# Patient Record
Sex: MALE | Race: WHITE | Employment: OTHER | URBAN - NONMETROPOLITAN AREA
[De-identification: names, ages, dates, MRNs, and addresses within clinical notes are randomized per-mention and may not be internally consistent; named-entity substitution may affect disease eponyms.]

---

## 2017-01-04 ENCOUNTER — OFFICE VISIT (OUTPATIENT)
Dept: OTHER | Age: 72
End: 2017-01-04

## 2017-01-04 VITALS
SYSTOLIC BLOOD PRESSURE: 118 MMHG | BODY MASS INDEX: 31.87 KG/M2 | DIASTOLIC BLOOD PRESSURE: 54 MMHG | WEIGHT: 215.2 LBS | HEIGHT: 69 IN

## 2017-01-04 DIAGNOSIS — E11.59 TYPE 2 DIABETES MELLITUS WITH OTHER CIRCULATORY COMPLICATION, WITH LONG-TERM CURRENT USE OF INSULIN (HCC): Primary | ICD-10-CM

## 2017-01-04 DIAGNOSIS — Z79.4 TYPE 2 DIABETES MELLITUS WITH OTHER CIRCULATORY COMPLICATION, WITH LONG-TERM CURRENT USE OF INSULIN (HCC): Primary | ICD-10-CM

## 2017-01-04 PROCEDURE — G0108 DIAB MANAGE TRN  PER INDIV: HCPCS | Performed by: REGISTERED NURSE

## 2017-01-05 ENCOUNTER — OFFICE VISIT (OUTPATIENT)
Dept: PULMONOLOGY | Age: 72
End: 2017-01-05

## 2017-01-05 VITALS
OXYGEN SATURATION: 98 % | WEIGHT: 216.2 LBS | HEART RATE: 63 BPM | BODY MASS INDEX: 32.02 KG/M2 | DIASTOLIC BLOOD PRESSURE: 55 MMHG | SYSTOLIC BLOOD PRESSURE: 140 MMHG | HEIGHT: 69 IN

## 2017-01-05 DIAGNOSIS — G47.33 OSA ON CPAP: Primary | ICD-10-CM

## 2017-01-05 DIAGNOSIS — Z99.89 OSA ON CPAP: Primary | ICD-10-CM

## 2017-01-05 PROCEDURE — 99213 OFFICE O/P EST LOW 20 MIN: CPT | Performed by: INTERNAL MEDICINE

## 2017-01-18 DIAGNOSIS — Z79.4 TYPE 2 DIABETES MELLITUS WITH OTHER CIRCULATORY COMPLICATION, WITH LONG-TERM CURRENT USE OF INSULIN (HCC): Primary | ICD-10-CM

## 2017-01-18 DIAGNOSIS — E11.59 TYPE 2 DIABETES MELLITUS WITH OTHER CIRCULATORY COMPLICATION, WITH LONG-TERM CURRENT USE OF INSULIN (HCC): Primary | ICD-10-CM

## 2017-02-03 RX ORDER — METOPROLOL SUCCINATE 50 MG/1
TABLET, EXTENDED RELEASE ORAL
Qty: 90 TABLET | Refills: 3 | Status: SHIPPED | OUTPATIENT
Start: 2017-02-03 | End: 2018-01-18 | Stop reason: SDUPTHER

## 2017-02-28 ENCOUNTER — OFFICE VISIT (OUTPATIENT)
Dept: INTERNAL MEDICINE | Age: 72
End: 2017-02-28

## 2017-02-28 VITALS
SYSTOLIC BLOOD PRESSURE: 150 MMHG | HEART RATE: 66 BPM | OXYGEN SATURATION: 96 % | HEIGHT: 69 IN | WEIGHT: 218 LBS | BODY MASS INDEX: 32.29 KG/M2 | DIASTOLIC BLOOD PRESSURE: 56 MMHG

## 2017-02-28 DIAGNOSIS — N20.0 NEPHROLITHIASIS: ICD-10-CM

## 2017-02-28 DIAGNOSIS — I70.1 RENAL ARTERY STENOSIS (HCC): ICD-10-CM

## 2017-02-28 DIAGNOSIS — Z86.79 H/O CLASS II ANGINA PECTORIS: Primary | ICD-10-CM

## 2017-02-28 DIAGNOSIS — I10 ESSENTIAL HYPERTENSION: ICD-10-CM

## 2017-02-28 DIAGNOSIS — I48.0 PAROXYSMAL ATRIAL FIBRILLATION (HCC): ICD-10-CM

## 2017-02-28 DIAGNOSIS — E78.2 MIXED HYPERLIPIDEMIA: ICD-10-CM

## 2017-02-28 DIAGNOSIS — N18.2 CHRONIC KIDNEY DISEASE, STAGE 2 (MILD): ICD-10-CM

## 2017-02-28 DIAGNOSIS — Z98.890 H/O RADIOFREQUENCY ABLATION FOR COMPLEX LEFT ATRIAL ARRHYTHMIA: ICD-10-CM

## 2017-02-28 DIAGNOSIS — Z79.4 TYPE 2 DIABETES MELLITUS WITH OTHER CIRCULATORY COMPLICATION, WITH LONG-TERM CURRENT USE OF INSULIN (HCC): ICD-10-CM

## 2017-02-28 DIAGNOSIS — R55 SYNCOPE AND COLLAPSE: ICD-10-CM

## 2017-02-28 DIAGNOSIS — G47.33 OBSTRUCTIVE SLEEP APNEA ON CPAP: ICD-10-CM

## 2017-02-28 DIAGNOSIS — Z86.79 H/O RADIOFREQUENCY ABLATION FOR COMPLEX LEFT ATRIAL ARRHYTHMIA: ICD-10-CM

## 2017-02-28 DIAGNOSIS — R00.2 PALPITATIONS: ICD-10-CM

## 2017-02-28 DIAGNOSIS — Z99.89 OBSTRUCTIVE SLEEP APNEA ON CPAP: ICD-10-CM

## 2017-02-28 DIAGNOSIS — E11.59 TYPE 2 DIABETES MELLITUS WITH OTHER CIRCULATORY COMPLICATION, WITH LONG-TERM CURRENT USE OF INSULIN (HCC): ICD-10-CM

## 2017-02-28 DIAGNOSIS — M51.9 LUMBAR DISC DISEASE: ICD-10-CM

## 2017-02-28 DIAGNOSIS — I25.10 CORONARY ARTERY DISEASE INVOLVING NATIVE CORONARY ARTERY OF NATIVE HEART WITHOUT ANGINA PECTORIS: ICD-10-CM

## 2017-02-28 DIAGNOSIS — I25.118 CORONARY ARTERY DISEASE OF NATIVE HEART WITH STABLE ANGINA PECTORIS, UNSPECIFIED VESSEL OR LESION TYPE (HCC): ICD-10-CM

## 2017-02-28 DIAGNOSIS — I49.3 PVC (PREMATURE VENTRICULAR CONTRACTION): ICD-10-CM

## 2017-02-28 DIAGNOSIS — I65.21 STENOSIS OF RIGHT CAROTID ARTERY: ICD-10-CM

## 2017-02-28 DIAGNOSIS — Z95.1 S/P CABG (CORONARY ARTERY BYPASS GRAFT): ICD-10-CM

## 2017-02-28 DIAGNOSIS — D12.6 ADENOMATOUS COLON POLYP: ICD-10-CM

## 2017-02-28 PROCEDURE — 1036F TOBACCO NON-USER: CPT | Performed by: INTERNAL MEDICINE

## 2017-02-28 PROCEDURE — 1123F ACP DISCUSS/DSCN MKR DOCD: CPT | Performed by: INTERNAL MEDICINE

## 2017-02-28 PROCEDURE — 3044F HG A1C LEVEL LT 7.0%: CPT | Performed by: INTERNAL MEDICINE

## 2017-02-28 PROCEDURE — 3017F COLORECTAL CA SCREEN DOC REV: CPT | Performed by: INTERNAL MEDICINE

## 2017-02-28 PROCEDURE — G8427 DOCREV CUR MEDS BY ELIG CLIN: HCPCS | Performed by: INTERNAL MEDICINE

## 2017-02-28 PROCEDURE — G8417 CALC BMI ABV UP PARAM F/U: HCPCS | Performed by: INTERNAL MEDICINE

## 2017-02-28 PROCEDURE — G8598 ASA/ANTIPLAT THER USED: HCPCS | Performed by: INTERNAL MEDICINE

## 2017-02-28 PROCEDURE — 99213 OFFICE O/P EST LOW 20 MIN: CPT | Performed by: INTERNAL MEDICINE

## 2017-02-28 PROCEDURE — 4040F PNEUMOC VAC/ADMIN/RCVD: CPT | Performed by: INTERNAL MEDICINE

## 2017-02-28 PROCEDURE — G8484 FLU IMMUNIZE NO ADMIN: HCPCS | Performed by: INTERNAL MEDICINE

## 2017-02-28 RX ORDER — ATORVASTATIN CALCIUM 40 MG/1
TABLET, FILM COATED ORAL
Qty: 30 TABLET | Refills: 11 | Status: SHIPPED | OUTPATIENT
Start: 2017-02-28 | End: 2018-01-18 | Stop reason: SDUPTHER

## 2017-03-06 ENCOUNTER — TELEPHONE (OUTPATIENT)
Dept: INTERNAL MEDICINE | Age: 72
End: 2017-03-06

## 2017-03-07 RX ORDER — APIXABAN 5 MG/1
TABLET, FILM COATED ORAL
Qty: 180 TABLET | Refills: 3 | Status: SHIPPED | OUTPATIENT
Start: 2017-03-07 | End: 2018-01-18 | Stop reason: SDUPTHER

## 2017-03-07 RX ORDER — LISINOPRIL 10 MG/1
TABLET ORAL
Qty: 180 TABLET | Refills: 1 | Status: SHIPPED | OUTPATIENT
Start: 2017-03-07 | End: 2017-09-23 | Stop reason: SDUPTHER

## 2017-03-13 RX ORDER — LANCETS
EACH MISCELLANEOUS
Qty: 400 EACH | Refills: 3 | Status: SHIPPED | OUTPATIENT
Start: 2017-03-13 | End: 2018-04-06 | Stop reason: SDUPTHER

## 2017-04-12 RX ORDER — INSULIN DETEMIR 100 [IU]/ML
INJECTION, SOLUTION SUBCUTANEOUS
Qty: 105 ML | Refills: 0 | Status: SHIPPED | OUTPATIENT
Start: 2017-04-12 | End: 2017-05-16 | Stop reason: SDUPTHER

## 2017-05-02 RX ORDER — LANCETS
EACH MISCELLANEOUS
Qty: 400 EACH | Refills: 3 | OUTPATIENT
Start: 2017-05-02

## 2017-05-10 ENCOUNTER — OFFICE VISIT (OUTPATIENT)
Dept: FAMILY MEDICINE CLINIC | Age: 72
End: 2017-05-10

## 2017-05-10 VITALS
HEART RATE: 68 BPM | BODY MASS INDEX: 31.73 KG/M2 | DIASTOLIC BLOOD PRESSURE: 60 MMHG | HEIGHT: 69 IN | RESPIRATION RATE: 16 BRPM | SYSTOLIC BLOOD PRESSURE: 142 MMHG | WEIGHT: 214.25 LBS

## 2017-05-10 DIAGNOSIS — E11.59 TYPE 2 DIABETES MELLITUS WITH OTHER CIRCULATORY COMPLICATION, WITH LONG-TERM CURRENT USE OF INSULIN (HCC): Primary | ICD-10-CM

## 2017-05-10 DIAGNOSIS — Z79.4 TYPE 2 DIABETES MELLITUS WITH OTHER CIRCULATORY COMPLICATION, WITH LONG-TERM CURRENT USE OF INSULIN (HCC): Primary | ICD-10-CM

## 2017-05-10 DIAGNOSIS — Z12.11 SCREEN FOR COLON CANCER: ICD-10-CM

## 2017-05-10 PROCEDURE — 99214 OFFICE O/P EST MOD 30 MIN: CPT | Performed by: FAMILY MEDICINE

## 2017-05-10 ASSESSMENT — ENCOUNTER SYMPTOMS
SHORTNESS OF BREATH: 1
CONSTIPATION: 0
SINUS PRESSURE: 0
BACK PAIN: 1

## 2017-05-10 ASSESSMENT — PATIENT HEALTH QUESTIONNAIRE - PHQ9
1. LITTLE INTEREST OR PLEASURE IN DOING THINGS: 0
2. FEELING DOWN, DEPRESSED OR HOPELESS: 0
SUM OF ALL RESPONSES TO PHQ9 QUESTIONS 1 & 2: 0
SUM OF ALL RESPONSES TO PHQ QUESTIONS 1-9: 0

## 2017-05-16 ENCOUNTER — OFFICE VISIT (OUTPATIENT)
Dept: OTHER | Age: 72
End: 2017-05-16

## 2017-05-16 VITALS
BODY MASS INDEX: 31.75 KG/M2 | DIASTOLIC BLOOD PRESSURE: 59 MMHG | WEIGHT: 215 LBS | HEART RATE: 59 BPM | SYSTOLIC BLOOD PRESSURE: 133 MMHG

## 2017-05-16 DIAGNOSIS — Z79.4 TYPE 2 DIABETES MELLITUS WITH OTHER CIRCULATORY COMPLICATION, WITH LONG-TERM CURRENT USE OF INSULIN (HCC): Primary | ICD-10-CM

## 2017-05-16 DIAGNOSIS — E11.59 TYPE 2 DIABETES MELLITUS WITH OTHER CIRCULATORY COMPLICATION, WITH LONG-TERM CURRENT USE OF INSULIN (HCC): Primary | ICD-10-CM

## 2017-05-16 ASSESSMENT — ENCOUNTER SYMPTOMS
CONSTIPATION: 0
DIARRHEA: 0
SHORTNESS OF BREATH: 0
BLOOD IN STOOL: 0

## 2017-06-28 ENCOUNTER — TELEPHONE (OUTPATIENT)
Dept: INTERNAL MEDICINE | Age: 72
End: 2017-06-28

## 2017-07-17 ENCOUNTER — HOSPITAL ENCOUNTER (OUTPATIENT)
Dept: PHARMACY | Age: 72
Setting detail: THERAPIES SERIES
Discharge: HOME | End: 2017-07-17
Payer: MEDICARE

## 2017-07-17 VITALS
SYSTOLIC BLOOD PRESSURE: 136 MMHG | DIASTOLIC BLOOD PRESSURE: 54 MMHG | HEIGHT: 69 IN | BODY MASS INDEX: 31.49 KG/M2 | WEIGHT: 212.6 LBS

## 2017-07-17 PROCEDURE — G0108 DIAB MANAGE TRN  PER INDIV: HCPCS | Performed by: REGISTERED NURSE

## 2017-08-01 RX ORDER — INSULIN DETEMIR 100 [IU]/ML
INJECTION, SOLUTION SUBCUTANEOUS
Qty: 105 ML | Refills: 3 | Status: SHIPPED | OUTPATIENT
Start: 2017-08-01 | End: 2017-08-03 | Stop reason: SDUPTHER

## 2017-08-01 RX ORDER — INSULIN LISPRO 100 [IU]/ML
INJECTION, SOLUTION INTRAVENOUS; SUBCUTANEOUS
Qty: 45 ML | Refills: 3 | Status: SHIPPED | OUTPATIENT
Start: 2017-08-01 | End: 2018-01-23 | Stop reason: DRUGHIGH

## 2017-08-03 ENCOUNTER — OFFICE VISIT (OUTPATIENT)
Dept: INTERNAL MEDICINE CLINIC | Age: 72
End: 2017-08-03
Payer: MEDICARE

## 2017-08-03 VITALS
DIASTOLIC BLOOD PRESSURE: 60 MMHG | SYSTOLIC BLOOD PRESSURE: 146 MMHG | BODY MASS INDEX: 32.36 KG/M2 | OXYGEN SATURATION: 98 % | HEART RATE: 68 BPM | HEIGHT: 69 IN | WEIGHT: 218.5 LBS

## 2017-08-03 DIAGNOSIS — Z86.79 H/O RADIOFREQUENCY ABLATION FOR COMPLEX LEFT ATRIAL ARRHYTHMIA: ICD-10-CM

## 2017-08-03 DIAGNOSIS — I25.10 CORONARY ARTERY DISEASE INVOLVING NATIVE CORONARY ARTERY OF NATIVE HEART WITHOUT ANGINA PECTORIS: Primary | ICD-10-CM

## 2017-08-03 DIAGNOSIS — E78.00 PURE HYPERCHOLESTEROLEMIA: ICD-10-CM

## 2017-08-03 DIAGNOSIS — Z79.4 TYPE 2 DIABETES MELLITUS WITH MICROALBUMINURIA, WITH LONG-TERM CURRENT USE OF INSULIN (HCC): ICD-10-CM

## 2017-08-03 DIAGNOSIS — R80.9 TYPE 2 DIABETES MELLITUS WITH MICROALBUMINURIA, WITH LONG-TERM CURRENT USE OF INSULIN (HCC): ICD-10-CM

## 2017-08-03 DIAGNOSIS — G47.33 OBSTRUCTIVE SLEEP APNEA ON CPAP: ICD-10-CM

## 2017-08-03 DIAGNOSIS — E11.29 TYPE 2 DIABETES MELLITUS WITH MICROALBUMINURIA, WITH LONG-TERM CURRENT USE OF INSULIN (HCC): ICD-10-CM

## 2017-08-03 DIAGNOSIS — Z98.890 H/O RADIOFREQUENCY ABLATION FOR COMPLEX LEFT ATRIAL ARRHYTHMIA: ICD-10-CM

## 2017-08-03 DIAGNOSIS — Z99.89 OBSTRUCTIVE SLEEP APNEA ON CPAP: ICD-10-CM

## 2017-08-03 DIAGNOSIS — Z95.1 S/P CABG (CORONARY ARTERY BYPASS GRAFT): ICD-10-CM

## 2017-08-03 PROBLEM — E11.9 TYPE 2 DIABETES MELLITUS WITHOUT COMPLICATION, WITHOUT LONG-TERM CURRENT USE OF INSULIN (HCC): Status: ACTIVE | Noted: 2017-08-03

## 2017-08-03 PROCEDURE — 1036F TOBACCO NON-USER: CPT | Performed by: INTERNAL MEDICINE

## 2017-08-03 PROCEDURE — G8598 ASA/ANTIPLAT THER USED: HCPCS | Performed by: INTERNAL MEDICINE

## 2017-08-03 PROCEDURE — 4040F PNEUMOC VAC/ADMIN/RCVD: CPT | Performed by: INTERNAL MEDICINE

## 2017-08-03 PROCEDURE — 99213 OFFICE O/P EST LOW 20 MIN: CPT | Performed by: INTERNAL MEDICINE

## 2017-08-03 PROCEDURE — 3046F HEMOGLOBIN A1C LEVEL >9.0%: CPT | Performed by: INTERNAL MEDICINE

## 2017-08-03 PROCEDURE — G8427 DOCREV CUR MEDS BY ELIG CLIN: HCPCS | Performed by: INTERNAL MEDICINE

## 2017-08-03 PROCEDURE — 1123F ACP DISCUSS/DSCN MKR DOCD: CPT | Performed by: INTERNAL MEDICINE

## 2017-08-03 PROCEDURE — G8417 CALC BMI ABV UP PARAM F/U: HCPCS | Performed by: INTERNAL MEDICINE

## 2017-08-03 PROCEDURE — 3017F COLORECTAL CA SCREEN DOC REV: CPT | Performed by: INTERNAL MEDICINE

## 2017-08-03 RX ORDER — NITROGLYCERIN 400 UG/1
1 SPRAY ORAL EVERY 5 MIN PRN
Qty: 1 BOTTLE | Refills: 6 | Status: SHIPPED | OUTPATIENT
Start: 2017-08-03 | End: 2018-10-03 | Stop reason: SDUPTHER

## 2017-09-16 ENCOUNTER — HOSPITAL ENCOUNTER (OUTPATIENT)
Age: 72
Discharge: HOME | End: 2017-09-16
Payer: MEDICARE

## 2017-09-16 LAB
ALBUMIN SERPL-MCNC: 4.3 G/DL (ref 3.5–5.1)
ALP BLD-CCNC: 67 U/L (ref 38–126)
ALT SERPL-CCNC: 21 U/L (ref 11–66)
ANION GAP SERPL CALCULATED.3IONS-SCNC: 12 MEQ/L (ref 8–16)
AST SERPL-CCNC: 20 U/L (ref 5–40)
BILIRUB SERPL-MCNC: 0.5 MG/DL (ref 0.3–1.2)
BILIRUBIN DIRECT: < 0.2 MG/DL (ref 0–0.3)
BUN BLDV-MCNC: 19 MG/DL (ref 7–22)
CALCIUM SERPL-MCNC: 9.8 MG/DL (ref 8.5–10.5)
CHLORIDE BLD-SCNC: 103 MEQ/L (ref 98–111)
CHOLESTEROL, TOTAL: 153 MG/DL (ref 100–199)
CO2: 26 MEQ/L (ref 23–33)
CREAT SERPL-MCNC: 1 MG/DL (ref 0.4–1.2)
CREATININE, URINE: 52.9 MG/DL
GFR SERPL CREATININE-BSD FRML MDRD: 73 ML/MIN/1.73M2
GLUCOSE BLD-MCNC: 93 MG/DL (ref 70–108)
HCT VFR BLD CALC: 38.6 % (ref 42–52)
HDLC SERPL-MCNC: 31 MG/DL
HEMOGLOBIN: 13.2 GM/DL (ref 14–18)
LDL CHOLESTEROL CALCULATED: 78 MG/DL
MCH RBC QN AUTO: 31.9 PG (ref 27–31)
MCHC RBC AUTO-ENTMCNC: 34.2 GM/DL (ref 33–37)
MCV RBC AUTO: 93.3 FL (ref 80–94)
MICROALBUMIN UR-MCNC: 18.11 MG/DL
MICROALBUMIN/CREAT UR-RTO: 342 MG/G (ref 0–30)
PDW BLD-RTO: 14.4 % (ref 11.5–14.5)
PLATELET # BLD: 242 THOU/MM3 (ref 130–400)
PMV BLD AUTO: 7.7 MCM (ref 7.4–10.4)
POTASSIUM SERPL-SCNC: 4.3 MEQ/L (ref 3.5–5.2)
RBC # BLD: 4.13 MILL/MM3 (ref 4.7–6.1)
SODIUM BLD-SCNC: 141 MEQ/L (ref 135–145)
TOTAL PROTEIN: 7.3 G/DL (ref 6.1–8)
TRIGL SERPL-MCNC: 221 MG/DL (ref 0–199)
WBC # BLD: 8.9 THOU/MM3 (ref 4.8–10.8)

## 2017-09-16 PROCEDURE — 80061 LIPID PANEL: CPT

## 2017-09-16 PROCEDURE — 85027 COMPLETE CBC AUTOMATED: CPT

## 2017-09-16 PROCEDURE — 82248 BILIRUBIN DIRECT: CPT

## 2017-09-16 PROCEDURE — 82043 UR ALBUMIN QUANTITATIVE: CPT

## 2017-09-16 PROCEDURE — 80053 COMPREHEN METABOLIC PANEL: CPT

## 2017-09-16 PROCEDURE — 36415 COLL VENOUS BLD VENIPUNCTURE: CPT

## 2017-09-18 ENCOUNTER — OFFICE VISIT (OUTPATIENT)
Dept: NEPHROLOGY | Age: 72
End: 2017-09-18
Payer: MEDICARE

## 2017-09-18 VITALS
BODY MASS INDEX: 31.9 KG/M2 | HEART RATE: 86 BPM | SYSTOLIC BLOOD PRESSURE: 134 MMHG | WEIGHT: 216 LBS | OXYGEN SATURATION: 97 % | DIASTOLIC BLOOD PRESSURE: 80 MMHG

## 2017-09-18 DIAGNOSIS — I50.32 HEART FAILURE, DIASTOLIC, CHRONIC (HCC): ICD-10-CM

## 2017-09-18 PROCEDURE — G8598 ASA/ANTIPLAT THER USED: HCPCS | Performed by: INTERNAL MEDICINE

## 2017-09-18 PROCEDURE — 1123F ACP DISCUSS/DSCN MKR DOCD: CPT | Performed by: INTERNAL MEDICINE

## 2017-09-18 PROCEDURE — 4040F PNEUMOC VAC/ADMIN/RCVD: CPT | Performed by: INTERNAL MEDICINE

## 2017-09-18 PROCEDURE — 3017F COLORECTAL CA SCREEN DOC REV: CPT | Performed by: INTERNAL MEDICINE

## 2017-09-18 PROCEDURE — G8417 CALC BMI ABV UP PARAM F/U: HCPCS | Performed by: INTERNAL MEDICINE

## 2017-09-18 PROCEDURE — G8427 DOCREV CUR MEDS BY ELIG CLIN: HCPCS | Performed by: INTERNAL MEDICINE

## 2017-09-18 PROCEDURE — 99213 OFFICE O/P EST LOW 20 MIN: CPT | Performed by: INTERNAL MEDICINE

## 2017-09-18 PROCEDURE — 1036F TOBACCO NON-USER: CPT | Performed by: INTERNAL MEDICINE

## 2017-09-18 ASSESSMENT — ENCOUNTER SYMPTOMS
WHEEZING: 0
SHORTNESS OF BREATH: 0
ABDOMINAL PAIN: 0
DIARRHEA: 0
BACK PAIN: 0
VOMITING: 0
FACIAL SWELLING: 0
NAUSEA: 0
COUGH: 0
CONSTIPATION: 0
BLOOD IN STOOL: 0
CHEST TIGHTNESS: 0
ABDOMINAL DISTENTION: 0
GASTROINTESTINAL NEGATIVE: 1

## 2017-09-26 RX ORDER — LISINOPRIL 10 MG/1
TABLET ORAL
Qty: 180 TABLET | Refills: 1 | Status: SHIPPED | OUTPATIENT
Start: 2017-09-26 | End: 2018-03-30 | Stop reason: SDUPTHER

## 2017-10-17 ENCOUNTER — HOSPITAL ENCOUNTER (OUTPATIENT)
Dept: PHARMACY | Age: 72
Setting detail: THERAPIES SERIES
Discharge: HOME | End: 2017-10-17
Payer: MEDICARE

## 2017-10-17 VITALS
HEART RATE: 66 BPM | BODY MASS INDEX: 32.25 KG/M2 | WEIGHT: 218.4 LBS | DIASTOLIC BLOOD PRESSURE: 68 MMHG | SYSTOLIC BLOOD PRESSURE: 158 MMHG

## 2017-10-17 PROCEDURE — 99213 OFFICE O/P EST LOW 20 MIN: CPT

## 2017-10-17 NOTE — PROGRESS NOTES
to bring in blood sugar diary at next visit. Follow up in 3 months or as needed.     Follow-up:   CDE in 3 months (get new HbA1c at this point)

## 2017-10-20 RX ORDER — DILTIAZEM HYDROCHLORIDE 120 MG/1
CAPSULE, EXTENDED RELEASE ORAL
Qty: 90 CAPSULE | Refills: 3 | Status: SHIPPED | OUTPATIENT
Start: 2017-10-20 | End: 2018-04-02 | Stop reason: ALTCHOICE

## 2018-01-18 ENCOUNTER — HOSPITAL ENCOUNTER (OUTPATIENT)
Age: 73
Discharge: HOME | End: 2018-01-18
Payer: MEDICARE

## 2018-01-18 ENCOUNTER — OFFICE VISIT (OUTPATIENT)
Dept: INTERNAL MEDICINE CLINIC | Age: 73
End: 2018-01-18
Payer: MEDICARE

## 2018-01-18 VITALS
BODY MASS INDEX: 31.55 KG/M2 | SYSTOLIC BLOOD PRESSURE: 158 MMHG | HEIGHT: 69 IN | HEART RATE: 66 BPM | DIASTOLIC BLOOD PRESSURE: 44 MMHG | WEIGHT: 213 LBS

## 2018-01-18 DIAGNOSIS — N18.2 CKD (CHRONIC KIDNEY DISEASE) STAGE 2, GFR 60-89 ML/MIN: ICD-10-CM

## 2018-01-18 DIAGNOSIS — Z79.4 TYPE 2 DIABETES MELLITUS WITH MICROALBUMINURIA, WITH LONG-TERM CURRENT USE OF INSULIN (HCC): ICD-10-CM

## 2018-01-18 DIAGNOSIS — N18.2 STAGE 2 CHRONIC KIDNEY DISEASE: ICD-10-CM

## 2018-01-18 DIAGNOSIS — R80.9 TYPE 2 DIABETES MELLITUS WITH MICROALBUMINURIA, WITH LONG-TERM CURRENT USE OF INSULIN (HCC): ICD-10-CM

## 2018-01-18 DIAGNOSIS — E11.29 TYPE 2 DIABETES MELLITUS WITH MICROALBUMINURIA, WITH LONG-TERM CURRENT USE OF INSULIN (HCC): ICD-10-CM

## 2018-01-18 DIAGNOSIS — I48.0 PAROXYSMAL ATRIAL FIBRILLATION (HCC): ICD-10-CM

## 2018-01-18 DIAGNOSIS — I25.118 CORONARY ARTERY DISEASE OF NATIVE HEART WITH STABLE ANGINA PECTORIS, UNSPECIFIED VESSEL OR LESION TYPE (HCC): Primary | ICD-10-CM

## 2018-01-18 LAB
CREAT SERPL-MCNC: 1.2 MG/DL (ref 0.4–1.2)
GFR SERPL CREATININE-BSD FRML MDRD: 59 ML/MIN/1.73M2

## 2018-01-18 PROCEDURE — G8417 CALC BMI ABV UP PARAM F/U: HCPCS | Performed by: INTERNAL MEDICINE

## 2018-01-18 PROCEDURE — G8427 DOCREV CUR MEDS BY ELIG CLIN: HCPCS | Performed by: INTERNAL MEDICINE

## 2018-01-18 PROCEDURE — 4040F PNEUMOC VAC/ADMIN/RCVD: CPT | Performed by: INTERNAL MEDICINE

## 2018-01-18 PROCEDURE — 36415 COLL VENOUS BLD VENIPUNCTURE: CPT

## 2018-01-18 PROCEDURE — G8598 ASA/ANTIPLAT THER USED: HCPCS | Performed by: INTERNAL MEDICINE

## 2018-01-18 PROCEDURE — 3046F HEMOGLOBIN A1C LEVEL >9.0%: CPT | Performed by: INTERNAL MEDICINE

## 2018-01-18 PROCEDURE — 3017F COLORECTAL CA SCREEN DOC REV: CPT | Performed by: INTERNAL MEDICINE

## 2018-01-18 PROCEDURE — 93000 ELECTROCARDIOGRAM COMPLETE: CPT | Performed by: INTERNAL MEDICINE

## 2018-01-18 PROCEDURE — 1036F TOBACCO NON-USER: CPT | Performed by: INTERNAL MEDICINE

## 2018-01-18 PROCEDURE — G8482 FLU IMMUNIZE ORDER/ADMIN: HCPCS | Performed by: INTERNAL MEDICINE

## 2018-01-18 PROCEDURE — 82565 ASSAY OF CREATININE: CPT

## 2018-01-18 PROCEDURE — 1123F ACP DISCUSS/DSCN MKR DOCD: CPT | Performed by: INTERNAL MEDICINE

## 2018-01-18 PROCEDURE — 99213 OFFICE O/P EST LOW 20 MIN: CPT | Performed by: INTERNAL MEDICINE

## 2018-01-18 RX ORDER — ISOSORBIDE MONONITRATE 120 MG/1
120 TABLET, EXTENDED RELEASE ORAL DAILY
Status: ON HOLD | COMMUNITY
Start: 2017-12-21 | End: 2021-07-21 | Stop reason: HOSPADM

## 2018-01-18 RX ORDER — ATORVASTATIN CALCIUM 40 MG/1
TABLET, FILM COATED ORAL
Qty: 90 TABLET | Refills: 3 | Status: SHIPPED | OUTPATIENT
Start: 2018-01-18 | End: 2018-01-23 | Stop reason: DRUGHIGH

## 2018-01-18 RX ORDER — METOPROLOL SUCCINATE 50 MG/1
TABLET, EXTENDED RELEASE ORAL
Qty: 90 TABLET | Refills: 3 | Status: SHIPPED | OUTPATIENT
Start: 2018-01-18 | End: 2019-01-04 | Stop reason: SDUPTHER

## 2018-01-18 RX ORDER — RANOLAZINE 500 MG/1
500 TABLET, EXTENDED RELEASE ORAL 2 TIMES DAILY
COMMUNITY
End: 2021-06-25 | Stop reason: SDUPTHER

## 2018-01-18 NOTE — PROGRESS NOTES
DAILY 90 tablet 3    CARTIA  MG extended release capsule TAKE 1 CAPSULE DAILY 90 capsule 3    insulin detemir (LEVEMIR FLEXTOUCH) 100 UNIT/ML injection pen Inject into skin 52 units in the morning and 54 units in the evening. 105 mL 0    lisinopril (PRINIVIL;ZESTRIL) 10 MG tablet TAKE 1 TABLET TWICE A  tablet 1    nitroGLYCERIN (NITROLINGUAL) 0.4 MG/SPRAY 0.4 mg spray Place 1 spray under the tongue every 5 minutes as needed for Chest pain 1 Bottle 6    NOVOFINE 30G X 8 MM MISC USE 1 NEW NEEDLE 6 TIMES A DAY AND AS NEEDED 600 each 3    HUMALOG KWIKPEN 100 UNIT/ML pen INJECT 10 UNITS BEFORE BREAKFAST AND 5 UNITS WITH LUNCH AND SUPPER (Patient taking differently: 7 u with breakfast and lunch and 10 u with supper) 45 mL 3    metFORMIN (GLUCOPHAGE) 1000 MG tablet TAKE 2 TABLETS DAILY (SHOULD BE SEEN FOR MORE) 180 tablet 2    Accu-Chek Softclix Lancets MISC CHECK THE GLUCOSE FOUR TIMES DAILY E11.59 400 each 3    glucose blood VI test strips (ACCU-CHEK COMPACT PLUS) strip Dx E11.59, check the glucose 4 times daily 408 strip 3    aspirin EC 81 MG EC tablet Take 81 mg by mouth daily.  Multiple Vitamin (MULTIVITAMIN PO) Take 1 tablet by mouth daily. No current facility-administered medications for this visit. No Known Allergies    I have reviewed the patient's medications, as well as, their past medical, social, and family history as appropriate. Review of Systems - General ROS: negative  Psychological ROS: negative  Hematological and Lymphatic ROS: No history of blood clots or bleeding disorder.    Respiratory ROS: positive for - shortness of breath  Cardiovascular ROS: positive for - chest discomfort with cold exposure and activity  Gastrointestinal ROS: no abdominal pain, change in bowel habits, or black or bloody stools  Genito-Urinary ROS: no dysuria, trouble voiding, or hematuria  Musculoskeletal ROS: negative  Neurological ROS: no TIA or stroke symptoms  Prior CEA; see Tamie Carroll M.D.

## 2018-01-19 ENCOUNTER — TELEPHONE (OUTPATIENT)
Dept: INTERNAL MEDICINE CLINIC | Age: 73
End: 2018-01-19

## 2018-01-23 RX ORDER — ATORVASTATIN CALCIUM 40 MG/1
40 TABLET, FILM COATED ORAL NIGHTLY
COMMUNITY
End: 2018-05-04 | Stop reason: SDUPTHER

## 2018-01-26 ENCOUNTER — APPOINTMENT (OUTPATIENT)
Dept: CARDIAC CATH/INVASIVE PROCEDURES | Age: 73
End: 2018-01-26
Payer: MEDICARE

## 2018-01-26 ENCOUNTER — HOSPITAL ENCOUNTER (OUTPATIENT)
Dept: INPATIENT UNIT | Age: 73
Discharge: HOME | End: 2018-01-26
Attending: INTERNAL MEDICINE | Admitting: INTERNAL MEDICINE
Payer: MEDICARE

## 2018-01-26 VITALS
HEART RATE: 54 BPM | DIASTOLIC BLOOD PRESSURE: 57 MMHG | SYSTOLIC BLOOD PRESSURE: 127 MMHG | HEIGHT: 69 IN | TEMPERATURE: 97.6 F | WEIGHT: 210 LBS | RESPIRATION RATE: 14 BRPM | BODY MASS INDEX: 31.1 KG/M2 | OXYGEN SATURATION: 97 %

## 2018-01-26 PROBLEM — I25.10 CAD IN NATIVE ARTERY: Status: ACTIVE | Noted: 2018-01-26

## 2018-01-26 PROBLEM — Z82.49 FAMILY HISTORY OF CLASS III ANGINA PECTORIS: Status: ACTIVE | Noted: 2018-01-26

## 2018-01-26 LAB
ABO: NORMAL
ALBUMIN SERPL-MCNC: 4 G/DL (ref 3.5–5.1)
ALP BLD-CCNC: 71 U/L (ref 38–126)
ALT SERPL-CCNC: 16 U/L (ref 11–66)
ANION GAP SERPL CALCULATED.3IONS-SCNC: 14 MEQ/L (ref 8–16)
ANTIBODY SCREEN: NORMAL
AST SERPL-CCNC: 17 U/L (ref 5–40)
BILIRUB SERPL-MCNC: 0.4 MG/DL (ref 0.3–1.2)
BUN BLDV-MCNC: 20 MG/DL (ref 7–22)
CALCIUM SERPL-MCNC: 9.6 MG/DL (ref 8.5–10.5)
CHLORIDE BLD-SCNC: 101 MEQ/L (ref 98–111)
CHOLESTEROL, TOTAL: 137 MG/DL (ref 100–199)
CO2: 24 MEQ/L (ref 23–33)
CREAT SERPL-MCNC: 1.2 MG/DL (ref 0.4–1.2)
EKG ATRIAL RATE: 63 BPM
EKG P AXIS: 91 DEGREES
EKG P-R INTERVAL: 210 MS
EKG Q-T INTERVAL: 454 MS
EKG QRS DURATION: 146 MS
EKG QTC CALCULATION (BAZETT): 464 MS
EKG R AXIS: 6 DEGREES
EKG T AXIS: 29 DEGREES
EKG VENTRICULAR RATE: 63 BPM
GFR SERPL CREATININE-BSD FRML MDRD: 59 ML/MIN/1.73M2
GLUCOSE BLD-MCNC: 114 MG/DL (ref 70–108)
HCT VFR BLD CALC: 38.4 % (ref 42–52)
HDLC SERPL-MCNC: 31 MG/DL
HEMOGLOBIN: 13.1 GM/DL (ref 14–18)
LDL CHOLESTEROL CALCULATED: 74 MG/DL
MCH RBC QN AUTO: 32 PG (ref 27–31)
MCHC RBC AUTO-ENTMCNC: 34.1 GM/DL (ref 33–37)
MCV RBC AUTO: 93.8 FL (ref 80–94)
PDW BLD-RTO: 14.4 % (ref 11.5–14.5)
PLATELET # BLD: 231 THOU/MM3 (ref 130–400)
PMV BLD AUTO: 8.3 MCM (ref 7.4–10.4)
POTASSIUM SERPL-SCNC: 4.4 MEQ/L (ref 3.5–5.2)
RBC # BLD: 4.09 MILL/MM3 (ref 4.7–6.1)
RH FACTOR: NORMAL
SODIUM BLD-SCNC: 139 MEQ/L (ref 135–145)
TOTAL PROTEIN: 6.7 G/DL (ref 6.1–8)
TRIGL SERPL-MCNC: 160 MG/DL (ref 0–199)
WBC # BLD: 7.2 THOU/MM3 (ref 4.8–10.8)

## 2018-01-26 PROCEDURE — C1769 GUIDE WIRE: HCPCS

## 2018-01-26 PROCEDURE — 80053 COMPREHEN METABOLIC PANEL: CPT

## 2018-01-26 PROCEDURE — 93005 ELECTROCARDIOGRAM TRACING: CPT

## 2018-01-26 PROCEDURE — 80061 LIPID PANEL: CPT

## 2018-01-26 PROCEDURE — 6360000002 HC RX W HCPCS

## 2018-01-26 PROCEDURE — 93459 L HRT ART/GRFT ANGIO: CPT | Performed by: INTERNAL MEDICINE

## 2018-01-26 PROCEDURE — A6258 TRANSPARENT FILM >16<=48 IN: HCPCS

## 2018-01-26 PROCEDURE — 96360 HYDRATION IV INFUSION INIT: CPT

## 2018-01-26 PROCEDURE — C1887 CATHETER, GUIDING: HCPCS

## 2018-01-26 PROCEDURE — 2500000003 HC RX 250 WO HCPCS

## 2018-01-26 PROCEDURE — 86850 RBC ANTIBODY SCREEN: CPT

## 2018-01-26 PROCEDURE — 2780000010 HC IMPLANT OTHER

## 2018-01-26 PROCEDURE — 85027 COMPLETE CBC AUTOMATED: CPT

## 2018-01-26 PROCEDURE — C1874 STENT, COATED/COV W/DEL SYS: HCPCS

## 2018-01-26 PROCEDURE — 96361 HYDRATE IV INFUSION ADD-ON: CPT

## 2018-01-26 PROCEDURE — 36415 COLL VENOUS BLD VENIPUNCTURE: CPT

## 2018-01-26 PROCEDURE — 86900 BLOOD TYPING SEROLOGIC ABO: CPT

## 2018-01-26 PROCEDURE — 6370000000 HC RX 637 (ALT 250 FOR IP)

## 2018-01-26 PROCEDURE — C9604 PERC D-E COR REVASC T CABG S: HCPCS | Performed by: INTERNAL MEDICINE

## 2018-01-26 PROCEDURE — C1894 INTRO/SHEATH, NON-LASER: HCPCS

## 2018-01-26 PROCEDURE — 86901 BLOOD TYPING SEROLOGIC RH(D): CPT

## 2018-01-26 PROCEDURE — C1760 CLOSURE DEV, VASC: HCPCS

## 2018-01-26 PROCEDURE — 2580000003 HC RX 258: Performed by: INTERNAL MEDICINE

## 2018-01-26 PROCEDURE — C1725 CATH, TRANSLUMIN NON-LASER: HCPCS

## 2018-01-26 RX ORDER — SODIUM CHLORIDE 0.9 % (FLUSH) 0.9 %
10 SYRINGE (ML) INJECTION PRN
Status: DISCONTINUED | OUTPATIENT
Start: 2018-01-26 | End: 2018-01-26 | Stop reason: HOSPADM

## 2018-01-26 RX ORDER — SODIUM CHLORIDE 9 MG/ML
INJECTION, SOLUTION INTRAVENOUS CONTINUOUS
Status: DISCONTINUED | OUTPATIENT
Start: 2018-01-26 | End: 2018-01-26 | Stop reason: ALTCHOICE

## 2018-01-26 RX ORDER — ASPIRIN 81 MG/1
81 TABLET ORAL DAILY
Status: DISCONTINUED | OUTPATIENT
Start: 2018-01-27 | End: 2018-01-26 | Stop reason: HOSPADM

## 2018-01-26 RX ORDER — ATROPINE SULFATE 0.4 MG/ML
0.5 AMPUL (ML) INJECTION
Status: DISCONTINUED | OUTPATIENT
Start: 2018-01-26 | End: 2018-01-26 | Stop reason: HOSPADM

## 2018-01-26 RX ORDER — ACETAMINOPHEN 325 MG/1
650 TABLET ORAL EVERY 4 HOURS PRN
Status: DISCONTINUED | OUTPATIENT
Start: 2018-01-26 | End: 2018-01-26 | Stop reason: HOSPADM

## 2018-01-26 RX ORDER — SODIUM CHLORIDE 9 MG/ML
INJECTION, SOLUTION INTRAVENOUS CONTINUOUS
Status: ACTIVE | OUTPATIENT
Start: 2018-01-26 | End: 2018-01-26

## 2018-01-26 RX ORDER — ONDANSETRON 2 MG/ML
4 INJECTION INTRAMUSCULAR; INTRAVENOUS EVERY 6 HOURS PRN
Status: DISCONTINUED | OUTPATIENT
Start: 2018-01-26 | End: 2018-01-26 | Stop reason: HOSPADM

## 2018-01-26 RX ORDER — SODIUM CHLORIDE 0.9 % (FLUSH) 0.9 %
10 SYRINGE (ML) INJECTION EVERY 12 HOURS SCHEDULED
Status: DISCONTINUED | OUTPATIENT
Start: 2018-01-26 | End: 2018-01-26 | Stop reason: HOSPADM

## 2018-01-26 RX ADMIN — SODIUM CHLORIDE: 9 INJECTION, SOLUTION INTRAVENOUS at 06:37

## 2018-01-26 NOTE — PLAN OF CARE
Problem: Discharge Planning:  Goal: Participates in care planning  Participates in care planning   Outcome: Met This Shift    Goal: Discharged to appropriate level of care  Discharged to appropriate level of care   Outcome: Met This Shift      Problem: Skin Integrity - Impaired:  Goal: Will show no infection signs and symptoms  Will show no infection signs and symptoms   Outcome: Met This Shift    Goal: Absence of new skin breakdown  Absence of new skin breakdown   Outcome: Met This Shift      Problem: Tissue Perfusion - Cardiopulmonary, Altered:  Goal: Absence of angina  Absence of angina   Outcome: Met This Shift

## 2018-01-26 NOTE — PROGRESS NOTES
Pt admitted to  2E ambulatory for cardiac cath  Pt NPO. Patient accompanied by wife. Vital signs obtained. Assessment and data collection initiated. Oriented to room. Policies and procedures for 2E explained   All questions answered with no further questions at this time. Fall prevention and safety precautions discussed with patient.

## 2018-01-26 NOTE — PROCEDURES
the  circ proximally. After that, the second obtuse marginal was totally  occluded. After that, the circumflex had subtotal stenosis at the origin  of a small PDA and posterolateral branch of the circumflex. The LAD has severe stenosis proximally with a severe stenosis at the origin  and the proximal portion of the diagonal artery and severe stenosis at the  distal diagonal artery. The diagonal artery becomes a small caliber artery  distally. The LAD was totally occluded in its mid course. The RCA was totally occluded at the ostium. The LIMA to the LAD was patent with good distal runoff. LAD itself is a  small caliber artery. It has diffuse nonobstructive disease distal to the  stented area. The saphenous vein graft to the OM was patent and the OM  divided into two moderate size branches which are tortuous, but they were  patent. The graft to the diagonal artery was totally occluded. LIMA to the LAD was patent with diffuse disease of the LAD distal to the  anastomosis site. The saphenous vein graft to the RCA was patent with nonobstructive disease  proximally and in its mid course a subtotal stenosis at the point of the  anastomosis. There is retrograde filling of the RCA and diffuse disease of  the posterolateral branch of the RCA, diffuse disease of the mid RCA and  distal RCA. The left ventriculogram showed a preserved systolic function of the left  ventricle and ejection fraction around 55%, mild diffuse hypokinesia of the  left ventricle. No mitral regurg. No gradient across the aortic valve. 3.  Intervention of the RCA through the vein graft. The RCA graft was cannulated with the right Amplatz guide catheter. Whisper wire was utilized, was placed in the distal end of the PDA of the  RCA. The area of severe stenosis was predilated utilizing 2.5, 12 mm TREK  balloon. This was inflated up to 8 atmospheric pressure for 15 seconds. It was then deflated and removed.   The

## 2018-02-07 ENCOUNTER — OFFICE VISIT (OUTPATIENT)
Dept: PULMONOLOGY | Age: 73
End: 2018-02-07
Payer: MEDICARE

## 2018-02-07 VITALS
HEIGHT: 69 IN | OXYGEN SATURATION: 98 % | DIASTOLIC BLOOD PRESSURE: 60 MMHG | BODY MASS INDEX: 31.55 KG/M2 | HEART RATE: 80 BPM | WEIGHT: 213 LBS | SYSTOLIC BLOOD PRESSURE: 124 MMHG

## 2018-02-07 DIAGNOSIS — G47.33 OBSTRUCTIVE SLEEP APNEA ON CPAP: Primary | ICD-10-CM

## 2018-02-07 DIAGNOSIS — Z99.89 OBSTRUCTIVE SLEEP APNEA ON CPAP: Primary | ICD-10-CM

## 2018-02-07 PROCEDURE — G8427 DOCREV CUR MEDS BY ELIG CLIN: HCPCS | Performed by: PHYSICIAN ASSISTANT

## 2018-02-07 PROCEDURE — 1123F ACP DISCUSS/DSCN MKR DOCD: CPT | Performed by: PHYSICIAN ASSISTANT

## 2018-02-07 PROCEDURE — 1036F TOBACCO NON-USER: CPT | Performed by: PHYSICIAN ASSISTANT

## 2018-02-07 PROCEDURE — 3017F COLORECTAL CA SCREEN DOC REV: CPT | Performed by: PHYSICIAN ASSISTANT

## 2018-02-07 PROCEDURE — G8598 ASA/ANTIPLAT THER USED: HCPCS | Performed by: PHYSICIAN ASSISTANT

## 2018-02-07 PROCEDURE — 99213 OFFICE O/P EST LOW 20 MIN: CPT | Performed by: PHYSICIAN ASSISTANT

## 2018-02-07 PROCEDURE — G8417 CALC BMI ABV UP PARAM F/U: HCPCS | Performed by: PHYSICIAN ASSISTANT

## 2018-02-07 PROCEDURE — G8482 FLU IMMUNIZE ORDER/ADMIN: HCPCS | Performed by: PHYSICIAN ASSISTANT

## 2018-02-07 PROCEDURE — 4040F PNEUMOC VAC/ADMIN/RCVD: CPT | Performed by: PHYSICIAN ASSISTANT

## 2018-02-07 ASSESSMENT — ENCOUNTER SYMPTOMS
SORE THROAT: 0
HEARTBURN: 0
NAUSEA: 0
EYES NEGATIVE: 1
WHEEZING: 0
SHORTNESS OF BREATH: 0
ORTHOPNEA: 0
RESPIRATORY NEGATIVE: 1
SINUS PAIN: 0
COUGH: 0
GASTROINTESTINAL NEGATIVE: 1
SPUTUM PRODUCTION: 0

## 2018-02-07 NOTE — PROGRESS NOTES
cell--precancer    BPH (benign prostatic hypertrophy)     CAD (coronary artery disease) 1997    Carotid artery disease ([address] 1995    right    Carotid disease, bilateral ([address]     Cerebral artery occlusion with cerebral infarction ([address]     CHF (congestive heart failure) (HCC)     Chronic kidney disease     GERD (gastroesophageal reflux disease)     Hyperlipidemia 1996    Hypertension     Lumbar disc disease 2001    MI (myocardial infarction) 1997    Nephrolithiasis 2002    NIDDM (non-insulin dependent diabetes mellitus) 1996    diagnosed 1996    RICCARDO (obstructive sleep apnea) 2005    cpap    Renal artery stenosis ([address] 2007    left    Renal insufficiency     S/P CABG (status post coronary artery bypass graft) 1997    5 vessels    Skin cancer     Squamous cell carcinoma     TIA (transient ischemic attack)     Wears partial dentures     upper and lower       Past Surgical History:   Procedure Laterality Date    ABLATION OF DYSRHYTHMIC FOCUS  3/13/13    CARDIAC SURGERY  1997    CARDIAC SURGERY  3/13/13    oblation for irreg rythm    CARDIOVERSION  3/8/2012    CAROTID ENDARTERECTOMY  1997    CHOLECYSTECTOMY  4/1999    COLONOSCOPY  6/22/09, 4/2011,7/12/17    polyp removal    CORONARY ANGIOPLASTY WITH STENT PLACEMENT  2008 (2), 2009 (1)    x2    CORONARY ARTERY BYPASS GRAFT  8/1997    5 vessel    FOOT SURGERY  12/20/2013    pre-cancerous mole rt foot removed    LITHOTRIPSY  4/2002   [employer] SHOULDER SURGERY  2001, 2003    rt shoulder manipulation--frozen shoulder-01, Lt -03    SKIN BIOPSY      SKIN CANCER EXCISION  12/20/13     R foot    SKIN CANCER EXCISION  1/2016    squamous cell Lt  upper cheek    URETER STENT PLACEMENT  4/2002    VASCULAR SURGERY      carotids & cabg harvests       Social History   Substance Use Topics    Smoking status: Never Smoker    Smokeless tobacco: Never Used    Alcohol use No       No Known Allergies    Current Outpatient Prescriptions   Medication Sig Dispense Refill    ticagrelor (BRILINTA) 90 MG TABS tablet Take 1 tablet by mouth 2 times daily 60 tablet 5    metFORMIN (GLUCOPHAGE) 1000 MG tablet Take 1,000 mg by mouth 2 times daily (with meals)      insulin lispro (HUMALOG KWIKPEN) 100 UNIT/ML pen Inject into the skin Takes 7 units before breakfast and lunch and 10 units before supper plus sliding scale as needed      atorvastatin (LIPITOR) 40 MG tablet Take 40 mg by mouth nightly      ACCU-CHEK COMPACT PLUS strip DX E11.59, CHECK THE GLUCOSE 4 TIMES DAILY 400 strip 3    ranolazine (RANEXA) 500 MG extended release tablet Take 500 mg by mouth 2 times daily      isosorbide mononitrate (IMDUR) 120 MG extended release tablet Take 120 mg by mouth daily       apixaban (ELIQUIS) 5 MG TABS tablet TAKE 1 TABLET TWICE A  tablet 3    metoprolol succinate (TOPROL XL) 50 MG extended release tablet TAKE 1 TABLET DAILY 90 tablet 3    CARTIA  MG extended release capsule TAKE 1 CAPSULE DAILY 90 capsule 3    insulin detemir (LEVEMIR FLEXTOUCH) 100 UNIT/ML injection pen Inject into skin 52 units in the morning and 54 units in the evening. 105 mL 0    lisinopril (PRINIVIL;ZESTRIL) 10 MG tablet TAKE 1 TABLET TWICE A  tablet 1    nitroGLYCERIN (NITROLINGUAL) 0.4 MG/SPRAY 0.4 mg spray Place 1 spray under the tongue every 5 minutes as needed for Chest pain 1 Bottle 6    NOVOFINE 30G X 8 MM MISC USE 1 NEW NEEDLE 6 TIMES A DAY AND AS NEEDED 600 each 3    Accu-Chek Softclix Lancets MISC CHECK THE GLUCOSE FOUR TIMES DAILY E11.59 400 each 3    aspirin EC 81 MG EC tablet Take 81 mg by mouth daily.  Multiple Vitamin (MULTIVITAMIN PO) Take 1 tablet by mouth daily. No current facility-administered medications for this visit.         Family History   Problem Relation Age of Onset    Cancer Mother     High Blood Pressure Mother     Stroke Father     High Blood Pressure Father     Heart Disease Brother     Cancer Brother      lung    Cancer and dry. He is not diaphoretic. Psychiatric: Mood, memory, affect and judgment normal.         ASSESSMENT/DIAGNOSIS    1. Obstructive sleep apnea on CPAP              Plan   Do you need any equipment today? Yes update supplies, he needs a new PAP when eligible   - Will increase pressure to 13 to improve AHI  - He  was advised to continue current positive airway pressure therapy with above described pressure. - He  advised to keep good compliance with current recommended pressure to get optimal results and clinical improvement  - Recommend 7-9 hours of sleep with PAP  - He was advised to call [facility] video regarding supplies if needed. - He call my office for earlier appointment if needed for worsening of sleep symptoms.   - He was instructed on weight loss  - Cielo Jiménez was educated about my impression and plan. Patient verbalizes understanding.   We will see Zi Cerda back in: 6 months with download    Talat Sandoval PA-C, MPAS  2/7/2018

## 2018-03-08 ENCOUNTER — HOSPITAL ENCOUNTER (OUTPATIENT)
Dept: CARDIAC REHAB | Age: 73
Setting detail: THERAPIES SERIES
Discharge: HOME | End: 2018-03-08
Payer: MEDICARE

## 2018-03-08 VITALS — BODY MASS INDEX: 32.22 KG/M2 | HEIGHT: 69 IN | WEIGHT: 217.5 LBS

## 2018-03-08 PROCEDURE — G0422 INTENS CARDIAC REHAB W/EXERC: HCPCS

## 2018-03-08 PROCEDURE — G0423 INTENS CARDIAC REHAB NO EXER: HCPCS

## 2018-03-08 RX ORDER — DILTIAZEM HYDROCHLORIDE 120 MG/1
120 TABLET, FILM COATED ORAL DAILY
Status: ON HOLD | COMMUNITY
End: 2018-03-21 | Stop reason: SDUPTHER

## 2018-03-08 NOTE — PLAN OF CARE
Sg Irwin.  Has returned to both   Return to work: Has Ramya White returned to work? [] Yes    [] No    Return to work date set? [] Yes, **    [] No    Ramya Haring is doing ** at work. Return to work: Has Ramya Zekeing returned to work? [] Yes    [] No    Return to work date set? [] Yes, **    [] No    Ramya Haring is doing ** at work. Return to work: Has Ramya Zekeing returned to work? [] Yes    [] No    Return to work date set? [] Yes, **    [] No    Ramya Zekeing is doing ** at work. Return to work: Has Ramya Zekeing returned to work? [] Yes    [] No    Return to work? [] Yes, **    [] No    *Required MET Level achieved for job duties? [] Yes    [] No   Orthopedic Limitations/  [x] Yes    [] No  If yes please list:  Knees and hips     Orthopedic Limitations  *If patient has orthopedic issue:   Actions/  accomodations needed to make Wilfrid successful : Orthopedic Limitations   Orthopedic Limitations   Orthopedic Limitations     Fall Risk  Fall risk assessed? [x] Yes      [] No    Balance Issues? [] Yes      [x] No     [] Walker [] Cane    [x] Safety issues reviewed      Fall Risk  *If patient is a fall risk, action needed to accommodate:  Fall Risk Fall Risk Fall Risk   Home Exercise  [] Yes    [x] No Home Exercise  [] Yes    [] No  Type: **  Frequency:**  Duration: ** Home Exercise  [] Yes    [] No  Type: **  Frequency: **  Duration: ** Home Exercise  [] Yes    [] No  Type: **  Frequency: **  Duration: ** Home Exercise  [] Yes    [] No  Type: **  Frequency: **  Duration: **   Angina with Activity? [x] Yes    [] No  Angina Management: rest and if doesn't go away uses nitro Angina with Activity? [] Yes    [] No  Angina Management: ** Angina with Activity? [] Yes    [] No  Angina Management: ** Angina with Activity? [] Yes    [] No  Angina Management: ** Angina with Activity?   [] Yes    [] No  Angina Management: **   EXERCISE PLAN EXERCISE PLAN EXERCISE PLAN EXERCISE PLAN EXERCISE PLAN   *Interventions* *Interventions* ** Return to ADL or Hobbies:  Jennifer Figueroa would like to improve strength and endurance so he/she is able to return to **             Individual Cardiac Treatment Plan  Nutrition  NUTRITION  ASSESSMENT/PLAN NUTRITION  REASSESSMENT NUTRITION   REASSESSMENT NUTRITION   REASSESSMENT NUTRITION  DISCHARGE/FOLLOW-UP   Stages of Change Stages of Change Stages of Change Stages of Change Stages of Change   [] Pre Contemplation  [] Contemplation  [x] Preparation  [] Action  [] Maintenance  [] Relapse [] Pre Contemplation  [] Contemplation  [] Preparation  [] Action  [] Maintenance  [] Relapse [] Pre Contemplation  [] Contemplation  [] Preparation  [] Action  [] Maintenance  [] Relapse [] Pre Contemplation  [] Contemplation  [] Preparation  [] Action  [] Maintenance  [] Relapse [] Pre Contemplation  [] Contemplation  [] Preparation  [] Action  [] Maintenance  [] Relapse   NUTRITION ASSESSMENT NUTRITION ASSESSMENT NUTRITION ASSESSMENT NUTRITION ASSESSMENT NUTRITION ASSESSMENT   Weight Management  Weight: 217.8        Height: 5'9   BMI: 32.2  Weight Management  Weight: **                  Weight Management  Weight: **                  Weight Management  Weight: ** Weight Management  Weight: **                    BMI: **   Eating Plan  Current eating habits: nothing specific Eating Plan  Changes: Eating Plan  Changes: Eating Plan  Changes: Eating Plan Improvements:   Alcohol Use  [x] none          [] daily  [] weekly      [] special          Diet Assessment Tool:  RATE YOUR PLATE  *Given to patient to complete and return.  Diet Assessment Tool:    Score: **/69       Diet Assessment Tool: RATE YOUR PLATE  Score: **/67   NUTRITION PLAN NUTRITION PLAN NUTRITION PLAN NUTRITION PLAN NUTRITION PLAN   *Interventions* *Interventions* *Interventions* *Interventions* *Interventions*   Initial Survey given Goal Setting Discussion:   [] Yes      [] No       Follow Up Survey Reviewed & Goals Updated:     Professional Referral  Please check if needed. [] Dietician Consult   [] Wt. Management Referral  [] Other:  Professional Referral  Please check if needed. [] Dietician Consult   [] Wt. Management Referral  [] Other: Professional Referral  Please check if needed. [] Dietician Consult   [] Wt. Management Referral  [] Other: Professional Referral  Please check if needed. [] Dietician Consult   [] Wt. Management Referral  [] Other: Professional Referral  Please check if needed. [] Dietician Consult   [] Wt. Management Referral  [] Other:   *Education* *Education* *Education* *Education* *Education*   Nutritional Education Recommended    [x] 1:1 Registered Dietitian    Workshops  [x] Label Reading   [x] Menu  [x] Targeting Nutrition Priorities  [x] Fueling a Healthy Body   Nutritional Education Attended/Date Nutritional Education Attended/Date Nutritional Education Attended/Date All Sessions Completed?     [] Yes  [] No   Cooking School    [x] 11 sessions recommended    [facility]  Sessions Completed  []Adding Flavor  []Fast & Healthy     Breakfasts  []Salads & Dressings  []Soups & Simple     Sauces  []Simple Sides  []Appetizers &     Snacks  []Delicious Desserts  []Plant Proteins  []Fast Evening Meals  [] Weekend Breakfasts  []Cook once, Eat       twice [address]  Sessions Completed [address]  Sessions Completed     [address]    # of sessions completed:  **   *Goals* *Goals* *Goals* *Goals* *Goals*   Wilfrid's nutritional goals are as follows:  Complete and return diet survey Wilfrid's nutritional goals are as follows:  [] Attend Nutrition Workshops  [] Attend 1:1   [] Attend Cooking Classes  [] ** Wilfrid's nutritional goals are as follows:  [] Attend Nutrition Workshops  [] Attend 1:1   [] Attend Cooking Classes  [] Complete and return diet survey  [] ** Wilfrid's nutritional goals are as follows:  [] Attend Nutrition Workshops  [] Attend 1:1   [] Attend Cooking Classes  [] ** Wilfrid achieved nutritional goals   [] Yes    [] No  If [] No  Medication Changes  [] Yes      [] No     Diabetes  Most Recent BS:  BS have been in range  [] Yes      [] No  Medication Changes  [] Yes      [] No     Diabetes  Most Recent BS:  BS have been in range  [] Yes      [] No  Medication Changes  [] Yes      [] No       Tobacco Use  [] Current  [] Former  [x] Never      Smokeless Tobacco use:   [] Yes      [x] No   Tobacco Use  Change in smoking status   [] Yes      [] No    Quit date: **   Tobacco Use  Change in smoking status   [] Yes      [] No    Quit date: **   Tobacco Use  Change in smoking status   [] Yes      [] No    Quit date: ** Tobacco Use  Change in smoking status   [] Yes      [] No    Quit date: **            Learning Barriers  Please select one:  [] Speech  [] Literacy  [] Hearing  [] Cognitive  [] Vision  [x] Ready to Learn Learning Barriers Addressed:   [] Yes      [] No   Learning Barriers Addressed:   [] Yes      [] No   Learning Barriers Addressed:  [] Yes      [] No Learning Barriers Addressed:  [] Yes      [] No     RISK FACTOR/EDUCATION PLAN RISK FACTOR/EDUCATION PLAN RISK FACTOR/EDUCATION PLAN RISK FACTOR/EDUCATION PLAN RISK FACTOR/EDUCATION PLAN   *Interventions* *Interventions* *Interventions* *Interventions* *Interventions*   Recommended Educational Videos    [x] Intake & The Pritikin Solution Program Overview  [x] Overview of The Pritikin Eating Plan  [x] Becoming A Pritikin   [x] Diseases of Our Time-Part 1  [x] Calorie Density  [x] Label Reading-Part 1  [x] Move It!   [x] Healthy Minds, Bodies, Hearts  [x] Dining Out-Part 1  [x] Heart Disease Risk Reduction  [x] Metabolic Syndrome & Belly Fat  [x] Facts on Fat  [x] Diseases of Our Times-Part 2  [x] Biology of Weight Control  [x] Biomechanical Limitations  [x] Nurtition Action Plan   Completed Videos      3/8/18  Intake & The Pritikin Solution Program Overview Completed Videos Completed Videos Recommended Educational Videos Completed    [] Yes      [] No    **If not completed, Why? **          Smoking Cessation/Relaspe Prevention Intervention needed? [] Yes      [x] No  *Pt verbalizes and agrees to attend intervention       Professional Referrals:  *Please check if needed  [] Diabetes Clinic  [] [address]   [] Other:     Professional Referrals:  *Please check if needed  [] Diabetes Clinic  [] Lipid Clinic   [] Other:   Preventative Medication Preventative Medication Preventative Medication Preventative Medication Preventative Medication   Aspirin  [x] Yes    [] No  Blood Thinner: Clopidogrel/Effient/Brillinta  [x] Yes    [] No  Beta Blocker  [x] Yes    [] No  Ace Inhibitor  [x] Yes    [] No  Statin/Lipid Lowering  [x] Yes    [] No Medication Changes? [] Yes    [] No Medication Changes? [] Yes    [] No Medication Changes? [] Yes    [] No Medication Changes? [] Yes    [] No   *Education* *Education* *Education* *Education* *Education*   Does Wilfrid require any additional education? [] Yes    [x] No   Does Wilfrid require any additional education? [] Yes    [] No Does Wilfrid require any additional education? [] Yes    [] No Does Wilfrid require any additional education? [] Yes    [] No Does Wilfrid require any additional education?   [] Yes    [] No   Recommended Additional Educational Videos    Medical  [] Hypertension & Heart Disease  [] Decoding Lab Results  [] Aging Enhancing Your QoL  [] Sleep Disorders  Exercise  [] Body Composition  [] Improve Performance  [] Exercise Action Plan  [] Intro to Yoga  Behavioral  [] How Our Thoughts Can Heal Our Hearts  [] Smoking Cessation  Nutrition  [] Planning Your Eating Strategy  [] Lable Reading- Part 2  [] Dining Out - Part 2  [] Targeting Your Nutrition Priorities  [] Fueling a Healthy Body  [] Menu Workshop  [employer] [] Breakfast & Snacks  [] [employer] Salads & Dressing  [] [facility]  [] Soups & Desserts   Additional Educational Videos Completed Additional Educational Videos Completed Additional Educational Videos Completed Additional Educational Videos Completed    [] Yes    [] No   *Goals* *Goals* *Goals* *Goals* *Goals*   Wilfrid's risk factor/education goals are as follows:    [x] Optimal BP <140/90  [] Blood Sugar <120  [x] Attend recommended video education sessions  [x] Takes medications as prescribed 100% of the time   [] ** Wilfrid's risk factor/education goals are as follows:    [x] Optimal BP <140/90  [] Blood Sugar <120  [x] Attend recommended video education sessions  [x] Takes medications as prescribed 100% of the time   [] ** Wilfrid's risk factor/education goals are as follows:    [x] Optimal BP <140/90  [] Blood Sugar <120  [x] Attend recommended video education sessions  [x] Takes medications as prescribed 100% of the time   [] ** Wilfrid's risk factor/education goals are as follows:    [x] Optimal BP <140/90  [] Blood Sugar <120  [x] Attend recommended video education sessions  [x] Takes medications as prescribed 100% of the time   [] ** Wilfrid achieved risk factor goals?   [] Yes    [] No  If no, why?  **     Monitored telemetry has revealed NSR  [] documented arrhythmia at increasing workloads  [] associated symptoms  Monitored telemetry has revealed **  [] documented arrhythmia at increasing workloads  [] associated symptoms ** Monitored telemetry has revealed  [] documented arrhythmia at increasing workloads  [] associated symptoms ** Monitored telemetry has revealed **  [] documented arrhythmia at increasing workloads  [] associated symptoms ** Monitored telemetry has revealed **  [] documented arrhythmia at increasing workloads  [] associated symptoms **   Physician Response    [x] Cardiac rehab is reasonably and medically necessary for continuous cardiac monitoring surveillance  of patient's cardiac activity  [x] Initiate continuous telemerty monitoring and notify me with any concerns  [] Other   Physician Response    [x] Cardiac rehab is reasonably and medically necessary for continuous cardiac monitoring surveillance  of patient's cardiac activity  [x] Continue continuous telemerty monitoring and notify me with any concerns  [] Other     Physician Response      [x] Cardiac rehab is reasonably and medically necessary for continuous cardiac monitoring surveillance  of patient's cardiac activity  [x] Continue continuous telemerty monitoring and notify me with any concerns   [] Other     Physician Response    [x] Cardiac rehab is reasonably and medically necessary for continuous cardiac monitoring surveillance  of patient's cardiac activity  [x] Continue continuous telemerty monitoring and notify me with any concerns   [] Other

## 2018-03-08 NOTE — PROGRESS NOTES
Video Education Report - ICR/CR    Name:  Ania Patel    Date:  3/8/2018  MRN: [id number]     Session #:  2  Session Length: 40 min    Recommended Videos        [x]01 Pritikin Solutions - Program Overview   34:22    []02 Overview of Pritikin Eating Plan   34:10    []03 Becoming a Rodríguez Popper   33:08     []04 Diseases of Our Time - Part 1   34:22    []05 Calorie Density     33:39   []06 Label Reading - Part 1    32:15   []07 Move it      32.54   []08 Healthy Minds, Bodies, Hearts   32:14   []09 Dining Out - Part 1    32:28   []10 Heart Disease Risk Reduction   41:18   []68 Metabolic Syndrome and Belly Fat  31:52   []12 Facts on Fat     35:29   []13 Diseases of Our Time - Part 2   33:07   []14 Biology of Weight Control   32:36   []15 Biomechanical Limitations   35:20   []16 Nutrition Action Plan    34:23      Comments:  Video completed, patient binder reviewed

## 2018-03-12 ENCOUNTER — HOSPITAL ENCOUNTER (OUTPATIENT)
Dept: CARDIAC REHAB | Age: 73
Setting detail: THERAPIES SERIES
Discharge: HOME | End: 2018-03-12
Payer: MEDICARE

## 2018-03-12 PROCEDURE — G0422 INTENS CARDIAC REHAB W/EXERC: HCPCS

## 2018-03-12 PROCEDURE — G0423 INTENS CARDIAC REHAB NO EXER: HCPCS

## 2018-03-14 ENCOUNTER — HOSPITAL ENCOUNTER (OUTPATIENT)
Age: 73
Discharge: HOME | End: 2018-03-14
Payer: MEDICARE

## 2018-03-14 ENCOUNTER — HOSPITAL ENCOUNTER (OUTPATIENT)
Dept: CARDIAC REHAB | Age: 73
Setting detail: THERAPIES SERIES
Discharge: HOME | End: 2018-03-14
Payer: MEDICARE

## 2018-03-14 DIAGNOSIS — Z79.4 TYPE 2 DIABETES MELLITUS WITH MICROALBUMINURIA, WITH LONG-TERM CURRENT USE OF INSULIN (HCC): Primary | ICD-10-CM

## 2018-03-14 DIAGNOSIS — R80.9 TYPE 2 DIABETES MELLITUS WITH MICROALBUMINURIA, WITH LONG-TERM CURRENT USE OF INSULIN (HCC): Primary | ICD-10-CM

## 2018-03-14 DIAGNOSIS — R80.9 TYPE 2 DIABETES MELLITUS WITH MICROALBUMINURIA, WITH LONG-TERM CURRENT USE OF INSULIN (HCC): ICD-10-CM

## 2018-03-14 DIAGNOSIS — E11.29 TYPE 2 DIABETES MELLITUS WITH MICROALBUMINURIA, WITH LONG-TERM CURRENT USE OF INSULIN (HCC): Primary | ICD-10-CM

## 2018-03-14 DIAGNOSIS — Z79.4 TYPE 2 DIABETES MELLITUS WITH MICROALBUMINURIA, WITH LONG-TERM CURRENT USE OF INSULIN (HCC): ICD-10-CM

## 2018-03-14 DIAGNOSIS — E11.29 TYPE 2 DIABETES MELLITUS WITH MICROALBUMINURIA, WITH LONG-TERM CURRENT USE OF INSULIN (HCC): ICD-10-CM

## 2018-03-14 LAB
ANION GAP SERPL CALCULATED.3IONS-SCNC: 16 MEQ/L (ref 8–16)
APTT: 37.6 SECONDS (ref 22–38)
AVERAGE GLUCOSE: 126 MG/DL (ref 70–126)
BUN BLDV-MCNC: 17 MG/DL (ref 7–22)
CALCIUM SERPL-MCNC: 10.3 MG/DL (ref 8.5–10.5)
CHLORIDE BLD-SCNC: 99 MEQ/L (ref 98–111)
CO2: 26 MEQ/L (ref 23–33)
CREAT SERPL-MCNC: 1.1 MG/DL (ref 0.4–1.2)
GFR SERPL CREATININE-BSD FRML MDRD: 66 ML/MIN/1.73M2
GLUCOSE BLD-MCNC: 118 MG/DL (ref 70–108)
HBA1C MFR BLD: 6.2 % (ref 4.4–6.4)
HCT VFR BLD CALC: 38.8 % (ref 42–52)
HEMOGLOBIN: 13.1 GM/DL (ref 14–18)
INR BLD: 1.1 (ref 0.85–1.13)
MAGNESIUM: 2.2 MG/DL (ref 1.6–2.4)
MCH RBC QN AUTO: 31.7 PG (ref 27–31)
MCHC RBC AUTO-ENTMCNC: 33.7 GM/DL (ref 33–37)
MCV RBC AUTO: 94 FL (ref 80–94)
PDW BLD-RTO: 15.5 % (ref 11.5–14.5)
PLATELET # BLD: 271 THOU/MM3 (ref 130–400)
PMV BLD AUTO: 7.9 FL (ref 7.4–10.4)
POTASSIUM SERPL-SCNC: 4.5 MEQ/L (ref 3.5–5.2)
RBC # BLD: 4.13 MILL/MM3 (ref 4.7–6.1)
SODIUM BLD-SCNC: 141 MEQ/L (ref 135–145)
WBC # BLD: 6.7 THOU/MM3 (ref 4.8–10.8)

## 2018-03-14 PROCEDURE — 83036 HEMOGLOBIN GLYCOSYLATED A1C: CPT

## 2018-03-14 PROCEDURE — 85027 COMPLETE CBC AUTOMATED: CPT

## 2018-03-14 PROCEDURE — 83735 ASSAY OF MAGNESIUM: CPT

## 2018-03-14 PROCEDURE — 80048 BASIC METABOLIC PNL TOTAL CA: CPT

## 2018-03-14 PROCEDURE — G0422 INTENS CARDIAC REHAB W/EXERC: HCPCS

## 2018-03-14 PROCEDURE — 36415 COLL VENOUS BLD VENIPUNCTURE: CPT

## 2018-03-14 PROCEDURE — G0423 INTENS CARDIAC REHAB NO EXER: HCPCS

## 2018-03-14 PROCEDURE — 85610 PROTHROMBIN TIME: CPT

## 2018-03-14 PROCEDURE — 85730 THROMBOPLASTIN TIME PARTIAL: CPT

## 2018-03-15 ENCOUNTER — OFFICE VISIT (OUTPATIENT)
Dept: INTERNAL MEDICINE CLINIC | Age: 73
End: 2018-03-15
Payer: MEDICARE

## 2018-03-15 ENCOUNTER — TELEPHONE (OUTPATIENT)
Dept: INTERNAL MEDICINE CLINIC | Age: 73
End: 2018-03-15

## 2018-03-15 VITALS
DIASTOLIC BLOOD PRESSURE: 52 MMHG | BODY MASS INDEX: 32.11 KG/M2 | WEIGHT: 216.8 LBS | SYSTOLIC BLOOD PRESSURE: 132 MMHG | HEIGHT: 69 IN

## 2018-03-15 DIAGNOSIS — E11.29 TYPE 2 DIABETES MELLITUS WITH MICROALBUMINURIA, WITH LONG-TERM CURRENT USE OF INSULIN (HCC): ICD-10-CM

## 2018-03-15 DIAGNOSIS — R80.9 TYPE 2 DIABETES MELLITUS WITH MICROALBUMINURIA, WITH LONG-TERM CURRENT USE OF INSULIN (HCC): ICD-10-CM

## 2018-03-15 DIAGNOSIS — Z79.4 TYPE 2 DIABETES MELLITUS WITH MICROALBUMINURIA, WITH LONG-TERM CURRENT USE OF INSULIN (HCC): ICD-10-CM

## 2018-03-15 PROCEDURE — G0108 DIAB MANAGE TRN  PER INDIV: HCPCS | Performed by: NURSE PRACTITIONER

## 2018-03-15 NOTE — PROGRESS NOTES
[facility]  [address] [place] [address] [facility], [address]  [phone] (phone)  [phone] (fax)    Patient ID: Giovanny Jenkins 1945  Referring Provider: Dr. Jolinda Soulier     Patient's name and  were verified. Subjective:    He presents for His follow-up diabetic visit. He has type 2 diabetes mellitus. Home regimen includes: insulin  biguanide He is compliant most of the time. Assessment:     Lab Results   Component Value Date    LABA1C 6.2 2018    BUN 17 2018    CREATININE 1.1 2018     Vitals:    03/15/18 0926   Weight: 216 lb 12.8 oz (98.3 kg)   Height: 5' 9\" (1.753 m)     Wt Readings from Last 3 Encounters:   03/15/18 216 lb 12.8 oz (98.3 kg)   18 217 lb 8 oz (98.7 kg)   18 213 lb (96.6 kg)     Ht Readings from Last 3 Encounters:   03/15/18 5' 9\" (1.753 m)   18 5' 9\" (1.753 m)   18 5' 9\" (1.753 m)       Current monitoring regimen: home blood tests - 4 times daily  Home blood sugar trends: FBS's . Noon . Dinner . -181. 2-3am 59, 52  Any episodes of hypoglycemia? yes - 2-3am every 1-2 weeks; also once per weekl AC evening meal  Previous visit with dietician: yes - Pritikin meal planning  Current diet: B- cereal or oatmeal                       L- meat/ starch                       D- meat/ potato/ veg or salad                    *just starting the Pritikin program/ meal plan  Current exercise: Pritikin program 3 days per week; 60 mins  Eye exam current (within one year): yes  2018  Any history of foot problems? no  Last foot exam: 3/15/18 Pedal pulses:   peripheral pulses symmetrical   Results of monofilament test: 10/10              Skin noted to be warm, pale and hair is absent. Immunizations up to date: yes -   Taking ASA:  Yes  Appropriate for use of MyChart Glucose Grid:  Yes    Focus:     Diabetes follow up. Recent A1C 6.2%. Reviewed A1C result (average) vs daily glucose checks.  Stacie Craig is having lows at 2-3am at least

## 2018-03-15 NOTE — PATIENT INSTRUCTIONS
1. Cut Humalog coverage for bedtime blood sugar by 1 unit                Blood sugar over 150 = 1 unit, over 175 = 2 units           *try to get 2 hours from your snack to your bedtime blood sugar          Check  2. Goal-- almost no more middle of the night lows! 3. Continue with cardiac rehab program. Clarene Service job! If you continue having lows in the middle of the night-call the clinic to  Review blood sugars.   Call with questions  [phone]

## 2018-03-16 ENCOUNTER — HOSPITAL ENCOUNTER (OUTPATIENT)
Dept: CARDIAC REHAB | Age: 73
Setting detail: THERAPIES SERIES
Discharge: HOME | End: 2018-03-16
Payer: MEDICARE

## 2018-03-16 PROCEDURE — G0422 INTENS CARDIAC REHAB W/EXERC: HCPCS

## 2018-03-16 PROCEDURE — G0423 INTENS CARDIAC REHAB NO EXER: HCPCS

## 2018-03-16 NOTE — TELEPHONE ENCOUNTER
Notified pt A1C is good. Wife wanted to relay to Dr. Fara Luther pt is having another stent on 3/21/18 at [facility] with [address] Dr. Nerissa Reynoso has also set him up to have a procedure on 4/10/18 at [facility] in [facility] to have a procedure called  PCI. She states they try to clear out the native vessels where there is total occlusion if they can.

## 2018-03-19 ENCOUNTER — HOSPITAL ENCOUNTER (OUTPATIENT)
Dept: CARDIAC REHAB | Age: 73
Setting detail: THERAPIES SERIES
Discharge: HOME | End: 2018-03-19
Payer: MEDICARE

## 2018-03-19 PROCEDURE — G0423 INTENS CARDIAC REHAB NO EXER: HCPCS

## 2018-03-19 PROCEDURE — G0422 INTENS CARDIAC REHAB W/EXERC: HCPCS

## 2018-03-19 NOTE — PROGRESS NOTES
Video Education Report - ICR/CR    Name:  Tj Aquino     Date:  3/19/2018  MRN: [id number]     Session #:    Session Length: 40 min    Recommended Videos        []01 [employer] - Program Overview   34:22    []02 Overview of Pritikin Eating Plan   34:10    []03 Becoming a Pritikin    33:08     []04 Diseases of Our Time - Part 1   34:22    []05 Calorie Density     33:39   []06 Label Reading - Part 1    32:15   [x]07 Move it      32.54   []08 Healthy Minds, Bodies, Hearts   32:14   []09 Dining Out - Part 1    32:28   []10 Heart Disease Risk Reduction   72:32   []09 Metabolic Syndrome and Belly Fat  31:52   []12 Facts on Fat     35:29   []13 Diseases of Our Time - Part 2   33:07   []14 Biology of Weight Control   32:36   []15 Biomechanical Limitations   35:20   []16 Nutrition Action Plan    34:23        Comments:  Video completed

## 2018-03-21 ENCOUNTER — APPOINTMENT (OUTPATIENT)
Dept: CARDIAC REHAB | Age: 73
End: 2018-03-21
Payer: MEDICARE

## 2018-03-21 ENCOUNTER — HOSPITAL ENCOUNTER (OUTPATIENT)
Dept: CARDIAC REHAB | Age: 73
Setting detail: THERAPIES SERIES
Discharge: HOME | End: 2018-03-21
Payer: MEDICARE

## 2018-03-21 PROBLEM — Z95.820 STATUS POST ANGIOPLASTY WITH STENT: Status: ACTIVE | Noted: 2018-03-21

## 2018-03-22 PROBLEM — Z82.49 FAMILY HISTORY OF CLASS III ANGINA PECTORIS: Status: ACTIVE | Noted: 2018-03-22

## 2018-03-23 ENCOUNTER — HOSPITAL ENCOUNTER (OUTPATIENT)
Dept: CARDIAC REHAB | Age: 73
Setting detail: THERAPIES SERIES
Discharge: HOME | End: 2018-03-23
Payer: MEDICARE

## 2018-03-23 ENCOUNTER — APPOINTMENT (OUTPATIENT)
Dept: CARDIAC REHAB | Age: 73
End: 2018-03-23
Payer: MEDICARE

## 2018-03-26 ENCOUNTER — HOSPITAL ENCOUNTER (OUTPATIENT)
Dept: CARDIAC REHAB | Age: 73
Setting detail: THERAPIES SERIES
Discharge: HOME | End: 2018-03-26
Payer: MEDICARE

## 2018-03-26 ENCOUNTER — APPOINTMENT (OUTPATIENT)
Dept: CARDIAC REHAB | Age: 73
End: 2018-03-26
Payer: MEDICARE

## 2018-03-28 ENCOUNTER — HOSPITAL ENCOUNTER (OUTPATIENT)
Dept: CARDIAC REHAB | Age: 73
Setting detail: THERAPIES SERIES
Discharge: HOME | End: 2018-03-28
Payer: MEDICARE

## 2018-03-28 PROCEDURE — G0422 INTENS CARDIAC REHAB W/EXERC: HCPCS

## 2018-03-28 PROCEDURE — G0423 INTENS CARDIAC REHAB NO EXER: HCPCS

## 2018-03-28 NOTE — PROGRESS NOTES
3/28/18  Pt called and stated that Dr. Abdirahman Hartman has Ok'd him to return to cardiac rehab.   Electronically signed by Esthela Velazquez on 3/28/2018 at 11:49 AM

## 2018-03-30 ENCOUNTER — HOSPITAL ENCOUNTER (OUTPATIENT)
Dept: CARDIAC REHAB | Age: 73
Setting detail: THERAPIES SERIES
Discharge: HOME | End: 2018-03-30
Payer: MEDICARE

## 2018-03-30 PROCEDURE — G0423 INTENS CARDIAC REHAB NO EXER: HCPCS

## 2018-03-30 PROCEDURE — G0422 INTENS CARDIAC REHAB W/EXERC: HCPCS

## 2018-04-02 ENCOUNTER — HOSPITAL ENCOUNTER (OUTPATIENT)
Dept: CARDIAC REHAB | Age: 73
Setting detail: THERAPIES SERIES
Discharge: HOME | End: 2018-04-02
Payer: MEDICARE

## 2018-04-02 ENCOUNTER — OFFICE VISIT (OUTPATIENT)
Dept: FAMILY MEDICINE CLINIC | Age: 73
End: 2018-04-02

## 2018-04-02 VITALS
SYSTOLIC BLOOD PRESSURE: 130 MMHG | HEIGHT: 69 IN | WEIGHT: 212.25 LBS | HEART RATE: 64 BPM | RESPIRATION RATE: 16 BRPM | DIASTOLIC BLOOD PRESSURE: 50 MMHG | BODY MASS INDEX: 31.44 KG/M2

## 2018-04-02 DIAGNOSIS — I25.118 CORONARY ARTERY DISEASE OF NATIVE HEART WITH STABLE ANGINA PECTORIS, UNSPECIFIED VESSEL OR LESION TYPE (HCC): Primary | ICD-10-CM

## 2018-04-02 PROCEDURE — G0422 INTENS CARDIAC REHAB W/EXERC: HCPCS

## 2018-04-02 PROCEDURE — 99214 OFFICE O/P EST MOD 30 MIN: CPT | Performed by: FAMILY MEDICINE

## 2018-04-02 PROCEDURE — G0423 INTENS CARDIAC REHAB NO EXER: HCPCS

## 2018-04-03 RX ORDER — LISINOPRIL 10 MG/1
TABLET ORAL
Qty: 180 TABLET | Refills: 1 | Status: SHIPPED | OUTPATIENT
Start: 2018-04-03 | End: 2018-10-03 | Stop reason: SDUPTHER

## 2018-04-04 ENCOUNTER — HOSPITAL ENCOUNTER (OUTPATIENT)
Dept: CARDIAC REHAB | Age: 73
Setting detail: THERAPIES SERIES
Discharge: HOME | End: 2018-04-04
Payer: MEDICARE

## 2018-04-04 PROCEDURE — G0422 INTENS CARDIAC REHAB W/EXERC: HCPCS

## 2018-04-04 PROCEDURE — G0423 INTENS CARDIAC REHAB NO EXER: HCPCS

## 2018-04-05 ENCOUNTER — TELEPHONE (OUTPATIENT)
Dept: INTERNAL MEDICINE CLINIC | Age: 73
End: 2018-04-05

## 2018-04-05 DIAGNOSIS — R06.02 SOB (SHORTNESS OF BREATH): ICD-10-CM

## 2018-04-05 DIAGNOSIS — I25.118 CORONARY ARTERY DISEASE OF NATIVE HEART WITH STABLE ANGINA PECTORIS, UNSPECIFIED VESSEL OR LESION TYPE (HCC): Primary | ICD-10-CM

## 2018-04-06 ENCOUNTER — HOSPITAL ENCOUNTER (OUTPATIENT)
Dept: CARDIAC REHAB | Age: 73
Setting detail: THERAPIES SERIES
Discharge: HOME | End: 2018-04-06
Payer: MEDICARE

## 2018-04-06 ENCOUNTER — HOSPITAL ENCOUNTER (OUTPATIENT)
Age: 73
Discharge: HOME | End: 2018-04-06
Payer: MEDICARE

## 2018-04-06 ENCOUNTER — HOSPITAL ENCOUNTER (OUTPATIENT)
Dept: GENERAL RADIOLOGY | Age: 73
Discharge: HOME | End: 2018-04-06
Payer: MEDICARE

## 2018-04-06 DIAGNOSIS — I25.118 CORONARY ARTERY DISEASE OF NATIVE HEART WITH STABLE ANGINA PECTORIS, UNSPECIFIED VESSEL OR LESION TYPE (HCC): ICD-10-CM

## 2018-04-06 DIAGNOSIS — R06.02 SOB (SHORTNESS OF BREATH): ICD-10-CM

## 2018-04-06 LAB — PRO-BNP: 164.8 PG/ML (ref 0–900)

## 2018-04-06 PROCEDURE — 83880 ASSAY OF NATRIURETIC PEPTIDE: CPT

## 2018-04-06 PROCEDURE — 36415 COLL VENOUS BLD VENIPUNCTURE: CPT

## 2018-04-06 PROCEDURE — 71046 X-RAY EXAM CHEST 2 VIEWS: CPT

## 2018-04-06 PROCEDURE — G0423 INTENS CARDIAC REHAB NO EXER: HCPCS

## 2018-04-06 PROCEDURE — G0422 INTENS CARDIAC REHAB W/EXERC: HCPCS

## 2018-04-09 ENCOUNTER — HOSPITAL ENCOUNTER (OUTPATIENT)
Dept: CARDIAC REHAB | Age: 73
Setting detail: THERAPIES SERIES
Discharge: HOME | End: 2018-04-09
Payer: MEDICARE

## 2018-04-09 ENCOUNTER — TELEPHONE (OUTPATIENT)
Dept: INTERNAL MEDICINE CLINIC | Age: 73
End: 2018-04-09

## 2018-04-09 PROCEDURE — G0423 INTENS CARDIAC REHAB NO EXER: HCPCS

## 2018-04-09 PROCEDURE — G0422 INTENS CARDIAC REHAB W/EXERC: HCPCS

## 2018-04-09 RX ORDER — LANCETS
EACH MISCELLANEOUS
Qty: 400 EACH | Refills: 5 | Status: SHIPPED | OUTPATIENT
Start: 2018-04-09 | End: 2020-06-01

## 2018-04-11 ENCOUNTER — HOSPITAL ENCOUNTER (OUTPATIENT)
Dept: CARDIAC REHAB | Age: 73
Setting detail: THERAPIES SERIES
Discharge: HOME | End: 2018-04-11
Payer: MEDICARE

## 2018-04-11 PROCEDURE — G0423 INTENS CARDIAC REHAB NO EXER: HCPCS

## 2018-04-11 PROCEDURE — G0422 INTENS CARDIAC REHAB W/EXERC: HCPCS

## 2018-04-13 ENCOUNTER — APPOINTMENT (OUTPATIENT)
Dept: CARDIAC REHAB | Age: 73
End: 2018-04-13
Payer: MEDICARE

## 2018-04-13 ENCOUNTER — HOSPITAL ENCOUNTER (OUTPATIENT)
Dept: CARDIAC REHAB | Age: 73
Setting detail: THERAPIES SERIES
Discharge: HOME | End: 2018-04-13
Payer: MEDICARE

## 2018-04-16 ENCOUNTER — HOSPITAL ENCOUNTER (OUTPATIENT)
Dept: CARDIAC REHAB | Age: 73
Setting detail: THERAPIES SERIES
Discharge: HOME | End: 2018-04-16
Payer: MEDICARE

## 2018-04-20 ENCOUNTER — APPOINTMENT (OUTPATIENT)
Dept: CARDIAC REHAB | Age: 73
End: 2018-04-20
Payer: MEDICARE

## 2018-04-20 ENCOUNTER — HOSPITAL ENCOUNTER (OUTPATIENT)
Dept: CARDIAC REHAB | Age: 73
Setting detail: THERAPIES SERIES
Discharge: HOME | End: 2018-04-20
Payer: MEDICARE

## 2018-04-23 ENCOUNTER — HOSPITAL ENCOUNTER (OUTPATIENT)
Dept: CARDIAC REHAB | Age: 73
Setting detail: THERAPIES SERIES
Discharge: HOME | End: 2018-04-23
Payer: MEDICARE

## 2018-04-23 ENCOUNTER — APPOINTMENT (OUTPATIENT)
Dept: CARDIAC REHAB | Age: 73
End: 2018-04-23
Payer: MEDICARE

## 2018-04-25 ENCOUNTER — APPOINTMENT (OUTPATIENT)
Dept: CARDIAC REHAB | Age: 73
End: 2018-04-25
Payer: MEDICARE

## 2018-04-27 ENCOUNTER — APPOINTMENT (OUTPATIENT)
Dept: CARDIAC REHAB | Age: 73
End: 2018-04-27
Payer: MEDICARE

## 2018-04-30 ENCOUNTER — APPOINTMENT (OUTPATIENT)
Dept: CARDIAC REHAB | Age: 73
End: 2018-04-30
Payer: MEDICARE

## 2018-05-03 ENCOUNTER — HOSPITAL ENCOUNTER (OUTPATIENT)
Dept: PULMONOLOGY | Age: 73
Discharge: HOME | End: 2018-05-03
Payer: MEDICARE

## 2018-05-03 PROCEDURE — 94729 DIFFUSING CAPACITY: CPT

## 2018-05-03 PROCEDURE — 94726 PLETHYSMOGRAPHY LUNG VOLUMES: CPT

## 2018-05-03 PROCEDURE — 94060 EVALUATION OF WHEEZING: CPT

## 2018-05-04 RX ORDER — ATORVASTATIN CALCIUM 40 MG/1
40 TABLET, FILM COATED ORAL NIGHTLY
Qty: 90 TABLET | Refills: 3 | Status: SHIPPED | OUTPATIENT
Start: 2018-05-04 | End: 2019-05-20 | Stop reason: SDUPTHER

## 2018-05-08 ENCOUNTER — HOSPITAL ENCOUNTER (OUTPATIENT)
Age: 73
Discharge: HOME | End: 2018-05-08
Payer: MEDICARE

## 2018-05-08 LAB
ANION GAP SERPL CALCULATED.3IONS-SCNC: 9 MEQ/L (ref 8–16)
APTT: 35.6 SECONDS (ref 22–38)
BUN BLDV-MCNC: 22 MG/DL (ref 7–22)
CALCIUM SERPL-MCNC: 9.3 MG/DL (ref 8.5–10.5)
CHLORIDE BLD-SCNC: 103 MEQ/L (ref 98–111)
CO2: 26 MEQ/L (ref 23–33)
CREAT SERPL-MCNC: 1.1 MG/DL (ref 0.4–1.2)
GFR SERPL CREATININE-BSD FRML MDRD: 66 ML/MIN/1.73M2
GLUCOSE BLD-MCNC: 105 MG/DL (ref 70–108)
HCT VFR BLD CALC: 34.1 % (ref 42–52)
HEMOGLOBIN: 11.6 GM/DL (ref 14–18)
INR BLD: 1.3 (ref 0.85–1.13)
MAGNESIUM: 2.1 MG/DL (ref 1.6–2.4)
MCH RBC QN AUTO: 33.3 PG (ref 27–31)
MCHC RBC AUTO-ENTMCNC: 33.9 GM/DL (ref 33–37)
MCV RBC AUTO: 98 FL (ref 80–94)
PDW BLD-RTO: 14 % (ref 11.5–14.5)
PLATELET # BLD: 240 THOU/MM3 (ref 130–400)
PMV BLD AUTO: 8.2 FL (ref 7.4–10.4)
POTASSIUM SERPL-SCNC: 4.8 MEQ/L (ref 3.5–5.2)
RBC # BLD: 3.48 MILL/MM3 (ref 4.7–6.1)
SODIUM BLD-SCNC: 138 MEQ/L (ref 135–145)
WBC # BLD: 6.4 THOU/MM3 (ref 4.8–10.8)

## 2018-05-08 PROCEDURE — 80048 BASIC METABOLIC PNL TOTAL CA: CPT

## 2018-05-08 PROCEDURE — 83735 ASSAY OF MAGNESIUM: CPT

## 2018-05-08 PROCEDURE — 85730 THROMBOPLASTIN TIME PARTIAL: CPT

## 2018-05-08 PROCEDURE — 36415 COLL VENOUS BLD VENIPUNCTURE: CPT

## 2018-05-08 PROCEDURE — 85610 PROTHROMBIN TIME: CPT

## 2018-05-08 PROCEDURE — 85027 COMPLETE CBC AUTOMATED: CPT

## 2018-06-01 ENCOUNTER — HOSPITAL ENCOUNTER (INPATIENT)
Age: 73
LOS: 2 days | Discharge: HOME | DRG: 698 | End: 2018-06-04
Attending: EMERGENCY MEDICINE | Admitting: EMERGENCY MEDICINE
Payer: MEDICARE

## 2018-06-01 ENCOUNTER — APPOINTMENT (OUTPATIENT)
Dept: GENERAL RADIOLOGY | Age: 73
DRG: 698 | End: 2018-06-01
Payer: MEDICARE

## 2018-06-01 DIAGNOSIS — T83.511A URINARY TRACT INFECTION ASSOCIATED WITH CATHETERIZATION OF URINARY TRACT, UNSPECIFIED INDWELLING URINARY CATHETER TYPE, INITIAL ENCOUNTER (HCC): Primary | ICD-10-CM

## 2018-06-01 DIAGNOSIS — N12 PYELONEPHRITIS: ICD-10-CM

## 2018-06-01 DIAGNOSIS — N39.0 URINARY TRACT INFECTION ASSOCIATED WITH CATHETERIZATION OF URINARY TRACT, UNSPECIFIED INDWELLING URINARY CATHETER TYPE, INITIAL ENCOUNTER (HCC): Primary | ICD-10-CM

## 2018-06-01 PROBLEM — Z82.49 FAMILY HISTORY OF CLASS III ANGINA PECTORIS: Status: RESOLVED | Noted: 2018-03-22 | Resolved: 2018-06-01

## 2018-06-01 PROBLEM — Z82.49 FAMILY HISTORY OF CLASS III ANGINA PECTORIS: Status: RESOLVED | Noted: 2018-01-26 | Resolved: 2018-06-01

## 2018-06-01 LAB
ANION GAP SERPL CALCULATED.3IONS-SCNC: 11 MEQ/L (ref 8–16)
BACTERIA: ABNORMAL /HPF
BASOPHILS # BLD: 0.1 %
BASOPHILS ABSOLUTE: 0 THOU/MM3 (ref 0–0.1)
BILIRUBIN URINE: NEGATIVE
BLOOD, URINE: ABNORMAL
BUN BLDV-MCNC: 22 MG/DL (ref 7–22)
CALCIUM SERPL-MCNC: 9.2 MG/DL (ref 8.5–10.5)
CASTS 2: ABNORMAL /LPF
CASTS UA: ABNORMAL /LPF
CHARACTER, URINE: ABNORMAL
CHLORIDE BLD-SCNC: 102 MEQ/L (ref 98–111)
CO2: 23 MEQ/L (ref 23–33)
COLOR: YELLOW
CREAT SERPL-MCNC: 1.4 MG/DL (ref 0.4–1.2)
CRYSTALS, UA: ABNORMAL
EOSINOPHIL # BLD: 0.3 %
EOSINOPHILS ABSOLUTE: 0 THOU/MM3 (ref 0–0.4)
EPITHELIAL CELLS, UA: ABNORMAL /HPF
GFR SERPL CREATININE-BSD FRML MDRD: 50 ML/MIN/1.73M2
GLUCOSE BLD-MCNC: 107 MG/DL (ref 70–108)
GLUCOSE BLD-MCNC: 171 MG/DL (ref 70–108)
GLUCOSE BLD-MCNC: 197 MG/DL (ref 70–108)
GLUCOSE URINE: NEGATIVE MG/DL
HCT VFR BLD CALC: 32.5 % (ref 42–52)
HEMOGLOBIN: 11.3 GM/DL (ref 14–18)
KETONES, URINE: NEGATIVE
LACTIC ACID, SEPSIS: 1.5 MMOL/L (ref 0.5–1.9)
LEUKOCYTE ESTERASE, URINE: ABNORMAL
LYMPHOCYTES # BLD: 3.1 %
LYMPHOCYTES ABSOLUTE: 0.4 THOU/MM3 (ref 1–4.8)
MCH RBC QN AUTO: 33.3 PG (ref 27–31)
MCHC RBC AUTO-ENTMCNC: 34.9 GM/DL (ref 33–37)
MCV RBC AUTO: 95.7 FL (ref 80–94)
MISCELLANEOUS 2: ABNORMAL
MONOCYTES # BLD: 8.2 %
MONOCYTES ABSOLUTE: 1.2 THOU/MM3 (ref 0.4–1.3)
NITRITE, URINE: NEGATIVE
NUCLEATED RED BLOOD CELLS: 0 /100 WBC
OSMOLALITY CALCULATION: 280.8 MOSMOL/KG (ref 275–300)
PDW BLD-RTO: 14.5 % (ref 11.5–14.5)
PH UA: 5.5
PLATELET # BLD: 208 THOU/MM3 (ref 130–400)
PMV BLD AUTO: 8.4 FL (ref 7.4–10.4)
POTASSIUM SERPL-SCNC: 4.2 MEQ/L (ref 3.5–5.2)
PROTEIN UA: 100
RBC # BLD: 3.4 MILL/MM3 (ref 4.7–6.1)
RBC URINE: ABNORMAL /HPF
RENAL EPITHELIAL, UA: ABNORMAL
SEG NEUTROPHILS: 88.3 %
SEGMENTED NEUTROPHILS ABSOLUTE COUNT: 12.7 THOU/MM3 (ref 1.8–7.7)
SODIUM BLD-SCNC: 136 MEQ/L (ref 135–145)
SPECIFIC GRAVITY, URINE: 1.02 (ref 1–1.03)
UROBILINOGEN, URINE: 0.2 EU/DL
WBC # BLD: 14.4 THOU/MM3 (ref 4.8–10.8)
WBC UA: ABNORMAL /HPF
YEAST: ABNORMAL

## 2018-06-01 PROCEDURE — 6360000002 HC RX W HCPCS: Performed by: EMERGENCY MEDICINE

## 2018-06-01 PROCEDURE — 6360000002 HC RX W HCPCS: Performed by: NURSE PRACTITIONER

## 2018-06-01 PROCEDURE — 83605 ASSAY OF LACTIC ACID: CPT

## 2018-06-01 PROCEDURE — 87077 CULTURE AEROBIC IDENTIFY: CPT

## 2018-06-01 PROCEDURE — 80048 BASIC METABOLIC PNL TOTAL CA: CPT

## 2018-06-01 PROCEDURE — 87184 SC STD DISK METHOD PER PLATE: CPT

## 2018-06-01 PROCEDURE — G0378 HOSPITAL OBSERVATION PER HR: HCPCS

## 2018-06-01 PROCEDURE — 81001 URINALYSIS AUTO W/SCOPE: CPT

## 2018-06-01 PROCEDURE — 87086 URINE CULTURE/COLONY COUNT: CPT

## 2018-06-01 PROCEDURE — 36415 COLL VENOUS BLD VENIPUNCTURE: CPT

## 2018-06-01 PROCEDURE — 71046 X-RAY EXAM CHEST 2 VIEWS: CPT

## 2018-06-01 PROCEDURE — 6370000000 HC RX 637 (ALT 250 FOR IP): Performed by: FAMILY MEDICINE

## 2018-06-01 PROCEDURE — 85025 COMPLETE CBC W/AUTO DIFF WBC: CPT

## 2018-06-01 PROCEDURE — 2580000003 HC RX 258: Performed by: EMERGENCY MEDICINE

## 2018-06-01 PROCEDURE — 2580000003 HC RX 258: Performed by: NURSE PRACTITIONER

## 2018-06-01 PROCEDURE — 87186 SC STD MICRODIL/AGAR DIL: CPT

## 2018-06-01 PROCEDURE — 99285 EMERGENCY DEPT VISIT HI MDM: CPT

## 2018-06-01 PROCEDURE — 82948 REAGENT STRIP/BLOOD GLUCOSE: CPT

## 2018-06-01 PROCEDURE — 96365 THER/PROPH/DIAG IV INF INIT: CPT

## 2018-06-01 RX ORDER — CLOPIDOGREL BISULFATE 75 MG/1
75 TABLET ORAL DAILY
COMMUNITY
End: 2022-08-08 | Stop reason: SDUPTHER

## 2018-06-01 RX ORDER — SODIUM CHLORIDE 0.9 % (FLUSH) 0.9 %
10 SYRINGE (ML) INJECTION EVERY 12 HOURS SCHEDULED
Status: DISCONTINUED | OUTPATIENT
Start: 2018-06-01 | End: 2018-06-04 | Stop reason: HOSPADM

## 2018-06-01 RX ORDER — ACETAMINOPHEN 325 MG/1
650 TABLET ORAL EVERY 4 HOURS PRN
Status: DISCONTINUED | OUTPATIENT
Start: 2018-06-01 | End: 2018-06-04 | Stop reason: HOSPADM

## 2018-06-01 RX ORDER — RANOLAZINE 500 MG/1
500 TABLET, EXTENDED RELEASE ORAL 2 TIMES DAILY
Status: DISCONTINUED | OUTPATIENT
Start: 2018-06-01 | End: 2018-06-03

## 2018-06-01 RX ORDER — LISINOPRIL 10 MG/1
10 TABLET ORAL DAILY
Status: DISCONTINUED | OUTPATIENT
Start: 2018-06-02 | End: 2018-06-04 | Stop reason: HOSPADM

## 2018-06-01 RX ORDER — ONDANSETRON 2 MG/ML
4 INJECTION INTRAMUSCULAR; INTRAVENOUS EVERY 6 HOURS PRN
Status: DISCONTINUED | OUTPATIENT
Start: 2018-06-01 | End: 2018-06-04 | Stop reason: HOSPADM

## 2018-06-01 RX ORDER — CLOPIDOGREL BISULFATE 75 MG/1
75 TABLET ORAL DAILY
Status: DISCONTINUED | OUTPATIENT
Start: 2018-06-02 | End: 2018-06-04 | Stop reason: HOSPADM

## 2018-06-01 RX ORDER — ATORVASTATIN CALCIUM 40 MG/1
40 TABLET, FILM COATED ORAL NIGHTLY
Status: DISCONTINUED | OUTPATIENT
Start: 2018-06-01 | End: 2018-06-04 | Stop reason: HOSPADM

## 2018-06-01 RX ORDER — SODIUM CHLORIDE 9 MG/ML
INJECTION, SOLUTION INTRAVENOUS CONTINUOUS
Status: DISCONTINUED | OUTPATIENT
Start: 2018-06-01 | End: 2018-06-03

## 2018-06-01 RX ORDER — DILTIAZEM HYDROCHLORIDE 120 MG/1
120 CAPSULE, EXTENDED RELEASE ORAL DAILY
COMMUNITY
End: 2018-12-19 | Stop reason: SDUPTHER

## 2018-06-01 RX ORDER — ISOSORBIDE MONONITRATE 60 MG/1
120 TABLET, EXTENDED RELEASE ORAL DAILY
Status: DISCONTINUED | OUTPATIENT
Start: 2018-06-02 | End: 2018-06-04 | Stop reason: HOSPADM

## 2018-06-01 RX ORDER — SODIUM CHLORIDE 0.9 % (FLUSH) 0.9 %
10 SYRINGE (ML) INJECTION PRN
Status: DISCONTINUED | OUTPATIENT
Start: 2018-06-01 | End: 2018-06-04 | Stop reason: HOSPADM

## 2018-06-01 RX ORDER — 0.9 % SODIUM CHLORIDE 0.9 %
500 INTRAVENOUS SOLUTION INTRAVENOUS ONCE
Status: COMPLETED | OUTPATIENT
Start: 2018-06-01 | End: 2018-06-01

## 2018-06-01 RX ORDER — METOPROLOL SUCCINATE 50 MG/1
50 TABLET, EXTENDED RELEASE ORAL DAILY
Status: DISCONTINUED | OUTPATIENT
Start: 2018-06-02 | End: 2018-06-04 | Stop reason: HOSPADM

## 2018-06-01 RX ORDER — NITROGLYCERIN 0.4 MG/1
0.4 TABLET SUBLINGUAL EVERY 5 MIN PRN
Status: DISCONTINUED | OUTPATIENT
Start: 2018-06-01 | End: 2018-06-04 | Stop reason: HOSPADM

## 2018-06-01 RX ORDER — DILTIAZEM HYDROCHLORIDE 60 MG/1
120 CAPSULE, EXTENDED RELEASE ORAL DAILY
Status: DISCONTINUED | OUTPATIENT
Start: 2018-06-02 | End: 2018-06-04 | Stop reason: HOSPADM

## 2018-06-01 RX ORDER — ASPIRIN 81 MG/1
81 TABLET ORAL DAILY
Status: DISCONTINUED | OUTPATIENT
Start: 2018-06-01 | End: 2018-06-04 | Stop reason: HOSPADM

## 2018-06-01 RX ADMIN — ACETAMINOPHEN 650 MG: 325 TABLET ORAL at 21:38

## 2018-06-01 RX ADMIN — ASPIRIN 81 MG: 81 TABLET ORAL at 21:36

## 2018-06-01 RX ADMIN — PIPERACILLIN SODIUM AND TAZOBACTAM SODIUM 3.38 G: 3; .375 INJECTION, POWDER, LYOPHILIZED, FOR SOLUTION INTRAVENOUS at 23:58

## 2018-06-01 RX ADMIN — SODIUM CHLORIDE 500 ML: 9 INJECTION, SOLUTION INTRAVENOUS at 17:11

## 2018-06-01 RX ADMIN — ATORVASTATIN CALCIUM 40 MG: 40 TABLET, FILM COATED ORAL at 21:37

## 2018-06-01 RX ADMIN — SODIUM CHLORIDE: 9 INJECTION, SOLUTION INTRAVENOUS at 23:57

## 2018-06-01 RX ADMIN — RANOLAZINE 500 MG: 500 TABLET, EXTENDED RELEASE ORAL at 21:37

## 2018-06-01 RX ADMIN — CEFTRIAXONE SODIUM 1 G: 1 INJECTION, POWDER, FOR SOLUTION INTRAMUSCULAR; INTRAVENOUS at 17:39

## 2018-06-01 ASSESSMENT — ENCOUNTER SYMPTOMS
CHEST TIGHTNESS: 0
ABDOMINAL PAIN: 0
NAUSEA: 0
RHINORRHEA: 0
COLOR CHANGE: 0
PHOTOPHOBIA: 0
SHORTNESS OF BREATH: 1
VOICE CHANGE: 0
DIARRHEA: 0
ABDOMINAL DISTENTION: 0
EYE REDNESS: 0
BACK PAIN: 0
SINUS PRESSURE: 0
WHEEZING: 0
VOMITING: 0
COUGH: 0
SORE THROAT: 0
CONSTIPATION: 0
BLOOD IN STOOL: 0

## 2018-06-01 ASSESSMENT — PAIN DESCRIPTION - PAIN TYPE: TYPE: CHRONIC PAIN

## 2018-06-01 ASSESSMENT — PAIN SCALES - GENERAL: PAINLEVEL_OUTOF10: 3

## 2018-06-01 ASSESSMENT — PAIN DESCRIPTION - LOCATION: LOCATION: BACK

## 2018-06-02 ENCOUNTER — APPOINTMENT (OUTPATIENT)
Dept: ULTRASOUND IMAGING | Age: 73
DRG: 698 | End: 2018-06-02
Payer: MEDICARE

## 2018-06-02 LAB
ANION GAP SERPL CALCULATED.3IONS-SCNC: 14 MEQ/L (ref 8–16)
BUN BLDV-MCNC: 16 MG/DL (ref 7–22)
CALCIUM SERPL-MCNC: 8.7 MG/DL (ref 8.5–10.5)
CHLORIDE BLD-SCNC: 102 MEQ/L (ref 98–111)
CO2: 21 MEQ/L (ref 23–33)
CREAT SERPL-MCNC: 1.2 MG/DL (ref 0.4–1.2)
EKG ATRIAL RATE: 72 BPM
EKG P AXIS: 94 DEGREES
EKG P-R INTERVAL: 188 MS
EKG Q-T INTERVAL: 434 MS
EKG QRS DURATION: 142 MS
EKG QTC CALCULATION (BAZETT): 475 MS
EKG R AXIS: -44 DEGREES
EKG T AXIS: 22 DEGREES
EKG VENTRICULAR RATE: 72 BPM
GFR SERPL CREATININE-BSD FRML MDRD: 59 ML/MIN/1.73M2
GLUCOSE BLD-MCNC: 104 MG/DL (ref 70–108)
GLUCOSE BLD-MCNC: 111 MG/DL (ref 70–108)
GLUCOSE BLD-MCNC: 148 MG/DL (ref 70–108)
GLUCOSE BLD-MCNC: 173 MG/DL (ref 70–108)
GLUCOSE BLD-MCNC: 84 MG/DL (ref 70–108)
HCT VFR BLD CALC: 33.7 % (ref 42–52)
HEMOGLOBIN: 11 GM/DL (ref 14–18)
MCH RBC QN AUTO: 31.8 PG (ref 27–31)
MCHC RBC AUTO-ENTMCNC: 32.7 GM/DL (ref 33–37)
MCV RBC AUTO: 97.2 FL (ref 80–94)
PDW BLD-RTO: 13.9 % (ref 11.5–14.5)
PLATELET # BLD: 195 THOU/MM3 (ref 130–400)
PMV BLD AUTO: 8.5 FL (ref 7.4–10.4)
POTASSIUM REFLEX MAGNESIUM: 3.9 MEQ/L (ref 3.5–5.2)
PRO-BNP: 2275 PG/ML (ref 0–900)
PROSTATE SPECIFIC ANTIGEN: 16.84 NG/ML (ref 0–1)
RBC # BLD: 3.47 MILL/MM3 (ref 4.7–6.1)
SODIUM BLD-SCNC: 137 MEQ/L (ref 135–145)
TROPONIN T: 0.11 NG/ML
TROPONIN T: 0.19 NG/ML
TROPONIN T: 0.34 NG/ML
WBC # BLD: 17 THOU/MM3 (ref 4.8–10.8)

## 2018-06-02 PROCEDURE — 6370000000 HC RX 637 (ALT 250 FOR IP): Performed by: FAMILY MEDICINE

## 2018-06-02 PROCEDURE — 1200000003 HC TELEMETRY R&B

## 2018-06-02 PROCEDURE — 84484 ASSAY OF TROPONIN QUANT: CPT

## 2018-06-02 PROCEDURE — 2580000003 HC RX 258: Performed by: EMERGENCY MEDICINE

## 2018-06-02 PROCEDURE — 82948 REAGENT STRIP/BLOOD GLUCOSE: CPT

## 2018-06-02 PROCEDURE — 6360000002 HC RX W HCPCS: Performed by: EMERGENCY MEDICINE

## 2018-06-02 PROCEDURE — 93005 ELECTROCARDIOGRAM TRACING: CPT | Performed by: INTERNAL MEDICINE

## 2018-06-02 PROCEDURE — 96366 THER/PROPH/DIAG IV INF ADDON: CPT

## 2018-06-02 PROCEDURE — 2500000003 HC RX 250 WO HCPCS: Performed by: INTERNAL MEDICINE

## 2018-06-02 PROCEDURE — 87040 BLOOD CULTURE FOR BACTERIA: CPT

## 2018-06-02 PROCEDURE — 76770 US EXAM ABDO BACK WALL COMP: CPT

## 2018-06-02 PROCEDURE — 84153 ASSAY OF PSA TOTAL: CPT

## 2018-06-02 PROCEDURE — 85027 COMPLETE CBC AUTOMATED: CPT

## 2018-06-02 PROCEDURE — 51798 US URINE CAPACITY MEASURE: CPT

## 2018-06-02 PROCEDURE — 80048 BASIC METABOLIC PNL TOTAL CA: CPT

## 2018-06-02 PROCEDURE — 83880 ASSAY OF NATRIURETIC PEPTIDE: CPT

## 2018-06-02 PROCEDURE — 36415 COLL VENOUS BLD VENIPUNCTURE: CPT

## 2018-06-02 PROCEDURE — 2060000000 HC ICU INTERMEDIATE R&B

## 2018-06-02 RX ORDER — DEXTROSE MONOHYDRATE 50 MG/ML
100 INJECTION, SOLUTION INTRAVENOUS PRN
Status: DISCONTINUED | OUTPATIENT
Start: 2018-06-02 | End: 2018-06-04 | Stop reason: HOSPADM

## 2018-06-02 RX ORDER — BUMETANIDE 0.25 MG/ML
1 INJECTION, SOLUTION INTRAMUSCULAR; INTRAVENOUS 2 TIMES DAILY
Status: DISCONTINUED | OUTPATIENT
Start: 2018-06-02 | End: 2018-06-04 | Stop reason: HOSPADM

## 2018-06-02 RX ORDER — NICOTINE POLACRILEX 4 MG
15 LOZENGE BUCCAL PRN
Status: DISCONTINUED | OUTPATIENT
Start: 2018-06-02 | End: 2018-06-04 | Stop reason: HOSPADM

## 2018-06-02 RX ORDER — CLONIDINE HYDROCHLORIDE 0.1 MG/1
0.1 TABLET ORAL ONCE
Status: COMPLETED | OUTPATIENT
Start: 2018-06-02 | End: 2018-06-02

## 2018-06-02 RX ORDER — DEXTROSE MONOHYDRATE 25 G/50ML
12.5 INJECTION, SOLUTION INTRAVENOUS PRN
Status: DISCONTINUED | OUTPATIENT
Start: 2018-06-02 | End: 2018-06-04 | Stop reason: HOSPADM

## 2018-06-02 RX ADMIN — PIPERACILLIN SODIUM AND TAZOBACTAM SODIUM 3.38 G: 3; .375 INJECTION, POWDER, LYOPHILIZED, FOR SOLUTION INTRAVENOUS at 16:30

## 2018-06-02 RX ADMIN — ISOSORBIDE MONONITRATE 120 MG: 60 TABLET, EXTENDED RELEASE ORAL at 08:03

## 2018-06-02 RX ADMIN — RANOLAZINE 500 MG: 500 TABLET, EXTENDED RELEASE ORAL at 21:21

## 2018-06-02 RX ADMIN — PIPERACILLIN SODIUM AND TAZOBACTAM SODIUM 3.38 G: 3; .375 INJECTION, POWDER, LYOPHILIZED, FOR SOLUTION INTRAVENOUS at 23:32

## 2018-06-02 RX ADMIN — PIPERACILLIN SODIUM AND TAZOBACTAM SODIUM 3.38 G: 3; .375 INJECTION, POWDER, LYOPHILIZED, FOR SOLUTION INTRAVENOUS at 07:55

## 2018-06-02 RX ADMIN — DILTIAZEM HYDROCHLORIDE 120 MG: 60 CAPSULE, EXTENDED RELEASE ORAL at 08:03

## 2018-06-02 RX ADMIN — CLOPIDOGREL BISULFATE 75 MG: 75 TABLET ORAL at 08:02

## 2018-06-02 RX ADMIN — BUMETANIDE 1 MG: 0.25 INJECTION INTRAMUSCULAR; INTRAVENOUS at 19:55

## 2018-06-02 RX ADMIN — RANOLAZINE 500 MG: 500 TABLET, EXTENDED RELEASE ORAL at 08:05

## 2018-06-02 RX ADMIN — CLONIDINE HYDROCHLORIDE 0.1 MG: 0.1 TABLET ORAL at 01:49

## 2018-06-02 RX ADMIN — ATORVASTATIN CALCIUM 40 MG: 40 TABLET, FILM COATED ORAL at 21:20

## 2018-06-02 RX ADMIN — ACETAMINOPHEN 650 MG: 325 TABLET ORAL at 03:44

## 2018-06-02 RX ADMIN — ACETAMINOPHEN 650 MG: 325 TABLET ORAL at 19:55

## 2018-06-02 RX ADMIN — LISINOPRIL 10 MG: 10 TABLET ORAL at 08:03

## 2018-06-02 RX ADMIN — SODIUM CHLORIDE: 9 INJECTION, SOLUTION INTRAVENOUS at 21:34

## 2018-06-02 RX ADMIN — SODIUM CHLORIDE: 9 INJECTION, SOLUTION INTRAVENOUS at 10:13

## 2018-06-02 RX ADMIN — METOPROLOL SUCCINATE 50 MG: 50 TABLET, EXTENDED RELEASE ORAL at 08:03

## 2018-06-02 ASSESSMENT — PAIN SCALES - GENERAL: PAINLEVEL_OUTOF10: 0

## 2018-06-03 PROBLEM — R79.89 ELEVATED TROPONIN: Status: ACTIVE | Noted: 2018-06-03

## 2018-06-03 PROBLEM — R77.8 ELEVATED TROPONIN: Status: ACTIVE | Noted: 2018-06-03

## 2018-06-03 LAB
GLUCOSE BLD-MCNC: 141 MG/DL (ref 70–108)
GLUCOSE BLD-MCNC: 171 MG/DL (ref 70–108)
GLUCOSE BLD-MCNC: 173 MG/DL (ref 70–108)
GLUCOSE BLD-MCNC: 92 MG/DL (ref 70–108)
TROPONIN T: 0.27 NG/ML
TROPONIN T: 0.27 NG/ML

## 2018-06-03 PROCEDURE — 96367 TX/PROPH/DG ADDL SEQ IV INF: CPT

## 2018-06-03 PROCEDURE — 6360000002 HC RX W HCPCS: Performed by: EMERGENCY MEDICINE

## 2018-06-03 PROCEDURE — 96366 THER/PROPH/DIAG IV INF ADDON: CPT

## 2018-06-03 PROCEDURE — 1200000003 HC TELEMETRY R&B

## 2018-06-03 PROCEDURE — 36415 COLL VENOUS BLD VENIPUNCTURE: CPT

## 2018-06-03 PROCEDURE — 6370000000 HC RX 637 (ALT 250 FOR IP): Performed by: INTERNAL MEDICINE

## 2018-06-03 PROCEDURE — 84484 ASSAY OF TROPONIN QUANT: CPT

## 2018-06-03 PROCEDURE — 82948 REAGENT STRIP/BLOOD GLUCOSE: CPT

## 2018-06-03 PROCEDURE — 2500000003 HC RX 250 WO HCPCS: Performed by: INTERNAL MEDICINE

## 2018-06-03 PROCEDURE — 2580000003 HC RX 258: Performed by: EMERGENCY MEDICINE

## 2018-06-03 RX ORDER — RANOLAZINE 500 MG/1
500 TABLET, EXTENDED RELEASE ORAL 2 TIMES DAILY
Status: DISCONTINUED | OUTPATIENT
Start: 2018-06-03 | End: 2018-06-04 | Stop reason: HOSPADM

## 2018-06-03 RX ORDER — LACTOBACILLUS RHAMNOSUS GG 10B CELL
1 CAPSULE ORAL
Status: DISCONTINUED | OUTPATIENT
Start: 2018-06-04 | End: 2018-06-04 | Stop reason: HOSPADM

## 2018-06-03 RX ORDER — ALPRAZOLAM 0.5 MG/1
0.5 TABLET ORAL 3 TIMES DAILY PRN
Status: DISCONTINUED | OUTPATIENT
Start: 2018-06-03 | End: 2018-06-04 | Stop reason: HOSPADM

## 2018-06-03 RX ADMIN — METOPROLOL SUCCINATE 50 MG: 50 TABLET, EXTENDED RELEASE ORAL at 09:15

## 2018-06-03 RX ADMIN — RANOLAZINE 500 MG: 500 TABLET, EXTENDED RELEASE ORAL at 09:15

## 2018-06-03 RX ADMIN — CLOPIDOGREL BISULFATE 75 MG: 75 TABLET ORAL at 09:14

## 2018-06-03 RX ADMIN — PIPERACILLIN SODIUM AND TAZOBACTAM SODIUM 3.38 G: 3; .375 INJECTION, POWDER, LYOPHILIZED, FOR SOLUTION INTRAVENOUS at 14:41

## 2018-06-03 RX ADMIN — LISINOPRIL 10 MG: 10 TABLET ORAL at 09:15

## 2018-06-03 RX ADMIN — PIPERACILLIN SODIUM AND TAZOBACTAM SODIUM 3.38 G: 3; .375 INJECTION, POWDER, LYOPHILIZED, FOR SOLUTION INTRAVENOUS at 22:54

## 2018-06-03 RX ADMIN — ATORVASTATIN CALCIUM 40 MG: 40 TABLET, FILM COATED ORAL at 22:12

## 2018-06-03 RX ADMIN — ISOSORBIDE MONONITRATE 120 MG: 60 TABLET, EXTENDED RELEASE ORAL at 09:15

## 2018-06-03 RX ADMIN — Medication 10 ML: at 21:55

## 2018-06-03 RX ADMIN — RANOLAZINE 500 MG: 500 TABLET, FILM COATED, EXTENDED RELEASE ORAL at 21:55

## 2018-06-03 RX ADMIN — BUMETANIDE 1 MG: 0.25 INJECTION INTRAMUSCULAR; INTRAVENOUS at 22:00

## 2018-06-03 RX ADMIN — ASPIRIN 81 MG: 81 TABLET ORAL at 09:09

## 2018-06-03 RX ADMIN — BUMETANIDE 1 MG: 0.25 INJECTION INTRAMUSCULAR; INTRAVENOUS at 09:29

## 2018-06-03 RX ADMIN — PIPERACILLIN SODIUM AND TAZOBACTAM SODIUM 3.38 G: 3; .375 INJECTION, POWDER, LYOPHILIZED, FOR SOLUTION INTRAVENOUS at 09:05

## 2018-06-03 RX ADMIN — SODIUM CHLORIDE: 9 INJECTION, SOLUTION INTRAVENOUS at 18:43

## 2018-06-03 RX ADMIN — DILTIAZEM HYDROCHLORIDE 120 MG: 60 CAPSULE, EXTENDED RELEASE ORAL at 09:15

## 2018-06-03 ASSESSMENT — PAIN DESCRIPTION - LOCATION: LOCATION: BACK

## 2018-06-03 ASSESSMENT — PAIN SCALES - GENERAL
PAINLEVEL_OUTOF10: 0

## 2018-06-03 ASSESSMENT — PAIN DESCRIPTION - PAIN TYPE: TYPE: CHRONIC PAIN

## 2018-06-04 VITALS
DIASTOLIC BLOOD PRESSURE: 63 MMHG | SYSTOLIC BLOOD PRESSURE: 137 MMHG | RESPIRATION RATE: 16 BRPM | OXYGEN SATURATION: 97 % | WEIGHT: 217.9 LBS | HEIGHT: 68 IN | BODY MASS INDEX: 33.02 KG/M2 | HEART RATE: 77 BPM | TEMPERATURE: 98 F

## 2018-06-04 PROBLEM — R19.7 DIARRHEA: Status: ACTIVE | Noted: 2018-06-04

## 2018-06-04 PROBLEM — N39.0 URINARY TRACT INFECTION ASSOCIATED WITH INDWELLING URETHRAL CATHETER (HCC): Status: ACTIVE | Noted: 2018-06-04

## 2018-06-04 PROBLEM — T83.511A URINARY TRACT INFECTION ASSOCIATED WITH INDWELLING URETHRAL CATHETER (HCC): Status: ACTIVE | Noted: 2018-06-04

## 2018-06-04 LAB
ANION GAP SERPL CALCULATED.3IONS-SCNC: 12 MEQ/L (ref 8–16)
BUN BLDV-MCNC: 20 MG/DL (ref 7–22)
CALCIUM SERPL-MCNC: 8.1 MG/DL (ref 8.5–10.5)
CHLORIDE BLD-SCNC: 104 MEQ/L (ref 98–111)
CO2: 21 MEQ/L (ref 23–33)
CREAT SERPL-MCNC: 1.4 MG/DL (ref 0.4–1.2)
GFR SERPL CREATININE-BSD FRML MDRD: 50 ML/MIN/1.73M2
GLUCOSE BLD-MCNC: 110 MG/DL (ref 70–108)
GLUCOSE BLD-MCNC: 122 MG/DL (ref 70–108)
GLUCOSE BLD-MCNC: 156 MG/DL (ref 70–108)
GLUCOSE BLD-MCNC: 267 MG/DL (ref 70–108)
HCT VFR BLD CALC: 29.2 % (ref 42–52)
HEMOCCULT STL QL: NEGATIVE
HEMOGLOBIN: 9.8 GM/DL (ref 14–18)
MAGNESIUM: 1.9 MG/DL (ref 1.6–2.4)
MCH RBC QN AUTO: 32.3 PG (ref 27–31)
MCHC RBC AUTO-ENTMCNC: 33.5 GM/DL (ref 33–37)
MCV RBC AUTO: 96.5 FL (ref 80–94)
ORGANISM: ABNORMAL
PDW BLD-RTO: 13.9 % (ref 11.5–14.5)
PLATELET # BLD: 188 THOU/MM3 (ref 130–400)
PMV BLD AUTO: 9 FL (ref 7.4–10.4)
POTASSIUM SERPL-SCNC: 3.8 MEQ/L (ref 3.5–5.2)
RBC # BLD: 3.03 MILL/MM3 (ref 4.7–6.1)
SODIUM BLD-SCNC: 137 MEQ/L (ref 135–145)
TROPONIN T: 0.24 NG/ML
URINE CULTURE REFLEX: ABNORMAL
WBC # BLD: 11.3 THOU/MM3 (ref 4.8–10.8)

## 2018-06-04 PROCEDURE — G8987 SELF CARE CURRENT STATUS: HCPCS

## 2018-06-04 PROCEDURE — 97530 THERAPEUTIC ACTIVITIES: CPT

## 2018-06-04 PROCEDURE — 2580000003 HC RX 258: Performed by: EMERGENCY MEDICINE

## 2018-06-04 PROCEDURE — 36415 COLL VENOUS BLD VENIPUNCTURE: CPT

## 2018-06-04 PROCEDURE — 82948 REAGENT STRIP/BLOOD GLUCOSE: CPT

## 2018-06-04 PROCEDURE — 2500000003 HC RX 250 WO HCPCS: Performed by: INTERNAL MEDICINE

## 2018-06-04 PROCEDURE — 85027 COMPLETE CBC AUTOMATED: CPT

## 2018-06-04 PROCEDURE — 6360000002 HC RX W HCPCS: Performed by: EMERGENCY MEDICINE

## 2018-06-04 PROCEDURE — 97166 OT EVAL MOD COMPLEX 45 MIN: CPT

## 2018-06-04 PROCEDURE — 6370000000 HC RX 637 (ALT 250 FOR IP): Performed by: INTERNAL MEDICINE

## 2018-06-04 PROCEDURE — G8988 SELF CARE GOAL STATUS: HCPCS

## 2018-06-04 PROCEDURE — 82272 OCCULT BLD FECES 1-3 TESTS: CPT

## 2018-06-04 PROCEDURE — 80048 BASIC METABOLIC PNL TOTAL CA: CPT

## 2018-06-04 PROCEDURE — 83735 ASSAY OF MAGNESIUM: CPT

## 2018-06-04 PROCEDURE — 97535 SELF CARE MNGMENT TRAINING: CPT

## 2018-06-04 PROCEDURE — 84484 ASSAY OF TROPONIN QUANT: CPT

## 2018-06-04 RX ORDER — POTASSIUM CHLORIDE 20 MEQ/1
20 TABLET, EXTENDED RELEASE ORAL DAILY
Qty: 60 TABLET | Refills: 3 | Status: SHIPPED | OUTPATIENT
Start: 2018-06-04 | End: 2018-06-04 | Stop reason: HOSPADM

## 2018-06-04 RX ORDER — CIPROFLOXACIN 500 MG/1
500 TABLET, FILM COATED ORAL 2 TIMES DAILY
Qty: 20 TABLET | Refills: 0 | Status: SHIPPED | OUTPATIENT
Start: 2018-06-04 | End: 2018-06-14

## 2018-06-04 RX ORDER — POTASSIUM CHLORIDE 20 MEQ/1
20 TABLET, EXTENDED RELEASE ORAL DAILY
Qty: 60 TABLET | Refills: 0 | Status: ON HOLD | OUTPATIENT
Start: 2018-06-04 | End: 2018-11-06 | Stop reason: ALTCHOICE

## 2018-06-04 RX ORDER — LACTOBACILLUS RHAMNOSUS GG 10B CELL
1 CAPSULE ORAL
Qty: 30 CAPSULE | Refills: 1 | Status: SHIPPED | OUTPATIENT
Start: 2018-06-05 | End: 2018-06-18 | Stop reason: ALTCHOICE

## 2018-06-04 RX ORDER — BUMETANIDE 2 MG/1
2 TABLET ORAL DAILY
Qty: 30 TABLET | Refills: 0 | Status: ON HOLD | OUTPATIENT
Start: 2018-06-04 | End: 2018-11-06 | Stop reason: ALTCHOICE

## 2018-06-04 RX ORDER — BUMETANIDE 2 MG/1
2 TABLET ORAL DAILY
Qty: 30 TABLET | Refills: 3 | Status: SHIPPED | OUTPATIENT
Start: 2018-06-04 | End: 2018-06-04 | Stop reason: HOSPADM

## 2018-06-04 RX ADMIN — DILTIAZEM HYDROCHLORIDE 120 MG: 60 CAPSULE, EXTENDED RELEASE ORAL at 09:16

## 2018-06-04 RX ADMIN — ISOSORBIDE MONONITRATE 120 MG: 60 TABLET, EXTENDED RELEASE ORAL at 09:16

## 2018-06-04 RX ADMIN — BUMETANIDE 1 MG: 0.25 INJECTION INTRAMUSCULAR; INTRAVENOUS at 09:14

## 2018-06-04 RX ADMIN — Medication 1 CAPSULE: at 09:13

## 2018-06-04 RX ADMIN — Medication 10 ML: at 09:18

## 2018-06-04 RX ADMIN — CLOPIDOGREL BISULFATE 75 MG: 75 TABLET ORAL at 09:15

## 2018-06-04 RX ADMIN — PIPERACILLIN SODIUM AND TAZOBACTAM SODIUM 3.38 G: 3; .375 INJECTION, POWDER, LYOPHILIZED, FOR SOLUTION INTRAVENOUS at 06:39

## 2018-06-04 RX ADMIN — LISINOPRIL 10 MG: 10 TABLET ORAL at 09:17

## 2018-06-04 RX ADMIN — RANOLAZINE 500 MG: 500 TABLET, FILM COATED, EXTENDED RELEASE ORAL at 09:17

## 2018-06-04 RX ADMIN — METOPROLOL SUCCINATE 50 MG: 50 TABLET, EXTENDED RELEASE ORAL at 09:17

## 2018-06-04 ASSESSMENT — PAIN DESCRIPTION - LOCATION
LOCATION: BACK
LOCATION: BACK

## 2018-06-04 ASSESSMENT — PAIN SCALES - GENERAL
PAINLEVEL_OUTOF10: 0

## 2018-06-04 ASSESSMENT — PAIN DESCRIPTION - PAIN TYPE
TYPE: CHRONIC PAIN
TYPE: CHRONIC PAIN

## 2018-06-05 ENCOUNTER — CARE COORDINATION (OUTPATIENT)
Dept: CASE MANAGEMENT | Age: 73
End: 2018-06-05

## 2018-06-05 DIAGNOSIS — T83.511A URINARY TRACT INFECTION ASSOCIATED WITH INDWELLING URETHRAL CATHETER, INITIAL ENCOUNTER (HCC): Primary | ICD-10-CM

## 2018-06-05 DIAGNOSIS — N39.0 URINARY TRACT INFECTION ASSOCIATED WITH INDWELLING URETHRAL CATHETER, INITIAL ENCOUNTER (HCC): Primary | ICD-10-CM

## 2018-06-05 PROCEDURE — 1111F DSCHRG MED/CURRENT MED MERGE: CPT | Performed by: FAMILY MEDICINE

## 2018-06-07 ENCOUNTER — HOSPITAL ENCOUNTER (OUTPATIENT)
Age: 73
Discharge: HOME | End: 2018-06-07
Payer: MEDICARE

## 2018-06-07 DIAGNOSIS — N39.0 URINARY TRACT INFECTION ASSOCIATED WITH CATHETERIZATION OF URINARY TRACT, UNSPECIFIED INDWELLING URINARY CATHETER TYPE, INITIAL ENCOUNTER (HCC): ICD-10-CM

## 2018-06-07 DIAGNOSIS — T83.511A URINARY TRACT INFECTION ASSOCIATED WITH CATHETERIZATION OF URINARY TRACT, UNSPECIFIED INDWELLING URINARY CATHETER TYPE, INITIAL ENCOUNTER (HCC): ICD-10-CM

## 2018-06-07 DIAGNOSIS — N12 PYELONEPHRITIS: ICD-10-CM

## 2018-06-07 LAB
ANION GAP SERPL CALCULATED.3IONS-SCNC: 14 MEQ/L (ref 8–16)
BLOOD CULTURE, ROUTINE: NORMAL
BLOOD CULTURE, ROUTINE: NORMAL
BUN BLDV-MCNC: 16 MG/DL (ref 7–22)
CALCIUM SERPL-MCNC: 9.2 MG/DL (ref 8.5–10.5)
CHLORIDE BLD-SCNC: 100 MEQ/L (ref 98–111)
CO2: 26 MEQ/L (ref 23–33)
CREAT SERPL-MCNC: 1.1 MG/DL (ref 0.4–1.2)
GFR SERPL CREATININE-BSD FRML MDRD: 66 ML/MIN/1.73M2
GLUCOSE BLD-MCNC: 102 MG/DL (ref 70–108)
HCT VFR BLD CALC: 33.7 % (ref 42–52)
HEMOGLOBIN: 11.5 GM/DL (ref 14–18)
MAGNESIUM: 2.2 MG/DL (ref 1.6–2.4)
MCH RBC QN AUTO: 32.8 PG (ref 27–31)
MCHC RBC AUTO-ENTMCNC: 34.2 GM/DL (ref 33–37)
MCV RBC AUTO: 95.8 FL (ref 80–94)
PDW BLD-RTO: 13.7 % (ref 11.5–14.5)
PLATELET # BLD: 340 THOU/MM3 (ref 130–400)
PMV BLD AUTO: 8.1 FL (ref 7.4–10.4)
POTASSIUM SERPL-SCNC: 4.2 MEQ/L (ref 3.5–5.2)
RBC # BLD: 3.52 MILL/MM3 (ref 4.7–6.1)
SODIUM BLD-SCNC: 140 MEQ/L (ref 135–145)
WBC # BLD: 8 THOU/MM3 (ref 4.8–10.8)

## 2018-06-07 PROCEDURE — 36415 COLL VENOUS BLD VENIPUNCTURE: CPT

## 2018-06-07 PROCEDURE — 83735 ASSAY OF MAGNESIUM: CPT

## 2018-06-07 PROCEDURE — 85027 COMPLETE CBC AUTOMATED: CPT

## 2018-06-07 PROCEDURE — 80048 BASIC METABOLIC PNL TOTAL CA: CPT

## 2018-06-08 ENCOUNTER — TELEPHONE (OUTPATIENT)
Dept: FAMILY MEDICINE CLINIC | Age: 73
End: 2018-06-08

## 2018-06-08 ENCOUNTER — CARE COORDINATION (OUTPATIENT)
Dept: CASE MANAGEMENT | Age: 73
End: 2018-06-08

## 2018-06-11 ENCOUNTER — OFFICE VISIT (OUTPATIENT)
Dept: FAMILY MEDICINE CLINIC | Age: 73
End: 2018-06-11

## 2018-06-11 VITALS
RESPIRATION RATE: 16 BRPM | HEIGHT: 68 IN | DIASTOLIC BLOOD PRESSURE: 52 MMHG | SYSTOLIC BLOOD PRESSURE: 120 MMHG | WEIGHT: 210.13 LBS | BODY MASS INDEX: 31.85 KG/M2 | HEART RATE: 60 BPM

## 2018-06-11 DIAGNOSIS — I50.30 DIASTOLIC HEART FAILURE, UNSPECIFIED HEART FAILURE CHRONICITY: ICD-10-CM

## 2018-06-11 DIAGNOSIS — R97.20 ELEVATED PSA, BETWEEN 10 AND LESS THAN 20 NG/ML: Primary | ICD-10-CM

## 2018-06-11 DIAGNOSIS — F41.9 ANXIETY: ICD-10-CM

## 2018-06-11 DIAGNOSIS — I70.1 RENAL ARTERY STENOSIS (HCC): ICD-10-CM

## 2018-06-11 PROCEDURE — 99214 OFFICE O/P EST MOD 30 MIN: CPT | Performed by: FAMILY MEDICINE

## 2018-06-11 PROCEDURE — 1111F DSCHRG MED/CURRENT MED MERGE: CPT | Performed by: FAMILY MEDICINE

## 2018-06-11 RX ORDER — HYDROXYZINE PAMOATE 25 MG/1
25 CAPSULE ORAL NIGHTLY PRN
Qty: 10 CAPSULE | Refills: 0 | Status: ON HOLD | OUTPATIENT
Start: 2018-06-11 | End: 2018-11-09 | Stop reason: HOSPADM

## 2018-06-12 ENCOUNTER — CARE COORDINATION (OUTPATIENT)
Dept: CASE MANAGEMENT | Age: 73
End: 2018-06-12

## 2018-06-12 RX ORDER — INSULIN LISPRO 100 [IU]/ML
INJECTION, SOLUTION INTRAVENOUS; SUBCUTANEOUS
Qty: 45 ML | Refills: 5 | Status: ON HOLD | OUTPATIENT
Start: 2018-06-12 | End: 2018-11-06 | Stop reason: ALTCHOICE

## 2018-06-15 ENCOUNTER — CARE COORDINATION (OUTPATIENT)
Dept: CASE MANAGEMENT | Age: 73
End: 2018-06-15

## 2018-06-18 ENCOUNTER — NURSE ONLY (OUTPATIENT)
Dept: FAMILY MEDICINE CLINIC | Age: 73
End: 2018-06-18

## 2018-06-18 ENCOUNTER — OFFICE VISIT (OUTPATIENT)
Dept: INTERNAL MEDICINE CLINIC | Age: 73
End: 2018-06-18
Payer: MEDICARE

## 2018-06-18 VITALS
SYSTOLIC BLOOD PRESSURE: 120 MMHG | HEIGHT: 69 IN | WEIGHT: 214.5 LBS | DIASTOLIC BLOOD PRESSURE: 48 MMHG | BODY MASS INDEX: 31.77 KG/M2 | HEART RATE: 60 BPM

## 2018-06-18 DIAGNOSIS — R35.0 URINE FREQUENCY: Primary | ICD-10-CM

## 2018-06-18 DIAGNOSIS — I25.10 CORONARY ARTERY DISEASE INVOLVING NATIVE CORONARY ARTERY, ANGINA PRESENCE UNSPECIFIED, UNSPECIFIED WHETHER NATIVE OR TRANSPLANTED HEART: Primary | ICD-10-CM

## 2018-06-18 DIAGNOSIS — N39.0 URINARY TRACT INFECTION ASSOCIATED WITH INDWELLING URETHRAL CATHETER, INITIAL ENCOUNTER (HCC): ICD-10-CM

## 2018-06-18 DIAGNOSIS — T83.511A URINARY TRACT INFECTION ASSOCIATED WITH INDWELLING URETHRAL CATHETER, INITIAL ENCOUNTER (HCC): ICD-10-CM

## 2018-06-18 DIAGNOSIS — Z95.820 STATUS POST ANGIOPLASTY WITH STENT: ICD-10-CM

## 2018-06-18 LAB
BACTERIA URINE, POC: ABNORMAL
BILIRUBIN URINE: 0 MG/DL
BLOOD, URINE: NEGATIVE
CASTS URINE, POC: ABNORMAL
CLARITY: CLEAR
COLOR: ABNORMAL
CRYSTALS URINE, POC: ABNORMAL
EPI CELLS URINE, POC: ABNORMAL
GLUCOSE URINE: ABNORMAL
KETONES, URINE: POSITIVE
LEUKOCYTE EST, POC: ABNORMAL
NITRITE, URINE: NEGATIVE
PH UA: 5 (ref 4.5–8)
PROTEIN UA: POSITIVE
RBC URINE, POC: NEGATIVE
SPECIFIC GRAVITY UA: 1.01 (ref 1–1.03)
UROBILINOGEN, URINE: NORMAL
WBC URINE, POC: ABNORMAL
YEAST URINE, POC: ABNORMAL

## 2018-06-18 PROCEDURE — G8598 ASA/ANTIPLAT THER USED: HCPCS | Performed by: INTERNAL MEDICINE

## 2018-06-18 PROCEDURE — 1123F ACP DISCUSS/DSCN MKR DOCD: CPT | Performed by: INTERNAL MEDICINE

## 2018-06-18 PROCEDURE — 1111F DSCHRG MED/CURRENT MED MERGE: CPT | Performed by: INTERNAL MEDICINE

## 2018-06-18 PROCEDURE — 3017F COLORECTAL CA SCREEN DOC REV: CPT | Performed by: INTERNAL MEDICINE

## 2018-06-18 PROCEDURE — G8417 CALC BMI ABV UP PARAM F/U: HCPCS | Performed by: INTERNAL MEDICINE

## 2018-06-18 PROCEDURE — 1036F TOBACCO NON-USER: CPT | Performed by: INTERNAL MEDICINE

## 2018-06-18 PROCEDURE — G8427 DOCREV CUR MEDS BY ELIG CLIN: HCPCS | Performed by: INTERNAL MEDICINE

## 2018-06-18 PROCEDURE — 4040F PNEUMOC VAC/ADMIN/RCVD: CPT | Performed by: INTERNAL MEDICINE

## 2018-06-18 PROCEDURE — 99213 OFFICE O/P EST LOW 20 MIN: CPT | Performed by: INTERNAL MEDICINE

## 2018-06-18 PROCEDURE — 81000 URINALYSIS NONAUTO W/SCOPE: CPT | Performed by: FAMILY MEDICINE

## 2018-06-18 ASSESSMENT — PATIENT HEALTH QUESTIONNAIRE - PHQ9
SUM OF ALL RESPONSES TO PHQ9 QUESTIONS 1 & 2: 0
SUM OF ALL RESPONSES TO PHQ QUESTIONS 1-9: 0
1. LITTLE INTEREST OR PLEASURE IN DOING THINGS: 0
2. FEELING DOWN, DEPRESSED OR HOPELESS: 0

## 2018-06-19 ENCOUNTER — CARE COORDINATION (OUTPATIENT)
Dept: CASE MANAGEMENT | Age: 73
End: 2018-06-19

## 2018-06-19 LAB
APPEARANCE: CLEAR
BILIRUBIN: NEGATIVE
COLOR: YELLOW
GLUCOSE BLD-MCNC: NEGATIVE MG/DL
KETONES, URINE: NEGATIVE
LEUKOCYTE ESTERASE, URINE: NEGATIVE
NITRITE, URINE: NEGATIVE
OCCULT BLOOD,URINE: NEGATIVE
PH: 5.5 (ref 5–9)
PROTEIN, URINE: NEGATIVE
SP GRAVITY MISCELLANEOUS: 1.01 (ref 1–1.03)
UROBILINOGEN, URINE: NORMAL

## 2018-06-22 ENCOUNTER — TELEPHONE (OUTPATIENT)
Dept: FAMILY MEDICINE CLINIC | Age: 73
End: 2018-06-22

## 2018-07-03 PROBLEM — R77.8 ELEVATED TROPONIN: Status: RESOLVED | Noted: 2018-06-03 | Resolved: 2018-07-03

## 2018-07-03 PROBLEM — R79.89 ELEVATED TROPONIN: Status: RESOLVED | Noted: 2018-06-03 | Resolved: 2018-07-03

## 2018-07-23 ENCOUNTER — HOSPITAL ENCOUNTER (OUTPATIENT)
Age: 73
Discharge: HOME | End: 2018-07-23
Payer: MEDICARE

## 2018-07-23 ENCOUNTER — TELEPHONE (OUTPATIENT)
Dept: FAMILY MEDICINE CLINIC | Age: 73
End: 2018-07-23

## 2018-07-23 DIAGNOSIS — R97.20 ELEVATED PSA, LESS THAN 10 NG/ML: Primary | ICD-10-CM

## 2018-07-23 DIAGNOSIS — R97.20 ELEVATED PSA, BETWEEN 10 AND LESS THAN 20 NG/ML: ICD-10-CM

## 2018-07-23 LAB
ALBUMIN SERPL-MCNC: 4 G/DL (ref 3.5–5.1)
ALP BLD-CCNC: 65 U/L (ref 38–126)
ALT SERPL-CCNC: 18 U/L (ref 11–66)
ANION GAP SERPL CALCULATED.3IONS-SCNC: 16 MEQ/L (ref 8–16)
AST SERPL-CCNC: 20 U/L (ref 5–40)
BILIRUB SERPL-MCNC: 0.2 MG/DL (ref 0.3–1.2)
BILIRUBIN DIRECT: < 0.2 MG/DL (ref 0–0.3)
BUN BLDV-MCNC: 24 MG/DL (ref 7–22)
CALCIUM SERPL-MCNC: 9.8 MG/DL (ref 8.5–10.5)
CHLORIDE BLD-SCNC: 105 MEQ/L (ref 98–111)
CHOLESTEROL, TOTAL: 147 MG/DL (ref 100–199)
CO2: 19 MEQ/L (ref 23–33)
CREAT SERPL-MCNC: 1.1 MG/DL (ref 0.4–1.2)
ERYTHROCYTE [DISTWIDTH] IN BLOOD BY AUTOMATED COUNT: 13.7 % (ref 11.5–14.5)
ERYTHROCYTE [DISTWIDTH] IN BLOOD BY AUTOMATED COUNT: 47.7 FL (ref 35–45)
GFR SERPL CREATININE-BSD FRML MDRD: 66 ML/MIN/1.73M2
GLUCOSE BLD-MCNC: 99 MG/DL (ref 70–108)
HCT VFR BLD CALC: 33.8 % (ref 42–52)
HDLC SERPL-MCNC: 28 MG/DL
HEMOGLOBIN: 11.4 GM/DL (ref 14–18)
LDL CHOLESTEROL CALCULATED: 88 MG/DL
MCH RBC QN AUTO: 32.2 PG (ref 26–33)
MCHC RBC AUTO-ENTMCNC: 33.7 GM/DL (ref 32.2–35.5)
MCV RBC AUTO: 95.5 FL (ref 80–94)
PLATELET # BLD: 263 THOU/MM3 (ref 130–400)
PMV BLD AUTO: 9.5 FL (ref 9.4–12.4)
POTASSIUM SERPL-SCNC: 4.4 MEQ/L (ref 3.5–5.2)
PROSTATE SPECIFIC ANTIGEN: 3.32 NG/ML (ref 0–1)
RBC # BLD: 3.54 MILL/MM3 (ref 4.7–6.1)
SODIUM BLD-SCNC: 140 MEQ/L (ref 135–145)
TOTAL PROTEIN: 7.1 G/DL (ref 6.1–8)
TRIGL SERPL-MCNC: 155 MG/DL (ref 0–199)
WBC # BLD: 7.5 THOU/MM3 (ref 4.8–10.8)

## 2018-07-23 PROCEDURE — 80053 COMPREHEN METABOLIC PANEL: CPT

## 2018-07-23 PROCEDURE — 80061 LIPID PANEL: CPT

## 2018-07-23 PROCEDURE — 84153 ASSAY OF PSA TOTAL: CPT

## 2018-07-23 PROCEDURE — 82248 BILIRUBIN DIRECT: CPT

## 2018-07-23 PROCEDURE — 36415 COLL VENOUS BLD VENIPUNCTURE: CPT

## 2018-07-23 PROCEDURE — 85027 COMPLETE CBC AUTOMATED: CPT

## 2018-07-23 NOTE — TELEPHONE ENCOUNTER
----- Message from Errol Rice MD sent at 7/23/2018  2:19 PM EDT -----  Let him know the follow up PSA was much better and I would check it again in a month

## 2018-07-24 ENCOUNTER — HOSPITAL ENCOUNTER (OUTPATIENT)
Dept: CARDIAC REHAB | Age: 73
Setting detail: THERAPIES SERIES
Discharge: HOME | End: 2018-07-24
Payer: MEDICARE

## 2018-07-24 VITALS — HEIGHT: 69 IN | OXYGEN SATURATION: 99 % | BODY MASS INDEX: 31.47 KG/M2 | WEIGHT: 212.5 LBS | HEART RATE: 63 BPM

## 2018-07-24 PROCEDURE — G0422 INTENS CARDIAC REHAB W/EXERC: HCPCS

## 2018-07-24 PROCEDURE — G0423 INTENS CARDIAC REHAB NO EXER: HCPCS

## 2018-07-24 NOTE — PLAN OF CARE
[facility] Program - [id number]  SLBAYFEO Facility-Based Program  Individualized Cardiac Treatment Plan    Patient Name:  Nikhil Post  :  1945  Age:  68 y.o. MRN:  [id number]  Diagnosis: PCI: RCA x 5  Date of Event: 18   Physician:  Freda Aguilar Office Visit:    Date Entered Program: 18  Risk Stratifications: [] Low [] Intermediate [x] High  Allergies: No Known Allergies    Individual Cardiac Treatment Plan -EXERCISE  INITIAL 30 DAY 60 DAY 90 DAY FINAL DAY   EXERCISE  ASSESSMENT/PLAN EXERCISE  REASSESSMENT EXERCISE   REASSESSMENT EXERCISE   REASSESSMENT EXERCISE  DISCHARGE/FOLLOW-UP   Date: 18 Date: Date: Date: Date:   Session #1 Session # ** Session # ** Session # ** Session # **  Last session completed on **   Stages of Change Stages of Change Stages of Change Stages of Change Stages of Change   [] Pre Contemplation  [x] Contemplation  [] Preparation  [] Action  [] Maintenance  [] Relapse [] Pre Contemplation  [] Contemplation  [] Preparation  [] Action  [] Maintenance  [] Relapse [] Pre Contemplation  [] Contemplation  [] Preparation  [] Action  [] Maintenance  [] Relapse [] Pre Contemplation  [] Contemplation  [] Preparation  [] Action  [] Maintenance  [] Relapse [] Pre Contemplation  [] Contemplation  [] Preparation  [] Action  [] Maintenance  [] Relapse   EXERCISE ASSESSMENT EXERCISE ASSESSMENT EXERCISE ASSESSMENT EXERCISE ASSESSMENT EXERCISE ASSESSMENT   6 Min Walk Test  Distance walked:   0.12 miles  633.6 ft.  1.9 METs  Max HR:77 BPM      RPE:  12    THR:    Rhythm:  NSR with occ PAC's    6 Min Walk Test  Distance walked:   ** miles  ** ft  ** METs  Max HR:** BPM      RPE:  **  %Change ft= **    Rhythm:  **   DASI: 8.9 METS DASI: ** METS DASI: ** METS DASI: ** METS DASI: ** METS   Return to Work  [employer] on returning to work?    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 Progression:   Progression:   Progression: Progression:  Increase time/intensity when RPE <13, and HR is in [facility]   [x] Yes      [] No  Upper and Lower body strength training 2x/wk    Wt: 2#       Reps:  8-15    *Increase wt. after completing 15 reps with an RPE of <12/13. [] Yes      [] No  Upper and Lower body strength training 2x/wk    Wt: **#       Reps:  8-15    *Increase wt. after completing 15 reps with an RPE of <12/13. [] Yes      [] No  Upper and Lower body strength training 2x/wk    Wt: **#       Reps:  8-15    *Increase wt. after completing 15 reps with an RPE of <12/13. [] Yes      [] No  Upper and Lower body strength training 2x/wk    Wt: **#       Reps:  8-15    *Increase wt. after completing 15 reps with an RPE of <12/13. Continue Strength Training at home   [] Exercise Log & Strength training handout given     Wt: **#       Reps:  8-15    *Increase wt. after completing 15 reps with an RPE of <12/13. Flexibility Flexibility Flexibility Flexibility Flexibility   [x] Yes      [] No  25 min session of Core Strength & Flexibility 1x/per week  Attends Core Strength & Flexibility   [] Yes      [] No Attends Core Strength & Flexibility   [] Yes      [] No Attends Core Strength & Flexibility   [] Yes      [] No Continue Core Strength & Flexibility at home   Home Exercise  *Wilfrid verbalizes planning to walk 3-4 days/week for 10-20 min on days not in rehab. Home Exercise  *Wilfrid verbalizes planning to ** ** days/week for ** min on days not in rehab.  Home Exercise  *Wilfrid verbalizes planning to ** ** days/week for ** min on days not in rehab. Home Exercise  *Wilfrid verbalizes planning to ** ** days/week for ** min on days not in rehab. Home Exercise  *Wilfrid verbalizes his/her plan to ** ** days/week for ** min @ **   *Education* *Education* *Education* *Education* *Education*   RPE Scale  [x] Yes      [] No  Exercise Safety  [x] Yes      [] No  Equipment Orientation  [x] Yes      [] No  S/S to Report  [x] Yes      [] No  Warm Up/Cool Down  [x] Yes      [] No  Home Exercise  [x] Yes      [] No    All Exercise Education Completed  [] Yes      [] No   *Goals* *Goals* *Goals* *Goals* *Goals*   Initial Exercise Goals Exercise Goals  Exercise Goals   Exercise Goals  Exercise Goals   Wilfrid plans to:  [x] Attend exercise sessions 3x/wk  [x] initiate home exercise 2-3x/wk for 10-20 min  [x] Increase 6 min walk distance by 10%  [] ** Wilfrid plans to:  [x] Attend exercise sessions 3x/wk  [x] continue home exercise 2-3x/wk for 20-30 min  [] ** Wilfrid's plans to:  [x] Attend exercise sessions 3x/wk  [x] continue home exercise 3-4x/wk for 30-45 min  [x] Determine plan of exercise following rehab  [] ** Wilfrid's plans to:  **   Wilfrid achieved exercise goals?     []  Yes    [] No  If no, why?  **  [] Increased 6 min walk distance by 10%  [] Currently exercising 30-60 min/day, 5-7days/wk   [] Plans to continue exercise on own  [] Plans to join a local fitness center to continue exercise  [] Does not plan to continue to exercise after rehab   Return to ADL or Hobbies:  Dougherty Holiday would like to improve strength and endurance so he is able to return to [employer], gardening,  volunteering Return to ADL or Hobbies:  Dougherty Holiday would like to improve strength and endurance so he/she is able to return to ** Return to ADL or Hobbies:  Dougherty Holiday would like to improve strength and endurance so he/she is able to return to ** Return to ADL or Hobbies:  Dougherty Holiday would like to improve strength and endurance so he/she is able to return to ** Return to ADL or Hobbies:  Ji Woodson would like to improve strength and endurance so he/she is able to return to **    *MET level required for above goal:  5-6 METs MET level Achieved:  **METS MET level Achieved:  **METS MET level Achieved:  **METS MET level Achieved:  **METS     Individual Cardiac Treatment Plan - Nutrition  NUTRITION  ASSESSMENT/PLAN NUTRITION  REASSESSMENT NUTRITION   REASSESSMENT NUTRITION   REASSESSMENT NUTRITION  DISCHARGE/FOLLOW-UP   Stages of Change Stages of Change Stages of Change Stages of Change Stages of Change   [] Pre Contemplation  [x] Contemplation  [] Preparation  [] Action  [] Maintenance  [] Relapse [] Pre Contemplation  [] Contemplation  [] Preparation  [] Action  [] Maintenance  [] Relapse [] Pre Contemplation  [] Contemplation  [] Preparation  [] Action  [] Maintenance  [] Relapse [] Pre Contemplation  [] Contemplation  [] Preparation  [] Action  [] Maintenance  [] Relapse [] Pre Contemplation  [] Contemplation  [] Preparation  [] Action  [] Maintenance  [] Relapse   NUTRITION ASSESSMENT NUTRITION ASSESSMENT NUTRITION ASSESSMENT NUTRITION ASSESSMENT NUTRITION ASSESSMENT   Weight Management  Weight: 212.8        Height: 69  BMI: 31.4 Weight Management  Weight: **                  Weight Management  Weight: **                  Weight Management  Weight: ** Weight Management  Weight: **                    BMI: **   Eating Plan  Current eating habits: low salt Eating Plan  Changes: Eating Plan  Changes: Eating Plan  Changes: Eating Plan Improvements:   Alcohol Use  [x] none          [] daily  [] weekly      [] special          Diet Assessment Tool:  RATE YOUR PLATE  *Given to patient to complete and return.  Diet Assessment Tool:    Score: **/69       Diet Assessment Tool: RATE YOUR PLATE  Score: **/98   NUTRITION PLAN NUTRITION PLAN NUTRITION PLAN NUTRITION PLAN NUTRITION PLAN   *Interventions* *Interventions* *Interventions* Month:  *Have you wished you were dead or wished you could go to sleep and not wake up? [] Yes      [x] No  *Have you had any thoughts of killing yourself? [] Yes      [x] No         Using a scale of 0-10, 0=none, 10=very:   Rate your depression: 0  Rate your anxiety:  0  Using a scale of 0-10, 0=none, 10=very:   Rate your depression: **  Rate your anxiety:  ** Using a scale of 0-10, 0=none, 10=very:   Rate your depression: **  Rate your anxiety:  ** Using a scale of 0-10, 0=none, 10=very:   Rate your depression: **  Rate your anxiety:  ** Using a scale of 0-10, 0=none, 10=very:   Rate your depression: **  Rate your anxiety:  **   Signs and Symptoms of Depression Present? [x] Yes      [] No  If yes, please explain:  Poor eye contact, difficulty concentrating , slow to answer questions Signs and Symptoms of Depression Present? [] Yes      [] No  If yes, please explain:  ** Signs and Symptoms of Depression Present? [] Yes      [] No  If yes, please explain:  ** Signs and Symptoms of Depression Present? [] Yes      [] No  If yes, please explain:  ** Signs and Symptoms of Depression Present? [] Yes      [] No  If yes, please explain:  **   Signs and Symptoms of Anxiety Present? [] Yes      [x] No   Signs and Symptoms of Anxiety Present? [] Yes      [] No  If yes, please explain:  ** Signs and Symptoms of Anxiety Present? [] Yes      [] No  If yes, please explain:  ** Signs and Symptoms of Anxiety Present? [] Yes      [] No  If yes, please explain:  ** Signs and Symptoms of Anxiety Present? [] Yes      [] No  If yes, please explain:  **   [x] Patient has poor eye contact   [x] Flat affect present. [] Signs of anxiety, anger or hostility    [] Signs social isolation present.    []  Signs of alcohol or substance abuse       PSYCHOSOCIAL PLAN PSYCHOSOCIAL PLAN PSYCHOSOCIAL PLAN PSYCHOSOCIAL PLAN PSYCHOSOCIAL PLAN   *Interventions* *Interventions* *Interventions* *Interventions* *Interventions*   *Please check if needed  [] Psych Consult  [] Physician Referral  [x] Stress Management Skills *Please check if needed  [] Psych Consult  [] Physician Referral  [] Stress Management Skills *Please check if needed  [] Psych Consult  [] Physician Referral  [] Stress Management Skills *Please check if needed  [] Psych Consult  [] Physician Referral  [] Stress Management Skills *Please check if needed  [] Psych Consult  [] Physician Referral  [] Stress Management Skills   Is patient currently taking anti-depressant or anti-anxiety medications? [] Yes      [x] No   Change in anti-depressant or anti-anxiety medications? [] Yes      [] No  If yes, please list medications:  ** Change in anti-depressant or anti-anxiety medications? [] Yes      [] No  If yes, please list medications:  ** Change in anti-depressant or anti-anxiety medications? [] Yes      [] No  If yes, please list medications:  ** Change in anti-depressant or anti-anxiety medications?   [] Yes      [] No  If yes, please list medications:  **   *Education* *Education* *Education* *Education* *Education*   Healthy Mind-Set Workshops Recommended  [x] Stress & Health  [x] Taking Charge of Stress  [x] New Thoughts, New Behaviors  [x] Managing Moods & Relationships Healthy Mind-Set Workshops Attended/Date Healthy Mind-Set Workshops Attended/Date Healthy Mind-Set Workshops Attended/Date Healthy Mind-Set Workshops  Completed  [] Yes      [] No   *Goals* *Goals* *Goals* *Goals* *Goals*   Wilfrid's psychosocial goals are as follows:  Complete HADS & Gilda & Cristi, Quality of Life Index, Cardiac Version IV Wilfrid's psychosocial goals are as follows:  [] Attend Healthy Mind-Set Workshops  [] Reduce depression symptom severity by 1 level  [] ** Wilfrid's psychosocial goals are as follows:  [] Attend Healthy Mind-Set Workshops  [] Reduce depression symptom severity by 1 level  [] ** Wilfrid's psychosocial goals are as follows:  [] Attend Healthy **  TRIG:  **  Medication Changes:  [] Yes      [] No   Diabetes  [x] Yes      [] No  FBS: 138           HbA1C:        Monitor BS @ home:   [x] Yes      [] No  Frequency: 4 x per day  Medication: humalog, levemir, metformin Diabetes  Most Recent BS:  BS have been in range  [] Yes      [] No  Medication Changes  [] Yes      [] No   Diabetes  Most Recent BS:  BS have been in range  [] Yes      [] No  Medication Changes  [] Yes      [] No     Diabetes  Most Recent BS:  BS have been in range  [] Yes      [] No  Medication Changes  [] Yes      [] No     Diabetes  Most Recent BS:  BS have been in range  [] Yes      [] No  Medication Changes  [] Yes      [] No       Tobacco Use  [] Current  [] Former  [x] Never      Smokeless Tobacco use:   [] Yes      [x] No   Tobacco Use  Change in smoking status   [] Yes      [] No    Quit date: **   Tobacco Use  Change in smoking status   [] Yes      [] No    Quit date: **   Tobacco Use  Change in smoking status   [] Yes      [] No    Quit date: ** Tobacco Use  Change in smoking status   [] Yes      [] No    Quit date: **            Learning Barriers  Please select one:  [] Speech  [] Literacy  [x] Hearing  [x] Cognitive ? [] Vision  [x] Ready to Learn Learning Barriers Addressed:   [] Yes      [] No   Learning Barriers Addressed:   [] Yes      [] No   Learning Barriers Addressed:  [] Yes      [] No Learning Barriers Addressed:  [] Yes      [] No     RISK FACTOR/EDUCATION PLAN RISK FACTOR/EDUCATION PLAN RISK FACTOR/EDUCATION PLAN RISK FACTOR/EDUCATION PLAN RISK FACTOR/EDUCATION PLAN   *Interventions* *Interventions* *Interventions* *Interventions* *Interventions*   Recommended Educational Videos    [x] Overview of The Pritikin Eating Plan  [x] Becoming A Pritikin   [x] Diseases of Our Time-Part 1  [x] Calorie Density  [x] Label Reading-Part 1  [x] Move It!   [x] Healthy Minds, Bodies, Hearts  [x] Dining Out-Part 1  [x] Heart Disease Risk Reduction  [x] Metabolic Syndrome & Belly

## 2018-07-24 NOTE — PROGRESS NOTES
Video Education Report - ICR/CR    Name:  Dajuan Elias     Date:  7/24/2018  MRN: [id number]     Session #:  1  Session Length: 40 min    Recommended Videos        []01 [employer] - Program Overview   34:22    []02 Overview of Pritikin Eating Plan   34:10    []03 Becoming a Ruth Sevilla   33:08     []04 Diseases of Our Time - Part 1   34:22    []05 Calorie Density     33:39   []06 Label Reading - Part 1    32:15   []07 Move it      32.54   []08 Healthy Minds, Bodies, Hearts   32:14   []09 Dining Out - Part 1    32:28   []10 Heart Disease Risk Reduction   13:23   []04 Metabolic Syndrome and Belly Fat  31:52   []12 Facts on Fat     35:29   []13 Diseases of Our Time - Part 2   33:07   []14 Biology of Weight Control   32:36   []15 Biomechanical Limitations   35:20   []16 Nutrition Action Plan    34:23      Comments:  Video completed, Reviewed Darryl alvarez

## 2018-07-30 ENCOUNTER — HOSPITAL ENCOUNTER (OUTPATIENT)
Dept: CARDIAC REHAB | Age: 73
Setting detail: THERAPIES SERIES
Discharge: HOME | End: 2018-07-30
Payer: MEDICARE

## 2018-07-30 PROCEDURE — G0423 INTENS CARDIAC REHAB NO EXER: HCPCS

## 2018-07-30 PROCEDURE — G0422 INTENS CARDIAC REHAB W/EXERC: HCPCS

## 2018-07-30 NOTE — PROGRESS NOTES
Hospital Facility-Based Program  Phase 2 Cardiac Rehab Weekly Progress Report      Initiate ICR protocol and progress as tolerated.

## 2018-08-01 ENCOUNTER — HOSPITAL ENCOUNTER (OUTPATIENT)
Dept: CARDIAC REHAB | Age: 73
Setting detail: THERAPIES SERIES
Discharge: HOME | End: 2018-08-01
Payer: MEDICARE

## 2018-08-01 PROCEDURE — G0423 INTENS CARDIAC REHAB NO EXER: HCPCS

## 2018-08-01 PROCEDURE — G0422 INTENS CARDIAC REHAB W/EXERC: HCPCS

## 2018-08-03 ENCOUNTER — HOSPITAL ENCOUNTER (OUTPATIENT)
Dept: CARDIAC REHAB | Age: 73
Setting detail: THERAPIES SERIES
Discharge: HOME | End: 2018-08-03
Payer: MEDICARE

## 2018-08-03 PROCEDURE — G0422 INTENS CARDIAC REHAB W/EXERC: HCPCS

## 2018-08-03 PROCEDURE — G0423 INTENS CARDIAC REHAB NO EXER: HCPCS

## 2018-08-03 NOTE — PROGRESS NOTES
Charlee ROB.:  1945  Acct Number: [de-identified]  MRN:  [id number]                      [facility] COOKING SCHOOL WORKSHOP             Date: 8/3/2018        Session # ________     Haritha Andrews class covered:      () Adding Flavor     () Fast Breakfasts     () Salads and Dressings     () Soups and Sauces     () Simple Sides     () Appetizers and Snacks     () Delicious Desserts     () Plant Based Proteins     (X) Fast Evening Meals     () Weekend Breakfasts     () Efficiency Cooking     () Meat Alternatives     Patients were shown how to choose, prep, and cook; substitutions and other options were given. Samples were offered. Recipes were given and questions answered. The patient above was in the [employer] for 40 minutes.       Electronically signed by Taylor Leonard RD, [place] on 8/3/2018 at 2:59 PM

## 2018-08-06 ENCOUNTER — HOSPITAL ENCOUNTER (OUTPATIENT)
Dept: CARDIAC REHAB | Age: 73
Setting detail: THERAPIES SERIES
Discharge: HOME | End: 2018-08-06
Payer: MEDICARE

## 2018-08-06 PROCEDURE — G0422 INTENS CARDIAC REHAB W/EXERC: HCPCS

## 2018-08-06 PROCEDURE — G0423 INTENS CARDIAC REHAB NO EXER: HCPCS

## 2018-08-06 NOTE — PROGRESS NOTES
Video Education Report - ICR/CR    Name:  Sandra Woody     Date:  8/6/2018  MRN: [id number]     Session #:  5  Session Length: 40 min    Recommended Videos        []01 [employer] - Program Overview   34:22    []02 Overview of Pritikin Eating Plan   34:10    []03 Becoming a Ferna Rasher   33:08     [x]04 Diseases of Our Time - Part 1   34:22    []05 Calorie Density     33:39   []06 Label Reading - Part 1    32:15   []07 Move it      32.54   []08 Healthy Minds, Bodies, Hearts   32:14   []09 Dining Out - Part 1    32:28   []10 Heart Disease Risk Reduction   27:94   []19 Metabolic Syndrome and Belly Fat  31:52   []12 Facts on Fat     35:29   []13 Diseases of Our Time - Part 2   33:07   []14 Biology of Weight Control   32:36   []15 Biomechanical Limitations   35:20   []16 Nutrition Action Plan    34:23        Comments:  Video completed.

## 2018-08-07 ENCOUNTER — OFFICE VISIT (OUTPATIENT)
Dept: PULMONOLOGY | Age: 73
End: 2018-08-07
Payer: MEDICARE

## 2018-08-07 VITALS
HEART RATE: 63 BPM | OXYGEN SATURATION: 97 % | DIASTOLIC BLOOD PRESSURE: 56 MMHG | WEIGHT: 211.4 LBS | BODY MASS INDEX: 31.31 KG/M2 | SYSTOLIC BLOOD PRESSURE: 118 MMHG | HEIGHT: 69 IN

## 2018-08-07 DIAGNOSIS — Z99.89 OBSTRUCTIVE SLEEP APNEA ON CPAP: Primary | ICD-10-CM

## 2018-08-07 DIAGNOSIS — G47.10 HYPERSOMNIA: ICD-10-CM

## 2018-08-07 DIAGNOSIS — R53.82 CHRONIC FATIGUE: ICD-10-CM

## 2018-08-07 DIAGNOSIS — R71.8 OTHER ABNORMALITY OF RED BLOOD CELLS: ICD-10-CM

## 2018-08-07 DIAGNOSIS — G47.33 OBSTRUCTIVE SLEEP APNEA ON CPAP: Primary | ICD-10-CM

## 2018-08-07 DIAGNOSIS — E55.9 VITAMIN D DEFICIENCY: ICD-10-CM

## 2018-08-07 PROCEDURE — 1123F ACP DISCUSS/DSCN MKR DOCD: CPT | Performed by: PHYSICIAN ASSISTANT

## 2018-08-07 PROCEDURE — 4040F PNEUMOC VAC/ADMIN/RCVD: CPT | Performed by: PHYSICIAN ASSISTANT

## 2018-08-07 PROCEDURE — 1036F TOBACCO NON-USER: CPT | Performed by: PHYSICIAN ASSISTANT

## 2018-08-07 PROCEDURE — 99213 OFFICE O/P EST LOW 20 MIN: CPT | Performed by: PHYSICIAN ASSISTANT

## 2018-08-07 PROCEDURE — 1101F PT FALLS ASSESS-DOCD LE1/YR: CPT | Performed by: PHYSICIAN ASSISTANT

## 2018-08-07 PROCEDURE — G8427 DOCREV CUR MEDS BY ELIG CLIN: HCPCS | Performed by: PHYSICIAN ASSISTANT

## 2018-08-07 PROCEDURE — 3017F COLORECTAL CA SCREEN DOC REV: CPT | Performed by: PHYSICIAN ASSISTANT

## 2018-08-07 PROCEDURE — G8598 ASA/ANTIPLAT THER USED: HCPCS | Performed by: PHYSICIAN ASSISTANT

## 2018-08-07 PROCEDURE — G8417 CALC BMI ABV UP PARAM F/U: HCPCS | Performed by: PHYSICIAN ASSISTANT

## 2018-08-07 ASSESSMENT — ENCOUNTER SYMPTOMS
HEMOPTYSIS: 0
VOMITING: 0
WHEEZING: 0
SORE THROAT: 0
SHORTNESS OF BREATH: 1
BACK PAIN: 0
GASTROINTESTINAL NEGATIVE: 1
EYES NEGATIVE: 1
COUGH: 0
ORTHOPNEA: 0
SINUS PAIN: 0
HEARTBURN: 0
NAUSEA: 0

## 2018-08-08 ENCOUNTER — HOSPITAL ENCOUNTER (OUTPATIENT)
Dept: CARDIAC REHAB | Age: 73
Setting detail: THERAPIES SERIES
Discharge: HOME | End: 2018-08-08
Payer: MEDICARE

## 2018-08-08 PROCEDURE — G0423 INTENS CARDIAC REHAB NO EXER: HCPCS

## 2018-08-08 PROCEDURE — G0422 INTENS CARDIAC REHAB W/EXERC: HCPCS

## 2018-08-08 NOTE — PROGRESS NOTES
Dajuan ROB.:  1945    Acct Number: [de-identified]   MRN:  [id number]                             [facility] NUTRITION 1:1 Counseling             Date: 2018       Session # _______     Charles Rosenbaum class covered:    1:1 Counseling Session  Receptiveness to education/goals:  (X) Agreeable ( ) No Interest   ( ) Refused  Evaluation of education:  (X) Indicates understanding   ( ) Needs reinforcement     () Unsuccessful     Readiness to change:    ( ) Pre-contemplative   ( ) Contemplative - ambivalent about change    (X) Action - ready to set action plan and implement choose more low calorie dense foods to help with weight loss efforts, cut back on use of salt in cooking and at table, limit animal protein to 3.5-4 oz per day  ( ) Maintenance - has made change and is trying, and or practicing different alternative behaviors     Notes: Had five stents in rt artery in , 12 stents total and open heart 1997. Most recent A1C was 6.2. Checks his BS 4x/day before meals and bed. Wife does the cooking and grocery shopping. Out to eat twice/week. Patient desires to lose weight at this time. Food Recall:  Breakfast: bowl of cereal- mixes a couple w/ skim milk, 1/2 banana, sometimes slice wheat toast  Lunch: Today had [employer]- had salad, 2 pieces of pie (does this once per month) OR once/week Lamy Chili baked potato or [employer]  Dinner:  Meat, fish, chicken, Corn on cob, cucumbers with vinegar  Snacks:  Last night- popcorn  Main Beverages:  Water, crystal light    Discussed calorie density to help with weight loss efforts. Patient does do well including vegetables into his meal plan per food recall. Encouraged to limit use of salt at the table and patient admits to being a heavy salter in the past. Positive reinforcement given. Robbiedeneen Otto was counseled by the Dietitian for 35 minutes. Motivational Interviewing was used to help promote change. Patient voiced understanding.      Electronically

## 2018-08-10 ENCOUNTER — HOSPITAL ENCOUNTER (OUTPATIENT)
Dept: CARDIAC REHAB | Age: 73
Setting detail: THERAPIES SERIES
Discharge: HOME | End: 2018-08-10
Payer: MEDICARE

## 2018-08-10 PROCEDURE — G0423 INTENS CARDIAC REHAB NO EXER: HCPCS

## 2018-08-10 PROCEDURE — G0422 INTENS CARDIAC REHAB W/EXERC: HCPCS

## 2018-08-13 ENCOUNTER — HOSPITAL ENCOUNTER (OUTPATIENT)
Dept: CARDIAC REHAB | Age: 73
Setting detail: THERAPIES SERIES
Discharge: HOME | End: 2018-08-13
Payer: MEDICARE

## 2018-08-13 PROCEDURE — G0422 INTENS CARDIAC REHAB W/EXERC: HCPCS

## 2018-08-13 PROCEDURE — G0423 INTENS CARDIAC REHAB NO EXER: HCPCS

## 2018-08-13 NOTE — PROGRESS NOTES
Hospital Facility-Based Program  Phase 2 Cardiac Rehab Weekly Progress Report      Patient prescribed exercise:  2:00 class. 3 times per week in rehab, 1-4 times per week at home for the amount of sessions/weeks specified by insurance. Current Levels: Treadmill: 1. 6mph/0% for 8 minutes,NuStep:  26 Saeed for 8 minutes, UBE: Level 14W for 5 minutes. Progression Discussion: Maintain/Increase Aerobic exercise 15 minutes to work on endurance. Attempt to increase intensity by 5-20% for each modality this week. Try to increase intensities until Wilfrid rates the exercises a 13-17 on Gunner RPE.

## 2018-08-15 ENCOUNTER — HOSPITAL ENCOUNTER (OUTPATIENT)
Dept: CARDIAC REHAB | Age: 73
Setting detail: THERAPIES SERIES
Discharge: HOME | End: 2018-08-15
Payer: MEDICARE

## 2018-08-15 PROCEDURE — G0422 INTENS CARDIAC REHAB W/EXERC: HCPCS

## 2018-08-15 PROCEDURE — G0423 INTENS CARDIAC REHAB NO EXER: HCPCS

## 2018-08-15 NOTE — PROGRESS NOTES
Jenni BERMUDEZO.B.:  1945    Acct Number: [de-identified]   MRN:  [id number]                             [facility] NUTRITION WORKSHOP             Date: 8/15/2018        Session # _______    Loli Godoy class covered:    ()  Label Reading  (X)  Menus  ()  Targeting Nutrition Priorities  ()  Fueling a Healthy Body       Gilson Richardson was in the Workshop with the Dietitian for 40 minutes. The content was presented via Powerpoint, lecture, and patient participation based format.       Electronically signed by Krysta Wilson RD, LD on 8/15/2018 at 3:23 PM

## 2018-08-17 ENCOUNTER — HOSPITAL ENCOUNTER (OUTPATIENT)
Dept: CARDIAC REHAB | Age: 73
Setting detail: THERAPIES SERIES
Discharge: HOME | End: 2018-08-17
Payer: MEDICARE

## 2018-08-17 PROCEDURE — G0423 INTENS CARDIAC REHAB NO EXER: HCPCS

## 2018-08-17 PROCEDURE — G0422 INTENS CARDIAC REHAB W/EXERC: HCPCS

## 2018-08-20 ENCOUNTER — HOSPITAL ENCOUNTER (OUTPATIENT)
Dept: CARDIAC REHAB | Age: 73
Setting detail: THERAPIES SERIES
Discharge: HOME | End: 2018-08-20
Payer: MEDICARE

## 2018-08-20 PROCEDURE — G0422 INTENS CARDIAC REHAB W/EXERC: HCPCS

## 2018-08-20 PROCEDURE — G0423 INTENS CARDIAC REHAB NO EXER: HCPCS

## 2018-08-20 NOTE — PROGRESS NOTES
[facility]ospital Facility-[facility] Program  Phase 2 Cardiac Rehab Weekly Progress Report      Patient prescribed exercise:  2:00 class. 3 times per week in rehab, 1-4 times per week at home for the amount of sessions/weeks specified by insurance. Current Levels: Treadmill: 1. 6mph/0% for 10 minutes, NuStep:  26 Saeed for 8 minutes, UBE: Level 16W for 5 minutes. Progression Discussion: Maintain/Increase Aerobic exercise 15 minutes to work on endurance. Attempt to increase intensity by 5-20% for each modality this week. Try to increase intensities until Wilfrid rates the exercises a 13-17 on Gunner RPE.

## 2018-08-22 ENCOUNTER — HOSPITAL ENCOUNTER (OUTPATIENT)
Dept: CARDIAC REHAB | Age: 73
Setting detail: THERAPIES SERIES
Discharge: HOME | End: 2018-08-22
Payer: MEDICARE

## 2018-08-22 PROCEDURE — G0422 INTENS CARDIAC REHAB W/EXERC: HCPCS

## 2018-08-22 PROCEDURE — G0423 INTENS CARDIAC REHAB NO EXER: HCPCS

## 2018-08-22 NOTE — PLAN OF CARE
meredith Osorio  *Required MET level=4-5  *Return to Work Date: already back Return to work: Has Jose Ramon Reggie returned to work? [x] Yes    [] No      Jose Ramon Reggie is doing well at work. Return to work: Has Jose Ramon Reggie returned to work? [] Yes    [] No    Return to work date set? [] Yes, **    [] No    Jose Ramon Reggie is doing ** at work. Return to work: Has Jose Ramon Reggie returned to work? [] Yes    [] No    Return to work date set? [] Yes, **    [] No    Jose Ramon Reggie is doing ** at work. Return to work: Has Jose Ramon Reggie returned to work? [] Yes    [] No    Return to work? [] Yes, **    [] No    *Required MET Level achieved for job duties? [] Yes    [] No   Orthopedic Limitations/  [x] Yes    [] No  If yes please list:  Hip pain     Orthopedic Limitations  *If patient has orthopedic issue:   Actions/  accomodations needed to make Wilfrid successful : increase aerobic activity at a slow, steady pace Orthopedic Limitations   Orthopedic Limitations   Orthopedic Limitations     Fall Risk  Fall risk assessed? [x] Yes      [] No    Balance Issues? [x] Yes      [] No     [] Walker [] Cane    [x] Safety issues reviewed      Fall Risk  *If patient is a fall risk, action needed to accommodate:increase aerobic activity at a slow, steady pace. Take necessary rest break. Fall Risk Fall Risk Fall Risk   Home Exercise  [x] Yes    [] No  Type: walking  Frequency: 4 x per week  Duration: 10 min Home Exercise  [] Yes    [x] No   Home Exercise  [] Yes    [] No  Type: **  Frequency: **  Duration: ** Home Exercise  [] Yes    [] No  Type: **  Frequency: **  Duration: ** Home Exercise  [] Yes    [] No  Type: **  Frequency: **  Duration: **   Angina with Activity? [] Yes    [x] No  Angina Management: na Angina with Activity? [x] Yes    [] No  Angina Management: rest, nitro Angina with Activity? [] Yes    [] No  Angina Management: ** Angina with Activity? [] Yes    [] No  Angina Management: ** Angina with Activity?   [] Yes    [] No  Angina Management: aerobic exercises = ** min     **min/mode Duration:  Total erobic exercise =  60-90 min   Intensity:   MET Level = 1.9-2.1  RPE = 12-15 Intensity:  Max MET Level = 2.2  RPE = 12-15 Intensity:  Max MET Level = **  RPE = 12-15 Intensity:  Max MET Level = **  RPE = 12-15 Intensity:  Max MET Level = ** RPE = 12-15   Progression: increase aerobic activity up to 30 min over next 4 weeks by increasing time 2-3 min/week. Progression: Increase aerobic activity up to 30 mins over the next 4 weeks. Progression:   Progression: Progression:  Increase time/intensity when RPE <13, and HR is in [facility]   [x] Yes      [] No  Upper and Lower body strength training 2x/wk    Wt: 2#       Reps:  8-15    *Increase wt. after completing 15 reps with an RPE of <12/13. [x] Yes      [] No  Upper and Lower body strength training 2x/wk    Wt: 2#       Reps:  8-15    *Increase wt. after completing 15 reps with an RPE of <12/13. [] Yes      [] No  Upper and Lower body strength training 2x/wk    Wt: **#       Reps:  8-15    *Increase wt. after completing 15 reps with an RPE of <12/13. [] Yes      [] No  Upper and Lower body strength training 2x/wk    Wt: **#       Reps:  8-15    *Increase wt. after completing 15 reps with an RPE of <12/13. Continue Strength Training at home   [] Exercise Log & Strength training handout given     Wt: **#       Reps:  8-15    *Increase wt. after completing 15 reps with an RPE of <12/13.    Flexibility Flexibility Flexibility Flexibility Flexibility   [x] Yes      [] No  25 min session of Core Strength & Flexibility 1x/per week  Attends Core Strength & Flexibility   [x] Yes      [] No Attends Core Strength & Flexibility   [] Yes      [] No Attends Core Strength & Flexibility   [] Yes      [] No Continue Core Strength & Flexibility at home   Home Exercise  *Wilfrid verbalizes planning to walk 3-4 days/week for 10-20 min on days not in rehab. Home Exercise  *Wilfrid verbalizes planning to initiate home exercise on days not in rehab. Home Exercise  *Wilfrid verbalizes planning to ** ** days/week for ** min on days not in rehab. Home Exercise  *Wilfrid verbalizes planning to ** ** days/week for ** min on days not in rehab. Home Exercise  *Wilfrid verbalizes his/her plan to ** ** days/week for ** min @ **   *Education* *Education* *Education* *Education* *Education*   RPE Scale  [x] Yes      [] No  Exercise Safety  [x] Yes      [] No  Equipment Orientation  [x] Yes      [] No  S/S to Report  [x] Yes      [] No  Warm Up/Cool Down  [x] Yes      [] No  Home Exercise  [x] Yes      [] No    All Exercise Education Completed  [] Yes      [] No   *Goals* *Goals* *Goals* *Goals* *Goals*   Initial Exercise Goals Exercise Goals  Exercise Goals   Exercise Goals  Exercise Goals   Wilfrid plans to:  [x] Attend exercise sessions 3x/wk  [x] initiate home exercise 2-3x/wk for 10-20 min  [x] Increase 6 min walk distance by 10%  [] ** Wilfrid plans to:  [x] Attend exercise sessions 3x/wk  [x] continue home exercise 2-3x/wk for 20-30 min  [] ** Wilfrid's plans to:  [x] Attend exercise sessions 3x/wk  [x] continue home exercise 3-4x/wk for 30-45 min  [x] Determine plan of exercise following rehab  [] ** Wilfrid's plans to:  **   Wilfrid achieved exercise goals?     []  Yes    [] No  If no, why?  **  [] Increased 6 min walk distance by 10%  [] Currently exercising 30-60 min/day, 5-7days/wk   [] Plans to continue exercise on own  [] Plans to join a local fitness center to continue exercise  [] Does not plan to continue to exercise after rehab   Return to ADL or Hobbies:  Danieline Doing would like to improve strength and endurance so he is able to return to [employer], gardening,  volunteering Return to ADL or Hobbies:  Danieline Doing would like to improve strength and endurance so he is able to return to yardwork Return to ADL or Hobbies:  Lexine Doing would like to improve strength and PLAN NUTRITION PLAN NUTRITION PLAN NUTRITION PLAN NUTRITION PLAN   *Interventions* *Interventions* *Interventions* *Interventions* *Interventions*   Initial Survey given Goal Setting Discussion:   [x] Yes      [] No       Follow Up Survey Reviewed & Goals Updated:     Professional Referral  Please check if needed. [] Dietician Consult   [] Wt. Management Referral  [] Other:  Professional Referral  Please check if needed. [] Dietician Consult   [] Wt. Management Referral  [] Other: Professional Referral  Please check if needed. [] Dietician Consult   [] Wt. Management Referral  [] Other: Professional Referral  Please check if needed. [] Dietician Consult   [] Wt. Management Referral  [] Other: Professional Referral  Please check if needed. [] Dietician Consult   [] Wt. Management Referral  [] Other:   *Education* *Education* *Education* *Education* *Education*   Nutritional Education Recommended    [x] 1:1 Registered Dietitian    Workshops  [x] Label Reading   [x] Menu  [x] Targeting Nutrition Priorities  [x] Fueling a Healthy Body   Nutritional Education Attended/Date    [x] 1:1 Registered Dietitian-8/8/18    [x] Menu-8/15/18 Nutritional Education Attended/Date Nutritional Education Attended/Date All Sessions Completed?     [] Yes  [] No   Cooking School    [x] 11 sessions recommended     *Adding Flavor  *Fast & Healthy     Breakfasts  *Salads & Dressings  *Soups & Simple     Sauces  *Simple Sides  *Appetizers &     Snacks  *Delicious Desserts  *Plant Proteins  *Fast Evening Meals  * Weekend Breakfasts  *Cook once, Eat       twice [employer]  Sessions Completed    *Fast Evening Meals- 8/3/18    * Weekend Breakfasts-8/10/18    Jigar Jensen once, Eat       Twice-8/17/18 Cooking School  Sessions Completed [facility]  Sessions Completed     Cooking School    # of sessions completed:  **   *Goals* *Goals* *Goals* *Goals* *Goals*   Priya nutritional goals are as follows:  Complete and return diet survey Priya nutritional goals are as follows:  [x] Attend Nutrition Workshops  [x] Attend 1:1   [x] Attend Cooking Classes  [] ** Wilfrid's nutritional goals are as follows:  [] Attend Nutrition Workshops  [] Attend 1:1   [] Attend Cooking Classes  [] Complete and return diet survey  [] ** Wilfrid's nutritional goals are as follows:  [] Attend Nutrition Workshops  [] Attend 1:1   [] Attend Cooking Classes  [] ** Wilfrid achieved nutritional goals   [] Yes    [] No  If no, why? Use knowledge gained to continue Pritikin eating plan at home       Individual Cardiac Treatment Plan - Psychosocial  PSYCHOSOCIAL  ASSESSMENT/PLAN PSYCHOSOCIAL  REASSESSMENT PSYCHOSOCIAL   REASSESSMENT PSYCHOSOCIAL   REASSESSMENT PSYCHOSOCIAL  DISCHARGE/FOLLOW-UP   Stages of Change Stages of Change Stages of Change Stages of Change Stages of Change   [x] Pre Contemplation  [] Contemplation  [] Preparation  [] Action  [] Maintenance  [] Relapse [] Pre Contemplation  [x] Contemplation  [] Preparation  [] Action  [] Maintenance  [] Relapse [] Pre Contemplation  [] Contemplation  [] Preparation  [] Action  [] Maintenance  [] Relapse [] Pre Contemplation  [] Contemplation  [] Preparation  [] Action  [] Maintenance  [] Relapse [] Pre Contemplation  [] Contemplation  [] Preparation  [] Action  [] Maintenance  [] Relapse   PSYCHOSOCIAL ASSESSMENT PSYCHOSOCIAL ASSESSMENT PSYCHOSOCIAL ASSESSMENT PSYCHOSOCIAL ASSESSMENT PSYCHOSOCIAL ASSESSMENT   Behavioral Outcomes Behavioral Outcomes Behavioral Outcomes Behavioral Outcomes Behavioral Outcomes   Tool Used:  Gilda & Cristi, Quality of Life Index, Cardiac Version IV  *Given to patient to complete.  Tool Used:    Gilda & Cristi, Quality of Life Index, Cardiac Version IV     Pt did not return   Tool Used:     Gilda & Cristi, Quality of Life Index, Cardiac Version IV    QOL Index Score: **  HF:**  S&E:**  P&S: **  Family: **   PHQ-9 score 1  Depression Severity  [x]Minimal  []Mild   []Moderate  []Moderately Severe []Severe    PHQ-9 score **  Depression Severity  []Minimal  []Mild   []Moderate  []Moderately Severe []Severe   Does patient have Family Support? [x] Yes      [] No  No signs of marital/family distress       Within the Past Month:  *Have you wished you were dead or wished you could go to sleep and not wake up? [] Yes      [x] No  *Have you had any thoughts of killing yourself? [] Yes      [x] No         Using a scale of 0-10, 0=none, 10=very:   Rate your depression: 0  Rate your anxiety:  0  Using a scale of 0-10, 0=none, 10=very:   Rate your depression: 0  Rate your anxiety: 0 Using a scale of 0-10, 0=none, 10=very:   Rate your depression: **  Rate your anxiety:  ** Using a scale of 0-10, 0=none, 10=very:   Rate your depression: **  Rate your anxiety:  ** Using a scale of 0-10, 0=none, 10=very:   Rate your depression: **  Rate your anxiety:  **   Signs and Symptoms of Depression Present? [x] Yes      [] No  If yes, please explain:  Poor eye contact, difficulty concentrating , slow to answer questions Signs and Symptoms of Depression Present? [x] Yes      [] No  If yes, please explain:  See prior note Signs and Symptoms of Depression Present? [] Yes      [] No  If yes, please explain:  ** Signs and Symptoms of Depression Present? [] Yes      [] No  If yes, please explain:  ** Signs and Symptoms of Depression Present? [] Yes      [] No  If yes, please explain:  **   Signs and Symptoms of Anxiety Present? [] Yes      [x] No   Signs and Symptoms of Anxiety Present? [] Yes      [x] No   Signs and Symptoms of Anxiety Present? [] Yes      [] No  If yes, please explain:  ** Signs and Symptoms of Anxiety Present? [] Yes      [] No  If yes, please explain:  ** Signs and Symptoms of Anxiety Present? [] Yes      [] No  If yes, please explain:  **   [x] Patient has poor eye contact   [x] Flat affect present. [] Signs of anxiety, anger or hostility    [] Signs social isolation present.    [] Signs of alcohol or substance abuse       PSYCHOSOCIAL PLAN PSYCHOSOCIAL PLAN PSYCHOSOCIAL PLAN PSYCHOSOCIAL PLAN PSYCHOSOCIAL PLAN   *Interventions* *Interventions* *Interventions* *Interventions* *Interventions*   *Please check if needed  [] Psych Consult  [] Physician Referral  [x] Stress Management Skills *Please check if needed  [] Psych Consult  [] Physician Referral  [x] Stress Management Skills *Please check if needed  [] Psych Consult  [] Physician Referral  [] Stress Management Skills *Please check if needed  [] Psych Consult  [] Physician Referral  [] Stress Management Skills *Please check if needed  [] Psych Consult  [] Physician Referral  [] Stress Management Skills   Is patient currently taking anti-depressant or anti-anxiety medications? [] Yes      [x] No   Change in anti-depressant or anti-anxiety medications? [] Yes      [x] No   Change in anti-depressant or anti-anxiety medications? [] Yes      [] No  If yes, please list medications:  ** Change in anti-depressant or anti-anxiety medications? [] Yes      [] No  If yes, please list medications:  ** Change in anti-depressant or anti-anxiety medications?   [] Yes      [] No  If yes, please list medications:  **   *Education* *Education* *Education* *Education* *Education*   Healthy Mind-Set Workshops Recommended  [x] Stress & Health  [x] Taking Charge of Stress  [x] New Thoughts, New Behaviors  [x] Managing Moods & Relationships Healthy Mind-Set Workshops Attended/Date  [x] New Thoughts, New Behaviors-8/22/18    [x] Managing Moods & Relationships-8/1/18 Healthy Mind-Set Workshops Attended/Date Healthy Mind-Set Workshops Attended/Date Healthy Mind-Set Workshops  Completed  [] Yes      [] No   *Goals* *Goals* *Goals* *Goals* *Goals*   Wilfrid's psychosocial goals are as follows:  Complete HADS & Gilda & Cristi, Quality of Life Index, Cardiac Version IV Wilfrid's psychosocial goals are as follows:  [x] Attend Healthy Mind-Set Workshops  [x] Reduce depression symptom severity by 1 level  [] ** Wilfrid's psychosocial goals are as follows:  [] Attend Healthy Mind-Set Workshops  [] Reduce depression symptom severity by 1 level  [] ** Wilfrid's psychosocial goals are as follows:  [] Attend Healthy Mind-Set Workshops  [] Reduce depression symptom severity by 1 level  [] ** Wilfrid achieved psychosocial goals?   [] Yes    [] No  If no, why?  **  [] Use methods learned to continue to reduce depression symptom severity by 1 level  [] **     Individual Cardiac Treatment Plan - Other:  Risk Factor/Education  RISK FACTOR  ASSESSMENT/PLAN RISK FACTOR  REASSESSMENT  RISK FACTOR  REASSESSMENT RISK FACTOR  REASSESSMENT RISK FACTOR   DISCHARGE/FOLLOW-UP   Stages of Change Stages of Change Stages of Change Stages of Change Stages of Change   [] Pre Contemplation  [x] Contemplation  [] Preparation  [] Action  [] Maintenance  [] Relapse [] Pre Contemplation  [] Contemplation  [] Preparation  [x] Action  [] Maintenance  [] Relapse [] Pre Contemplation  [] Contemplation  [] Preparation  [] Action  [] Maintenance  [] Relapse [] Pre Contemplation  [] Contemplation  [] Preparation  [] Action  [] Maintenance  [] Relapse [] Pre Contemplation  [] Contemplation  [] Preparation  [] Action  [] Maintenance  [] Relapse   RISK FACTOR/EDUCATION ASSESSMENT RISK FACTOR/EDUCATION ASSESSMENT RISK FACTOR/EDUCATION ASSESSMENT RISK FACTOR/EDUCATION ASSESSMENT RISK FACTOR /EDUCATION ASSESSMENT   Hypertension  [x] Yes      [] No    Resting BP: 144/52  Peak Ex BP:168/52  Medication: lisinopril, metoprolol, diltiazem   Hypertension  Resting BP: 112/58  Peak Ex BP:162/60  Medication Changes:  [] Yes      [x] No Hypertension  Resting BP: **  Peak Ex BP:**  Medication Changes:  [] Yes      [] No Hypertension  Resting BP: **  Peak Ex BP:**  Medication Changes:  [] Yes      [] No Hypertension  Resting BP: **  Peak Ex BP:**  Medication Changes:  [] Yes      [] No   Lipids  HLD/DLD  [x] Yes      [] No  TOTAL CHOL: 135  HDL:  35  LDL:  68  TRI  Medication: atorvastatin Lipids  Medication Changes:  [] Yes      [x] No     Lipids  Medication Changes:  [] Yes      [] No     Lipids  Medication Changes:  [] Yes      [] No     Lipids    TOTAL CHOL: **  HDL:  **  LDL:  **  TRIG:  **  Medication Changes:  [] Yes      [] No   Diabetes  [x] Yes      [] No  FBS: 138           HbA1C:        Monitor BS @ home:   [x] Yes      [] No  Frequency: 4 x per day  Medication: humalog, levemir, metformin Diabetes  Most Recent BS: 80  BS have been in range  [x] Yes      [] No  Medication Changes  [] Yes      [x] No   Diabetes  Most Recent BS:  BS have been in range  [] Yes      [] No  Medication Changes  [] Yes      [] No     Diabetes  Most Recent BS:  BS have been in range  [] Yes      [] No  Medication Changes  [] Yes      [] No     Diabetes  Most Recent BS:  BS have been in range  [] Yes      [] No  Medication Changes  [] Yes      [] No       Tobacco Use  [] Current  [] Former  [x] Never      Smokeless Tobacco use:   [] Yes      [x] No   Tobacco Use  Change in smoking status   [] Yes      [x] No       Tobacco Use  Change in smoking status   [] Yes      [] No    Quit date: **   Tobacco Use  Change in smoking status   [] Yes      [] No    Quit date: ** Tobacco Use  Change in smoking status   [] Yes      [] No    Quit date: **            Learning Barriers  Please select one:  [] Speech  [] Literacy  [x] Hearing  [x] Cognitive ?   [] Vision  [x] Ready to Learn Learning Barriers Addressed:   [x] Yes      [] No   Learning Barriers Addressed:   [] Yes      [] No   Learning Barriers Addressed:  [] Yes      [] No Learning Barriers Addressed:  [] Yes      [] No     RISK FACTOR/EDUCATION PLAN RISK FACTOR/EDUCATION PLAN RISK FACTOR/EDUCATION PLAN RISK FACTOR/EDUCATION PLAN RISK FACTOR/EDUCATION PLAN   *Interventions* *Interventions* *Interventions* *Interventions* *Interventions*   Recommended Educational Videos    [x] Overview of [employer] [x] No Does Wilfrid require any additional education? [] Yes    [] No Does Wilfrid require any additional education? [] Yes    [] No Does Wilfrid require any additional education? [] Yes    [] No   Recommended Additional Educational Videos    Medical  [] Hypertension & Heart Disease  [] Decoding Lab Results  [] Aging Enhancing Your QoL  [] Sleep Disorders  Exercise  [] Body Composition  [] Improve Performance  [] Exercise Action Plan  [] Intro to Yoga  Behavioral  [] How Our Thoughts Can Heal Our Hearts  [] Smoking Cessation  Nutrition  [] Planning Your Eating Strategy  [] Lable Reading- Part 2  [] Dining Out - Part 2  [] Targeting Your Nutrition Priorities  [] Fueling a Healthy Body  [] Menu Workshop  [employer] [] Breakfast & Snacks  [] [employer] Salads & Dressing  [] [address] [place] [address]  [] Soups & Desserts   Additional Educational Videos Completed Additional Educational Videos Completed Additional Educational Videos Completed Additional Educational Videos Completed    [] Yes    [] No   *Goals* *Goals* *Goals* *Goals* *Goals*   Wilfrid's risk factor/education goals are as follows:    [x] Optimal BP <140/90  [x] Blood Sugar <120  [x] Attend recommended video education sessions  [x] Takes medications as prescribed 100% of the time   [] ** Wilfrid's risk factor/education goals are as follows:    [x] Optimal BP <140/90  [x] Blood Sugar <120  [x] Attend recommended video education sessions  [x] Takes medications as prescribed 100% of the time   [] ** Wilfrid's risk factor/education goals are as follows:    [x] Optimal BP <140/90  [] Blood Sugar <120  [x] Attend recommended video education sessions  [x] Takes medications as prescribed 100% of the time   [] ** Wilfrid's risk factor/education goals are as follows:    [x] Optimal BP <140/90  [] Blood Sugar <120  [x] Attend recommended video education sessions  [x] Takes medications as prescribed 100% of the time   [] ** Wilfrid achieved risk factor goals?   [] Yes    [] No  If no, why?  **     Monitored telemetry has revealed : NSR with occ PAC's   Monitored telemetry has revealed NSR    Monitored telemetry has revealed  [] documented arrhythmia at increasing workloads  [] associated symptoms ** Monitored telemetry has revealed **  [] documented arrhythmia at increasing workloads  [] associated symptoms ** Monitored telemetry has revealed **  [] documented arrhythmia at increasing workloads  [] associated symptoms **   Physician Response    [x] Cardiac rehab is reasonably and medically necessary for continuous cardiac monitoring surveillance  of patient's cardiac activity  [x] Initiate continuous telemerty monitoring and notify me with any concerns  [] Other   Physician Response    [x] Cardiac rehab is reasonably and medically necessary for continuous cardiac monitoring surveillance  of patient's cardiac activity  [x] Continue continuous telemerty monitoring and notify me with any concerns  [] Other     Physician Response      [x] Cardiac rehab is reasonably and medically necessary for continuous cardiac monitoring surveillance  of patient's cardiac activity  [x] Continue continuous telemerty monitoring and notify me with any concerns   [] Other     Physician Response    [x] Cardiac rehab is reasonably and medically necessary for continuous cardiac monitoring surveillance  of patient's cardiac activity  [x] Continue continuous telemerty monitoring and notify me with any concerns   [] Other          Cosigned by: Mary Hernandez MD at 7/25/2018  7:38 AM   Revision History      Date/Time User Provider Type Action   7/25/2018  7:38 AM Mary Hernandez MD Physician Cosign   7/24/2018  3:08 PM Gee 81 Exercise Physiologist Sign

## 2018-08-24 ENCOUNTER — HOSPITAL ENCOUNTER (OUTPATIENT)
Dept: CARDIAC REHAB | Age: 73
Setting detail: THERAPIES SERIES
End: 2018-08-24
Payer: MEDICARE

## 2018-08-24 ENCOUNTER — APPOINTMENT (OUTPATIENT)
Dept: CARDIAC REHAB | Age: 73
End: 2018-08-24
Payer: MEDICARE

## 2018-08-27 ENCOUNTER — HOSPITAL ENCOUNTER (OUTPATIENT)
Dept: CARDIAC REHAB | Age: 73
Setting detail: THERAPIES SERIES
Discharge: HOME | End: 2018-08-27
Payer: MEDICARE

## 2018-08-27 ENCOUNTER — APPOINTMENT (OUTPATIENT)
Dept: CARDIAC REHAB | Age: 73
End: 2018-08-27
Payer: MEDICARE

## 2018-08-29 ENCOUNTER — APPOINTMENT (OUTPATIENT)
Dept: CARDIAC REHAB | Age: 73
End: 2018-08-29
Payer: MEDICARE

## 2018-08-29 ENCOUNTER — HOSPITAL ENCOUNTER (OUTPATIENT)
Dept: CARDIAC REHAB | Age: 73
Setting detail: THERAPIES SERIES
End: 2018-08-29
Payer: MEDICARE

## 2018-08-31 ENCOUNTER — APPOINTMENT (OUTPATIENT)
Dept: CARDIAC REHAB | Age: 73
End: 2018-08-31
Payer: MEDICARE

## 2018-09-03 ENCOUNTER — APPOINTMENT (OUTPATIENT)
Dept: CARDIAC REHAB | Age: 73
End: 2018-09-03
Payer: MEDICARE

## 2018-09-04 NOTE — PROGRESS NOTES
Hospital Facility-Based Program  Phase 2 Cardiac Rehab Weekly Progress Report      Patient prescribed exercise:  2:00 class. 3 times per week in rehab, 1-4 times per week at home for the amount of sessions/weeks specified by insurance. Current Levels: Treadmill: 1. 6mph/0% for 10 minutes, NuStep:  28 Saeed for 12 minutes, UBE: Level 0.4 for 5 minutes. Progression Discussion: Maintain/Increase Aerobic exercise 27-30 minutes to work on endurance. Attempt to increase intensity by 5-20% for each modality this week. Try to increase intensities until Wilfrid rates the exercises a 13-17 on Gunner RPE.

## 2018-09-05 ENCOUNTER — HOSPITAL ENCOUNTER (OUTPATIENT)
Dept: CARDIAC REHAB | Age: 73
Setting detail: THERAPIES SERIES
Discharge: HOME | End: 2018-09-05
Payer: MEDICARE

## 2018-09-05 PROCEDURE — G0422 INTENS CARDIAC REHAB W/EXERC: HCPCS

## 2018-09-05 PROCEDURE — G0423 INTENS CARDIAC REHAB NO EXER: HCPCS

## 2018-09-07 ENCOUNTER — HOSPITAL ENCOUNTER (OUTPATIENT)
Dept: CARDIAC REHAB | Age: 73
Setting detail: THERAPIES SERIES
Discharge: HOME | End: 2018-09-07
Payer: MEDICARE

## 2018-09-07 PROCEDURE — G0422 INTENS CARDIAC REHAB W/EXERC: HCPCS

## 2018-09-07 PROCEDURE — G0423 INTENS CARDIAC REHAB NO EXER: HCPCS

## 2018-09-10 ENCOUNTER — HOSPITAL ENCOUNTER (OUTPATIENT)
Dept: CARDIAC REHAB | Age: 73
Setting detail: THERAPIES SERIES
Discharge: HOME | End: 2018-09-10
Payer: MEDICARE

## 2018-09-10 ENCOUNTER — TELEPHONE (OUTPATIENT)
Dept: FAMILY MEDICINE CLINIC | Age: 73
End: 2018-09-10

## 2018-09-10 DIAGNOSIS — E11.29 TYPE 2 DIABETES MELLITUS WITH MICROALBUMINURIA, WITH LONG-TERM CURRENT USE OF INSULIN (HCC): Primary | ICD-10-CM

## 2018-09-10 DIAGNOSIS — Z79.4 TYPE 2 DIABETES MELLITUS WITH MICROALBUMINURIA, WITH LONG-TERM CURRENT USE OF INSULIN (HCC): Primary | ICD-10-CM

## 2018-09-10 DIAGNOSIS — R80.9 TYPE 2 DIABETES MELLITUS WITH MICROALBUMINURIA, WITH LONG-TERM CURRENT USE OF INSULIN (HCC): Primary | ICD-10-CM

## 2018-09-10 PROCEDURE — G0422 INTENS CARDIAC REHAB W/EXERC: HCPCS

## 2018-09-10 PROCEDURE — G0423 INTENS CARDIAC REHAB NO EXER: HCPCS

## 2018-09-11 ENCOUNTER — HOSPITAL ENCOUNTER (OUTPATIENT)
Age: 73
Discharge: HOME | End: 2018-09-11
Payer: MEDICARE

## 2018-09-11 DIAGNOSIS — I50.32 HEART FAILURE, DIASTOLIC, CHRONIC (HCC): ICD-10-CM

## 2018-09-11 DIAGNOSIS — G47.10 HYPERSOMNIA: ICD-10-CM

## 2018-09-11 DIAGNOSIS — R97.20 ELEVATED PSA, LESS THAN 10 NG/ML: ICD-10-CM

## 2018-09-11 DIAGNOSIS — E11.29 TYPE 2 DIABETES MELLITUS WITH MICROALBUMINURIA, WITH LONG-TERM CURRENT USE OF INSULIN (HCC): ICD-10-CM

## 2018-09-11 DIAGNOSIS — E55.9 VITAMIN D DEFICIENCY: ICD-10-CM

## 2018-09-11 DIAGNOSIS — R80.9 TYPE 2 DIABETES MELLITUS WITH MICROALBUMINURIA, WITH LONG-TERM CURRENT USE OF INSULIN (HCC): ICD-10-CM

## 2018-09-11 DIAGNOSIS — R53.82 CHRONIC FATIGUE: ICD-10-CM

## 2018-09-11 DIAGNOSIS — Z79.4 TYPE 2 DIABETES MELLITUS WITH MICROALBUMINURIA, WITH LONG-TERM CURRENT USE OF INSULIN (HCC): ICD-10-CM

## 2018-09-11 DIAGNOSIS — R71.8 OTHER ABNORMALITY OF RED BLOOD CELLS: ICD-10-CM

## 2018-09-11 LAB
ANION GAP SERPL CALCULATED.3IONS-SCNC: 15 MEQ/L (ref 8–16)
AVERAGE GLUCOSE: 126 MG/DL (ref 70–126)
BUN BLDV-MCNC: 29 MG/DL (ref 7–22)
CALCIUM SERPL-MCNC: 9.9 MG/DL (ref 8.5–10.5)
CHLORIDE BLD-SCNC: 100 MEQ/L (ref 98–111)
CO2: 25 MEQ/L (ref 23–33)
CREAT SERPL-MCNC: 1.5 MG/DL (ref 0.4–1.2)
CREATININE, URINE: 103.9 MG/DL
ERYTHROCYTE [DISTWIDTH] IN BLOOD BY AUTOMATED COUNT: 14 % (ref 11.5–14.5)
ERYTHROCYTE [DISTWIDTH] IN BLOOD BY AUTOMATED COUNT: 50 FL (ref 35–45)
FERRITIN: 61 NG/ML (ref 22–322)
FOLATE: > 20 NG/ML (ref 4.8–24.2)
GFR SERPL CREATININE-BSD FRML MDRD: 46 ML/MIN/1.73M2
GLUCOSE BLD-MCNC: 132 MG/DL (ref 70–108)
HBA1C MFR BLD: 6.2 % (ref 4.4–6.4)
HCT VFR BLD CALC: 34.5 % (ref 42–52)
HEMOGLOBIN: 11.5 GM/DL (ref 14–18)
MCH RBC QN AUTO: 32.3 PG (ref 26–33)
MCHC RBC AUTO-ENTMCNC: 33.3 GM/DL (ref 32.2–35.5)
MCV RBC AUTO: 96.9 FL (ref 80–94)
MICROALBUMIN UR-MCNC: 9 MG/DL
MICROALBUMIN/CREAT UR-RTO: 87 MG/G (ref 0–30)
PLATELET # BLD: 258 THOU/MM3 (ref 130–400)
PMV BLD AUTO: 9.3 FL (ref 9.4–12.4)
POTASSIUM SERPL-SCNC: 5.6 MEQ/L (ref 3.5–5.2)
PROSTATE SPECIFIC ANTIGEN: 3.44 NG/ML (ref 0–1)
RBC # BLD: 3.56 MILL/MM3 (ref 4.7–6.1)
SODIUM BLD-SCNC: 140 MEQ/L (ref 135–145)
TSH SERPL DL<=0.05 MIU/L-ACNC: 2.26 UIU/ML (ref 0.4–4.2)
VITAMIN B-12: 523 PG/ML (ref 211–911)
VITAMIN D 25-HYDROXY: 32 NG/ML (ref 30–100)
WBC # BLD: 6.6 THOU/MM3 (ref 4.8–10.8)

## 2018-09-11 PROCEDURE — 84153 ASSAY OF PSA TOTAL: CPT

## 2018-09-11 PROCEDURE — 82728 ASSAY OF FERRITIN: CPT

## 2018-09-11 PROCEDURE — 82607 VITAMIN B-12: CPT

## 2018-09-11 PROCEDURE — 83036 HEMOGLOBIN GLYCOSYLATED A1C: CPT

## 2018-09-11 PROCEDURE — 84443 ASSAY THYROID STIM HORMONE: CPT

## 2018-09-11 PROCEDURE — 82043 UR ALBUMIN QUANTITATIVE: CPT

## 2018-09-11 PROCEDURE — 80048 BASIC METABOLIC PNL TOTAL CA: CPT

## 2018-09-11 PROCEDURE — 82746 ASSAY OF FOLIC ACID SERUM: CPT

## 2018-09-11 PROCEDURE — 85027 COMPLETE CBC AUTOMATED: CPT

## 2018-09-11 PROCEDURE — 82306 VITAMIN D 25 HYDROXY: CPT

## 2018-09-11 PROCEDURE — 36415 COLL VENOUS BLD VENIPUNCTURE: CPT

## 2018-09-12 ENCOUNTER — TELEPHONE (OUTPATIENT)
Dept: NEPHROLOGY | Age: 73
End: 2018-09-12

## 2018-09-12 ENCOUNTER — HOSPITAL ENCOUNTER (OUTPATIENT)
Dept: CARDIAC REHAB | Age: 73
Setting detail: THERAPIES SERIES
Discharge: HOME | End: 2018-09-12
Payer: MEDICARE

## 2018-09-12 DIAGNOSIS — N18.30 CHRONIC KIDNEY DISEASE, STAGE III (MODERATE) (HCC): Primary | ICD-10-CM

## 2018-09-12 PROCEDURE — G0423 INTENS CARDIAC REHAB NO EXER: HCPCS

## 2018-09-12 PROCEDURE — G0422 INTENS CARDIAC REHAB W/EXERC: HCPCS

## 2018-09-12 NOTE — PROGRESS NOTES
Erick ROB.:  1945    Acct Number: [[id number]]   MRN:  [id number]                             [facility] HEALTHY MIND-SET WORKSHOP             Date: 2018        Session #________    Lisa Huertas class covered:    (X )  Stress and Health Workshop:  [facility] patient will learn about the body's adaptive response that is triggered by a variety of stressors. Patient will gain insight into the toll that chronic stress takes on their health, both emotionally and physically. ( ) Taking Charge of Stress Workshop:  [facility] patient will learn and practice a variety of stress management techniques. Patient will be able to effectively apply coping mechanisms in perceived stressful situations. ( ) New Thoughts New Behaviors Workshop: [facility] patient will learn and practice techniques for developing effective health and lifestyle goals. Patient will be able to effectively apply the goal setting process learned to develop at least one new personal goal.     ( ) Managing Moods & Relationships Workshop:  [facility] patient will learn how emotional and chronic stress factors can impact their hearts. They will learn healthy ways to handle stress and utilize positive coping mechanisms. In addition, [facility] patient will learn ways to improve communication skills. Irene Higuera actively participated and verbalized understanding. Total time in the Healthy Mind-Set class was 60 minutes.     Electronically signed by LEMUEL Motta on 2018 at 4:11 PM

## 2018-09-13 ENCOUNTER — OFFICE VISIT (OUTPATIENT)
Dept: INTERNAL MEDICINE CLINIC | Age: 73
End: 2018-09-13
Payer: MEDICARE

## 2018-09-13 VITALS
BODY MASS INDEX: 31.58 KG/M2 | WEIGHT: 213.2 LBS | DIASTOLIC BLOOD PRESSURE: 56 MMHG | HEIGHT: 69 IN | SYSTOLIC BLOOD PRESSURE: 120 MMHG

## 2018-09-13 DIAGNOSIS — R80.9 TYPE 2 DIABETES MELLITUS WITH MICROALBUMINURIA, WITH LONG-TERM CURRENT USE OF INSULIN (HCC): ICD-10-CM

## 2018-09-13 DIAGNOSIS — Z79.4 TYPE 2 DIABETES MELLITUS WITH MICROALBUMINURIA, WITH LONG-TERM CURRENT USE OF INSULIN (HCC): ICD-10-CM

## 2018-09-13 DIAGNOSIS — E11.29 TYPE 2 DIABETES MELLITUS WITH MICROALBUMINURIA, WITH LONG-TERM CURRENT USE OF INSULIN (HCC): ICD-10-CM

## 2018-09-13 PROCEDURE — G0108 DIAB MANAGE TRN  PER INDIV: HCPCS | Performed by: NURSE PRACTITIONER

## 2018-09-13 RX ORDER — AMOXICILLIN 500 MG/1
500 CAPSULE ORAL 4 TIMES DAILY
Refills: 1 | Status: ON HOLD | COMMUNITY
Start: 2018-09-07 | End: 2018-11-06 | Stop reason: ALTCHOICE

## 2018-09-13 NOTE — PROGRESS NOTES
consistent carb intake. Reviewed the need to minimize lows and the risk to heart health with too low of blood sugars. Encouraged measuring bkfst which is usually cereal and to track carbs in other meals--trying to stay 45-60 grams per meal. Exercise is structured with cardiac rehab. Encouragement given. Cliff fink not demonstrate interest in improving his blood sugar control. He depends on his wife to help keep in on track. Follow up 6 months    DSME PLAN:   Discussed general issues about diabetes pathophysiology and management. Counseling at today's visit: patterns of BS's; cause of high or low BS's; treating low BS's. Keep doing what you are doing! Most of your blood sugars are doing good. When you get a blood sugar over 180 = where did it come from?             -less Humalog the meal before? Larger carb meal? No activity? When blood sugars are are low: always treat with rapid glucose. Then eat your meal or a snack                          --take your Humalog after a couple bites of your meal  Any questions--call the clinic [phone]    Meter download, medications, PMH and nursing assessment reviewed. Nilda Maldonado states He is willing to participate in this plan of care and verbalized understanding of all instructions provided. Teach back used to verify comprehension. Total time involved in direct patient education: 60 minutes.

## 2018-09-13 NOTE — PATIENT INSTRUCTIONS
Keep doing what you are doing! Most of your blood sugars are doing good. When you get a blood sugar over 180 = where did it come from?             -less Humalog the meal before? Larger carb meal? No activity? When blood sugars are are low: always treat with rapid glucose.  Then eat your meal or a snack                          --take your Humalog after a couple bites of your meal  Any questions--call the clinic [phone]

## 2018-09-14 ENCOUNTER — HOSPITAL ENCOUNTER (OUTPATIENT)
Dept: CARDIAC REHAB | Age: 73
Setting detail: THERAPIES SERIES
Discharge: HOME | End: 2018-09-14
Payer: MEDICARE

## 2018-09-14 PROCEDURE — G0422 INTENS CARDIAC REHAB W/EXERC: HCPCS

## 2018-09-14 PROCEDURE — G0423 INTENS CARDIAC REHAB NO EXER: HCPCS

## 2018-09-14 NOTE — PROGRESS NOTES
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 Samples were offered. Recipes were given and questions answered. The patient above was in the [employer] for 45 minutes.       Electronically signed by Kate Morales on 2018 at 3:06 PM

## 2018-09-17 ENCOUNTER — HOSPITAL ENCOUNTER (OUTPATIENT)
Age: 73
Discharge: HOME | End: 2018-09-17
Payer: MEDICARE

## 2018-09-17 ENCOUNTER — HOSPITAL ENCOUNTER (OUTPATIENT)
Dept: CARDIAC REHAB | Age: 73
Setting detail: THERAPIES SERIES
Discharge: HOME | End: 2018-09-17
Payer: MEDICARE

## 2018-09-17 ENCOUNTER — OFFICE VISIT (OUTPATIENT)
Dept: NEPHROLOGY | Age: 73
End: 2018-09-17
Payer: MEDICARE

## 2018-09-17 VITALS
SYSTOLIC BLOOD PRESSURE: 121 MMHG | HEART RATE: 66 BPM | OXYGEN SATURATION: 99 % | BODY MASS INDEX: 31.4 KG/M2 | DIASTOLIC BLOOD PRESSURE: 51 MMHG | WEIGHT: 212.6 LBS

## 2018-09-17 DIAGNOSIS — N18.30 CHRONIC KIDNEY DISEASE, STAGE III (MODERATE) (HCC): ICD-10-CM

## 2018-09-17 DIAGNOSIS — I10 ESSENTIAL HYPERTENSION: ICD-10-CM

## 2018-09-17 DIAGNOSIS — N18.30 CKD (CHRONIC KIDNEY DISEASE) STAGE 3, GFR 30-59 ML/MIN (HCC): Primary | ICD-10-CM

## 2018-09-17 DIAGNOSIS — N17.9 AKI (ACUTE KIDNEY INJURY) (HCC): ICD-10-CM

## 2018-09-17 LAB
ANION GAP SERPL CALCULATED.3IONS-SCNC: 11 MEQ/L (ref 8–16)
BUN BLDV-MCNC: 34 MG/DL (ref 7–22)
CALCIUM SERPL-MCNC: 9.2 MG/DL (ref 8.5–10.5)
CHLORIDE BLD-SCNC: 105 MEQ/L (ref 98–111)
CO2: 23 MEQ/L (ref 23–33)
CREAT SERPL-MCNC: 1.5 MG/DL (ref 0.4–1.2)
GFR SERPL CREATININE-BSD FRML MDRD: 46 ML/MIN/1.73M2
GLUCOSE BLD-MCNC: 148 MG/DL (ref 70–108)
POTASSIUM SERPL-SCNC: 5 MEQ/L (ref 3.5–5.2)
SODIUM BLD-SCNC: 139 MEQ/L (ref 135–145)

## 2018-09-17 PROCEDURE — 1101F PT FALLS ASSESS-DOCD LE1/YR: CPT | Performed by: INTERNAL MEDICINE

## 2018-09-17 PROCEDURE — G0422 INTENS CARDIAC REHAB W/EXERC: HCPCS

## 2018-09-17 PROCEDURE — 1123F ACP DISCUSS/DSCN MKR DOCD: CPT | Performed by: INTERNAL MEDICINE

## 2018-09-17 PROCEDURE — G0423 INTENS CARDIAC REHAB NO EXER: HCPCS

## 2018-09-17 PROCEDURE — 99213 OFFICE O/P EST LOW 20 MIN: CPT | Performed by: INTERNAL MEDICINE

## 2018-09-17 PROCEDURE — G8417 CALC BMI ABV UP PARAM F/U: HCPCS | Performed by: INTERNAL MEDICINE

## 2018-09-17 PROCEDURE — 3017F COLORECTAL CA SCREEN DOC REV: CPT | Performed by: INTERNAL MEDICINE

## 2018-09-17 PROCEDURE — 4040F PNEUMOC VAC/ADMIN/RCVD: CPT | Performed by: INTERNAL MEDICINE

## 2018-09-17 PROCEDURE — G8598 ASA/ANTIPLAT THER USED: HCPCS | Performed by: INTERNAL MEDICINE

## 2018-09-17 PROCEDURE — 80048 BASIC METABOLIC PNL TOTAL CA: CPT

## 2018-09-17 PROCEDURE — 1036F TOBACCO NON-USER: CPT | Performed by: INTERNAL MEDICINE

## 2018-09-17 PROCEDURE — G8427 DOCREV CUR MEDS BY ELIG CLIN: HCPCS | Performed by: INTERNAL MEDICINE

## 2018-09-17 PROCEDURE — 36415 COLL VENOUS BLD VENIPUNCTURE: CPT

## 2018-09-17 NOTE — PLAN OF CARE
[facility] Program - 13 [id number]  [id number] Facility-Based Program  Individualized Cardiac Treatment Plan    Patient Name:  Queen Joy  :  1945  Age:  68 y.o. MRN:  [id number]  Diagnosis: PCI: RCA x 5  Date of Event: 18   Physician:  Dona Aguilar Office Visit:    Date Entered Program: 18  Risk Stratifications: [] Low [] Intermediate [x] High  Allergies: No Known Allergies    Individual Cardiac Treatment Plan -EXERCISE  INITIAL 30 DAY 60 DAY 90 DAY FINAL DAY   EXERCISE  ASSESSMENT/PLAN EXERCISE  REASSESSMENT EXERCISE   REASSESSMENT EXERCISE   REASSESSMENT EXERCISE  DISCHARGE/FOLLOW-UP   Date: 18 Date: 18 Date: 18 Date: Date:   Session #1 Session # 25 Session # 32 Session # ** Session # **  Last session completed on **   Stages of Change Stages of Change Stages of Change Stages of Change Stages of Change   [] Pre Contemplation  [x] Contemplation  [] Preparation  [] Action  [] Maintenance  [] Relapse [] Pre Contemplation  [x] Contemplation  [] Preparation  [] Action  [] Maintenance  [] Relapse [] Pre Contemplation  [x] Contemplation  [] Preparation  [] Action  [] Maintenance  [] Relapse [] Pre Contemplation  [] Contemplation  [] Preparation  [] Action  [] Maintenance  [] Relapse [] Pre Contemplation  [] Contemplation  [] Preparation  [] Action  [] Maintenance  [] Relapse   EXERCISE ASSESSMENT EXERCISE ASSESSMENT EXERCISE ASSESSMENT EXERCISE ASSESSMENT EXERCISE ASSESSMENT   6 Min Walk Test  Distance walked:   0.12 miles  633.6 ft.  1.9 METs  Max HR:77 BPM      RPE:  12    THR:    Rhythm:  NSR with occ PAC's    6 Min Walk Test  Distance walked:   ** miles  ** ft  ** METs  Max HR:** BPM      RPE:  **  %Change ft= **    Rhythm:  **   DASI: 8.9 METS DASI: 6.67 METS DASI: 6.11METS DASI: ** METS DASI: ** METS   Return to Work  [employer] on returning to work?    [x] Yes              [] No   [] Disabled     [] Retired    If yes:  *Job description: PLAN EXERCISE PLAN EXERCISE PLAN   *Interventions* *Interventions* *Interventions* *Interventions* *Interventions*   Exercise Prescription  (per physician & CR staff) Exercise Prescription  (per physician & CR staff) Exercise Prescription  (per physician & CR staff) Exercise Prescription  (per physician & CR staff) Exercise Prescription  (per physician & CR staff)   Cardiovascular Cardiovascular Cardiovascular Cardiovascular Cardiovascular   Mode:    [x] Treadmill (TM)  [x] Schwinn Airdyne (AD)  [x] Arms Ergometer (AE)  [x] NuStep  [] Elliptical (E) MODE:    [x] Treadmill (TM)  [x] Schwinn Airdyne (AD)  [x] Arms Ergometer (AE)  [x] NuStep  [] Elliptical (E) MODE:    [x] Treadmill (TM)  [] Schwinn Airdyne (AD)  [x] Arms Ergometer (AE)  [x] NuStep  [] Elliptical (E) MODE:    [] Treadmill (TM)  [] Schwinn Airdyne (AD)  [] Arms Ergometer (AE)  [] NuStep  [] Elliptical (E) MODE:    [] Treadmill (TM)  [] Schwinn Airdyne (AD)  [] Arms Ergometer (AE)  [] NuStep  [] Elliptical (E)   Initial Workloads  TM: [email or web address] 2.1 METs  AD: 0.4 level = 1.9 METs  NS: 26  Saeed= 1.9 METs  AE: 0.2 level = 1.4 METs Current Workloads  TM: 1.6@ 0%=2.2  METs  NS:  28Watts= 1.9 METs  AE:0.4level = 1.6 METs Current Workloads  TM: 1.6 @0 %= 2.2 METs    NS: 28  Saeed= 1.9 METs  AE:16 Saeed = 1.7 METs Current Workloads  TM:  @ %=  METs  AD:  level =  METs  NS:   Saeed=  METs  AE:  level =  METs Current Workloads  TM:  @ %=  METs  AD:  level =  METs  NS:   Saeed=  METs  AE:  level =  METs     Frequency:    ICR: 3x/week  Home: 2-3x/wk Frequency:   ICR: 3x/week  Home: 3x/wk Frequency:  ICR: 3x/week  Home: 3-4x/wk Frequency:  ICR: 3x/week  Home: 3-4x/wk Frequency:  Jose Ramon Paredes will continue exercise at  5-7 days/week   Duration:   Total aerobic exercise = 20 min    5 min/mode Duration:  Total aerobic exercises = 27 min     5-12  min/mode Duration:  Total aerobic exercises = 22-28 min     10-12min/mode Duration:  Total aerobic exercises = ** min days/week for 10-20 min on days not in rehab. Home Exercise  *Wilfrid verbalizes planning to initiate home exercise on days not in rehab. Home Exercise  *Wilfrid verbalizes planning to walk 3-4 days/week for 10-20 min on days not in rehab. Home Exercise  *Wilfrid verbalizes planning to ** ** days/week for ** min on days not in rehab. Home Exercise  *Wilfrid verbalizes his/her plan to ** ** days/week for ** min @ **   *Education* *Education* *Education* *Education* *Education*   RPE Scale  [x] Yes      [] No  Exercise Safety  [x] Yes      [] No  Equipment Orientation  [x] Yes      [] No  S/S to Report  [x] Yes      [] No  Warm Up/Cool Down  [x] Yes      [] No  Home Exercise  [x] Yes      [] No    All Exercise Education Completed  [] Yes      [] No   *Goals* *Goals* *Goals* *Goals* *Goals*   Initial Exercise Goals Exercise Goals  Exercise Goals   Exercise Goals  Exercise Goals   Wilfrid plans to:  [x] Attend exercise sessions 3x/wk  [x] initiate home exercise 2-3x/wk for 10-20 min  [x] Increase 6 min walk distance by 10%  [] ** Wilfrid plans to:  [x] Attend exercise sessions 3x/wk  [x] continue home exercise 2-3x/wk for 20-30 min  [] ** Wilfrid's plans to:  [x] Attend exercise sessions 3x/wk  [x] continue home exercise 3-4x/wk for 30-45 min  [x] Determine plan of exercise following rehab  [] ** Wilfrid's plans to:  **   Wilfrid achieved exercise goals?     []  Yes    [] No  If no, why?  **  [] Increased 6 min walk distance by 10%  [] Currently exercising 30-60 min/day, 5-7days/wk   [] Plans to continue exercise on own  [] Plans to join a local fitness center to continue exercise  [] Does not plan to continue to exercise after rehab   Return to ADL or Hobbies:  Jose Moreira would like to improve strength and endurance so he is able to return to [employer], gardening,  volunteering Return to ADL or Hobbies:  Jose Moreira would like to improve strength and endurance so he is able to return to yardwork Return to ADL or poor eye contact   [x] Flat affect present. [] Signs of anxiety, anger or hostility    [] Signs social isolation present. []  Signs of alcohol or substance abuse       PSYCHOSOCIAL PLAN PSYCHOSOCIAL PLAN PSYCHOSOCIAL PLAN PSYCHOSOCIAL PLAN PSYCHOSOCIAL PLAN   *Interventions* *Interventions* *Interventions* *Interventions* *Interventions*   *Please check if needed  [] Psych Consult  [] Physician Referral  [x] Stress Management Skills *Please check if needed  [] Psych Consult  [] Physician Referral  [x] Stress Management Skills *Please check if needed  [] Psych Consult  [] Physician Referral  [x] Stress Management Skills *Please check if needed  [] Psych Consult  [] Physician Referral  [] Stress Management Skills *Please check if needed  [] Psych Consult  [] Physician Referral  [] Stress Management Skills   Is patient currently taking anti-depressant or anti-anxiety medications? [] Yes      [x] No   Change in anti-depressant or anti-anxiety medications? [] Yes      [x] No   Change in anti-depressant or anti-anxiety medications? [] Yes      [x] No  If yes, please list medications:  ** Change in anti-depressant or anti-anxiety medications? [] Yes      [] No  If yes, please list medications:  ** Change in anti-depressant or anti-anxiety medications?   [] Yes      [] No  If yes, please list medications:  **   *Education* *Education* *Education* *Education* *Education*   Healthy Mind-Set Workshops Recommended  [x] Stress & Health  [x] Taking Charge of Stress  [x] New Thoughts, New Behaviors  [x] Managing Moods & Relationships Healthy Mind-Set Workshops Attended/Date  [x] New Thoughts, New Behaviors-8/22/18    [x] Managing Moods & Relationships-8/1/18 Healthy Mind-Set Workshops Attended/Date    9/12/18Stress & Health Healthy Mind-Set Workshops Attended/Date Healthy Mind-Set Workshops  Completed  [] Yes      [] No   *Goals* *Goals* *Goals* *Goals* *Goals*   Wilfrid's psychosocial goals are as follows:  Complete HADS & BP:**  Medication Changes:  [] Yes      [] No Hypertension  Resting BP: **  Peak Ex BP:**  Medication Changes:  [] Yes      [] No   Lipids  HLD/DLD  [x] Yes      [] No  TOTAL CHOL: 135  HDL:  35  LDL:  68  TRI  Medication: atorvastatin Lipids  Medication Changes:  [] Yes      [x] No     Lipids  Medication Changes:  [] Yes      [x] No     Lipids  Medication Changes:  [] Yes      [] No     Lipids    TOTAL CHOL: **  HDL:  **  LDL:  **  TRIG:  **  Medication Changes:  [] Yes      [] No   Diabetes  [x] Yes      [] No  FBS: 138           HbA1C:        Monitor BS @ home:   [x] Yes      [] No  Frequency: 4 x per day  Medication: humalog, levemir, metformin Diabetes  Most Recent BS: 80  BS have been in range  [x] Yes      [] No  Medication Changes  [] Yes      [x] No   Diabetes  Most Recent BS:  BS have been in range  [x] Yes      [] No  Medication Changes  [] Yes      [x] No     Diabetes  Most Recent BS:  BS have been in range  [] Yes      [] No  Medication Changes  [] Yes      [] No     Diabetes  Most Recent BS:  BS have been in range  [] Yes      [] No  Medication Changes  [] Yes      [] No       Tobacco Use  [] Current  [] Former  [x] Never      Smokeless Tobacco use:   [] Yes      [x] No   Tobacco Use  Change in smoking status   [] Yes      [x] No       Tobacco Use  Change in smoking status   [] Yes      [x] No       Tobacco Use  Change in smoking status   [] Yes      [] No    Quit date: ** Tobacco Use  Change in smoking status   [] Yes      [] No    Quit date: **            Learning Barriers  Please select one:  [] Speech  [] Literacy  [x] Hearing  [x] Cognitive ?   [] Vision  [x] Ready to Learn Learning Barriers Addressed:   [x] Yes      [] No   Learning Barriers Addressed:   [x] Yes      [] No   Learning Barriers Addressed:  [] Yes      [] No Learning Barriers Addressed:  [] Yes      [] No     RISK FACTOR/EDUCATION PLAN RISK FACTOR/EDUCATION PLAN RISK FACTOR/EDUCATION PLAN RISK FACTOR/EDUCATION PLAN RISK FACTOR/EDUCATION PLAN   *Interventions* *Interventions* *Interventions* *Interventions* *Interventions*   Recommended Educational Videos    [x] Overview of The Pritikin Eating Plan  [x] Becoming A Pritikin   [x] Diseases of Our Time-Part 1  [x] Calorie Density  [x] Label Reading-Part 1  [x] Move It! [x] Healthy Minds, Bodies, Hearts  [x] Dining Out-Part 1  [x] Heart Disease Risk Reduction  [x] Metabolic Syndrome & Belly Fat  [x] Facts on Fat  [x] Diseases of Our Times-Part 2  [x] Biology of Weight Control  [x] Biomechanical Limitations  [x] Nurtition Action Plan   Completed Videos      7/24/18  Overview of The Pritikin Eating Plan    [x] Diseases of Our Time-Part 1-8/6/18    [x] Calorie Density-8/13/18    [x] Label Reading-Part 1-8/20/18    [x] Heart Disease Risk Reduction-7/30/18 Completed Videos    9/17/18[x] Move It! 9/10/18Healthy Minds, Bodies, Hearts Completed Videos Recommended Educational Videos Completed    [] Yes      [] No    **If not completed, Why? **          Smoking Cessation/Relaspe Prevention Intervention needed? [] Yes      [x] No   Smoking Cessation/Relapse Prevention Scheduled? [] Yes      [x] No       Smoking Cessation Counseling attended  [] Yes      [] No  **If not completed, Why? **   Professional Referrals:  *Please check if needed  [] Diabetes Clinic  [] Lipid Clinic   [] Other:     Professional Referrals:  *Please check if needed  [] Diabetes Clinic  [] Lipid Clinic   [] Other:   Preventative Medication Preventative Medication Preventative Medication Preventative Medication Preventative Medication   Aspirin  [x] Yes    [] No  Blood Thinner: Clopidogrel/Effient/Brillinta/Eliquis  [x] Yes    [] No  Beta Blocker  [x] Yes    [] No  Ace Inhibitor  [x] Yes    [] No  Statin/Lipid Lowering  [x] Yes    [] No Medication Changes? [] Yes    [x] No Medication Changes? [] Yes    [x] No Medication Changes? [] Yes    [] No Medication Changes?   [] Yes    [] No   *Education* *Education* Sugar <120  [x] Attend recommended video education sessions  [x] Takes medications as prescribed 100% of the time   [] ** Wilfrid achieved risk factor goals?   [] Yes    [] No  If no, why?  **     Monitored telemetry has revealed : NSR with occ PAC's   Monitored telemetry has revealed NSR    Monitored telemetry has revealedNSR[] [] associated symptoms  SOB Monitored telemetry has revealed **  [] documented arrhythmia at increasing workloads  [] associated symptoms ** Monitored telemetry has revealed **  [] documented arrhythmia at increasing workloads  [] associated symptoms **   Physician Response    [x] Cardiac rehab is reasonably and medically necessary for continuous cardiac monitoring surveillance  of patient's cardiac activity  [x] Initiate continuous telemerty monitoring and notify me with any concerns  [] Other   Physician Response    [x] Cardiac rehab is reasonably and medically necessary for continuous cardiac monitoring surveillance  of patient's cardiac activity  [x] Continue continuous telemerty monitoring and notify me with any concerns  [] Other     Physician Response      [x] Cardiac rehab is reasonably and medically necessary for continuous cardiac monitoring surveillance  of patient's cardiac activity  [x] Continue continuous telemerty monitoring and notify me with any concerns   [] Other     Physician Response    [x] Cardiac rehab is reasonably and medically necessary for continuous cardiac monitoring surveillance  of patient's cardiac activity  [x] Continue continuous telemerty monitoring and notify me with any concerns   [] Other          Cosigned by: Yesenia Franco MD at 7/25/2018  7:38 AM   Revision History      Date/Time User Provider Type Action   7/25/2018  7:38 AM Yesenia Franco MD Physician Cosign   7/24/2018  3:08 PM Elizabeth De La Vega Exercise Physiologist Sign     Cosigned by: Yesenia Franco MD at 8/22/2018  6:04 PM   Revision History      Date/Time User Provider Type Action   8/22/2018

## 2018-09-17 NOTE — PROGRESS NOTES
[facility] Program  Phase 2 Cardiac Rehab Weekly Progress Report      Patient prescribed exercise:  2:00 class. 3 times per week in rehab, 1-4 times per week at home for the amount of sessions/weeks specified by insurance. Current Levels: Treadmill: 1. 6mph/0% for 10 minutes,  NuStep:  28 Saeed for 12 minutes,     Progression Discussion: Maintain/Increase Aerobic exercise 15 minutes to work on endurance. Attempt to increase intensity by 5-20% for each modality this week. Try to increase intensities until Wilfrid rates the exercises a 13-17 on Gunner RPE.

## 2018-09-17 NOTE — PROGRESS NOTES
DAY (Patient taking differently: 5 mg 2 times daily TAKE 1 TABLET TWICE A DAY) 180 tablet 3    metoprolol succinate (TOPROL XL) 50 MG extended release tablet TAKE 1 TABLET DAILY 90 tablet 3    insulin detemir (LEVEMIR FLEXTOUCH) 100 UNIT/ML injection pen Inject into skin 52 units in the morning and 54 units in the evening. (Patient taking differently: 51 Units 2 times daily Inject into skin 51 units in the morning and 51 units in the evening.) 105 mL 0    nitroGLYCERIN (NITROLINGUAL) 0.4 MG/SPRAY 0.4 mg spray Place 1 spray under the tongue every 5 minutes as needed for Chest pain 1 Bottle 6    NOVOFINE 30G X 8 MM MISC USE 1 NEW NEEDLE 6 TIMES A DAY AND AS NEEDED 600 each 3    aspirin EC 81 MG EC tablet Take 81 mg by mouth daily. Vitals     BP (!) 121/51 (Site: Left Upper Arm, Position: Sitting, Cuff Size: Medium Adult)   Pulse 66   Wt 212 lb 9.6 oz (96.4 kg)   SpO2 99%   BMI 31.40 kg/m²  Wt Readings from Last 3 Encounters:   09/17/18 212 lb 9.6 oz (96.4 kg)   09/13/18 213 lb 3.2 oz (96.7 kg)   08/07/18 211 lb 6.4 oz (95.9 kg)        Physical Exam     General -- no distress  Lungs -- clear  Heart -- S1, S2 heard, JVD - no  Abdomen - soft, non-tender  Extremities -- no edema  CNS - awake and alert    Labs, Radiology and Tests    Labs -    9/18           Potassium 5.0           BUN 34           Creatinine 1.5           eGFR 46                       UPCR            [facility]                          Renal Ultrasound Scan -- 6/2018  Rt kidney 13.1 cm and left kidney 11.50  Left kidney cysts 5.7 cm. Assessment    1. Renal - Acute kidney injury on chronic kidney disease stage III most likely secondary to the use of diuretics. He is also on ACE inhibitor  - No significant improvement even after holding the diuretic for a week  - At this time advised the patient to monitor his weights and if the shortness of breath worsens or weight worsened advised to take the diuretic.   Daily weights  - Patient did have significant exposure to IV contrast within this year which may or may not have contributed to the current episode. - We will repeat a BMP in a month and see if the renal function improves  2. Hyperkalemia secondary to acute kidney injury and exogenous potassium supplements currently much better  3. Coronary artery disease with CABG 20 years ago and 12 stents so far 9 of them have been placed in 2018  4. Diabetes mellitus, okay to continue metformin for now  5. Elevated PSA refer to urology  6. meds reviewed and D/W patient and wife  7. FU in 6 months    Tests and orders placed this Encounter     Orders Placed This Encounter   Procedures    CBC    Creatinine, Random Urine    MICROALBUMIN, RANDOM URINE (W/O CREATININE)    Protein, urine, random   [facility] - MD Chuck Garvin M.D  [facility].

## 2018-09-19 ENCOUNTER — HOSPITAL ENCOUNTER (OUTPATIENT)
Dept: CARDIAC REHAB | Age: 73
Setting detail: THERAPIES SERIES
Discharge: HOME | End: 2018-09-19
Payer: MEDICARE

## 2018-09-19 PROCEDURE — G0422 INTENS CARDIAC REHAB W/EXERC: HCPCS

## 2018-09-19 PROCEDURE — G0423 INTENS CARDIAC REHAB NO EXER: HCPCS

## 2018-09-20 ENCOUNTER — OFFICE VISIT (OUTPATIENT)
Dept: UROLOGY | Age: 73
End: 2018-09-20
Payer: MEDICARE

## 2018-09-20 ENCOUNTER — TELEPHONE (OUTPATIENT)
Dept: INTERNAL MEDICINE CLINIC | Age: 73
End: 2018-09-20

## 2018-09-20 VITALS
HEIGHT: 69 IN | BODY MASS INDEX: 31.27 KG/M2 | WEIGHT: 211.1 LBS | SYSTOLIC BLOOD PRESSURE: 102 MMHG | DIASTOLIC BLOOD PRESSURE: 50 MMHG

## 2018-09-20 DIAGNOSIS — R33.9 INCOMPLETE EMPTYING OF BLADDER: ICD-10-CM

## 2018-09-20 DIAGNOSIS — N40.0 BENIGN PROSTATIC HYPERPLASIA WITH ELEVATED PROSTATE SPECIFIC ANTIGEN (PSA): ICD-10-CM

## 2018-09-20 DIAGNOSIS — R97.20 ELEVATED PSA: Primary | ICD-10-CM

## 2018-09-20 DIAGNOSIS — R97.20 BENIGN PROSTATIC HYPERPLASIA WITH ELEVATED PROSTATE SPECIFIC ANTIGEN (PSA): ICD-10-CM

## 2018-09-20 PROCEDURE — 99212 OFFICE O/P EST SF 10 MIN: CPT | Performed by: NURSE PRACTITIONER

## 2018-09-20 PROCEDURE — G8427 DOCREV CUR MEDS BY ELIG CLIN: HCPCS | Performed by: NURSE PRACTITIONER

## 2018-09-20 PROCEDURE — 1036F TOBACCO NON-USER: CPT | Performed by: NURSE PRACTITIONER

## 2018-09-20 PROCEDURE — 1123F ACP DISCUSS/DSCN MKR DOCD: CPT | Performed by: NURSE PRACTITIONER

## 2018-09-20 PROCEDURE — 4040F PNEUMOC VAC/ADMIN/RCVD: CPT | Performed by: NURSE PRACTITIONER

## 2018-09-20 PROCEDURE — 1101F PT FALLS ASSESS-DOCD LE1/YR: CPT | Performed by: NURSE PRACTITIONER

## 2018-09-20 PROCEDURE — 3017F COLORECTAL CA SCREEN DOC REV: CPT | Performed by: NURSE PRACTITIONER

## 2018-09-20 PROCEDURE — G8598 ASA/ANTIPLAT THER USED: HCPCS | Performed by: NURSE PRACTITIONER

## 2018-09-20 PROCEDURE — G8417 CALC BMI ABV UP PARAM F/U: HCPCS | Performed by: NURSE PRACTITIONER

## 2018-09-20 NOTE — PROGRESS NOTES
[address]  [address] [facility]  Dept: [phone]  Loc: [phone]  Visit Date: 9/20/2018      HPI:     Erick Patel is a 68 y.o. with past medical history of UTI, BPH, CAD with stent placement at [facility] in [address] [place] [address] around June 2018. It was around this time that a lira catheter was placed and he developed a UTI. During the period with his lira catheter, a PSA was drawn and was found elevated at 16.84 on 6/2/18 Today he presents for an elevated PSA. Since June, two additional PSA of 3.32 and 3.44 were resulted. There has been a slow progression over the past 5 years in PSA values. Irene Higuera denies dysuria, hesitancy, weak stream, frequency, gross hematuria, flank pain, fever, chills, and suprapubic pain. He does admit to occasional urgency, nocturia, and incomplete emptying mostly in the morning. Erick Patel comes in with his spouse today. History is obtained from Irene Higuera, his spouse, and the medical records.       Current Outpatient Prescriptions   Medication Sig Dispense Refill    amoxicillin (AMOXIL) 500 MG capsule Take 500 mg by mouth 4 times daily   1    HUMALOG KWIKPEN 100 UNIT/ML pen INJECT 10 UNITS BEFORE BREAKFAST AND 5 UNITS WITH LUNCH AND SUPPER 45 mL 5    hydrOXYzine (VISTARIL) 25 MG capsule Take 1 capsule by mouth nightly as needed for Itching 10 capsule 0    bumetanide (BUMEX) 2 MG tablet Take 1 tablet by mouth daily 30 tablet 0    potassium chloride (KLOR-CON M) 20 MEQ extended release tablet Take 1 tablet by mouth daily 60 tablet 0    clopidogrel (PLAVIX) 75 MG tablet Take 75 mg by mouth daily      diltiazem (CARDIZEM 12 HR) 120 MG extended release capsule Take 120 mg by mouth daily      atorvastatin (LIPITOR) 40 MG tablet Take 1 tablet by mouth nightly 90 tablet 3    metFORMIN (GLUCOPHAGE) 1000 MG tablet Take 1 tablet by mouth 2 times daily (with meals) 180 tablet 1    ACCU-CHEK SOFTCLIX LANCETS MISC CHECK THE GLUCOSE FOUR TIMES DAILY E11.59 400 each 5    lisinopril (PRINIVIL;ZESTRIL) 10 MG tablet TAKE 1 TABLET TWICE A  tablet 1    CPAP Machine MISC by Does not apply route Please change CPAP pressure to 13 cm H20. 1 each 0    ACCU-CHEK COMPACT PLUS strip DX E11.59, CHECK THE GLUCOSE 4 TIMES DAILY 400 strip 3    ranolazine (RANEXA) 500 MG extended release tablet Take 500 mg by mouth 2 times daily      isosorbide mononitrate (IMDUR) 120 MG extended release tablet Take 120 mg by mouth daily       apixaban (ELIQUIS) 5 MG TABS tablet TAKE 1 TABLET TWICE A DAY (Patient taking differently: 5 mg 2 times daily TAKE 1 TABLET TWICE A DAY) 180 tablet 3    metoprolol succinate (TOPROL XL) 50 MG extended release tablet TAKE 1 TABLET DAILY 90 tablet 3    insulin detemir (LEVEMIR FLEXTOUCH) 100 UNIT/ML injection pen Inject into skin 52 units in the morning and 54 units in the evening. (Patient taking differently: 51 Units 2 times daily Inject into skin 51 units in the morning and 51 units in the evening.) 105 mL 0    nitroGLYCERIN (NITROLINGUAL) 0.4 MG/SPRAY 0.4 mg spray Place 1 spray under the tongue every 5 minutes as needed for Chest pain 1 Bottle 6    NOVOFINE 30G X 8 MM [facility] USE 1 NEW NEEDLE 6 TIMES A DAY AND AS NEEDED 600 each 3    aspirin EC 81 MG EC tablet Take 81 mg by mouth daily. No current facility-administered medications for this visit. Past Medical History  Warden Alejandra  has a past medical history of Adenomatous colon polyp; Angina at rest [facility]); Atrial fibrillation (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 BPH (benign prostatic hypertrophy); CAD (coronary artery disease); Carotid artery disease ([facility] Carotid disease, bilateral ([facility] Cerebral artery occlusion with cerebral infarction [facility]); CHF (congestive heart failure) ([facility] Chronic kidney disease; GERD (gastroesophageal reflux disease); Hyperlipidemia;  Hypertension; Lumbar disc disease; MI (myocardial infarction) ([facility] Nephrolithiasis; NIDDM (non-insulin dependent diabetes mellitus); RICCARDO (obstructive sleep apnea); Renal artery stenosis ([facility] Renal insufficiency; S/P CABG (status post coronary artery bypass graft); Skin cancer; Squamous cell carcinoma; TIA (transient ischemic attack); and Wears partial dentures. Past Surgical History  The patient  has a past surgical history that includes Lithotripsy (4/2002); Ureter stent placement (4/2002); Coronary angioplasty with stent (2008 (2), 2009 (1)); Carotid endarterectomy (1997); Cholecystectomy (4/1999); shoulder surgery (2001, 2003); Cardioversion (3/8/2012); Coronary artery bypass graft (8/1997); Cardiac surgery (1997); Cardiac surgery (3/13/13); ablation of dysrhythmic focus (3/13/13); Skin cancer excision (12/20/13 ); Foot surgery (12/20/2013); Skin cancer excision (1/2016); skin biopsy; vascular surgery; Colonoscopy (6/22/09, 4/2011,7/12/17); Diagnostic Cardiac Cath Lab Procedure; and Percutaneous Transluminal Coronary Angio (05/30/2018). Family History  This patient's family history includes Cancer in his brother, brother, and mother; Heart Disease in his brother; High Blood Pressure in his father and mother; Stroke in his father. Social History  Radha Braun  reports that he has never smoked. He has never used smokeless tobacco. He reports that he does not drink alcohol or use drugs. Subjective:     Review of Systems  No problems with ears, nose or throat. No problems with eyes. No chest pain, shortness of breath, abdominal pain, extremity pain or weakness, and no neurological deficits. He does admit to some diarrhea due to previous antibiotic therapy. No rashes.  symptoms per HPI. The remainder of the review of symptoms is negative.     Objective:     PE:   Vitals:    09/20/18 1408   BP: (!) 102/50   Weight: 211 lb 1.6 oz (95.8 kg)   Height: 5' 9\" (1.753 m)       Constitutional: Alert and oriented times 3, no acute distress and cooperative to examination with Left kidney demonstrates a large simple cyst upper pole. This can be seen on previous MRI. Measures up to 5.7 cm. In the midportion there is a isoechoic area measuring 2.4 x 2.5 cm this is possibly a column of Petros. Resistive indexes borderline. RIGHT KIDNEY - 13.31 x 6.23 x 6.85 cm   Resistive Index - 0.87   Cortical Thickness - 1.73 cm       LEFT KIDNEY - 11.50 x 5.09 x 9.94 cm   Resistive Index - 0.73   Cortical Thickness - 1.65 cm       URINARY BLADDER   Pre-Void - 76 mL   Post-Void - / mL           Impression   Asymmetrically enlarged right kidney. Bilateral renal cysts and left sciatic column of Petros versus a mass. Patient may benefit from dedicated CT of the kidneys with renal mass protocol.       **This report has been created using voice recognition software.  It may contain minor errors which are inherent in voice recognition technology. **       Final report electronically signed by Dr. Alok Hyde on 6/2/2018 4:35 PM       Assessment & Plan:      Diagnosis Orders   1. Elevated PSA  PSA Prostatic Specific Antigen    Miscellaneous Sendout 1    POCT Urinalysis No Micro (Auto)   2. Incomplete emptying of bladder  POCT Urinalysis No Micro (Auto)    poct post void residual   3. Benign prostatic hyperplasia with elevated prostate specific antigen (PSA)         Patient was explained that the life time risk of prostate cancer is approx. 16% and that prostate cancer can occur at any level of PSA. The upper limit of PSA values are not identical at the different labs. PSA is NOT cancer specific test and the entire issue of prostate cancer screening is highly debatable. Current guidelines from American Urology Association, [place] Preventive Services task force, [place] Task force on [employer] and AAFP does not support routine use of PSA for PCa screening, especially in a man >70 years age or <10 years life expectancy.     Considering his PSA value, age, race, family history, as well as the findings of RENETTA, he has moderately low risk for prostate cancer. Options of management and follow up discussed:    1. Proceed w/ TRUS biopsy of prostate. The possible complications of biopsy were explained e.g. Serious Infection, hematuria and rectal bleeding. 2. Repeat PSA in 2 months. If the PSA continues to be high, will proceed w/ Prostate biopsy. ExosomeDx testing ordered. 3. Stop future PSA/RENETTA screening all together. Patient chose option # 2. Will make appropriate arrangements. Return in about 2 months (around 11/20/2018) for elevated PSA, psa prior to visit and discuss ExosomeDx Result.     KAVON Bautista  Urology

## 2018-09-21 ENCOUNTER — HOSPITAL ENCOUNTER (OUTPATIENT)
Dept: CARDIAC REHAB | Age: 73
Setting detail: THERAPIES SERIES
Discharge: HOME | End: 2018-09-21
Payer: MEDICARE

## 2018-09-21 PROCEDURE — G0423 INTENS CARDIAC REHAB NO EXER: HCPCS

## 2018-09-21 PROCEDURE — G0422 INTENS CARDIAC REHAB W/EXERC: HCPCS

## 2018-09-24 ENCOUNTER — HOSPITAL ENCOUNTER (OUTPATIENT)
Dept: CARDIAC REHAB | Age: 73
Setting detail: THERAPIES SERIES
Discharge: HOME | End: 2018-09-24
Payer: MEDICARE

## 2018-09-24 PROCEDURE — G0422 INTENS CARDIAC REHAB W/EXERC: HCPCS

## 2018-09-24 PROCEDURE — G0423 INTENS CARDIAC REHAB NO EXER: HCPCS

## 2018-09-24 NOTE — PROGRESS NOTES
Video Education Report - ICR/CR    Name:  Haroon Garcia     Date:  9/24/2018  MRN: [id number]     Session #:  21  Session Length: 40 min    Recommended Videos        []01 [employer] - Program Overview   34:22    []02 Overview of Pritikin Eating Plan   34:10    []03 Becoming a Anthony Angelucci   33:08     []04 Diseases of Our Time - Part 1   34:22    []05 Calorie Density     33:39   []06 Label Reading - Part 1    32:15   []07 Move it      32.54   []08 Healthy Minds, Bodies, Hearts   32:14   []09 Dining Out - Part 1    32:28   []10 Heart Disease Risk Reduction   09:13   [Q]45 Metabolic Syndrome and Belly Fat  31:52   []12 Facts on Fat     35:29   []13 Diseases of Our Time - Part 2   33:07   []14 Biology of Weight Control   32:36   []15 Biomechanical Limitations   35:20   []16 Nutrition Action Plan    34:23        Comments:  Video completed

## 2018-09-26 ENCOUNTER — HOSPITAL ENCOUNTER (OUTPATIENT)
Dept: CARDIAC REHAB | Age: 73
Setting detail: THERAPIES SERIES
Discharge: HOME | End: 2018-09-26
Payer: MEDICARE

## 2018-09-26 PROCEDURE — G0422 INTENS CARDIAC REHAB W/EXERC: HCPCS

## 2018-09-26 PROCEDURE — G0423 INTENS CARDIAC REHAB NO EXER: HCPCS

## 2018-09-26 RX ORDER — INSULIN DETEMIR 100 [IU]/ML
INJECTION, SOLUTION SUBCUTANEOUS
Qty: 105 ML | Refills: 3 | Status: ON HOLD | OUTPATIENT
Start: 2018-09-26 | End: 2018-11-06 | Stop reason: ALTCHOICE

## 2018-09-28 ENCOUNTER — HOSPITAL ENCOUNTER (OUTPATIENT)
Dept: CARDIAC REHAB | Age: 73
Setting detail: THERAPIES SERIES
Discharge: HOME | End: 2018-09-28
Payer: MEDICARE

## 2018-09-28 PROCEDURE — G0423 INTENS CARDIAC REHAB NO EXER: HCPCS

## 2018-09-28 PROCEDURE — G0422 INTENS CARDIAC REHAB W/EXERC: HCPCS

## 2018-10-01 ENCOUNTER — CARE COORDINATION (OUTPATIENT)
Dept: CARE COORDINATION | Age: 73
End: 2018-10-01

## 2018-10-01 ENCOUNTER — HOSPITAL ENCOUNTER (OUTPATIENT)
Dept: CARDIAC REHAB | Age: 73
Setting detail: THERAPIES SERIES
Discharge: HOME | End: 2018-10-01
Payer: MEDICARE

## 2018-10-01 PROCEDURE — G0422 INTENS CARDIAC REHAB W/EXERC: HCPCS

## 2018-10-01 PROCEDURE — G0423 INTENS CARDIAC REHAB NO EXER: HCPCS

## 2018-10-01 NOTE — PROGRESS NOTES
Video Education Report - ICR/CR    Name:  Jeovany Azevedo     Date:  10/1/2018  MRN: [id number]     Session #:  24  Session Length: 40 min    Recommended Videos        []01 [employer] - Program Overview   34:22    []02 Overview of Pritikin Eating Plan   34:10    []03 Becoming a Chalino Arciniegaa   33:08    []04 Diseases of Our Time - Part 1   34:22    []05 Calorie Density     33:39   []06 Label Reading - Part 1    32:15   []07 Move it      32.54   []08 Healthy Minds, Bodies, Hearts   32:14   []09 Dining Out - Part 1    32:28   []10 Heart Disease Risk Reduction   12:93   []89 Metabolic Syndrome and Belly Fat  31:52   [x]12 Facts on Fat     35:29   []13 Diseases of Our Time - Part 2   33:07   []14 Biology of Weight Control   32:36   []15 Biomechanical Limitations   35:20   []16 Nutrition Action Plan    34:23        Comments:  Video completed.

## 2018-10-02 ENCOUNTER — NURSE ONLY (OUTPATIENT)
Dept: UROLOGY | Age: 73
End: 2018-10-02
Payer: MEDICARE

## 2018-10-02 ENCOUNTER — TELEPHONE (OUTPATIENT)
Dept: UROLOGY | Age: 73
End: 2018-10-02

## 2018-10-02 VITALS — DIASTOLIC BLOOD PRESSURE: 58 MMHG | SYSTOLIC BLOOD PRESSURE: 112 MMHG

## 2018-10-02 DIAGNOSIS — R33.9 INCOMPLETE BLADDER EMPTYING: ICD-10-CM

## 2018-10-02 DIAGNOSIS — R97.20 ELEVATED PSA: Primary | ICD-10-CM

## 2018-10-02 LAB
BILIRUBIN URINE: NEGATIVE
BLOOD URINE, POC: NEGATIVE
CHARACTER, URINE: CLEAR
COLOR, URINE: YELLOW
GLUCOSE URINE: NEGATIVE MG/DL
KETONES, URINE: NEGATIVE
LEUKOCYTE CLUMPS, URINE: NEGATIVE
NITRITE, URINE: NEGATIVE
PH, URINE: 5.5
POST VOID RESIDUAL (PVR): 151 ML
PROTEIN, URINE: 30 MG/DL
SPECIFIC GRAVITY, URINE: 1.02 (ref 1–1.03)
UROBILINOGEN, URINE: 0.2 EU/DL

## 2018-10-02 PROCEDURE — 99999 PR OFFICE/OUTPT VISIT,PROCEDURE ONLY: CPT | Performed by: NURSE PRACTITIONER

## 2018-10-02 PROCEDURE — 81003 URINALYSIS AUTO W/O SCOPE: CPT | Performed by: NURSE PRACTITIONER

## 2018-10-02 PROCEDURE — 51798 US URINE CAPACITY MEASURE: CPT | Performed by: NURSE PRACTITIONER

## 2018-10-03 ENCOUNTER — APPOINTMENT (OUTPATIENT)
Dept: CARDIAC REHAB | Age: 73
End: 2018-10-03
Payer: MEDICARE

## 2018-10-03 ENCOUNTER — HOSPITAL ENCOUNTER (OUTPATIENT)
Dept: CARDIAC REHAB | Age: 73
Setting detail: THERAPIES SERIES
End: 2018-10-03
Payer: MEDICARE

## 2018-10-03 RX ORDER — LISINOPRIL 10 MG/1
TABLET ORAL
Qty: 180 TABLET | Refills: 1 | Status: ON HOLD | OUTPATIENT
Start: 2018-10-03 | End: 2018-11-09 | Stop reason: HOSPADM

## 2018-10-03 RX ORDER — NITROGLYCERIN 400 UG/1
SPRAY ORAL
Qty: 4.9 G | Refills: 6 | Status: SHIPPED | OUTPATIENT
Start: 2018-10-03 | End: 2019-12-20 | Stop reason: SDUPTHER

## 2018-10-05 ENCOUNTER — HOSPITAL ENCOUNTER (OUTPATIENT)
Dept: CARDIAC REHAB | Age: 73
Setting detail: THERAPIES SERIES
End: 2018-10-05
Payer: MEDICARE

## 2018-10-05 ENCOUNTER — APPOINTMENT (OUTPATIENT)
Dept: CARDIAC REHAB | Age: 73
End: 2018-10-05
Payer: MEDICARE

## 2018-10-08 ENCOUNTER — HOSPITAL ENCOUNTER (OUTPATIENT)
Dept: CARDIAC REHAB | Age: 73
Setting detail: THERAPIES SERIES
Discharge: HOME | End: 2018-10-08
Payer: MEDICARE

## 2018-10-08 PROCEDURE — G0423 INTENS CARDIAC REHAB NO EXER: HCPCS

## 2018-10-08 PROCEDURE — G0422 INTENS CARDIAC REHAB W/EXERC: HCPCS

## 2018-10-10 ENCOUNTER — HOSPITAL ENCOUNTER (OUTPATIENT)
Dept: CARDIAC REHAB | Age: 73
Setting detail: THERAPIES SERIES
Discharge: HOME | End: 2018-10-10
Payer: MEDICARE

## 2018-10-10 PROCEDURE — G0422 INTENS CARDIAC REHAB W/EXERC: HCPCS

## 2018-10-10 PROCEDURE — G0423 INTENS CARDIAC REHAB NO EXER: HCPCS

## 2018-10-12 ENCOUNTER — NURSE ONLY (OUTPATIENT)
Dept: UROLOGY | Age: 73
End: 2018-10-12
Payer: MEDICARE

## 2018-10-12 ENCOUNTER — HOSPITAL ENCOUNTER (OUTPATIENT)
Dept: CARDIAC REHAB | Age: 73
Setting detail: THERAPIES SERIES
Discharge: HOME | End: 2018-10-12
Payer: MEDICARE

## 2018-10-12 DIAGNOSIS — R33.9 INCOMPLETE EMPTYING OF BLADDER: Primary | ICD-10-CM

## 2018-10-12 LAB
BILIRUBIN URINE: NEGATIVE
BLOOD URINE, POC: NEGATIVE
CHARACTER, URINE: CLEAR
COLOR, URINE: YELLOW
GLUCOSE URINE: NEGATIVE MG/DL
KETONES, URINE: NEGATIVE
LEUKOCYTE CLUMPS, URINE: NEGATIVE
NITRITE, URINE: NEGATIVE
PH, URINE: 6
POST VOID RESIDUAL (PVR): 140 ML
PROTEIN, URINE: 30 MG/DL
SPECIFIC GRAVITY, URINE: 1.02 (ref 1–1.03)
UROBILINOGEN, URINE: 0.2 EU/DL

## 2018-10-12 PROCEDURE — 81003 URINALYSIS AUTO W/O SCOPE: CPT | Performed by: NURSE PRACTITIONER

## 2018-10-12 PROCEDURE — G0422 INTENS CARDIAC REHAB W/EXERC: HCPCS

## 2018-10-12 PROCEDURE — 51798 US URINE CAPACITY MEASURE: CPT | Performed by: NURSE PRACTITIONER

## 2018-10-12 NOTE — PROGRESS NOTES
Anesthesia Evaluation     .             ROS/MED HX    ENT/Pulmonary:  - neg pulmonary ROS   (+)Intermittent asthma , . .    Neurologic: Comment: T10 paraplegic - neg neurologic ROS     Cardiovascular:  - neg cardiovascular ROS       METS/Exercise Tolerance:     Hematologic:  - neg hematologic  ROS       Musculoskeletal:  - neg musculoskeletal ROS       GI/Hepatic:  - neg GI/hepatic ROS       Renal/Genitourinary:  - ROS Renal section negative       Endo:  - neg endo ROS       Psychiatric:  - neg psychiatric ROS       Infectious Disease:  - neg infectious disease ROS       Malignancy:      - no malignancy   Other:    - neg other ROS                 Physical Exam  Normal systems: cardiovascular, pulmonary and dental    Airway   Mallampati: II  TM distance: >3 FB  Neck ROM: full    Dental     Cardiovascular       Pulmonary                     Anesthesia Plan      History & Physical Review  History and physical reviewed and following examination; no interval change.    ASA Status:  2 .    NPO Status:  > 8 hours    Plan for MAC with Intravenous induction. Maintenance will be TIVA.  Reason for MAC:  Deep or markedly invasive procedure (G8)  PONV prophylaxis:  Ondansetron (or other 5HT-3) and Dexamethasone or Solumedrol       Postoperative Care  Postoperative pain management:  IV analgesics, Oral pain medications and Multi-modal analgesia.      Consents  Anesthetic plan, risks, benefits and alternatives discussed with:  Patient..                          .   Pt on hold for ~2 weeks due to recent stent placement x2.

## 2018-10-15 ENCOUNTER — HOSPITAL ENCOUNTER (OUTPATIENT)
Dept: CARDIAC REHAB | Age: 73
Setting detail: THERAPIES SERIES
Discharge: HOME | End: 2018-10-15
Payer: MEDICARE

## 2018-10-15 PROCEDURE — G0422 INTENS CARDIAC REHAB W/EXERC: HCPCS

## 2018-10-15 PROCEDURE — G0423 INTENS CARDIAC REHAB NO EXER: HCPCS

## 2018-10-15 NOTE — PLAN OF CARE
PLAN EXERCISE PLAN   *Interventions* *Interventions* *Interventions* *Interventions* *Interventions*   Exercise Prescription  (per physician & CR staff) Exercise Prescription  (per physician & CR staff) Exercise Prescription  (per physician & CR staff) Exercise Prescription  (per physician & CR staff) Exercise Prescription  (per physician & CR staff)   Cardiovascular Cardiovascular Cardiovascular Cardiovascular Cardiovascular   Mode:    [x] Treadmill (TM)  [x] Schwinn Airdyne (AD)  [x] Arms Ergometer (AE)  [x] NuStep  [] Elliptical (E) MODE:    [x] Treadmill (TM)  [x] Schwinn Airdyne (AD)  [x] Arms Ergometer (AE)  [x] NuStep  [] Elliptical (E) MODE:    [x] Treadmill (TM)  [] Schwinn Airdyne (AD)  [x] Arms Ergometer (AE)  [x] NuStep  [] Elliptical (E) MODE:    [x] Treadmill (TM)  [] Schwinn Airdyne (AD)  [x] Arms Ergometer (AE)  [x] NuStep  [] Elliptical (E) MODE:    [] Treadmill (TM)  [] Schwinn Airdyne (AD)  [] Arms Ergometer (AE)  [] NuStep  [] Elliptical (E)   Initial Workloads  TM: [email or web address] 2.1 METs  AD: 0.4 level = 1.9 METs  NS: 26  Saeed= 1.9 METs  AE: 0.2 level = 1.4 METs Current Workloads  TM: 1.6@ 0%=2.2  METs  NS:  28Watts= 1.9 METs  AE:0.4level = 1.6 METs Current Workloads  TM: 1.6 @0 %= 2.2 METs    NS: 28  Saeed= 1.9 METs  AE:16 Saeed = 1.7 METs Current Workloads  TM: 2.0 @0 %= 2.8 METs  NS: 34  Saeed=2.1  METs  AE:0.3  level = 1.4 METs Current Workloads  TM:  @ %=  METs  AD:  level =  METs  NS:   Saeed=  METs  AE:  level =  METs     Frequency:    ICR: 3x/week  Home: 2-3x/wk Frequency:   ICR: 3x/week  Home: 3x/wk Frequency:  ICR: 3x/week  Home: 3-4x/wk Frequency:  ICR: 3x/week  Home: 3-4x/wk Frequency:  Mechele Book will continue exercise at  5-7 days/week   Duration:   Total aerobic exercise = 20 min    5 min/mode Duration:  Total aerobic exercises = 27 min     5-12  min/mode Duration:  Total aerobic exercises = 22-28 min     10-12min/mode Duration:  Total aerobic exercises = 30 min     12 min/mode Flexibility at home   Beto Sanches 1723 verbalizes planning to walk 3-4 days/week for 10-20 min on days not in rehab. Home Exercise  *Wilfrid verbalizes planning to initiate home exercise on days not in rehab. Home Exercise  *Wilfrid verbalizes planning to walk 3-4 days/week for 10-20 min on days not in rehab. Home Exercise  *Wilfrid verbalizes planning to initiate exercise at home. Home Exercise  *Wilfrid verbalizes his/her plan to ** ** days/week for ** min @ **   *Education* *Education* *Education* *Education* *Education*   RPE Scale  [x] Yes      [] No  Exercise Safety  [x] Yes      [] No  Equipment Orientation  [x] Yes      [] No  S/S to Report  [x] Yes      [] No  Warm Up/Cool Down  [x] Yes      [] No  Home Exercise  [x] Yes      [] No    All Exercise Education Completed  [] Yes      [] No   *Goals* *Goals* *Goals* *Goals* *Goals*   Initial Exercise Goals Exercise Goals  Exercise Goals   Exercise Goals  Exercise Goals   Wilfrid plans to:  [x] Attend exercise sessions 3x/wk  [x] initiate home exercise 2-3x/wk for 10-20 min  [x] Increase 6 min walk distance by 10%  [] ** Wilfrid plans to:  [x] Attend exercise sessions 3x/wk  [x] continue home exercise 2-3x/wk for 20-30 min  [] ** Wilfrid's plans to:  [x] Attend exercise sessions 3x/wk  [x] continue home exercise 3-4x/wk for 30-45 min  [x] Determine plan of exercise following rehab  [] ** Wilfrid's plans to:  [x] Attend exercise sessions 3x/wk  [x] continue home exercise 3-4x/wk for 30-45 min  [x] Determine plan of exercise following rehab  [] **   Wilfrid achieved exercise goals?     []  Yes    [] No  If no, why?  **  [] Increased 6 min walk distance by 10%  [] Currently exercising 30-60 min/day, 5-7days/wk   [] Plans to continue exercise on own  [] Plans to join a local fitness center to continue exercise  [] Does not plan to continue to exercise after rehab   Return to ADL or Hobbies:  Yesenia Dey would like to improve strength and endurance so he is able to 8/3/18    * Weekend Breakfasts-8/10/18    Bettina Esparza once, Eat       Twice-8/17/18 Cooking School  Sessions Completed    9/14/18*Salads & Dressings     [facility]  Sessions Completed    Soups and sauces: 9/21/18    Simple sides: 9/28/18 Cooking [employer]    # of sessions completed:  **   *Goals* *Goals* *Goals* *Goals* *Goals*   Wilfrid's nutritional goals are as follows:  Complete and return diet survey Wilfrid's nutritional goals are as follows:  [x] Attend Nutrition Workshops  [x] Attend 1:1   [x] Attend Cooking Classes  [] ** Wilfrid's nutritional goals are as follows:  [x] Attend Nutrition Workshops  [x] Attend 1:1   [x] Attend Cooking Classes  [x] Complete and return diet survey  [] ** Wilfrid's nutritional goals are as follows:  [x] Attend Nutrition Workshops  [] Attend 1:1   [x] Attend Cooking Classes  [] ** Wilfrid achieved nutritional goals   [] Yes    [] No  If no, why?   Use knowledge gained to continue Pritikin eating plan at home       Individual Cardiac Treatment Plan - Psychosocial  PSYCHOSOCIAL  ASSESSMENT/PLAN PSYCHOSOCIAL  REASSESSMENT PSYCHOSOCIAL   REASSESSMENT PSYCHOSOCIAL   REASSESSMENT PSYCHOSOCIAL  DISCHARGE/FOLLOW-UP   Stages of Change Stages of Change Stages of Change Stages of Change Stages of Change   [x] Pre Contemplation  [] Contemplation  [] Preparation  [] Action  [] Maintenance  [] Relapse [] Pre Contemplation  [x] Contemplation  [] Preparation  [] Action  [] Maintenance  [] Relapse [] Pre Contemplation  [] Contemplation  [] Preparation  [] Action  [x] Maintenance  [] Relapse [] Pre Contemplation  [] Contemplation  [] Preparation  [] Action  [x] Maintenance  [] Relapse [] Pre Contemplation  [] Contemplation  [] Preparation  [] Action  [] Maintenance  [] Relapse   PSYCHOSOCIAL ASSESSMENT PSYCHOSOCIAL ASSESSMENT PSYCHOSOCIAL ASSESSMENT PSYCHOSOCIAL ASSESSMENT PSYCHOSOCIAL ASSESSMENT   Behavioral Outcomes Behavioral Outcomes Behavioral Outcomes Behavioral Outcomes Behavioral Outcomes   Tool

## 2018-10-17 ENCOUNTER — HOSPITAL ENCOUNTER (OUTPATIENT)
Dept: CARDIAC REHAB | Age: 73
Setting detail: THERAPIES SERIES
Discharge: HOME | End: 2018-10-17
Payer: MEDICARE

## 2018-10-17 PROCEDURE — G0423 INTENS CARDIAC REHAB NO EXER: HCPCS

## 2018-10-17 PROCEDURE — G0422 INTENS CARDIAC REHAB W/EXERC: HCPCS

## 2018-10-19 ENCOUNTER — HOSPITAL ENCOUNTER (OUTPATIENT)
Dept: CARDIAC REHAB | Age: 73
Setting detail: THERAPIES SERIES
Discharge: HOME | End: 2018-10-19
Payer: MEDICARE

## 2018-10-19 PROCEDURE — G0423 INTENS CARDIAC REHAB NO EXER: HCPCS

## 2018-10-19 PROCEDURE — G0422 INTENS CARDIAC REHAB W/EXERC: HCPCS

## 2018-10-19 NOTE — PROGRESS NOTES
Ephraim ROB.:  1945  Acct Number: [de-identified]  MRN:  [id number]                      [facility] COOKING SCHOOL WORKSHOP             Date: 10/19/2018        Session # ________     Jaylon Alvarenga class covered:      () Adding Flavor     () Fast Breakfasts     () Salads and Dressings     () Soups and Sauces     () Simple Sides     () Appetizers and Snacks     () Delicious Desserts     (x) Plant Based Proteins     () Fast Evening Meals     () Weekend Breakfasts     () Efficiency Cooking     () Meat Alternatives     Patients were shown how to choose, prep, and cook; substitutions and other options were given. Samples were offered. Recipes were given and questions answered. The patient above was in the [employer] for 60 minutes.       Electronically signed by Zain Osborne on 10/19/2018 at 3:32 PM

## 2018-10-22 ENCOUNTER — HOSPITAL ENCOUNTER (OUTPATIENT)
Dept: CARDIAC REHAB | Age: 73
Setting detail: THERAPIES SERIES
Discharge: HOME | End: 2018-10-22
Payer: MEDICARE

## 2018-10-22 PROCEDURE — G0423 INTENS CARDIAC REHAB NO EXER: HCPCS

## 2018-10-22 PROCEDURE — G0422 INTENS CARDIAC REHAB W/EXERC: HCPCS

## 2018-10-22 NOTE — PROGRESS NOTES
Video Education Report - ICR/CR    Name:  Jeovany Azevedo     Date:  10/22/2018  MRN: [id number]     Session #:  32  Session Length: 40 min    Recommended Videos        []01 [employer] - Program Overview   34:22    []02 Overview of Pritikin Eating Plan   34:10    []03 Becoming a Chalino Mola   33:08     []04 Diseases of Our Time - Part 1   34:22    []05 Calorie Density     33:39   []06 Label Reading - Part 1    32:15   []07 Move it      32.54   []08 Healthy Minds, Bodies, Hearts   32:14   []09 Dining Out - Part 1    32:28   []10 Heart Disease Risk Reduction   40:38   []22 Metabolic Syndrome and Belly Fat  31:52   []12 Facts on Fat     35:29   []13 Diseases of Our Time - Part 2   33:07   []14 Biology of Weight Control   32:36   []15 Biomechanical Limitations   35:20   [x]16 Nutrition Action Plan    34:23        Comments:  Video completed

## 2018-10-24 ENCOUNTER — HOSPITAL ENCOUNTER (OUTPATIENT)
Dept: CARDIAC REHAB | Age: 73
Setting detail: THERAPIES SERIES
Discharge: HOME | End: 2018-10-24
Payer: MEDICARE

## 2018-10-24 ENCOUNTER — CARE COORDINATION (OUTPATIENT)
Dept: CARE COORDINATION | Age: 73
End: 2018-10-24

## 2018-10-24 PROCEDURE — G0423 INTENS CARDIAC REHAB NO EXER: HCPCS

## 2018-10-24 PROCEDURE — G0422 INTENS CARDIAC REHAB W/EXERC: HCPCS

## 2018-10-26 ENCOUNTER — HOSPITAL ENCOUNTER (OUTPATIENT)
Dept: CARDIAC REHAB | Age: 73
Setting detail: THERAPIES SERIES
Discharge: HOME | End: 2018-10-26
Payer: MEDICARE

## 2018-10-26 PROCEDURE — G0423 INTENS CARDIAC REHAB NO EXER: HCPCS

## 2018-10-26 PROCEDURE — G0422 INTENS CARDIAC REHAB W/EXERC: HCPCS

## 2018-10-26 NOTE — PROGRESS NOTES
Gaudencio BERMUDEZO.B.:  1945  Acct Number: [de-identified]  MRN:  [id number]                      [facility] COOKING SCHOOL WORKSHOP             Date: 10/26/2018        Session # ________     [employer] class covered:      () Adding Flavor     () Fast Breakfasts     () Salads and Dressings     () Soups and Sauces     () Simple Sides     () Appetizers and Snacks     () Delicious Desserts     () Plant Based Proteins     (X) Fast Evening Meals     () Weekend Breakfasts     () Efficiency Cooking     () Meat Alternatives     Patients were shown how to choose, prep, and cook; substitutions and other options were given. Samples were offered. Recipes were given and questions answered. The patient above was in the [employer] for 40 minutes.       Electronically signed by Jose Gallegos RD, LD on 10/26/2018 at 2:59 PM

## 2018-10-29 ENCOUNTER — HOSPITAL ENCOUNTER (OUTPATIENT)
Dept: CARDIAC REHAB | Age: 73
Setting detail: THERAPIES SERIES
Discharge: HOME | End: 2018-10-29
Payer: MEDICARE

## 2018-10-29 PROCEDURE — G0423 INTENS CARDIAC REHAB NO EXER: HCPCS

## 2018-10-29 PROCEDURE — G0422 INTENS CARDIAC REHAB W/EXERC: HCPCS

## 2018-10-29 NOTE — PROGRESS NOTES
Hospital Facility-Based Program  Phase 2 Cardiac Rehab Weekly Progress Report      Patient prescribed exercise:  2:00 class. 3 times per week in rehab, 1-4 times per week at home for the amount of sessions/weeks specified by insurance. Current Levels: NuStep:  45 Saeed for 15 minutes, UBE: Level 20W for 5 minutes. Progression Discussion: Maintain/Increase Aerobic exercise 15 minutes to work on endurance. Attempt to increase intensity by 5-20% for each modality this week. Try to increase intensities until Wilfrid rates the exercises a 13-17 on Gunner RPE.

## 2018-10-31 ENCOUNTER — HOSPITAL ENCOUNTER (OUTPATIENT)
Dept: CARDIAC REHAB | Age: 73
Setting detail: THERAPIES SERIES
Discharge: HOME | End: 2018-10-31
Payer: MEDICARE

## 2018-10-31 PROCEDURE — G0422 INTENS CARDIAC REHAB W/EXERC: HCPCS

## 2018-10-31 PROCEDURE — G0423 INTENS CARDIAC REHAB NO EXER: HCPCS

## 2018-11-02 ENCOUNTER — HOSPITAL ENCOUNTER (OUTPATIENT)
Dept: CARDIAC REHAB | Age: 73
Setting detail: THERAPIES SERIES
Discharge: HOME | End: 2018-11-02
Payer: MEDICARE

## 2018-11-02 VITALS — WEIGHT: 213 LBS | BODY MASS INDEX: 33.43 KG/M2 | HEIGHT: 67 IN

## 2018-11-02 NOTE — PLAN OF CARE
EXERCISE PLAN EXERCISE PLAN EXERCISE PLAN EXERCISE PLAN EXERCISE PLAN   *Interventions* *Interventions* *Interventions* *Interventions* *Interventions*   Exercise Prescription  (per physician & CR staff) Exercise Prescription  (per physician & CR staff) Exercise Prescription  (per physician & CR staff) Exercise Prescription  (per physician & CR staff) Exercise Prescription  (per physician & CR staff)   Cardiovascular Cardiovascular Cardiovascular Cardiovascular Cardiovascular   Mode:    [x] Treadmill (TM)  [x] Schwinn Airdyne (AD)  [x] Arms Ergometer (AE)  [x] NuStep  [] Elliptical (E) MODE:    [x] Treadmill (TM)  [x] Schwinn Airdyne (AD)  [x] Arms Ergometer (AE)  [x] NuStep  [] Elliptical (E) MODE:    [x] Treadmill (TM)  [] Schwinn Airdyne (AD)  [x] Arms Ergometer (AE)  [x] NuStep  [] Elliptical (E) MODE:    [x] Treadmill (TM)  [] Schwinn Airdyne (AD)  [x] Arms Ergometer (AE)  [x] NuStep  [] Elliptical (E) MODE:    [] Treadmill (TM)  [] Schwinn Airdyne (AD)  [x] Arms Ergometer (AE)  [x] NuStep  [] Elliptical (E)   Initial Workloads  TM: [email or web address] 2.1 METs  AD: 0.4 level = 1.9 METs  NS: 26  Saeed= 1.9 METs  AE: 0.2 level = 1.4 METs Current Workloads  TM: 1.6@ 0%=2.2  METs  NS:  28Watts= 1.9 METs  AE:0.4level = 1.6 METs Current Workloads  TM: 1.6 @0 %= 2.2 METs    NS: 28  Saeed= 1.9 METs  AE:16 Saeed = 1.7 METs Current Workloads  TM: 2.0 @0 %= 2.8 METs  NS: 34  Saeed=2.1  METs  AE:0.3  level = 1.4 METs Current Workloads  NS: 40  Saeed= 2.2 METs  AE: 20 Saeed = 1.8 METs     Frequency:    ICR: 3x/week  Home: 2-3x/wk Frequency:   ICR: 3x/week  Home: 3x/wk Frequency:  ICR: 3x/week  Home: 3-4x/wk Frequency:  ICR: 3x/week  Home: 3-4x/wk Frequency:  Melanee Deiters will continue exercise at  5-7 days/week   Duration:   Total aerobic exercise = 20 min    5 min/mode Duration:  Total aerobic exercises = 27 min     5-12  min/mode Duration:  Total aerobic exercises = 22-28 min     10-12min/mode Duration:  Total aerobic exercises = 30 Behavioral Outcomes Behavioral Outcomes Behavioral Outcomes Behavioral Outcomes Behavioral Outcomes   Tool Used:  Gilda Colin, Quality of Life Index, Cardiac Version IV  *Given to patient to complete. Tool Used:    Gilda & Cristi, Quality of Life Index, Cardiac Version IV     Pt did not return   Tool Used:     Gilda & Cristi, Quality of Life Index, Cardiac Version IV    QOL Index Score: **  HF:**  S&E:**  P&S: **  Family: **   PHQ-9 score 1  Depression Severity  [x]Minimal  []Mild   []Moderate  []Moderately Severe  []Severe    PHQ-9 score **  Depression Severity  []Minimal  []Mild   []Moderate  []Moderately Severe []Severe   Does patient have Family Support? [x] Yes      [] No  No signs of marital/family distress       Within the Past Month:  *Have you wished you were dead or wished you could go to sleep and not wake up? [] Yes      [x] No  *Have you had any thoughts of killing yourself? [] Yes      [x] No         Using a scale of 0-10, 0=none, 10=very:   Rate your depression: 0  Rate your anxiety:  0  Using a scale of 0-10, 0=none, 10=very:   Rate your depression: 0  Rate your anxiety: 0 Using a scale of 0-10, 0=none, 10=very:   Rate your depression: 0  Rate your anxiety:  5 Using a scale of 0-10, 0=none, 10=very:   Rate your depression: 3  Rate your anxiety:  0 Using a scale of 0-10, 0=none, 10=very:   Rate your depression: 0  Rate your anxiety:  0   Signs and Symptoms of Depression Present? [x] Yes      [] No  If yes, please explain:  Poor eye contact, difficulty concentrating , slow to answer questions Signs and Symptoms of Depression Present? [x] Yes      [] No  If yes, please explain:  See prior note Signs and Symptoms of Depression Present? [] Yes      [x] No  If yes, please explain:  ** Signs and Symptoms of Depression Present? [x] Yes      [] No  If yes, please explain:  See prior note. Signs and Symptoms of Depression Present?     [x] Yes      [] No  If yes, please explain:  See prior note. No changes. Signs and Symptoms of Anxiety Present? [] Yes      [x] No   Signs and Symptoms of Anxiety Present? [] Yes      [x] No   Signs and Symptoms of Anxiety Present? [] Yes      [x] No  If yes, please explain:  ** Signs and Symptoms of Anxiety Present? [x] Yes      [] No  If yes, please explain:  Pt inconsistent with self report of anxiety and depression levels. Pt not willing to discuss it with staff. Signs and Symptoms of Anxiety Present? [x] Yes      [] No  If yes, please explain:  See prior note. [x] Patient has poor eye contact   [x] Flat affect present. [] Signs of anxiety, anger or hostility    [] Signs social isolation present. []  Signs of alcohol or substance abuse       PSYCHOSOCIAL PLAN PSYCHOSOCIAL PLAN PSYCHOSOCIAL PLAN PSYCHOSOCIAL PLAN PSYCHOSOCIAL PLAN   *Interventions* *Interventions* *Interventions* *Interventions* *Interventions*   *Please check if needed  [] Psych Consult  [] Physician Referral  [x] Stress Management Skills *Please check if needed  [] Psych Consult  [] Physician Referral  [x] Stress Management Skills *Please check if needed  [] Psych Consult  [] Physician Referral  [x] Stress Management Skills *Please check if needed  [] Psych Consult  [] Physician Referral  [x] Stress Management Skills *Please check if needed  [] Psych Consult  [] Physician Referral  [x] Stress Management Skills   Is patient currently taking anti-depressant or anti-anxiety medications? [] Yes      [x] No   Change in anti-depressant or anti-anxiety medications? [] Yes      [x] No   Change in anti-depressant or anti-anxiety medications? [] Yes      [x] No  If yes, please list medications:  ** Change in anti-depressant or anti-anxiety medications? [] Yes      [x] No   Change in anti-depressant or anti-anxiety medications?   [] Yes      [x] No     *Education* *Education* *Education* *Education* *Education*   Healthy Mind-Set Workshops Recommended  [x] Stress & Cessation/Relaspe Prevention Intervention needed? [] Yes      [x] No   Smoking Cessation/Relapse Prevention Scheduled? [] Yes      [x] No       Smoking Cessation Counseling attended  na   Professional Referrals:  *Please check if needed  [] Diabetes Clinic  [] Lipid Clinic   [] Other:     Professional Referrals:  *Please check if needed  [] Diabetes Clinic  [] Lipid Clinic   [] Other:   Preventative Medication Preventative Medication Preventative Medication Preventative Medication Preventative Medication   Aspirin  [x] Yes    [] No  Blood Thinner: Clopidogrel/Effient/Brillinta/Eliquis  [x] Yes    [] No  Beta Blocker  [x] Yes    [] No  Ace Inhibitor  [x] Yes    [] No  Statin/Lipid Lowering  [x] Yes    [] No Medication Changes? [] Yes    [x] No Medication Changes? [] Yes    [x] No Medication Changes? [] Yes    [x] No Medication Changes? [] Yes    [x] No   *Education* *Education* *Education* *Education* *Education*   Does Wilfrid require any additional education? [] Yes    [x] No   Does Wilfrid require any additional education? [] Yes    [x] No Does Wilfrid require any additional education? [] Yes    [x] No Does Wilfrid require any additional education? [] Yes    [x] No Does Wilfrid require any additional education?   [] Yes    [x] No   Recommended Additional Educational Videos    Medical  [] Hypertension & Heart Disease  [] Decoding Lab Results  [] Aging Enhancing Your QoL  [] Sleep Disorders  Exercise  [] Body Composition  [] Improve Performance  [] Exercise Action Plan  [] Intro to Yoga  Behavioral  [] How Our Thoughts Can Heal Our Hearts  [] Smoking Cessation  Nutrition  [] Planning Your Eating Strategy  [] Lable Reading- Part 2  [] Dining Out - Part 2  [] Targeting Your Nutrition Priorities  [] Fueling a Healthy Body  [] Menu Workshop  [employer] [] Breakfast & Snacks  [] [employer] Salads & Dressing  [] [address] [place] [address]  [] Soups & Desserts   Additional Educational Videos Completed Additional

## 2018-11-02 NOTE — PLAN OF CARE
[facility] Program - 58 TDCICQS  [id number] Facility-Based Program  Individualized Cardiac Treatment Plan    Patient Name:  Amy Gomes  :  1945  Age:  68 y.o. MRN:  [id number]  Diagnosis: PCI: RCA x 5  Date of Event: 18   Physician:  Lucrecia Aguilar Office Visit:    Date Entered Program: 18  Risk Stratifications: [] Low [] Intermediate [x] High  Allergies: No Known Allergies    Individual Cardiac Treatment Plan -EXERCISE  INITIAL 30 DAY 60 DAY 90 DAY FINAL DAY   EXERCISE  ASSESSMENT/PLAN EXERCISE  REASSESSMENT EXERCISE   REASSESSMENT EXERCISE   REASSESSMENT EXERCISE  DISCHARGE/FOLLOW-UP   Date: 18 Date: 18 Date: 18 Date: 10/15/18 Date: 18   Session #1 Session # 25 Session # 32 Session # 46 Session # 67  Last session completed on 18   Stages of Change Stages of Change Stages of Change Stages of Change Stages of Change   [] Pre Contemplation  [x] Contemplation  [] Preparation  [] Action  [] Maintenance  [] Relapse [] Pre Contemplation  [x] Contemplation  [] Preparation  [] Action  [] Maintenance  [] Relapse [] Pre Contemplation  [x] Contemplation  [] Preparation  [] Action  [] Maintenance  [] Relapse [] Pre Contemplation  [x] Contemplation  [] Preparation  [] Action  [] Maintenance  [] Relapse [] Pre Contemplation  [x] Contemplation  [] Preparation  [] Action  [] Maintenance  [] Relapse   EXERCISE ASSESSMENT EXERCISE ASSESSMENT EXERCISE ASSESSMENT EXERCISE ASSESSMENT EXERCISE ASSESSMENT   6 Min Walk Test  Distance walked:   0.12 miles  633.6 ft.  1.9 METs  Max HR:77 BPM      RPE:  12    THR:    Rhythm:  NSR with occ PAC's    6 min walk test not completed d/t pt having severe chest pain while walking. DASI: 8.9 METS DASI: 6.67 METS DASI: 6.11METS DASI: 7.4 METS    Return to Work  [employer] on returning to work?    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 [x] Yes    [] No      Quynh Edwards is doing well at work. Return to work: Has Quynh Edwards returned to work? [x] Yes    [] No     Return to work: Has Quynh Edwards returned to work? [x] Yes    [] No        Quynh Edwards is doing well at work. Return to work: Has Quynh Edwards returned to work? [x] Yes    [] No      *Required MET Level achieved for job duties? [x] Yes    [] No   Orthopedic Limitations/  [x] Yes    [] No  If yes please list:  Hip pain     Orthopedic Limitations  *If patient has orthopedic issue:   Actions/  accomodations needed to make Wilfrid successful : increase aerobic activity at a slow, steady pace Orthopedic Limitations  No AD   Orthopedic Limitations   Orthopedic Limitations     Fall Risk  Fall risk assessed? [x] Yes      [] No    Balance Issues? [x] Yes      [] No     [] Walker [] Cane    [x] Safety issues reviewed      Fall Risk  *If patient is a fall risk, action needed to accommodate:increase aerobic activity at a slow, steady pace. Take necessary rest break. Fall Risk na Fall Risk Fall Risk   Home Exercise  [x] Yes    [] No  Type: walking  Frequency: 4 x per week  Duration: 10 min Home Exercise  [] Yes    [x] No   Home Exercise  [] Yes    [x] No   Home Exercise  [] Yes    [x] No   Home Exercise  [] Yes    [x] No     Angina with Activity? [] Yes    [x] No  Angina Management: na Angina with Activity? [x] Yes    [] No  Angina Management: rest, nitro Angina with Activity? [x] Yes    [] No  Angina Management: nitro  Pt pre-treats with nitro at cardiac rehab. Angina with Activity? [x] Yes    [] No  Angina Management: pre treats with nitro before cardiac rehab session. Angina with Activity? [x] Yes    [] No  Angina Management: pre-treat with nitro before exercise, and treat c/p with nitro. When pt has c/p during rehab staff does not feel he is being honest when rating his pain. Breakfasts  *Cook once, Eat       twice [employer]  Sessions Completed    *Fast Evening Meals- 8/3/18    * Weekend Breakfasts-8/10/18    [employer] once, Eat       Twice-8/17/18 [employer]  Sessions Completed    9/14/18*Salads & Dressings     [facility]  Sessions Completed    Soups and sauces: 9/21/18    Simple sides: 9/28/18 [employer]    # of sessions completed:  10   *Goals* *Goals* *Goals* *Goals* *Goals*   Wilfrid's nutritional goals are as follows:  Complete and return diet survey Wilfrid's nutritional goals are as follows:  [x] Attend Nutrition Workshops  [x] Attend 1:1   [x] Attend Cooking Classes  [] ** Wilfrid's nutritional goals are as follows:  [x] Attend Nutrition Workshops  [x] Attend 1:1   [x] Attend Cooking Classes  [x] Complete and return diet survey  [] ** Wilfrid's nutritional goals are as follows:  [x] Attend Nutrition Workshops  [] Attend 1:1   [x] Attend Cooking Classes  [] ** Wilfrid achieved nutritional goals   [x] Yes    [] No  If no, why?   Use knowledge gained to continue Pritikin eating plan at home       Individual Cardiac Treatment Plan - Psychosocial  PSYCHOSOCIAL  ASSESSMENT/PLAN PSYCHOSOCIAL  REASSESSMENT PSYCHOSOCIAL   REASSESSMENT PSYCHOSOCIAL   REASSESSMENT PSYCHOSOCIAL  DISCHARGE/FOLLOW-UP   Stages of Change Stages of Change Stages of Change Stages of Change Stages of Change   [x] Pre Contemplation  [] Contemplation  [] Preparation  [] Action  [] Maintenance  [] Relapse [] Pre Contemplation  [x] Contemplation  [] Preparation  [] Action  [] Maintenance  [] Relapse [] Pre Contemplation  [] Contemplation  [] Preparation  [] Action  [x] Maintenance  [] Relapse [] Pre Contemplation  [] Contemplation  [] Preparation  [] Action  [x] Maintenance  [] Relapse [] Pre Contemplation  [] Contemplation  [] Preparation  [] Action  [x] Maintenance  [] Relapse   PSYCHOSOCIAL ASSESSMENT PSYCHOSOCIAL ASSESSMENT PSYCHOSOCIAL ASSESSMENT PSYCHOSOCIAL ASSESSMENT PSYCHOSOCIAL ASSESSMENT prior note. No changes. Signs and Symptoms of Anxiety Present? [] Yes      [x] No   Signs and Symptoms of Anxiety Present? [] Yes      [x] No   Signs and Symptoms of Anxiety Present? [] Yes      [x] No  If yes, please explain:  ** Signs and Symptoms of Anxiety Present? [x] Yes      [] No  If yes, please explain:  Pt inconsistent with self report of anxiety and depression levels. Pt not willing to discuss it with staff. Signs and Symptoms of Anxiety Present? [x] Yes      [] No  If yes, please explain:  See prior note. [x] Patient has poor eye contact   [x] Flat affect present. [] Signs of anxiety, anger or hostility    [] Signs social isolation present. []  Signs of alcohol or substance abuse       PSYCHOSOCIAL PLAN PSYCHOSOCIAL PLAN PSYCHOSOCIAL PLAN PSYCHOSOCIAL PLAN PSYCHOSOCIAL PLAN   *Interventions* *Interventions* *Interventions* *Interventions* *Interventions*   *Please check if needed  [] Psych Consult  [] Physician Referral  [x] Stress Management Skills *Please check if needed  [] Psych Consult  [] Physician Referral  [x] Stress Management Skills *Please check if needed  [] Psych Consult  [] Physician Referral  [x] Stress Management Skills *Please check if needed  [] Psych Consult  [] Physician Referral  [x] Stress Management Skills *Please check if needed  [] Psych Consult  [] Physician Referral  [x] Stress Management Skills   Is patient currently taking anti-depressant or anti-anxiety medications? [] Yes      [x] No   Change in anti-depressant or anti-anxiety medications? [] Yes      [x] No   Change in anti-depressant or anti-anxiety medications? [] Yes      [x] No  If yes, please list medications:  ** Change in anti-depressant or anti-anxiety medications? [] Yes      [x] No   Change in anti-depressant or anti-anxiety medications?   [] Yes      [x] No     *Education* *Education* *Education* *Education* *Education*   Healthy Mind-Set Workshops Recommended  [x] Stress & Health  [x] Taking Charge of Stress  [x] New Thoughts, New Behaviors  [x] Managing Moods & Relationships Healthy Mind-Set Workshops Attended/Date  [x] New Thoughts, New Behaviors-8/22/18    [x] Managing Moods & Relationships-8/1/18 Healthy Mind-Set Workshops Attended/Date    9/12/18Stress & Health Healthy Mind-Set Workshops Attended/Date    [x] Taking Charge of Stress 10/3/18      Healthy Mind-Set Workshops  Completed  [x] Yes      [] No   *Goals* *Goals* *Goals* *Goals* *Goals*   Wilfrid's psychosocial goals are as follows:  Complete HADS & Gilda Colin Quality of Life Index, Cardiac Version IV Wilfrid's psychosocial goals are as follows:  [x] Attend Healthy Mind-Set Workshops  [x] Reduce depression symptom severity by 1 level  [] ** Wilfrid's psychosocial goals are as follows:  [x] Attend Healthy Mind-Set Workshops  [x] Reduce depression symptom severity by 1 level  [] ** Wilfrid's psychosocial goals are as follows:  [x] Attend Healthy Mind-Set Workshops  [x] Reduce depression symptom severity by 1 level  [] ** Wilfrid achieved psychosocial goals?   [x] Yes    [] No    [] Use methods learned to continue to reduce depression symptom severity by 1 level  [] **     Individual Cardiac Treatment Plan - Other:  Risk Factor/Education  RISK FACTOR  ASSESSMENT/PLAN RISK FACTOR  REASSESSMENT  RISK FACTOR  REASSESSMENT RISK FACTOR  REASSESSMENT RISK FACTOR   DISCHARGE/FOLLOW-UP   Stages of Change Stages of Change Stages of Change Stages of Change Stages of Change   [] Pre Contemplation  [x] Contemplation  [] Preparation  [] Action  [] Maintenance  [] Relapse [] Pre Contemplation  [] Contemplation  [] Preparation  [x] Action  [] Maintenance  [] Relapse [] Pre Contemplation  [x] Contemplation  [] Preparation  [] Action  [] Maintenance  [] Relapse [] Pre Contemplation  [x] Contemplation  [] Preparation  [] Action  [] Maintenance  [] Relapse [] Pre Contemplation  [x] Contemplation  [] Preparation  [] Action  [] [] Yes      [x] No     Tobacco Use  Change in smoking status   [] Yes      [x] No                Learning Barriers  Please select one:  [] Speech  [] Literacy  [x] Hearing  [x] Cognitive ? [] Vision  [x] Ready to Learn Learning Barriers Addressed:   [x] Yes      [] No   Learning Barriers Addressed:   [x] Yes      [] No   Learning Barriers Addressed:  [x] Yes      [] No Learning Barriers Addressed:  [] Yes      [x] No     RISK FACTOR/EDUCATION PLAN RISK FACTOR/EDUCATION PLAN RISK FACTOR/EDUCATION PLAN RISK FACTOR/EDUCATION PLAN RISK FACTOR/EDUCATION PLAN   *Interventions* *Interventions* *Interventions* *Interventions* *Interventions*   Recommended Educational Videos    [x] Overview of The Pritikin Eating Plan  [x] Becoming A Pritikin   [x] Diseases of Our Time-Part 1  [x] Calorie Density  [x] Label Reading-Part 1  [x] Move It! [x] Healthy Minds, Bodies, Hearts  [x] Dining Out-Part 1  [x] Heart Disease Risk Reduction  [x] Metabolic Syndrome & Belly Fat  [x] Facts on Fat  [x] Diseases of Our Times-Part 2  [x] Biology of Weight Control  [x] Biomechanical Limitations  [x] Nurtition Action Plan   Completed Videos      7/24/18  Overview of The Pritikin Eating Plan    [x] Diseases of Our Time-Part 1-8/6/18    [x] Calorie Density-8/13/18    [x] Label Reading-Part 1-8/20/18    [x] Heart Disease Risk Reduction-7/30/18 Completed Videos    9/17/18[x] Move It!     9/10/18Healthy Minds, Bodies, Hearts Completed Videos    9/10 [x] Healthy Minds, Bodies, Hearts    9/19/18[x] Dining Out-Part 1    2/30/78 [x] Metabolic Syndrome & Belly Fat    10/1/18   [x] Facts on Fat    10/8/18 [x] Diseases of Our Times-Part 2    10/10/18 [x] Biology of Weight Control    10/15/18 Biomechanical Limitations    10/22 Nutrition Action Plan    10/31/18 How our thoughts can heal our hearts    10/29/18 Becoming a pritikin  Recommended Educational Videos Completed    [x] Yes      [] No    **If not completed, Why? **          Smoking

## 2018-11-05 ENCOUNTER — APPOINTMENT (OUTPATIENT)
Dept: GENERAL RADIOLOGY | Age: 73
DRG: 286 | End: 2018-11-05
Payer: MEDICARE

## 2018-11-05 ENCOUNTER — HOSPITAL ENCOUNTER (INPATIENT)
Age: 73
LOS: 3 days | Discharge: HOME | DRG: 286 | End: 2018-11-09
Attending: EMERGENCY MEDICINE | Admitting: INTERNAL MEDICINE
Payer: MEDICARE

## 2018-11-05 DIAGNOSIS — J45.909 ASTHMA, UNSPECIFIED ASTHMA SEVERITY, UNSPECIFIED WHETHER COMPLICATED, UNSPECIFIED WHETHER PERSISTENT: ICD-10-CM

## 2018-11-05 DIAGNOSIS — R06.02 SHORTNESS OF BREATH: ICD-10-CM

## 2018-11-05 DIAGNOSIS — R65.10 SIRS DUE TO NON-INFECTIOUS PROCESS WITHOUT ACUTE ORGAN DYSFUNCTION (HCC): Primary | ICD-10-CM

## 2018-11-05 DIAGNOSIS — F41.9 ANXIETY: ICD-10-CM

## 2018-11-05 LAB
ANION GAP SERPL CALCULATED.3IONS-SCNC: 15 MEQ/L (ref 8–16)
BACTERIA: ABNORMAL /HPF
BASOPHILS # BLD: 0.2 %
BASOPHILS ABSOLUTE: 0 THOU/MM3 (ref 0–0.1)
BILIRUBIN URINE: NEGATIVE
BLOOD, URINE: NEGATIVE
BUN BLDV-MCNC: 15 MG/DL (ref 7–22)
CALCIUM SERPL-MCNC: 9.1 MG/DL (ref 8.5–10.5)
CASTS 2: ABNORMAL /LPF
CASTS UA: ABNORMAL /LPF
CHARACTER, URINE: CLEAR
CHLORIDE BLD-SCNC: 102 MEQ/L (ref 98–111)
CO2: 21 MEQ/L (ref 23–33)
COLOR: YELLOW
CREAT SERPL-MCNC: 1.1 MG/DL (ref 0.4–1.2)
CRYSTALS, UA: ABNORMAL
EKG ATRIAL RATE: 98 BPM
EKG P AXIS: 88 DEGREES
EKG P-R INTERVAL: 176 MS
EKG Q-T INTERVAL: 376 MS
EKG QRS DURATION: 116 MS
EKG QTC CALCULATION (BAZETT): 480 MS
EKG R AXIS: 71 DEGREES
EKG T AXIS: 51 DEGREES
EKG VENTRICULAR RATE: 98 BPM
EOSINOPHIL # BLD: 1.4 %
EOSINOPHILS ABSOLUTE: 0.2 THOU/MM3 (ref 0–0.4)
EPITHELIAL CELLS, UA: ABNORMAL /HPF
ERYTHROCYTE [DISTWIDTH] IN BLOOD BY AUTOMATED COUNT: 13.2 % (ref 11.5–14.5)
ERYTHROCYTE [DISTWIDTH] IN BLOOD BY AUTOMATED COUNT: 46.1 FL (ref 35–45)
GFR SERPL CREATININE-BSD FRML MDRD: 65 ML/MIN/1.73M2
GLUCOSE BLD-MCNC: 170 MG/DL (ref 70–108)
GLUCOSE URINE: NEGATIVE MG/DL
HCT VFR BLD CALC: 29.3 % (ref 42–52)
HEMOGLOBIN: 9.6 GM/DL (ref 14–18)
IMMATURE GRANS (ABS): 0.06 THOU/MM3 (ref 0–0.07)
IMMATURE GRANULOCYTES: 0.5 %
INR BLD: 1.12 (ref 0.85–1.13)
KETONES, URINE: NEGATIVE
LACTIC ACID: 2.8 MMOL/L (ref 0.5–2.2)
LEUKOCYTE ESTERASE, URINE: NEGATIVE
LYMPHOCYTES # BLD: 7.4 %
LYMPHOCYTES ABSOLUTE: 0.9 THOU/MM3 (ref 1–4.8)
MCH RBC QN AUTO: 31.5 PG (ref 26–33)
MCHC RBC AUTO-ENTMCNC: 32.8 GM/DL (ref 32.2–35.5)
MCV RBC AUTO: 96.1 FL (ref 80–94)
MISCELLANEOUS 2: ABNORMAL
MONOCYTES # BLD: 8.9 %
MONOCYTES ABSOLUTE: 1.1 THOU/MM3 (ref 0.4–1.3)
NITRITE, URINE: NEGATIVE
NUCLEATED RED BLOOD CELLS: 0 /100 WBC
OSMOLALITY CALCULATION: 280.5 MOSMOL/KG (ref 275–300)
PH UA: 7
PLATELET # BLD: 271 THOU/MM3 (ref 130–400)
PMV BLD AUTO: 9.6 FL (ref 9.4–12.4)
POTASSIUM REFLEX MAGNESIUM: 4 MEQ/L (ref 3.5–5.2)
PRO-BNP: 1125 PG/ML (ref 0–900)
PROTEIN UA: 100
RBC # BLD: 3.05 MILL/MM3 (ref 4.7–6.1)
RBC URINE: ABNORMAL /HPF
RENAL EPITHELIAL, UA: ABNORMAL
SEG NEUTROPHILS: 81.6 %
SEGMENTED NEUTROPHILS ABSOLUTE COUNT: 10.3 THOU/MM3 (ref 1.8–7.7)
SODIUM BLD-SCNC: 138 MEQ/L (ref 135–145)
SPECIFIC GRAVITY, URINE: 1.02 (ref 1–1.03)
TROPONIN T: 0.01 NG/ML
UROBILINOGEN, URINE: 0.2 EU/DL
WBC # BLD: 12.6 THOU/MM3 (ref 4.8–10.8)
WBC UA: ABNORMAL /HPF
YEAST: ABNORMAL

## 2018-11-05 PROCEDURE — 99285 EMERGENCY DEPT VISIT HI MDM: CPT

## 2018-11-05 PROCEDURE — 36415 COLL VENOUS BLD VENIPUNCTURE: CPT

## 2018-11-05 PROCEDURE — 81001 URINALYSIS AUTO W/SCOPE: CPT

## 2018-11-05 PROCEDURE — 80048 BASIC METABOLIC PNL TOTAL CA: CPT

## 2018-11-05 PROCEDURE — 82550 ASSAY OF CK (CPK): CPT

## 2018-11-05 PROCEDURE — 84145 PROCALCITONIN (PCT): CPT

## 2018-11-05 PROCEDURE — 85025 COMPLETE CBC W/AUTO DIFF WBC: CPT

## 2018-11-05 PROCEDURE — 85610 PROTHROMBIN TIME: CPT

## 2018-11-05 PROCEDURE — 93005 ELECTROCARDIOGRAM TRACING: CPT | Performed by: EMERGENCY MEDICINE

## 2018-11-05 PROCEDURE — 84484 ASSAY OF TROPONIN QUANT: CPT

## 2018-11-05 PROCEDURE — 2580000003 HC RX 258: Performed by: EMERGENCY MEDICINE

## 2018-11-05 PROCEDURE — 83880 ASSAY OF NATRIURETIC PEPTIDE: CPT

## 2018-11-05 PROCEDURE — 83605 ASSAY OF LACTIC ACID: CPT

## 2018-11-05 PROCEDURE — 71045 X-RAY EXAM CHEST 1 VIEW: CPT

## 2018-11-05 PROCEDURE — 87040 BLOOD CULTURE FOR BACTERIA: CPT

## 2018-11-05 RX ORDER — SODIUM CHLORIDE 9 MG/ML
INJECTION, SOLUTION INTRAVENOUS CONTINUOUS
Status: DISCONTINUED | OUTPATIENT
Start: 2018-11-05 | End: 2018-11-06

## 2018-11-05 RX ORDER — 0.9 % SODIUM CHLORIDE 0.9 %
30 INTRAVENOUS SOLUTION INTRAVENOUS ONCE
Status: COMPLETED | OUTPATIENT
Start: 2018-11-05 | End: 2018-11-06

## 2018-11-05 RX ADMIN — SODIUM CHLORIDE 1983 ML: 9 INJECTION, SOLUTION INTRAVENOUS at 22:53

## 2018-11-05 ASSESSMENT — ENCOUNTER SYMPTOMS
BACK PAIN: 0
COUGH: 0
DIARRHEA: 1
ABDOMINAL PAIN: 0
WHEEZING: 0
EYE DISCHARGE: 0
SHORTNESS OF BREATH: 1
BLOOD IN STOOL: 0
NAUSEA: 0
VOMITING: 0
EYE REDNESS: 0
SORE THROAT: 0
RHINORRHEA: 0

## 2018-11-06 PROBLEM — R06.02 SHORTNESS OF BREATH: Status: ACTIVE | Noted: 2018-11-06

## 2018-11-06 LAB
BACTERIA: ABNORMAL
BASOPHILS # BLD: 0.3 %
BASOPHILS ABSOLUTE: 0 THOU/MM3 (ref 0–0.1)
BILIRUBIN URINE: NEGATIVE
BLOOD, URINE: NEGATIVE
CASTS: ABNORMAL /LPF
CASTS: ABNORMAL /LPF
CHARACTER, URINE: CLEAR
COLOR: YELLOW
CRYSTALS: ABNORMAL
EOSINOPHIL # BLD: 2.3 %
EOSINOPHILS ABSOLUTE: 0.2 THOU/MM3 (ref 0–0.4)
EPITHELIAL CELLS, UA: ABNORMAL /HPF
ERYTHROCYTE [DISTWIDTH] IN BLOOD BY AUTOMATED COUNT: 13.2 % (ref 11.5–14.5)
ERYTHROCYTE [DISTWIDTH] IN BLOOD BY AUTOMATED COUNT: 47.5 FL (ref 35–45)
GLUCOSE BLD-MCNC: 125 MG/DL (ref 70–108)
GLUCOSE BLD-MCNC: 144 MG/DL (ref 70–108)
GLUCOSE BLD-MCNC: 75 MG/DL (ref 70–108)
GLUCOSE, URINE: NEGATIVE MG/DL
HCT VFR BLD CALC: 30.8 % (ref 42–52)
HEMOCCULT STL QL: NEGATIVE
HEMOGLOBIN: 9.8 GM/DL (ref 14–18)
IMMATURE GRANS (ABS): 0.04 THOU/MM3 (ref 0–0.07)
IMMATURE GRANULOCYTES: 0.4 %
KETONES, URINE: NEGATIVE
LACTIC ACID: 1.1 MMOL/L (ref 0.5–2.2)
LACTIC ACID: 2.8 MMOL/L (ref 0.5–2.2)
LEUKOCYTE EST, POC: NEGATIVE
LYMPHOCYTES # BLD: 9.6 %
LYMPHOCYTES ABSOLUTE: 0.9 THOU/MM3 (ref 1–4.8)
MCH RBC QN AUTO: 31.7 PG (ref 26–33)
MCHC RBC AUTO-ENTMCNC: 31.8 GM/DL (ref 32.2–35.5)
MCV RBC AUTO: 99.7 FL (ref 80–94)
MISCELLANEOUS LAB TEST RESULT: ABNORMAL
MONOCYTES # BLD: 10.4 %
MONOCYTES ABSOLUTE: 1 THOU/MM3 (ref 0.4–1.3)
NITRITE, URINE: NEGATIVE
NUCLEATED RED BLOOD CELLS: 0 /100 WBC
PH UA: 6.5
PLATELET # BLD: 271 THOU/MM3 (ref 130–400)
PMV BLD AUTO: 9.9 FL (ref 9.4–12.4)
PROCALCITONIN: 0.07 NG/ML (ref 0.01–0.09)
PROTEIN UA: ABNORMAL MG/DL
RBC # BLD: 3.09 MILL/MM3 (ref 4.7–6.1)
RBC URINE: ABNORMAL /HPF
RENAL EPITHELIAL, UA: ABNORMAL
SEG NEUTROPHILS: 77 %
SEGMENTED NEUTROPHILS ABSOLUTE COUNT: 7.2 THOU/MM3 (ref 1.8–7.7)
SPECIFIC GRAVITY UA: 1.01 (ref 1–1.03)
TOTAL CK: 106 U/L (ref 55–170)
TOTAL CK: 80 U/L (ref 55–170)
TOTAL CK: 94 U/L (ref 55–170)
TROPONIN T: 0.03 NG/ML
TROPONIN T: 0.03 NG/ML
UROBILINOGEN, URINE: 0.2 EU/DL
WBC # BLD: 9.3 THOU/MM3 (ref 4.8–10.8)
WBC UA: ABNORMAL /HPF
YEAST: ABNORMAL

## 2018-11-06 PROCEDURE — 81001 URINALYSIS AUTO W/SCOPE: CPT

## 2018-11-06 PROCEDURE — 82550 ASSAY OF CK (CPK): CPT

## 2018-11-06 PROCEDURE — 93010 ELECTROCARDIOGRAM REPORT: CPT | Performed by: NUCLEAR MEDICINE

## 2018-11-06 PROCEDURE — 36415 COLL VENOUS BLD VENIPUNCTURE: CPT

## 2018-11-06 PROCEDURE — 82948 REAGENT STRIP/BLOOD GLUCOSE: CPT

## 2018-11-06 PROCEDURE — 6370000000 HC RX 637 (ALT 250 FOR IP): Performed by: INTERNAL MEDICINE

## 2018-11-06 PROCEDURE — 99223 1ST HOSP IP/OBS HIGH 75: CPT | Performed by: INTERNAL MEDICINE

## 2018-11-06 PROCEDURE — 6360000002 HC RX W HCPCS: Performed by: INTERNAL MEDICINE

## 2018-11-06 PROCEDURE — 85025 COMPLETE CBC W/AUTO DIFF WBC: CPT

## 2018-11-06 PROCEDURE — 84484 ASSAY OF TROPONIN QUANT: CPT

## 2018-11-06 PROCEDURE — 1200000003 HC TELEMETRY R&B

## 2018-11-06 PROCEDURE — 82272 OCCULT BLD FECES 1-3 TESTS: CPT

## 2018-11-06 PROCEDURE — 94640 AIRWAY INHALATION TREATMENT: CPT

## 2018-11-06 PROCEDURE — 83605 ASSAY OF LACTIC ACID: CPT

## 2018-11-06 RX ORDER — DILTIAZEM HYDROCHLORIDE 60 MG/1
120 CAPSULE, EXTENDED RELEASE ORAL DAILY
Status: DISCONTINUED | OUTPATIENT
Start: 2018-11-06 | End: 2018-11-09 | Stop reason: HOSPADM

## 2018-11-06 RX ORDER — BUMETANIDE 0.25 MG/ML
1 INJECTION, SOLUTION INTRAMUSCULAR; INTRAVENOUS EVERY 8 HOURS
Status: DISCONTINUED | OUTPATIENT
Start: 2018-11-06 | End: 2018-11-06 | Stop reason: CLARIF

## 2018-11-06 RX ORDER — METOPROLOL SUCCINATE 50 MG/1
50 TABLET, EXTENDED RELEASE ORAL DAILY
Status: DISCONTINUED | OUTPATIENT
Start: 2018-11-06 | End: 2018-11-09 | Stop reason: HOSPADM

## 2018-11-06 RX ORDER — METOPROLOL TARTRATE 5 MG/5ML
5 INJECTION INTRAVENOUS ONCE
Status: DISCONTINUED | OUTPATIENT
Start: 2018-11-06 | End: 2018-11-09 | Stop reason: HOSPADM

## 2018-11-06 RX ORDER — ALBUTEROL SULFATE 2.5 MG/3ML
2.5 SOLUTION RESPIRATORY (INHALATION)
Status: DISCONTINUED | OUTPATIENT
Start: 2018-11-06 | End: 2018-11-07

## 2018-11-06 RX ORDER — ATORVASTATIN CALCIUM 40 MG/1
40 TABLET, FILM COATED ORAL NIGHTLY
Status: DISCONTINUED | OUTPATIENT
Start: 2018-11-06 | End: 2018-11-09 | Stop reason: HOSPADM

## 2018-11-06 RX ORDER — INSULIN GLARGINE 100 [IU]/ML
55 INJECTION, SOLUTION SUBCUTANEOUS 2 TIMES DAILY
Status: DISCONTINUED | OUTPATIENT
Start: 2018-11-06 | End: 2018-11-06 | Stop reason: ALTCHOICE

## 2018-11-06 RX ORDER — POTASSIUM CHLORIDE 20 MEQ/1
20 TABLET, EXTENDED RELEASE ORAL DAILY
Status: DISCONTINUED | OUTPATIENT
Start: 2018-11-06 | End: 2018-11-07

## 2018-11-06 RX ORDER — ISOSORBIDE MONONITRATE 60 MG/1
120 TABLET, EXTENDED RELEASE ORAL DAILY
Status: DISCONTINUED | OUTPATIENT
Start: 2018-11-06 | End: 2018-11-07

## 2018-11-06 RX ORDER — METHYLPREDNISOLONE SODIUM SUCCINATE 40 MG/ML
40 INJECTION, POWDER, LYOPHILIZED, FOR SOLUTION INTRAMUSCULAR; INTRAVENOUS EVERY 12 HOURS
Status: DISCONTINUED | OUTPATIENT
Start: 2018-11-06 | End: 2018-11-06

## 2018-11-06 RX ORDER — FUROSEMIDE 10 MG/ML
40 INJECTION INTRAMUSCULAR; INTRAVENOUS EVERY 8 HOURS
Status: DISCONTINUED | OUTPATIENT
Start: 2018-11-06 | End: 2018-11-07

## 2018-11-06 RX ORDER — CLOPIDOGREL BISULFATE 75 MG/1
75 TABLET ORAL DAILY
Status: DISCONTINUED | OUTPATIENT
Start: 2018-11-06 | End: 2018-11-09 | Stop reason: HOSPADM

## 2018-11-06 RX ORDER — LISINOPRIL 20 MG/1
20 TABLET ORAL 2 TIMES DAILY
Status: DISCONTINUED | OUTPATIENT
Start: 2018-11-06 | End: 2018-11-07

## 2018-11-06 RX ORDER — METHYLPREDNISOLONE SODIUM SUCCINATE 40 MG/ML
40 INJECTION, POWDER, LYOPHILIZED, FOR SOLUTION INTRAMUSCULAR; INTRAVENOUS DAILY
Status: DISCONTINUED | OUTPATIENT
Start: 2018-11-06 | End: 2018-11-07

## 2018-11-06 RX ORDER — BUMETANIDE 2 MG/1
2 TABLET ORAL DAILY PRN
Status: ON HOLD | COMMUNITY
End: 2018-11-09 | Stop reason: HOSPADM

## 2018-11-06 RX ORDER — LISINOPRIL 10 MG/1
10 TABLET ORAL 2 TIMES DAILY
Status: DISCONTINUED | OUTPATIENT
Start: 2018-11-06 | End: 2018-11-06

## 2018-11-06 RX ORDER — HYDROXYZINE PAMOATE 25 MG/1
25 CAPSULE ORAL NIGHTLY PRN
Status: DISCONTINUED | OUTPATIENT
Start: 2018-11-06 | End: 2018-11-07

## 2018-11-06 RX ORDER — RANOLAZINE 500 MG/1
500 TABLET, EXTENDED RELEASE ORAL 2 TIMES DAILY
Status: DISCONTINUED | OUTPATIENT
Start: 2018-11-06 | End: 2018-11-09 | Stop reason: HOSPADM

## 2018-11-06 RX ADMIN — METHYLPREDNISOLONE SODIUM SUCCINATE 40 MG: 40 INJECTION, POWDER, FOR SOLUTION INTRAMUSCULAR; INTRAVENOUS at 17:42

## 2018-11-06 RX ADMIN — POTASSIUM CHLORIDE 20 MEQ: 20 TABLET, EXTENDED RELEASE ORAL at 09:16

## 2018-11-06 RX ADMIN — LISINOPRIL 10 MG: 10 TABLET ORAL at 09:12

## 2018-11-06 RX ADMIN — RANOLAZINE 500 MG: 500 TABLET, FILM COATED, EXTENDED RELEASE ORAL at 09:13

## 2018-11-06 RX ADMIN — ALBUTEROL SULFATE 2.5 MG: 2.5 SOLUTION RESPIRATORY (INHALATION) at 20:44

## 2018-11-06 RX ADMIN — DILTIAZEM HYDROCHLORIDE 120 MG: 60 CAPSULE, EXTENDED RELEASE ORAL at 09:15

## 2018-11-06 RX ADMIN — ATORVASTATIN CALCIUM 40 MG: 40 TABLET, FILM COATED ORAL at 21:05

## 2018-11-06 RX ADMIN — ALBUTEROL SULFATE 2.5 MG: 2.5 SOLUTION RESPIRATORY (INHALATION) at 17:02

## 2018-11-06 RX ADMIN — LISINOPRIL 20 MG: 20 TABLET ORAL at 21:05

## 2018-11-06 RX ADMIN — APIXABAN 5 MG: 5 TABLET, FILM COATED ORAL at 09:14

## 2018-11-06 RX ADMIN — FUROSEMIDE 40 MG: 10 INJECTION, SOLUTION INTRAMUSCULAR; INTRAVENOUS at 04:13

## 2018-11-06 RX ADMIN — CLOPIDOGREL BISULFATE 75 MG: 75 TABLET, FILM COATED ORAL at 09:14

## 2018-11-06 RX ADMIN — FUROSEMIDE 40 MG: 10 INJECTION, SOLUTION INTRAMUSCULAR; INTRAVENOUS at 11:52

## 2018-11-06 RX ADMIN — METFORMIN HYDROCHLORIDE 1000 MG: 500 TABLET, FILM COATED ORAL at 09:12

## 2018-11-06 RX ADMIN — ISOSORBIDE MONONITRATE 120 MG: 60 TABLET ORAL at 09:12

## 2018-11-06 RX ADMIN — METFORMIN HYDROCHLORIDE 1000 MG: 500 TABLET, FILM COATED ORAL at 17:00

## 2018-11-06 RX ADMIN — FUROSEMIDE 40 MG: 10 INJECTION, SOLUTION INTRAMUSCULAR; INTRAVENOUS at 19:46

## 2018-11-06 RX ADMIN — METOPROLOL SUCCINATE 50 MG: 50 TABLET, FILM COATED, EXTENDED RELEASE ORAL at 09:13

## 2018-11-06 RX ADMIN — RANOLAZINE 500 MG: 500 TABLET, FILM COATED, EXTENDED RELEASE ORAL at 21:05

## 2018-11-06 ASSESSMENT — PAIN SCALES - GENERAL
PAINLEVEL_OUTOF10: 0
PAINLEVEL_OUTOF10: 0

## 2018-11-06 NOTE — CARE COORDINATION
11/6/18, 7:50 AM      Freda Dumont       Admitted from: ER 11/5/2018/ 2053 Hospital day: 0   Location: 6-15/015-A Reason for admit: Shortness of breath [R06.02] Status: IP   Admit order signed?: yes  PMH:  has a past medical history of Adenomatous colon polyp; Angina at rest [facility]); Atrial fibrillation ([facility] 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[facility] BPH (benign prostatic hypertrophy); CAD (coronary artery disease); Carot[facility] ([facility] Carotid disease, bilateral ([facility] Cerebral artery occlusion with cerebral infarction [facility]); CHF (congestive heart failure) ([facility] Chronic kidney disease; GERD (gastroesophageal reflux disease); Hyperlipidemia; Hypertension; Lumbar disc disease; MI (myocardial infarction) ([facility] Nephrolithiasis; NIDDM (non-insulin dependent diabetes mellitus); RICCARDO (obstructive sleep apnea); Renal artery stenosis ([facility] Renal insufficiency; S/P CABG (status post coronary artery bypass graft); Skin cancer; Squamous cell carcinoma; TIA (transient ischemic attack); and Wears partial dentures. Medications:  Scheduled Meds:   metoprolol  5 mg Intravenous Once    apixaban  5 mg Oral BID    atorvastatin  40 mg Oral Nightly    clopidogrel  75 mg Oral Daily    diltiazem  120 mg Oral Daily    insulin lispro  10 Units Subcutaneous QAM AC    ranolazine  500 mg Oral BID    isosorbide mononitrate  120 mg Oral Daily    metoprolol succinate  50 mg Oral Daily    metFORMIN  1,000 mg Oral BID WC    potassium chloride  20 mEq Oral Daily    lisinopril  10 mg Oral BID    insulin lispro  5 Units Subcutaneous Daily before lunch    insulin lispro  5 Units Subcutaneous Dinner    insulin lispro  0-6 Units Subcutaneous TID WC    insulin lispro  0-3 Units Subcutaneous Nightly    furosemide  40 mg Intravenous Q8H    insulin glargine  55 Units Subcutaneous BID     Continuous Infusions:   sodium chloride Stopped (11/06/18 0416)      Pertinent Info/Orders/Treatment Plan: Trops elev. IV Lasix tid, DM management. Cardiology consult pending. Diet: DIET CARDIAC;   Smoking status:  reports that he has never smoked. He has never used smokeless tobacco.   PCP: Catie Haji MD  Readmission: no  Readmission Risk Score: 15%    Discharge Planning  Current Residence:  Private Residence  Living Arrangements:  Spouse/Significant Other   Support Systems:  Children, Family Members, Spouse/Significant Other  Current Services PTA:     Potential Assistance Needed:  N/A  Potential Assistance Purchasing Medications:  No  Does patient want to participate in local refill/ meds to beds program?  No  Type of Home Care Services:  None  Patient expects to be discharged to:  home with wife  Expected Discharge date:  11/08/18  Follow Up Appointment: Best Day/ Time: Monday AM    Discharge Plan: Spoke with Quinn Galarza and his wife, they plan return home at discharge. Quinn Galarza has a home CPAP. He denies need for HH,DME, or med assistance.       Evaluation: no

## 2018-11-06 NOTE — ED PROVIDER NOTES
TIMES A DAY AND AS NEEDED    NOVOFINE 32G X 6 MM MISC    USE 1 NEW NEEDLE 6 TIMES A DAY AND AS NEEDED    POTASSIUM CHLORIDE (KLOR-CON M) 20 MEQ EXTENDED RELEASE TABLET    Take 1 tablet by mouth daily    RANOLAZINE (RANEXA) 500 MG EXTENDED RELEASE TABLET    Take 500 mg by mouth 2 times daily       ALLERGIES     has No Known Allergies. FAMILY HISTORY     indicated that his mother is . He indicated that his father is . He indicated that his sister is . He indicated that only one of his three brothers is alive. family history includes Cancer in his brother, brother, and mother; Heart Disease in his brother; High Blood Pressure in his father and mother; Stroke in his father. SOCIAL HISTORY      reports that he has never smoked. He has never used smokeless tobacco. He reports that he does not drink alcohol or use drugs. PHYSICAL EXAM       ED Triage Vitals [18]   BP Temp Temp Source Pulse Resp SpO2 Height Weight   134/77 98.3 °F (36.8 °C) Oral 97 28 96 % 5' 7\" (1.702 m) 213 lb (96.6 kg)      Physical Exam   Constitutional: He is oriented to person, place, and time. Vital signs are normal. He appears well-developed and well-nourished. He is cooperative. Non-toxic appearance. He has a sickly appearance. He does not appear ill. HENT:   Head: Normocephalic. Right Ear: External ear normal. No drainage. Left Ear: External ear normal. No drainage. Nose: Nose normal. No epistaxis. Mouth/Throat: Mucous membranes are not dry and not cyanotic. Eyes: Conjunctivae and EOM are normal. Right eye exhibits no discharge. Left eye exhibits no discharge. No scleral icterus. Neck: Trachea normal, normal range of motion and phonation normal. Neck supple. No tracheal deviation present. Cardiovascular: Regular rhythm, normal heart sounds and intact distal pulses. Tachycardia present. Exam reveals no gallop and no friction rub. No murmur heard.   Pulses:       Radial pulses are 2+ on the right side. Pulmonary/Chest: Effort normal. No stridor. Tachypnea noted. No respiratory distress. He has decreased breath sounds. He has no wheezes. He has no rales. He exhibits no tenderness. Abdominal: Soft. Bowel sounds are normal. He exhibits no distension and no pulsatile midline mass. There is no tenderness. There is no rebound and no guarding. Musculoskeletal: Normal range of motion. He exhibits no edema or tenderness (No calf pain or tenderness. No evidence of DVT). Neurological: He is alert and oriented to person, place, and time. He has normal strength. He displays no tremor. He displays no seizure activity. Coordination normal. GCS eye subscore is 4. GCS verbal subscore is 5. GCS motor subscore is 6. Skin: Skin is warm and dry. No rash (on exposed surfaced) noted. He is not diaphoretic. No cyanosis or erythema. No pallor. Psychiatric: He has a normal mood and affect. His speech is normal and behavior is normal.   Nursing note and vitals reviewed. DIFFERENTIAL DIAGNOSIS:   SIRS, UTI, sepsis, pneumonia or bronchitis. Rule out acute coronary syndrome. Rule out congestive heart failure. DIAGNOSTIC RESULTS     EKG: All EKG's are interpreted by the [facility] Physician who either signs or Co-signs this chart in the absence of a cardiologist.    Michael Bertrand. Rate: 98 bpm  AZ interval: 176 ms  QRS duration: 116 ms  QTc: 480 ms  P-R-T axes: 88, 71, 51  No STEMI. EKG gives impression of sinus rhythm with premature supraventricular complexes and fusion complexes. Compared to old EKG on 2-Jun-2018. RADIOLOGY: non-plain film images(s) such as CT, Ultrasound and MRI are read by the radiologist.    XR CHEST PORTABLE   Final Result   Impression:   1. Stable cardiac enlargement and postoperative sternotomy changes. 2. Mild perihilar peribronchial cuffing. Correlate with mild bronchitis and/or early venous congestion.            **This report has been created using voice

## 2018-11-06 NOTE — FLOWSHEET NOTE
11/06/18 1043   Provider Notification   Reason for Communication Evaluate   Provider Name José Miguel    Provider Notification Physician   Method of Communication Secure Message   Response Waiting for response   Notification Time 1043     Informed Dr. Nellie Collier regarding blood pressure 1 hour after medication. See Flowsheet.

## 2018-11-06 NOTE — PLAN OF CARE
Problem: Falls - Risk of:  Goal: Will remain free from falls  Will remain free from falls   Outcome: Ongoing  No falls noted this shift. Continue falling star program. Bed alarm on, bed in low position. Call light and personal belongings in reach. Patient uses call light appropriately. Comments: Care plan reviewed with patient. Patient and wife verbalize understanding of the plan of care and contribute to goal setting.

## 2018-11-06 NOTE — ED NOTES
Patient resting in bed with even and unlabored respirations, spouse at bedside. IV established, fluids started. Vitals obtained. Patient and spouse deny any further needs at this time.       Wilfredo Sharp RN  11/05/18 4081

## 2018-11-06 NOTE — H&P
CO2  is 21, BUN of 15, and creatinine of 1.1. Lactic acid was 1.1. WBC was  12.6, hemoglobin of 9.6, hematocrit of 29.3, and platelets of 000. DIAGNOSTIC STUDIES:  EKG showed right bundle branch block with fusion  complexes. Chest x-ray showed mild perihilar peribronchial cuffing,  could be related to mild bronchitis versus early venous congestion. Troponin was 0.029. ProBNP was 1125. CK was 94. IMPRESSION:  1. Progressive shortness of breath with a strong history of coronary  artery disease, status post coronary artery bypass graft, and had 12  stents placed, now with chronic elevation of troponin, and pulmonary  vascular congestion noted on chest x-ray. 2.  Anemia. 3.  Insulin-requiring diabetes mellitus. 4.  History of paroxysmal AFib, status post ablation, on chronic  anticoagulation. 5.  Hypertension. 6.  Hyperlipidemia. 7.  Acute-on-chronic congestive heart failure secondary to diastolic  dysfunction. 8.  History of peripheral vascular disease, status post right carotid  endarterectomy. 9.  History of benign prostatic hypertrophy. 10.  Obstructive sleep apnea, on CPAP machine. RECOMMENDATION:  1. Continue with careful diuresis, monitor his cardiac enzymes. Cardiology has already been consulted. In view of the patient's  progressive symptoms, we will have primary also involved. 2.  The patient's last echo from March was reviewed. 3.  We will increase his ACE as his blood pressure is still elevated. 4.  Monitor his chems. Cover with sliding scale. 5.  Check Hemoccult stools, iron studies, and followup with hemoglobin  and hematocrit. 6.  Discussed code status, at least a full resuscitation.         Kevin Lizarraga M.D.    D: 11/06/2018 10:15:04       T: 11/06/2018 12:55:23     [email or web address]  Job#: [id number]     Doc#: [id number]    CC:

## 2018-11-06 NOTE — CONSULTS
[place] [facility]    Patient - Taylor Patel   MRN -  [id number]   [facility] # - [de-identified]   - 1945      Date of Admission -  2018  8:53 PM  Date of evaluation -  2018  Room - 8 Ariana Mancilla MD Primary Care Physician - Autumn Lopez MD     Problem List      Active Hospital Problems    Diagnosis Date Noted    Shortness of breath [R06.02] 2018     Reason for Consult    For evaluation of dyspnea  HPI   History Obtained From: Patient and electronic medical record. Taylor Patel is a 68 y.o. male He was admitted under Dr. Helene Howard service. Pulmonary medicine was consulted for further evaluation of dyspnea. He is having dyspnea especially with exercise for last 2years. His exertional dyspnea is getting worse for the last 2years. His exertional dyspnea is associated with minimal cough but no sputum production. His exertional dyspnea is associated wheezing. His exertional dyspnea gets worse in winter and when ever he exposes to cold weather. His functional status got decreased due to his worsening of shortness of breath. Currently he can walk few steps on level ground. No history of orthopnea but gives a hx of paroxysmal nocturnal dyspnea. She was never diagnosed with bronchial asthma or COPD in the past. No family history of asthma.  He denies any history of Pulmonary embolism or Pulmonary hypertension in the past.       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    BPH (benign prostatic hypertrophy)     CAD (coronary artery disease)     Carotid artery disease ([address]     right    Carotid disease, bilateral ([address]     Cerebral artery occlusion with cerebral infarction release tablet, Take 500 mg by mouth 2 times daily  isosorbide mononitrate (IMDUR) 120 MG extended release tablet, Take 120 mg by mouth daily   apixaban (ELIQUIS) 5 MG TABS tablet, TAKE 1 TABLET TWICE A DAY  metoprolol succinate (TOPROL XL) 50 MG extended release tablet, TAKE 1 TABLET DAILY  NOVOFINE 32G X 6 MM MISC, USE 1 NEW NEEDLE 6 TIMES A DAY AND AS NEEDED  NOVOFINE 30G X 8 MM MISC, USE 1 NEW NEEDLE 6 TIMES A DAY AND AS NEEDED  Diet    DIET CARDIAC; Allergies    Patient has no known allergies. Social History     Social History     Social History    Marital status:      Spouse name: Kelly Lozano Number of children: 2    Years of education: N/A     Occupational History    Not on file. Social History Main Topics    Smoking status: Never Smoker    Smokeless tobacco: Never Used    Alcohol use No    Drug use: No    Sexual activity: Yes     Partners: Female     Other Topics Concern    Not on file     Social History Narrative    No narrative on file     Family History      Family History   Problem Relation Age of Onset    Cancer Mother     High Blood Pressure Mother     Stroke Father     High Blood Pressure Father     Heart Disease Brother     Cancer Brother         lung    Cancer Brother         melanoma       Riview of systems   General/Constitutional: No recent loss of weight or appetite changes. No fever or chills. HENT: Negative. Eyes: Negative. Upper respiratory tract: No nasal stuffiness or post nasal drip. Lower respiratory tract/ lungs: Minimal cough with no sputum production. No hemoptysis. Exertional dyspnea with associated wheezing. Cardiovascular: No palpitations. Episodic exetional chest pain. Gastrointestinal: No nausea or vomiting. Neurological: No focal neurologiacal weakness. Extremities: trace leg edema. Musculoskeletal: No complaints. Genitourinary: No complaints. Hematological: Negative. Psychiatric/Behavioral: Negative. Skin: No itching.     Vitals

## 2018-11-07 LAB
ALBUMIN SERPL-MCNC: 3.4 G/DL (ref 3.5–5.1)
ALP BLD-CCNC: 68 U/L (ref 38–126)
ALT SERPL-CCNC: 12 U/L (ref 11–66)
ANION GAP SERPL CALCULATED.3IONS-SCNC: 16 MEQ/L (ref 8–16)
APTT: 34 SECONDS (ref 22–38)
APTT: 42.1 SECONDS (ref 22–38)
APTT: 47.3 SECONDS (ref 22–38)
AST SERPL-CCNC: 13 U/L (ref 5–40)
BASOPHILS # BLD: 0.1 %
BASOPHILS ABSOLUTE: 0 THOU/MM3 (ref 0–0.1)
BILIRUB SERPL-MCNC: 0.3 MG/DL (ref 0.3–1.2)
BUN BLDV-MCNC: 28 MG/DL (ref 7–22)
CALCIUM SERPL-MCNC: 9.2 MG/DL (ref 8.5–10.5)
CHLORIDE BLD-SCNC: 101 MEQ/L (ref 98–111)
CHOLESTEROL, TOTAL: 117 MG/DL (ref 100–199)
CO2: 20 MEQ/L (ref 23–33)
CREAT SERPL-MCNC: 1.4 MG/DL (ref 0.4–1.2)
CREAT SERPL-MCNC: 1.5 MG/DL (ref 0.4–1.2)
EKG ATRIAL RATE: 85 BPM
EKG ATRIAL RATE: 93 BPM
EKG P AXIS: 85 DEGREES
EKG P AXIS: 89 DEGREES
EKG P-R INTERVAL: 180 MS
EKG P-R INTERVAL: 188 MS
EKG Q-T INTERVAL: 410 MS
EKG Q-T INTERVAL: 420 MS
EKG QRS DURATION: 136 MS
EKG QRS DURATION: 140 MS
EKG QTC CALCULATION (BAZETT): 499 MS
EKG QTC CALCULATION (BAZETT): 509 MS
EKG R AXIS: 47 DEGREES
EKG R AXIS: 87 DEGREES
EKG T AXIS: 20 DEGREES
EKG T AXIS: 26 DEGREES
EKG VENTRICULAR RATE: 85 BPM
EKG VENTRICULAR RATE: 93 BPM
EOSINOPHIL # BLD: 0 %
EOSINOPHILS ABSOLUTE: 0 THOU/MM3 (ref 0–0.4)
ERYTHROCYTE [DISTWIDTH] IN BLOOD BY AUTOMATED COUNT: 13 % (ref 11.5–14.5)
ERYTHROCYTE [DISTWIDTH] IN BLOOD BY AUTOMATED COUNT: 46.3 FL (ref 35–45)
FERRITIN: 78 NG/ML (ref 22–322)
FOLATE: > 20 NG/ML (ref 4.8–24.2)
GFR SERPL CREATININE-BSD FRML MDRD: 46 ML/MIN/1.73M2
GFR SERPL CREATININE-BSD FRML MDRD: 50 ML/MIN/1.73M2
GLUCOSE BLD-MCNC: 156 MG/DL (ref 70–108)
GLUCOSE BLD-MCNC: 165 MG/DL (ref 70–108)
GLUCOSE BLD-MCNC: 271 MG/DL (ref 70–108)
GLUCOSE BLD-MCNC: 278 MG/DL (ref 70–108)
GLUCOSE BLD-MCNC: 280 MG/DL (ref 70–108)
HCT VFR BLD CALC: 31.1 % (ref 42–52)
HDLC SERPL-MCNC: 41 MG/DL
HEMOGLOBIN: 10.1 GM/DL (ref 14–18)
IMMATURE GRANS (ABS): 0.05 THOU/MM3 (ref 0–0.07)
IMMATURE GRANULOCYTES: 0.4 %
IRON: 35 UG/DL (ref 65–195)
LDL CHOLESTEROL CALCULATED: 66 MG/DL
LV EF: 50 %
LVEF MODALITY: NORMAL
LYMPHOCYTES # BLD: 5.2 %
LYMPHOCYTES ABSOLUTE: 0.6 THOU/MM3 (ref 1–4.8)
MCH RBC QN AUTO: 31.4 PG (ref 26–33)
MCHC RBC AUTO-ENTMCNC: 32.5 GM/DL (ref 32.2–35.5)
MCV RBC AUTO: 96.6 FL (ref 80–94)
MONOCYTES # BLD: 1.9 %
MONOCYTES ABSOLUTE: 0.2 THOU/MM3 (ref 0.4–1.3)
NUCLEATED RED BLOOD CELLS: 0 /100 WBC
PLATELET # BLD: 293 THOU/MM3 (ref 130–400)
PMV BLD AUTO: 9.9 FL (ref 9.4–12.4)
POTASSIUM SERPL-SCNC: 4.9 MEQ/L (ref 3.5–5.2)
RBC # BLD: 3.22 MILL/MM3 (ref 4.7–6.1)
SEG NEUTROPHILS: 92.4 %
SEGMENTED NEUTROPHILS ABSOLUTE COUNT: 11.1 THOU/MM3 (ref 1.8–7.7)
SODIUM BLD-SCNC: 137 MEQ/L (ref 135–145)
TOTAL CK: 111 U/L (ref 55–170)
TOTAL IRON BINDING CAPACITY: 218 UG/DL (ref 171–450)
TOTAL PROTEIN: 6.7 G/DL (ref 6.1–8)
TRIGL SERPL-MCNC: 50 MG/DL (ref 0–199)
TROPONIN T: 0.03 NG/ML
TROPONIN T: 0.09 NG/ML
TROPONIN T: < 0.01 NG/ML
VITAMIN B-12: 438 PG/ML (ref 211–911)
WBC # BLD: 12 THOU/MM3 (ref 4.8–10.8)

## 2018-11-07 PROCEDURE — 80053 COMPREHEN METABOLIC PANEL: CPT

## 2018-11-07 PROCEDURE — 6370000000 HC RX 637 (ALT 250 FOR IP): Performed by: INTERNAL MEDICINE

## 2018-11-07 PROCEDURE — 93010 ELECTROCARDIOGRAM REPORT: CPT | Performed by: NUCLEAR MEDICINE

## 2018-11-07 PROCEDURE — 6360000002 HC RX W HCPCS: Performed by: INTERNAL MEDICINE

## 2018-11-07 PROCEDURE — 2500000003 HC RX 250 WO HCPCS: Performed by: INTERNAL MEDICINE

## 2018-11-07 PROCEDURE — 36415 COLL VENOUS BLD VENIPUNCTURE: CPT

## 2018-11-07 PROCEDURE — 93005 ELECTROCARDIOGRAM TRACING: CPT | Performed by: INTERNAL MEDICINE

## 2018-11-07 PROCEDURE — 83550 IRON BINDING TEST: CPT

## 2018-11-07 PROCEDURE — 82565 ASSAY OF CREATININE: CPT

## 2018-11-07 PROCEDURE — 82728 ASSAY OF FERRITIN: CPT

## 2018-11-07 PROCEDURE — 82550 ASSAY OF CK (CPK): CPT

## 2018-11-07 PROCEDURE — 82746 ASSAY OF FOLIC ACID SERUM: CPT

## 2018-11-07 PROCEDURE — 82948 REAGENT STRIP/BLOOD GLUCOSE: CPT

## 2018-11-07 PROCEDURE — 82607 VITAMIN B-12: CPT

## 2018-11-07 PROCEDURE — 1200000003 HC TELEMETRY R&B

## 2018-11-07 PROCEDURE — 93306 TTE W/DOPPLER COMPLETE: CPT

## 2018-11-07 PROCEDURE — 99233 SBSQ HOSP IP/OBS HIGH 50: CPT | Performed by: INTERNAL MEDICINE

## 2018-11-07 PROCEDURE — 80061 LIPID PANEL: CPT

## 2018-11-07 PROCEDURE — APPSS30 APP SPLIT SHARED TIME 16-30 MINUTES: Performed by: NURSE PRACTITIONER

## 2018-11-07 PROCEDURE — 2580000003 HC RX 258: Performed by: INTERNAL MEDICINE

## 2018-11-07 PROCEDURE — 94760 N-INVAS EAR/PLS OXIMETRY 1: CPT

## 2018-11-07 PROCEDURE — 83540 ASSAY OF IRON: CPT

## 2018-11-07 PROCEDURE — 82552 ASSAY OF CPK IN BLOOD: CPT

## 2018-11-07 PROCEDURE — 85730 THROMBOPLASTIN TIME PARTIAL: CPT

## 2018-11-07 PROCEDURE — 84484 ASSAY OF TROPONIN QUANT: CPT

## 2018-11-07 PROCEDURE — 99222 1ST HOSP IP/OBS MODERATE 55: CPT | Performed by: INTERNAL MEDICINE

## 2018-11-07 PROCEDURE — 85025 COMPLETE CBC W/AUTO DIFF WBC: CPT

## 2018-11-07 PROCEDURE — 94640 AIRWAY INHALATION TREATMENT: CPT

## 2018-11-07 RX ORDER — HYDRALAZINE HYDROCHLORIDE 25 MG/1
25 TABLET, FILM COATED ORAL EVERY 8 HOURS SCHEDULED
Status: DISCONTINUED | OUTPATIENT
Start: 2018-11-07 | End: 2018-11-09 | Stop reason: HOSPADM

## 2018-11-07 RX ORDER — ALBUTEROL SULFATE 2.5 MG/3ML
2.5 SOLUTION RESPIRATORY (INHALATION) EVERY 4 HOURS PRN
Status: DISCONTINUED | OUTPATIENT
Start: 2018-11-07 | End: 2018-11-09 | Stop reason: HOSPADM

## 2018-11-07 RX ORDER — HEPARIN SODIUM 1000 [USP'U]/ML
2000 INJECTION, SOLUTION INTRAVENOUS; SUBCUTANEOUS PRN
Status: DISCONTINUED | OUTPATIENT
Start: 2018-11-07 | End: 2018-11-09 | Stop reason: ALTCHOICE

## 2018-11-07 RX ORDER — SODIUM CHLORIDE 9 MG/ML
INJECTION, SOLUTION INTRAVENOUS CONTINUOUS
Status: DISCONTINUED | OUTPATIENT
Start: 2018-11-07 | End: 2018-11-08 | Stop reason: SDUPTHER

## 2018-11-07 RX ORDER — HEPARIN SODIUM 10000 [USP'U]/100ML
18 INJECTION, SOLUTION INTRAVENOUS CONTINUOUS
Status: DISCONTINUED | OUTPATIENT
Start: 2018-11-07 | End: 2018-11-07 | Stop reason: DRUGHIGH

## 2018-11-07 RX ORDER — NITROGLYCERIN 20 MG/100ML
5 INJECTION INTRAVENOUS CONTINUOUS
Status: DISCONTINUED | OUTPATIENT
Start: 2018-11-07 | End: 2018-11-07

## 2018-11-07 RX ORDER — NITROGLYCERIN 400 UG/1
1 SPRAY ORAL EVERY 5 MIN PRN
Status: DISCONTINUED | OUTPATIENT
Start: 2018-11-07 | End: 2018-11-07

## 2018-11-07 RX ORDER — ALPRAZOLAM 0.25 MG/1
0.25 TABLET ORAL 3 TIMES DAILY PRN
Status: DISCONTINUED | OUTPATIENT
Start: 2018-11-07 | End: 2018-11-09 | Stop reason: HOSPADM

## 2018-11-07 RX ORDER — HEPARIN SODIUM 10000 [USP'U]/100ML
12 INJECTION, SOLUTION INTRAVENOUS CONTINUOUS
Status: DISCONTINUED | OUTPATIENT
Start: 2018-11-07 | End: 2018-11-09

## 2018-11-07 RX ORDER — HEPARIN SODIUM 1000 [USP'U]/ML
4000 INJECTION, SOLUTION INTRAVENOUS; SUBCUTANEOUS PRN
Status: DISCONTINUED | OUTPATIENT
Start: 2018-11-07 | End: 2018-11-09 | Stop reason: ALTCHOICE

## 2018-11-07 RX ORDER — NITROGLYCERIN 0.4 MG/1
0.4 TABLET SUBLINGUAL EVERY 5 MIN PRN
Status: DISCONTINUED | OUTPATIENT
Start: 2018-11-07 | End: 2018-11-09 | Stop reason: HOSPADM

## 2018-11-07 RX ORDER — PREDNISONE 20 MG/1
40 TABLET ORAL DAILY
Status: DISCONTINUED | OUTPATIENT
Start: 2018-11-08 | End: 2018-11-09 | Stop reason: HOSPADM

## 2018-11-07 RX ORDER — NITROGLYCERIN 20 MG/100ML
5 INJECTION INTRAVENOUS CONTINUOUS
Status: DISCONTINUED | OUTPATIENT
Start: 2018-11-07 | End: 2018-11-09 | Stop reason: ALTCHOICE

## 2018-11-07 RX ADMIN — CLOPIDOGREL BISULFATE 75 MG: 75 TABLET, FILM COATED ORAL at 09:52

## 2018-11-07 RX ADMIN — METFORMIN HYDROCHLORIDE 1000 MG: 500 TABLET, FILM COATED ORAL at 09:58

## 2018-11-07 RX ADMIN — NITROGLYCERIN 5 MCG/MIN: 20 INJECTION INTRAVENOUS at 11:22

## 2018-11-07 RX ADMIN — DILTIAZEM HYDROCHLORIDE 120 MG: 60 CAPSULE, EXTENDED RELEASE ORAL at 09:53

## 2018-11-07 RX ADMIN — LISINOPRIL 20 MG: 20 TABLET ORAL at 09:51

## 2018-11-07 RX ADMIN — ATORVASTATIN CALCIUM 40 MG: 40 TABLET, FILM COATED ORAL at 21:11

## 2018-11-07 RX ADMIN — FUROSEMIDE 40 MG: 10 INJECTION, SOLUTION INTRAMUSCULAR; INTRAVENOUS at 03:03

## 2018-11-07 RX ADMIN — ALBUTEROL SULFATE 2.5 MG: 2.5 SOLUTION RESPIRATORY (INHALATION) at 13:19

## 2018-11-07 RX ADMIN — POTASSIUM CHLORIDE 20 MEQ: 20 TABLET, EXTENDED RELEASE ORAL at 08:46

## 2018-11-07 RX ADMIN — SODIUM CHLORIDE: 9 INJECTION, SOLUTION INTRAVENOUS at 21:17

## 2018-11-07 RX ADMIN — METOPROLOL SUCCINATE 50 MG: 50 TABLET, FILM COATED, EXTENDED RELEASE ORAL at 09:52

## 2018-11-07 RX ADMIN — RANOLAZINE 500 MG: 500 TABLET, FILM COATED, EXTENDED RELEASE ORAL at 09:52

## 2018-11-07 RX ADMIN — HYDROXYZINE PAMOATE 25 MG: 25 CAPSULE ORAL at 01:03

## 2018-11-07 RX ADMIN — RANOLAZINE 500 MG: 500 TABLET, FILM COATED, EXTENDED RELEASE ORAL at 21:11

## 2018-11-07 RX ADMIN — ISOSORBIDE MONONITRATE 120 MG: 60 TABLET ORAL at 09:52

## 2018-11-07 RX ADMIN — ALPRAZOLAM 0.25 MG: 0.25 TABLET ORAL at 16:38

## 2018-11-07 RX ADMIN — ALBUTEROL SULFATE 2.5 MG: 2.5 SOLUTION RESPIRATORY (INHALATION) at 09:00

## 2018-11-07 RX ADMIN — ACETYLCYSTEINE 1000 MG: 500 TABLET, EFFERVESCENT ORAL at 21:11

## 2018-11-07 RX ADMIN — HEPARIN SODIUM 12 UNITS/KG/HR: 10000 INJECTION, SOLUTION INTRAVENOUS at 11:23

## 2018-11-07 RX ADMIN — ALPRAZOLAM 0.25 MG: 0.25 TABLET ORAL at 12:29

## 2018-11-07 RX ADMIN — HEPARIN SODIUM 2000 UNITS: 1000 INJECTION INTRAVENOUS; SUBCUTANEOUS at 16:52

## 2018-11-07 RX ADMIN — METHYLPREDNISOLONE SODIUM SUCCINATE 40 MG: 40 INJECTION, POWDER, FOR SOLUTION INTRAMUSCULAR; INTRAVENOUS at 08:47

## 2018-11-07 RX ADMIN — HYDRALAZINE HYDROCHLORIDE 25 MG: 25 TABLET, FILM COATED ORAL at 21:11

## 2018-11-07 RX ADMIN — ALPRAZOLAM 0.25 MG: 0.25 TABLET ORAL at 21:12

## 2018-11-07 ASSESSMENT — ENCOUNTER SYMPTOMS
BACK PAIN: 0
NAUSEA: 0
ABDOMINAL PAIN: 0
VOMITING: 0
EYES NEGATIVE: 1
SHORTNESS OF BREATH: 1
COUGH: 1
SORE THROAT: 0

## 2018-11-07 ASSESSMENT — PAIN SCALES - GENERAL: PAINLEVEL_OUTOF10: 0

## 2018-11-07 NOTE — PROGRESS NOTES
[STORY:9180]   Patient Vitals for the past 96 hrs (Last 3 readings):   Weight   11/06/18 0327 218 lb 9.6 oz (99.2 kg)   11/05/18 2046 213 lb (96.6 kg)       Exam   Physical Exam   Constitutional: No distress on RA. Patient appears moderately built and  moderately nourished. Head: Normocephalic and atraumatic. Eyes: Conjunctivae are normal. Pupils are equal, round. No scleral icterus. Neck: Neck supple. No tracheal deviation present. Cardiovascular: S1 and S2 with no murmur. No peripheral edema  Pulmonary/Chest: Normal effort with bilateral air entry, faint bibasilar rales. No stridor. No respiratory distress. Patient exhibits no tenderness. Abdominal: Soft. Bowel sounds audible. No distension or tenderness to palp. Musculoskeletal: Moves all extremities  Neurological: Patient is alert and oriented to person, place, and time. Skin: Warm and dry. Labs  - Old records and notes have been reviewed in [facility]   ABG  Lab Results   Component Value Date    PH 5.5 06/18/2018     No results found for: MIKAELA Shin  CBC  Recent Labs      11/05/18   2147  11/06/18   1046  11/07/18   0534   WBC  12.6*  9.3  12.0*   RBC  3.05*  3.09*  3.22*   HGB  9.6*  9.8*  10.1*   HCT  29.3*  30.8*  31.1*   MCV  96.1*  99.7*  96.6*   MCH  31.5  31.7  31.4   MCHC  32.8  31.8*  32.5   PLT  271  271  293   MPV  9.6  9.9  9.9      BMP  Recent Labs      11/05/18   2147  11/07/18   0534   NA  138  137   K  4.0  4.9   CL  102  101   CO2  21*  20*   BUN  15  28*   CREATININE  1.1  1.4*   GLUCOSE  170*  156*   CALCIUM  9.1  9.2     LFT  Recent Labs      11/07/18   0534   AST  13   ALT  12   BILITOT  0.3   ALKPHOS  68     TROP  Lab Results   Component Value Date    TROPONINT < 0.010 11/07/2018    TROPONINT 0.028 11/06/2018    TROPONINT 0.029 11/06/2018     BNP  No results for input(s): BNP in the last 72 hours.   Lactic Acid  Recent Labs      11/05/18   2148  11/06/18   0005  11/06/18   0510   LACTA  2.8*  2.8*  1.1 congestion. CT Scans  (See actual reports for details)            Assessment   -Dyspnea especially with exertion due to multiple etiologies including Pulmonary Vs Cardiac  -Bilateral wheezing with episodic dyspnea- ? Late onset intermittent bronchial asthma. PFT with no obstruction or reversibility post BD  -Elevated troponins- cardiology planning heart cath 11/8/18  -Severe obstructive sleep apnea. He is currently on treatment with 13cm H20.  -Atrial fibrillation ([address] on chronic anticoagulation.  -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[address] -CAD (coronary artery disease) S/p CABG. Multiple stents placement.  -Cerebral artery occlusion with cerebral infarction ([facility] [address] -Hypertension. -NIDDM (non-insulin dependent diabetes mellitus)  Plan   -Albuterol 2.5mg via nebs Q4h PRN. -Solumedrol 40 mg IV Q12hr change to prednisone 40 mg PO Daily starting in AM  -Continue his CPAP with pressure of 13cm H20 during sleep.  -Echocardiogram with 2 D Doppler to check for current LV function and to evaluate for ? Pulmonary hypertension-Completed pending final read. -Wilbert Dominguez educated about environmental safety precautions he need to practice to prevent exacerbation ofhis Asthma. Wilbert Dominguez verbalizes understanding.   -Cardiology following planning heart cath per WESTLEY Arriaga       Case discussed with nurse,patient and family including his wife. Questions and concerns addressed. Electronically signed by   KAVON Canada CNP on 11/7/2018 at 2:20 PM     Addendum by Dr. Gama Duffy MD:  I have seen and examined the patient independently. Face to face evaluation and examination was performed. The above evaluation and note has been reviewed. Labs and radiographs were reviewed. I Have discussed with Mr. Yenny Cortes CNP about this patient in detail. The above assessment and plan has been reviewed. Please see my modifications mentioned below. My modifications:  He feels the same. Not in any distress.   Bilateral

## 2018-11-07 NOTE — FLOWSHEET NOTE
11/07/18 1015   Provider Notification   Reason for Communication Evaluate   Provider Name José Miguel   Provider Notification Physician   Method of Communication Secure Message   Response Waiting for response   Notification Time 1015     10:15 am  6k15 - Parameters for Nitro Drip? Systolic goal? Titrate to ? EKG shows SR with Premature atrial complexes. Right Bundle branch block , Possible inferior infarct, age understand. BP is still elevated 233/81, denies chest pressure. Waiting on lab to draw troponin and CK. Any additional orders?  Please Advise TY  Unread

## 2018-11-08 ENCOUNTER — APPOINTMENT (OUTPATIENT)
Dept: CARDIAC CATH/INVASIVE PROCEDURES | Age: 73
DRG: 286 | End: 2018-11-08
Payer: MEDICARE

## 2018-11-08 LAB
ABO: NORMAL
ALBUMIN SERPL-MCNC: 3.4 G/DL (ref 3.5–5.1)
ALP BLD-CCNC: 61 U/L (ref 38–126)
ALT SERPL-CCNC: 11 U/L (ref 11–66)
ANION GAP SERPL CALCULATED.3IONS-SCNC: 13 MEQ/L (ref 8–16)
ANION GAP SERPL CALCULATED.3IONS-SCNC: 13 MEQ/L (ref 8–16)
ANTIBODY SCREEN: NORMAL
APTT: 28.8 SECONDS (ref 22–38)
APTT: 75.3 SECONDS (ref 22–38)
AST SERPL-CCNC: 18 U/L (ref 5–40)
BASOPHILS # BLD: 0.1 %
BASOPHILS ABSOLUTE: 0 THOU/MM3 (ref 0–0.1)
BILIRUB SERPL-MCNC: < 0.2 MG/DL (ref 0.3–1.2)
BUN BLDV-MCNC: 47 MG/DL (ref 7–22)
BUN BLDV-MCNC: 48 MG/DL (ref 7–22)
CALCIUM SERPL-MCNC: 8.8 MG/DL (ref 8.5–10.5)
CALCIUM SERPL-MCNC: 8.8 MG/DL (ref 8.5–10.5)
CHLORIDE BLD-SCNC: 101 MEQ/L (ref 98–111)
CHLORIDE BLD-SCNC: 99 MEQ/L (ref 98–111)
CO2: 21 MEQ/L (ref 23–33)
CO2: 22 MEQ/L (ref 23–33)
COLLECTED BY:: ABNORMAL
COLLECTED BY:: NORMAL
CREAT SERPL-MCNC: 1.5 MG/DL (ref 0.4–1.2)
CREAT SERPL-MCNC: 1.5 MG/DL (ref 0.4–1.2)
EKG ATRIAL RATE: 60 BPM
EKG P AXIS: 76 DEGREES
EKG P-R INTERVAL: 158 MS
EKG Q-T INTERVAL: 482 MS
EKG QRS DURATION: 132 MS
EKG QTC CALCULATION (BAZETT): 482 MS
EKG R AXIS: 50 DEGREES
EKG T AXIS: 37 DEGREES
EKG VENTRICULAR RATE: 60 BPM
EOSINOPHIL # BLD: 0 %
EOSINOPHILS ABSOLUTE: 0 THOU/MM3 (ref 0–0.4)
ERYTHROCYTE [DISTWIDTH] IN BLOOD BY AUTOMATED COUNT: 13.2 % (ref 11.5–14.5)
ERYTHROCYTE [DISTWIDTH] IN BLOOD BY AUTOMATED COUNT: 13.3 % (ref 11.5–14.5)
ERYTHROCYTE [DISTWIDTH] IN BLOOD BY AUTOMATED COUNT: 45.5 FL (ref 35–45)
ERYTHROCYTE [DISTWIDTH] IN BLOOD BY AUTOMATED COUNT: 46.8 FL (ref 35–45)
GFR SERPL CREATININE-BSD FRML MDRD: 46 ML/MIN/1.73M2
GFR SERPL CREATININE-BSD FRML MDRD: 46 ML/MIN/1.73M2
GLUCOSE BLD-MCNC: 136 MG/DL (ref 70–108)
GLUCOSE BLD-MCNC: 181 MG/DL (ref 70–108)
GLUCOSE BLD-MCNC: 184 MG/DL (ref 70–108)
GLUCOSE BLD-MCNC: 196 MG/DL (ref 70–108)
GLUCOSE BLD-MCNC: 219 MG/DL (ref 70–108)
GLUCOSE BLD-MCNC: 324 MG/DL (ref 70–108)
HCT VFR BLD CALC: 26.9 % (ref 42–52)
HCT VFR BLD CALC: 27.8 % (ref 42–52)
HEMOGLOBIN: 8.9 GM/DL (ref 14–18)
HEMOGLOBIN: 9.2 GM/DL (ref 14–18)
IMMATURE GRANS (ABS): 0.1 THOU/MM3 (ref 0–0.07)
IMMATURE GRANULOCYTES: 0.6 %
LV EF: 60 %
LVEF MODALITY: NORMAL
LYMPHOCYTES # BLD: 6.2 %
LYMPHOCYTES ABSOLUTE: 1 THOU/MM3 (ref 1–4.8)
MCH RBC QN AUTO: 31.4 PG (ref 26–33)
MCH RBC QN AUTO: 31.7 PG (ref 26–33)
MCHC RBC AUTO-ENTMCNC: 33.1 GM/DL (ref 32.2–35.5)
MCHC RBC AUTO-ENTMCNC: 33.1 GM/DL (ref 32.2–35.5)
MCV RBC AUTO: 95.1 FL (ref 80–94)
MCV RBC AUTO: 95.9 FL (ref 80–94)
MONOCYTES # BLD: 8.4 %
MONOCYTES ABSOLUTE: 1.3 THOU/MM3 (ref 0.4–1.3)
NUCLEATED RED BLOOD CELLS: 0 /100 WBC
PLATELET # BLD: 294 THOU/MM3 (ref 130–400)
PLATELET # BLD: 307 THOU/MM3 (ref 130–400)
PMV BLD AUTO: 9.7 FL (ref 9.4–12.4)
PMV BLD AUTO: 9.9 FL (ref 9.4–12.4)
POC O2 SATURATION: 72 % (ref 94–97)
POC O2 SATURATION: 72 % (ref 94–97)
POC O2 SATURATION: 73 % (ref 94–97)
POC O2 SATURATION: 97 % (ref 94–97)
POTASSIUM REFLEX MAGNESIUM: 4.9 MEQ/L (ref 3.5–5.2)
POTASSIUM SERPL-SCNC: 4.8 MEQ/L (ref 3.5–5.2)
RBC # BLD: 2.83 MILL/MM3 (ref 4.7–6.1)
RBC # BLD: 2.9 MILL/MM3 (ref 4.7–6.1)
RH FACTOR: NORMAL
SEG NEUTROPHILS: 84.7 %
SEGMENTED NEUTROPHILS ABSOLUTE COUNT: 13.2 THOU/MM3 (ref 1.8–7.7)
SODIUM BLD-SCNC: 134 MEQ/L (ref 135–145)
SODIUM BLD-SCNC: 135 MEQ/L (ref 135–145)
SOURCE, BLOOD GAS: ABNORMAL
SOURCE, BLOOD GAS: NORMAL
TOTAL PROTEIN: 6.3 G/DL (ref 6.1–8)
TROPONIN T: 0.05 NG/ML
TROPONIN T: 0.08 NG/ML
TROPONIN T: 0.1 NG/ML
TROPONIN T: 0.12 NG/ML
WBC # BLD: 15.6 THOU/MM3 (ref 4.8–10.8)
WBC # BLD: 16.6 THOU/MM3 (ref 4.8–10.8)

## 2018-11-08 PROCEDURE — B2181ZZ FLUOROSCOPY OF LEFT INTERNAL MAMMARY BYPASS GRAFT USING LOW OSMOLAR CONTRAST: ICD-10-PCS | Performed by: INTERNAL MEDICINE

## 2018-11-08 PROCEDURE — 6370000000 HC RX 637 (ALT 250 FOR IP): Performed by: INTERNAL MEDICINE

## 2018-11-08 PROCEDURE — 99232 SBSQ HOSP IP/OBS MODERATE 35: CPT | Performed by: INTERNAL MEDICINE

## 2018-11-08 PROCEDURE — 93005 ELECTROCARDIOGRAM TRACING: CPT | Performed by: INTERNAL MEDICINE

## 2018-11-08 PROCEDURE — 6360000002 HC RX W HCPCS: Performed by: INTERNAL MEDICINE

## 2018-11-08 PROCEDURE — 2500000003 HC RX 250 WO HCPCS: Performed by: INTERNAL MEDICINE

## 2018-11-08 PROCEDURE — 82948 REAGENT STRIP/BLOOD GLUCOSE: CPT

## 2018-11-08 PROCEDURE — 4A023N8 MEASUREMENT OF CARDIAC SAMPLING AND PRESSURE, BILATERAL, PERCUTANEOUS APPROACH: ICD-10-PCS | Performed by: INTERNAL MEDICINE

## 2018-11-08 PROCEDURE — 2580000003 HC RX 258: Performed by: INTERNAL MEDICINE

## 2018-11-08 PROCEDURE — 80048 BASIC METABOLIC PNL TOTAL CA: CPT

## 2018-11-08 PROCEDURE — 86901 BLOOD TYPING SEROLOGIC RH(D): CPT

## 2018-11-08 PROCEDURE — 36415 COLL VENOUS BLD VENIPUNCTURE: CPT

## 2018-11-08 PROCEDURE — B2131ZZ FLUOROSCOPY OF MULTIPLE CORONARY ARTERY BYPASS GRAFTS USING LOW OSMOLAR CONTRAST: ICD-10-PCS | Performed by: INTERNAL MEDICINE

## 2018-11-08 PROCEDURE — C1760 CLOSURE DEV, VASC: HCPCS

## 2018-11-08 PROCEDURE — 80053 COMPREHEN METABOLIC PANEL: CPT

## 2018-11-08 PROCEDURE — 93325 DOPPLER ECHO COLOR FLOW MAPG: CPT

## 2018-11-08 PROCEDURE — C1887 CATHETER, GUIDING: HCPCS

## 2018-11-08 PROCEDURE — 99152 MOD SED SAME PHYS/QHP 5/>YRS: CPT | Performed by: NUCLEAR MEDICINE

## 2018-11-08 PROCEDURE — 93320 DOPPLER ECHO COMPLETE: CPT

## 2018-11-08 PROCEDURE — 6370000000 HC RX 637 (ALT 250 FOR IP): Performed by: NURSE PRACTITIONER

## 2018-11-08 PROCEDURE — 84484 ASSAY OF TROPONIN QUANT: CPT

## 2018-11-08 PROCEDURE — 6360000002 HC RX W HCPCS: Performed by: NUCLEAR MEDICINE

## 2018-11-08 PROCEDURE — 93312 ECHO TRANSESOPHAGEAL: CPT

## 2018-11-08 PROCEDURE — 6360000002 HC RX W HCPCS

## 2018-11-08 PROCEDURE — 85730 THROMBOPLASTIN TIME PARTIAL: CPT

## 2018-11-08 PROCEDURE — 85025 COMPLETE CBC W/AUTO DIFF WBC: CPT

## 2018-11-08 PROCEDURE — 2709999900 HC NON-CHARGEABLE SUPPLY

## 2018-11-08 PROCEDURE — C1894 INTRO/SHEATH, NON-LASER: HCPCS

## 2018-11-08 PROCEDURE — 1200000003 HC TELEMETRY R&B

## 2018-11-08 PROCEDURE — B246ZZ4 ULTRASONOGRAPHY OF RIGHT AND LEFT HEART, TRANSESOPHAGEAL: ICD-10-PCS | Performed by: NUCLEAR MEDICINE

## 2018-11-08 PROCEDURE — C1769 GUIDE WIRE: HCPCS

## 2018-11-08 PROCEDURE — 86900 BLOOD TYPING SEROLOGIC ABO: CPT

## 2018-11-08 PROCEDURE — 86850 RBC ANTIBODY SCREEN: CPT

## 2018-11-08 PROCEDURE — 2500000003 HC RX 250 WO HCPCS

## 2018-11-08 PROCEDURE — 93461 R&L HRT ART/VENTRICLE ANGIO: CPT | Performed by: INTERNAL MEDICINE

## 2018-11-08 PROCEDURE — 85027 COMPLETE CBC AUTOMATED: CPT

## 2018-11-08 PROCEDURE — 93010 ELECTROCARDIOGRAM REPORT: CPT | Performed by: NUCLEAR MEDICINE

## 2018-11-08 PROCEDURE — B2151ZZ FLUOROSCOPY OF LEFT HEART USING LOW OSMOLAR CONTRAST: ICD-10-PCS | Performed by: INTERNAL MEDICINE

## 2018-11-08 PROCEDURE — APPSS30 APP SPLIT SHARED TIME 16-30 MINUTES: Performed by: NURSE PRACTITIONER

## 2018-11-08 PROCEDURE — 6370000000 HC RX 637 (ALT 250 FOR IP)

## 2018-11-08 PROCEDURE — 94760 N-INVAS EAR/PLS OXIMETRY 1: CPT

## 2018-11-08 PROCEDURE — B2111ZZ FLUOROSCOPY OF MULTIPLE CORONARY ARTERIES USING LOW OSMOLAR CONTRAST: ICD-10-PCS | Performed by: INTERNAL MEDICINE

## 2018-11-08 PROCEDURE — 6360000004 HC RX CONTRAST MEDICATION: Performed by: INTERNAL MEDICINE

## 2018-11-08 PROCEDURE — 82810 BLOOD GASES O2 SAT ONLY: CPT

## 2018-11-08 RX ORDER — FENTANYL CITRATE 50 UG/ML
INJECTION, SOLUTION INTRAMUSCULAR; INTRAVENOUS PRN
Status: DISCONTINUED | OUTPATIENT
Start: 2018-11-08 | End: 2018-11-08 | Stop reason: HOSPADM

## 2018-11-08 RX ORDER — SODIUM CHLORIDE 0.9 % (FLUSH) 0.9 %
10 SYRINGE (ML) INJECTION PRN
Status: DISCONTINUED | OUTPATIENT
Start: 2018-11-08 | End: 2018-11-08 | Stop reason: SDUPTHER

## 2018-11-08 RX ORDER — DIPHENHYDRAMINE HCL 25 MG
25 TABLET ORAL
Status: ACTIVE | OUTPATIENT
Start: 2018-11-08 | End: 2018-11-08

## 2018-11-08 RX ORDER — NITROGLYCERIN 0.4 MG/1
0.4 TABLET SUBLINGUAL EVERY 5 MIN PRN
Status: DISCONTINUED | OUTPATIENT
Start: 2018-11-08 | End: 2018-11-09 | Stop reason: HOSPADM

## 2018-11-08 RX ORDER — DIPHENHYDRAMINE HCL 25 MG
50 TABLET ORAL ONCE
Status: DISCONTINUED | OUTPATIENT
Start: 2018-11-08 | End: 2018-11-09 | Stop reason: HOSPADM

## 2018-11-08 RX ORDER — SODIUM CHLORIDE 0.9 % (FLUSH) 0.9 %
10 SYRINGE (ML) INJECTION EVERY 12 HOURS SCHEDULED
Status: DISCONTINUED | OUTPATIENT
Start: 2018-11-08 | End: 2018-11-08 | Stop reason: SDUPTHER

## 2018-11-08 RX ORDER — ACETAMINOPHEN 325 MG/1
650 TABLET ORAL EVERY 4 HOURS PRN
Status: DISCONTINUED | OUTPATIENT
Start: 2018-11-08 | End: 2018-11-09 | Stop reason: HOSPADM

## 2018-11-08 RX ORDER — SODIUM CHLORIDE 9 MG/ML
INJECTION, SOLUTION INTRAVENOUS CONTINUOUS
Status: DISCONTINUED | OUTPATIENT
Start: 2018-11-08 | End: 2018-11-08 | Stop reason: SDUPTHER

## 2018-11-08 RX ORDER — ALPRAZOLAM 0.5 MG/1
0.5 TABLET ORAL
Status: ACTIVE | OUTPATIENT
Start: 2018-11-08 | End: 2018-11-08

## 2018-11-08 RX ORDER — ONDANSETRON 2 MG/ML
4 INJECTION INTRAMUSCULAR; INTRAVENOUS EVERY 6 HOURS PRN
Status: DISCONTINUED | OUTPATIENT
Start: 2018-11-08 | End: 2018-11-09 | Stop reason: HOSPADM

## 2018-11-08 RX ORDER — MIDAZOLAM HYDROCHLORIDE 1 MG/ML
INJECTION INTRAMUSCULAR; INTRAVENOUS PRN
Status: DISCONTINUED | OUTPATIENT
Start: 2018-11-08 | End: 2018-11-08 | Stop reason: HOSPADM

## 2018-11-08 RX ORDER — SODIUM CHLORIDE 0.9 % (FLUSH) 0.9 %
10 SYRINGE (ML) INJECTION EVERY 12 HOURS SCHEDULED
Status: DISCONTINUED | OUTPATIENT
Start: 2018-11-08 | End: 2018-11-09 | Stop reason: HOSPADM

## 2018-11-08 RX ORDER — ASPIRIN 325 MG
325 TABLET ORAL ONCE
Status: COMPLETED | OUTPATIENT
Start: 2018-11-08 | End: 2018-11-08

## 2018-11-08 RX ORDER — SODIUM CHLORIDE 0.9 % (FLUSH) 0.9 %
10 SYRINGE (ML) INJECTION PRN
Status: DISCONTINUED | OUTPATIENT
Start: 2018-11-08 | End: 2018-11-09 | Stop reason: HOSPADM

## 2018-11-08 RX ORDER — ATROPINE SULFATE 0.4 MG/ML
0.5 AMPUL (ML) INJECTION
Status: ACTIVE | OUTPATIENT
Start: 2018-11-08 | End: 2018-11-08

## 2018-11-08 RX ORDER — SODIUM CHLORIDE 9 MG/ML
100 INJECTION, SOLUTION INTRAVENOUS CONTINUOUS
Status: DISCONTINUED | OUTPATIENT
Start: 2018-11-08 | End: 2018-11-08

## 2018-11-08 RX ADMIN — HEPARIN SODIUM 18 UNITS/KG/HR: 10000 INJECTION, SOLUTION INTRAVENOUS at 05:35

## 2018-11-08 RX ADMIN — PREDNISONE 40 MG: 20 TABLET ORAL at 08:38

## 2018-11-08 RX ADMIN — ALPRAZOLAM 0.25 MG: 0.25 TABLET ORAL at 23:45

## 2018-11-08 RX ADMIN — RANOLAZINE 500 MG: 500 TABLET, FILM COATED, EXTENDED RELEASE ORAL at 20:13

## 2018-11-08 RX ADMIN — SODIUM CHLORIDE 100 ML/HR: 9 INJECTION, SOLUTION INTRAVENOUS at 11:43

## 2018-11-08 RX ADMIN — NITROGLYCERIN 40 MCG/MIN: 20 INJECTION INTRAVENOUS at 20:18

## 2018-11-08 RX ADMIN — RANOLAZINE 500 MG: 500 TABLET, FILM COATED, EXTENDED RELEASE ORAL at 08:38

## 2018-11-08 RX ADMIN — HEPARIN SODIUM 2000 UNITS: 1000 INJECTION INTRAVENOUS; SUBCUTANEOUS at 00:32

## 2018-11-08 RX ADMIN — HYDRALAZINE HYDROCHLORIDE 25 MG: 25 TABLET, FILM COATED ORAL at 23:45

## 2018-11-08 RX ADMIN — CLOPIDOGREL BISULFATE 75 MG: 75 TABLET, FILM COATED ORAL at 06:38

## 2018-11-08 RX ADMIN — NITROGLYCERIN 50 MCG/MIN: 20 INJECTION INTRAVENOUS at 03:53

## 2018-11-08 RX ADMIN — HYDRALAZINE HYDROCHLORIDE 25 MG: 25 TABLET, FILM COATED ORAL at 16:27

## 2018-11-08 RX ADMIN — ASPIRIN 325 MG: 325 TABLET ORAL at 06:20

## 2018-11-08 RX ADMIN — ACETYLCYSTEINE 1000 MG: 500 TABLET, EFFERVESCENT ORAL at 20:13

## 2018-11-08 RX ADMIN — ACETYLCYSTEINE 1000 MG: 500 TABLET, EFFERVESCENT ORAL at 08:38

## 2018-11-08 RX ADMIN — IOPAMIDOL 215 ML: 755 INJECTION, SOLUTION INTRAVENOUS at 07:59

## 2018-11-08 RX ADMIN — ATORVASTATIN CALCIUM 40 MG: 40 TABLET, FILM COATED ORAL at 20:13

## 2018-11-08 ASSESSMENT — PAIN - FUNCTIONAL ASSESSMENT: PAIN_FUNCTIONAL_ASSESSMENT: 0-10

## 2018-11-08 ASSESSMENT — PAIN SCALES - GENERAL
PAINLEVEL_OUTOF10: 0

## 2018-11-08 NOTE — PROGRESS NOTES
hydrocortisone sodium succinate PF  200 mg Intravenous Once    sodium chloride flush  10 mL Intravenous 2 times per day    predniSONE  40 mg Oral Daily    Acetylcysteine  1,000 mg Oral BID    hydrALAZINE  25 mg Oral 3 times per day    metoprolol  5 mg Intravenous Once    atorvastatin  40 mg Oral Nightly    clopidogrel  75 mg Oral Daily    diltiazem  120 mg Oral Daily    ranolazine  500 mg Oral BID    metoprolol succinate  50 mg Oral Daily    insulin lispro  0-6 Units Subcutaneous TID WC    insulin lispro  0-3 Units Subcutaneous Nightly    insulin detemir  52 Units Subcutaneous QAM    insulin detemir  55 Units Subcutaneous Nightly    insulin lispro  7 Units Subcutaneous BID- 8&2    insulin lispro  10 Units Subcutaneous Dinner     ALPRAZolam, diphenhydrAMINE, nitroGLYCERIN, sodium chloride flush, acetaminophen, atropine, magnesium hydroxide, ondansetron, ALPRAZolam, heparin (porcine), heparin (porcine), nitroGLYCERIN, albuterol  IV Drips/Infusions   sodium chloride 100 mL/hr (11/08/18 1143)    nitroGLYCERIN 40 mcg/min (11/08/18 0935)    heparin (porcine) Stopped (11/08/18 5685)     Home Medications  Prescriptions Prior to Admission: bumetanide (BUMEX) 2 MG tablet, Take 2 mg by mouth daily as needed  insulin lispro (HUMALOG KWIKPEN) 100 UNIT/ML pen, Inject 7 Units into the skin 2 times daily before breakfast and lunch plus sliding scale  insulin lispro (HUMALOG KWIKPEN) 100 UNIT/ML pen, Inject 10 Units into the skin daily before dinner plus sliding scale  insulin detemir (LEVEMIR FLEXPEN) 100 UNIT/ML injection pen, Inject 55 Units into the skin nightly  insulin detemir (LEVEMIR FLEXPEN) 100 UNIT/ML injection pen, Inject 52 Units into the skin every morning  lisinopril (PRINIVIL;ZESTRIL) 10 MG tablet, TAKE 1 TABLET TWICE A DAY  nitroGLYCERIN (NITROLINGUAL) 0.4 MG/SPRAY 0.4 mg spray, USE 1 SPRAY ON OR UNDER THE TONGUE EVERY 5 MINUTES AS NEEDED FOR CHEST PAIN  hydrOXYzine (VISTARIL) 25 MG capsule, Take 11/08/18   0424  11/08/18   0539   NA  137   --   134*  135   K  4.9   --   4.8  4.9   CL  101   --   99  101   CO2  20*   --   22*  21*   BUN  28*   --   48*  47*   CREATININE  1.4*  1.5*  1.5*  1.5*   GLUCOSE  156*   --   181*  184*   CALCIUM  9.2   --   8.8  8.8     LFT  Recent Labs      11/07/18   0534  11/08/18   0539   AST  13  18   ALT  12  11   BILITOT  0.3  <0.2*   ALKPHOS  68  61     TROP  Lab Results   Component Value Date    TROPONINT 0.102 11/08/2018    TROPONINT 0.124 11/08/2018    TROPONINT 0.094 11/07/2018     BNP  No results for input(s): BNP in the last 72 hours. Lactic Acid  Recent Labs      11/05/18   2148  11/06/18   0005  11/06/18   0510   LACTA  2.8*  2.8*  1.1     INR  Recent Labs      11/05/18   2147   INR  1.12     PTT  Recent Labs      11/07/18   1505  11/07/18   2225  11/08/18   0424   APTT  42.1*  47.3*  75.3*     Glucose  Recent Labs      11/07/18   2105  11/08/18   0616  11/08/18   1054   POCGLU  280*  196*  136*     UA   Recent Labs      11/05/18   2255  11/06/18   1133   SPECGRAV   --   1.014   PHUR  7.0  6.5   COLORU  YELLOW  YELLOW   PROTEINU  100*  TRACE*   BLOODU  NEGATIVE  NEGATIVE   RBCUA  0-2  NONE SEEN   WBCUA  0-2  NONE SEEN   BACTERIA  NONE  NONE   NITRU  NEGATIVE  NEGATIVE   GLUCOSEU  NEGATIVE   --    BILIRUBINUR  NEGATIVE  NEGATIVE   UROBILINOGEN  0.2  0.2   KETUA  NEGATIVE  NEGATIVE   LABCAST   --   NONE SEEN  NONE SEEN   . PFTs                   Sleep studies                  CPAP study: 6/12/2005                                Cultures    Blood cultures X2- negative. EKG     Echocardiogram     ECHO from 11/7/18    Summary   Technically difficult examination.   This is a suboptimal study due to poor echocardiographic window.   Normal left ventricle size and systolic function. Ejection fraction was   estimated at 50%.  There were no regional left ventricular wall motion   abnormalities and wall thickness was within normal limits.   The mitral valve structure was therapy   -Continue his CPAP with pressure of 13cm H20 during sleep.  -Continue current pulmonology treatments       -Cardiology following planning BOSSMAN @ 1500 today w/Dr. Chelsie Bland       Case discussed with nurse,patient and family including his wife. Questions and concerns addressed. Electronically signed by   KAVON Haddad - CNP on 11/8/2018 at 12:18 PM     Long h/o Dyspnea  Had LHC/RHC earlier, procedure notes pending  BOSSMAN done by Dr Marni Caicedo, only mild MR  Will review RHC when available  Stable for DC from pulmonary standpoint    Patient seen and examined independently by me. Above discussed and I agree with Rocio Marin CNP note Also see my additional comments. Labs, cultures, and radiographs when available were reviewed. Changes were made in the orders as necessary. I discussed patient concerns with Priya RABAGO and instructions were given. Respiratory care issues addressed. Please see our orders for the updated patient care plan.     Electronically signed by     Claudia Skinner MD on 11/8/2018 at 8:13 PM

## 2018-11-08 NOTE — FLOWSHEET NOTE
11/07/18 1934   Encounter Summary   Services provided to: Patient   Continue Visiting (11/7  V)   Volunteer Visit Sofía Chaudhari)   Spiritual/Lutheran   Type Spiritual support

## 2018-11-08 NOTE — PROGRESS NOTES
MG/SPRAY 0.4 mg spray USE 1 SPRAY ON OR UNDER THE TONGUE EVERY 5 MINUTES AS NEEDED FOR CHEST PAIN 10/3/18  Yes Claudy Bermudez MD   hydrOXYzine (VISTARIL) 25 MG capsule Take 1 capsule by mouth nightly as needed for Itching 6/11/18  Yes Paula Santoro MD   clopidogrel (PLAVIX) 75 MG tablet Take 75 mg by mouth daily   Yes Historical Provider, MD   diltiazem (CARDIZEM 12 HR) 120 MG extended release capsule Take 120 mg by mouth daily   Yes Historical Provider, MD   atorvastatin (LIPITOR) 40 MG tablet Take 1 tablet by mouth nightly 5/4/18  Yes Claudy Bermudez MD   metFORMIN (GLUCOPHAGE) 1000 MG tablet Take 1 tablet by mouth 2 times daily (with meals) 4/18/18  Yes Paula Santoro MD   ACCU-CHEK VIRY [facility] LANCETS MISC CHECK THE GLUCOSE FOUR TIMES DAILY E11.59 4/9/18  Yes Paula Santoro MD   CPAP Machine MISC by Does not apply route Please change CPAP pressure to 13 cm H20. 2/7/18  Yes Augustus De Jesus PA-C   ACCU-CHEK COMPACT PLUS strip DX E11.59, CHECK THE GLUCOSE 4 TIMES DAILY 1/19/18  Yes Paula Santoro MD   ranolazine (RANEXA) 500 MG extended release tablet Take 500 mg by mouth 2 times daily   Yes Historical Provider, MD   isosorbide mononitrate (IMDUR) 120 MG extended release tablet Take 120 mg by mouth daily  12/21/17  Yes Historical Provider, MD   apixaban (ELIQUIS) 5 MG TABS tablet TAKE 1 TABLET TWICE A DAY 1/18/18  Yes Claudy Bermudez MD   metoprolol succinate (TOPROL XL) 50 MG extended release tablet TAKE 1 TABLET DAILY 1/18/18  Yes Claudy Bermudez MD   NOVOFINE 32G X 6 MM MISC USE 1 NEW NEEDLE 6 TIMES A DAY AND AS NEEDED 10/31/18   MD ANGELO Alvarez 30G X 8 MM MISC USE 1 NEW NEEDLE 6 TIMES A DAY AND AS NEEDED 8/1/17   Paula Santoro MD   Lancet Devices (ACCU-CHEK SOFTCLIX LANCET DEV) MISC Use one with each blood check 1/14/14 4/2/18  Paula Santoro MD       Vital Signs  Vitals:    11/07/18 1700   BP: (!) 143/54   Pulse: 84   Resp:    Temp:    SpO2:        Physical:  Heart:

## 2018-11-08 NOTE — CARE COORDINATION
11/8/18, 1:58 PM      Eugena Soulier day: 2  Location: [facility]15/015 Reason for admit: Shortness of breath [R06.02]   Procedure:  11/8/18 Left Heart Catheterization, Right Heart Catheterization, lima and svg visulisation by Dr. Saima Silva    11/8/18  BOSSMAN today in Endo/ pending    Treatment Plan of Care: continue accu checks, post cath checks, daily weights, wean IV Nitro, Heparin IV conts, troponin, consult to cardiac rehab. PCP: John Jauregui MD  Readmission Risk Score: 22%  Discharge Plan: plans home with wife who is retired nurse.

## 2018-11-08 NOTE — FLOWSHEET NOTE
11/07/18 1630   Provider Notification   Reason for Communication Evaluate   Provider Name Migdalia   Provider Notification Physician   Method of Communication Secure Message   Response Waiting for response   Notification Time 1630       4:30 pm  [phone] From: Magdi Worthington RN [facility] [address] RE: Darryl Vargas 1C82 - Consult - elevated creatinine , patient to have Heart Cath tomorrow. Unread    Dr. Negron called stating he will see patient.

## 2018-11-09 ENCOUNTER — APPOINTMENT (OUTPATIENT)
Dept: CT IMAGING | Age: 73
DRG: 286 | End: 2018-11-09
Payer: MEDICARE

## 2018-11-09 VITALS
TEMPERATURE: 97.6 F | SYSTOLIC BLOOD PRESSURE: 172 MMHG | HEART RATE: 74 BPM | OXYGEN SATURATION: 98 % | BODY MASS INDEX: 32.71 KG/M2 | WEIGHT: 208.4 LBS | HEIGHT: 67 IN | RESPIRATION RATE: 16 BRPM | DIASTOLIC BLOOD PRESSURE: 82 MMHG

## 2018-11-09 LAB
ANION GAP SERPL CALCULATED.3IONS-SCNC: 12 MEQ/L (ref 8–16)
APTT: 27.8 SECONDS (ref 22–38)
BASOPHILS # BLD: 0.1 %
BASOPHILS ABSOLUTE: 0 THOU/MM3 (ref 0–0.1)
BUN BLDV-MCNC: 36 MG/DL (ref 7–22)
CALCIUM SERPL-MCNC: 8.8 MG/DL (ref 8.5–10.5)
CHLORIDE BLD-SCNC: 103 MEQ/L (ref 98–111)
CO2: 21 MEQ/L (ref 23–33)
CREAT SERPL-MCNC: 1.2 MG/DL (ref 0.4–1.2)
EOSINOPHIL # BLD: 0 %
EOSINOPHILS ABSOLUTE: 0 THOU/MM3 (ref 0–0.4)
ERYTHROCYTE [DISTWIDTH] IN BLOOD BY AUTOMATED COUNT: 13.3 % (ref 11.5–14.5)
ERYTHROCYTE [DISTWIDTH] IN BLOOD BY AUTOMATED COUNT: 47.1 FL (ref 35–45)
GFR SERPL CREATININE-BSD FRML MDRD: 59 ML/MIN/1.73M2
GLUCOSE BLD-MCNC: 209 MG/DL (ref 70–108)
GLUCOSE BLD-MCNC: 229 MG/DL (ref 70–108)
GLUCOSE BLD-MCNC: 270 MG/DL (ref 70–108)
HCT VFR BLD CALC: 29.1 % (ref 42–52)
HEMOGLOBIN: 9.4 GM/DL (ref 14–18)
IMMATURE GRANS (ABS): 0.07 THOU/MM3 (ref 0–0.07)
IMMATURE GRANULOCYTES: 0.5 %
LYMPHOCYTES # BLD: 7.8 %
LYMPHOCYTES ABSOLUTE: 1.1 THOU/MM3 (ref 1–4.8)
MCH RBC QN AUTO: 31.3 PG (ref 26–33)
MCHC RBC AUTO-ENTMCNC: 32.3 GM/DL (ref 32.2–35.5)
MCV RBC AUTO: 97 FL (ref 80–94)
MONOCYTES # BLD: 7.8 %
MONOCYTES ABSOLUTE: 1.1 THOU/MM3 (ref 0.4–1.3)
NUCLEATED RED BLOOD CELLS: 0 /100 WBC
PLATELET # BLD: 323 THOU/MM3 (ref 130–400)
PMV BLD AUTO: 9.8 FL (ref 9.4–12.4)
POTASSIUM SERPL-SCNC: 4.5 MEQ/L (ref 3.5–5.2)
RBC # BLD: 3 MILL/MM3 (ref 4.7–6.1)
SEG NEUTROPHILS: 83.8 %
SEGMENTED NEUTROPHILS ABSOLUTE COUNT: 11.6 THOU/MM3 (ref 1.8–7.7)
SODIUM BLD-SCNC: 136 MEQ/L (ref 135–145)
TROPONIN T: 0.07 NG/ML
TROPONIN T: 0.08 NG/ML
TROPONIN T: 0.08 NG/ML
WBC # BLD: 13.9 THOU/MM3 (ref 4.8–10.8)

## 2018-11-09 PROCEDURE — 84484 ASSAY OF TROPONIN QUANT: CPT

## 2018-11-09 PROCEDURE — 6370000000 HC RX 637 (ALT 250 FOR IP): Performed by: INTERNAL MEDICINE

## 2018-11-09 PROCEDURE — 6370000000 HC RX 637 (ALT 250 FOR IP): Performed by: NURSE PRACTITIONER

## 2018-11-09 PROCEDURE — 36415 COLL VENOUS BLD VENIPUNCTURE: CPT

## 2018-11-09 PROCEDURE — 80048 BASIC METABOLIC PNL TOTAL CA: CPT

## 2018-11-09 PROCEDURE — 85730 THROMBOPLASTIN TIME PARTIAL: CPT

## 2018-11-09 PROCEDURE — 99232 SBSQ HOSP IP/OBS MODERATE 35: CPT | Performed by: INTERNAL MEDICINE

## 2018-11-09 PROCEDURE — 82948 REAGENT STRIP/BLOOD GLUCOSE: CPT

## 2018-11-09 PROCEDURE — APPSS30 APP SPLIT SHARED TIME 16-30 MINUTES: Performed by: NURSE PRACTITIONER

## 2018-11-09 PROCEDURE — 71250 CT THORAX DX C-: CPT

## 2018-11-09 PROCEDURE — 85025 COMPLETE CBC W/AUTO DIFF WBC: CPT

## 2018-11-09 RX ORDER — PREDNISONE 20 MG/1
40 TABLET ORAL DAILY
Qty: 2 TABLET | Refills: 0 | Status: SHIPPED | OUTPATIENT
Start: 2018-11-10 | End: 2018-11-09

## 2018-11-09 RX ORDER — ALBUTEROL SULFATE 90 UG/1
2 AEROSOL, METERED RESPIRATORY (INHALATION) EVERY 6 HOURS PRN
Qty: 1 INHALER | Refills: 3 | Status: SHIPPED | OUTPATIENT
Start: 2018-11-09 | End: 2018-11-09

## 2018-11-09 RX ORDER — FLUTICASONE PROPIONATE 110 UG/1
2 AEROSOL, METERED RESPIRATORY (INHALATION) 2 TIMES DAILY
Qty: 1 INHALER | Refills: 3 | Status: SHIPPED | OUTPATIENT
Start: 2018-11-09 | End: 2019-07-15 | Stop reason: SDUPTHER

## 2018-11-09 RX ORDER — ALPRAZOLAM 0.25 MG/1
0.25 TABLET ORAL DAILY PRN
Qty: 7 TABLET | Refills: 0 | Status: SHIPPED | OUTPATIENT
Start: 2018-11-09 | End: 2018-11-24

## 2018-11-09 RX ORDER — ALBUTEROL SULFATE 90 UG/1
2 AEROSOL, METERED RESPIRATORY (INHALATION) EVERY 6 HOURS PRN
Qty: 1 INHALER | Refills: 3 | Status: SHIPPED | OUTPATIENT
Start: 2018-11-09 | End: 2019-07-15 | Stop reason: SDUPTHER

## 2018-11-09 RX ORDER — BUMETANIDE 2 MG/1
2 TABLET ORAL DAILY
Qty: 30 TABLET | Refills: 0 | Status: ON HOLD | OUTPATIENT
Start: 2018-11-09 | End: 2021-04-08 | Stop reason: HOSPADM

## 2018-11-09 RX ORDER — BUMETANIDE 1 MG/1
2 TABLET ORAL DAILY
Status: DISCONTINUED | OUTPATIENT
Start: 2018-11-09 | End: 2018-11-09 | Stop reason: HOSPADM

## 2018-11-09 RX ORDER — ISOSORBIDE MONONITRATE 60 MG/1
120 TABLET, EXTENDED RELEASE ORAL DAILY
Status: DISCONTINUED | OUTPATIENT
Start: 2018-11-09 | End: 2018-11-09 | Stop reason: HOSPADM

## 2018-11-09 RX ORDER — FLUTICASONE PROPIONATE 110 UG/1
2 AEROSOL, METERED RESPIRATORY (INHALATION) 2 TIMES DAILY
Qty: 1 INHALER | Refills: 3 | Status: SHIPPED | OUTPATIENT
Start: 2018-11-09 | End: 2018-11-09

## 2018-11-09 RX ORDER — PREDNISONE 20 MG/1
40 TABLET ORAL DAILY
Qty: 2 TABLET | Refills: 0 | Status: SHIPPED | OUTPATIENT
Start: 2018-11-10 | End: 2018-11-11

## 2018-11-09 RX ORDER — POTASSIUM CHLORIDE 750 MG/1
10 TABLET, FILM COATED, EXTENDED RELEASE ORAL DAILY
Qty: 30 TABLET | Refills: 0 | Status: SHIPPED | OUTPATIENT
Start: 2018-11-09 | End: 2018-12-19 | Stop reason: ALTCHOICE

## 2018-11-09 RX ORDER — POTASSIUM CHLORIDE 750 MG/1
10 TABLET, FILM COATED, EXTENDED RELEASE ORAL DAILY
Status: DISCONTINUED | OUTPATIENT
Start: 2018-11-09 | End: 2018-11-09 | Stop reason: HOSPADM

## 2018-11-09 RX ORDER — HYDRALAZINE HYDROCHLORIDE 25 MG/1
25 TABLET, FILM COATED ORAL EVERY 8 HOURS SCHEDULED
Qty: 90 TABLET | Refills: 0 | Status: SHIPPED | OUTPATIENT
Start: 2018-11-09 | End: 2022-08-08 | Stop reason: SDUPTHER

## 2018-11-09 RX ADMIN — RANOLAZINE 500 MG: 500 TABLET, FILM COATED, EXTENDED RELEASE ORAL at 08:00

## 2018-11-09 RX ADMIN — HYDRALAZINE HYDROCHLORIDE 25 MG: 25 TABLET, FILM COATED ORAL at 15:31

## 2018-11-09 RX ADMIN — CLOPIDOGREL BISULFATE 75 MG: 75 TABLET, FILM COATED ORAL at 08:04

## 2018-11-09 RX ADMIN — ACETYLCYSTEINE 1000 MG: 500 TABLET, EFFERVESCENT ORAL at 08:00

## 2018-11-09 RX ADMIN — ISOSORBIDE MONONITRATE 120 MG: 60 TABLET ORAL at 15:59

## 2018-11-09 RX ADMIN — HYDRALAZINE HYDROCHLORIDE 25 MG: 25 TABLET, FILM COATED ORAL at 08:00

## 2018-11-09 RX ADMIN — PREDNISONE 40 MG: 20 TABLET ORAL at 08:00

## 2018-11-09 RX ADMIN — METOPROLOL SUCCINATE 50 MG: 50 TABLET, FILM COATED, EXTENDED RELEASE ORAL at 08:04

## 2018-11-09 RX ADMIN — DILTIAZEM HYDROCHLORIDE 120 MG: 60 CAPSULE, EXTENDED RELEASE ORAL at 08:00

## 2018-11-09 NOTE — CARE COORDINATION
11/9/18, 12:51 PM    Anticipate discharge today. Home with wife. Denies needs. Discharge plan discussed by  and . Discharge plan reviewed with patient/ family. Patient/ family verbalize understanding of discharge plan and are in agreement with plan. Understanding was demonstrated using the teach back method.        IMM Letter  IMM Letter given to Patient/Family/Significant other/Guardian/POA/by[de-identified] Naveen Villa CM  IMM Letter date given[de-identified] 11/09/18  IMM Letter time given[de-identified] 0759

## 2018-11-09 NOTE — PROGRESS NOTES
[facility]         Renal Inpatient Follow-Up note         11/9/2018 8:14 AM    Pt Name:   Arabella Inch:   [birth year]  Attending:   Janell Melchor MD    Chief Complaint : Christi Gottlieb is a 68 y.o. 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 No distress  Feels ok.  Just returned from cath - had CAD with no intervention  No cp still some SOB     Scheduled Medications :    diphenhydrAMINE  50 mg Oral Once    hydrocortisone sodium succinate PF  200 mg Intravenous Once    sodium chloride flush  10 mL Intravenous 2 times per day    predniSONE  40 mg Oral Daily    Acetylcysteine  1,000 mg Oral BID    hydrALAZINE  25 mg Oral 3 times per day    metoprolol  5 mg Intravenous Once    atorvastatin  40 mg Oral Nightly    clopidogrel  75 mg Oral Daily    diltiazem  120 mg Oral Daily    ranolazine  500 mg Oral BID    metoprolol succinate  50 mg Oral Daily    insulin lispro  0-6 Units Subcutaneous TID WC    insulin lispro  0-3 Units Subcutaneous Nightly    insulin detemir  52 Units Subcutaneous QAM    insulin detemir  55 Units Subcutaneous Nightly    insulin lispro  7 Units Subcutaneous BID- 8&2    insulin lispro  10 Units Subcutaneous Dinner      nitroGLYCERIN 40 mcg/min (11/08/18 2018)    heparin (porcine) Stopped (11/08/18 0545)       Vitals :  BP (!) 184/72   Pulse 65   Temp 97.4 °F (36.3 °C) (Oral)   Resp 16   Ht 5' 7\" (1.702 m)   Wt 208 lb 6.4 oz (94.5 kg)   SpO2 96%   BMI 32.64 kg/m²     24HR INTAKE/OUTPUT:      Intake/Output Summary (Last 24 hours) at 11/09/18 0814  Last data filed at 11/09/18 0346   Gross per 24 hour   Intake          1597.41 ml   Output             2600 ml   Net         -1002.59 ml     Last 3 weights  Wt Readings from Last 3 Encounters:   11/09/18 208 lb 6.4 oz (94.5 kg)   11/02/18 213 lb (96.6 kg)   09/20/18 211 lb 1.6 oz (95.8 kg)           Physical Exam :  General

## 2018-11-09 NOTE — PROGRESS NOTES
33.1  33.1  32.3   PLT  294  307  323   MPV  9.9  9.7  9.8      BMP  Recent Labs      11/08/18   0424  11/08/18   0539  11/09/18   0343   NA  134*  135  136   K  4.8  4.9  4.5   CL  99  101  103   CO2  22*  21*  21*   BUN  48*  47*  36*   CREATININE  1.5*  1.5*  1.2   GLUCOSE  181*  184*  229*   CALCIUM  8.8  8.8  8.8     LFT  Recent Labs      11/07/18   0534  11/08/18   0539   AST  13  18   ALT  12  11   BILITOT  0.3  <0.2*   ALKPHOS  68  61     TROP  Lab Results   Component Value Date    TROPONINT 0.080 11/09/2018    TROPONINT 0.071 11/09/2018    TROPONINT 0.048 11/08/2018     BNP  No results for input(s): BNP in the last 72 hours. Lactic Acid  No results for input(s): LACTA in the last 72 hours. INR  No results for input(s): INR, PROTIME in the last 72 hours. PTT  Recent Labs      11/08/18   0424  11/08/18   2236  11/09/18   0343   APTT  75.3*  28.8  27.8     Glucose  Recent Labs      11/08/18   1643  11/08/18   2020  11/09/18   1045   POCGLU  219*  324*  209*     UA   No results for input(s): SPECGRAV, PHUR, COLORU, CLARITYU, MUCUS, PROTEINU, BLOODU, RBCUA, WBCUA, BACTERIA, NITRU, GLUCOSEU, BILIRUBINUR, UROBILINOGEN, KETUA, LABCAST, LABCASTTY, AMORPHOS in the last 72 hours. Invalid input(s): CRYSTALS. PFTs                   Sleep studies                  CPAP study: 6/12/2005                                Cultures    Blood cultures X2- negative. EKG     Echocardiogram     ECHO from 11/7/18    Summary   Technically difficult examination.   This is a suboptimal study due to poor echocardiographic window.   Normal left ventricle size and systolic function. Ejection fraction was   estimated at 50%. There were no regional left ventricular wall motion   abnormalities and wall thickness was within normal limits.   The mitral valve structure was normal with normal leaflet separation.   DOPPLER: The transmitral velocity was within the normal range with no   evidence for mitral stenosis.  There was mild mitral

## 2018-11-10 ENCOUNTER — CARE COORDINATION (OUTPATIENT)
Dept: CASE MANAGEMENT | Age: 73
End: 2018-11-10

## 2018-11-10 DIAGNOSIS — I25.10 CORONARY ARTERY DISEASE INVOLVING NATIVE CORONARY ARTERY, ANGINA PRESENCE UNSPECIFIED, UNSPECIFIED WHETHER NATIVE OR TRANSPLANTED HEART: Primary | ICD-10-CM

## 2018-11-10 LAB — CK ISOENZYMES: NORMAL

## 2018-11-11 LAB
BLOOD CULTURE, ROUTINE: NORMAL
BLOOD CULTURE, ROUTINE: NORMAL

## 2018-11-12 ENCOUNTER — TELEPHONE (OUTPATIENT)
Dept: FAMILY MEDICINE CLINIC | Age: 73
End: 2018-11-12

## 2018-11-15 ENCOUNTER — CARE COORDINATION (OUTPATIENT)
Dept: CASE MANAGEMENT | Age: 73
End: 2018-11-15

## 2018-11-16 ENCOUNTER — OFFICE VISIT (OUTPATIENT)
Dept: FAMILY MEDICINE CLINIC | Age: 73
End: 2018-11-16

## 2018-11-16 VITALS
SYSTOLIC BLOOD PRESSURE: 132 MMHG | HEIGHT: 67 IN | HEART RATE: 64 BPM | WEIGHT: 210.13 LBS | BODY MASS INDEX: 32.98 KG/M2 | RESPIRATION RATE: 13 BRPM | DIASTOLIC BLOOD PRESSURE: 60 MMHG

## 2018-11-16 DIAGNOSIS — I50.31 ACUTE DIASTOLIC CONGESTIVE HEART FAILURE (HCC): Primary | ICD-10-CM

## 2018-11-16 DIAGNOSIS — I10 ESSENTIAL HYPERTENSION: ICD-10-CM

## 2018-11-16 DIAGNOSIS — R80.9 TYPE 2 DIABETES MELLITUS WITH MICROALBUMINURIA, WITH LONG-TERM CURRENT USE OF INSULIN (HCC): ICD-10-CM

## 2018-11-16 DIAGNOSIS — Z79.4 TYPE 2 DIABETES MELLITUS WITH MICROALBUMINURIA, WITH LONG-TERM CURRENT USE OF INSULIN (HCC): ICD-10-CM

## 2018-11-16 DIAGNOSIS — I50.30 DIASTOLIC CONGESTIVE HEART FAILURE, UNSPECIFIED HF CHRONICITY (HCC): ICD-10-CM

## 2018-11-16 DIAGNOSIS — E11.29 TYPE 2 DIABETES MELLITUS WITH MICROALBUMINURIA, WITH LONG-TERM CURRENT USE OF INSULIN (HCC): ICD-10-CM

## 2018-11-16 PROCEDURE — 99496 TRANSJ CARE MGMT HIGH F2F 7D: CPT | Performed by: FAMILY MEDICINE

## 2018-11-16 PROCEDURE — 1111F DSCHRG MED/CURRENT MED MERGE: CPT | Performed by: FAMILY MEDICINE

## 2018-11-21 ENCOUNTER — HOSPITAL ENCOUNTER (OUTPATIENT)
Dept: CARDIAC REHAB | Age: 73
Setting detail: THERAPIES SERIES
Discharge: HOME | End: 2018-11-21
Payer: MEDICARE

## 2018-11-21 PROCEDURE — G0423 INTENS CARDIAC REHAB NO EXER: HCPCS

## 2018-11-21 PROCEDURE — G0422 INTENS CARDIAC REHAB W/EXERC: HCPCS

## 2018-11-21 NOTE — PLAN OF CARE
[facility] Program - [facility] URCMPSP  [id number] Facility-Based Program  Individualized Cardiac Treatment Plan    Patient Name:  Talha Celestin  :  1945  Age:  68 y.o. MRN:  [id number]  Diagnosis: PCI: RCA x 5  Date of Event: 18   Physician:  Lorraine Aguilar Office Visit:    Date Entered Program: 18  Risk Stratifications: [] Low [] Intermediate [x] High  Allergies: No Known Allergies    Individual Cardiac Treatment Plan -EXERCISE  INITIAL 30 DAY 60 DAY 90 DAY FINAL DAY   EXERCISE  ASSESSMENT/PLAN EXERCISE  REASSESSMENT EXERCISE   REASSESSMENT EXERCISE   REASSESSMENT EXERCISE  DISCHARGE/FOLLOW-UP   Date: 18 Date: 18 Date: 18 Date: 10/15/18 Date: 18   Session #1 Session # 25 Session # 32 Session # 46 Session # 67  Last session completed on 18   Stages of Change Stages of Change Stages of Change Stages of Change Stages of Change   [] Pre Contemplation  [x] Contemplation  [] Preparation  [] Action  [] Maintenance  [] Relapse [] Pre Contemplation  [x] Contemplation  [] Preparation  [] Action  [] Maintenance  [] Relapse [] Pre Contemplation  [x] Contemplation  [] Preparation  [] Action  [] Maintenance  [] Relapse [] Pre Contemplation  [x] Contemplation  [] Preparation  [] Action  [] Maintenance  [] Relapse [] Pre Contemplation  [x] Contemplation  [] Preparation  [] Action  [] Maintenance  [] Relapse   EXERCISE ASSESSMENT EXERCISE ASSESSMENT EXERCISE ASSESSMENT EXERCISE ASSESSMENT EXERCISE ASSESSMENT   6 Min Walk Test  Distance walked:   0.12 miles  633.6 ft.  1.9 METs  Max HR:77 BPM      RPE:  12    THR:    Rhythm:  NSR with occ PAC's    6 min walk test not completed d/t pt having severe chest pain while walking. DASI: 8.9 METS DASI: 6.67 METS DASI: 6.11METS DASI: 7.4 METS 7.31 METS   Return to Work  [employer] on returning to work?    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 Progression: Increase aerobic activity up to 30 mins over the next 4 weeks. Progression: Pt is not progressing due to angina and poor attendance   Progression:  Continue to increase intensity as tolerated. D/t chest pain pt is difficult to progress. Progression:  Increase time/intensity when RPE <13, and HR is in [facility]   [x] Yes      [] No  Upper and Lower body strength training 2x/wk    Wt: 2#       Reps:  8-15    *Increase wt. after completing 15 reps with an RPE of <12/13. [x] Yes      [] No  Upper and Lower body strength training 2x/wk    Wt: 2#       Reps:  8-15    *Increase wt. after completing 15 reps with an RPE of <12/13. [x] Yes      [] No  Upper and Lower body strength training 2x/wk    Wt: 2#       Reps:  8-15    *Increase wt. after completing 15 reps with an RPE of <12/13. [x] Yes      [] No  Upper and Lower body strength training 2x/wk    Wt: 2#       Reps:  8-15    *Increase wt. after completing 15 reps with an RPE of <12/13. Wt: 2#       Reps:  8-15    *Increase wt. after completing 15 reps with an RPE of <12/13.    Flexibility Flexibility Flexibility Flexibility Flexibility   [x] Yes      [] No  25 min session of Core Strength & Flexibility 1x/per week  Attends Core Strength & Flexibility   [x] Yes      [] No Attends Core Strength & Flexibility   [x] Yes      [] No Attends Core Strength & Flexibility   [x] Yes      [] No Continue Core Strength & Flexibility at home   Home Hobbies:  Mercedes Santana would like to improve strength and endurance so he is able to return to [employer], gardening,  volunteering Return to ADL or Hobbies:  Mercedes Santana would like to improve strength and endurance so he is able to return to yardwork Return to ADL or Hobbies:  Mercedes Santana would like to improve strength and endurance so he is able to return to all previous activities Return to ADL or Hobbies:  Mercedes Santana would like to improve strength and endurance so he is able to return to all previous activities. Return to ADL or Hobbies:  Mercedes Santana would like to improve strength and endurance so he is able to return to previous activities    *MET level required for above goal:  5-6 METs MET level Achieved:  6.67 METS MET level Achieved:  2. 2METS MET level Achieved:  2.2 METS MET level Achieved:  2.2 METS     Individual Cardiac Treatment Plan - Nutrition  NUTRITION  ASSESSMENT/PLAN NUTRITION  REASSESSMENT NUTRITION   REASSESSMENT NUTRITION   REASSESSMENT NUTRITION  DISCHARGE/FOLLOW-UP   Stages of Change Stages of Change Stages of Change Stages of Change Stages of Change   [] Pre Contemplation  [x] Contemplation  [] Preparation  [] Action  [] Maintenance  [] Relapse [] Pre Contemplation  [x] Contemplation  [] Preparation  [] Action  [] Maintenance  [] Relapse [x] Pre Contemplation  [] Contemplation  [] Preparation  [] Action  [] Maintenance  [] Relapse [] Pre Contemplation  [x] Contemplation  [] Preparation  [] Action  [] Maintenance  [] Relapse [] Pre Contemplation  [x] Contemplation  [] Preparation  [] Action  [] Maintenance  [] Relapse   NUTRITION ASSESSMENT NUTRITION ASSESSMENT NUTRITION ASSESSMENT NUTRITION ASSESSMENT NUTRITION ASSESSMENT   Weight Management  Weight: 212.8        Height: 69  BMI: 31.4 Weight Management  Weight: 212.4                 Weight Management  Weight: 214                  Weight Management  Weight: 214.2 Weight Management  Weight: 213                  BMI: 31.4   Eating Plan  Current eating habits: low salt Eating Plan  Changes: none Eating Plan  Changes: none Eating Plan  Changes:none Eating Plan Improvements: none   Alcohol Use  [x] none          [] daily  [] weekly      [] special          Diet Assessment Tool:  RATE YOUR PLATE  *Given to patient to complete and return. Diet Assessment Tool:    Score: 50/69       Diet Assessment Tool: RATE YOUR PLATE  Score: 46/71   NUTRITION PLAN NUTRITION PLAN NUTRITION PLAN NUTRITION PLAN NUTRITION PLAN   *Interventions* *Interventions* *Interventions* *Interventions* *Interventions*   Initial Survey given Goal Setting Discussion:   [x] Yes      [] No       Follow Up Survey Reviewed & Goals Updated:     Professional Referral  Please check if needed. [] Dietician Consult   [] Wt. Management Referral  [] Other:  Professional Referral  Please check if needed. [] Dietician Consult   [] Wt. Management Referral  [] Other: Professional Referral  Please check if needed. [] Dietician Consult   [] Wt. Management Referral  [] Other: Professional Referral  Please check if needed. [] Dietician Consult   [] Wt. Management Referral  [] Other: Professional Referral  Please check if needed. [] Dietician Consult   [] Wt. Management Referral  [] Other:   *Education* *Education* *Education* *Education* *Education*   Nutritional Education Recommended    [x] 1:1 Registered Dietitian    Workshops  [x] Label Reading   [x] Menu  [x] Targeting Nutrition Priorities  [x] Fueling a Healthy Body   Nutritional Education Attended/Date    [x] 1:1 Registered Dietitian-8/8/18    [x] Menu-8/15/18 Nutritional Education Attended/Date   Nutritional Education Attended/Date    [x] Targeting Nutrition Priorities 9/26    [x] Fueling a Healthy Body 9/15/18 All Sessions Completed?     [x] Yes  [] No   Cooking School    [x] 11 sessions recommended     *Adding Flavor  *Fast & Healthy     Breakfasts  *Salads & Dressings  *Soups & Simple     Sauces  *Simple Sides  *Appetizers &     Snacks  *Delicious Desserts  *Plant See prior note. No changes. Signs and Symptoms of Anxiety Present? [] Yes      [x] No   Signs and Symptoms of Anxiety Present? [] Yes      [x] No   Signs and Symptoms of Anxiety Present? [] Yes      [x] No  If yes, please explain:  ** Signs and Symptoms of Anxiety Present? [x] Yes      [] No  If yes, please explain:  Pt inconsistent with self report of anxiety and depression levels. Pt not willing to discuss it with staff. Signs and Symptoms of Anxiety Present? [x] Yes      [] No  If yes, please explain:  See prior note. [x] Patient has poor eye contact   [x] Flat affect present. [] Signs of anxiety, anger or hostility    [] Signs social isolation present. []  Signs of alcohol or substance abuse       PSYCHOSOCIAL PLAN PSYCHOSOCIAL PLAN PSYCHOSOCIAL PLAN PSYCHOSOCIAL PLAN PSYCHOSOCIAL PLAN   *Interventions* *Interventions* *Interventions* *Interventions* *Interventions*   *Please check if needed  [] Psych Consult  [] Physician Referral  [x] Stress Management Skills *Please check if needed  [] Psych Consult  [] Physician Referral  [x] Stress Management Skills *Please check if needed  [] Psych Consult  [] Physician Referral  [x] Stress Management Skills *Please check if needed  [] Psych Consult  [] Physician Referral  [x] Stress Management Skills *Please check if needed  [] Psych Consult  [] Physician Referral  [x] Stress Management Skills   Is patient currently taking anti-depressant or anti-anxiety medications? [] Yes      [x] No   Change in anti-depressant or anti-anxiety medications? [] Yes      [x] No   Change in anti-depressant or anti-anxiety medications? [] Yes      [x] No  If yes, please list medications:  ** Change in anti-depressant or anti-anxiety medications? [] Yes      [x] No   Change in anti-depressant or anti-anxiety medications?   [] Yes      [x] No     *Education* *Education* *Education* *Education* *Education*   Healthy Mind-Set Workshops Recommended  [x] Stress &

## 2018-11-21 NOTE — PROGRESS NOTES
Video Education Report - ICR/CR    Name:  Amy Gomes     Date:  11/21/2018  MRN: [id number]     Session #:  39  Session Length: 45 min    Recommended Videos        []01 [employer] - Program Overview   34:22    []02 Overview of [employer] Eating Plan   34:10    []03 Becoming a Yessenia Sprung   33:08     []04 Diseases of Our Time - Part 1   34:22    []05 Calorie Density     33:39   []06 Label Reading - Part 1    32:15   []07 Move it      32.54   []08 Healthy Minds, Bodies, Hearts   32:14   []09 Dining Out - Part 1    32:28   []10 Heart Disease Risk Reduction   64:93   []60 Metabolic Syndrome and Belly Fat  31:52   []12 Facts on Fat     35:29   []13 Diseases of Our Time - Part 2   33:07   []14 Biology of Weight Control   32:36   []15 Biomechanical Limitations   35:20   []16 Nutrition Action Plan    34:23    Additional Videos         [x]17 Hypertension & Heart Disease   32:39         Comments:  Video completed,

## 2018-11-23 ENCOUNTER — HOSPITAL ENCOUNTER (OUTPATIENT)
Age: 73
Discharge: HOME | End: 2018-11-23
Payer: MEDICARE

## 2018-11-23 DIAGNOSIS — R97.20 ELEVATED PSA: ICD-10-CM

## 2018-11-23 DIAGNOSIS — R06.02 SHORTNESS OF BREATH: ICD-10-CM

## 2018-11-23 LAB
ANION GAP SERPL CALCULATED.3IONS-SCNC: 14 MEQ/L (ref 8–16)
BASOPHILS # BLD: 0.4 %
BASOPHILS ABSOLUTE: 0 THOU/MM3 (ref 0–0.1)
BUN BLDV-MCNC: 28 MG/DL (ref 7–22)
CALCIUM SERPL-MCNC: 9.9 MG/DL (ref 8.5–10.5)
CHLORIDE BLD-SCNC: 102 MEQ/L (ref 98–111)
CO2: 25 MEQ/L (ref 23–33)
CREAT SERPL-MCNC: 1.4 MG/DL (ref 0.4–1.2)
EOSINOPHIL # BLD: 1 %
EOSINOPHILS ABSOLUTE: 0.1 THOU/MM3 (ref 0–0.4)
ERYTHROCYTE [DISTWIDTH] IN BLOOD BY AUTOMATED COUNT: 13.9 % (ref 11.5–14.5)
ERYTHROCYTE [DISTWIDTH] IN BLOOD BY AUTOMATED COUNT: 50.1 FL (ref 35–45)
GFR SERPL CREATININE-BSD FRML MDRD: 50 ML/MIN/1.73M2
GLUCOSE BLD-MCNC: 118 MG/DL (ref 70–108)
HCT VFR BLD CALC: 35.5 % (ref 42–52)
HEMOGLOBIN: 11.3 GM/DL (ref 14–18)
IMMATURE GRANS (ABS): 0.03 THOU/MM3 (ref 0–0.07)
IMMATURE GRANULOCYTES: 0.3 %
LYMPHOCYTES # BLD: 12.7 %
LYMPHOCYTES ABSOLUTE: 1.2 THOU/MM3 (ref 1–4.8)
MCH RBC QN AUTO: 31.6 PG (ref 26–33)
MCHC RBC AUTO-ENTMCNC: 31.8 GM/DL (ref 32.2–35.5)
MCV RBC AUTO: 99.2 FL (ref 80–94)
MONOCYTES # BLD: 8.2 %
MONOCYTES ABSOLUTE: 0.8 THOU/MM3 (ref 0.4–1.3)
NUCLEATED RED BLOOD CELLS: 0 /100 WBC
PLATELET # BLD: 258 THOU/MM3 (ref 130–400)
PMV BLD AUTO: 9.9 FL (ref 9.4–12.4)
POTASSIUM SERPL-SCNC: 5 MEQ/L (ref 3.5–5.2)
PROSTATE SPECIFIC ANTIGEN: 2.76 NG/ML (ref 0–1)
RBC # BLD: 3.58 MILL/MM3 (ref 4.7–6.1)
SEG NEUTROPHILS: 77.4 %
SEGMENTED NEUTROPHILS ABSOLUTE COUNT: 7.4 THOU/MM3 (ref 1.8–7.7)
SODIUM BLD-SCNC: 141 MEQ/L (ref 135–145)
WBC # BLD: 9.5 THOU/MM3 (ref 4.8–10.8)

## 2018-11-23 PROCEDURE — 85025 COMPLETE CBC W/AUTO DIFF WBC: CPT

## 2018-11-23 PROCEDURE — 84153 ASSAY OF PSA TOTAL: CPT

## 2018-11-23 PROCEDURE — 36415 COLL VENOUS BLD VENIPUNCTURE: CPT

## 2018-11-23 PROCEDURE — 80048 BASIC METABOLIC PNL TOTAL CA: CPT

## 2018-11-26 ENCOUNTER — OFFICE VISIT (OUTPATIENT)
Dept: NEPHROLOGY | Age: 73
End: 2018-11-26
Payer: MEDICARE

## 2018-11-26 VITALS
HEART RATE: 61 BPM | BODY MASS INDEX: 32.89 KG/M2 | WEIGHT: 210 LBS | DIASTOLIC BLOOD PRESSURE: 54 MMHG | OXYGEN SATURATION: 98 % | SYSTOLIC BLOOD PRESSURE: 120 MMHG

## 2018-11-26 DIAGNOSIS — N18.30 CKD (CHRONIC KIDNEY DISEASE) STAGE 3, GFR 30-59 ML/MIN (HCC): Primary | ICD-10-CM

## 2018-11-26 DIAGNOSIS — I50.32 CHRONIC DIASTOLIC CONGESTIVE HEART FAILURE (HCC): ICD-10-CM

## 2018-11-26 DIAGNOSIS — I10 ESSENTIAL HYPERTENSION: ICD-10-CM

## 2018-11-26 PROCEDURE — 3017F COLORECTAL CA SCREEN DOC REV: CPT | Performed by: INTERNAL MEDICINE

## 2018-11-26 PROCEDURE — G8598 ASA/ANTIPLAT THER USED: HCPCS | Performed by: INTERNAL MEDICINE

## 2018-11-26 PROCEDURE — 1036F TOBACCO NON-USER: CPT | Performed by: INTERNAL MEDICINE

## 2018-11-26 PROCEDURE — 4040F PNEUMOC VAC/ADMIN/RCVD: CPT | Performed by: INTERNAL MEDICINE

## 2018-11-26 PROCEDURE — 1123F ACP DISCUSS/DSCN MKR DOCD: CPT | Performed by: INTERNAL MEDICINE

## 2018-11-26 PROCEDURE — G8428 CUR MEDS NOT DOCUMENT: HCPCS | Performed by: INTERNAL MEDICINE

## 2018-11-26 PROCEDURE — G8417 CALC BMI ABV UP PARAM F/U: HCPCS | Performed by: INTERNAL MEDICINE

## 2018-11-26 PROCEDURE — 1101F PT FALLS ASSESS-DOCD LE1/YR: CPT | Performed by: INTERNAL MEDICINE

## 2018-11-26 PROCEDURE — 1111F DSCHRG MED/CURRENT MED MERGE: CPT | Performed by: INTERNAL MEDICINE

## 2018-11-26 PROCEDURE — G8484 FLU IMMUNIZE NO ADMIN: HCPCS | Performed by: INTERNAL MEDICINE

## 2018-11-26 PROCEDURE — 99213 OFFICE O/P EST LOW 20 MIN: CPT | Performed by: INTERNAL MEDICINE

## 2018-11-27 ENCOUNTER — CARE COORDINATION (OUTPATIENT)
Dept: CASE MANAGEMENT | Age: 73
End: 2018-11-27

## 2018-11-28 NOTE — PLAN OF CARE
vounteer  *Required MET level=4-5  *Return to Work Date: already back Return to work: Has Yesenia Dey returned to work? [x] Yes    [] No      Yesenia Dey is doing well at work. Return to work: Has Yesenia Dey returned to work? [x] Yes    [] No     Return to work: Has Yesenia Dey returned to work? [x] Yes    [] No        Yesenia Dey is doing well at work. Return to work: Has Yesenia Dey returned to work? [x] Yes    [] No      *Required MET Level achieved for job duties? [x] Yes    [] No   Orthopedic Limitations/  [x] Yes    [] No  If yes please list:  Hip pain     Orthopedic Limitations  *If patient has orthopedic issue:   Actions/  accomodations needed to make Wilfrid successful : increase aerobic activity at a slow, steady pace Orthopedic Limitations  No AD   Orthopedic Limitations   Orthopedic Limitations     Fall Risk  Fall risk assessed? [x] Yes      [] No    Balance Issues? [x] Yes      [] No     [] Walker [] Cane    [x] Safety issues reviewed      Fall Risk  *If patient is a fall risk, action needed to accommodate:increase aerobic activity at a slow, steady pace. Take necessary rest break. Fall Risk na Fall Risk Fall Risk   Home Exercise  [x] Yes    [] No  Type: walking  Frequency: 4 x per week  Duration: 10 min Home Exercise  [] Yes    [x] No   Home Exercise  [] Yes    [x] No   Home Exercise  [] Yes    [x] No   Home Exercise  [x] Yes    [] No     Angina with Activity? [] Yes    [x] No  Angina Management: na Angina with Activity? [x] Yes    [] No  Angina Management: rest, nitro Angina with Activity? [x] Yes    [] No  Angina Management: nitro  Pt pre-treats with nitro at cardiac rehab. Angina with Activity? [x] Yes    [] No  Angina Management: pre treats with nitro before cardiac rehab session. Angina with Activity? [x] Yes    [] No  Angina Management: pre-treat with nitro before exercise, and treat c/p with nitro. When pt has c/p during rehab staff does not feel he is being honest when rating his pain. Duration:  Total aerobic exercises = 30 min     12 min/mode Duration:  Total erobic exercise =  60-90 min   Intensity:   MET Level = 1.9-2.1  RPE = 12-15 Intensity:  Max MET Level = 2.2  RPE = 12-15 Intensity:  Max MET Level = 2.2  RPE = 12-15 Intensity:  Max MET Level = 2.8  RPE = 12-15 Intensity:  Max MET Level = 2.2 RPE = 12-15   Progression: increase aerobic activity up to 30 min over next 4 weeks by increasing time 2-3 min/week. Progression: Increase aerobic activity up to 30 mins over the next 4 weeks. Progression: Pt is not progressing due to angina and poor attendance   Progression:  Continue to increase intensity as tolerated. D/t chest pain pt is difficult to progress. Progression:  Increase time/intensity when RPE <13, and HR is in [facility]   [x] Yes      [] No  Upper and Lower body strength training 2x/wk    Wt: 2#       Reps:  8-15    *Increase wt. after completing 15 reps with an RPE of <12/13. [x] Yes      [] No  Upper and Lower body strength training 2x/wk    Wt: 2#       Reps:  8-15    *Increase wt. after completing 15 reps with an RPE of <12/13. [x] Yes      [] No  Upper and Lower body strength training 2x/wk    Wt: 2#       Reps:  8-15    *Increase wt. after completing 15 reps with an RPE of <12/13. [x] Yes      [] No  Upper and Lower body strength training 2x/wk    Wt: 2#       Reps:  8-15    *Increase wt. after completing 15 reps with an RPE of <12/13. Wt: 2#       Reps:  8-15    *Increase wt. after completing 15 reps with an RPE of <12/13.    Flexibility Flexibility Flexibility Flexibility Flexibility   [x] Yes      [] No  25 min session of Core Strength & Flexibility 1x/per week  Attends Core Strength & Flexibility   [x] Yes      [] No Attends Core Strength & Flexibility   [x] Yes      [] No Attends Core Strength & Flexibility   [x] Yes      [] No Continue Core Strength & Flexibility at home   Home ASSESSMENT PSYCHOSOCIAL ASSESSMENT   Behavioral Outcomes Behavioral Outcomes Behavioral Outcomes Behavioral Outcomes Behavioral Outcomes   Tool Used:  Gilda Colin, Quality of Life Index, Cardiac Version IV  *Given to patient to complete. Tool Used:    Gilda & Cristi, Quality of Life Index, Cardiac Version IV     Pt did not return   Tool Used:     Gilda & Cristi, Quality of Life Index, Cardiac Version IV    PT DID NOT COMPLETE SURVEY   PHQ-9 score 1  Depression Severity  [x]Minimal  []Mild   []Moderate  []Moderately Severe  []Severe    PHQ-9 score 5  Depression Severity  []Minimal  [x]Mild   []Moderate  []Moderately Severe []Severe   Does patient have Family Support? [x] Yes      [] No  No signs of marital/family distress       Within the Past Month:  *Have you wished you were dead or wished you could go to sleep and not wake up? [] Yes      [x] No  *Have you had any thoughts of killing yourself? [] Yes      [x] No         Using a scale of 0-10, 0=none, 10=very:   Rate your depression: 0  Rate your anxiety:  0  Using a scale of 0-10, 0=none, 10=very:   Rate your depression: 0  Rate your anxiety: 0 Using a scale of 0-10, 0=none, 10=very:   Rate your depression: 0  Rate your anxiety:  5 Using a scale of 0-10, 0=none, 10=very:   Rate your depression: 3  Rate your anxiety:  0 Using a scale of 0-10, 0=none, 10=very:   Rate your depression: 0  Rate your anxiety:  0   Signs and Symptoms of Depression Present? [x] Yes      [] No  If yes, please explain:  Poor eye contact, difficulty concentrating , slow to answer questions Signs and Symptoms of Depression Present? [x] Yes      [] No  If yes, please explain:  See prior note Signs and Symptoms of Depression Present? [] Yes      [x] No  If yes, please explain:  ** Signs and Symptoms of Depression Present? [x] Yes      [] No  If yes, please explain:  See prior note. Signs and Symptoms of Depression Present?     [x] Yes      [] No  If yes, please explain: Maintenance  [] Relapse   RISK FACTOR/EDUCATION ASSESSMENT RISK FACTOR/EDUCATION ASSESSMENT RISK FACTOR/EDUCATION ASSESSMENT RISK FACTOR/EDUCATION ASSESSMENT RISK FACTOR /EDUCATION ASSESSMENT   Hypertension  [x] Yes      [] No    Resting BP: 144/52  Peak Ex BP:168/52  Medication: lisinopril, metoprolol, diltiazem   Hypertension  Resting BP: 112/58  Peak Ex BP:162/60  Medication Changes:  [] Yes      [x] No Hypertension  Resting BP: 148/42  Peak Ex BP:154/80  Medication Changes:  [] Yes      [x] No Hypertension  Resting BP: 132/58  Peak Ex BP:152/52  Medication Changes:  [] Yes      [x] No Hypertension  Resting BP: 130/54  Peak Ex BP:160/72  Medication Changes:  [] Yes      [x] No   Lipids  HLD/DLD  [x] Yes      [] No  TOTAL CHOL: 135  HDL:  35  LDL:  68  TRI  Medication: atorvastatin Lipids  Medication Changes:  [] Yes      [x] No     Lipids  Medication Changes:  [] Yes      [x] No     Lipids  Medication Changes:  [] Yes      [x] No     Lipids    TOTAL CHOL: 147  HDL:  28  LDL:  88  TRI  Medication Changes:  [] Yes      [x] No   Diabetes  [x] Yes      [] No  FBS: 138           HbA1C:        Monitor BS @ home:   [x] Yes      [] No  Frequency: 4 x per day  Medication: humalog, levemir, metformin Diabetes  Most Recent BS: 80  BS have been in range  [x] Yes      [] No  Medication Changes  [] Yes      [x] No   Diabetes  Most Recent BS:  BS have been in range  [x] Yes      [] No  Medication Changes  [] Yes      [x] No     Diabetes  Most Recent BS: 112  BS have been in range  [x] Yes      [] No  Medication Changes  [] Yes      [x] No     Diabetes  Most Recent BS:  BS have been in range  [x] Yes      [] No  Medication Changes  [] Yes      [x] No       Tobacco Use  [] Current  [] Former  [x] Never      Smokeless Tobacco use:   [] Yes      [x] No   Tobacco Use  Change in smoking status   [] Yes      [x] No       Tobacco Use  Change in smoking status   [] Yes      [x] No       Tobacco Use  Change in smoking status Educational Videos Completed Additional Educational Videos Completed Additional Educational Videos Completed    [] Yes    [x] No   *Goals* *Goals* *Goals* *Goals* *Goals*   Wilfrid's risk factor/education goals are as follows:    [x] Optimal BP <140/90  [x] Blood Sugar <120  [x] Attend recommended video education sessions  [x] Takes medications as prescribed 100% of the time   [] ** Wilfrid's risk factor/education goals are as follows:    [x] Optimal BP <140/90  [x] Blood Sugar <120  [x] Attend recommended video education sessions  [x] Takes medications as prescribed 100% of the time   [] ** Wilfrid's risk factor/education goals are as follows:    [x] Optimal BP <140/90  [x] Blood Sugar <120  [x] Attend recommended video education sessions  [x] Takes medications as prescribed 100% of the time   [] ** Wilfrid's risk factor/education goals are as follows:    [x] Optimal BP <140/90  [] Blood Sugar <120  [x] Attend recommended video education sessions  [x] Takes medications as prescribed 100% of the time   [] ** Wilfrid achieved risk factor goals?   [x] Yes    [] No  If no, why?  **     Monitored telemetry has revealed : NSR with occ PAC's   Monitored telemetry has revealed NSR    Monitored telemetry has revealedNSR[] [] associated symptoms  SOB Monitored telemetry has revealed NSR   Monitored telemetry has revealed NSR     Physician Response    [x] Cardiac rehab is reasonably and medically necessary for continuous cardiac monitoring surveillance  of patient's cardiac activity  [x] Initiate continuous telemerty monitoring and notify me with any concerns  [] Other   Physician Response    [x] Cardiac rehab is reasonably and medically necessary for continuous cardiac monitoring surveillance  of patient's cardiac activity  [x] Continue continuous telemerty monitoring and notify me with any concerns  [] Other     Physician Response      [x] Cardiac rehab is reasonably and medically necessary for continuous cardiac monitoring surveillance  of patient's cardiac activity  [x] Continue continuous telemerty monitoring and notify me with any concerns   [] Other     Physician Response    [x] Cardiac rehab is reasonably and medically necessary for continuous cardiac monitoring surveillance  of patient's cardiac activity  [x] Continue continuous telemerty monitoring and notify me with any concerns   [] Other     Electronically signed by Stuart Porter on 11/28/2018 at 12:56 PM       Cosigned by: Patricia Love MD at 7/25/2018  7:38 AM   Revision History      Date/Time User Provider Type Action   7/25/2018  7:38 AM Patricia Love MD Physician Cosign   7/24/2018  3:08 PM [address] Exercise Physiologist Sign     Cosigned by: Patricia Love MD at 8/22/2018  6:04 PM   Revision History      Date/Time User Provider Type Action   8/22/2018  6:04 PM Patricia Love MD Physician Cosign   8/22/2018  3:36 PM Earline Fierro Exercise        Date/Time User Provider Type Action   9/18/2018  1:13 PM Patricia Love MD Physician Cosign   9/17/2018  2:58 PM [address] Exercise Physiologist Sign     Date/Time User Provider Type Action   10/17/2018 11:19 AM Patricia Love MD Physician Cosign   10/15/2018  2:29 PM [facility] Physiologist Sign       Cosigned by: Patricia Love MD at 11/2/2018 11:35 PM   Revision History      Date/Time User Provider Type Action   11/2/2018 11:35 PM Patricia Love MD Physician Cosign   11/2/2018  3:27 PM [facility] Physiologist Sign

## 2018-12-05 ENCOUNTER — OFFICE VISIT (OUTPATIENT)
Dept: UROLOGY | Age: 73
End: 2018-12-05
Payer: MEDICARE

## 2018-12-05 VITALS
HEIGHT: 69 IN | BODY MASS INDEX: 30.88 KG/M2 | SYSTOLIC BLOOD PRESSURE: 118 MMHG | WEIGHT: 208.5 LBS | DIASTOLIC BLOOD PRESSURE: 56 MMHG

## 2018-12-05 DIAGNOSIS — R97.20 ELEVATED PSA: Primary | ICD-10-CM

## 2018-12-05 DIAGNOSIS — N28.1 BILATERAL RENAL CYSTS: ICD-10-CM

## 2018-12-05 LAB
BILIRUBIN URINE: NEGATIVE
BLOOD URINE, POC: NEGATIVE
CHARACTER, URINE: CLEAR
COLOR, URINE: YELLOW
GLUCOSE URINE: NEGATIVE MG/DL
KETONES, URINE: NEGATIVE
LEUKOCYTE CLUMPS, URINE: NEGATIVE
NITRITE, URINE: NEGATIVE
PH, URINE: 6
POST VOID RESIDUAL (PVR): 64 ML
PROTEIN, URINE: NEGATIVE MG/DL
SPECIFIC GRAVITY, URINE: 1.01 (ref 1–1.03)
UROBILINOGEN, URINE: 0.2 EU/DL

## 2018-12-05 PROCEDURE — 1036F TOBACCO NON-USER: CPT | Performed by: NURSE PRACTITIONER

## 2018-12-05 PROCEDURE — G8427 DOCREV CUR MEDS BY ELIG CLIN: HCPCS | Performed by: NURSE PRACTITIONER

## 2018-12-05 PROCEDURE — 1101F PT FALLS ASSESS-DOCD LE1/YR: CPT | Performed by: NURSE PRACTITIONER

## 2018-12-05 PROCEDURE — G8484 FLU IMMUNIZE NO ADMIN: HCPCS | Performed by: NURSE PRACTITIONER

## 2018-12-05 PROCEDURE — 1123F ACP DISCUSS/DSCN MKR DOCD: CPT | Performed by: NURSE PRACTITIONER

## 2018-12-05 PROCEDURE — G8417 CALC BMI ABV UP PARAM F/U: HCPCS | Performed by: NURSE PRACTITIONER

## 2018-12-05 PROCEDURE — 99212 OFFICE O/P EST SF 10 MIN: CPT | Performed by: NURSE PRACTITIONER

## 2018-12-05 PROCEDURE — G8598 ASA/ANTIPLAT THER USED: HCPCS | Performed by: NURSE PRACTITIONER

## 2018-12-05 PROCEDURE — 81003 URINALYSIS AUTO W/O SCOPE: CPT | Performed by: NURSE PRACTITIONER

## 2018-12-05 PROCEDURE — 1111F DSCHRG MED/CURRENT MED MERGE: CPT | Performed by: NURSE PRACTITIONER

## 2018-12-05 PROCEDURE — 51798 US URINE CAPACITY MEASURE: CPT | Performed by: NURSE PRACTITIONER

## 2018-12-05 PROCEDURE — 4040F PNEUMOC VAC/ADMIN/RCVD: CPT | Performed by: NURSE PRACTITIONER

## 2018-12-05 PROCEDURE — 3017F COLORECTAL CA SCREEN DOC REV: CPT | Performed by: NURSE PRACTITIONER

## 2018-12-05 NOTE — PROGRESS NOTES
asymmetrically enlarged. Complex 1.5 cm cyst right kidney upper pole   No solid mass seen in the right kidney. No hydronephrosis. No cortical echogenicity is normal. Resistive indexes elevated. Left kidney demonstrates a large simple cyst upper pole. This can be seen on previous MRI. Measures up to 5.7 cm. In the midportion there is a isoechoic area measuring 2.4 x 2.5 cm this is possibly a column of Petros. Resistive indexes borderline. RIGHT KIDNEY - 13.31 x 6.23 x 6.85 cm   Resistive Index - 0.87   Cortical Thickness - 1.73 cm       LEFT KIDNEY - 11.50 x 5.09 x 9.94 cm   Resistive Index - 0.73   Cortical Thickness - 1.65 cm       URINARY BLADDER   Pre-Void - 76 mL   Post-Void - / mL           Impression   Asymmetrically enlarged right kidney. Bilateral renal cysts and left sciatic column of Petros versus a mass. Patient may benefit from dedicated CT of the kidneys with renal mass protocol.       **This report has been created using voice recognition software.  It may contain minor errors which are inherent in voice recognition technology. **       Final report electronically signed by Dr. Eliot Victoria on 6/2/2018 4:35 PM     PVR today = 64 ml. Assessment & Plan:      Diagnosis Orders   1. Elevated PSA  POCT Urinalysis No Micro (Auto)    CT ABDOMEN PELVIS WO CONTRAST Additional Contrast? None    PSA Prostatic Specific Antigen   2. Bilateral renal cysts  CT ABDOMEN PELVIS WO CONTRAST Additional Contrast? None     Obtain CT Abd & Pelvis to r/o renal mass. Will call results to patient. Repeat PSA in 1 year. Return in about 1 year (around 12/5/2019), or if symptoms worsen or fail to improve.     KAVON Chung  Urology

## 2018-12-07 RX ORDER — DILTIAZEM HYDROCHLORIDE 120 MG/1
CAPSULE, EXTENDED RELEASE ORAL
Qty: 90 CAPSULE | Refills: 3 | Status: SHIPPED | OUTPATIENT
Start: 2018-12-07 | End: 2019-11-22 | Stop reason: SDUPTHER

## 2018-12-07 NOTE — TELEPHONE ENCOUNTER
Date of last visit:  11/16/2018  Date of next visit:  Visit date not found    Requested Prescriptions     Pending Prescriptions Disp Refills    metFORMIN (GLUCOPHAGE) 1000 MG tablet [Pharmacy Med Name: METFORMIN HCL TABS 1000MG] 180 tablet 1     Sig: TAKE 1 TABLET TWICE A DAY WITH MEALS

## 2018-12-17 ENCOUNTER — HOSPITAL ENCOUNTER (OUTPATIENT)
Dept: CT IMAGING | Age: 73
Discharge: HOME | End: 2018-12-17
Payer: MEDICARE

## 2018-12-17 DIAGNOSIS — R97.20 ELEVATED PSA: ICD-10-CM

## 2018-12-17 DIAGNOSIS — N28.1 BILATERAL RENAL CYSTS: ICD-10-CM

## 2018-12-17 PROCEDURE — 74176 CT ABD & PELVIS W/O CONTRAST: CPT

## 2018-12-19 ENCOUNTER — OFFICE VISIT (OUTPATIENT)
Dept: INTERNAL MEDICINE CLINIC | Age: 73
End: 2018-12-19
Payer: MEDICARE

## 2018-12-19 VITALS
SYSTOLIC BLOOD PRESSURE: 130 MMHG | DIASTOLIC BLOOD PRESSURE: 50 MMHG | HEART RATE: 72 BPM | WEIGHT: 214 LBS | HEIGHT: 69 IN | BODY MASS INDEX: 31.7 KG/M2

## 2018-12-19 DIAGNOSIS — I25.10 CORONARY ARTERY DISEASE INVOLVING NATIVE CORONARY ARTERY OF NATIVE HEART WITHOUT ANGINA PECTORIS: ICD-10-CM

## 2018-12-19 DIAGNOSIS — R80.9 TYPE 2 DIABETES MELLITUS WITH MICROALBUMINURIA, WITH LONG-TERM CURRENT USE OF INSULIN (HCC): ICD-10-CM

## 2018-12-19 DIAGNOSIS — I10 ESSENTIAL HYPERTENSION: ICD-10-CM

## 2018-12-19 DIAGNOSIS — N18.30 CKD (CHRONIC KIDNEY DISEASE) STAGE 3, GFR 30-59 ML/MIN (HCC): Primary | ICD-10-CM

## 2018-12-19 DIAGNOSIS — E11.29 TYPE 2 DIABETES MELLITUS WITH MICROALBUMINURIA, WITH LONG-TERM CURRENT USE OF INSULIN (HCC): ICD-10-CM

## 2018-12-19 DIAGNOSIS — Z79.4 TYPE 2 DIABETES MELLITUS WITH MICROALBUMINURIA, WITH LONG-TERM CURRENT USE OF INSULIN (HCC): ICD-10-CM

## 2018-12-19 DIAGNOSIS — E78.2 MIXED HYPERLIPIDEMIA: ICD-10-CM

## 2018-12-19 DIAGNOSIS — I50.30 DIASTOLIC CONGESTIVE HEART FAILURE, UNSPECIFIED HF CHRONICITY (HCC): ICD-10-CM

## 2018-12-19 PROCEDURE — 3044F HG A1C LEVEL LT 7.0%: CPT | Performed by: INTERNAL MEDICINE

## 2018-12-19 PROCEDURE — G8484 FLU IMMUNIZE NO ADMIN: HCPCS | Performed by: INTERNAL MEDICINE

## 2018-12-19 PROCEDURE — 4040F PNEUMOC VAC/ADMIN/RCVD: CPT | Performed by: INTERNAL MEDICINE

## 2018-12-19 PROCEDURE — 1101F PT FALLS ASSESS-DOCD LE1/YR: CPT | Performed by: INTERNAL MEDICINE

## 2018-12-19 PROCEDURE — G8598 ASA/ANTIPLAT THER USED: HCPCS | Performed by: INTERNAL MEDICINE

## 2018-12-19 PROCEDURE — G8417 CALC BMI ABV UP PARAM F/U: HCPCS | Performed by: INTERNAL MEDICINE

## 2018-12-19 PROCEDURE — G8427 DOCREV CUR MEDS BY ELIG CLIN: HCPCS | Performed by: INTERNAL MEDICINE

## 2018-12-19 PROCEDURE — 1036F TOBACCO NON-USER: CPT | Performed by: INTERNAL MEDICINE

## 2018-12-19 PROCEDURE — 2022F DILAT RTA XM EVC RTNOPTHY: CPT | Performed by: INTERNAL MEDICINE

## 2018-12-19 PROCEDURE — 3017F COLORECTAL CA SCREEN DOC REV: CPT | Performed by: INTERNAL MEDICINE

## 2018-12-19 PROCEDURE — 1123F ACP DISCUSS/DSCN MKR DOCD: CPT | Performed by: INTERNAL MEDICINE

## 2018-12-19 PROCEDURE — 99213 OFFICE O/P EST LOW 20 MIN: CPT | Performed by: INTERNAL MEDICINE

## 2018-12-19 NOTE — PROGRESS NOTES
Pyelonephritis    Diarrhea    Urinary tract infection associated with indwelling urethral catheter (HCC)    [facility]    Shortness of breath    SIRS due to non-infectious process without acute organ dysfunction ([facility]    Renal failure syndrome    Wheezing    Asthma    Essential hypertension     Past Surgical History:   Procedure Laterality Date    ABLATION OF DYSRHYTHMIC FOCUS  3/13/13   [address] CARDIAC SURGERY  3/13/13    oblation for irreg rythm    CARDIOVERSION  3/8/2012    CAROTID ENDARTERECTOMY  1997    CHOLECYSTECTOMY  4/1999    COLONOSCOPY  6/22/09, 4/2011,7/12/17    polyp removal    CORONARY ANGIOPLASTY WITH STENT PLACEMENT  2008 (2), 2009 (1)    x2    CORONARY ARTERY BYPASS GRAFT  8/1997    5 vessel    DIAGNOSTIC CARDIAC CATH LAB PROCEDURE      FOOT SURGERY  12/20/2013    pre-cancerous mole rt foot removed    LITHOTRIPSY  4/2002    MO OFFICE/OUTPT VISIT,PROCEDURE ONLY N/A 11/8/2018    TRANSESOPHAGEAL ECHOCARDIOGRAM performed by Melissa Marie MD at [address] [place] [address] [facility]  05/30/2018   Alise  2001, 2003    rt shoulder manipulation--frozen shoulder-01, Lt -03    SKIN BIOPSY      SKIN CANCER EXCISION  12/20/13     R foot    SKIN CANCER EXCISION  1/2016    squamous cell Lt  upper cheek    URETER STENT PLACEMENT  4/2002    VASCULAR SURGERY      carotids & cabg harvests     Family History   Problem Relation Age of Onset    Cancer Mother     High Blood Pressure Mother     Stroke Father     High Blood Pressure Father     Heart Disease Brother     Cancer Brother         lung    Cancer Brother         melanoma     Social History     Social History    Marital status:      Spouse name: Ayaka Wilson Number of children: 2    Years of education: N/A     Occupational History    Not on file. Social History Main Topics    Smoking status: Never Smoker    Smokeless tobacco: Never Used    Alcohol use No    Drug use:  No

## 2019-01-04 RX ORDER — METOPROLOL SUCCINATE 50 MG/1
TABLET, EXTENDED RELEASE ORAL
Qty: 90 TABLET | Refills: 3 | Status: SHIPPED | OUTPATIENT
Start: 2019-01-04 | End: 2019-05-20 | Stop reason: SDUPTHER

## 2019-01-07 VITALS — HEIGHT: 69 IN | WEIGHT: 206 LBS | BODY MASS INDEX: 30.51 KG/M2

## 2019-01-10 ENCOUNTER — HOSPITAL ENCOUNTER (OUTPATIENT)
Dept: INPATIENT UNIT | Age: 74
Discharge: HOME | End: 2019-01-10

## 2019-02-04 RX ORDER — BLOOD SUGAR DIAGNOSTIC, DRUM
STRIP MISCELLANEOUS
Qty: 408 STRIP | Refills: 3 | Status: SHIPPED | OUTPATIENT
Start: 2019-02-04 | End: 2020-06-09 | Stop reason: SDUPTHER

## 2019-03-01 ENCOUNTER — CARE COORDINATION (OUTPATIENT)
Dept: CARE COORDINATION | Age: 74
End: 2019-03-01

## 2019-03-04 ENCOUNTER — CARE COORDINATION (OUTPATIENT)
Dept: CARE COORDINATION | Age: 74
End: 2019-03-04

## 2019-03-07 ENCOUNTER — TELEPHONE (OUTPATIENT)
Dept: NEPHROLOGY | Age: 74
End: 2019-03-07

## 2019-03-07 DIAGNOSIS — N18.30 CHRONIC KIDNEY DISEASE, STAGE III (MODERATE) (HCC): Primary | ICD-10-CM

## 2019-03-08 ENCOUNTER — TELEPHONE (OUTPATIENT)
Dept: FAMILY MEDICINE CLINIC | Age: 74
End: 2019-03-08

## 2019-03-08 DIAGNOSIS — R80.9 TYPE 2 DIABETES MELLITUS WITH MICROALBUMINURIA, WITH LONG-TERM CURRENT USE OF INSULIN (HCC): Primary | ICD-10-CM

## 2019-03-08 DIAGNOSIS — Z79.4 TYPE 2 DIABETES MELLITUS WITH MICROALBUMINURIA, WITH LONG-TERM CURRENT USE OF INSULIN (HCC): Primary | ICD-10-CM

## 2019-03-08 DIAGNOSIS — E11.29 TYPE 2 DIABETES MELLITUS WITH MICROALBUMINURIA, WITH LONG-TERM CURRENT USE OF INSULIN (HCC): Primary | ICD-10-CM

## 2019-03-13 ENCOUNTER — OFFICE VISIT (OUTPATIENT)
Dept: INTERNAL MEDICINE CLINIC | Age: 74
End: 2019-03-13
Payer: MEDICARE

## 2019-03-13 VITALS
BODY MASS INDEX: 31.31 KG/M2 | WEIGHT: 211.4 LBS | SYSTOLIC BLOOD PRESSURE: 128 MMHG | DIASTOLIC BLOOD PRESSURE: 56 MMHG | HEIGHT: 69 IN

## 2019-03-13 DIAGNOSIS — E11.29 TYPE 2 DIABETES MELLITUS WITH MICROALBUMINURIA, WITH LONG-TERM CURRENT USE OF INSULIN (HCC): Primary | ICD-10-CM

## 2019-03-13 DIAGNOSIS — R80.9 TYPE 2 DIABETES MELLITUS WITH MICROALBUMINURIA, WITH LONG-TERM CURRENT USE OF INSULIN (HCC): Primary | ICD-10-CM

## 2019-03-13 DIAGNOSIS — Z79.4 TYPE 2 DIABETES MELLITUS WITH MICROALBUMINURIA, WITH LONG-TERM CURRENT USE OF INSULIN (HCC): Primary | ICD-10-CM

## 2019-03-13 LAB — HBA1C MFR BLD: 6.4 % (ref 4.3–5.7)

## 2019-03-13 PROCEDURE — 83036 HEMOGLOBIN GLYCOSYLATED A1C: CPT | Performed by: INTERNAL MEDICINE

## 2019-03-13 PROCEDURE — G0108 DIAB MANAGE TRN  PER INDIV: HCPCS | Performed by: REGISTERED NURSE

## 2019-03-14 ENCOUNTER — HOSPITAL ENCOUNTER (OUTPATIENT)
Dept: INPATIENT UNIT | Age: 74
Discharge: HOME | End: 2019-03-15
Attending: INTERNAL MEDICINE | Admitting: INTERNAL MEDICINE
Payer: MEDICARE

## 2019-03-14 PROBLEM — I25.10 CAD S/P PERCUTANEOUS CORONARY ANGIOPLASTY: Status: ACTIVE | Noted: 2019-03-14

## 2019-03-14 PROBLEM — Z98.61 CAD S/P PERCUTANEOUS CORONARY ANGIOPLASTY: Status: ACTIVE | Noted: 2019-03-14

## 2019-03-14 LAB
ABO: NORMAL
ACTIVATED CLOTTING TIME: 389 SECONDS (ref 1–150)
ACTIVATED CLOTTING TIME: 577 SECONDS (ref 1–150)
ALBUMIN SERPL-MCNC: 4.4 G/DL (ref 3.5–5.1)
ALP BLD-CCNC: 64 U/L (ref 38–126)
ALT SERPL-CCNC: 14 U/L (ref 11–66)
ANION GAP SERPL CALCULATED.3IONS-SCNC: 12 MEQ/L (ref 8–16)
ANTIBODY SCREEN: NORMAL
AST SERPL-CCNC: 16 U/L (ref 5–40)
BILIRUB SERPL-MCNC: 0.3 MG/DL (ref 0.3–1.2)
BUN BLDV-MCNC: 28 MG/DL (ref 7–22)
CALCIUM SERPL-MCNC: 8.8 MG/DL (ref 8.5–10.5)
CHLORIDE BLD-SCNC: 103 MEQ/L (ref 98–111)
CHOLESTEROL, TOTAL: 120 MG/DL (ref 100–199)
CO2: 22 MEQ/L (ref 23–33)
CREAT SERPL-MCNC: 1.4 MG/DL (ref 0.4–1.2)
EKG ATRIAL RATE: 60 BPM
EKG P AXIS: 83 DEGREES
EKG P-R INTERVAL: 182 MS
EKG Q-T INTERVAL: 448 MS
EKG QRS DURATION: 136 MS
EKG QTC CALCULATION (BAZETT): 448 MS
EKG R AXIS: 43 DEGREES
EKG T AXIS: 38 DEGREES
EKG VENTRICULAR RATE: 60 BPM
ERYTHROCYTE [DISTWIDTH] IN BLOOD BY AUTOMATED COUNT: 13.6 % (ref 11.5–14.5)
ERYTHROCYTE [DISTWIDTH] IN BLOOD BY AUTOMATED COUNT: 48 FL (ref 35–45)
GFR SERPL CREATININE-BSD FRML MDRD: 50 ML/MIN/1.73M2
GLUCOSE BLD-MCNC: 133 MG/DL (ref 70–108)
HCT VFR BLD CALC: 34 % (ref 42–52)
HDLC SERPL-MCNC: 32 MG/DL
HEMOGLOBIN: 11.3 GM/DL (ref 14–18)
INR BLD: 1.03 (ref 0.85–1.13)
LDL CHOLESTEROL CALCULATED: 57 MG/DL
MCH RBC QN AUTO: 31.8 PG (ref 26–33)
MCHC RBC AUTO-ENTMCNC: 33.2 GM/DL (ref 32.2–35.5)
MCV RBC AUTO: 95.8 FL (ref 80–94)
PLATELET # BLD: 229 THOU/MM3 (ref 130–400)
PMV BLD AUTO: 9.5 FL (ref 9.4–12.4)
POTASSIUM REFLEX MAGNESIUM: 4.7 MEQ/L (ref 3.5–5.2)
RBC # BLD: 3.55 MILL/MM3 (ref 4.7–6.1)
RH FACTOR: NORMAL
SODIUM BLD-SCNC: 137 MEQ/L (ref 135–145)
TOTAL PROTEIN: 7 G/DL (ref 6.1–8)
TRIGL SERPL-MCNC: 153 MG/DL (ref 0–199)
WBC # BLD: 7.1 THOU/MM3 (ref 4.8–10.8)

## 2019-03-14 PROCEDURE — 93010 ELECTROCARDIOGRAM REPORT: CPT | Performed by: INTERNAL MEDICINE

## 2019-03-14 PROCEDURE — 85347 COAGULATION TIME ACTIVATED: CPT

## 2019-03-14 PROCEDURE — 80053 COMPREHEN METABOLIC PANEL: CPT

## 2019-03-14 PROCEDURE — 86901 BLOOD TYPING SEROLOGIC RH(D): CPT

## 2019-03-14 PROCEDURE — 6360000004 HC RX CONTRAST MEDICATION: Performed by: INTERNAL MEDICINE

## 2019-03-14 PROCEDURE — 2709999900 HC NON-CHARGEABLE SUPPLY

## 2019-03-14 PROCEDURE — C1769 GUIDE WIRE: HCPCS

## 2019-03-14 PROCEDURE — 93005 ELECTROCARDIOGRAM TRACING: CPT | Performed by: INTERNAL MEDICINE

## 2019-03-14 PROCEDURE — 2500000003 HC RX 250 WO HCPCS

## 2019-03-14 PROCEDURE — 80061 LIPID PANEL: CPT

## 2019-03-14 PROCEDURE — C1725 CATH, TRANSLUMIN NON-LASER: HCPCS

## 2019-03-14 PROCEDURE — 6370000000 HC RX 637 (ALT 250 FOR IP): Performed by: INTERNAL MEDICINE

## 2019-03-14 PROCEDURE — 86900 BLOOD TYPING SEROLOGIC ABO: CPT

## 2019-03-14 PROCEDURE — 86850 RBC ANTIBODY SCREEN: CPT

## 2019-03-14 PROCEDURE — C9601 PERC DRUG-EL COR STENT BRAN: HCPCS | Performed by: INTERNAL MEDICINE

## 2019-03-14 PROCEDURE — 96360 HYDRATION IV INFUSION INIT: CPT

## 2019-03-14 PROCEDURE — C1894 INTRO/SHEATH, NON-LASER: HCPCS

## 2019-03-14 PROCEDURE — C1874 STENT, COATED/COV W/DEL SYS: HCPCS

## 2019-03-14 PROCEDURE — 6370000000 HC RX 637 (ALT 250 FOR IP)

## 2019-03-14 PROCEDURE — 6360000002 HC RX W HCPCS

## 2019-03-14 PROCEDURE — 2580000003 HC RX 258: Performed by: INTERNAL MEDICINE

## 2019-03-14 PROCEDURE — 96361 HYDRATE IV INFUSION ADD-ON: CPT

## 2019-03-14 PROCEDURE — 85027 COMPLETE CBC AUTOMATED: CPT

## 2019-03-14 PROCEDURE — 85610 PROTHROMBIN TIME: CPT

## 2019-03-14 PROCEDURE — C1887 CATHETER, GUIDING: HCPCS

## 2019-03-14 PROCEDURE — 36415 COLL VENOUS BLD VENIPUNCTURE: CPT

## 2019-03-14 PROCEDURE — C9600 PERC DRUG-EL COR STENT SING: HCPCS | Performed by: INTERNAL MEDICINE

## 2019-03-14 RX ORDER — SODIUM CHLORIDE 0.9 % (FLUSH) 0.9 %
10 SYRINGE (ML) INJECTION EVERY 12 HOURS SCHEDULED
Status: DISCONTINUED | OUTPATIENT
Start: 2019-03-15 | End: 2019-03-15 | Stop reason: HOSPADM

## 2019-03-14 RX ORDER — ACETAMINOPHEN 325 MG/1
650 TABLET ORAL EVERY 4 HOURS PRN
Status: DISCONTINUED | OUTPATIENT
Start: 2019-03-14 | End: 2019-03-15 | Stop reason: HOSPADM

## 2019-03-14 RX ORDER — ATROPINE SULFATE 0.4 MG/ML
0.5 AMPUL (ML) INJECTION
Status: ACTIVE | OUTPATIENT
Start: 2019-03-14 | End: 2019-03-14

## 2019-03-14 RX ORDER — M-VIT,TX,IRON,MINS/CALC/FOLIC 27MG-0.4MG
1 TABLET ORAL NIGHTLY
COMMUNITY

## 2019-03-14 RX ORDER — SODIUM CHLORIDE 0.9 % (FLUSH) 0.9 %
10 SYRINGE (ML) INJECTION PRN
Status: DISCONTINUED | OUTPATIENT
Start: 2019-03-14 | End: 2019-03-15 | Stop reason: HOSPADM

## 2019-03-14 RX ORDER — SODIUM CHLORIDE 0.9 % (FLUSH) 0.9 %
10 SYRINGE (ML) INJECTION EVERY 12 HOURS SCHEDULED
Status: DISCONTINUED | OUTPATIENT
Start: 2019-03-14 | End: 2019-03-15 | Stop reason: SDUPTHER

## 2019-03-14 RX ORDER — ONDANSETRON 2 MG/ML
4 INJECTION INTRAMUSCULAR; INTRAVENOUS EVERY 6 HOURS PRN
Status: DISCONTINUED | OUTPATIENT
Start: 2019-03-14 | End: 2019-03-15 | Stop reason: HOSPADM

## 2019-03-14 RX ORDER — ASPIRIN 325 MG
325 TABLET ORAL DAILY
Status: DISCONTINUED | OUTPATIENT
Start: 2019-03-15 | End: 2019-03-15 | Stop reason: HOSPADM

## 2019-03-14 RX ORDER — SODIUM CHLORIDE 9 MG/ML
INJECTION, SOLUTION INTRAVENOUS CONTINUOUS
Status: DISCONTINUED | OUTPATIENT
Start: 2019-03-14 | End: 2019-03-15 | Stop reason: SDUPTHER

## 2019-03-14 RX ORDER — DIPHENHYDRAMINE HCL 25 MG
50 TABLET ORAL ONCE
Status: DISCONTINUED | OUTPATIENT
Start: 2019-03-14 | End: 2019-03-15 | Stop reason: HOSPADM

## 2019-03-14 RX ORDER — ASPIRIN 325 MG
325 TABLET ORAL ONCE
Status: COMPLETED | OUTPATIENT
Start: 2019-03-14 | End: 2019-03-14

## 2019-03-14 RX ORDER — SODIUM CHLORIDE 9 MG/ML
INJECTION, SOLUTION INTRAVENOUS CONTINUOUS
Status: ACTIVE | OUTPATIENT
Start: 2019-03-15 | End: 2019-03-15

## 2019-03-14 RX ADMIN — ASPIRIN 325 MG: 325 TABLET ORAL at 13:34

## 2019-03-14 RX ADMIN — SODIUM CHLORIDE: 9 INJECTION, SOLUTION INTRAVENOUS at 13:25

## 2019-03-14 RX ADMIN — IOPAMIDOL 340 ML: 755 INJECTION, SOLUTION INTRAVENOUS at 23:28

## 2019-03-15 VITALS
HEIGHT: 69 IN | BODY MASS INDEX: 30.36 KG/M2 | RESPIRATION RATE: 16 BRPM | SYSTOLIC BLOOD PRESSURE: 122 MMHG | WEIGHT: 205 LBS | HEART RATE: 58 BPM | TEMPERATURE: 97.4 F | OXYGEN SATURATION: 95 % | DIASTOLIC BLOOD PRESSURE: 55 MMHG

## 2019-03-15 LAB
EKG ATRIAL RATE: 60 BPM
EKG P AXIS: 79 DEGREES
EKG P-R INTERVAL: 186 MS
EKG Q-T INTERVAL: 464 MS
EKG QRS DURATION: 142 MS
EKG QTC CALCULATION (BAZETT): 464 MS
EKG R AXIS: 13 DEGREES
EKG T AXIS: 26 DEGREES
EKG VENTRICULAR RATE: 60 BPM
ERYTHROCYTE [DISTWIDTH] IN BLOOD BY AUTOMATED COUNT: 13.9 % (ref 11.5–14.5)
ERYTHROCYTE [DISTWIDTH] IN BLOOD BY AUTOMATED COUNT: 49.1 FL (ref 35–45)
GLUCOSE BLD-MCNC: 110 MG/DL (ref 70–108)
GLUCOSE BLD-MCNC: 145 MG/DL (ref 70–108)
GLUCOSE BLD-MCNC: 239 MG/DL (ref 70–108)
HCT VFR BLD CALC: 30.6 % (ref 42–52)
HEMOGLOBIN: 10 GM/DL (ref 14–18)
MCH RBC QN AUTO: 31.5 PG (ref 26–33)
MCHC RBC AUTO-ENTMCNC: 32.7 GM/DL (ref 32.2–35.5)
MCV RBC AUTO: 96.5 FL (ref 80–94)
PLATELET # BLD: 204 THOU/MM3 (ref 130–400)
PMV BLD AUTO: 10.1 FL (ref 9.4–12.4)
RBC # BLD: 3.17 MILL/MM3 (ref 4.7–6.1)
WBC # BLD: 7.1 THOU/MM3 (ref 4.8–10.8)

## 2019-03-15 PROCEDURE — 2580000003 HC RX 258: Performed by: INTERNAL MEDICINE

## 2019-03-15 PROCEDURE — 93005 ELECTROCARDIOGRAM TRACING: CPT | Performed by: INTERNAL MEDICINE

## 2019-03-15 PROCEDURE — 85027 COMPLETE CBC AUTOMATED: CPT

## 2019-03-15 PROCEDURE — 6360000002 HC RX W HCPCS

## 2019-03-15 PROCEDURE — 96361 HYDRATE IV INFUSION ADD-ON: CPT

## 2019-03-15 PROCEDURE — 82948 REAGENT STRIP/BLOOD GLUCOSE: CPT

## 2019-03-15 PROCEDURE — 6370000000 HC RX 637 (ALT 250 FOR IP): Performed by: INTERNAL MEDICINE

## 2019-03-15 PROCEDURE — 36415 COLL VENOUS BLD VENIPUNCTURE: CPT

## 2019-03-15 PROCEDURE — 93010 ELECTROCARDIOGRAM REPORT: CPT | Performed by: INTERNAL MEDICINE

## 2019-03-15 PROCEDURE — 96360 HYDRATION IV INFUSION INIT: CPT

## 2019-03-15 RX ORDER — DILTIAZEM HYDROCHLORIDE 120 MG/1
120 CAPSULE, COATED, EXTENDED RELEASE ORAL DAILY
Status: DISCONTINUED | OUTPATIENT
Start: 2019-03-15 | End: 2019-03-15 | Stop reason: HOSPADM

## 2019-03-15 RX ORDER — METOPROLOL SUCCINATE 50 MG/1
50 TABLET, EXTENDED RELEASE ORAL DAILY
Status: DISCONTINUED | OUTPATIENT
Start: 2019-03-15 | End: 2019-03-15 | Stop reason: HOSPADM

## 2019-03-15 RX ORDER — ATORVASTATIN CALCIUM 40 MG/1
40 TABLET, FILM COATED ORAL NIGHTLY
Status: DISCONTINUED | OUTPATIENT
Start: 2019-03-15 | End: 2019-03-15 | Stop reason: HOSPADM

## 2019-03-15 RX ORDER — FLUTICASONE PROPIONATE 110 UG/1
2 AEROSOL, METERED RESPIRATORY (INHALATION) 2 TIMES DAILY
Status: DISCONTINUED | OUTPATIENT
Start: 2019-03-15 | End: 2019-03-15 | Stop reason: HOSPADM

## 2019-03-15 RX ORDER — RANOLAZINE 500 MG/1
500 TABLET, EXTENDED RELEASE ORAL 2 TIMES DAILY
Status: DISCONTINUED | OUTPATIENT
Start: 2019-03-15 | End: 2019-03-15 | Stop reason: HOSPADM

## 2019-03-15 RX ORDER — ISOSORBIDE MONONITRATE 120 MG/1
120 TABLET, EXTENDED RELEASE ORAL DAILY
Status: DISCONTINUED | OUTPATIENT
Start: 2019-03-15 | End: 2019-03-15 | Stop reason: HOSPADM

## 2019-03-15 RX ORDER — CLOPIDOGREL BISULFATE 75 MG/1
75 TABLET ORAL DAILY
Status: DISCONTINUED | OUTPATIENT
Start: 2019-03-15 | End: 2019-03-15 | Stop reason: HOSPADM

## 2019-03-15 RX ORDER — BUMETANIDE 2 MG/1
2 TABLET ORAL DAILY
Status: DISCONTINUED | OUTPATIENT
Start: 2019-03-15 | End: 2019-03-15 | Stop reason: HOSPADM

## 2019-03-15 RX ORDER — ALBUTEROL SULFATE 90 UG/1
2 AEROSOL, METERED RESPIRATORY (INHALATION) EVERY 6 HOURS PRN
Status: DISCONTINUED | OUTPATIENT
Start: 2019-03-15 | End: 2019-03-15 | Stop reason: HOSPADM

## 2019-03-15 RX ORDER — M-VIT,TX,IRON,MINS/CALC/FOLIC 27MG-0.4MG
1 TABLET ORAL NIGHTLY
Status: DISCONTINUED | OUTPATIENT
Start: 2019-03-15 | End: 2019-03-15 | Stop reason: HOSPADM

## 2019-03-15 RX ORDER — HYDRALAZINE HYDROCHLORIDE 25 MG/1
25 TABLET, FILM COATED ORAL EVERY 8 HOURS SCHEDULED
Status: DISCONTINUED | OUTPATIENT
Start: 2019-03-15 | End: 2019-03-15 | Stop reason: HOSPADM

## 2019-03-15 RX ADMIN — MULTIPLE VITAMINS W/ MINERALS TAB 1 TABLET: TAB at 01:20

## 2019-03-15 RX ADMIN — ASPIRIN 325 MG: 325 TABLET ORAL at 08:34

## 2019-03-15 RX ADMIN — ISOSORBIDE MONONITRATE 120 MG: 120 TABLET, EXTENDED RELEASE ORAL at 08:31

## 2019-03-15 RX ADMIN — DILTIAZEM HYDROCHLORIDE 120 MG: 120 CAPSULE, COATED, EXTENDED RELEASE ORAL at 08:30

## 2019-03-15 RX ADMIN — RANOLAZINE 500 MG: 500 TABLET, FILM COATED, EXTENDED RELEASE ORAL at 08:32

## 2019-03-15 RX ADMIN — CLOPIDOGREL BISULFATE 75 MG: 75 TABLET ORAL at 08:30

## 2019-03-15 RX ADMIN — HYDRALAZINE HYDROCHLORIDE 25 MG: 25 TABLET ORAL at 06:39

## 2019-03-15 RX ADMIN — ATORVASTATIN CALCIUM 40 MG: 40 TABLET, FILM COATED ORAL at 01:24

## 2019-03-15 RX ADMIN — APIXABAN 5 MG: 5 TABLET, FILM COATED ORAL at 08:29

## 2019-03-15 RX ADMIN — METOPROLOL SUCCINATE 50 MG: 50 TABLET, EXTENDED RELEASE ORAL at 08:31

## 2019-03-15 RX ADMIN — RANOLAZINE 500 MG: 500 TABLET, FILM COATED, EXTENDED RELEASE ORAL at 01:23

## 2019-03-15 RX ADMIN — SACUBITRIL AND VALSARTAN 1 TABLET: 49; 51 TABLET, FILM COATED ORAL at 08:32

## 2019-03-15 RX ADMIN — HYDRALAZINE HYDROCHLORIDE 25 MG: 25 TABLET ORAL at 13:34

## 2019-03-15 RX ADMIN — BUMETANIDE 2 MG: 2 TABLET ORAL at 08:29

## 2019-03-15 RX ADMIN — SACUBITRIL AND VALSARTAN 1 TABLET: 49; 51 TABLET, FILM COATED ORAL at 01:21

## 2019-03-15 RX ADMIN — SODIUM CHLORIDE: 9 INJECTION, SOLUTION INTRAVENOUS at 00:51

## 2019-03-15 RX ADMIN — HYDRALAZINE HYDROCHLORIDE 25 MG: 25 TABLET ORAL at 01:20

## 2019-03-15 RX ADMIN — APIXABAN 5 MG: 5 TABLET, FILM COATED ORAL at 01:23

## 2019-03-18 ENCOUNTER — APPOINTMENT (OUTPATIENT)
Dept: GENERAL RADIOLOGY | Age: 74
End: 2019-03-18
Payer: MEDICARE

## 2019-03-18 ENCOUNTER — HOSPITAL ENCOUNTER (EMERGENCY)
Age: 74
Discharge: HOME | End: 2019-03-19
Attending: EMERGENCY MEDICINE
Payer: MEDICARE

## 2019-03-18 ENCOUNTER — HOSPITAL ENCOUNTER (OUTPATIENT)
Age: 74
Discharge: HOME | End: 2019-03-18
Payer: MEDICARE

## 2019-03-18 VITALS
HEIGHT: 69 IN | WEIGHT: 201 LBS | DIASTOLIC BLOOD PRESSURE: 46 MMHG | RESPIRATION RATE: 17 BRPM | HEART RATE: 66 BPM | SYSTOLIC BLOOD PRESSURE: 167 MMHG | TEMPERATURE: 97.7 F | OXYGEN SATURATION: 96 % | BODY MASS INDEX: 29.77 KG/M2

## 2019-03-18 DIAGNOSIS — R80.9 TYPE 2 DIABETES MELLITUS WITH MICROALBUMINURIA, WITH LONG-TERM CURRENT USE OF INSULIN (HCC): ICD-10-CM

## 2019-03-18 DIAGNOSIS — N18.30 CHRONIC KIDNEY DISEASE, STAGE III (MODERATE) (HCC): ICD-10-CM

## 2019-03-18 DIAGNOSIS — R07.89 OTHER CHEST PAIN: Primary | ICD-10-CM

## 2019-03-18 DIAGNOSIS — Z79.4 TYPE 2 DIABETES MELLITUS WITH MICROALBUMINURIA, WITH LONG-TERM CURRENT USE OF INSULIN (HCC): ICD-10-CM

## 2019-03-18 DIAGNOSIS — E11.29 TYPE 2 DIABETES MELLITUS WITH MICROALBUMINURIA, WITH LONG-TERM CURRENT USE OF INSULIN (HCC): ICD-10-CM

## 2019-03-18 LAB
ANION GAP SERPL CALCULATED.3IONS-SCNC: 12 MEQ/L (ref 8–16)
ANION GAP SERPL CALCULATED.3IONS-SCNC: 15 MEQ/L (ref 8–16)
AVERAGE GLUCOSE: 126 MG/DL (ref 70–126)
BASOPHILS # BLD: 0.5 %
BASOPHILS ABSOLUTE: 0 THOU/MM3 (ref 0–0.1)
BUN BLDV-MCNC: 26 MG/DL (ref 7–22)
BUN BLDV-MCNC: 27 MG/DL (ref 7–22)
CALCIUM SERPL-MCNC: 9.6 MG/DL (ref 8.5–10.5)
CALCIUM SERPL-MCNC: 9.7 MG/DL (ref 8.5–10.5)
CHLORIDE BLD-SCNC: 107 MEQ/L (ref 98–111)
CHLORIDE BLD-SCNC: 99 MEQ/L (ref 98–111)
CO2: 23 MEQ/L (ref 23–33)
CO2: 23 MEQ/L (ref 23–33)
CREAT SERPL-MCNC: 1.5 MG/DL (ref 0.4–1.2)
CREAT SERPL-MCNC: 1.7 MG/DL (ref 0.4–1.2)
EKG ATRIAL RATE: 74 BPM
EKG P AXIS: 80 DEGREES
EKG P-R INTERVAL: 196 MS
EKG Q-T INTERVAL: 416 MS
EKG QRS DURATION: 130 MS
EKG QTC CALCULATION (BAZETT): 461 MS
EKG R AXIS: 88 DEGREES
EKG T AXIS: 41 DEGREES
EKG VENTRICULAR RATE: 74 BPM
EOSINOPHIL # BLD: 1.8 %
EOSINOPHILS ABSOLUTE: 0.1 THOU/MM3 (ref 0–0.4)
ERYTHROCYTE [DISTWIDTH] IN BLOOD BY AUTOMATED COUNT: 14.1 % (ref 11.5–14.5)
ERYTHROCYTE [DISTWIDTH] IN BLOOD BY AUTOMATED COUNT: 49.5 FL (ref 35–45)
GFR SERPL CREATININE-BSD FRML MDRD: 40 ML/MIN/1.73M2
GFR SERPL CREATININE-BSD FRML MDRD: 46 ML/MIN/1.73M2
GLUCOSE BLD-MCNC: 108 MG/DL (ref 70–108)
GLUCOSE BLD-MCNC: 226 MG/DL (ref 70–108)
HBA1C MFR BLD: 6.2 % (ref 4.4–6.4)
HCT VFR BLD CALC: 35.5 % (ref 42–52)
HEMOGLOBIN: 11.5 GM/DL (ref 14–18)
IMMATURE GRANS (ABS): 0.02 THOU/MM3 (ref 0–0.07)
IMMATURE GRANULOCYTES: 0.3 %
LYMPHOCYTES # BLD: 13.6 %
LYMPHOCYTES ABSOLUTE: 1 THOU/MM3 (ref 1–4.8)
MCH RBC QN AUTO: 31 PG (ref 26–33)
MCHC RBC AUTO-ENTMCNC: 32.4 GM/DL (ref 32.2–35.5)
MCV RBC AUTO: 95.7 FL (ref 80–94)
MONOCYTES # BLD: 12.3 %
MONOCYTES ABSOLUTE: 0.9 THOU/MM3 (ref 0.4–1.3)
NUCLEATED RED BLOOD CELLS: 0 /100 WBC
OSMOLALITY CALCULATION: 285.7 MOSMOL/KG (ref 275–300)
PLATELET # BLD: 245 THOU/MM3 (ref 130–400)
PMV BLD AUTO: 9.7 FL (ref 9.4–12.4)
POTASSIUM SERPL-SCNC: 4.1 MEQ/L (ref 3.5–5.2)
POTASSIUM SERPL-SCNC: 4.7 MEQ/L (ref 3.5–5.2)
PRO-BNP: 390 PG/ML (ref 0–900)
RBC # BLD: 3.71 MILL/MM3 (ref 4.7–6.1)
SEG NEUTROPHILS: 71.5 %
SEGMENTED NEUTROPHILS ABSOLUTE COUNT: 5.5 THOU/MM3 (ref 1.8–7.7)
SODIUM BLD-SCNC: 137 MEQ/L (ref 135–145)
SODIUM BLD-SCNC: 142 MEQ/L (ref 135–145)
TROPONIN T: 0.19 NG/ML
WBC # BLD: 7.7 THOU/MM3 (ref 4.8–10.8)

## 2019-03-18 PROCEDURE — 99285 EMERGENCY DEPT VISIT HI MDM: CPT

## 2019-03-18 PROCEDURE — 36415 COLL VENOUS BLD VENIPUNCTURE: CPT

## 2019-03-18 PROCEDURE — 84484 ASSAY OF TROPONIN QUANT: CPT

## 2019-03-18 PROCEDURE — 85025 COMPLETE CBC W/AUTO DIFF WBC: CPT

## 2019-03-18 PROCEDURE — 80048 BASIC METABOLIC PNL TOTAL CA: CPT

## 2019-03-18 PROCEDURE — 83880 ASSAY OF NATRIURETIC PEPTIDE: CPT

## 2019-03-18 PROCEDURE — 93005 ELECTROCARDIOGRAM TRACING: CPT | Performed by: FAMILY MEDICINE

## 2019-03-18 PROCEDURE — 71046 X-RAY EXAM CHEST 2 VIEWS: CPT

## 2019-03-18 PROCEDURE — 83036 HEMOGLOBIN GLYCOSYLATED A1C: CPT

## 2019-03-19 PROCEDURE — 93010 ELECTROCARDIOGRAM REPORT: CPT | Performed by: INTERNAL MEDICINE

## 2019-03-19 ASSESSMENT — ENCOUNTER SYMPTOMS
NAUSEA: 0
ABDOMINAL PAIN: 0
WHEEZING: 0
SHORTNESS OF BREATH: 1
BACK PAIN: 0
EYE DISCHARGE: 0
EYE REDNESS: 0
VOMITING: 0
RHINORRHEA: 0
SORE THROAT: 0
DIARRHEA: 0
COUGH: 0

## 2019-03-20 ENCOUNTER — OFFICE VISIT (OUTPATIENT)
Dept: NEPHROLOGY | Age: 74
End: 2019-03-20
Payer: MEDICARE

## 2019-03-20 VITALS
BODY MASS INDEX: 30.98 KG/M2 | HEART RATE: 57 BPM | SYSTOLIC BLOOD PRESSURE: 96 MMHG | WEIGHT: 209.8 LBS | DIASTOLIC BLOOD PRESSURE: 47 MMHG | OXYGEN SATURATION: 99 %

## 2019-03-20 DIAGNOSIS — I50.32 CHRONIC DIASTOLIC CONGESTIVE HEART FAILURE (HCC): ICD-10-CM

## 2019-03-20 DIAGNOSIS — N18.30 CHRONIC KIDNEY DISEASE, STAGE III (MODERATE) (HCC): Primary | ICD-10-CM

## 2019-03-20 DIAGNOSIS — I10 ESSENTIAL HYPERTENSION: ICD-10-CM

## 2019-03-20 PROCEDURE — 99213 OFFICE O/P EST LOW 20 MIN: CPT | Performed by: INTERNAL MEDICINE

## 2019-03-20 PROCEDURE — G8598 ASA/ANTIPLAT THER USED: HCPCS | Performed by: INTERNAL MEDICINE

## 2019-03-20 PROCEDURE — G8484 FLU IMMUNIZE NO ADMIN: HCPCS | Performed by: INTERNAL MEDICINE

## 2019-03-20 PROCEDURE — G8417 CALC BMI ABV UP PARAM F/U: HCPCS | Performed by: INTERNAL MEDICINE

## 2019-03-20 PROCEDURE — 4040F PNEUMOC VAC/ADMIN/RCVD: CPT | Performed by: INTERNAL MEDICINE

## 2019-03-20 PROCEDURE — G8427 DOCREV CUR MEDS BY ELIG CLIN: HCPCS | Performed by: INTERNAL MEDICINE

## 2019-03-20 PROCEDURE — 3017F COLORECTAL CA SCREEN DOC REV: CPT | Performed by: INTERNAL MEDICINE

## 2019-03-20 PROCEDURE — 1036F TOBACCO NON-USER: CPT | Performed by: INTERNAL MEDICINE

## 2019-03-20 PROCEDURE — 1123F ACP DISCUSS/DSCN MKR DOCD: CPT | Performed by: INTERNAL MEDICINE

## 2019-03-20 PROCEDURE — 1101F PT FALLS ASSESS-DOCD LE1/YR: CPT | Performed by: INTERNAL MEDICINE

## 2019-03-21 ENCOUNTER — CARE COORDINATION (OUTPATIENT)
Dept: CASE MANAGEMENT | Age: 74
End: 2019-03-21

## 2019-03-27 ENCOUNTER — OFFICE VISIT (OUTPATIENT)
Dept: FAMILY MEDICINE CLINIC | Age: 74
End: 2019-03-27

## 2019-03-27 VITALS
SYSTOLIC BLOOD PRESSURE: 118 MMHG | BODY MASS INDEX: 30.86 KG/M2 | RESPIRATION RATE: 13 BRPM | HEART RATE: 74 BPM | WEIGHT: 208.38 LBS | HEIGHT: 69 IN | DIASTOLIC BLOOD PRESSURE: 60 MMHG

## 2019-03-27 DIAGNOSIS — R80.9 TYPE 2 DIABETES MELLITUS WITH MICROALBUMINURIA, WITH LONG-TERM CURRENT USE OF INSULIN (HCC): Primary | ICD-10-CM

## 2019-03-27 DIAGNOSIS — E11.29 TYPE 2 DIABETES MELLITUS WITH MICROALBUMINURIA, WITH LONG-TERM CURRENT USE OF INSULIN (HCC): Primary | ICD-10-CM

## 2019-03-27 DIAGNOSIS — Z79.4 TYPE 2 DIABETES MELLITUS WITH MICROALBUMINURIA, WITH LONG-TERM CURRENT USE OF INSULIN (HCC): Primary | ICD-10-CM

## 2019-03-27 PROCEDURE — 1111F DSCHRG MED/CURRENT MED MERGE: CPT | Performed by: FAMILY MEDICINE

## 2019-03-27 PROCEDURE — 99495 TRANSJ CARE MGMT MOD F2F 14D: CPT | Performed by: FAMILY MEDICINE

## 2019-05-08 ENCOUNTER — TELEPHONE (OUTPATIENT)
Dept: FAMILY MEDICINE CLINIC | Age: 74
End: 2019-05-08

## 2019-05-08 NOTE — TELEPHONE ENCOUNTER
I could not find the Accu-check Guide meter and strips in Epic so it ordered it in letter form. I will fax the letter to the [facility].  I tried to call them to see if he would want to use the [facility] since he is checking 4x a day but got no answer on home or cell phones.

## 2019-05-20 RX ORDER — METOPROLOL SUCCINATE 50 MG/1
TABLET, EXTENDED RELEASE ORAL
Qty: 90 TABLET | Refills: 3 | Status: SHIPPED | OUTPATIENT
Start: 2019-05-20 | End: 2020-04-13

## 2019-05-20 RX ORDER — ATORVASTATIN CALCIUM 40 MG/1
40 TABLET, FILM COATED ORAL NIGHTLY
Qty: 90 TABLET | Refills: 3 | Status: SHIPPED | OUTPATIENT
Start: 2019-05-20 | End: 2020-05-28 | Stop reason: SDUPTHER

## 2019-05-30 RX ORDER — BLOOD-GLUCOSE METER
1 EACH MISCELLANEOUS ONCE
Qty: 1 KIT | Refills: 0 | OUTPATIENT
Start: 2019-05-30 | End: 2019-05-30

## 2019-05-30 NOTE — TELEPHONE ENCOUNTER
Noted in encounter notes that script was hand written 5/8/19, but as of 5/29/19, wife reports that pharmacy did not have the script. Call to pharmacy later today and script had been received per Dr. Olman Corona. Meter is not covered-will need to pay cash. Strips are covered but only for testing 3 times per day.

## 2019-05-30 NOTE — TELEPHONE ENCOUNTER
Moise Iglesias presents in the office. He is out of strips. He reports [facility] has still not received a script for meter and strips for Accu Chek Guide. He is not interested in the [place] at this time.

## 2019-05-30 NOTE — TELEPHONE ENCOUNTER
Spoke to pharmacist Bridgett Hughes, never received RX through fax.     Refaxed written RX to   [phone]

## 2019-06-05 ENCOUNTER — OFFICE VISIT (OUTPATIENT)
Dept: PULMONOLOGY | Age: 74
End: 2019-06-05
Payer: MEDICARE

## 2019-06-05 VITALS
SYSTOLIC BLOOD PRESSURE: 118 MMHG | HEART RATE: 76 BPM | BODY MASS INDEX: 31.31 KG/M2 | HEIGHT: 69 IN | DIASTOLIC BLOOD PRESSURE: 64 MMHG | WEIGHT: 211.4 LBS | OXYGEN SATURATION: 93 %

## 2019-06-05 DIAGNOSIS — G47.33 OBSTRUCTIVE SLEEP APNEA ON CPAP: Primary | ICD-10-CM

## 2019-06-05 DIAGNOSIS — E11.29 TYPE 2 DIABETES MELLITUS WITH MICROALBUMINURIA, WITH LONG-TERM CURRENT USE OF INSULIN (HCC): Primary | ICD-10-CM

## 2019-06-05 DIAGNOSIS — Z99.89 OBSTRUCTIVE SLEEP APNEA ON CPAP: Primary | ICD-10-CM

## 2019-06-05 DIAGNOSIS — E66.9 OBESITY (BMI 30-39.9): ICD-10-CM

## 2019-06-05 DIAGNOSIS — Z79.4 TYPE 2 DIABETES MELLITUS WITH MICROALBUMINURIA, WITH LONG-TERM CURRENT USE OF INSULIN (HCC): Primary | ICD-10-CM

## 2019-06-05 DIAGNOSIS — R80.9 TYPE 2 DIABETES MELLITUS WITH MICROALBUMINURIA, WITH LONG-TERM CURRENT USE OF INSULIN (HCC): Primary | ICD-10-CM

## 2019-06-05 DIAGNOSIS — R06.02 SOB (SHORTNESS OF BREATH): ICD-10-CM

## 2019-06-05 PROCEDURE — G8427 DOCREV CUR MEDS BY ELIG CLIN: HCPCS | Performed by: PHYSICIAN ASSISTANT

## 2019-06-05 PROCEDURE — 3017F COLORECTAL CA SCREEN DOC REV: CPT | Performed by: PHYSICIAN ASSISTANT

## 2019-06-05 PROCEDURE — 99214 OFFICE O/P EST MOD 30 MIN: CPT | Performed by: PHYSICIAN ASSISTANT

## 2019-06-05 PROCEDURE — G8598 ASA/ANTIPLAT THER USED: HCPCS | Performed by: PHYSICIAN ASSISTANT

## 2019-06-05 PROCEDURE — 1123F ACP DISCUSS/DSCN MKR DOCD: CPT | Performed by: PHYSICIAN ASSISTANT

## 2019-06-05 PROCEDURE — 4040F PNEUMOC VAC/ADMIN/RCVD: CPT | Performed by: PHYSICIAN ASSISTANT

## 2019-06-05 PROCEDURE — 1036F TOBACCO NON-USER: CPT | Performed by: PHYSICIAN ASSISTANT

## 2019-06-05 PROCEDURE — G8417 CALC BMI ABV UP PARAM F/U: HCPCS | Performed by: PHYSICIAN ASSISTANT

## 2019-06-05 ASSESSMENT — ENCOUNTER SYMPTOMS
COUGH: 0
BACK PAIN: 0
WHEEZING: 0
ALLERGIC/IMMUNOLOGIC NEGATIVE: 1
CHEST TIGHTNESS: 0
NAUSEA: 0
DIARRHEA: 0
SHORTNESS OF BREATH: 0
EYES NEGATIVE: 1
STRIDOR: 0

## 2019-06-05 NOTE — TELEPHONE ENCOUNTER
Date of last visit:  3/27/2019  Date of next visit:  10/2/2019    Requested Prescriptions     Pending Prescriptions Disp Refills    insulin lispro (HUMALOG KWIKPEN) 100 UNIT/ML pen 5 pen 0     Sig: Inject 7 Units into the skin 2 times daily before breakfast and lunch plus sliding scale

## 2019-06-05 NOTE — PROGRESS NOTES
[facility]      Wilfredo Figueroa         [id number]  6/5/2019   Chief Complaint   Patient presents with    Follow-up     RICCARDO 10 month follow up, with a download and labs9-11-18        Pt of Shayy Barrientos    PAP Download:   Original or initialAHI: 8.3     Date of initial study: 2/7/18      Provider:    Tiffany Peralta        __ Suella Nickels    __ Thermon Cedars            __[facility]   __[place]:       __Other    Neck Size: 17 Mallampati Mallampati 4  ESS:    SAQLI:     Presentation:   [address] [place] [address] presents for sleep medicine follow up for obstructive sleep apnea  Since the last visit, Lazara Mcgee is doing ok with PAP but his PAP is not working properly. Heater is not working and not downloading. He had multiple stents done this year. He continues to be SOB. He has been on Flovent and albuterol with no benefit. Equipment issues: The pressure is  acceptable, the mask is acceptable and he  is  using the humidity. Sleep issues:  Do you feel better? Yes  More rested? Yes   Better concentration? yes    Progress History:   Since last visit any new medical issues? Yes stents  New ER or hospitlal visits? No  Any new or changes in medicines? No  Any new sleep medicines?  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    BPH (benign prostatic hypertrophy)     CAD (coronary artery disease) 1997    Carotid artery disease ([address] 1995    right    Carotid disease, bilateral ([address]     Cerebral artery occlusion with cerebral infarction ([address]     CHF (congestive heart failure) ([address]     Chronic kidney disease     GERD (gastroesophageal reflux disease)     Hyperlipidemia 1996    Hypertension     Lumbar disc disease 2001    MI [facility]ocardial infarction) [facility] 1997    Nephrolithiasis 2002    NIDDM (non-insulin dependent diabetes mellitus) 1996    diagnosed 1996    RICCARDO (obstructive sleep apnea) 2005    cpap    Renal artery stenosis ([address] 2007    left    Renal insufficiency     S/P CABG (status post coronary artery bypass graft) 1997    5 vessels    Skin cancer     Squamous cell carcinoma     TIA (transient ischemic attack)     Wears partial dentures     upper and lower       Past Surgical History:   Procedure Laterality Date    ABLATION OF DYSRHYTHMIC FOCUS  3/13/13   [address] CARDIAC SURGERY  3/13/13    oblation for irreg rythm    CARDIOVERSION  3/8/2012    CAROTID ENDARTERECTOMY  1997    CHOLECYSTECTOMY  4/1999    COLONOSCOPY  6/22/09, 4/2011,7/12/17    polyp removal    CORONARY ANGIOPLASTY WITH STENT PLACEMENT  2008 (2), 2009 (1)    x2    CORONARY ARTERY BYPASS GRAFT  8/1997    5 vessel    DIAGNOSTIC CARDIAC CATH LAB PROCEDURE      FOOT SURGERY  12/20/2013    pre-cancerous mole rt foot removed    LITHOTRIPSY  4/2002    OR OFFICE/OUTPT VISIT,PROCEDURE ONLY N/A 11/8/2018    TRANSESOPHAGEAL ECHOCARDIOGRAM performed by Abdi Crockett MD at [address] [place] [address] [facility]  05/30/2018   [facility] SURGERY  2001, 2003    rt shoulder manipulation--frozen shoulder-01, Lt -03    SKIN BIOPSY      SKIN CANCER EXCISION  12/20/13     R foot    SKIN CANCER EXCISION  1/2016    squamous cell Lt  upper cheek    URETER STENT PLACEMENT  4/2002    VASCULAR SURGERY      carotids & cabg harvests       Social History     Tobacco Use    Smoking status: Never Smoker    Smokeless tobacco: Never Used   Substance Use Topics    Alcohol use: No     Alcohol/week: 0.0 oz    Drug use: No       No Known Allergies    Current Outpatient Medications   Medication Sig Dispense Refill    Insulin Pen Needle (NOVOFINE) 32G X 6 MM MISC USE 1 NEW NEEDLE 6 TIMES A DAY AND AS NEEDED 600 each 5    atorvastatin (LIPITOR) 40 MG tablet Take 1 tablet by mouth nightly 90 tablet 3    metoprolol succinate (TOPROL XL) 50 MG extended release tablet TAKE 1 TABLET DAILY 90 tablet 3    apixaban (ELIQUIS) 5 MG TABS tablet TAKE 1 TABLET TWICE A  tablet 3    metFORMIN (GLUCOPHAGE) 1000 MG tablet Take 1 tablet by mouth once for 1 dose      insulin detemir (LEVEMIR FLEXPEN) 100 UNIT/ML injection pen Inject 45 Units into the skin daily (with breakfast) AND 40 Units nightly.       Multiple Vitamins-Minerals (THERAPEUTIC MULTIVITAMIN-MINERALS) tablet Take 1 tablet by mouth nightly      ACCU-CHEK COMPACT PLUS strip DX E11.59, CHECK THE GLUCOSE 4 TIMES DAILY 408 strip 3    insulin lispro (HUMALOG KWIKPEN) 100 UNIT/ML pen Inject 7 Units into the skin 2 times daily before breakfast and lunch plus sliding scale 5 pen 0    CARTIA  MG extended release capsule TAKE 1 CAPSULE DAILY 90 capsule 3    hydrALAZINE (APRESOLINE) 25 MG tablet Take 1 tablet by mouth every 8 hours 90 tablet 0    bumetanide (BUMEX) 2 MG tablet Take 1 tablet by mouth daily 30 tablet 0    sacubitril-valsartan (ENTRESTO) 49-51 MG per tablet Take 1 tablet by mouth 2 times daily 60 tablet 0    insulin lispro (HUMALOG KWIKPEN) 100 UNIT/ML pen Inject 10 Units into the skin daily before dinner plus sliding scale      clopidogrel (PLAVIX) 75 MG tablet Take 75 mg by mouth daily      ACCU-CHEK SOFTCLIX LANCETS MISC CHECK THE GLUCOSE FOUR TIMES DAILY E11.59 400 each 5    CPAP Machine MISC by Does not apply route Please change CPAP pressure to 13 cm H20. 1 each 0    ranolazine (RANEXA) 500 MG extended release tablet Take 500 mg by mouth 2 times daily      isosorbide mononitrate (IMDUR) 120 MG extended release tablet Take 120 mg by mouth daily       NOVOFINE 30G X 8 MM MISC USE 1 NEW NEEDLE 6 TIMES A DAY AND AS NEEDED 600 each 3    albuterol sulfate HFA (VENTOLIN HFA) 108 (90 Base) MCG/ACT inhaler Inhale 2 puffs into the lungs every 6 hours as needed for Wheezing or Shortness of Breath 1 Inhaler 3    fluticasone atraumatic. Right Ear: External ear normal.   Left Ear: External ear normal.   Mouth/Throat: Oropharynx is clear and moist.   Eyes: Pupils are equal, round, and reactive to light. Conjunctivae and EOM are normal.   Neck: Normal range of motion. Neck supple. Cardiovascular: Normal rate, regular rhythm and normal heart sounds. Pulmonary/Chest: Effort normal and breath sounds normal.   Abdominal: Soft. Musculoskeletal: Normal range of motion. Neurological: He is alert and oriented to person, place, and time. Skin: Skin is warm and dry. Psychiatric: He has a normal mood and affect. His behavior is normal. Judgment and thought content normal.     ECHO 11/08/18      Findings      Mitral Valve   Mild mitral regurgitation is present. Aortic Valve   The aortic valve was trileaflet with normal thickness and cuspal   separation. There was no echocardiographic evidence of a vegetation. DOPPLER: Transaortic velocity was within the normal range with no evidence   of aortic stenosis or regurgitaiton. Tricuspid Valve   Mild tricuspid regurgitation. Pulmonic Valve   Trivial pulmonic regurgitation visualized. Left Atrium   moderate Patent foramen ovale with left-to-right shunt was visualized. Left Ventricle   Ejection fraction is visually estimated at 60%. Overall left ventricular function is normal.      Right Atrium   Right atrial size was normal with no thrombus identified. ATRIAL SEPTUM:   No defect or patent foramen ovale was identified. There was no   right-to-left shunt, with provocative maneuvers to increase right atrial   pressure. Right Ventricle   The right ventricular size was normal with normal systolic function and   wall thickness. ASSESSMENT/DIAGNOSIS     Diagnosis Orders   1. Obstructive sleep apnea on CPAP     2. Obesity (BMI 30-39.9)     3. SOB (shortness of breath)              Plan   Do you need any equipment today? Yes he needs replacement machine.   Their

## 2019-06-17 ENCOUNTER — OFFICE VISIT (OUTPATIENT)
Dept: INTERNAL MEDICINE CLINIC | Age: 74
End: 2019-06-17
Payer: MEDICARE

## 2019-06-17 VITALS
HEIGHT: 69 IN | SYSTOLIC BLOOD PRESSURE: 130 MMHG | WEIGHT: 216 LBS | DIASTOLIC BLOOD PRESSURE: 48 MMHG | HEART RATE: 70 BPM | BODY MASS INDEX: 31.99 KG/M2

## 2019-06-17 DIAGNOSIS — E11.29 TYPE 2 DIABETES MELLITUS WITH MICROALBUMINURIA, WITH LONG-TERM CURRENT USE OF INSULIN (HCC): ICD-10-CM

## 2019-06-17 DIAGNOSIS — Z79.4 TYPE 2 DIABETES MELLITUS WITH MICROALBUMINURIA, WITH LONG-TERM CURRENT USE OF INSULIN (HCC): ICD-10-CM

## 2019-06-17 DIAGNOSIS — R80.9 TYPE 2 DIABETES MELLITUS WITH MICROALBUMINURIA, WITH LONG-TERM CURRENT USE OF INSULIN (HCC): ICD-10-CM

## 2019-06-17 DIAGNOSIS — N18.30 CKD (CHRONIC KIDNEY DISEASE) STAGE 3, GFR 30-59 ML/MIN (HCC): ICD-10-CM

## 2019-06-17 DIAGNOSIS — I25.10 CORONARY ARTERY DISEASE INVOLVING NATIVE CORONARY ARTERY, ANGINA PRESENCE UNSPECIFIED, UNSPECIFIED WHETHER NATIVE OR TRANSPLANTED HEART: Primary | ICD-10-CM

## 2019-06-17 PROCEDURE — G8417 CALC BMI ABV UP PARAM F/U: HCPCS | Performed by: INTERNAL MEDICINE

## 2019-06-17 PROCEDURE — 2022F DILAT RTA XM EVC RTNOPTHY: CPT | Performed by: INTERNAL MEDICINE

## 2019-06-17 PROCEDURE — 4040F PNEUMOC VAC/ADMIN/RCVD: CPT | Performed by: INTERNAL MEDICINE

## 2019-06-17 PROCEDURE — G8427 DOCREV CUR MEDS BY ELIG CLIN: HCPCS | Performed by: INTERNAL MEDICINE

## 2019-06-17 PROCEDURE — 3044F HG A1C LEVEL LT 7.0%: CPT | Performed by: INTERNAL MEDICINE

## 2019-06-17 PROCEDURE — 3017F COLORECTAL CA SCREEN DOC REV: CPT | Performed by: INTERNAL MEDICINE

## 2019-06-17 PROCEDURE — 99213 OFFICE O/P EST LOW 20 MIN: CPT | Performed by: INTERNAL MEDICINE

## 2019-06-17 PROCEDURE — 1123F ACP DISCUSS/DSCN MKR DOCD: CPT | Performed by: INTERNAL MEDICINE

## 2019-06-17 PROCEDURE — 1036F TOBACCO NON-USER: CPT | Performed by: INTERNAL MEDICINE

## 2019-06-17 PROCEDURE — G8598 ASA/ANTIPLAT THER USED: HCPCS | Performed by: INTERNAL MEDICINE

## 2019-06-17 RX ORDER — ALPRAZOLAM 0.25 MG/1
0.25 TABLET ORAL NIGHTLY PRN
COMMUNITY
End: 2021-03-29

## 2019-06-17 ASSESSMENT — PATIENT HEALTH QUESTIONNAIRE - PHQ9
SUM OF ALL RESPONSES TO PHQ QUESTIONS 1-9: 0
SUM OF ALL RESPONSES TO PHQ9 QUESTIONS 1 & 2: 0
2. FEELING DOWN, DEPRESSED OR HOPELESS: 0
SUM OF ALL RESPONSES TO PHQ QUESTIONS 1-9: 0
1. LITTLE INTEREST OR PLEASURE IN DOING THINGS: 0

## 2019-06-17 NOTE — PROGRESS NOTES
YOB: 1945    Chief Complaint   Patient presents with    Coronary Artery Disease     6 month follow up    Diabetes    Other     lv dysfunction-wife states pt has been snacking on salty things     NEW SXS: none; gets angina seveal x per week. Told by John Reagan he can do no more intervention, consdier [facility]. Sugars a little higher. Last A1C  6.8 9.18    Sees [facility] for kidneys. Going through stress with wife illness    This patient presents for medical evaluation. I last saw the patient 6m  ago. This patient is followed regularly by Dr. Shyanne Robertson. Patient Active Problem List   Diagnosis    CAD (coronary artery disease)    CKD (chronic kidney disease) stage 3, GFR 30-59 ml/min ([facility])    S/P CABG (coronary artery bypass graft)    Hyperlipidemia    Renal artery stenosis ([facility])    Obstructive sleep apnea on CPAP    Diastolic CHF ([address]    Coronary artery disease involving native coronary artery of native heart without angina pectoris    Type 2 diabetes mellitus with microalbuminuria, with long-term current use of insulin ([address]    CAD in native artery    Status post angioplasty with stent    Pyelonephritis    Diarrhea    Urinary tract infection associated with indwelling urethral catheter ([address]    MADI (acute kidney injury) ([address]    Shortness of breath    SIRS due to non-infectious process without acute organ dysfunction ([facility])    Renal failure syndrome    Wheezing    Asthma    Essential hypertension    CAD S/P percutaneous coronary angioplasty    Obesity (BMI 30-39. 9)       Current Outpatient Medications   Medication Sig Dispense Refill    ALPRAZolam (XANAX) 0.25 MG tablet Take 0.25 mg by mouth nightly as needed for Anxiety.       insulin lispro (HUMALOG KWIKPEN) 100 UNIT/ML pen Inject 7 Units into the skin 2 times daily before breakfast and lunch plus sliding scale 5 pen 3    Insulin Pen Needle (NOVOFINE) 32G X 6 MM MISC USE 1 NEW NEEDLE 6 TIMES A DAY AND AS NEEDED 600 each 5    atorvastatin (LIPITOR) 40 MG tablet Take 1 tablet by mouth nightly 90 tablet 3    metoprolol succinate (TOPROL XL) 50 MG extended release tablet TAKE 1 TABLET DAILY 90 tablet 3    apixaban (ELIQUIS) 5 MG TABS tablet TAKE 1 TABLET TWICE A  tablet 3    metFORMIN (GLUCOPHAGE) 1000 MG tablet Take 1 tablet by mouth once for 1 dose      insulin detemir (LEVEMIR FLEXPEN) 100 UNIT/ML injection pen Inject 45 Units into the skin daily (with breakfast) AND 40 Units nightly.  Multiple Vitamins-Minerals (THERAPEUTIC MULTIVITAMIN-MINERALS) tablet Take 1 tablet by mouth nightly      ACCU-CHEK COMPACT PLUS strip DX E11.59, CHECK THE GLUCOSE 4 TIMES DAILY 408 strip 3    CARTIA  MG extended release capsule TAKE 1 CAPSULE DAILY 90 capsule 3    hydrALAZINE (APRESOLINE) 25 MG tablet Take 1 tablet by mouth every 8 hours 90 tablet 0    albuterol sulfate HFA (VENTOLIN HFA) 108 (90 Base) MCG/ACT inhaler Inhale 2 puffs into the lungs every 6 hours as needed for Wheezing or Shortness of Breath 1 Inhaler 3    fluticasone (FLOVENT HFA) 110 MCG/ACT inhaler Inhale 2 puffs into the lungs 2 times daily Patient to rinse mouth after dose.  1 Inhaler 3    bumetanide (BUMEX) 2 MG tablet Take 1 tablet by mouth daily 30 tablet 0    sacubitril-valsartan (ENTRESTO) 49-51 MG per tablet Take 1 tablet by mouth 2 times daily 60 tablet 0    insulin lispro (HUMALOG KWIKPEN) 100 UNIT/ML pen Inject 10 Units into the skin daily before dinner plus sliding scale      nitroGLYCERIN (NITROLINGUAL) 0.4 MG/SPRAY 0.4 mg spray USE 1 SPRAY ON OR UNDER THE TONGUE EVERY 5 MINUTES AS NEEDED FOR CHEST PAIN 4.9 g 6    clopidogrel (PLAVIX) 75 MG tablet Take 75 mg by mouth daily      ACCU-CHEK SOFTCLIX LANCETS MISC CHECK THE GLUCOSE FOUR TIMES DAILY E11.59 400 each 5    ranolazine (RANEXA) 500 MG extended release tablet Take 500 mg by mouth 2 times daily      isosorbide mononitrate (IMDUR) 120 MG extended release tablet Take 120 mg microalbuminuria, with long-term current use of insulin ([facility])         No orders of the defined types were placed in this encounter. IMP/PLAN:    1. Chronic angina- advised to increase Ranexa to 1000 mg am 500 pm  2. Dm- will need A1C  3. ckd- advised to have urine microalbumin; to see [facility]        Will schedule follow up appointment in 3m. Continue medications at current doses. Signed By:  Laly Ferrara M.D.

## 2019-07-02 ENCOUNTER — NURSE ONLY (OUTPATIENT)
Dept: LAB | Age: 74
End: 2019-07-02

## 2019-07-02 LAB
ALBUMIN SERPL-MCNC: 4 G/DL (ref 3.5–5.1)
ALP BLD-CCNC: 66 U/L (ref 38–126)
ALT SERPL-CCNC: 14 U/L (ref 11–66)
ANION GAP SERPL CALCULATED.3IONS-SCNC: 13 MEQ/L (ref 8–16)
AST SERPL-CCNC: 16 U/L (ref 5–40)
BILIRUB SERPL-MCNC: 0.3 MG/DL (ref 0.3–1.2)
BILIRUBIN DIRECT: < 0.2 MG/DL (ref 0–0.3)
BUN BLDV-MCNC: 35 MG/DL (ref 7–22)
CALCIUM SERPL-MCNC: 9.6 MG/DL (ref 8.5–10.5)
CHLORIDE BLD-SCNC: 106 MEQ/L (ref 98–111)
CHOLESTEROL, TOTAL: 124 MG/DL (ref 100–199)
CO2: 20 MEQ/L (ref 23–33)
CREAT SERPL-MCNC: 1.7 MG/DL (ref 0.4–1.2)
ERYTHROCYTE [DISTWIDTH] IN BLOOD BY AUTOMATED COUNT: 13.5 % (ref 11.5–14.5)
ERYTHROCYTE [DISTWIDTH] IN BLOOD BY AUTOMATED COUNT: 47.8 FL (ref 35–45)
GFR SERPL CREATININE-BSD FRML MDRD: 40 ML/MIN/1.73M2
GLUCOSE BLD-MCNC: 116 MG/DL (ref 70–108)
HCT VFR BLD CALC: 35.4 % (ref 42–52)
HDLC SERPL-MCNC: 35 MG/DL
HEMOGLOBIN: 11.5 GM/DL (ref 14–18)
LDL CHOLESTEROL CALCULATED: 60 MG/DL
MCH RBC QN AUTO: 31.5 PG (ref 26–33)
MCHC RBC AUTO-ENTMCNC: 32.5 GM/DL (ref 32.2–35.5)
MCV RBC AUTO: 97 FL (ref 80–94)
PLATELET # BLD: 234 THOU/MM3 (ref 130–400)
PMV BLD AUTO: 9.9 FL (ref 9.4–12.4)
POTASSIUM SERPL-SCNC: 4.9 MEQ/L (ref 3.5–5.2)
RBC # BLD: 3.65 MILL/MM3 (ref 4.7–6.1)
SODIUM BLD-SCNC: 139 MEQ/L (ref 135–145)
TOTAL PROTEIN: 7.1 G/DL (ref 6.1–8)
TRIGL SERPL-MCNC: 146 MG/DL (ref 0–199)
WBC # BLD: 6.7 THOU/MM3 (ref 4.8–10.8)

## 2019-07-13 NOTE — PROGRESS NOTES
[facility]                                                  Pulmonary medicine clinic initial follow up note. Patient: Sydnee Jhaveri  : 1945     Lung Nodule Screening     [] Qualifies    [x] Does not qualify   [] Declined    [] Completed     Chief complaint/[place]: Sydnee Jhaveri is a 76 y. o.oldmale came for regarding his mild intermittent Bronchial asthma. He denies any cough or expectoration. No fever or chills. No recent hospitalizations or emergency room visits. He is using inhalers with good compliance. He rarely uses rescue inhaler. He denies any nocturnal symptoms. He is currently on treatment with following inhalers/Nebs:  -Flovent MDI 2 puffs BID E-scribed at discharge  -Ventolin MDI 2 puffs every 6 hours. He is currently not using any oxygen supplementation at rest, exercise or during sleep/at night time. He is having shortness of breath: Yes  Onset: gradual   Duration:Chronic  Diurnal variation:  None. Current functional capacity on level ground: <1block/s on level ground. He can climb steps: Yes  Flights of steps he can climb: 1  He denies orthopnea. He denies paroxysmal nocturnal dyspnea. He is having cough: No     He is having chest pain:No. He had a hx of chest pain. He follows with Dr. Brian Current    He is currently not using any oxygen supplementation at rest, exercise or during sleep/at night time. He was never diagnosed with pulmonary diseases I.e bronchial asthma, Pulmonary fibrosis, Sarcoidosis, pulmonary embolism,pulmonary hypertension or pleural effusion/s in the past.  He denies any hemoptysis. He was never diagnosed with pulmonary tuberculosis in the past. He denies any recent exposure to any patients with tuberculosis. He denies any recent travel to endemic places of tuberculosis.       Asthma control (Severity) questionnaire:  Asthma symptoms:  > ventricle:  Size was normal.  Systolic function was at the lower limits of normal by visual assessment. Ejection fraction was estimated to be 50 % in  the range of 50 % to 55 %. This study was inadequate for the evaluation of regional wall motion.     Left atrium:  The atrium was mildly dilated.     Mitral valve: There was mild regurgitation.     Tricuspid valve: There was mild regurgitation.     Prepared and signed by     Kaley Marie MD  Signed 19-Apr-2015 09:01:19     Echocardiogram done at [facility] office in 03/2018. Reviewed. EF is 50 to 55%     BOSSMAN procedure:ECHOCARDIOGRAM TRANSESOPHAGEAL.      Ejection fraction is visually estimated at 60%.   Overall left ventricular function is normal.   moderate Patent foramen ovale with left-to-right shunt was visualized.   The aortic valve was trileaflet with normal thickness and cuspal   separation. There was no echocardiographic evidence of a vegetation.   DOPPLER: Transaortic velocity was within the normal range with no evidence   of aortic stenosis or regurgitaiton.   Mild mitral regurgitation is present.      Signature      ----------------------------------------------------------------   Electronically signed by Carmita Bhakta MD (Interpreting   physician) on 11/08/2018 at 05:34 PM    Chest Xray:  Mar 18, 2019   PROCEDURE: XR CHEST (2 VW)   No acute cardiopulmonary disease. Assessment:  -Mild intermittent bronchial asthma- Not under control due to poor compliance. He is using his inhalers with poor compliance.  -Dyspnea especially with exertion due to multiple etiologies including Pulmonary Vs Cardiac- Improving  -Severe obstructive sleep apnea. He is currently on treatment with 13cm H20. He follows with Ms Jeffedwar Marrufo  -h/o Atrial fibrillation [facility]) on chronic anticoagulation with Eliquis  -CAD (coronary artery disease) S/p CABG. Multiple stents placement.  He is following with Dr. Missy Bautista  -Hypertension under control  -NIDDM

## 2019-07-15 ENCOUNTER — OFFICE VISIT (OUTPATIENT)
Dept: PULMONOLOGY | Age: 74
End: 2019-07-15
Payer: MEDICARE

## 2019-07-15 VITALS
TEMPERATURE: 98 F | HEART RATE: 71 BPM | HEIGHT: 69 IN | SYSTOLIC BLOOD PRESSURE: 126 MMHG | BODY MASS INDEX: 31.61 KG/M2 | DIASTOLIC BLOOD PRESSURE: 46 MMHG | OXYGEN SATURATION: 95 % | WEIGHT: 213.4 LBS

## 2019-07-15 DIAGNOSIS — J45.20 MILD INTERMITTENT ASTHMA WITHOUT COMPLICATION: Primary | ICD-10-CM

## 2019-07-15 DIAGNOSIS — J45.909 ASTHMA, UNSPECIFIED ASTHMA SEVERITY, UNSPECIFIED WHETHER COMPLICATED, UNSPECIFIED WHETHER PERSISTENT: ICD-10-CM

## 2019-07-15 PROCEDURE — 1123F ACP DISCUSS/DSCN MKR DOCD: CPT | Performed by: INTERNAL MEDICINE

## 2019-07-15 PROCEDURE — 3017F COLORECTAL CA SCREEN DOC REV: CPT | Performed by: INTERNAL MEDICINE

## 2019-07-15 PROCEDURE — G8427 DOCREV CUR MEDS BY ELIG CLIN: HCPCS | Performed by: INTERNAL MEDICINE

## 2019-07-15 PROCEDURE — G8598 ASA/ANTIPLAT THER USED: HCPCS | Performed by: INTERNAL MEDICINE

## 2019-07-15 PROCEDURE — G8417 CALC BMI ABV UP PARAM F/U: HCPCS | Performed by: INTERNAL MEDICINE

## 2019-07-15 PROCEDURE — 4040F PNEUMOC VAC/ADMIN/RCVD: CPT | Performed by: INTERNAL MEDICINE

## 2019-07-15 PROCEDURE — 99214 OFFICE O/P EST MOD 30 MIN: CPT | Performed by: INTERNAL MEDICINE

## 2019-07-15 PROCEDURE — 1036F TOBACCO NON-USER: CPT | Performed by: INTERNAL MEDICINE

## 2019-07-15 RX ORDER — ALBUTEROL SULFATE 90 UG/1
2 AEROSOL, METERED RESPIRATORY (INHALATION) EVERY 6 HOURS PRN
Qty: 1 INHALER | Refills: 11 | Status: SHIPPED | OUTPATIENT
Start: 2019-07-15 | End: 2020-07-20

## 2019-07-15 RX ORDER — FLUTICASONE PROPIONATE 110 UG/1
2 AEROSOL, METERED RESPIRATORY (INHALATION) 2 TIMES DAILY
Qty: 3 INHALER | Refills: 3 | Status: SHIPPED | OUTPATIENT
Start: 2019-07-15 | End: 2020-07-20

## 2019-08-08 ENCOUNTER — TELEPHONE (OUTPATIENT)
Dept: INTERNAL MEDICINE CLINIC | Age: 74
End: 2019-08-08

## 2019-08-08 NOTE — TELEPHONE ENCOUNTER
Trevor Lopez from Dr. Naun Noyola office called saying pt needs 3 teeth extracted from lower jaw. They would like him to hold is eliquis 2 days if OK with EF. Pt had 3 stents in march. No date set. Pt is not in a lot of pain. If needed they can hold with him antibiotics to get him by maybe another month if best to wait to get him to the 6 month radha after stents. Will you comment on this or should they check with Dr. Toribio Santana?

## 2019-09-16 ENCOUNTER — OFFICE VISIT (OUTPATIENT)
Dept: INTERNAL MEDICINE CLINIC | Age: 74
End: 2019-09-16
Payer: MEDICARE

## 2019-09-16 ENCOUNTER — TELEPHONE (OUTPATIENT)
Dept: INTERNAL MEDICINE CLINIC | Age: 74
End: 2019-09-16

## 2019-09-16 VITALS
SYSTOLIC BLOOD PRESSURE: 112 MMHG | HEART RATE: 62 BPM | DIASTOLIC BLOOD PRESSURE: 60 MMHG | HEIGHT: 69 IN | WEIGHT: 211 LBS | BODY MASS INDEX: 31.25 KG/M2

## 2019-09-16 DIAGNOSIS — E11.29 TYPE 2 DIABETES MELLITUS WITH MICROALBUMINURIA, WITH LONG-TERM CURRENT USE OF INSULIN (HCC): Primary | ICD-10-CM

## 2019-09-16 DIAGNOSIS — R80.9 TYPE 2 DIABETES MELLITUS WITH MICROALBUMINURIA, WITH LONG-TERM CURRENT USE OF INSULIN (HCC): Primary | ICD-10-CM

## 2019-09-16 DIAGNOSIS — Z79.4 TYPE 2 DIABETES MELLITUS WITH MICROALBUMINURIA, WITH LONG-TERM CURRENT USE OF INSULIN (HCC): Primary | ICD-10-CM

## 2019-09-16 LAB — HBA1C MFR BLD: 6.1 % (ref 4.3–5.7)

## 2019-09-16 PROCEDURE — G0108 DIAB MANAGE TRN  PER INDIV: HCPCS | Performed by: INTERNAL MEDICINE

## 2019-09-16 PROCEDURE — 83036 HEMOGLOBIN GLYCOSYLATED A1C: CPT | Performed by: INTERNAL MEDICINE

## 2019-09-23 DIAGNOSIS — R80.9 TYPE 2 DIABETES MELLITUS WITH MICROALBUMINURIA, WITH LONG-TERM CURRENT USE OF INSULIN (HCC): ICD-10-CM

## 2019-09-23 DIAGNOSIS — E11.29 TYPE 2 DIABETES MELLITUS WITH MICROALBUMINURIA, WITH LONG-TERM CURRENT USE OF INSULIN (HCC): ICD-10-CM

## 2019-09-23 DIAGNOSIS — Z79.4 TYPE 2 DIABETES MELLITUS WITH MICROALBUMINURIA, WITH LONG-TERM CURRENT USE OF INSULIN (HCC): ICD-10-CM

## 2019-09-23 NOTE — TELEPHONE ENCOUNTER
Date of last visit:  3/27/2019  Date of next visit:  10/2/2019    Requested Prescriptions     Pending Prescriptions Disp Refills    insulin lispro (HUMALOG KWIKPEN) 100 UNIT/ML pen 5 pen 3     Sig: Inject 7 Units into the skin 2 times daily before breakfast and lunch plus sliding scale    metFORMIN (GLUCOPHAGE) 1000 MG tablet 180 tablet      Sig: Take 1 tablet by mouth nightly Often taking only 1 tablet daily

## 2019-10-02 DIAGNOSIS — E11.29 TYPE 2 DIABETES MELLITUS WITH MICROALBUMINURIA, WITH LONG-TERM CURRENT USE OF INSULIN (HCC): ICD-10-CM

## 2019-10-02 DIAGNOSIS — R80.9 TYPE 2 DIABETES MELLITUS WITH MICROALBUMINURIA, WITH LONG-TERM CURRENT USE OF INSULIN (HCC): ICD-10-CM

## 2019-10-02 DIAGNOSIS — Z79.4 TYPE 2 DIABETES MELLITUS WITH MICROALBUMINURIA, WITH LONG-TERM CURRENT USE OF INSULIN (HCC): ICD-10-CM

## 2019-10-07 DIAGNOSIS — R80.9 TYPE 2 DIABETES MELLITUS WITH MICROALBUMINURIA, WITH LONG-TERM CURRENT USE OF INSULIN (HCC): ICD-10-CM

## 2019-10-07 DIAGNOSIS — Z79.4 TYPE 2 DIABETES MELLITUS WITH MICROALBUMINURIA, WITH LONG-TERM CURRENT USE OF INSULIN (HCC): ICD-10-CM

## 2019-10-07 DIAGNOSIS — E11.29 TYPE 2 DIABETES MELLITUS WITH MICROALBUMINURIA, WITH LONG-TERM CURRENT USE OF INSULIN (HCC): ICD-10-CM

## 2019-10-21 ENCOUNTER — OFFICE VISIT (OUTPATIENT)
Dept: NEPHROLOGY | Age: 74
End: 2019-10-21
Payer: MEDICARE

## 2019-10-21 ENCOUNTER — NURSE ONLY (OUTPATIENT)
Dept: LAB | Age: 74
End: 2019-10-21

## 2019-10-21 VITALS
BODY MASS INDEX: 31.07 KG/M2 | OXYGEN SATURATION: 98 % | SYSTOLIC BLOOD PRESSURE: 110 MMHG | WEIGHT: 210.4 LBS | DIASTOLIC BLOOD PRESSURE: 38 MMHG | HEART RATE: 52 BPM

## 2019-10-21 DIAGNOSIS — I10 ESSENTIAL HYPERTENSION: ICD-10-CM

## 2019-10-21 DIAGNOSIS — N18.30 CHRONIC KIDNEY DISEASE, STAGE III (MODERATE) (HCC): Primary | ICD-10-CM

## 2019-10-21 DIAGNOSIS — I50.32 CHRONIC DIASTOLIC CONGESTIVE HEART FAILURE (HCC): ICD-10-CM

## 2019-10-21 DIAGNOSIS — N18.30 CHRONIC KIDNEY DISEASE, STAGE III (MODERATE) (HCC): ICD-10-CM

## 2019-10-21 LAB
ANION GAP SERPL CALCULATED.3IONS-SCNC: 13 MEQ/L (ref 8–16)
BUN BLDV-MCNC: 42 MG/DL (ref 7–22)
CALCIUM SERPL-MCNC: 9.5 MG/DL (ref 8.5–10.5)
CHLORIDE BLD-SCNC: 106 MEQ/L (ref 98–111)
CO2: 20 MEQ/L (ref 23–33)
CREAT SERPL-MCNC: 1.6 MG/DL (ref 0.4–1.2)
CREATININE URINE: 80.2 MG/DL
CREATININE, URINE: 80.2 MG/DL
GFR SERPL CREATININE-BSD FRML MDRD: 42 ML/MIN/1.73M2
GLUCOSE BLD-MCNC: 114 MG/DL (ref 70–108)
MICROALBUMIN UR-MCNC: 3.45 MG/DL
MICROALBUMIN/CREAT UR-RTO: 43 MG/G (ref 0–30)
POTASSIUM SERPL-SCNC: 4.3 MEQ/L (ref 3.5–5.2)
PROTEIN, URINE: 10 MG/DL
SODIUM BLD-SCNC: 139 MEQ/L (ref 135–145)

## 2019-10-21 PROCEDURE — G8598 ASA/ANTIPLAT THER USED: HCPCS | Performed by: INTERNAL MEDICINE

## 2019-10-21 PROCEDURE — G8427 DOCREV CUR MEDS BY ELIG CLIN: HCPCS | Performed by: INTERNAL MEDICINE

## 2019-10-21 PROCEDURE — 99213 OFFICE O/P EST LOW 20 MIN: CPT | Performed by: INTERNAL MEDICINE

## 2019-10-21 PROCEDURE — 1036F TOBACCO NON-USER: CPT | Performed by: INTERNAL MEDICINE

## 2019-10-21 PROCEDURE — G8417 CALC BMI ABV UP PARAM F/U: HCPCS | Performed by: INTERNAL MEDICINE

## 2019-10-21 PROCEDURE — G8484 FLU IMMUNIZE NO ADMIN: HCPCS | Performed by: INTERNAL MEDICINE

## 2019-10-21 PROCEDURE — 1123F ACP DISCUSS/DSCN MKR DOCD: CPT | Performed by: INTERNAL MEDICINE

## 2019-10-21 PROCEDURE — 4040F PNEUMOC VAC/ADMIN/RCVD: CPT | Performed by: INTERNAL MEDICINE

## 2019-10-21 PROCEDURE — 3017F COLORECTAL CA SCREEN DOC REV: CPT | Performed by: INTERNAL MEDICINE

## 2019-10-22 ENCOUNTER — TELEPHONE (OUTPATIENT)
Dept: NEPHROLOGY | Age: 74
End: 2019-10-22

## 2019-11-05 ENCOUNTER — OFFICE VISIT (OUTPATIENT)
Dept: PULMONOLOGY | Age: 74
End: 2019-11-05
Payer: MEDICARE

## 2019-11-05 VITALS
WEIGHT: 214.6 LBS | SYSTOLIC BLOOD PRESSURE: 118 MMHG | HEART RATE: 62 BPM | BODY MASS INDEX: 31.78 KG/M2 | DIASTOLIC BLOOD PRESSURE: 58 MMHG | HEIGHT: 69 IN | OXYGEN SATURATION: 97 %

## 2019-11-05 DIAGNOSIS — E66.9 OBESITY (BMI 30-39.9): ICD-10-CM

## 2019-11-05 DIAGNOSIS — Z99.89 OBSTRUCTIVE SLEEP APNEA ON CPAP: Primary | ICD-10-CM

## 2019-11-05 DIAGNOSIS — G47.33 OBSTRUCTIVE SLEEP APNEA ON CPAP: Primary | ICD-10-CM

## 2019-11-05 DIAGNOSIS — G47.31 COMPLEX SLEEP APNEA SYNDROME: ICD-10-CM

## 2019-11-05 PROCEDURE — 99214 OFFICE O/P EST MOD 30 MIN: CPT | Performed by: PHYSICIAN ASSISTANT

## 2019-11-05 PROCEDURE — G8482 FLU IMMUNIZE ORDER/ADMIN: HCPCS | Performed by: PHYSICIAN ASSISTANT

## 2019-11-05 PROCEDURE — 3017F COLORECTAL CA SCREEN DOC REV: CPT | Performed by: PHYSICIAN ASSISTANT

## 2019-11-05 PROCEDURE — G8427 DOCREV CUR MEDS BY ELIG CLIN: HCPCS | Performed by: PHYSICIAN ASSISTANT

## 2019-11-05 PROCEDURE — 1036F TOBACCO NON-USER: CPT | Performed by: PHYSICIAN ASSISTANT

## 2019-11-05 PROCEDURE — 1123F ACP DISCUSS/DSCN MKR DOCD: CPT | Performed by: PHYSICIAN ASSISTANT

## 2019-11-05 PROCEDURE — G8417 CALC BMI ABV UP PARAM F/U: HCPCS | Performed by: PHYSICIAN ASSISTANT

## 2019-11-05 PROCEDURE — 4040F PNEUMOC VAC/ADMIN/RCVD: CPT | Performed by: PHYSICIAN ASSISTANT

## 2019-11-05 PROCEDURE — G8598 ASA/ANTIPLAT THER USED: HCPCS | Performed by: PHYSICIAN ASSISTANT

## 2019-11-05 ASSESSMENT — ENCOUNTER SYMPTOMS
SHORTNESS OF BREATH: 0
BACK PAIN: 0
CHEST TIGHTNESS: 0
ALLERGIC/IMMUNOLOGIC NEGATIVE: 1
COUGH: 0
NAUSEA: 0
EYES NEGATIVE: 1
WHEEZING: 0
STRIDOR: 0
DIARRHEA: 0

## 2019-11-18 DIAGNOSIS — R80.9 TYPE 2 DIABETES MELLITUS WITH MICROALBUMINURIA, WITH LONG-TERM CURRENT USE OF INSULIN (HCC): ICD-10-CM

## 2019-11-18 DIAGNOSIS — E11.29 TYPE 2 DIABETES MELLITUS WITH MICROALBUMINURIA, WITH LONG-TERM CURRENT USE OF INSULIN (HCC): ICD-10-CM

## 2019-11-18 DIAGNOSIS — Z79.4 TYPE 2 DIABETES MELLITUS WITH MICROALBUMINURIA, WITH LONG-TERM CURRENT USE OF INSULIN (HCC): ICD-10-CM

## 2019-11-25 RX ORDER — DILTIAZEM HYDROCHLORIDE 120 MG/1
CAPSULE, COATED, EXTENDED RELEASE ORAL
Qty: 90 CAPSULE | Refills: 3 | Status: SHIPPED | OUTPATIENT
Start: 2019-11-25 | End: 2020-07-20 | Stop reason: SDUPTHER

## 2019-12-16 ENCOUNTER — HOSPITAL ENCOUNTER (OUTPATIENT)
Age: 74
Discharge: HOME | End: 2019-12-16
Payer: MEDICARE

## 2019-12-16 DIAGNOSIS — R97.20 ELEVATED PSA: ICD-10-CM

## 2019-12-16 LAB — PROSTATE SPECIFIC ANTIGEN: 1.42 NG/ML (ref 0–1)

## 2019-12-16 PROCEDURE — 84153 ASSAY OF PSA TOTAL: CPT

## 2019-12-16 PROCEDURE — 36415 COLL VENOUS BLD VENIPUNCTURE: CPT

## 2019-12-17 ENCOUNTER — OFFICE VISIT (OUTPATIENT)
Dept: UROLOGY | Age: 74
End: 2019-12-17
Payer: MEDICARE

## 2019-12-17 VITALS
DIASTOLIC BLOOD PRESSURE: 78 MMHG | HEIGHT: 69 IN | BODY MASS INDEX: 31.7 KG/M2 | SYSTOLIC BLOOD PRESSURE: 134 MMHG | WEIGHT: 214 LBS

## 2019-12-17 DIAGNOSIS — N40.1 HYPERTROPHY OF PROSTATE WITH URINARY OBSTRUCTION: ICD-10-CM

## 2019-12-17 DIAGNOSIS — N13.8 HYPERTROPHY OF PROSTATE WITH URINARY OBSTRUCTION: ICD-10-CM

## 2019-12-17 DIAGNOSIS — R97.20 ELEVATED PSA: Primary | ICD-10-CM

## 2019-12-17 PROBLEM — N39.0 URINARY TRACT INFECTION ASSOCIATED WITH INDWELLING URETHRAL CATHETER (HCC): Status: RESOLVED | Noted: 2018-06-04 | Resolved: 2019-12-17

## 2019-12-17 PROBLEM — T83.511A URINARY TRACT INFECTION ASSOCIATED WITH INDWELLING URETHRAL CATHETER (HCC): Status: RESOLVED | Noted: 2018-06-04 | Resolved: 2019-12-17

## 2019-12-17 LAB
BILIRUBIN URINE: NEGATIVE
BLOOD URINE, POC: NEGATIVE
CHARACTER, URINE: CLEAR
COLOR, URINE: YELLOW
GLUCOSE URINE: NEGATIVE MG/DL
KETONES, URINE: NEGATIVE
LEUKOCYTE CLUMPS, URINE: NEGATIVE
NITRITE, URINE: NEGATIVE
PH, URINE: 5 (ref 5–9)
PROTEIN, URINE: NEGATIVE MG/DL
SPECIFIC GRAVITY, URINE: 1.01 (ref 1–1.03)
UROBILINOGEN, URINE: 0.2 EU/DL (ref 0–1)

## 2019-12-17 PROCEDURE — G8427 DOCREV CUR MEDS BY ELIG CLIN: HCPCS | Performed by: NURSE PRACTITIONER

## 2019-12-17 PROCEDURE — 1123F ACP DISCUSS/DSCN MKR DOCD: CPT | Performed by: NURSE PRACTITIONER

## 2019-12-17 PROCEDURE — 4040F PNEUMOC VAC/ADMIN/RCVD: CPT | Performed by: NURSE PRACTITIONER

## 2019-12-17 PROCEDURE — G8482 FLU IMMUNIZE ORDER/ADMIN: HCPCS | Performed by: NURSE PRACTITIONER

## 2019-12-17 PROCEDURE — G8598 ASA/ANTIPLAT THER USED: HCPCS | Performed by: NURSE PRACTITIONER

## 2019-12-17 PROCEDURE — 3017F COLORECTAL CA SCREEN DOC REV: CPT | Performed by: NURSE PRACTITIONER

## 2019-12-17 PROCEDURE — G8417 CALC BMI ABV UP PARAM F/U: HCPCS | Performed by: NURSE PRACTITIONER

## 2019-12-17 PROCEDURE — 99213 OFFICE O/P EST LOW 20 MIN: CPT | Performed by: NURSE PRACTITIONER

## 2019-12-17 PROCEDURE — 81003 URINALYSIS AUTO W/O SCOPE: CPT | Performed by: NURSE PRACTITIONER

## 2019-12-17 PROCEDURE — 1036F TOBACCO NON-USER: CPT | Performed by: NURSE PRACTITIONER

## 2019-12-18 ENCOUNTER — OFFICE VISIT (OUTPATIENT)
Dept: INTERNAL MEDICINE CLINIC | Age: 74
End: 2019-12-18
Payer: MEDICARE

## 2019-12-18 VITALS
BODY MASS INDEX: 31.7 KG/M2 | WEIGHT: 214 LBS | HEART RATE: 72 BPM | SYSTOLIC BLOOD PRESSURE: 136 MMHG | DIASTOLIC BLOOD PRESSURE: 50 MMHG | HEIGHT: 69 IN

## 2019-12-18 DIAGNOSIS — R97.20 ELEVATED PSA: ICD-10-CM

## 2019-12-18 DIAGNOSIS — I20.8 CHRONIC STABLE ANGINA (HCC): Primary | ICD-10-CM

## 2019-12-18 DIAGNOSIS — Z98.61 CAD S/P PERCUTANEOUS CORONARY ANGIOPLASTY: ICD-10-CM

## 2019-12-18 DIAGNOSIS — I25.10 CAD S/P PERCUTANEOUS CORONARY ANGIOPLASTY: ICD-10-CM

## 2019-12-18 DIAGNOSIS — Z95.1 S/P CABG (CORONARY ARTERY BYPASS GRAFT): ICD-10-CM

## 2019-12-18 DIAGNOSIS — I50.30 DIASTOLIC CONGESTIVE HEART FAILURE, UNSPECIFIED HF CHRONICITY (HCC): ICD-10-CM

## 2019-12-18 DIAGNOSIS — N18.30 CKD (CHRONIC KIDNEY DISEASE) STAGE 3, GFR 30-59 ML/MIN (HCC): ICD-10-CM

## 2019-12-18 PROCEDURE — G8598 ASA/ANTIPLAT THER USED: HCPCS | Performed by: INTERNAL MEDICINE

## 2019-12-18 PROCEDURE — G8427 DOCREV CUR MEDS BY ELIG CLIN: HCPCS | Performed by: INTERNAL MEDICINE

## 2019-12-18 PROCEDURE — G8417 CALC BMI ABV UP PARAM F/U: HCPCS | Performed by: INTERNAL MEDICINE

## 2019-12-18 PROCEDURE — G8482 FLU IMMUNIZE ORDER/ADMIN: HCPCS | Performed by: INTERNAL MEDICINE

## 2019-12-18 PROCEDURE — 99213 OFFICE O/P EST LOW 20 MIN: CPT | Performed by: INTERNAL MEDICINE

## 2019-12-18 PROCEDURE — 1123F ACP DISCUSS/DSCN MKR DOCD: CPT | Performed by: INTERNAL MEDICINE

## 2019-12-18 PROCEDURE — 4040F PNEUMOC VAC/ADMIN/RCVD: CPT | Performed by: INTERNAL MEDICINE

## 2019-12-18 PROCEDURE — 3017F COLORECTAL CA SCREEN DOC REV: CPT | Performed by: INTERNAL MEDICINE

## 2019-12-18 PROCEDURE — 1036F TOBACCO NON-USER: CPT | Performed by: INTERNAL MEDICINE

## 2019-12-20 ENCOUNTER — OFFICE VISIT (OUTPATIENT)
Dept: FAMILY MEDICINE CLINIC | Age: 74
End: 2019-12-20

## 2019-12-20 VITALS
RESPIRATION RATE: 14 BRPM | BODY MASS INDEX: 31.84 KG/M2 | SYSTOLIC BLOOD PRESSURE: 112 MMHG | HEIGHT: 69 IN | HEART RATE: 72 BPM | WEIGHT: 215 LBS | DIASTOLIC BLOOD PRESSURE: 68 MMHG

## 2019-12-20 DIAGNOSIS — Z98.61 CAD S/P PERCUTANEOUS CORONARY ANGIOPLASTY: ICD-10-CM

## 2019-12-20 DIAGNOSIS — R80.9 TYPE 2 DIABETES MELLITUS WITH MICROALBUMINURIA, WITH LONG-TERM CURRENT USE OF INSULIN (HCC): Primary | ICD-10-CM

## 2019-12-20 DIAGNOSIS — I25.10 CAD S/P PERCUTANEOUS CORONARY ANGIOPLASTY: ICD-10-CM

## 2019-12-20 DIAGNOSIS — E11.29 TYPE 2 DIABETES MELLITUS WITH MICROALBUMINURIA, WITH LONG-TERM CURRENT USE OF INSULIN (HCC): Primary | ICD-10-CM

## 2019-12-20 DIAGNOSIS — Z79.4 TYPE 2 DIABETES MELLITUS WITH MICROALBUMINURIA, WITH LONG-TERM CURRENT USE OF INSULIN (HCC): Primary | ICD-10-CM

## 2019-12-20 LAB
CHP ED QC CHECK: ABNORMAL
GLUCOSE BLD-MCNC: 146 MG/DL

## 2019-12-20 PROCEDURE — 99213 OFFICE O/P EST LOW 20 MIN: CPT | Performed by: FAMILY MEDICINE

## 2019-12-20 PROCEDURE — G8427 DOCREV CUR MEDS BY ELIG CLIN: HCPCS | Performed by: FAMILY MEDICINE

## 2019-12-20 PROCEDURE — 1123F ACP DISCUSS/DSCN MKR DOCD: CPT | Performed by: FAMILY MEDICINE

## 2019-12-20 PROCEDURE — G8417 CALC BMI ABV UP PARAM F/U: HCPCS | Performed by: FAMILY MEDICINE

## 2019-12-20 PROCEDURE — 3017F COLORECTAL CA SCREEN DOC REV: CPT | Performed by: FAMILY MEDICINE

## 2019-12-20 PROCEDURE — 82962 GLUCOSE BLOOD TEST: CPT | Performed by: FAMILY MEDICINE

## 2019-12-20 PROCEDURE — 1036F TOBACCO NON-USER: CPT | Performed by: FAMILY MEDICINE

## 2019-12-20 PROCEDURE — 4040F PNEUMOC VAC/ADMIN/RCVD: CPT | Performed by: FAMILY MEDICINE

## 2019-12-20 PROCEDURE — G8598 ASA/ANTIPLAT THER USED: HCPCS | Performed by: FAMILY MEDICINE

## 2019-12-20 RX ORDER — NITROGLYCERIN 400 UG/1
SPRAY ORAL
Qty: 4.9 G | Refills: 6 | Status: SHIPPED | OUTPATIENT
Start: 2019-12-20 | End: 2020-07-20 | Stop reason: SDUPTHER

## 2019-12-20 ASSESSMENT — PATIENT HEALTH QUESTIONNAIRE - PHQ9
SUM OF ALL RESPONSES TO PHQ9 QUESTIONS 1 & 2: 0
SUM OF ALL RESPONSES TO PHQ QUESTIONS 1-9: 0
2. FEELING DOWN, DEPRESSED OR HOPELESS: 0
1. LITTLE INTEREST OR PLEASURE IN DOING THINGS: 0
SUM OF ALL RESPONSES TO PHQ QUESTIONS 1-9: 0

## 2019-12-20 ASSESSMENT — ENCOUNTER SYMPTOMS
SHORTNESS OF BREATH: 0
CONSTIPATION: 0
SINUS PRESSURE: 0

## 2020-01-07 ENCOUNTER — OFFICE VISIT (OUTPATIENT)
Dept: PULMONOLOGY | Age: 75
End: 2020-01-07
Payer: MEDICARE

## 2020-01-07 VITALS
WEIGHT: 216.4 LBS | DIASTOLIC BLOOD PRESSURE: 64 MMHG | BODY MASS INDEX: 32.05 KG/M2 | HEART RATE: 59 BPM | SYSTOLIC BLOOD PRESSURE: 132 MMHG | HEIGHT: 69 IN | OXYGEN SATURATION: 97 %

## 2020-01-07 PROCEDURE — 99214 OFFICE O/P EST MOD 30 MIN: CPT | Performed by: PHYSICIAN ASSISTANT

## 2020-01-07 PROCEDURE — G8482 FLU IMMUNIZE ORDER/ADMIN: HCPCS | Performed by: PHYSICIAN ASSISTANT

## 2020-01-07 PROCEDURE — 1123F ACP DISCUSS/DSCN MKR DOCD: CPT | Performed by: PHYSICIAN ASSISTANT

## 2020-01-07 PROCEDURE — 1036F TOBACCO NON-USER: CPT | Performed by: PHYSICIAN ASSISTANT

## 2020-01-07 PROCEDURE — 3017F COLORECTAL CA SCREEN DOC REV: CPT | Performed by: PHYSICIAN ASSISTANT

## 2020-01-07 PROCEDURE — G8417 CALC BMI ABV UP PARAM F/U: HCPCS | Performed by: PHYSICIAN ASSISTANT

## 2020-01-07 PROCEDURE — 4040F PNEUMOC VAC/ADMIN/RCVD: CPT | Performed by: PHYSICIAN ASSISTANT

## 2020-01-07 PROCEDURE — G8427 DOCREV CUR MEDS BY ELIG CLIN: HCPCS | Performed by: PHYSICIAN ASSISTANT

## 2020-01-07 ASSESSMENT — ENCOUNTER SYMPTOMS
BACK PAIN: 0
EYES NEGATIVE: 1
STRIDOR: 0
DIARRHEA: 0
SHORTNESS OF BREATH: 0
COUGH: 0
ALLERGIC/IMMUNOLOGIC NEGATIVE: 1
NAUSEA: 0
CHEST TIGHTNESS: 0
WHEEZING: 0

## 2020-01-07 NOTE — PROGRESS NOTES
[facility]      Yash Ogden         [id number]  1/7/2020   Chief Complaint   Patient presents with    Follow-up     RICCARDO 8 wk f/u with [facility] download        Pt of Dr. Louisa Urena    PAP Download:   Original or initialAHI: 8.3     Date of initial study: 2/7/18      Compliant  60%     Noncompliant 3 %     PAP Type CPAP Level  12/16   Avg Hrs/Day 8 hr 4 min  AHI: 9.5   Recorded compliance dates ,   12/7/19-/1/5/20   Machine/Mfg: Resmed Interface: FFM    Provider:    _x_SR-BISHOPE           Trish [place]        __ Dotty Marin    __ Nonda Sow            __[facility] __Adaptive   __[place]:       __Other    Neck Size: 17  Mallampati Mallampati 4  ESS:  13      Here is a scan of the most recent download:        Presentation:   Noelle Wright presents for sleep medicine follow up for obstructive sleep apnea  Since the last visit, Noelle Wright is doing well with PAP but AHI is still elevated. He has failed CPAP and pressure was changed to APAP. His AHI is still elevated. He has 2 PAP machine and is using his old PAP nearly 50% of the time    Equipment issues: The pressure is  acceptable, the mask is acceptable     Sleep issues:  Do you feel better? Yes  More rested? Yes   Better concentration? yes    Progress History:   Since last visit any new medical issues? No  New ER or hospitlal visits? No  Any new or changes in medicines? No  Any new sleep medicines?  No        Past Medical History:   Diagnosis Date    Adenomatous colon polyp 2009/2010, 6/'14,7/'17    Angina at rest 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    BPH (benign prostatic hypertrophy)     CAD (coronary artery disease) 1997    Carotid artery disease ([address] 1995    right    Carotid disease, bilateral ([address]     Cerebral artery occlusion with cerebral infarction ([facility]     CHF (congestive heart failure) (HCC)     Chronic kidney disease     GERD (gastroesophageal reflux disease)     Hyperlipidemia 1996    Hypertension     Lumbar disc disease 2001    MI (myocardial infarction) ([address] 1997    Nephrolithiasis 2002    NIDDM (non-insulin dependent diabetes mellitus) 1996    diagnosed 1996    RICCARDO (obstructive sleep apnea) 2005    cpap    Renal artery stenosis ([facility] [address] 2007    left    Renal insufficiency     S/P CABG (status post coronary artery bypass graft) 1997    5 vessels    Skin cancer     Squamous cell carcinoma     TIA (transient ischemic attack)     Wears partial dentures     upper and lower       Past Surgical History:   Procedure Laterality Date    ABLATION OF DYSRHYTHMIC FOCUS  3/13/13   [address] CARDIAC SURGERY  3/13/13    oblation for irreg rythm    CARDIOVERSION  3/8/2012    CAROTID ENDARTERECTOMY  1997    CHOLECYSTECTOMY  4/1999    COLONOSCOPY  6/22/09, 4/2011,7/12/17    polyp removal    CORONARY ANGIOPLASTY WITH STENT PLACEMENT  2008 (2), 2009 (1)    x2    CORONARY ARTERY BYPASS GRAFT  8/1997    5 vessel    DIAGNOSTIC CARDIAC CATH LAB PROCEDURE      FOOT SURGERY  12/20/2013    pre-cancerous mole rt foot removed    LITHOTRIPSY  4/2002    [place] OFFICE/OUTPT VISIT,PROCEDURE ONLY N/A 11/8/2018    TRANSESOPHAGEAL ECHOCARDIOGRAM performed by July Thao MD at [address] [place] [address] [facility]  05/30/2018   Alise  2001, 2003    rt shoulder manipulation--frozen shoulder-01, Lt -03    SKIN BIOPSY      SKIN CANCER EXCISION  12/20/13     R foot    SKIN CANCER EXCISION  1/2016    squamous cell Lt  upper cheek    URETER STENT PLACEMENT  4/2002    VASCULAR SURGERY      carotids & cabg harvests       Social History     Tobacco Use    Smoking status: Never Smoker    Smokeless tobacco: Never Used   Substance Use Topics    Alcohol use: No     Alcohol/week: 0.0 standard drinks    Drug use: No       No Known Allergies    Current Outpatient Medications   Medication Sig Dispense Refill    nitroGLYCERIN (NITROLINGUAL) 0.4 MG/SPRAY 0.4 mg spray USE 1 SPRAY ON OR UNDER THE TONGUE EVERY 5 MINUTES AS NEEDED FOR CHEST PAIN 4.9 g 6    HYDROXYZINE HCL PO Take 10 mg by mouth as needed       apixaban (ELIQUIS) 5 MG TABS tablet TAKE 1 TABLET TWICE A  tablet 3    diltiazem (CARDIZEM CD) 120 MG extended release capsule TAKE 1 CAPSULE BY MOUTH  DAILY 90 capsule 3    insulin detemir (LEVEMIR FLEXPEN) 100 UNIT/ML injection pen Inject 45 Units into the skin daily (with breakfast) AND 40 Units nightly. Inject 55 units subcutaneously BID. (Patient taking differently: 45 units twice daily) 5 pen 5    CPAP Machine MISC by Does not apply route Please change CPAP pressure to auto 12-16 cm H20. Download in 2 weeks 1 each 0    insulin lispro (HUMALOG KWIKPEN) 100 UNIT/ML pen Inject 7 Units into the skin 2 times daily before breakfast and lunch plus sliding scale (Patient taking differently: Inject 7 Units into the skin 2 times daily before breakfast and lunch plus sliding scale and 10 units with supper plus sliding scale) 5 pen 3    metFORMIN (GLUCOPHAGE) 1000 MG tablet Take 1 tablet by mouth nightly Often taking only 1 tablet daily 180 tablet 3    albuterol sulfate HFA (VENTOLIN HFA) 108 (90 Base) MCG/ACT inhaler Inhale 2 puffs into the lungs every 6 hours as needed for Wheezing or Shortness of Breath 1 Inhaler 11    fluticasone (FLOVENT HFA) 110 MCG/ACT inhaler Inhale 2 puffs into the lungs 2 times daily Patient to rinse mouth after dose. 3 Inhaler 3    ALPRAZolam (XANAX) 0.25 MG tablet Take 0.25 mg by mouth nightly as needed for Anxiety.       Insulin Pen Needle (NOVOFINE) 32G X 6 MM MISC USE 1 NEW NEEDLE 6 TIMES A DAY AND AS NEEDED 600 each 5    atorvastatin (LIPITOR) 40 MG tablet Take 1 tablet by mouth nightly 90 tablet 3    metoprolol succinate (TOPROL XL) 50 MG extended release tablet TAKE 1 TABLET DAILY 90 tablet 3    Multiple Vitamins-Minerals (THERAPEUTIC MULTIVITAMIN-MINERALS) tablet Take 1 Readings from Last 3 Encounters:   01/07/20 216 lb 6.4 oz (98.2 kg)   12/20/19 215 lb (97.5 kg)   12/18/19 214 lb (97.1 kg)     Weight stable / unchanged  Vitals: /64 (Site: Left Upper Arm, Position: Sitting, Cuff Size: Medium Adult)   Pulse 59   Ht 5' 9\" (1.753 m)   Wt 216 lb 6.4 oz (98.2 kg)   SpO2 97% Comment: on room air  BMI 31.96 kg/m²       Physical Exam  Constitutional:       Appearance: He is well-developed. HENT:      Head: Normocephalic and atraumatic. Right Ear: External ear normal.      Left Ear: External ear normal.   Eyes:      Conjunctiva/sclera: Conjunctivae normal.      Pupils: Pupils are equal, round, and reactive to light. Neck:      Musculoskeletal: Normal range of motion and neck supple. Cardiovascular:      Rate and Rhythm: Normal rate and regular rhythm. Heart sounds: Normal heart sounds. Pulmonary:      Effort: Pulmonary effort is normal.      Breath sounds: Normal breath sounds. Musculoskeletal: Normal range of motion. Skin:     General: Skin is warm and dry. Neurological:      Mental Status: He is alert and oriented to person, place, and time. Psychiatric:         Behavior: Behavior normal.         Thought Content: Thought content normal.         Judgment: Judgment normal.       ECHO  Summary   Risk benefits and alternatives were explained to the patient and informed   consent was obtained. The transesophageal approach was used. The study included complete 2D imaging, complete spectral doppler, and   color doppler. Procedure performed without complications. Probe easily inserted by Cardiologist.   Ejection fraction is visually estimated at 60%. Overall left ventricular function is normal.   moderate Patent foramen ovale with left-to-right shunt was visualized. The aortic valve was trileaflet with normal thickness and cuspal   separation. There was no echocardiographic evidence of a vegetation.    DOPPLER: Transaortic velocity was within the

## 2020-01-15 ENCOUNTER — HOSPITAL ENCOUNTER (OUTPATIENT)
Dept: PULMONOLOGY | Age: 75
Discharge: HOME | End: 2020-01-15
Payer: MEDICARE

## 2020-01-15 PROCEDURE — 94060 EVALUATION OF WHEEZING: CPT

## 2020-01-21 ENCOUNTER — OFFICE VISIT (OUTPATIENT)
Dept: PULMONOLOGY | Age: 75
End: 2020-01-21
Payer: MEDICARE

## 2020-01-21 VITALS
BODY MASS INDEX: 31.99 KG/M2 | TEMPERATURE: 97.6 F | DIASTOLIC BLOOD PRESSURE: 72 MMHG | SYSTOLIC BLOOD PRESSURE: 116 MMHG | HEART RATE: 75 BPM | OXYGEN SATURATION: 97 % | WEIGHT: 216 LBS | HEIGHT: 69 IN

## 2020-01-21 PROCEDURE — 99213 OFFICE O/P EST LOW 20 MIN: CPT | Performed by: NURSE PRACTITIONER

## 2020-01-21 PROCEDURE — G8417 CALC BMI ABV UP PARAM F/U: HCPCS | Performed by: NURSE PRACTITIONER

## 2020-01-21 PROCEDURE — 1123F ACP DISCUSS/DSCN MKR DOCD: CPT | Performed by: NURSE PRACTITIONER

## 2020-01-21 PROCEDURE — G8427 DOCREV CUR MEDS BY ELIG CLIN: HCPCS | Performed by: NURSE PRACTITIONER

## 2020-01-21 PROCEDURE — 1036F TOBACCO NON-USER: CPT | Performed by: NURSE PRACTITIONER

## 2020-01-21 PROCEDURE — G8482 FLU IMMUNIZE ORDER/ADMIN: HCPCS | Performed by: NURSE PRACTITIONER

## 2020-01-21 PROCEDURE — 3017F COLORECTAL CA SCREEN DOC REV: CPT | Performed by: NURSE PRACTITIONER

## 2020-01-21 PROCEDURE — 4040F PNEUMOC VAC/ADMIN/RCVD: CPT | Performed by: NURSE PRACTITIONER

## 2020-01-21 ASSESSMENT — ENCOUNTER SYMPTOMS
WHEEZING: 0
DIARRHEA: 0
ABDOMINAL PAIN: 0
EYES NEGATIVE: 1
TROUBLE SWALLOWING: 0
CHOKING: 0
COUGH: 0
VOMITING: 0
SHORTNESS OF BREATH: 1
CHEST TIGHTNESS: 0
NAUSEA: 0

## 2020-01-21 NOTE — PROGRESS NOTES
Take 75 mg by mouth daily      ACCU-CHEK SOFTCLIX LANCETS MISC CHECK THE GLUCOSE FOUR TIMES DAILY E11.59 400 each 5    ranolazine (RANEXA) 500 MG extended release tablet Take 500 mg by mouth 2 times daily      isosorbide mononitrate (IMDUR) 120 MG extended release tablet Take 120 mg by mouth daily       NOVOFINE 30G X 8 MM MISC USE 1 NEW NEEDLE 6 TIMES A DAY AND AS NEEDED 600 each 3     No current facility-administered medications for this visit. Gibran SHERIFF   Review of Systems   Constitutional: Negative for activity change, appetite change, chills, fatigue, fever and unexpected weight change. HENT: Negative. Negative for trouble swallowing. Eyes: Negative. Respiratory: Positive for shortness of breath. Negative for cough, choking, chest tightness and wheezing. Cardiovascular: Negative for chest pain, palpitations and leg swelling. Gastrointestinal: Negative for abdominal pain, diarrhea, nausea and vomiting. Genitourinary: Negative. Musculoskeletal: Negative. Skin: Negative. Neurological: Negative. Hematological: Does not bruise/bleed easily. Psychiatric/Behavioral: Negative for sleep disturbance and suicidal ideas. Physical exam   /72 (Site: Left Upper Arm, Position: Sitting, Cuff Size: Large Adult)   Pulse 75   Temp 97.6 °F (36.4 °C)   Ht 5' 9\" (1.753 m)   Wt 216 lb (98 kg)   SpO2 97% Comment: on room air at rest  BMI 31.90 kg/m²        Physical Exam  Vitals signs and nursing note reviewed. Constitutional:       General: He is not in acute distress. Appearance: He is well-developed. HENT:      Mouth/Throat:      Pharynx: No oropharyngeal exudate. Eyes:      Conjunctiva/sclera: Conjunctivae normal.   Cardiovascular:      Rate and Rhythm: Normal rate and regular rhythm. Heart sounds: No murmur. No friction rub. Pulmonary:      Effort: Pulmonary effort is normal. No respiratory distress. Breath sounds: Normal breath sounds.  No wheezing or

## 2020-01-24 ENCOUNTER — NURSE ONLY (OUTPATIENT)
Dept: LAB | Age: 75
End: 2020-01-24

## 2020-01-24 LAB
ALBUMIN SERPL-MCNC: 4.3 G/DL (ref 3.5–5.1)
ALP BLD-CCNC: 75 U/L (ref 38–126)
ALT SERPL-CCNC: 15 U/L (ref 11–66)
ANION GAP SERPL CALCULATED.3IONS-SCNC: 9 MEQ/L (ref 8–16)
AST SERPL-CCNC: 16 U/L (ref 5–40)
BILIRUB SERPL-MCNC: 0.4 MG/DL (ref 0.3–1.2)
BILIRUBIN DIRECT: < 0.2 MG/DL (ref 0–0.3)
BUN BLDV-MCNC: 32 MG/DL (ref 7–22)
CALCIUM SERPL-MCNC: 9.4 MG/DL (ref 8.5–10.5)
CHLORIDE BLD-SCNC: 104 MEQ/L (ref 98–111)
CHOLESTEROL, TOTAL: 138 MG/DL (ref 100–199)
CO2: 26 MEQ/L (ref 23–33)
CREAT SERPL-MCNC: 1.8 MG/DL (ref 0.4–1.2)
ERYTHROCYTE [DISTWIDTH] IN BLOOD BY AUTOMATED COUNT: 13.2 % (ref 11.5–14.5)
ERYTHROCYTE [DISTWIDTH] IN BLOOD BY AUTOMATED COUNT: 50.4 FL (ref 35–45)
FOLATE: > 20 NG/ML (ref 4.8–24.2)
GFR SERPL CREATININE-BSD FRML MDRD: 37 ML/MIN/1.73M2
GLUCOSE BLD-MCNC: 115 MG/DL (ref 70–108)
HCT VFR BLD CALC: 36.8 % (ref 42–52)
HDLC SERPL-MCNC: 31 MG/DL
HEMOGLOBIN: 11.7 GM/DL (ref 14–18)
LDL CHOLESTEROL CALCULATED: 74 MG/DL
MCH RBC QN AUTO: 32.6 PG (ref 26–33)
MCHC RBC AUTO-ENTMCNC: 31.8 GM/DL (ref 32.2–35.5)
MCV RBC AUTO: 102.5 FL (ref 80–94)
PLATELET # BLD: 239 THOU/MM3 (ref 130–400)
PMV BLD AUTO: 10 FL (ref 9.4–12.4)
POTASSIUM SERPL-SCNC: 5.4 MEQ/L (ref 3.5–5.2)
RBC # BLD: 3.59 MILL/MM3 (ref 4.7–6.1)
SODIUM BLD-SCNC: 139 MEQ/L (ref 135–145)
TOTAL PROTEIN: 7.4 G/DL (ref 6.1–8)
TRIGL SERPL-MCNC: 167 MG/DL (ref 0–199)
VITAMIN B-12: 872 PG/ML (ref 211–911)
WBC # BLD: 8.2 THOU/MM3 (ref 4.8–10.8)

## 2020-02-06 ENCOUNTER — TELEPHONE (OUTPATIENT)
Dept: PULMONOLOGY | Age: 75
End: 2020-02-06

## 2020-02-12 NOTE — TELEPHONE ENCOUNTER
Date of last visit:  12/20/2019  Date of next visit:  Visit date not found    Requested Prescriptions     Pending Prescriptions Disp Refills    LEVEMIR FLEXTOUCH 100 UNIT/ML injection pen [Pharmacy Med Name: Genesis Tavares 5X3 ML SOLUTION] 90 mL      Sig: INJECT SUBCUTANEOUSLY Laura. Antonio Merritt

## 2020-02-24 ENCOUNTER — TELEPHONE (OUTPATIENT)
Dept: PULMONOLOGY | Age: 75
End: 2020-02-24

## 2020-02-24 NOTE — TELEPHONE ENCOUNTER
Called patient to set up follow up of his pap set up done on 02/21/2020, patient's wife answered, he will call us back to schedule a follow up.

## 2020-04-13 RX ORDER — METOPROLOL SUCCINATE 50 MG/1
TABLET, EXTENDED RELEASE ORAL
Qty: 90 TABLET | Refills: 3 | Status: SHIPPED | OUTPATIENT
Start: 2020-04-13 | End: 2021-03-01 | Stop reason: SDUPTHER

## 2020-04-17 ENCOUNTER — NURSE ONLY (OUTPATIENT)
Dept: LAB | Age: 75
End: 2020-04-17

## 2020-04-17 LAB
ANION GAP SERPL CALCULATED.3IONS-SCNC: 14 MEQ/L (ref 8–16)
BUN BLDV-MCNC: 34 MG/DL (ref 7–22)
CALCIUM SERPL-MCNC: 9.3 MG/DL (ref 8.5–10.5)
CHLORIDE BLD-SCNC: 101 MEQ/L (ref 98–111)
CO2: 25 MEQ/L (ref 23–33)
CREAT SERPL-MCNC: 1.8 MG/DL (ref 0.4–1.2)
ERYTHROCYTE [DISTWIDTH] IN BLOOD BY AUTOMATED COUNT: 13.2 % (ref 11.5–14.5)
ERYTHROCYTE [DISTWIDTH] IN BLOOD BY AUTOMATED COUNT: 50.5 FL (ref 35–45)
GFR SERPL CREATININE-BSD FRML MDRD: 37 ML/MIN/1.73M2
GLUCOSE BLD-MCNC: 82 MG/DL (ref 70–108)
HCT VFR BLD CALC: 35.3 % (ref 42–52)
HEMOGLOBIN: 10.9 GM/DL (ref 14–18)
MCH RBC QN AUTO: 32.1 PG (ref 26–33)
MCHC RBC AUTO-ENTMCNC: 30.9 GM/DL (ref 32.2–35.5)
MCV RBC AUTO: 103.8 FL (ref 80–94)
PLATELET # BLD: 252 THOU/MM3 (ref 130–400)
PMV BLD AUTO: 9.7 FL (ref 9.4–12.4)
POTASSIUM SERPL-SCNC: 4.8 MEQ/L (ref 3.5–5.2)
RBC # BLD: 3.4 MILL/MM3 (ref 4.7–6.1)
SODIUM BLD-SCNC: 140 MEQ/L (ref 135–145)
WBC # BLD: 8.6 THOU/MM3 (ref 4.8–10.8)

## 2020-04-20 ENCOUNTER — VIRTUAL VISIT (OUTPATIENT)
Dept: NEPHROLOGY | Age: 75
End: 2020-04-20
Payer: MEDICARE

## 2020-04-20 VITALS
BODY MASS INDEX: 31.6 KG/M2 | SYSTOLIC BLOOD PRESSURE: 131 MMHG | WEIGHT: 214 LBS | HEART RATE: 71 BPM | TEMPERATURE: 97.9 F | DIASTOLIC BLOOD PRESSURE: 65 MMHG

## 2020-04-20 PROCEDURE — 4040F PNEUMOC VAC/ADMIN/RCVD: CPT | Performed by: INTERNAL MEDICINE

## 2020-04-20 PROCEDURE — G8427 DOCREV CUR MEDS BY ELIG CLIN: HCPCS | Performed by: INTERNAL MEDICINE

## 2020-04-20 PROCEDURE — 99213 OFFICE O/P EST LOW 20 MIN: CPT | Performed by: INTERNAL MEDICINE

## 2020-04-20 PROCEDURE — 3017F COLORECTAL CA SCREEN DOC REV: CPT | Performed by: INTERNAL MEDICINE

## 2020-04-20 PROCEDURE — 1123F ACP DISCUSS/DSCN MKR DOCD: CPT | Performed by: INTERNAL MEDICINE

## 2020-04-20 NOTE — PROGRESS NOTES
Act and the [address] [facility] authority and the [employer] Act, this Virtual  Visit was conducted, with patient's consent, to reduce the patient's risk of exposure to COVID-19 and provide continuity of care for an established patient. Services were provided through a video synchronous discussion virtually to substitute for in-person clinic visit.

## 2020-05-06 ENCOUNTER — OFFICE VISIT (OUTPATIENT)
Dept: PULMONOLOGY | Age: 75
End: 2020-05-06
Payer: MEDICARE

## 2020-05-06 VITALS
HEART RATE: 78 BPM | DIASTOLIC BLOOD PRESSURE: 66 MMHG | WEIGHT: 219 LBS | TEMPERATURE: 96.1 F | SYSTOLIC BLOOD PRESSURE: 108 MMHG | OXYGEN SATURATION: 99 % | BODY MASS INDEX: 32.44 KG/M2 | HEIGHT: 69 IN

## 2020-05-06 PROCEDURE — 4040F PNEUMOC VAC/ADMIN/RCVD: CPT | Performed by: PHYSICIAN ASSISTANT

## 2020-05-06 PROCEDURE — 1036F TOBACCO NON-USER: CPT | Performed by: PHYSICIAN ASSISTANT

## 2020-05-06 PROCEDURE — G8417 CALC BMI ABV UP PARAM F/U: HCPCS | Performed by: PHYSICIAN ASSISTANT

## 2020-05-06 PROCEDURE — 99214 OFFICE O/P EST MOD 30 MIN: CPT | Performed by: PHYSICIAN ASSISTANT

## 2020-05-06 PROCEDURE — G8427 DOCREV CUR MEDS BY ELIG CLIN: HCPCS | Performed by: PHYSICIAN ASSISTANT

## 2020-05-06 PROCEDURE — 1123F ACP DISCUSS/DSCN MKR DOCD: CPT | Performed by: PHYSICIAN ASSISTANT

## 2020-05-06 PROCEDURE — 3017F COLORECTAL CA SCREEN DOC REV: CPT | Performed by: PHYSICIAN ASSISTANT

## 2020-05-06 ASSESSMENT — ENCOUNTER SYMPTOMS
CHEST TIGHTNESS: 0
WHEEZING: 0
DIARRHEA: 0
ALLERGIC/IMMUNOLOGIC NEGATIVE: 1
SHORTNESS OF BREATH: 1
EYES NEGATIVE: 1
NAUSEA: 0
COUGH: 0
STRIDOR: 0
BACK PAIN: 0

## 2020-05-06 NOTE — PROGRESS NOTES
[facility]      Tamara Stapleton         [id number]  5/6/2020   Chief Complaint   Patient presents with    Sleep Apnea     RICCARDO f/u. Last office visit was 1/7/2020        Pt of Dr. Stephanie Stevens    PAP Download:   Original or initial AHI: 8.3     Date of initial study: 2/7/18      Compliant  100%     Noncompliant  %     PAP Type Cpap Level  15/11/3   Avg Hrs/Day 8 hours 26 min   AHI: 8.5    Recorded compliance dates 4/5/2020 to 5/4/2020  Machine/Mfg: ResMed  Interface: FFM     Provider:    _*_SR-HME Myla Sandhoff        __ [employer]    __ [facility]            __[facility] [facility] __Adaptive   __Northwest:       __Other    Neck Size: 17  Mallampati4   ESS: 10  SAQLI:     Here is a scan of the most recent download:            Presentation:   Lucy Rosa presents for sleep medicine follow up for obstructive sleep apnea  Since the last visit, Lucy Mask is doing well with PAP. He is now on bipap after failing CPAP. He is having mask leak. He is sleeping well. Equipment issues: The pressure is  acceptable, the mask is acceptable     Sleep issues:  Do you feel better? Yes  More rested? Yes   Better concentration? yes    Progress History:   Since last visit any new medical issues? No  New ER or hospitlal visits? No  Any new or changes in medicines? No  Any new sleep medicines?  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    BPH (benign prostatic hypertrophy)     CAD (coronary artery disease) 1997    Carotid artery disease ([address] 1995    right    Carotid disease, bilateral ([address]     Cerebral artery occlusion with cerebral infarction ([address]     CHF (congestive heart failure) (HCC)     Chronic kidney disease     GERD (gastroesophageal reflux disease)     Hyperlipidemia 1996    Hypertension     Lumbar disc disease 2001    MI (myocardial infarction) ([facility] 1997    Nephrolithiasis 2002    NIDDM (non-insulin dependent diabetes mellitus) 1996    diagnosed 1996    RICCARDO (obstructive sleep apnea) 2005    cpap    Renal artery stenosis ([facility] 2007    left    Renal insufficiency     S/P CABG (status post coronary artery bypass graft) 1997    5 vessels    Skin cancer     Squamous cell carcinoma     TIA (transient ischemic attack)     Wears partial dentures     upper and lower       Past Surgical History:   Procedure Laterality Date    ABLATION OF DYSRHYTHMIC FOCUS  3/13/13   [address] CARDIAC SURGERY  3/13/13    oblation for irreg rythm    CARDIOVERSION  3/8/2012    CAROTID ENDARTERECTOMY  1997    CHOLECYSTECTOMY  4/1999    COLONOSCOPY  6/22/09, 4/2011,7/12/17    polyp removal    CORONARY ANGIOPLASTY WITH STENT PLACEMENT  2008 (2), 2009 (1)    x2    CORONARY ARTERY BYPASS GRAFT  8/1997    5 vessel    DIAGNOSTIC CARDIAC CATH LAB PROCEDURE      FOOT SURGERY  12/20/2013    pre-cancerous mole rt foot removed    LITHOTRIPSY  4/2002    [place] OFFICE/OUTPT VISIT,PROCEDURE ONLY N/A 11/8/2018    TRANSESOPHAGEAL ECHOCARDIOGRAM performed by Michelle Downey MD at [address] [place] [address] [facility]  05/30/2018   Alise  2001, 2003    rt shoulder manipulation--frozen shoulder-01, Lt -03    SKIN BIOPSY      SKIN CANCER EXCISION  12/20/13     R foot    SKIN CANCER EXCISION  1/2016    squamous cell Lt  upper cheek    URETER STENT PLACEMENT  4/2002    VASCULAR SURGERY      carotids & cabg harvests       Social History     Tobacco Use    Smoking status: Never Smoker    Smokeless tobacco: Never Used   Substance Use Topics    Alcohol use: No     Alcohol/week: 0.0 standard drinks    Drug use: No       No Known Allergies    Current Outpatient Medications   Medication Sig Dispense Refill    CPAP Machine MISC by Does not apply route Please change BIPAP pressure to IPAP 14 and EPAP 11 cm H20. 1 each 0    metoprolol succinate (TOPROL XL) 50 MG extended release tablet TAKE 1 TABLET BY MOUTH  DAILY 90 tablet 3    insulin detemir (LEVEMIR FLEXTOUCH) 100 UNIT/ML injection pen 45 units twice daily 90 mL 5    nitroGLYCERIN (NITROLINGUAL) 0.4 MG/SPRAY 0.4 mg spray USE 1 SPRAY ON OR UNDER THE TONGUE EVERY 5 MINUTES AS NEEDED FOR CHEST PAIN 4.9 g 6    HYDROXYZINE HCL PO Take 10 mg by mouth as needed       apixaban (ELIQUIS) 5 MG TABS tablet TAKE 1 TABLET TWICE A  tablet 3    diltiazem (CARDIZEM CD) 120 MG extended release capsule TAKE 1 CAPSULE BY MOUTH  DAILY 90 capsule 3    insulin lispro (HUMALOG KWIKPEN) 100 UNIT/ML pen Inject 7 Units into the skin 2 times daily before breakfast and lunch plus sliding scale (Patient taking differently: Inject 7 Units into the skin 2 times daily before breakfast and lunch plus sliding scale and 10 units with supper plus sliding scale) 5 pen 3    metFORMIN (GLUCOPHAGE) 1000 MG tablet Take 1 tablet by mouth nightly Often taking only 1 tablet daily 180 tablet 3    albuterol sulfate HFA (VENTOLIN HFA) 108 (90 Base) MCG/ACT inhaler Inhale 2 puffs into the lungs every 6 hours as needed for Wheezing or Shortness of Breath 1 Inhaler 11    fluticasone (FLOVENT HFA) 110 MCG/ACT inhaler Inhale 2 puffs into the lungs 2 times daily Patient to rinse mouth after dose. 3 Inhaler 3    ALPRAZolam (XANAX) 0.25 MG tablet Take 0.25 mg by mouth nightly as needed for Anxiety.       Insulin Pen Needle (NOVOFINE) 32G X 6 MM MISC USE 1 NEW NEEDLE 6 TIMES A DAY AND AS NEEDED 600 each 5    atorvastatin (LIPITOR) 40 MG tablet Take 1 tablet by mouth nightly 90 tablet 3    Multiple Vitamins-Minerals (THERAPEUTIC MULTIVITAMIN-MINERALS) tablet Take 1 tablet by mouth nightly      ACCU-CHEK COMPACT PLUS strip DX E11.59, CHECK THE GLUCOSE 4 TIMES DAILY 408 strip 3    hydrALAZINE (APRESOLINE) 25 MG tablet Take 1 tablet by mouth every 8 hours 90 tablet 0    bumetanide (BUMEX) 2 MG tablet Take 1 tablet by mouth daily 30 tablet 0    sacubitril-valsartan (ENTRESTO) 49-51 MG per tablet Take 1 tablet by mouth 2 times daily 60 tablet 0    clopidogrel (PLAVIX) 75 MG tablet Take 75 mg by mouth daily      ACCU-CHEK SOFTCLIX LANCETS MISC CHECK THE GLUCOSE FOUR TIMES DAILY E11.59 400 each 5    ranolazine (RANEXA) 500 MG extended release tablet Take 500 mg by mouth 2 times daily      isosorbide mononitrate (IMDUR) 120 MG extended release tablet Take 120 mg by mouth daily       NOVOFINE 30G X 8 MM MISC USE 1 NEW NEEDLE 6 TIMES A DAY AND AS NEEDED 600 each 3     No current facility-administered medications for this visit. Family History   Problem Relation Age of Onset    Cancer Mother     High Blood Pressure Mother     Stroke Father     High Blood Pressure Father     Heart Disease Brother     Cancer Brother         lung    Cancer Brother         melanoma        Review of Systems -   Review of Systems   Constitutional: Negative for activity change, appetite change, chills and fever. HENT: Negative for congestion and postnasal drip. Eyes: Negative. Respiratory: Positive for shortness of breath. Negative for cough, chest tightness, wheezing and stridor. Cardiovascular: Negative for chest pain and leg swelling. Gastrointestinal: Negative for diarrhea and nausea. Endocrine: Negative. Genitourinary: Negative. Musculoskeletal: Negative. Negative for arthralgias and back pain. Skin: Negative. Allergic/Immunologic: Negative. Neurological: Negative. Negative for dizziness and light-headedness. Psychiatric/Behavioral: Negative. All other systems reviewed and are negative. Physical Exam:    BMI:  Body mass index is 32.34 kg/m².     Wt Readings from Last 3 Encounters:   05/06/20 219 lb (99.3 kg)   04/20/20 214 lb (97.1 kg)   01/21/20 216 lb (98 kg)     Weight stable / unchanged  Vitals: /66 (Site: Right Upper Arm, Position: Sitting, Cuff Size: Large Adult)   Pulse 78   Temp 96.1 °F (35.6 °C) (Tympanic)   Ht 5' 9\" (1.753 m)   Wt 219 lb (99.3 kg)   SpO2 99% Comment: room air  BMI 32.34 kg/m²       Physical Exam  Constitutional:       Appearance: He is well-developed. HENT:      Head: Normocephalic and atraumatic. Right Ear: External ear normal.      Left Ear: External ear normal.   Eyes:      Conjunctiva/sclera: Conjunctivae normal.      Pupils: Pupils are equal, round, and reactive to light. Neck:      Musculoskeletal: Normal range of motion and neck supple. Cardiovascular:      Rate and Rhythm: Normal rate and regular rhythm. Heart sounds: Normal heart sounds. Pulmonary:      Effort: Pulmonary effort is normal.      Breath sounds: Normal breath sounds. Musculoskeletal: Normal range of motion. Skin:     General: Skin is warm and dry. Neurological:      Mental Status: He is alert and oriented to person, place, and time. Psychiatric:         Behavior: Behavior normal.         Thought Content: Thought content normal.         Judgment: Judgment normal.           ASSESSMENT/DIAGNOSIS     Diagnosis Orders   1. Obstructive sleep apnea on CPAP     2. Complex sleep apnea syndrome     3. Asthma, unspecified asthma severity, unspecified whether complicated, unspecified whether persistent     4. SOB (shortness of breath)              Plan   Do you need any equipment today? No  - Will adjust pressure to improve AHI  - Needs new cushion to improve leak  - He  was advised to continue current positive airway pressure therapy with above described pressure.    - He  advised to keep good compliance with current recommended pressure to get optimal results and clinical improvement  - Recommend 7-9 hours of sleep with PAP  - He was advised to call [employer] regarding supplies if needed.   -He call my office for earlier appointment if needed for worsening of sleep symptoms.   - He was instructed on weight loss  - Wilver Newman was educated about my impression and plan. Patient verbalizesunderstanding.   We will see Leon Alberto back in: 6 months with download    Information added by my medical assistant/LPN was reviewed today          Sriram Elliott PA-C, GUZMAN  5/6/2020

## 2020-05-28 RX ORDER — ATORVASTATIN CALCIUM 40 MG/1
40 TABLET, FILM COATED ORAL NIGHTLY
Qty: 30 TABLET | Refills: 2 | Status: SHIPPED | OUTPATIENT
Start: 2020-05-28 | End: 2020-07-20 | Stop reason: SDUPTHER

## 2020-06-09 ENCOUNTER — TELEPHONE (OUTPATIENT)
Dept: INTERNAL MEDICINE CLINIC | Age: 75
End: 2020-06-09

## 2020-06-09 RX ORDER — BLOOD SUGAR DIAGNOSTIC, DRUM
STRIP MISCELLANEOUS
Qty: 408 STRIP | Refills: 3 | Status: SHIPPED | OUTPATIENT
Start: 2020-06-09 | End: 2021-07-06

## 2020-06-09 RX ORDER — LANCETS
EACH MISCELLANEOUS
Qty: 360 EACH | Refills: 15 | Status: SHIPPED | OUTPATIENT
Start: 2020-06-09 | End: 2021-07-06

## 2020-06-22 ENCOUNTER — OFFICE VISIT (OUTPATIENT)
Dept: INTERNAL MEDICINE CLINIC | Age: 75
End: 2020-06-22
Payer: MEDICARE

## 2020-06-22 VITALS — BODY MASS INDEX: 32.44 KG/M2 | WEIGHT: 219 LBS | HEIGHT: 69 IN | TEMPERATURE: 98.8 F

## 2020-06-22 LAB — HBA1C MFR BLD: 6.2 % (ref 4.3–5.7)

## 2020-06-22 PROCEDURE — 83036 HEMOGLOBIN GLYCOSYLATED A1C: CPT | Performed by: DIETITIAN, REGISTERED

## 2020-06-22 PROCEDURE — 97802 MEDICAL NUTRITION INDIV IN: CPT | Performed by: DIETITIAN, REGISTERED

## 2020-06-22 NOTE — PROGRESS NOTES
[address] [place] [address]. [address] [place] [address] Azael., AhmetShruti Field[facility], [address]  [phone] (phone)  [phone] (fax)    Patient Name: Saniya Reed. Date of Birth: [id number]. MRN: [id number]      Assessment: Patient is a 76 y.o. 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     -Nutritionally relevant labs:   Lab Results   Component Value Date/Time    LABA1C 6.1 (H) 09/16/2019 08:42 AM    LABA1C 6.2 03/18/2019 09:26 AM    LABA1C 6.4 (H) 03/13/2019 01:59 PM    LABA1C 6.2 09/11/2018 10:22 AM    LABA1C 6.2 03/14/2018 01:35 PM    GLUCOSE 82 04/17/2020 09:36 AM    GLUCOSE 115 (H) 01/24/2020 10:30 AM    GLUCOSE 169 (H) 03/24/2012 09:25 AM    GLUCOSE 165 (H) 03/08/2012 08:37 AM    CHOL 138 01/24/2020 10:30 AM    HDL 31 01/24/2020 10:30 AM    LDLCALC 74 01/24/2020 10:30 AM    TRIG 167 01/24/2020 10:30 AM     AIC 6.2%  -Blood sugar trends: Checks BS 2 - 3x/day  FBS:  76 - 129, lowest.  Lunch: 130, 116, 118  Dinner:  115 - 180  Bedtime:  147 - 238   Pt c/o occasional middle of the night/early morning low BS episodes.    -Food recall:   Breakfast: 9 am - cheerios OR other cold cereal OR shredded wheat OR oatmeal - minute oats. Adds plain cheerios to his honey nut cheerios sometimesOR will sweetened his plain cereal with splenda. And sometimes will add fruit in his cereal.    Lunch: 1230 - 1 pm - today - 1 cup Broccoli cheese soup ([address]) and leftover 1 1/2 chicken strips breaded Eugena Yomi), tapioca (2/3 cup). Crystal lite   Dinner: 6 - 630 pm - last night - Father[employer] grill out - grilled chicken brst, asparagus, corn zucchini casserole, 1/2 apple dumpling, 1 dip of homemade ice cream   Snacks: not last night. Not usually. Maybe peanuts. 2-3x/week bring home take out. [address] (split the meal and still have leftovers).     Likes to ride his motorcycle once a week with his friends and they eat out then stop somewhere for ice cream - he gets a

## 2020-07-15 NOTE — TELEPHONE ENCOUNTER
Date of last visit:  12/20/2019  Date of next visit:  Visit date not found    Requested Prescriptions     Pending Prescriptions Disp Refills    Insulin Pen Needle (Farzad Schaefer) 32G X 6 MM MISC [Pharmacy Med Name: [id number]] 556 each 1     Sig: USE 1 NEW NEEDLE 6 TIMES  DAILY AND AS NEEDED

## 2020-07-20 ENCOUNTER — OFFICE VISIT (OUTPATIENT)
Dept: INTERNAL MEDICINE CLINIC | Age: 75
End: 2020-07-20
Payer: MEDICARE

## 2020-07-20 VITALS
BODY MASS INDEX: 32.41 KG/M2 | HEART RATE: 66 BPM | DIASTOLIC BLOOD PRESSURE: 50 MMHG | WEIGHT: 218.8 LBS | TEMPERATURE: 97.3 F | SYSTOLIC BLOOD PRESSURE: 110 MMHG | HEIGHT: 69 IN

## 2020-07-20 PROBLEM — R19.7 DIARRHEA: Status: RESOLVED | Noted: 2018-06-04 | Resolved: 2020-07-20

## 2020-07-20 PROBLEM — N12 PYELONEPHRITIS: Status: RESOLVED | Noted: 2018-06-01 | Resolved: 2020-07-20

## 2020-07-20 PROBLEM — N17.9 AKI (ACUTE KIDNEY INJURY) (HCC): Status: RESOLVED | Noted: 2018-09-17 | Resolved: 2020-07-20

## 2020-07-20 PROCEDURE — G8427 DOCREV CUR MEDS BY ELIG CLIN: HCPCS | Performed by: INTERNAL MEDICINE

## 2020-07-20 PROCEDURE — 3044F HG A1C LEVEL LT 7.0%: CPT | Performed by: INTERNAL MEDICINE

## 2020-07-20 PROCEDURE — 3017F COLORECTAL CA SCREEN DOC REV: CPT | Performed by: INTERNAL MEDICINE

## 2020-07-20 PROCEDURE — G8417 CALC BMI ABV UP PARAM F/U: HCPCS | Performed by: INTERNAL MEDICINE

## 2020-07-20 PROCEDURE — 1123F ACP DISCUSS/DSCN MKR DOCD: CPT | Performed by: INTERNAL MEDICINE

## 2020-07-20 PROCEDURE — 2022F DILAT RTA XM EVC RTNOPTHY: CPT | Performed by: INTERNAL MEDICINE

## 2020-07-20 PROCEDURE — 1036F TOBACCO NON-USER: CPT | Performed by: INTERNAL MEDICINE

## 2020-07-20 PROCEDURE — 99214 OFFICE O/P EST MOD 30 MIN: CPT | Performed by: INTERNAL MEDICINE

## 2020-07-20 PROCEDURE — 93000 ELECTROCARDIOGRAM COMPLETE: CPT | Performed by: INTERNAL MEDICINE

## 2020-07-20 PROCEDURE — 4040F PNEUMOC VAC/ADMIN/RCVD: CPT | Performed by: INTERNAL MEDICINE

## 2020-07-20 RX ORDER — ATORVASTATIN CALCIUM 40 MG/1
40 TABLET, FILM COATED ORAL NIGHTLY
Qty: 90 TABLET | Refills: 3 | Status: SHIPPED | OUTPATIENT
Start: 2020-07-20 | End: 2021-06-29

## 2020-07-20 RX ORDER — NITROGLYCERIN 400 UG/1
SPRAY ORAL
Qty: 4.9 G | Refills: 6 | Status: SHIPPED | OUTPATIENT
Start: 2020-07-20 | End: 2021-07-28 | Stop reason: SDUPTHER

## 2020-07-20 RX ORDER — DILTIAZEM HYDROCHLORIDE 120 MG/1
CAPSULE, COATED, EXTENDED RELEASE ORAL
Qty: 90 CAPSULE | Refills: 3 | Status: SHIPPED | OUTPATIENT
Start: 2020-07-20 | End: 2021-06-28 | Stop reason: SDUPTHER

## 2020-07-20 ASSESSMENT — PATIENT HEALTH QUESTIONNAIRE - PHQ9
2. FEELING DOWN, DEPRESSED OR HOPELESS: 0
SUM OF ALL RESPONSES TO PHQ QUESTIONS 1-9: 0
1. LITTLE INTEREST OR PLEASURE IN DOING THINGS: 0
SUM OF ALL RESPONSES TO PHQ QUESTIONS 1-9: 0
SUM OF ALL RESPONSES TO PHQ9 QUESTIONS 1 & 2: 0

## 2020-07-20 NOTE — PROGRESS NOTES
YOB: 1945    Chief Complaint   Patient presents with    Chest Pain     described as a pressure after riding motocycle-pt took 4 NTG and felt sick to stomach    Other     [facility]-they only advised to loose his belly     NEW SXS:; gets angina seveal x per week. Told by Trisha Anderson he can do no more intervention; he was seen at [facility] several mos ago and they concur. Had a episode of prolonged cp (hrs) 3 days ago; vomited. He has episodes 3-4x per week. 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 This patient presents for medical evaluation. I last saw the patient 6m  ago. This patient is followed regularly by Dr. Sera Velásquez. Patient Active Problem List   Diagnosis    CAD (coronary artery disease)    CKD (chronic kidney disease) stage 3, GFR 30-59 ml/min ([facility])    S/P CABG (coronary artery bypass graft)    Hyperlipidemia    Renal artery stenosis ([facility])    Obstructive sleep apnea on CPAP    Coronary artery disease involving native coronary artery of native heart without angina pectoris    Type 2 diabetes mellitus with microalbuminuria, with long-term current use of insulin ([facility] [address]    CAD in native artery    Status post angioplasty with stent    Shortness of breath    Wheezing    Asthma    Essential hypertension    Obesity (BMI 30-39. 9)    Elevated PSA    Hypertrophy of prostate with urinary obstruction       Current Outpatient Medications   Medication Sig Dispense Refill    atorvastatin (LIPITOR) 40 MG tablet Take 1 tablet by mouth nightly 90 tablet 3    nitroGLYCERIN (NITROLINGUAL) 0.4 MG/SPRAY 0.4 mg spray USE 1 SPRAY ON OR UNDER THE TONGUE EVERY 5 MINUTES AS NEEDED FOR CHEST PAIN 4.9 g 6    dilTIAZem (CARDIZEM CD) 120 MG extended release capsule TAKE 1 CAPSULE BY MOUTH  DAILY 90 capsule 3    Insulin Pen Needle (NOVOFINE) 32G X 6 MM MISC USE 1 NEW NEEDLE 6 TIMES  DAILY AND AS NEEDED 540 each 1    Accu-Chek current facility-administered medications for this visit. No Known Allergies    I have reviewed the patient's medications, as well as, their past medical, social, and family history as appropriate. Review of Systems - General ROS: negative  Psychological ROS: positive for - anxiety  Hematological and Lymphatic ROS: No history of blood clots or bleeding disorder. Respiratory ROS: positive for - shortness of breath  Cardiovascular ROS: See above    Gastrointestinal ROS: no abdominal pain, change in bowel habits, or black or bloody stools. HE HAS COLON POLYPS, GETS REGULAR COLONOSCOPIES  Genito-Urinary ROS: no dysuria, trouble voiding, or hematuria  Musculoskeletal ROS: negative  Neurological ROS: no TIA or stroke symptoms  Dermatological ROS: negative    Blood pressure (!) 110/50, pulse 66, temperature 97.3 °F (36.3 °C), height 5' 9.02\" (1.753 m), weight 218 lb 12.8 oz (99.2 kg). Physical Examination: General appearance - overweight and anxious  Mental status - alert, oriented to person, place, and time  Neck - supple, no significant adenopathy, no JVD, or carotid bruits  Chest - clear to auscultation, no wheezes, rales or rhonchi, symmetric air entry  Heart - normal rate, ir regular rhythm, normal S1, S2, no murmurs, rubs, clicks or gallops  Abdomen - protuberant  Neurological - alert, oriented, normal speech, no focal findings or movement disorder noted  Musculoskeletal - no muscular tenderness noted  Extremities - no pedal edema noted  Skin - normal coloration and turgor, no rashes, no suspicious skin lesions noted          Diagnosis Orders   1. Coronary artery disease involving native coronary artery, angina presence unspecified, unspecified whether native or transplanted heart  EKG 12 Lead   2. Type 2 diabetes mellitus with microalbuminuria, with long-term current use of insulin (HCC)     3. Status post angioplasty with stent  EKG 12 Lead   4. Shortness of breath  EKG 12 Lead   5.  CKD (chronic kidney disease) stage 3, GFR 30-59 ml/min ([facility])     6. CAD S/P percutaneous coronary angioplasty  nitroGLYCERIN (NITROLINGUAL) 0.4 MG/SPRAY 0.4 mg spray    EKG 12 Lead       Orders Placed This Encounter   Procedures    EKG 12 Lead     Order Specific Question:   Reason for Exam?     Answer: Other        IMP/PLAN:    1. Chronic angina- no change EKG today  2. Dm- stable  3. ckd- stable  4. On entresto per cardio- this use off label; but can have benefit as has proteinuria        Will schedule follow up appointment in 6m. Continue medications at current doses. He is on max rx for angina; discussed taking ntg prophylactically. Discussed he is not candidate for viagra like products    Signed By:  Marivel Michelle.  Ryan Kwan M.D.

## 2020-09-08 NOTE — TELEPHONE ENCOUNTER
Date of last visit:  12/20/2019  Date of next visit:  Visit date not found    Requested Prescriptions     Pending Prescriptions Disp Refills    metFORMIN (GLUCOPHAGE) 1000 MG tablet [Pharmacy Med Name: MetFORMIN 1000MG TABLET] 90 tablet 1     Sig: TAKE 1 TABLET BY MOUTH  NIGHTLY

## 2020-09-21 ENCOUNTER — TELEPHONE (OUTPATIENT)
Dept: INTERNAL MEDICINE CLINIC | Age: 75
End: 2020-09-21

## 2020-09-21 ENCOUNTER — OFFICE VISIT (OUTPATIENT)
Dept: INTERNAL MEDICINE CLINIC | Age: 75
End: 2020-09-21
Payer: MEDICARE

## 2020-09-21 VITALS
BODY MASS INDEX: 31.46 KG/M2 | WEIGHT: 212.4 LBS | DIASTOLIC BLOOD PRESSURE: 59 MMHG | TEMPERATURE: 97.9 F | HEART RATE: 69 BPM | HEIGHT: 69 IN | SYSTOLIC BLOOD PRESSURE: 121 MMHG

## 2020-09-21 PROCEDURE — G0108 DIAB MANAGE TRN  PER INDIV: HCPCS | Performed by: INTERNAL MEDICINE

## 2020-09-21 NOTE — PATIENT INSTRUCTIONS
Try to get 10 minutes of exercise every day--stay as active as possible      --avoid getting short of breath of having chest pain  Try to take your Humalog before you eat vs after eating  Try taking only 1/2 Metformin tablet and see if this helps your diarrhea.

## 2020-09-21 NOTE — TELEPHONE ENCOUNTER
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 His Metformin was reduced from 2000mg daily to 1000mg daily. His glucose levels remain in goal--see appointment notes. Please authorize the [facility] at [address] [place] [address] to teach/ assist Alfredo Course to reduce his Metformin to 500mg daily. If you agree, please note and return. Thank you.

## 2020-09-21 NOTE — PROGRESS NOTES
[facility]  [address] Renée Amato, [address]  [phone] (phone)  [phone] (fax)    Patient ID: Jose Stark [id number]  Referring Provider: Fabricio Bishop     Patient's name and  were verified. Subjective:    He presents for His follow-up diabetic visit. He has type 2 diabetes mellitus. Home regimen includes: insulin  biguanide He is noncompliant some of the time. Assessment:     Lab Results   Component Value Date    LABA1C 6.2 2020    LABA1C 6.2 2019    BUN 34 2020    CREATININE 1.8 2020     There were no vitals filed for this visit. Wt Readings from Last 3 Encounters:   20 218 lb 12.8 oz (99.2 kg)   20 219 lb (99.3 kg)   20 219 lb (99.3 kg)     Ht Readings from Last 3 Encounters:   20 5' 9.02\" (1.753 m)   20 5' 9\" (1.753 m)   20 5' 9\" (1.753 m)       Glucose at 4 hrs PPD today resulted at 103mg/dl  Current monitoring regimen: home blood tests - 4 times daily  Home blood sugar trends: FBS's 107-133. Noon 130, 148. Dinner 116-147, 250. -230  Any episodes of hypoglycemia? no  Previous visit with dietician: yes -   Current diet: B cereal/ s berries/ peaches/ milk                L  pb jelly sandwich                D spaghetti/ sauce/ apple crisp/ garlic                BT pork rinds orpotato chips or apple or pretzels  Current exercise: ADL's-working outdoors. driving most days  Eye exam current (within one year): yes 2020 Dr. Kandi Pina  Any history of foot problems? no  Last foot exam: 20 Pedal pulses:   peripheral pulses symmetrical   Results of monofilament test: 10/10              Skin noted to be warm, pale and hair is absent. Immunizations up to date: yes -   Taking ASA:  No - If not why? On blood thinners  Appropriate for use of MyChart Glucose Grid:  No    Focus:     Diabetes education. Most recent A1C 6.2% Glucose levels in goal fasting.  They are elevated at bedtime, but these readings are tested 1-2 hours after bedtime snacking. Overall, glucose levels are doing well. He continues to have loose diarrhea. He was cut back from 2000mg daily to 1000mg daily months ago. He still takes an antidiarrheal to maintain control. Discussed decreasing Metformin to 500mg to see if this helps diarhea. Mos current complaint is SOB with even small amounts of activity. He has obtained a second opinion from a cardiologist in [facility]. He was told he needs to lose weight. He has seen the dietician once; but declines future visit. Activity is limited by SOB. Merlin Mccabe has limited interest in making changes. Encouragement given. Follow up 6 months. Discussion of Bettye 2 CGM and its benefits. Something they will consider. DSME PLAN:   Discussed general issues about diabetes pathophysiology and management. Counseling at today's visit: BG goals; carbs; exercise; increasing movement-gradually; insulin action. Try to get 10 minutes of exercise every day--stay as active as possible      --avoid getting short of breath of having chest pain  Try to take your Humalog before you eat vs after eating  Try taking only 1/2 Metformin tablet and see if this helps your diarrhea. Meter download, medications, PMH and nursing assessment reviewed. Di Medrano states He is willing to participate in this plan of care and verbalized understanding of all instructions provided. Teach back used to verify comprehension. Total time involved in direct patient education: 30 minutes.

## 2020-09-23 ENCOUNTER — TELEPHONE (OUTPATIENT)
Dept: INTERNAL MEDICINE CLINIC | Age: 75
End: 2020-09-23

## 2020-09-23 NOTE — TELEPHONE ENCOUNTER
Wilfrid instructed to take Metformin 500mg (1/2 tab) daily with evening meal. Wilfrid voiced understanding via teach back

## 2020-10-06 NOTE — TELEPHONE ENCOUNTER
Abena Barron and his wife called in and stated that he would like to try the Rice 2 Continuous Glucose Monitor. Please sign the script to be sent to [facility]. Thank you.

## 2020-10-14 ENCOUNTER — NURSE ONLY (OUTPATIENT)
Dept: LAB | Age: 75
End: 2020-10-14

## 2020-10-14 LAB
ANION GAP SERPL CALCULATED.3IONS-SCNC: 8 MEQ/L (ref 8–16)
BUN BLDV-MCNC: 35 MG/DL (ref 7–22)
CALCIUM SERPL-MCNC: 9.4 MG/DL (ref 8.5–10.5)
CHLORIDE BLD-SCNC: 105 MEQ/L (ref 98–111)
CO2: 24 MEQ/L (ref 23–33)
CREAT SERPL-MCNC: 1.5 MG/DL (ref 0.4–1.2)
ERYTHROCYTE [DISTWIDTH] IN BLOOD BY AUTOMATED COUNT: 13.5 % (ref 11.5–14.5)
ERYTHROCYTE [DISTWIDTH] IN BLOOD BY AUTOMATED COUNT: 51.2 FL (ref 35–45)
GFR SERPL CREATININE-BSD FRML MDRD: 46 ML/MIN/1.73M2
GLUCOSE BLD-MCNC: 90 MG/DL (ref 70–108)
HCT VFR BLD CALC: 35.7 % (ref 42–52)
HEMOGLOBIN: 11.3 GM/DL (ref 14–18)
MCH RBC QN AUTO: 32.6 PG (ref 26–33)
MCHC RBC AUTO-ENTMCNC: 31.7 GM/DL (ref 32.2–35.5)
MCV RBC AUTO: 102.9 FL (ref 80–94)
PLATELET # BLD: 207 THOU/MM3 (ref 130–400)
PMV BLD AUTO: 9.5 FL (ref 9.4–12.4)
POTASSIUM SERPL-SCNC: 5.2 MEQ/L (ref 3.5–5.2)
RBC # BLD: 3.47 MILL/MM3 (ref 4.7–6.1)
SODIUM BLD-SCNC: 137 MEQ/L (ref 135–145)
WBC # BLD: 9.6 THOU/MM3 (ref 4.8–10.8)

## 2020-10-19 ENCOUNTER — TELEPHONE (OUTPATIENT)
Dept: NEPHROLOGY | Age: 75
End: 2020-10-19

## 2020-10-19 NOTE — TELEPHONE ENCOUNTER
Wife called in to cancel this appt. They have both been exposed to Covid. Didn't know if you wanted to review his labs and let me know when to reschedule him, or if you would like to do a virtual with him. Please advise.

## 2020-10-21 RX ORDER — FLASH GLUCOSE SCANNING READER
EACH MISCELLANEOUS
Qty: 1 DEVICE | Refills: 0 | Status: SHIPPED | OUTPATIENT
Start: 2020-10-21 | End: 2020-11-09 | Stop reason: CLARIF

## 2020-10-21 RX ORDER — FLASH GLUCOSE SENSOR
KIT MISCELLANEOUS
Qty: 2 EACH | Refills: 0 | Status: SHIPPED | OUTPATIENT
Start: 2020-10-21 | End: 2020-11-09 | Stop reason: CLARIF

## 2020-10-21 NOTE — TELEPHONE ENCOUNTER
Appt made for Jan 26, 2020 at 2pm. Spoke to [place]. She is aware. Mailed appt reminder and labs to home address.

## 2020-11-03 PROBLEM — I25.10 CAD IN NATIVE ARTERY: Status: RESOLVED | Noted: 2018-01-26 | Resolved: 2020-11-03

## 2020-11-03 PROBLEM — I25.10 CAD (CORONARY ARTERY DISEASE): Status: RESOLVED | Noted: 2020-11-03 | Resolved: 2020-11-03

## 2020-11-04 ENCOUNTER — OFFICE VISIT (OUTPATIENT)
Dept: PULMONOLOGY | Age: 75
End: 2020-11-04
Payer: MEDICARE

## 2020-11-04 VITALS
BODY MASS INDEX: 31.58 KG/M2 | SYSTOLIC BLOOD PRESSURE: 118 MMHG | WEIGHT: 213.2 LBS | HEART RATE: 74 BPM | OXYGEN SATURATION: 99 % | DIASTOLIC BLOOD PRESSURE: 60 MMHG | HEIGHT: 69 IN

## 2020-11-04 PROCEDURE — G8417 CALC BMI ABV UP PARAM F/U: HCPCS | Performed by: PHYSICIAN ASSISTANT

## 2020-11-04 PROCEDURE — G8484 FLU IMMUNIZE NO ADMIN: HCPCS | Performed by: PHYSICIAN ASSISTANT

## 2020-11-04 PROCEDURE — 99214 OFFICE O/P EST MOD 30 MIN: CPT | Performed by: PHYSICIAN ASSISTANT

## 2020-11-04 PROCEDURE — 3017F COLORECTAL CA SCREEN DOC REV: CPT | Performed by: PHYSICIAN ASSISTANT

## 2020-11-04 PROCEDURE — G8427 DOCREV CUR MEDS BY ELIG CLIN: HCPCS | Performed by: PHYSICIAN ASSISTANT

## 2020-11-04 PROCEDURE — 1123F ACP DISCUSS/DSCN MKR DOCD: CPT | Performed by: PHYSICIAN ASSISTANT

## 2020-11-04 PROCEDURE — 4040F PNEUMOC VAC/ADMIN/RCVD: CPT | Performed by: PHYSICIAN ASSISTANT

## 2020-11-04 PROCEDURE — 1036F TOBACCO NON-USER: CPT | Performed by: PHYSICIAN ASSISTANT

## 2020-11-04 ASSESSMENT — ENCOUNTER SYMPTOMS
BACK PAIN: 0
DIARRHEA: 0
CHEST TIGHTNESS: 0
ALLERGIC/IMMUNOLOGIC NEGATIVE: 1
STRIDOR: 0
SHORTNESS OF BREATH: 0
COUGH: 0
NAUSEA: 0
WHEEZING: 0
EYES NEGATIVE: 1

## 2020-11-04 NOTE — PROGRESS NOTES
[facility]      Karen Barnhart         [id number]  11/4/2020   Chief Complaint   Patient presents with    Follow-up     RICCARDO 6 month follow up with a download        Pt of Dr. Marielle Max    PAP Download:   Mayra Saenz or initial AHI: 8.3     Date of initial study: 2/7/18      Compliant  100%     Noncompliant 0 %     PAP Type SpontLevel  14/11 cmH20   Avg Hrs/Day 8:20  AHI: 10.4   Recorded compliance dates , 10/3/20  to 11/1/20   Machine/Mfg:   [x] ResMed    [] Respironics/Dreamstation   Interface:   [] Nasal    [] Nasal pillows   [x] FFM      Provider:      [x] [facility]     [][facility]     [] [employer]    [] Ahmed Lisa    [] [place]               [] [facility]      [] [employer]    [] [address]      [] Other    Neck Size: 17  Mallampati Mallampati 4  ESS:  9  SAQLI: 70     Here is a scan of the most recent download:            Presentation:   Lily Fraser presents for sleep medicine follow up for complex sleep apnea  Since the last visit, Lily Fraser is doing well with PAP but AHI still elevated. Mask is fitting better than previously. He is sleeping ok. Still tired at times. Equipment issues: The pressure is  acceptable, the mask is acceptable     Sleep issues:  Do you feel better? Yes and No  More rested? Sometimes   Better concentration? yes    Progress History:   Since last visit any new medical issues? No  New ER or hospitlal visits? No  Any new or changes in medicines? No  Any new sleep medicines?  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    BPH (benign prostatic hypertrophy)     CAD (coronary artery disease) 1997    Carotid artery disease ([address] 1995    right    Carotid disease, bilateral ([facility] [address] [facility]     Cerebral artery occlusion with cerebral infarction [facility])     CHF (congestive heart failure) [facility] [address]     Chronic kidney disease     GERD (gastroesophageal reflux disease)     Hyperlipidemia 1996    Hypertension     Lumbar disc disease 2001    MI (myocardial infarction) ([facility] [address] 1997    Nephrolithiasis 2002    NIDDM (non-insulin dependent diabetes mellitus) 1996    diagnosed 1996    RICCARDO (obstructive sleep apnea) 2005    cpap    Renal artery stenosis ([facility] [address] 2007    left    Renal insufficiency     S/P CABG (status post coronary artery bypass graft) 1997    5 vessels    Skin cancer     Squamous cell carcinoma     TIA (transient ischemic attack)     Wears partial dentures     upper and lower       Past Surgical History:   Procedure Laterality Date    ABLATION OF DYSRHYTHMIC FOCUS  3/13/13   [address] CARDIAC SURGERY  3/13/13    oblation for irreg rythm    CARDIOVERSION  3/8/2012    CAROTID ENDARTERECTOMY  1997    CHOLECYSTECTOMY  4/1999    COLONOSCOPY  6/22/09, 4/2011,7/12/17    polyp removal    CORONARY ANGIOPLASTY WITH STENT PLACEMENT  2008 (2), 2009 (1)    x2    CORONARY ARTERY BYPASS GRAFT  8/1997    5 vessel    DIAGNOSTIC CARDIAC CATH LAB PROCEDURE      FOOT SURGERY  12/20/2013    pre-cancerous mole rt foot removed    LITHOTRIPSY  4/2002    AR OFFICE/OUTPT VISIT,PROCEDURE ONLY N/A 11/8/2018    TRANSESOPHAGEAL ECHOCARDIOGRAM performed by Jayme Barfield MD at [address] [facility]    PTCA  05/30/2018   [facility] SURGERY  2001, 2003    rt shoulder manipulation--frozen shoulder-01, Lt -03    SKIN BIOPSY      SKIN CANCER EXCISION  12/20/13     R foot    SKIN CANCER EXCISION  1/2016    squamous cell Lt  upper cheek    URETER STENT PLACEMENT  4/2002    VASCULAR SURGERY      carotids & cabg harvests       Social History     Tobacco Use    Smoking status: Never Smoker    Smokeless tobacco: Never Used   Substance Use Topics    Alcohol use: No     Alcohol/week: 0.0 standard drinks    Drug use: No       No Known Allergies    Current Outpatient Medications   Medication Sig Dispense Refill    CPAP Machine MISC by Does not apply route Please change BIPAP pressure to IPAP 13 and EPAP 10 cm H20.   Download in 1 month 1 each 0    Continuous Blood Gluc  (FREESTYLE IGOR 14 DAY READER) SHAILA USE AS DIRECTED 1 Device 0    Continuous Blood Gluc Sensor (FREESTYLE IGOR 14 DAY SENSOR) MISC USE AS DIRECTED 2 each 0    Continuous Blood Gluc  (FREESTYLE IGOR 2 READER SYSTM) SHAILA Use as directed 1 Device 0    Continuous Blood Gluc Sensor (FREESTYLE IGOR 2 SENSOR SYSTM) MISC Apply one sensor every 14 days or as directed 2 each 4    metFORMIN (GLUCOPHAGE) 1000 MG tablet Take 0.5 tablets by mouth Daily with supper 90 tablet 1    sodium chloride nebulizer 0.9 % NEBU 30 mL with albuterol (5 MG/ML) 0.5% NEBU Inhale 1 puff into the lungs as needed      atorvastatin (LIPITOR) 40 MG tablet Take 1 tablet by mouth nightly 90 tablet 3    nitroGLYCERIN (NITROLINGUAL) 0.4 MG/SPRAY 0.4 mg spray USE 1 SPRAY ON OR UNDER THE TONGUE EVERY 5 MINUTES AS NEEDED FOR CHEST PAIN 4.9 g 6    dilTIAZem (CARDIZEM CD) 120 MG extended release capsule TAKE 1 CAPSULE BY MOUTH  DAILY 90 capsule 3    Insulin Pen Needle (NOVOFINE) 32G X 6 MM MISC USE 1 NEW NEEDLE 6 TIMES  DAILY AND AS NEEDED 540 each 1    Accu-Chek FastClix Lancets MISC USE TO TEST BLOOD SUGARS 4 TIMES DAILY 360 each 15    blood glucose test strips (ACCU-CHEK COMPACT PLUS) strip DX E11.59, CHECK THE GLUCOSE 4 TIMES DAILY 408 strip 3    metoprolol succinate (TOPROL XL) 50 MG extended release tablet TAKE 1 TABLET BY MOUTH  DAILY 90 tablet 3    insulin detemir (LEVEMIR FLEXTOUCH) 100 UNIT/ML injection pen 45 units twice daily (Patient taking differently: 50 - 52 units am and pm.) 90 mL 5    apixaban (ELIQUIS) 5 MG TABS tablet TAKE 1 TABLET TWICE A  tablet 3    insulin lispro (HUMALOG KWIKPEN) 100 UNIT/ML pen Inject 7 Units into the skin 2 times daily before breakfast and lunch plus sliding scale (Patient taking differently: Inject 7 Units into the skin 2 times daily before breakfast and lunch plus sliding scale and 10 units with supper plus sliding scale) 5 pen 3    ALPRAZolam (XANAX) 0.25 MG tablet Take 0.25 mg by mouth nightly as needed for Anxiety.  Multiple Vitamins-Minerals (THERAPEUTIC MULTIVITAMIN-MINERALS) tablet Take 1 tablet by mouth nightly      hydrALAZINE (APRESOLINE) 25 MG tablet Take 1 tablet by mouth every 8 hours 90 tablet 0    bumetanide (BUMEX) 2 MG tablet Take 1 tablet by mouth daily 30 tablet 0    sacubitril-valsartan (ENTRESTO) 49-51 MG per tablet Take 1 tablet by mouth 2 times daily 60 tablet 0    clopidogrel (PLAVIX) 75 MG tablet Take 75 mg by mouth daily      ranolazine (RANEXA) 500 MG extended release tablet Take 500 mg by mouth 2 times daily      isosorbide mononitrate (IMDUR) 120 MG extended release tablet Take 120 mg by mouth daily       NOVOFINE 30G X 8 MM MISC USE 1 NEW NEEDLE 6 TIMES A DAY AND AS NEEDED 600 each 3     No current facility-administered medications for this visit. Family History   Problem Relation Age of Onset    Cancer Mother     High Blood Pressure Mother     Stroke Father     High Blood Pressure Father     Heart Disease Brother     Cancer Brother         lung    Cancer Brother         melanoma        Review of Systems -   Review of Systems   Constitutional: Negative for activity change, appetite change, chills and fever. HENT: Negative for congestion and postnasal drip. Eyes: Negative. Respiratory: Negative for cough, chest tightness, shortness of breath, wheezing and stridor. Cardiovascular: Negative for chest pain and leg swelling. Gastrointestinal: Negative for diarrhea and nausea. Endocrine: Negative. Genitourinary: Negative. Musculoskeletal: Negative. Negative for arthralgias and back pain. Skin: Negative. Allergic/Immunologic: Negative. Neurological: Negative.   Negative for dizziness and light-headedness. Psychiatric/Behavioral: Negative. All other systems reviewed and are negative. Physical Exam:    BMI:  Body mass index is 31.48 kg/m². Wt Readings from Last 3 Encounters:   11/04/20 213 lb 3.2 oz (96.7 kg)   09/21/20 212 lb 6.4 oz (96.3 kg)   07/20/20 218 lb 12.8 oz (99.2 kg)     Weight stable / unchanged  Vitals: /60 (Site: Left Upper Arm, Position: Sitting, Cuff Size: Large Adult)   Pulse 74   Ht 5' 9\" (1.753 m)   Wt 213 lb 3.2 oz (96.7 kg)   SpO2 99% Comment: on room air at rest  BMI 31.48 kg/m²       Physical Exam  Constitutional:       Appearance: He is well-developed. HENT:      Head: Normocephalic and atraumatic. Right Ear: External ear normal.      Left Ear: External ear normal.   Eyes:      Conjunctiva/sclera: Conjunctivae normal.      Pupils: Pupils are equal, round, and reactive to light. Neck:      Musculoskeletal: Normal range of motion and neck supple. Cardiovascular:      Rate and Rhythm: Normal rate and regular rhythm. Heart sounds: Normal heart sounds. Pulmonary:      Effort: Pulmonary effort is normal.      Breath sounds: Normal breath sounds. Musculoskeletal: Normal range of motion. Skin:     General: Skin is warm and dry. Neurological:      Mental Status: He is alert and oriented to person, place, and time. Psychiatric:         Behavior: Behavior normal.         Thought Content: Thought content normal.         Judgment: Judgment normal.           ASSESSMENT/DIAGNOSIS     Diagnosis Orders   1. Complex sleep apnea syndrome     2. Obesity (BMI 30-39. 9)              Plan   Do you need any equipment today? No  - Will try adjusting pressure to correct AHI  - Download reviewed and discussed with patient  - He  was advised to continue current positive airway pressure therapy with above described pressure.    - He  advised to keep good compliance with current recommended pressure to get optimal results and clinical improvement  - Recommend 7-9 hours of sleep with PAP  - He was advised to call [employer] company regarding supplies if needed.   -He call my office for earlier appointment if needed for worsening of sleep symptoms.   - He was instructed on weight loss  - Marta Gupta was educated about my impression and plan. Patient verbalizesunderstanding.   We will see Rosalia Tavares back in: 4 months with download    Information added by my medical assistant/LPN was reviewed today          Ca Meade PA-C, MPAS  11/4/2020

## 2020-11-09 ENCOUNTER — TELEPHONE (OUTPATIENT)
Dept: INTERNAL MEDICINE CLINIC | Age: 75
End: 2020-11-09

## 2020-11-09 NOTE — TELEPHONE ENCOUNTER
Tatianna Adams from [facility] called asking for medical necessity [facility] and also office notes. I advised taisha that Dr. Tricia Leos only follows his cardiac status and we do not follow his diabetes. I gave him Dr Goldman's office number for him to contact their office for script. Someone erroneously sent script to Dr. Tricia Leos to sign for Morgan and he signed it. We do not have adequate documentation in Dr Latonya Peñaloza office notes as he does not follow diabetes. I called pts wife and explained what is going on to her and she verbalizes understanding.

## 2020-12-14 PROCEDURE — 99091 COLLJ & INTERPJ DATA EA 30 D: CPT | Performed by: PHYSICIAN ASSISTANT

## 2020-12-14 NOTE — PROGRESS NOTES
12/14/2020   PAP Download:   Compliant  100%     Noncompliant 0 %     PAP Type Bipap Level  13/10   Avg Hrs/Day 8hr 30min  AHI: 19    Machine/Mfg:   [x] ResMed    [] Respironics/RadioRx     Review of Wilfrid download reveals AHI continues to be elevated. He is having both central and obstructive events. Based on review of download, will try auto again. He will likely need an inlab titration at some point  Download and case was reviewed for 30min    The encounter diagnosis was Complex sleep apnea syndrome.           Jyoti Hall PA-C, MPAS  12/14/2020

## 2021-01-06 DIAGNOSIS — G47.31 COMPLEX SLEEP APNEA SYNDROME: Primary | ICD-10-CM

## 2021-01-06 PROCEDURE — 99091 COLLJ & INTERPJ DATA EA 30 D: CPT | Performed by: PHYSICIAN ASSISTANT

## 2021-01-06 NOTE — PROGRESS NOTES
1/6/2021   PAP Download:   Compliant  100%     Noncompliant 0 %     PAP Type bipap Level  18/8/5   Avg Hrs/Day 9hr  AHI: 25   Recorded compliance dates   Machine/Mfg:   [x] ResMed    [] Respironics/Dreamstation     Review of Wilfrid download reveals AHI still elevated with central and obstructive events. Based on review of download, will make another adjustment and if no improvement will need in lab titration  Download and case was reviewed for 30min    The encounter diagnosis was Complex sleep apnea syndrome.           Diana Dubin PA-C, MPAS  1/6/2021

## 2021-01-25 ENCOUNTER — NURSE ONLY (OUTPATIENT)
Dept: LAB | Age: 76
End: 2021-01-25

## 2021-01-25 DIAGNOSIS — N18.30 STAGE 3 CHRONIC KIDNEY DISEASE, UNSPECIFIED WHETHER STAGE 3A OR 3B CKD (HCC): ICD-10-CM

## 2021-01-25 LAB
ANION GAP SERPL CALCULATED.3IONS-SCNC: 9 MEQ/L (ref 8–16)
BILIRUBIN URINE: NEGATIVE
BLOOD, URINE: NEGATIVE
BUN BLDV-MCNC: 45 MG/DL (ref 7–22)
CALCIUM SERPL-MCNC: 9.2 MG/DL (ref 8.5–10.5)
CHARACTER, URINE: CLEAR
CHLORIDE BLD-SCNC: 106 MEQ/L (ref 98–111)
CO2: 25 MEQ/L (ref 23–33)
COLOR: YELLOW
CREAT SERPL-MCNC: 1.7 MG/DL (ref 0.4–1.2)
CREATININE URINE: 145 MG/DL
GFR SERPL CREATININE-BSD FRML MDRD: 39 ML/MIN/1.73M2
GLUCOSE BLD-MCNC: 99 MG/DL (ref 70–108)
GLUCOSE, URINE: NEGATIVE MG/DL
KETONES, URINE: NEGATIVE
LEUKOCYTE EST, POC: NEGATIVE
NITRITE, URINE: NEGATIVE
PH UA: 5.5 (ref 5–9)
POTASSIUM SERPL-SCNC: 4.9 MEQ/L (ref 3.5–5.2)
PROT/CREAT RATIO, UR: 0.06
PROTEIN UA: NEGATIVE MG/DL
PROTEIN, URINE: 9.2 MG/DL
SODIUM BLD-SCNC: 140 MEQ/L (ref 135–145)
SPECIFIC GRAVITY UA: 1.02 (ref 1–1.03)
UROBILINOGEN, URINE: 0.2 EU/DL (ref 0–1)

## 2021-01-28 ENCOUNTER — OFFICE VISIT (OUTPATIENT)
Dept: NEPHROLOGY | Age: 76
End: 2021-01-28
Payer: MEDICARE

## 2021-01-28 VITALS
OXYGEN SATURATION: 98 % | TEMPERATURE: 97.8 F | DIASTOLIC BLOOD PRESSURE: 50 MMHG | HEART RATE: 63 BPM | WEIGHT: 220 LBS | SYSTOLIC BLOOD PRESSURE: 122 MMHG | BODY MASS INDEX: 32.49 KG/M2

## 2021-01-28 DIAGNOSIS — I10 ESSENTIAL HYPERTENSION: ICD-10-CM

## 2021-01-28 DIAGNOSIS — I50.32 CHRONIC DIASTOLIC CONGESTIVE HEART FAILURE (HCC): ICD-10-CM

## 2021-01-28 DIAGNOSIS — N18.30 STAGE 3 CHRONIC KIDNEY DISEASE, UNSPECIFIED WHETHER STAGE 3A OR 3B CKD (HCC): Primary | ICD-10-CM

## 2021-01-28 PROCEDURE — 4040F PNEUMOC VAC/ADMIN/RCVD: CPT | Performed by: INTERNAL MEDICINE

## 2021-01-28 PROCEDURE — 1036F TOBACCO NON-USER: CPT | Performed by: INTERNAL MEDICINE

## 2021-01-28 PROCEDURE — G8417 CALC BMI ABV UP PARAM F/U: HCPCS | Performed by: INTERNAL MEDICINE

## 2021-01-28 PROCEDURE — G8427 DOCREV CUR MEDS BY ELIG CLIN: HCPCS | Performed by: INTERNAL MEDICINE

## 2021-01-28 PROCEDURE — 1123F ACP DISCUSS/DSCN MKR DOCD: CPT | Performed by: INTERNAL MEDICINE

## 2021-01-28 PROCEDURE — G8484 FLU IMMUNIZE NO ADMIN: HCPCS | Performed by: INTERNAL MEDICINE

## 2021-01-28 PROCEDURE — 3017F COLORECTAL CA SCREEN DOC REV: CPT | Performed by: INTERNAL MEDICINE

## 2021-01-28 PROCEDURE — 99213 OFFICE O/P EST LOW 20 MIN: CPT | Performed by: INTERNAL MEDICINE

## 2021-01-28 NOTE — PROGRESS NOTES
PLACEMENT  2008 (2), 2009 (1)    x2    CORONARY ARTERY BYPASS GRAFT  8/1997    5 vessel    DIAGNOSTIC CARDIAC CATH LAB PROCEDURE      FOOT SURGERY  12/20/2013    pre-cancerous mole rt foot removed    LITHOTRIPSY  4/2002    LA OFFICE/OUTPT VISIT,PROCEDURE ONLY N/A 11/8/2018    TRANSESOPHAGEAL ECHOCARDIOGRAM performed by Swapnil Jansen MD at [facility]    PTCA  05/30/2018   Mikemary  2001, 2003    rt shoulder manipulation--frozen shoulder-01, Lt -03    SKIN BIOPSY      SKIN CANCER EXCISION  12/20/13     R foot    SKIN CANCER EXCISION  1/2016    squamous cell Lt  upper cheek    URETER STENT PLACEMENT  4/2002    VASCULAR SURGERY      carotids & cabg harvests        Medications :     Outpatient Medications Marked as Taking for the 1/28/21 encounter (Office Visit) with Libertad Hooper MD   Medication Sig Dispense Refill    CPAP Machine MISC by Does not apply route Please change BIPAP pressure to 14/12 cm H20.   Download 2 weeks 1 each 0    apixaban (ELIQUIS) 5 MG TABS tablet TAKE 1 TABLET BY MOUTH  TWICE A  tablet 3    metFORMIN (GLUCOPHAGE) 1000 MG tablet Take 0.5 tablets by mouth Daily with supper (Patient taking differently: Take 1,000 mg by mouth Daily with supper ) 90 tablet 1    sodium chloride nebulizer 0.9 % NEBU 30 mL with albuterol (5 MG/ML) 0.5% NEBU Inhale 1 puff into the lungs as needed      atorvastatin (LIPITOR) 40 MG tablet Take 1 tablet by mouth nightly 90 tablet 3    nitroGLYCERIN (NITROLINGUAL) 0.4 MG/SPRAY 0.4 mg spray USE 1 SPRAY ON OR UNDER THE TONGUE EVERY 5 MINUTES AS NEEDED FOR CHEST PAIN 4.9 g 6    dilTIAZem (CARDIZEM CD) 120 MG extended release capsule TAKE 1 CAPSULE BY MOUTH  DAILY 90 capsule 3    Insulin Pen Needle (NOVOFINE) 32G X 6 MM MISC USE 1 NEW NEEDLE 6 TIMES  DAILY AND AS NEEDED 540 each 1    Accu-Chek FastClix Lancets MISC USE TO TEST BLOOD SUGARS 4 TIMES DAILY 360 each 15    blood glucose test strips (ACCU-CHEK COMPACT PLUS) strip DX E11.59, CHECK THE GLUCOSE 4 TIMES DAILY 408 strip 3    metoprolol succinate (TOPROL XL) 50 MG extended release tablet TAKE 1 TABLET BY MOUTH  DAILY 90 tablet 3    insulin detemir (LEVEMIR FLEXTOUCH) 100 UNIT/ML injection pen 45 units twice daily (Patient taking differently: 50  units am and pm.) 90 mL 5    insulin lispro (HUMALOG KWIKPEN) 100 UNIT/ML pen Inject 7 Units into the skin 2 times daily before breakfast and lunch plus sliding scale (Patient taking differently: Inject 7 Units into the skin 2 times daily before breakfast and lunch plus sliding scale and 10 units with supper plus sliding scale) 5 pen 3    ALPRAZolam (XANAX) 0.25 MG tablet Take 0.25 mg by mouth nightly as needed for Anxiety.       Multiple Vitamins-Minerals (THERAPEUTIC MULTIVITAMIN-MINERALS) tablet Take 1 tablet by mouth nightly      hydrALAZINE (APRESOLINE) 25 MG tablet Take 1 tablet by mouth every 8 hours 90 tablet 0    bumetanide (BUMEX) 2 MG tablet Take 1 tablet by mouth daily 30 tablet 0    sacubitril-valsartan (ENTRESTO) 49-51 MG per tablet Take 1 tablet by mouth 2 times daily 60 tablet 0    clopidogrel (PLAVIX) 75 MG tablet Take 75 mg by mouth daily      ranolazine (RANEXA) 500 MG extended release tablet Take 500 mg by mouth 2 times daily      isosorbide mononitrate (IMDUR) 120 MG extended release tablet Take 120 mg by mouth daily       NOVOFINE 30G X 8 MM MISC USE 1 NEW NEEDLE 6 TIMES A DAY AND AS NEEDED 600 each 3       Vitals     BP (!) 122/50 (Site: Left Upper Arm, Position: Sitting, Cuff Size: Medium Adult)   Pulse 63   Temp 97.8 °F (36.6 °C) (Temporal)   Wt 220 lb (99.8 kg)   SpO2 98%   BMI 32.49 kg/m²  Wt Readings from Last 3 Encounters:   01/28/21 220 lb (99.8 kg)   11/04/20 213 lb 3.2 oz (96.7 kg)   09/21/20 212 lb 6.4 oz (96.3 kg)        Physical Exam   General -- no distress  Lungs -- clear  Heart -- S1, S2 heard, JVD - no  Abdomen - soft, non-tender  Extremities -- no edema  CNS - awake and alert    Labs, Radiology and Tests    Labs -    9/18 3/19 4/20 1/21        Potassium 5.0 4.1 4.8 4.9        BUN 34 26 34 45        Creatinine 1.5 1.5 (1.7) 1.8 1.7        eGFR 46 46 37 39                    UPCR    60        [facility]                          Renal Ultrasound Scan -- 6/2018  Rt kidney 13.1 cm and left kidney 11.50  Left kidney cysts 5.7 cm. Assessment    1. Renal - Acute kidney injury on chronic kidney disease stage III most likely secondary to the use of diuretics. He is also on entresto .  -Overall renal function appears to be stable his GFR is around 39 mL/minute  -Continue Entresto and current dose of Bumex 2 mg p.o. daily can take additional doses if needed  2. Electrolytes - WNL  3. Coronary artery disease with CABG 20 years ago and 15 stents so far 9 of them have been placed in 2018   - again recent cardiac cath in 3/18 - 3 more stents  4. Diabetes mellitus, okay to continue metformin for now. If renal function declines to less than 30 we may need to consider discontinuation  5. Elevated PSA . BPH seen urology . 6. meds reviewed and D/W patient and wife  7. FU in 6 months    Tests and orders placed this Encounter     No orders of the defined types were placed in this encounter. Sosa Rowell M.D  [facility].

## 2021-02-19 DIAGNOSIS — G47.31 COMPLEX SLEEP APNEA SYNDROME: Primary | ICD-10-CM

## 2021-02-19 PROCEDURE — 99091 COLLJ & INTERPJ DATA EA 30 D: CPT | Performed by: PHYSICIAN ASSISTANT

## 2021-02-19 NOTE — PROGRESS NOTES
2/19/2021   PAP Download:   Compliant  100%     Noncompliant 0 %     PAP Type Bipap Level  14/12   Avg Hrs/Day 8hr 30 min  AHI: 20.3     Machine/Mfg:   [x] ResMed    [] Respironics/Dreamstation     Review of Wilfrid download reveals AHI still elevated with obstructive and central events. Based on review of download, will place back in auto and try adjustment. He will need an in lab titration and likely ASV. Will discuss at next [address] [facility],[address] and case was reviewed for 30min    The encounter diagnosis was Complex sleep apnea syndrome.           Karin Lindsay PA-C, ASHLEYS  2/19/2021

## 2021-02-22 DIAGNOSIS — Z79.4 TYPE 2 DIABETES MELLITUS WITH MICROALBUMINURIA, WITH LONG-TERM CURRENT USE OF INSULIN (HCC): ICD-10-CM

## 2021-02-22 DIAGNOSIS — R80.9 TYPE 2 DIABETES MELLITUS WITH MICROALBUMINURIA, WITH LONG-TERM CURRENT USE OF INSULIN (HCC): ICD-10-CM

## 2021-02-22 DIAGNOSIS — E11.29 TYPE 2 DIABETES MELLITUS WITH MICROALBUMINURIA, WITH LONG-TERM CURRENT USE OF INSULIN (HCC): ICD-10-CM

## 2021-02-22 RX ORDER — INSULIN DETEMIR 100 [IU]/ML
INJECTION, SOLUTION SUBCUTANEOUS
Qty: 90 ML | Refills: 5 | Status: ON HOLD | OUTPATIENT
Start: 2021-02-22 | End: 2021-07-21 | Stop reason: HOSPADM

## 2021-02-22 NOTE — TELEPHONE ENCOUNTER
Date of last visit:  12/20/2019  Date of next visit:  Visit date not found    Requested Prescriptions     Pending Prescriptions Disp Refills    insulin detemir (LEVEMIR FLEXTOUCH) 100 UNIT/ML injection pen [Pharmacy Med Name: Omar Waller 5X3 ML SOLUTION] 90 mL 5     Sig: INJECT SUBCUTANEOUSLY 8 [facility]

## 2021-03-01 ENCOUNTER — OFFICE VISIT (OUTPATIENT)
Dept: INTERNAL MEDICINE CLINIC | Age: 76
End: 2021-03-01
Payer: MEDICARE

## 2021-03-01 VITALS
BODY MASS INDEX: 32.85 KG/M2 | DIASTOLIC BLOOD PRESSURE: 56 MMHG | TEMPERATURE: 98.2 F | SYSTOLIC BLOOD PRESSURE: 130 MMHG | HEART RATE: 66 BPM | WEIGHT: 221.8 LBS | HEIGHT: 69 IN

## 2021-03-01 DIAGNOSIS — I10 ESSENTIAL HYPERTENSION: ICD-10-CM

## 2021-03-01 DIAGNOSIS — E11.29 TYPE 2 DIABETES MELLITUS WITH MICROALBUMINURIA, WITH LONG-TERM CURRENT USE OF INSULIN (HCC): ICD-10-CM

## 2021-03-01 DIAGNOSIS — I25.10 CORONARY ARTERY DISEASE INVOLVING NATIVE CORONARY ARTERY OF NATIVE HEART WITHOUT ANGINA PECTORIS: Primary | ICD-10-CM

## 2021-03-01 DIAGNOSIS — I25.10 CORONARY ARTERY DISEASE INVOLVING NATIVE CORONARY ARTERY, ANGINA PRESENCE UNSPECIFIED, UNSPECIFIED WHETHER NATIVE OR TRANSPLANTED HEART: ICD-10-CM

## 2021-03-01 DIAGNOSIS — Z79.4 TYPE 2 DIABETES MELLITUS WITH MICROALBUMINURIA, WITH LONG-TERM CURRENT USE OF INSULIN (HCC): ICD-10-CM

## 2021-03-01 DIAGNOSIS — R80.9 TYPE 2 DIABETES MELLITUS WITH MICROALBUMINURIA, WITH LONG-TERM CURRENT USE OF INSULIN (HCC): ICD-10-CM

## 2021-03-01 DIAGNOSIS — N18.30 STAGE 3 CHRONIC KIDNEY DISEASE, UNSPECIFIED WHETHER STAGE 3A OR 3B CKD (HCC): ICD-10-CM

## 2021-03-01 PROCEDURE — G8427 DOCREV CUR MEDS BY ELIG CLIN: HCPCS | Performed by: INTERNAL MEDICINE

## 2021-03-01 PROCEDURE — G8417 CALC BMI ABV UP PARAM F/U: HCPCS | Performed by: INTERNAL MEDICINE

## 2021-03-01 PROCEDURE — 99213 OFFICE O/P EST LOW 20 MIN: CPT | Performed by: INTERNAL MEDICINE

## 2021-03-01 PROCEDURE — 4040F PNEUMOC VAC/ADMIN/RCVD: CPT | Performed by: INTERNAL MEDICINE

## 2021-03-01 PROCEDURE — 1036F TOBACCO NON-USER: CPT | Performed by: INTERNAL MEDICINE

## 2021-03-01 PROCEDURE — 1123F ACP DISCUSS/DSCN MKR DOCD: CPT | Performed by: INTERNAL MEDICINE

## 2021-03-01 PROCEDURE — G8484 FLU IMMUNIZE NO ADMIN: HCPCS | Performed by: INTERNAL MEDICINE

## 2021-03-01 RX ORDER — METOPROLOL SUCCINATE 50 MG/1
TABLET, EXTENDED RELEASE ORAL
Qty: 90 TABLET | Refills: 3 | Status: SHIPPED | OUTPATIENT
Start: 2021-03-01 | End: 2022-02-04

## 2021-03-01 ASSESSMENT — PATIENT HEALTH QUESTIONNAIRE - PHQ9
SUM OF ALL RESPONSES TO PHQ QUESTIONS 1-9: 0
SUM OF ALL RESPONSES TO PHQ QUESTIONS 1-9: 0

## 2021-03-01 NOTE — PROGRESS NOTES
Karen Chavarria (:  1945) is a 68 y.o. male,Established patient, here for evaluation of the following chief complaint(s):  Fatigue (gets tired easily), Coronary Artery Disease, Chronic Kidney Disease, and Diabetes (follows with PCP and Evan Quezada in diabetes ctr.)      ASSESSMENT/PLAN:  1. Stage 3 chronic kidney disease, unspecified whether stage 3a or 3b CKD- sees Dr Mary Payan- renal fx stable. On metformin; will need to watch  2. Coronary artery disease involving native coronary artery of native heart  No new sxs. Continue same meds    3. Essential hypertension- controlled  4. Type 2 diabetes mellitus with microalbuminuria, with long-term current use of insulin ([address]- he gets some lows in middle of night; advised to have hs snack      No follow-ups on file. SUBJECTIVE/OBJECTIVE:  HPI pt with cad, cabg, stent, dm, ckd; here for routine visit. Reports no new sxs. Has chronic low back problems. 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 He has a CGM. It is off from his meter. Review of Systems  Review of Systems - Twelve point ROS completed and found to be negative except as noted above. Physical Exam    General appearance - alert, well appearing, and in no distress and overweight    Mental Status - alert, oriented to person, place, and time        Neck - supple, no significant adenopathy   No bruits        Chest - clear to auscultation, no wheezes, rales or rhonchi, symmetric air entry     Heart - normal rate, regular rhythm, normal S1, S2, no murmurs, rubs, clicks or gallops     Abdomen - protuberant     }     Neurological - alert, oriented, normal speech, no focal findings or movement disorder noted     Musculoskeletal - no muscular tenderness noted     Extremities - no pedal edema noted     Skin - normal coloration and turgor, no rashes, no suspicious skin lesions noted          An electronic signature was used to authenticate this note.     --Nicol Hurtado MD

## 2021-03-18 ENCOUNTER — TELEPHONE (OUTPATIENT)
Dept: INTERNAL MEDICINE CLINIC | Age: 76
End: 2021-03-18

## 2021-03-18 NOTE — TELEPHONE ENCOUNTER
Patient's wife called in and stated that Delfino Gomes was having problems with his Rice. She stated that his numbers that the Rice is giving him is way different then what his meter is showing. I let her know that there will always be a difference in the two. I also told her that they should call the company and they can help trouble shoot the issue. I also described to her where the Rice should be inserted. She verbalized understanding.

## 2021-03-22 ENCOUNTER — OFFICE VISIT (OUTPATIENT)
Dept: INTERNAL MEDICINE CLINIC | Age: 76
End: 2021-03-22
Payer: MEDICARE

## 2021-03-22 ENCOUNTER — OFFICE VISIT (OUTPATIENT)
Dept: PULMONOLOGY | Age: 76
End: 2021-03-22
Payer: MEDICARE

## 2021-03-22 VITALS
WEIGHT: 224.6 LBS | TEMPERATURE: 98 F | HEART RATE: 58 BPM | OXYGEN SATURATION: 99 % | SYSTOLIC BLOOD PRESSURE: 132 MMHG | DIASTOLIC BLOOD PRESSURE: 62 MMHG | HEIGHT: 69 IN | BODY MASS INDEX: 33.27 KG/M2

## 2021-03-22 DIAGNOSIS — E11.29 TYPE 2 DIABETES MELLITUS WITH MICROALBUMINURIA, WITH LONG-TERM CURRENT USE OF INSULIN (HCC): Primary | ICD-10-CM

## 2021-03-22 DIAGNOSIS — Z79.4 TYPE 2 DIABETES MELLITUS WITH MICROALBUMINURIA, WITH LONG-TERM CURRENT USE OF INSULIN (HCC): Primary | ICD-10-CM

## 2021-03-22 DIAGNOSIS — E66.9 OBESITY (BMI 30-39.9): ICD-10-CM

## 2021-03-22 DIAGNOSIS — G47.10 HYPERSOMNIA: ICD-10-CM

## 2021-03-22 DIAGNOSIS — G47.31 COMPLEX SLEEP APNEA SYNDROME: Primary | ICD-10-CM

## 2021-03-22 DIAGNOSIS — R80.9 TYPE 2 DIABETES MELLITUS WITH MICROALBUMINURIA, WITH LONG-TERM CURRENT USE OF INSULIN (HCC): Primary | ICD-10-CM

## 2021-03-22 LAB — HBA1C MFR BLD: 7.1 % (ref 4.3–5.7)

## 2021-03-22 PROCEDURE — 83036 HEMOGLOBIN GLYCOSYLATED A1C: CPT | Performed by: INTERNAL MEDICINE

## 2021-03-22 PROCEDURE — G8427 DOCREV CUR MEDS BY ELIG CLIN: HCPCS | Performed by: PHYSICIAN ASSISTANT

## 2021-03-22 PROCEDURE — 1123F ACP DISCUSS/DSCN MKR DOCD: CPT | Performed by: PHYSICIAN ASSISTANT

## 2021-03-22 PROCEDURE — G8417 CALC BMI ABV UP PARAM F/U: HCPCS | Performed by: PHYSICIAN ASSISTANT

## 2021-03-22 PROCEDURE — 1036F TOBACCO NON-USER: CPT | Performed by: PHYSICIAN ASSISTANT

## 2021-03-22 PROCEDURE — 99214 OFFICE O/P EST MOD 30 MIN: CPT | Performed by: PHYSICIAN ASSISTANT

## 2021-03-22 PROCEDURE — G0108 DIAB MANAGE TRN  PER INDIV: HCPCS | Performed by: INTERNAL MEDICINE

## 2021-03-22 PROCEDURE — 4040F PNEUMOC VAC/ADMIN/RCVD: CPT | Performed by: PHYSICIAN ASSISTANT

## 2021-03-22 PROCEDURE — G8484 FLU IMMUNIZE NO ADMIN: HCPCS | Performed by: PHYSICIAN ASSISTANT

## 2021-03-22 ASSESSMENT — ENCOUNTER SYMPTOMS
ALLERGIC/IMMUNOLOGIC NEGATIVE: 1
CHEST TIGHTNESS: 0
NAUSEA: 0
BACK PAIN: 0
EYES NEGATIVE: 1
WHEEZING: 0
SHORTNESS OF BREATH: 1
STRIDOR: 0
COUGH: 0
DIARRHEA: 0

## 2021-03-22 NOTE — PROGRESS NOTES
[facility]  [facility] [address], [address]  [phone] (phone)  [phone] (fax)    Patient ID: Michaela Mack 1945  Referring Provider: Dr. Daksha Mehta     Patient's name and  were verified. Subjective:    He presents for His follow-up diabetic visit. He has type 2 diabetes mellitus. Home regimen includes: insulin  biguanide He is noncompliant some of the time. Assessment:     Lab Results   Component Value Date    LABA1C 6.2 2020    LABA1C 6.2 2019    BUN 45 2021    CREATININE 1.7 2021     There were no vitals filed for this visit. Wt Readings from Last 3 Encounters:   21 224 lb 9.6 oz (101.9 kg)   21 221 lb 12.8 oz (100.6 kg)   21 220 lb (99.8 kg)     Ht Readings from Last 3 Encounters:   21 5' 9\" (1.753 m)   21 5' 9.02\" (1.753 m)   20 5' 9\" (1.753 m)       Glucose at 2 hrs PPD today resulted at 161mg/dl  Current monitoring regimen: home blood tests - 4 times daily  Home blood sugar trends: FBS's 112-172. Noon 144-185. Dinner 321-101. -250  Any episodes of hypoglycemia? yes - Raya Price reporting lows that were not really low  Previous visit with dietician: yes - 20  Current diet: B cereal/ berries. Egg/ toast                    L soup/ open faced ham salad sand; greek yogurt; cookie                     D 1/2 frozen pizza                    Snacks -bedtime peanuts; popcorn  Current exercise: ADL's -low activity levels  Eye exam current (within one year): yes Dr. Oscar Edwards 2020  Any history of foot problems? no  Last foot exam: 20  Immunizations up to date: yes -   Taking ASA:  No - If not why? On blood thinners  Appropriate for use of MyChart Glucose Grid:  No    Focus:     Diabetes education. A1C today 7.1. Glucose levels are running higher due to issues with the [place] [employer] he has started using.  He had trouble with his first 2 sensors--reading low blood sugars that he treated with glucose and were actual normal/ high readings. This had gone on for a few weeks and Kings Claros has been very frustrated. On his [address] sensor, he stopped having this issue and things are returning to his 'normal'. Weight is up 2#, but this also could be r/t feeding low BS's that weren't low. Encouraged Kings Claros to call Bita Tao to discuss these issues. His fingerstick meter can be his backup when glucose levels do not make sense. Reviewed general DM management issues as below to help keep his glucose levels in good control. Encouragement given. Follow up 6 months. DSME PLAN:   Discussed general issues about diabetes pathophysiology and management. Counseling at today's visit: factors that can influence Bettye; when to check with fingerstick; carbs; timing of meal dosing; exrcise; weight loss. Really try to exercise after supper every day               10-15 minutes sitting in the chair using small weights or stretch bands               Keep your back supported by your chair          -try to do this right after supper  Take your Humalog before you eat            --high carbs meals:  7 units for breakfast and lunch            --low carb meal and blood sugar is good before eatin/2 the units            --low carb meal and blood sugar is high before eating: full dose  When you get low blood sugars on your Bettye--check on your Accu Chek meter               Before you treat the low blood sugar  Make sure you always have something with you for low Blood sugar    Meter download, medications, PMH and nursing assessment reviewed. Maria A Clancy states He is willing to participate in this plan of care and verbalized understanding of all instructions provided. Teach back used to verify comprehension. Total time involved in direct patient education: 60 minutes.

## 2021-03-22 NOTE — PATIENT INSTRUCTIONS
Really try to exercise after supper every day               10-15 minutes sitting in the chair using small weights or stretch bands               Keep your back supported by your chair          -try to do this right after supper  Take your Humalog before you eat            --high carbs meals:  7 units for breakfast and lunch            --low carb meal and blood sugar is good before eatin/2 the units            --low carb meal and blood sugar is high before eating: full dose  When you get low blood sugars on your Bettye--check on your Accu Chek meter               Before you treat the low blood sugar  Make sure you always have something with you for low Blood sugar

## 2021-03-22 NOTE — PROGRESS NOTES
[facility]      Holli Talavera         [id number]  3/22/2021   Chief Complaint   Patient presents with    Sleep Apnea     RICCARDO follow up. Last seen 11/4/2020        Pt of Dr. Urmila SANDOVAL Download:   Original or initial AHI: 8.3     Date of initial study: 2/7/18      Compliant  96%     Noncompliant 4 %     PAP Type  Bipap  Level  15/10/2   Avg Hrs/Day 8 hours 47 minutes   AHI: 17.5   Recorded compliance dates , 2/19/21  to 3/17/21   Machine/Mfg:   [x] ResMed    [] Respironics/Dreamstation   Interface:   [] Nasal    [] Nasal pillows   [] FFM      Provider:      [x] [facility]     [][employer]     [] [employer]    [] Maria Dolores Comfort    [] [place]               [] [facility]      [] [employer]    [] [address]      [] Other    Neck Size:   17.50  Mallampati   4  ESS: 10  SAQLI: 70    Here is a scan of the most recent download:          Presentation:   Kisha Bourgeois presents for sleep medicine follow up for complex sleep apnea  Since the last visit, Kisha Bourgeois is doing om with PAP but AHI still elevated. He is sleeping ok at times and other times wakes up. He is tired during the day. Equipment issues: The pressure is  acceptable, the mask is acceptable     Sleep issues:  Do you feel better? Yes and No  More rested? Sometimes   Better concentration? yes    Progress History:   Since last visit any new medical issues? No  New ER or hospitlal visits? No  Any new or changes in medicines? No  Any new sleep medicines? No    Review of Systems -   Review of Systems   Constitutional: Negative for activity change, appetite change, chills and fever. HENT: Negative for congestion and postnasal drip. Eyes: Negative. Respiratory: Positive for shortness of breath. Negative for cough, chest tightness, wheezing and stridor. Cardiovascular: Negative for chest pain and leg swelling. Gastrointestinal: Negative for diarrhea and nausea. Endocrine: Negative. Genitourinary: Negative. Musculoskeletal: Negative. Negative for arthralgias and back pain. Skin: Negative. Allergic/Immunologic: Negative. Neurological: Negative. Negative for dizziness and light-headedness. Psychiatric/Behavioral: Negative. All other systems reviewed and are negative. Physical Exam:    BMI:  Body mass index is 33.17 kg/m². Wt Readings from Last 3 Encounters:   03/22/21 224 lb 9.6 oz (101.9 kg)   03/01/21 221 lb 12.8 oz (100.6 kg)   01/28/21 220 lb (99.8 kg)     Weight stable / unchanged  Vitals: /62 (Site: Left Upper Arm, Position: Sitting, Cuff Size: Large Adult)   Pulse 58   Temp 98 °F (36.7 °C) (Tympanic)   Ht 5' 9\" (1.753 m)   Wt 224 lb 9.6 oz (101.9 kg)   SpO2 99% Comment: room air  BMI 33.17 kg/m²       Physical Exam  Constitutional:       Appearance: He is well-developed. HENT:      Head: Normocephalic and atraumatic. Right Ear: External ear normal.      Left Ear: External ear normal.   Eyes:      Conjunctiva/sclera: Conjunctivae normal.      Pupils: Pupils are equal, round, and reactive to light. Neck:      Musculoskeletal: Normal range of motion and neck supple. Cardiovascular:      Rate and Rhythm: Normal rate and regular rhythm. Heart sounds: Normal heart sounds. Pulmonary:      Effort: Pulmonary effort is normal.      Breath sounds: Normal breath sounds. Musculoskeletal: Normal range of motion. Skin:     General: Skin is warm and dry. Neurological:      Mental Status: He is alert and oriented to person, place, and time. Psychiatric:         Behavior: Behavior normal.         Thought Content: Thought content normal.         Judgment: Judgment normal.           ASSESSMENT/DIAGNOSIS     Diagnosis Orders   1. Complex sleep apnea syndrome  ECHO Complete 2D W Doppler W Color    Sleep Study with PAP Titration   2. Obesity (BMI 30-39.9)     3. Hypersomnia              Plan   Do you need any equipment today?  No  - Needs bipap to ASV titration for complex sleep apnea  - will get ECHO first to ensure LV normal in order to pursue ASV  - Download reviewed and discussed with patient  - He  was advised to continue current positive airway pressure therapy with above described pressure. - He  advised to keep good compliance with current recommended pressure to get optimal results and clinical improvement  - Recommend 7-9 hours of sleep with PAP  - He was advised to call [employer] regarding supplies if needed.   -He call my office for earlier appointment if needed for worsening of sleep symptoms.   - He was instructed on weight loss  - Margi Colin was educated about my impression and plan. Patient verbalizesunderstanding.   We will see Carmelita Coma back in6 weeks after titration     Information added by my medical assistant/LPN was reviewed today         Michelle Gamboa PA-C, MPAS  3/22/2021

## 2021-03-23 VITALS
WEIGHT: 220.4 LBS | SYSTOLIC BLOOD PRESSURE: 135 MMHG | TEMPERATURE: 97.2 F | BODY MASS INDEX: 32.64 KG/M2 | HEART RATE: 60 BPM | HEIGHT: 69 IN | DIASTOLIC BLOOD PRESSURE: 63 MMHG

## 2021-03-23 RX ORDER — HYDROXYZINE HYDROCHLORIDE 10 MG/1
10 TABLET, FILM COATED ORAL DAILY PRN
COMMUNITY
Start: 2021-03-13 | End: 2022-08-08

## 2021-03-25 ENCOUNTER — HOSPITAL ENCOUNTER (OUTPATIENT)
Dept: NON INVASIVE DIAGNOSTICS | Age: 76
Discharge: HOME | End: 2021-03-25
Payer: MEDICARE

## 2021-03-25 DIAGNOSIS — G47.31 COMPLEX SLEEP APNEA SYNDROME: ICD-10-CM

## 2021-03-25 LAB
LV EF: 70 %
LVEF MODALITY: NORMAL

## 2021-03-25 PROCEDURE — 93306 TTE W/DOPPLER COMPLETE: CPT

## 2021-03-26 ENCOUNTER — TELEPHONE (OUTPATIENT)
Dept: PULMONOLOGY | Age: 76
End: 2021-03-26

## 2021-03-26 NOTE — TELEPHONE ENCOUNTER
----- Message from Joanne Hyde PA-C sent at 3/25/2021  1:42 PM EDT -----  [id number] [address] for ASV titration

## 2021-03-29 ENCOUNTER — OFFICE VISIT (OUTPATIENT)
Dept: FAMILY MEDICINE CLINIC | Age: 76
End: 2021-03-29

## 2021-03-29 VITALS
DIASTOLIC BLOOD PRESSURE: 64 MMHG | BODY MASS INDEX: 31.75 KG/M2 | SYSTOLIC BLOOD PRESSURE: 132 MMHG | WEIGHT: 214.38 LBS | TEMPERATURE: 97.7 F | HEART RATE: 68 BPM | RESPIRATION RATE: 16 BRPM | HEIGHT: 69 IN

## 2021-03-29 DIAGNOSIS — Z00.00 ROUTINE GENERAL MEDICAL EXAMINATION AT A HEALTH CARE FACILITY: ICD-10-CM

## 2021-03-29 DIAGNOSIS — R39.15 URINARY URGENCY: Primary | ICD-10-CM

## 2021-03-29 DIAGNOSIS — F41.9 ANXIETY: Primary | ICD-10-CM

## 2021-03-29 DIAGNOSIS — I70.1 ATHEROSCLEROSIS OF RENAL ARTERY (HCC): ICD-10-CM

## 2021-03-29 DIAGNOSIS — I20.8 CHRONIC STABLE ANGINA (HCC): ICD-10-CM

## 2021-03-29 PROBLEM — I65.23 BILATERAL CAROTID ARTERY STENOSIS: Status: ACTIVE | Noted: 2018-02-26

## 2021-03-29 PROBLEM — Z85.828 HX OF NONMELANOMA SKIN CANCER: Status: ACTIVE | Noted: 2018-02-26

## 2021-03-29 PROBLEM — E11.59 TYPE 2 DIABETES MELLITUS WITH CIRCULATORY DISORDER (HCC): Status: ACTIVE | Noted: 2017-08-03

## 2021-03-29 PROBLEM — I20.89 CHRONIC STABLE ANGINA (HCC): Status: ACTIVE | Noted: 2021-03-29

## 2021-03-29 PROBLEM — I25.118 CORONARY ARTERY DISEASE OF NATIVE ARTERY OF NATIVE HEART WITH STABLE ANGINA PECTORIS (HCC): Status: ACTIVE | Noted: 2018-02-26

## 2021-03-29 PROBLEM — I20.9 ANGINA, CLASS III (HCC): Status: ACTIVE | Noted: 2018-02-26

## 2021-03-29 PROBLEM — Z86.79 HX OF ATRIAL FIBRILLATION WITHOUT CURRENT MEDICATION: Status: ACTIVE | Noted: 2018-02-26

## 2021-03-29 LAB
BACTERIA URINE, POC: ABNORMAL
BILIRUBIN URINE: 0 MG/DL
BLOOD, URINE: NEGATIVE
CASTS URINE, POC: ABNORMAL
CLARITY: CLEAR
COLOR: YELLOW
CRYSTALS URINE, POC: ABNORMAL
EPI CELLS URINE, POC: ABNORMAL
GLUCOSE URINE: ABNORMAL
KETONES, URINE: POSITIVE
LEUKOCYTE EST, POC: ABNORMAL
NITRITE, URINE: NEGATIVE
PH UA: 5 (ref 4.5–8)
PROTEIN UA: POSITIVE
RBC URINE, POC: ABNORMAL
SPECIFIC GRAVITY UA: 1.01 (ref 1–1.03)
UROBILINOGEN, URINE: NORMAL
WBC URINE, POC: ABNORMAL
YEAST URINE, POC: ABNORMAL

## 2021-03-29 PROCEDURE — 1123F ACP DISCUSS/DSCN MKR DOCD: CPT | Performed by: FAMILY MEDICINE

## 2021-03-29 PROCEDURE — G8417 CALC BMI ABV UP PARAM F/U: HCPCS | Performed by: FAMILY MEDICINE

## 2021-03-29 PROCEDURE — 4040F PNEUMOC VAC/ADMIN/RCVD: CPT | Performed by: FAMILY MEDICINE

## 2021-03-29 PROCEDURE — 81000 URINALYSIS NONAUTO W/SCOPE: CPT | Performed by: FAMILY MEDICINE

## 2021-03-29 PROCEDURE — 99214 OFFICE O/P EST MOD 30 MIN: CPT | Performed by: FAMILY MEDICINE

## 2021-03-29 PROCEDURE — 1036F TOBACCO NON-USER: CPT | Performed by: FAMILY MEDICINE

## 2021-03-29 PROCEDURE — G0438 PPPS, INITIAL VISIT: HCPCS | Performed by: FAMILY MEDICINE

## 2021-03-29 PROCEDURE — G8427 DOCREV CUR MEDS BY ELIG CLIN: HCPCS | Performed by: FAMILY MEDICINE

## 2021-03-29 RX ORDER — ALPRAZOLAM 0.25 MG/1
0.25 TABLET ORAL 3 TIMES DAILY PRN
Qty: 7 TABLET | Refills: 0 | Status: SHIPPED | OUTPATIENT
Start: 2021-03-29 | End: 2021-04-01

## 2021-03-29 SDOH — ECONOMIC STABILITY: FOOD INSECURITY: WITHIN THE PAST 12 MONTHS, THE FOOD YOU BOUGHT JUST DIDN'T LAST AND YOU DIDN'T HAVE MONEY TO GET MORE.: NEVER TRUE

## 2021-03-29 SDOH — ECONOMIC STABILITY: TRANSPORTATION INSECURITY
IN THE PAST 12 MONTHS, HAS LACK OF TRANSPORTATION KEPT YOU FROM MEETINGS, WORK, OR FROM GETTING THINGS NEEDED FOR DAILY LIVING?: NO

## 2021-03-29 SDOH — ECONOMIC STABILITY: TRANSPORTATION INSECURITY
IN THE PAST 12 MONTHS, HAS THE LACK OF TRANSPORTATION KEPT YOU FROM MEDICAL APPOINTMENTS OR FROM GETTING MEDICATIONS?: NO

## 2021-03-29 ASSESSMENT — PATIENT HEALTH QUESTIONNAIRE - PHQ9
SUM OF ALL RESPONSES TO PHQ QUESTIONS 1-9: 0
SUM OF ALL RESPONSES TO PHQ QUESTIONS 1-9: 0
SUM OF ALL RESPONSES TO PHQ9 QUESTIONS 1 & 2: 0

## 2021-03-29 NOTE — TELEPHONE ENCOUNTER
Spouse called req refill for   Alprazolam .25 mg 1 tab PRN anxiety per spouse.     Spouse stated last RX refill of #7 has lasted pt several years and was ordered by Dr Della Jacobson.    Maria Teresa Rooney

## 2021-03-29 NOTE — PATIENT INSTRUCTIONS
of the tests and screenings are paid in full while other may be subject to a deductible, co-insurance, and/or copay. Some of these benefits include a comprehensive review of your medical history including lifestyle, illnesses that may run in your family, and various assessments and screenings as appropriate. After reviewing your medical record and screening and assessments performed today your provider may have ordered immunizations, labs, imaging, and/or referrals for you. A list of these orders (if applicable) as well as your Preventive Care list are included within your After Visit Summary for your review. Other Preventive Recommendations:    A preventive eye exam performed by an eye specialist is recommended every 1-2 years to screen for glaucoma; cataracts, macular degeneration, and other eye disorders. A preventive dental visit is recommended every 6 months. Try to get at least 150 minutes of exercise per week or 10,000 steps per day on a pedometer . Order or download the FREE \"Exercise & Physical Activity: Your Everyday Guide\" from [employer]. Call 6-554.626.1549 or search [employer] online. You need 0733-7150 mg of calcium and 4486-5239 IU of vitamin D per day. It is possible to meet your calcium requirement with diet alone, but a vitamin D supplement is usually necessary to meet this goal.  When exposed to the sun, use a sunscreen that protects against both UVA and UVB radiation with an SPF of 30 or greater. Reapply every 2 to 3 hours or after sweating, drying off with a towel, or swimming. Always wear a seat belt when traveling in a car. Always wear a helmet when riding a bicycle or motorcycle.

## 2021-03-29 NOTE — PROGRESS NOTES
Medicare Annual Wellness Visit  Name: Cesilia Hoover Date: 3/29/2021   MRN: [id number] Sex: Male   Age: 68 y.o. Ethnicity: Non-/Non    : 1945 Race: Gigi Draper is here for Medicare AWV    Screenings for behavioral, psychosocial and functional/safety risks, and cognitive dysfunction are all negative except as indicated below. These results, as well as other patient data from the [address] form, are documented in Flowsheets linked to this Encounter. No Known Allergies    Prior to Visit Medications    Medication Sig Taking?  Authorizing Provider   hydrOXYzine (ATARAX) 10 MG tablet Take 10 mg by mouth as needed  Historical Provider, MD   fluocinonide (LIDEX) 0.05 % cream Apply 1 applicator topically as needed  Historical Provider, MD   BiPAP Machine MISC by Does not apply route  Historical Provider, MD   metoprolol succinate (TOPROL XL) 50 MG extended release tablet TAKE 1 TABLET BY MOUTH  DAILY  Emily Haines MD   insulin detemir (LEVEMIR FLEXTOUCH) 100 UNIT/ML injection pen 50  units am and pm.  Patient taking differently: Inject 45 Units into the skin 2 times daily 50  units am and pm.  Ochoa Hobbs MD   apixaban (ELIQUIS) 5 MG TABS tablet TAKE 1 TABLET BY MOUTH  TWICE A DAY  Emily Haines MD   metFORMIN (GLUCOPHAGE) 1000 MG tablet Take 0.5 tablets by mouth Daily with supper  Patient taking differently: Take 1,000 mg by mouth Daily with supper   Ochoa Hobbs MD   sodium chloride nebulizer 0.9 % NEBU 30 mL with albuterol (5 MG/ML) 0.5% NEBU Inhale 1 puff into the lungs as needed  Historical Provider, MD   atorvastatin (LIPITOR) 40 MG tablet Take 1 tablet by mouth nightly  Emily Haines MD   nitroGLYCERIN (NITROLINGUAL) 0.4 MG/SPRAY 0.4 mg spray USE 1 SPRAY ON OR UNDER THE TONGUE EVERY 5 MINUTES AS NEEDED FOR CHEST PAIN  Emily Haines MD   dilTIAZem (CARDIZEM CD) 120 MG extended release capsule TAKE 1 CAPSULE BY MOUTH  DAILY  Emily Haines MD Insulin Pen Needle (NOVOFINE) 32G X 6 MM MISC USE 1 NEW NEEDLE 6 TIMES  DAILY AND AS NEEDED  Jennifer Estes MD   Accu-Chek FastClix Lancets MISC USE TO TEST BLOOD SUGARS 4 TIMES DAILY  Jennifer Estes MD   blood glucose test strips (ACCU-CHEK COMPACT PLUS) strip DX E11.59, CHECK THE GLUCOSE 4 TIMES DAILY  Jennifer Estes MD   insulin lispro (HUMALOG KWIKPEN) 100 UNIT/ML pen Inject 7 Units into the skin 2 times daily before breakfast and lunch plus sliding scale  Patient taking differently: Inject 7 Units into the skin 2 times daily before breakfast and lunch plus sliding scale and 10 units with supper plus sliding scale  Jennifer Estes MD   ALPRAZolam Anika Kumar) 0.25 MG tablet Take 0.25 mg by mouth nightly as needed for Anxiety.   Historical Provider, MD   Multiple Vitamins-Minerals (THERAPEUTIC MULTIVITAMIN-MINERALS) tablet Take 1 tablet by mouth nightly  Historical Provider, MD   hydrALAZINE (APRESOLINE) 25 MG tablet Take 1 tablet by mouth every 8 hours  Macey Rogers MD   bumetanide (BUMEX) 2 MG tablet Take 1 tablet by mouth daily  Macey Rogers MD   sacubitril-valsartan (ENTRESTO) 49-51 MG per tablet Take 1 tablet by mouth 2 times daily  Macey Rogers MD   clopidogrel (PLAVIX) 75 MG tablet Take 75 mg by mouth daily  Historical Provider, MD   ranolazine (RANEXA) 500 MG extended release tablet Take 500 mg by mouth 2 times daily  Historical Provider, MD   isosorbide mononitrate (IMDUR) 120 MG extended release tablet Take 120 mg by mouth daily   Historical Provider, MD   NOVOFINE 30G X 8 MM MISC USE 1 NEW NEEDLE 6 TIMES A DAY AND AS NEEDED  Jennifer Estes MD   Lancet Devices (ACCU-CHEK SOFTCLIX LANCET DEV) MISC Use one with each blood check  Jennifer Estes MD       Past Medical History:   Diagnosis Date    Adenomatous colon polyp 2009/2010, 6/'14,7/'17, 9/20    Angina at rest 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    BPH (benign prostatic hypertrophy)     CAD (coronary artery disease) 1997    Carotid artery disease ([address] 1995    right    Carotid disease, bilateral ([address]     Cerebral artery occlusion with cerebral infarction ([address]     CHF (congestive heart failure) (HCC)     Chronic kidney disease     GERD (gastroesophageal reflux disease)     Hyperlipidemia 1996    Hypertension     Lumbar disc disease 2001    MI (myocardial infarction) ([address] 1997    Nephrolithiasis 2002    NIDDM (non-insulin dependent diabetes mellitus) 1996    diagnosed 1996    RICCARDO (obstructive sleep apnea) 2005    cpap    Renal artery stenosis ([address] 2007    left    Renal insufficiency     S/P CABG (status post coronary artery bypass graft) 1997    5 vessels    Skin cancer     Squamous cell carcinoma     TIA (transient ischemic attack)     Wears partial dentures     upper and lower       Past Surgical History:   Procedure Laterality Date    ABLATION OF DYSRHYTHMIC FOCUS  3/13/13   [address] CARDIAC SURGERY  3/13/13    oblation for irreg rythm    CARDIOVERSION  3/8/2012    CAROTID ENDARTERECTOMY  1997    CHOLECYSTECTOMY  4/1999    COLONOSCOPY  6/22/09, 4/2011,7/12/17    polyp removal    CORONARY ANGIOPLASTY WITH STENT PLACEMENT  2008 (2), 2009 (1)    x2    CORONARY ARTERY BYPASS GRAFT  8/1997    5 vessel    DIAGNOSTIC CARDIAC CATH LAB PROCEDURE      FOOT SURGERY  12/20/2013    pre-cancerous mole rt foot removed    LITHOTRIPSY  4/2002    [place] OFFICE/OUTPT VISIT,PROCEDURE ONLY N/A 11/8/2018    TRANSESOPHAGEAL ECHOCARDIOGRAM performed by Casper Mathur MD at [address] [facility]    PTCA  05/30/2018   [facility] SURGERY  2001, 2003    rt shoulder manipulation--frozen shoulder-01, Lt -03    SKIN BIOPSY      SKIN CANCER EXCISION  12/20/13     R foot    SKIN CANCER EXCISION  1/2016    squamous cell Lt  upper cheek    URETER STENT PLACEMENT  4/2002    VASCULAR intact, conjunctivae normal  ENT: tympanic membrane, external ear and ear canal normal bilaterally, oropharynx clear and moist with normal mucous membranes  Neck: neck supple and non tender without mass, no thyromegaly or thyroid nodules, no cervical lymphadenopathy   Pulmonary/Chest: clear to auscultation bilaterally- no wheezes, rales or rhonchi, normal air movement, no respiratory distress  Cardiovascular: normal rate, normal S1 and S2, no murmurs, no gallops and no carotid bruits  Abdomen: soft, non-tender, non-distended, normal bowel sounds, no masses or organomegaly  Genitourinary: genitals normal without hernia or inguinal adenopathy  Extremities: no cyanosis and no clubbing  Musculoskeletal: normal range of motion, no joint swelling, deformity or tenderness  Neurologic: gait and coordination normal and speech normal    Patient's complete Health Risk Assessment and screening values have been reviewed and are found in Flowsheets. The following problems were reviewed today and where indicated follow up appointments were made and/or referrals ordered.     Positive Risk Factor Screenings with Interventions:           Health Habits/Nutrition:  Health Habits/Nutrition  Do you exercise for at least 20 minutes 2-3 times per week?: (!) No  Have you lost any weight without trying in the past 3 months?: No  Do you eat only one meal per day?: No  Have you seen the dentist within the past year?: Yes  Body mass index: (!) 31.65  Health Habits/Nutrition Interventions:  · the BMI is variable   · Exercise is hard due to back pain and SOB    Hearing/Vision:  No exam data present  Hearing/Vision  Do you or your family notice any trouble with your hearing that hasn't been managed with hearing aids?: (!) Yes  Do you have difficulty driving, watching TV, or doing any of your daily activities because of your eyesight?: No  Have you had an eye exam within the past year?: Yes  Hearing/Vision Interventions:  · he's working on getting for medicare awv.     Diagnoses and all orders for this visit:    Atherosclerosis of renal artery ([facility]    Chronic stable angina ([facility] [address] [facility]

## 2021-04-06 ENCOUNTER — APPOINTMENT (OUTPATIENT)
Dept: GENERAL RADIOLOGY | Age: 76
DRG: 251 | End: 2021-04-06
Payer: MEDICARE

## 2021-04-06 ENCOUNTER — HOSPITAL ENCOUNTER (INPATIENT)
Age: 76
LOS: 3 days | Discharge: HOME | DRG: 251 | End: 2021-04-09
Attending: EMERGENCY MEDICINE | Admitting: INTERNAL MEDICINE
Payer: MEDICARE

## 2021-04-06 ENCOUNTER — APPOINTMENT (OUTPATIENT)
Dept: ULTRASOUND IMAGING | Age: 76
DRG: 251 | End: 2021-04-06
Payer: MEDICARE

## 2021-04-06 DIAGNOSIS — E83.41 HYPERMAGNESEMIA: ICD-10-CM

## 2021-04-06 DIAGNOSIS — N17.9 AKI (ACUTE KIDNEY INJURY) (HCC): ICD-10-CM

## 2021-04-06 DIAGNOSIS — R77.8 ELEVATED TROPONIN: Primary | ICD-10-CM

## 2021-04-06 DIAGNOSIS — E87.5 HYPERKALEMIA: ICD-10-CM

## 2021-04-06 PROBLEM — N18.9 ACUTE KIDNEY INJURY SUPERIMPOSED ON CKD (HCC): Status: ACTIVE | Noted: 2021-04-06

## 2021-04-06 LAB
ALBUMIN SERPL-MCNC: 4.2 G/DL (ref 3.5–5.1)
ALP BLD-CCNC: 64 U/L (ref 38–126)
ALT SERPL-CCNC: 17 U/L (ref 11–66)
ANION GAP SERPL CALCULATED.3IONS-SCNC: 13 MEQ/L (ref 8–16)
APTT: 36 SECONDS (ref 22–38)
AST SERPL-CCNC: 18 U/L (ref 5–40)
AVERAGE GLUCOSE: 150 MG/DL (ref 70–126)
BACTERIA: NORMAL
BASOPHILS # BLD: 0.3 %
BASOPHILS ABSOLUTE: 0 THOU/MM3 (ref 0–0.1)
BILIRUB SERPL-MCNC: 0.3 MG/DL (ref 0.3–1.2)
BILIRUBIN DIRECT: < 0.2 MG/DL (ref 0–0.3)
BILIRUBIN URINE: NEGATIVE
BLOOD, URINE: NEGATIVE
BUN BLDV-MCNC: 67 MG/DL (ref 7–22)
CALCIUM SERPL-MCNC: 9.3 MG/DL (ref 8.5–10.5)
CASTS: NORMAL /LPF
CASTS: NORMAL /LPF
CHARACTER, URINE: CLEAR
CHLORIDE BLD-SCNC: 104 MEQ/L (ref 98–111)
CHLORIDE, URINE: 111 MEQ/L
CO2: 21 MEQ/L (ref 23–33)
COLOR: YELLOW
CREAT SERPL-MCNC: 2.4 MG/DL (ref 0.4–1.2)
CREATININE URINE: 32.7 MG/DL
CRYSTALS: NORMAL
EKG ATRIAL RATE: 73 BPM
EKG P AXIS: 87 DEGREES
EKG P-R INTERVAL: 204 MS
EKG Q-T INTERVAL: 430 MS
EKG QRS DURATION: 130 MS
EKG QTC CALCULATION (BAZETT): 473 MS
EKG R AXIS: 74 DEGREES
EKG T AXIS: 37 DEGREES
EKG VENTRICULAR RATE: 73 BPM
EOSINOPHIL # BLD: 0.7 %
EOSINOPHILS ABSOLUTE: 0.1 THOU/MM3 (ref 0–0.4)
EPITHELIAL CELLS, UA: NORMAL /HPF
ERYTHROCYTE [DISTWIDTH] IN BLOOD BY AUTOMATED COUNT: 13.8 % (ref 11.5–14.5)
ERYTHROCYTE [DISTWIDTH] IN BLOOD BY AUTOMATED COUNT: 13.9 % (ref 11.5–14.5)
ERYTHROCYTE [DISTWIDTH] IN BLOOD BY AUTOMATED COUNT: 50.8 FL (ref 35–45)
ERYTHROCYTE [DISTWIDTH] IN BLOOD BY AUTOMATED COUNT: 51.3 FL (ref 35–45)
GFR SERPL CREATININE-BSD FRML MDRD: 26 ML/MIN/1.73M2
GLUCOSE BLD-MCNC: 162 MG/DL (ref 70–108)
GLUCOSE BLD-MCNC: 200 MG/DL (ref 70–108)
GLUCOSE BLD-MCNC: 214 MG/DL (ref 70–108)
GLUCOSE, URINE: NEGATIVE MG/DL
HBA1C MFR BLD: 7 % (ref 4.4–6.4)
HCT VFR BLD CALC: 34.9 % (ref 42–52)
HCT VFR BLD CALC: 35.7 % (ref 42–52)
HEMOGLOBIN: 10.7 GM/DL (ref 14–18)
HEMOGLOBIN: 11.2 GM/DL (ref 14–18)
IMMATURE GRANS (ABS): 0.04 THOU/MM3 (ref 0–0.07)
IMMATURE GRANULOCYTES: 0.4 %
KETONES, URINE: NEGATIVE
LEUKOCYTE ESTERASE, URINE: NEGATIVE
LIPASE: 67.9 U/L (ref 5.6–51.3)
LYMPHOCYTES # BLD: 9.9 %
LYMPHOCYTES ABSOLUTE: 1.1 THOU/MM3 (ref 1–4.8)
MAGNESIUM: 2.5 MG/DL (ref 1.6–2.4)
MAGNESIUM: 2.6 MG/DL (ref 1.6–2.4)
MCH RBC QN AUTO: 30.7 PG (ref 26–33)
MCH RBC QN AUTO: 31.3 PG (ref 26–33)
MCHC RBC AUTO-ENTMCNC: 30.7 GM/DL (ref 32.2–35.5)
MCHC RBC AUTO-ENTMCNC: 31.4 GM/DL (ref 32.2–35.5)
MCV RBC AUTO: 100.3 FL (ref 80–94)
MCV RBC AUTO: 99.7 FL (ref 80–94)
MISCELLANEOUS LAB TEST RESULT: NORMAL
MONOCYTES # BLD: 8.5 %
MONOCYTES ABSOLUTE: 0.9 THOU/MM3 (ref 0.4–1.3)
NITRITE, URINE: NEGATIVE
NUCLEATED RED BLOOD CELLS: 0 /100 WBC
OSMOLALITY CALCULATION: 301.5 MOSMOL/KG (ref 275–300)
PH UA: 5 (ref 5–9)
PLATELET # BLD: 210 THOU/MM3 (ref 130–400)
PLATELET # BLD: 217 THOU/MM3 (ref 130–400)
PMV BLD AUTO: 10.1 FL (ref 9.4–12.4)
PMV BLD AUTO: 9.8 FL (ref 9.4–12.4)
POTASSIUM SERPL-SCNC: 5.2 MEQ/L (ref 3.5–5.2)
POTASSIUM SERPL-SCNC: 5.6 MEQ/L (ref 3.5–5.2)
POTASSIUM, URINE: 14.1 MEQ/L
PROTEIN UA: NEGATIVE MG/DL
RBC # BLD: 3.48 MILL/MM3 (ref 4.7–6.1)
RBC # BLD: 3.58 MILL/MM3 (ref 4.7–6.1)
RBC URINE: NORMAL /HPF
RENAL EPITHELIAL, UA: NORMAL
SEG NEUTROPHILS: 80.2 %
SEGMENTED NEUTROPHILS ABSOLUTE COUNT: 8.9 THOU/MM3 (ref 1.8–7.7)
SODIUM BLD-SCNC: 138 MEQ/L (ref 135–145)
SODIUM URINE: 113 MEQ/L
SPECIFIC GRAVITY UA: 1.01 (ref 1–1.03)
TOTAL PROTEIN: 7 G/DL (ref 6.1–8)
TROPONIN T: 0.03 NG/ML
UROBILINOGEN, URINE: 0.2 EU/DL (ref 0–1)
WBC # BLD: 11.1 THOU/MM3 (ref 4.8–10.8)
WBC # BLD: 11.7 THOU/MM3 (ref 4.8–10.8)
WBC UA: NORMAL /HPF
YEAST: NORMAL

## 2021-04-06 PROCEDURE — 83690 ASSAY OF LIPASE: CPT

## 2021-04-06 PROCEDURE — 81001 URINALYSIS AUTO W/SCOPE: CPT

## 2021-04-06 PROCEDURE — 82570 ASSAY OF URINE CREATININE: CPT

## 2021-04-06 PROCEDURE — 85027 COMPLETE CBC AUTOMATED: CPT

## 2021-04-06 PROCEDURE — 99222 1ST HOSP IP/OBS MODERATE 55: CPT | Performed by: INTERNAL MEDICINE

## 2021-04-06 PROCEDURE — 6370000000 HC RX 637 (ALT 250 FOR IP): Performed by: INTERNAL MEDICINE

## 2021-04-06 PROCEDURE — 84133 ASSAY OF URINE POTASSIUM: CPT

## 2021-04-06 PROCEDURE — 2140000000 HC CCU INTERMEDIATE R&B

## 2021-04-06 PROCEDURE — 84484 ASSAY OF TROPONIN QUANT: CPT

## 2021-04-06 PROCEDURE — 83036 HEMOGLOBIN GLYCOSYLATED A1C: CPT

## 2021-04-06 PROCEDURE — 85025 COMPLETE CBC W/AUTO DIFF WBC: CPT

## 2021-04-06 PROCEDURE — 99284 EMERGENCY DEPT VISIT MOD MDM: CPT

## 2021-04-06 PROCEDURE — 76770 US EXAM ABDO BACK WALL COMP: CPT

## 2021-04-06 PROCEDURE — 83735 ASSAY OF MAGNESIUM: CPT

## 2021-04-06 PROCEDURE — 93005 ELECTROCARDIOGRAM TRACING: CPT | Performed by: EMERGENCY MEDICINE

## 2021-04-06 PROCEDURE — 82436 ASSAY OF URINE CHLORIDE: CPT

## 2021-04-06 PROCEDURE — 84300 ASSAY OF URINE SODIUM: CPT

## 2021-04-06 PROCEDURE — 82948 REAGENT STRIP/BLOOD GLUCOSE: CPT

## 2021-04-06 PROCEDURE — 82248 BILIRUBIN DIRECT: CPT

## 2021-04-06 PROCEDURE — 71045 X-RAY EXAM CHEST 1 VIEW: CPT

## 2021-04-06 PROCEDURE — 6360000002 HC RX W HCPCS: Performed by: REGISTERED NURSE

## 2021-04-06 PROCEDURE — 36415 COLL VENOUS BLD VENIPUNCTURE: CPT

## 2021-04-06 PROCEDURE — 85730 THROMBOPLASTIN TIME PARTIAL: CPT

## 2021-04-06 PROCEDURE — 6370000000 HC RX 637 (ALT 250 FOR IP): Performed by: REGISTERED NURSE

## 2021-04-06 PROCEDURE — 80053 COMPREHEN METABOLIC PANEL: CPT

## 2021-04-06 PROCEDURE — 2580000003 HC RX 258: Performed by: REGISTERED NURSE

## 2021-04-06 PROCEDURE — 84132 ASSAY OF SERUM POTASSIUM: CPT

## 2021-04-06 RX ORDER — CLOPIDOGREL BISULFATE 75 MG/1
75 TABLET ORAL DAILY
Status: DISCONTINUED | OUTPATIENT
Start: 2021-04-07 | End: 2021-04-09 | Stop reason: HOSPADM

## 2021-04-06 RX ORDER — ACETAMINOPHEN 325 MG/1
650 TABLET ORAL EVERY 6 HOURS PRN
Status: DISCONTINUED | OUTPATIENT
Start: 2021-04-06 | End: 2021-04-09 | Stop reason: HOSPADM

## 2021-04-06 RX ORDER — ISOSORBIDE MONONITRATE 60 MG/1
120 TABLET, EXTENDED RELEASE ORAL DAILY
Status: DISCONTINUED | OUTPATIENT
Start: 2021-04-07 | End: 2021-04-09 | Stop reason: HOSPADM

## 2021-04-06 RX ORDER — ONDANSETRON 2 MG/ML
4 INJECTION INTRAMUSCULAR; INTRAVENOUS EVERY 6 HOURS PRN
Status: DISCONTINUED | OUTPATIENT
Start: 2021-04-06 | End: 2021-04-09 | Stop reason: HOSPADM

## 2021-04-06 RX ORDER — ATORVASTATIN CALCIUM 40 MG/1
40 TABLET, FILM COATED ORAL NIGHTLY
Status: DISCONTINUED | OUTPATIENT
Start: 2021-04-06 | End: 2021-04-09 | Stop reason: HOSPADM

## 2021-04-06 RX ORDER — PROMETHAZINE HYDROCHLORIDE 25 MG/1
12.5 TABLET ORAL EVERY 6 HOURS PRN
Status: DISCONTINUED | OUTPATIENT
Start: 2021-04-06 | End: 2021-04-09 | Stop reason: HOSPADM

## 2021-04-06 RX ORDER — METOPROLOL SUCCINATE 50 MG/1
50 TABLET, EXTENDED RELEASE ORAL DAILY
Status: DISCONTINUED | OUTPATIENT
Start: 2021-04-07 | End: 2021-04-09 | Stop reason: HOSPADM

## 2021-04-06 RX ORDER — HYDRALAZINE HYDROCHLORIDE 25 MG/1
25 TABLET, FILM COATED ORAL EVERY 8 HOURS SCHEDULED
Status: DISCONTINUED | OUTPATIENT
Start: 2021-04-06 | End: 2021-04-09 | Stop reason: HOSPADM

## 2021-04-06 RX ORDER — DILTIAZEM HYDROCHLORIDE 120 MG/1
120 CAPSULE, COATED, EXTENDED RELEASE ORAL DAILY
Status: DISCONTINUED | OUTPATIENT
Start: 2021-04-07 | End: 2021-04-09 | Stop reason: HOSPADM

## 2021-04-06 RX ORDER — SODIUM POLYSTYRENE SULFONATE 15 G/60ML
15 SUSPENSION ORAL; RECTAL ONCE
Status: COMPLETED | OUTPATIENT
Start: 2021-04-06 | End: 2021-04-06

## 2021-04-06 RX ORDER — NICOTINE POLACRILEX 4 MG
15 LOZENGE BUCCAL PRN
Status: DISCONTINUED | OUTPATIENT
Start: 2021-04-06 | End: 2021-04-09 | Stop reason: HOSPADM

## 2021-04-06 RX ORDER — DEXTROSE MONOHYDRATE 25 G/50ML
12.5 INJECTION, SOLUTION INTRAVENOUS PRN
Status: DISCONTINUED | OUTPATIENT
Start: 2021-04-06 | End: 2021-04-09 | Stop reason: HOSPADM

## 2021-04-06 RX ORDER — SODIUM CHLORIDE 0.9 % (FLUSH) 0.9 %
10 SYRINGE (ML) INJECTION PRN
Status: DISCONTINUED | OUTPATIENT
Start: 2021-04-06 | End: 2021-04-08 | Stop reason: SDUPTHER

## 2021-04-06 RX ORDER — INSULIN GLARGINE 100 [IU]/ML
45 INJECTION, SOLUTION SUBCUTANEOUS 2 TIMES DAILY
Status: DISCONTINUED | OUTPATIENT
Start: 2021-04-06 | End: 2021-04-06 | Stop reason: ALTCHOICE

## 2021-04-06 RX ORDER — HEPARIN SODIUM 10000 [USP'U]/100ML
5-30 INJECTION, SOLUTION INTRAVENOUS CONTINUOUS
Status: DISCONTINUED | OUTPATIENT
Start: 2021-04-06 | End: 2021-04-06 | Stop reason: DRUGHIGH

## 2021-04-06 RX ORDER — SODIUM CHLORIDE 9 MG/ML
25 INJECTION, SOLUTION INTRAVENOUS PRN
Status: DISCONTINUED | OUTPATIENT
Start: 2021-04-06 | End: 2021-04-09 | Stop reason: HOSPADM

## 2021-04-06 RX ORDER — DEXTROSE MONOHYDRATE 50 MG/ML
100 INJECTION, SOLUTION INTRAVENOUS PRN
Status: DISCONTINUED | OUTPATIENT
Start: 2021-04-06 | End: 2021-04-09 | Stop reason: HOSPADM

## 2021-04-06 RX ORDER — HEPARIN SODIUM 1000 [USP'U]/ML
40 INJECTION, SOLUTION INTRAVENOUS; SUBCUTANEOUS PRN
Status: DISCONTINUED | OUTPATIENT
Start: 2021-04-06 | End: 2021-04-09 | Stop reason: ALTCHOICE

## 2021-04-06 RX ORDER — SODIUM CHLORIDE 9 MG/ML
INJECTION, SOLUTION INTRAVENOUS CONTINUOUS
Status: DISCONTINUED | OUTPATIENT
Start: 2021-04-06 | End: 2021-04-08

## 2021-04-06 RX ORDER — HEPARIN SODIUM 10000 [USP'U]/100ML
5-30 INJECTION, SOLUTION INTRAVENOUS CONTINUOUS
Status: DISCONTINUED | OUTPATIENT
Start: 2021-04-06 | End: 2021-04-09

## 2021-04-06 RX ORDER — RANOLAZINE 500 MG/1
500 TABLET, EXTENDED RELEASE ORAL 2 TIMES DAILY
Status: DISCONTINUED | OUTPATIENT
Start: 2021-04-06 | End: 2021-04-09 | Stop reason: HOSPADM

## 2021-04-06 RX ORDER — SODIUM CHLORIDE 0.9 % (FLUSH) 0.9 %
10 SYRINGE (ML) INJECTION EVERY 12 HOURS SCHEDULED
Status: DISCONTINUED | OUTPATIENT
Start: 2021-04-06 | End: 2021-04-08 | Stop reason: SDUPTHER

## 2021-04-06 RX ORDER — ACETAMINOPHEN 650 MG/1
650 SUPPOSITORY RECTAL EVERY 6 HOURS PRN
Status: DISCONTINUED | OUTPATIENT
Start: 2021-04-06 | End: 2021-04-09 | Stop reason: HOSPADM

## 2021-04-06 RX ORDER — HEPARIN SODIUM 1000 [USP'U]/ML
80 INJECTION, SOLUTION INTRAVENOUS; SUBCUTANEOUS PRN
Status: DISCONTINUED | OUTPATIENT
Start: 2021-04-06 | End: 2021-04-09 | Stop reason: ALTCHOICE

## 2021-04-06 RX ADMIN — SODIUM CHLORIDE: 9 INJECTION, SOLUTION INTRAVENOUS at 19:38

## 2021-04-06 RX ADMIN — HEPARIN SODIUM 18 UNITS/KG/HR: 10000 INJECTION, SOLUTION INTRAVENOUS at 19:38

## 2021-04-06 RX ADMIN — SODIUM POLYSTYRENE SULFONATE 15 G: 15 SUSPENSION ORAL; RECTAL at 22:40

## 2021-04-06 RX ADMIN — ATORVASTATIN CALCIUM 40 MG: 40 TABLET, FILM COATED ORAL at 21:08

## 2021-04-06 RX ADMIN — RANOLAZINE 500 MG: 500 TABLET, FILM COATED, EXTENDED RELEASE ORAL at 21:08

## 2021-04-06 RX ADMIN — HYDRALAZINE HYDROCHLORIDE 25 MG: 25 TABLET, FILM COATED ORAL at 21:08

## 2021-04-06 ASSESSMENT — ENCOUNTER SYMPTOMS
BACK PAIN: 0
COLOR CHANGE: 0
COUGH: 0
CHOKING: 0
VOMITING: 0
ABDOMINAL DISTENTION: 0
CONSTIPATION: 0
ABDOMINAL PAIN: 0
WHEEZING: 0
SORE THROAT: 0
SHORTNESS OF BREATH: 1
EYES NEGATIVE: 1
CHEST TIGHTNESS: 0
EYE PAIN: 0
DIARRHEA: 0
NAUSEA: 0
STRIDOR: 0

## 2021-04-06 NOTE — CONSULTS
[facility]          [facility]        [address]        [place] [facility]        [phone]           Inpatient Initial consult note         4/6/2021 7:13 PM    Patient Name:   Gurpreet Sweeney  Birthdate:    [birth year]  Primary Care Physician:  Mavis Larsen MD     History Obtained From:  patient     Consultation requested by : Wiley Rashid MD    requested for  : Evaluation of worsening renal function     History of presentingillness   Gurpreet Sweeney is a 68 y.o.   male with Past Medical History as stated below presented with a chief complaint of Chest Pain   on 4/6/2021 . Patient presented with chief complaints of chest pain which started last night, moderate severity, unable to sleep, pain was on the left side of his chest.  Patient took almost 4 nitro with minimal help. Associated with some shortness of breath which have been chronic, no other modifying factors. Patient has an extensive history of cardiac disease and he almost had 15 stents placed so far so that the persuasion of the life patient has decided to come to the ER. Upon arrival to the ER patient was found to have slightly elevated troponins elevated creatinine, hyperkalemia and so nephrology has been consulted for further evaluation and management.      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    BPH (benign prostatic hypertrophy)     CAD (coronary artery disease) 1997    Carotid artery disease ([address] 1995    right    Carotid disease, bilateral ([address]     Cerebral artery occlusion with cerebral infarction ([address]     CHF (congestive heart failure) (HCC)     Chronic kidney disease     GERD (gastroesophageal reflux disease)     Hyperlipidemia 1996    Hypertension     Lumbar disc disease 2001    [facility] (myocardial infarction) [facility] 1997    Nephrolithiasis 2002    NIDDM (non-insulin dependent diabetes mellitus) 1996    diagnosed 1996    RICCARDO (obstructive sleep apnea) 2005    cpap    Renal artery stenosis ([facility] 2007    left    Renal insufficiency     S/P CABG (status post coronary artery bypass graft) 1997    5 vessels    Skin cancer     Squamous cell carcinoma     TIA (transient ischemic attack)     Wears partial dentures     upper and lower     Past Surgical History:   Procedure Laterality Date    ABLATION OF DYSRHYTHMIC FOCUS  3/13/13   [address] SURGERY  3/13/13    oblation for irreg rythm    CARDIOVERSION  3/8/2012    CAROTID ENDARTERECTOMY  1997    CHOLECYSTECTOMY  4/1999    COLONOSCOPY  6/22/09, 4/2011,7/12/17    polyp removal    CORONARY ANGIOPLASTY WITH STENT PLACEMENT  2008 (2), 2009 (1)    x2    CORONARY ARTERY BYPASS GRAFT  8/1997    5 vessel    DIAGNOSTIC CARDIAC CATH LAB PROCEDURE      FOOT SURGERY  12/20/2013    pre-cancerous mole rt foot removed    LITHOTRIPSY  4/2002    [place] OFFICE/OUTPT VISIT,PROCEDURE ONLY N/A 11/8/2018    TRANSESOPHAGEAL ECHOCARDIOGRAM performed by Satish Purdy MD at [address] [place] [address] PTCA  05/30/2018   Alise  2001, 2003    rt shoulder manipulation--frozen shoulder-01, Lt -03    SKIN BIOPSY      SKIN CANCER EXCISION  12/20/13     R foot    SKIN CANCER EXCISION  1/2016    squamous cell Lt  upper cheek    URETER STENT PLACEMENT  4/2002    VASCULAR SURGERY      carotids & cabg harvests     Social History     Socioeconomic History    Marital status:      Spouse name: Franc Mckenzie Number of children: 2    Years of education: Not on file    Highest education level: Not on file   Occupational History    Not on file   Social Needs    Financial resource strain: Not hard at all   Madelyn-Breonna insecurity     Worry: Never true     Inability: Never true   Monserrat Duggan Transportation needs     Medical: No     Non-medical: No   Tobacco Use    Smoking status: Never Smoker    Smokeless tobacco: Never Used   Substance and Sexual Activity    Alcohol use: No     Alcohol/week: 0.0 standard drinks    Drug use: No    Sexual activity: Yes     Partners: Female   Lifestyle    Physical activity     Days per week: Not on file     Minutes per session: Not on file    Stress: Not on file   Relationships    Social connections     Talks on phone: Not on file     Gets together: Not on file     Attends Religion service: Not on file     Active member of club or organization: Not on file     Attends meetings of clubs or organizations: Not on file     Relationship status: Not on file    Intimate partner violence     Fear of current or ex partner: Not on file     Emotionally abused: Not on file     Physically abused: Not on file     Forced sexual activity: Not on file   Other Topics Concern    Not on file   Social History Narrative    Not on file     Family History   Problem Relation Age of Onset    Cancer Mother     High Blood Pressure Mother     Stroke Father     High Blood Pressure Father     Heart Disease Brother     Cancer Brother         lung    Cancer Brother         melanoma     Medications & Allergies      Prior to Admission medications    Medication Sig Start Date End Date Taking?  Authorizing Provider   hydrOXYzine (ATARAX) 10 MG tablet Take 10 mg by mouth as needed 3/13/21  Yes Historical Provider, MD   BiPAP Machine MISC by Does not apply route   Yes Historical Provider, MD   metoprolol succinate (TOPROL XL) 50 MG extended release tablet TAKE 1 TABLET BY MOUTH  DAILY 3/1/21  Yes Bishnu Lee MD   insulin detemir (LEVEMIR FLEXTOUCH) 100 UNIT/ML injection pen 50  units am and pm.  Patient taking differently: Inject 45 Units into the skin 2 times daily 50  units am and pm. 2/22/21  Yes Jennifer Estes MD   apixaban (ELIQUIS) 5 MG TABS tablet TAKE 1 TABLET BY MOUTH  TWICE A DAY 1/14/21  Yes Brandi Christian MD   metFORMIN (GLUCOPHAGE) 1000 MG tablet Take 0.5 tablets by mouth Daily with supper  Patient taking differently: Take 1,000 mg by mouth Daily with supper  9/23/20  Yes Janine Bravo MD   atorvastatin (LIPITOR) 40 MG tablet Take 1 tablet by mouth nightly 7/20/20  Yes Brandi Christian MD   nitroGLYCERIN (NITROLINGUAL) 0.4 MG/SPRAY 0.4 mg spray USE 1 SPRAY ON OR UNDER THE TONGUE EVERY 5 MINUTES AS NEEDED FOR CHEST PAIN 7/20/20  Yes Brandi Christian MD   dilTIAZem (CARDIZEM CD) 120 MG extended release capsule TAKE 1 CAPSULE BY MOUTH  DAILY 7/20/20  Yes Brandi Christian MD   Insulin Pen Needle (NOVOFINE) 32G X 6 MM MISC USE 1 NEW NEEDLE 6 TIMES  DAILY AND AS NEEDED 7/17/20  Yes Jannie Bravo MD   Accu-Chek FastClix Lancets MISC USE TO TEST BLOOD SUGARS 4 TIMES DAILY 6/9/20  Yes Janine Bravo MD   blood glucose test strips (ACCU-CHEK COMPACT PLUS) strip DX E11.59, CHECK THE GLUCOSE 4 TIMES DAILY 6/9/20  Yes Janine Bravo MD   insulin lispro (HUMALOG KWIKPEN) 100 UNIT/ML pen Inject 7 Units into the skin 2 times daily before breakfast and lunch plus sliding scale  Patient taking differently: Inject 7 Units into the skin 2 times daily 7 units before breakfast and lunch; plus sliding scale and 10 units before supper plus sliding scale 9/23/19  Yes Janine Bravo MD   Multiple Vitamins-Minerals (THERAPEUTIC MULTIVITAMIN-MINERALS) tablet Take 1 tablet by mouth nightly   Yes Historical Provider, MD   hydrALAZINE (APRESOLINE) 25 MG tablet Take 1 tablet by mouth every 8 hours 11/9/18  Yes Yasmeen Mota MD   bumetanide (BUMEX) 2 MG tablet Take 1 tablet by mouth daily 11/9/18  Yes Yasmeen Mota MD   sacubitril-valsartan (ENTRESTO) 49-51 MG per tablet Take 1 tablet by mouth 2 times daily  Patient taking differently: Take 1 tablet by mouth daily  11/11/18  Yes Yasmeen Mota MD   clopidogrel (PLAVIX) 75 MG tablet Take 75 mg by mouth daily morning   Yes Historical Provider, MD   ranolazine (RANEXA) 500 MG extended release tablet Take 500 mg by mouth 2 times daily   Yes Historical Provider, MD   isosorbide mononitrate (IMDUR) 120 MG extended release tablet Take 120 mg by mouth daily  12/21/17  Yes Historical Provider, MD   NOVOFINE 30G X 8 MM MISC USE 1 NEW NEEDLE 6 TIMES A DAY AND AS NEEDED 8/1/17  Yes Ivelisse Montoya MD   fluocinonide (LIDEX) 0.05 % cream Apply 1 applicator topically as needed 12/29/20   Historical Provider, MD   sodium chloride nebulizer 0.9 % NEBU 30 mL with albuterol (5 MG/ML) 0.5% NEBU Inhale 1 puff into the lungs as needed    Historical Provider, MD   Lancet Devices ([address]) MISC Use one with each blood check 1/14/14 4/2/18  Ivelisse Montoya MD     Allergies: Patient has no known allergies. IP meds : Scheduled Meds:   atorvastatin  40 mg Oral Nightly    [START ON 4/7/2021] clopidogrel  75 mg Oral Daily    [START ON 4/7/2021] dilTIAZem  120 mg Oral Daily    hydrALAZINE  25 mg Oral 3 times per day    [START ON 4/7/2021] isosorbide mononitrate  120 mg Oral Daily    [START ON 4/7/2021] metoprolol succinate  50 mg Oral Daily    ranolazine  500 mg Oral BID    sodium chloride flush  10 mL Intravenous 2 times per day    sodium polystyrene  15 g Oral Once    insulin lispro  0-12 Units Subcutaneous TID WC    insulin lispro  0-6 Units Subcutaneous Nightly    insulin glargine  45 Units Subcutaneous BID    insulin lispro  10 Units Subcutaneous Daily    [START ON 4/7/2021] insulin lispro  7 Units Subcutaneous BID AC     Continuous Infusions:   sodium chloride      sodium chloride      dextrose      heparin (PORCINE) Infusion       Review of Systems Physical Exam   Review of Systems   Constitutional: Negative for chills, fatigue and fever. HENT: Negative. Negative for ear pain and sore throat. Eyes: Negative. Negative for pain. Respiratory: Positive for shortness of breath. Negative for cough. Cardiovascular: Positive for chest pain. Negative for leg swelling. Gastrointestinal: Negative for abdominal pain, diarrhea, nausea and vomiting. Genitourinary: Negative for dysuria, frequency and hematuria. Musculoskeletal: Negative for back pain and neck pain. Skin: Negative for rash and wound. Neurological: Negative for dizziness, light-headedness and headaches. Psychiatric/Behavioral: Negative for agitation and confusion. Physical Exam  Vitals signs reviewed. Constitutional:       General: He is not in acute distress. Appearance: Normal appearance. He is not diaphoretic. HENT:      Head: Normocephalic and atraumatic. Right Ear: External ear normal.      Left Ear: External ear normal.      Nose: Nose normal.      Mouth/Throat:      Mouth: Mucous membranes are dry. Eyes:      General: No scleral icterus. Right eye: No discharge. Left eye: No discharge. Conjunctiva/sclera: Conjunctivae normal.   Neck:      Musculoskeletal: Normal range of motion and neck supple. Thyroid: No thyromegaly. Vascular: No JVD. Cardiovascular:      Rate and Rhythm: Normal rate and regular rhythm. Heart sounds: Normal heart sounds. No murmur. Pulmonary:      Effort: Pulmonary effort is normal. No respiratory distress. Breath sounds: Normal breath sounds. No stridor. No wheezing or rales. Chest:      Chest wall: No tenderness. Abdominal:      General: Bowel sounds are normal. There is distension. Palpations: Abdomen is soft. Tenderness: There is no abdominal tenderness. Musculoskeletal:         General: No swelling or tenderness. Right lower leg: No edema. Left lower leg: No edema. Skin:     General: Skin is warm and dry. Findings: No erythema or rash. Neurological:      General: No focal deficit present. Mental Status: He is alert and oriented to person, place, and time.    Psychiatric:         Mood and Affect: Mood normal.

## 2021-04-06 NOTE — ED NOTES
Pt resting in bed with wife at bedside. Respirations even and unlabored. Updated on room assignment. Pt provided with water and snack.       Haydee Valdivia RN  04/06/21 1983

## 2021-04-06 NOTE — ED PROVIDER NOTES
[facility] ENCOUNTER          Pt Name: Ricardo Brown  MRN: [id number]  [place] 1945  Date of evaluation: 4/6/2021  Treating Resident Physician: Hieu Godwin DO  Supervising Physician: Dr. Cande Jane       Chief Complaint   Patient presents with    Chest Pain     History obtained from chart review and the patient. HISTORY OF PRESENT ILLNESS    HPI  Ricardo Brown is a 68 y.o. male who presents to the emergency department for evaluation of chest pain. Patient states he had chest pain last night at rest. Called it a sharp pain. Took 3 nitro and finally got some relief. Patient's wife is a former nurse and states she checked his BP at that time and was 341E in systolic. Patient states he also had SOB but denies any other symptoms. Patient has extensive cardiac hx with atrial ablation done in 2013 and multiple caths done by Dr. Sosa Zhang with most recent in 2019 with stenting in RCA. The patient has no other acute complaints at this time. REVIEW OF SYSTEMS   Review of Systems   Constitutional: Negative for appetite change, chills, diaphoresis and fever. Respiratory: Positive for shortness of breath. Negative for cough, choking, chest tightness, wheezing and stridor. Cardiovascular: Positive for chest pain (substernal). Negative for palpitations and leg swelling. Gastrointestinal: Negative for abdominal distention, abdominal pain, constipation, diarrhea, nausea and vomiting. Endocrine: Negative for cold intolerance, heat intolerance, polydipsia and polyphagia. Genitourinary: Negative for dysuria, enuresis, flank pain, frequency and urgency. Musculoskeletal: Negative for back pain, joint swelling, myalgias and neck pain. Skin: Negative for color change, pallor and rash. Neurological: Negative for dizziness, tremors, syncope, light-headedness, numbness and headaches. Hematological: Negative for adenopathy.  Does not bruise/bleed easily. Psychiatric/Behavioral: Negative for confusion, decreased concentration and suicidal ideas. The patient is not nervous/anxious.           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    BPH (benign prostatic hypertrophy)     CAD (coronary artery disease) 1997    Carotid artery disease ([address] 1995    right    Carotid disease, bilateral ([address]     Cerebral artery occlusion with cerebral infarction ([address]     CHF (congestive heart failure) ([address]     Chronic kidney disease     GERD (gastroesophageal reflux disease)     Hyperlipidemia 1996    Hypertension     Lumbar disc disease 2001    MI (myocardial infarction) ([address] 1997    Nephrolithiasis 2002    NIDDM (non-insulin dependent diabetes mellitus) 1996    diagnosed 1996    RICCARDO (obstructive sleep apnea) 2005    cpap    Renal artery stenosis ([address] 2007    left    Renal insufficiency     S/P CABG (status post coronary artery bypass graft) 1997    5 vessels    Skin cancer     Squamous cell carcinoma     TIA (transient ischemic attack)     Wears partial dentures     upper and lower     Past Surgical History:   Procedure Laterality Date    ABLATION OF DYSRHYTHMIC FOCUS  3/13/13   [address]    CARDIAC SURGERY  3/13/13    oblation for irreg rythm    CARDIOVERSION  3/8/2012    CAROTID ENDARTERECTOMY  1997    CHOLECYSTECTOMY  4/1999    COLONOSCOPY  6/22/09, 4/2011,7/12/17    polyp removal    CORONARY ANGIOPLASTY WITH STENT PLACEMENT  2008 (2), 2009 (1)    x2    CORONARY ARTERY BYPASS GRAFT  8/1997    5 vessel    DIAGNOSTIC CARDIAC CATH LAB PROCEDURE      FOOT SURGERY  12/20/2013    pre-cancerous mole rt foot removed    LITHOTRIPSY  4/2002    ID OFFICE/OUTPT VISIT,PROCEDURE ONLY N/A 11/8/2018    TRANSESOPHAGEAL ECHOCARDIOGRAM performed by Juni Enciso MD at [address] [place] [address] [facility]  05/30/2018   [facility] SURGERY  2001, 2003    rt shoulder manipulation--frozen shoulder-01, Lt -03    SKIN BIOPSY      SKIN CANCER EXCISION  12/20/13     R foot    SKIN CANCER EXCISION  1/2016    squamous cell Lt  upper cheek    URETER STENT PLACEMENT  4/2002    VASCULAR SURGERY      carotids & cabg harvests         MEDICATIONS   No current facility-administered medications for this encounter.      Current Outpatient Medications:     hydrOXYzine (ATARAX) 10 MG tablet, Take 10 mg by mouth as needed, Disp: , Rfl:     BiPAP Machine MISC, by Does not apply route, Disp: , Rfl:     metoprolol succinate (TOPROL XL) 50 MG extended release tablet, TAKE 1 TABLET BY MOUTH  DAILY, Disp: 90 tablet, Rfl: 3    insulin detemir (LEVEMIR FLEXTOUCH) 100 UNIT/ML injection pen, 50  units am and pm. (Patient taking differently: Inject 45 Units into the skin 2 times daily 50  units am and pm.), Disp: 90 mL, Rfl: 5    apixaban (ELIQUIS) 5 MG TABS tablet, TAKE 1 TABLET BY MOUTH  TWICE A DAY, Disp: 180 tablet, Rfl: 3    metFORMIN (GLUCOPHAGE) 1000 MG tablet, Take 0.5 tablets by mouth Daily with supper (Patient taking differently: Take 1,000 mg by mouth Daily with supper ), Disp: 90 tablet, Rfl: 1    atorvastatin (LIPITOR) 40 MG tablet, Take 1 tablet by mouth nightly, Disp: 90 tablet, Rfl: 3    nitroGLYCERIN (NITROLINGUAL) 0.4 MG/SPRAY 0.4 mg spray, USE 1 SPRAY ON OR UNDER THE TONGUE EVERY 5 MINUTES AS NEEDED FOR CHEST PAIN, Disp: 4.9 g, Rfl: 6    dilTIAZem (CARDIZEM CD) 120 MG extended release capsule, TAKE 1 CAPSULE BY MOUTH  DAILY, Disp: 90 capsule, Rfl: 3    Insulin Pen Needle (NOVOFINE) 32G X 6 MM MISC, USE 1 NEW NEEDLE 6 TIMES  DAILY AND AS NEEDED, Disp: 540 each, Rfl: 1    Accu-Chek FastClix Lancets MISC, USE TO TEST BLOOD SUGARS 4 TIMES DAILY, Disp: 360 each, Rfl: 15    blood glucose test strips (ACCU-CHEK COMPACT PLUS) strip, DX E11.59, CHECK THE GLUCOSE 4 TIMES DAILY, Disp: 408 strip, Rfl: 3    insulin lispro (HUMALOG KWIKPEN) 100 UNIT/ML pen, Inject 7 Units into the skin 2 times daily before breakfast and lunch plus sliding scale (Patient taking differently: Inject 7 Units into the skin 2 times daily 7 units before breakfast and lunch; plus sliding scale and 10 units before supper plus sliding scale), Disp: 5 pen, Rfl: 3    Multiple Vitamins-Minerals (THERAPEUTIC MULTIVITAMIN-MINERALS) tablet, Take 1 tablet by mouth nightly, Disp: , Rfl:     hydrALAZINE (APRESOLINE) 25 MG tablet, Take 1 tablet by mouth every 8 hours, Disp: 90 tablet, Rfl: 0    bumetanide (BUMEX) 2 MG tablet, Take 1 tablet by mouth daily, Disp: 30 tablet, Rfl: 0    sacubitril-valsartan (ENTRESTO) 49-51 MG per tablet, Take 1 tablet by mouth 2 times daily (Patient taking differently: Take 1 tablet by mouth daily ), Disp: 60 tablet, Rfl: 0    clopidogrel (PLAVIX) 75 MG tablet, Take 75 mg by mouth daily morning, Disp: , Rfl:     ranolazine (RANEXA) 500 MG extended release tablet, Take 500 mg by mouth 2 times daily, Disp: , Rfl:     isosorbide mononitrate (IMDUR) 120 MG extended release tablet, Take 120 mg by mouth daily , Disp: , Rfl:     NOVOFINE 30G X 8 MM MISC, USE 1 NEW NEEDLE 6 TIMES A DAY AND AS NEEDED, Disp: 600 each, Rfl: 3    fluocinonide (LIDEX) 0.05 % cream, Apply 1 applicator topically as needed, Disp: , Rfl:     sodium chloride nebulizer 0.9 % NEBU 30 mL with albuterol (5 MG/ML) 0.5% NEBU, Inhale 1 puff into the lungs as needed, Disp: , Rfl:       SOCIAL HISTORY     Social History     Social History Narrative    Not on file     Social History     Tobacco Use    Smoking status: Never Smoker    Smokeless tobacco: Never Used   Substance Use Topics    Alcohol use: No     Alcohol/week: 0.0 standard drinks    Drug use: No         ALLERGIES   No Known Allergies      FAMILY HISTORY     Family History   Problem Relation Age of Onset    Cancer Mother     High Blood Pressure Mother     Stroke Father     High Blood Pressure Father     Heart Disease Brother     Cancer Brother         lung    Cancer Brother         melanoma         PREVIOUS RECORDS   Previous records reviewed:       PHYSICAL EXAM     ED Triage Vitals   BP Temp Temp Source Pulse Resp SpO2 Height Weight   04/06/21 1311 04/06/21 1309 04/06/21 1309 04/06/21 1309 04/06/21 1309 04/06/21 1309 04/06/21 1309 04/06/21 1309   (!) 141/49 97.8 °F (36.6 °C) Oral 72 20 100 % 5' 9\" (1.753 m) 210 lb (95.3 kg)     Initial vital signs and nursing assessment reviewed and normal. Body mass index is 31.01 kg/m². Pulsoximetry is normal per my interpretation. Additional Vital Signs:  Vitals:    04/06/21 1424   BP: (!) 122/92   Pulse: 66   Resp: 14   Temp:    SpO2: 100%       Physical Exam  Constitutional:       General: He is not in acute distress. Appearance: Normal appearance. He is obese. He is not ill-appearing. HENT:      Head: Normocephalic and atraumatic. Neck:      Musculoskeletal: Normal range of motion and neck supple. No neck rigidity or muscular tenderness. Vascular: No carotid bruit. Cardiovascular:      Rate and Rhythm: Normal rate and regular rhythm. Pulses: Normal pulses. Heart sounds: Normal heart sounds. No murmur. No friction rub. Pulmonary:      Effort: Pulmonary effort is normal. No respiratory distress. Breath sounds: Normal breath sounds. No stridor. Abdominal:      General: Abdomen is flat. Bowel sounds are normal. There is no distension. Palpations: Abdomen is soft. There is no mass. Tenderness: There is no abdominal tenderness. There is no guarding. Musculoskeletal: Normal range of motion. General: No swelling, tenderness, deformity or signs of injury. Right lower leg: No edema. Left lower leg: No edema. Lymphadenopathy:      Cervical: No cervical adenopathy.    Skin:     General: Skin is warm and dry. Capillary Refill: Capillary refill takes less than 2 seconds. Coloration: Skin is not jaundiced. Findings: No bruising, erythema, lesion or rash. Neurological:      General: No focal deficit present. Mental Status: He is alert and oriented to person, place, and time. Motor: No weakness. Coordination: Coordination normal.      Gait: Gait normal.   Psychiatric:         Mood and Affect: Mood normal.         Behavior: Behavior normal.         Thought Content: Thought content normal.             MEDICAL DECISION MAKING   Initial Assessment:   1. Chest pain  2. Elevated Troponin  3. MADI. Plan:    Patient has elevated troponin. Could be due to acute ischemia or MADI. Patient states he does not have chest pain currently. Due to elevated K, Mg, and weaker Kidney function will admit patient. EKG changes shows sinus rhythm with frequent pvcs. Will have to trend the troponin.          ED RESULTS   Laboratory results:  Labs Reviewed   BASIC METABOLIC PANEL - Abnormal; Notable for the following components:       Result Value    Potassium 5.6 (*)     CO2 21 (*)     Glucose 214 (*)     BUN 67 (*)     CREATININE 2.4 (*)     All other components within normal limits   CBC WITH AUTO DIFFERENTIAL - Abnormal; Notable for the following components:    WBC 11.1 (*)     RBC 3.58 (*)     Hemoglobin 11.2 (*)     Hematocrit 35.7 (*)     MCV 99.7 (*)     MCHC 31.4 (*)     RDW-SD 51.3 (*)     Segs Absolute 8.9 (*)     All other components within normal limits   TROPONIN - Abnormal; Notable for the following components:    Troponin T 0.027 (*)     All other components within normal limits   MAGNESIUM - Abnormal; Notable for the following components:    Magnesium 2.6 (*)     All other components within normal limits   LIPASE - Abnormal; Notable for the following components:    Lipase 67.9 (*)     All other components within normal limits   GLOMERULAR FILTRATION RATE, ESTIMATED - Abnormal; Notable for the following components:    Est, Glom Filt Rate 26 (*)     All other components within normal limits   OSMOLALITY - Abnormal; Notable for the following components:    Osmolality Calc 301.5 (*)     All other components within normal limits   HEPATIC FUNCTION PANEL   ANION GAP       Radiologic studies results:  XR CHEST PORTABLE   Final Result      No acute intrathoracic process. **This report has been created using voice recognition software. It may contain minor errors which are inherent in voice recognition technology. **      Final report electronically signed by Dr. Maria Elena Chen on 4/6/2021 2:00 PM          ED Medications administered this visit: Medications - No data to display      ED COURSE     ED Course as of Apr 06 1510   Tue Apr 06, 2021   1433 Troponin T(!): 0.027 [JARVIS]   1433 Troponin T(!): 0.027 [JARVIS]   1433 Troponin T(!): 0.027 [JARVIS]   1442 Potassium(!): 5.6 [JARVIS]   1506 Patient in for chest pain. Took 3 nitro last night with some relief. Has elevated troponin and K. Also MADI . EKG shows frequent PVCS. Will admit    [JARVIS]      ED Course User Index  [JARVIS] Yoli Villa, DO         MEDICATION CHANGES     New Prescriptions    No medications on file         FINAL DISPOSITION     Final diagnoses:   Elevated troponin   MADI (acute kidney injury) ([facility]   Hyperkalemia   Hypermagnesemia     Condition: condition: fair  Dispo: Admit to med/surg floor      This transcription was electronically signed. Parts of this transcriptions may have been dictated by use of voice recognition software and electronically transcribed, and parts may have been transcribed with the assistance of an ED scribe. The transcription may contain errors not detected in proofreading. Please refer to my supervising physician's documentation if my documentation differs.     Electronically Signed: Lupis Trotter, 04/06/21, 3:10 PM        Lupis Trotter,   Resident  04/06/21 4 Medical [facility], DO  Resident  04/06/21 1510       Roberto Salinas

## 2021-04-06 NOTE — ED NOTES
ED to inpatient nurses report    Chief Complaint   Patient presents with    Chest Pain      Present to ED from home with wife  LOC: alert and orientated to name, place, date  Vital signs   Vitals:    04/06/21 1309 04/06/21 1311 04/06/21 1424 04/06/21 1551   BP:  (!) 141/49 (!) 122/92 (!) 143/49   Pulse: 72  66 59   Resp: 20  14 15   Temp: 97.8 °F (36.6 °C)      TempSrc: Oral      SpO2: 100%  100% 100%   Weight: 210 lb (95.3 kg)      Height: 5' 9\" (1.753 m)         Oxygen Baseline 0L    Current needs required 0L Bipap/Cpap Yes at bedtime  LDAs:   Peripheral IV 04/06/21 Right Hand (Active)   Site Assessment Clean;Dry; Intact 04/06/21 1552   Line Status Capped 04/06/21 1552   Dressing Status Clean;Dry; Intact 04/06/21 1552     Mobility: Independent  Pending ED orders: none  Present condition: continuous monitoring of blood pressure and heart rate; monitoring of electrolytes,  kidney function, and troponin.      Electronically signed by Karena Jo RN on 4/6/2021 at [address], RN  04/06/21 [id number]

## 2021-04-06 NOTE — ED NOTES
Pt resting bed with family at bedside. Respirations even and unlabored.       Abhijeet Nickerson, [place]  04/06/21 2923

## 2021-04-06 NOTE — H&P
Internal Medicine Specialties  H&P  4/6/2021  3:52 PM    Patient:  Pam Rothman  YOB: 1945    MRN: [id number]   Acct:  [de-identified]   12/012A  Primary Care Physician: Marco Mireles MD    Chief Complaint:  Chief Complaint   Patient presents with    Chest Pain       History of Present Illness: The patient is a 68 y.o. male with pmhx of CABG 1998, multiple stents placed most recent 2019, CAD, HLD, CKD, Atrial fibrillation s/p ablation in 2013, CHF, DM2 who presents with Chest pain. He reports that he started to have chest pain in the night and was unable to sleep until the middle of the night because of the pain, which was in the left side to center of his chest. He took nitro in the night that helped a little. When he woke up in the AM the pain was gone but because of his extensive hx of heart problems he decided to come to the ED. He follows with Dr Jose E Rodríguez for cardiology and Dr Dilcia Ruvalcaba for nephrology. He denies chest pain, SOB, abdominal pain, headache, vomiting, diarrhea at this time. In the emergency department troponin was slightly elevated at 0.027, Cr elevated to 2.4, mg 2.6, K 5.6. CXR negative for any acute processes. EKG shows BBB with new PVC's in bigeminy. Last echo was done on 3/25 and showed EF of 50-55%. Patient admitted for MADI and elevated troponins.        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    BPH (benign prostatic hypertrophy)     CAD (coronary artery disease) 1997    Carotid artery disease ([address] 1995    right    Carotid disease, bilateral ([address]     Cerebral artery occlusion with cerebral infarction ([address]     CHF (congestive heart failure) (HCC)     Chronic kidney disease     GERD (gastroesophageal reflux disease)     Hyperlipidemia 1996    Hypertension     Lumbar disc disease 2001    MI (myocardial infarction) ([facility] [address] 1997    Nephrolithiasis 2002    NIDDM (non-insulin dependent diabetes mellitus) 1996    diagnosed 1996    RICCARDO (obstructive sleep apnea) 2005    cpap    Renal artery stenosis ([facility] [address] 2007    left    Renal insufficiency     S/P CABG (status post coronary artery bypass graft) 1997    5 vessels    Skin cancer     Squamous cell carcinoma     TIA (transient ischemic attack)     Wears partial dentures     upper and lower       Past Surgical History:        Procedure Laterality Date    ABLATION OF DYSRHYTHMIC FOCUS  3/13/13   [address] SURGERY  3/13/13    oblation for irreg rythm    CARDIOVERSION  3/8/2012    CAROTID ENDARTERECTOMY  1997    CHOLECYSTECTOMY  4/1999    COLONOSCOPY  6/22/09, 4/2011,7/12/17    polyp removal    CORONARY ANGIOPLASTY WITH STENT PLACEMENT  2008 (2), 2009 (1)    x2    CORONARY ARTERY BYPASS GRAFT  8/1997    5 vessel    DIAGNOSTIC CARDIAC CATH LAB PROCEDURE      FOOT SURGERY  12/20/2013    pre-cancerous mole rt foot removed    LITHOTRIPSY  4/2002    [place] OFFICE/OUTPT VISIT,PROCEDURE ONLY N/A 11/8/2018    TRANSESOPHAGEAL ECHOCARDIOGRAM performed by Jovanni Barnes MD at [address] [place] [address] [facility]  05/30/2018   Alise  2001, 2003    rt shoulder manipulation--frozen shoulder-01, Lt -03    SKIN BIOPSY      SKIN CANCER EXCISION  12/20/13     R foot    SKIN CANCER EXCISION  1/2016    squamous cell Lt  upper cheek    URETER STENT PLACEMENT  4/2002    VASCULAR SURGERY      carotids & cabg harvests       Home Medications: Not in a hospital admission. Allergies:  Patient has no known allergies. Social History:    reports that he has never smoked. He has never used smokeless tobacco. He reports that he does not drink alcohol or use drugs.       Family History:       Problem Relation Age of Onset    Cancer Mother     High Blood Pressure Mother     Stroke Father     High Blood Pressure Father     Heart Disease Brother     Cancer Brother         lung    Cancer Brother         melanoma       Review of Systems:    General ROS: negative  Psychological ROS: negative  Hematological and Lymphatic ROS: negative  Endocrine ROS: negative  Vision:negative  ENT ROS: Denies  Respiratory ROS: Chest pain resolved as of this AM  Cardiovascular ROS: no chest pain or dyspnea on exertion  Gastrointestinal ROS: no abdominal pain, change in bowel habits, or black or bloody stools  Genito-Urinary ROS: no dysuria, trouble voiding, or hematuria  Musculoskeletal ROS: negative  Neurological ROS: no TIA or stroke symptoms  Dermatological ROS: negative  Weight:no change in weight  Appetite:ok      Physical Exam:    Vitals:  Patient Vitals for the past 24 hrs:   BP Temp Temp src Pulse Resp SpO2 Height Weight   04/06/21 1551 (!) 143/49 -- -- 59 15 100 % -- --   04/06/21 1424 (!) 122/92 -- -- 66 14 100 % -- --   04/06/21 1311 (!) 141/49 -- -- -- -- -- -- --   04/06/21 1309 -- 97.8 °F (36.6 °C) Oral 72 20 100 % 5' 9\" (1.753 m) 210 lb (95.3 kg)     Weight: Weight: 210 lb (95.3 kg)     24 hour intake/output:No intake or output data in the 24 hours ending 04/06/21 1552    General appearance - alert, well appearing, and in no distress  Eyes - pupils equal and reactive, extraocular eye movements intact  Ears - bilateral TM's and external ear canals normal  Nose - normal and patent, no erythema, discharge or polyps  Mouth - mucous membranes moist, pharynx normal without lesions  Neck - supple, no significant adenopathy  Lymphatics - no palpable lymphadenopathy, no hepatosplenomegaly  Chest - Slight expiratory wheezes bilateral  Distant Breath Sounds: No  Heart - normal rate, frequent PVC's, normal S1, S2, no murmurs, rubs, clicks or gallops  Abdomen - soft, nontender, nondistended, no masses or organomegaly  Obese: No; Protuberant:   Neurological - alert, oriented, normal speech, no focal findings or movement disorder noted  Musculoskeletal - no joint tenderness, deformity or swelling  Extremities - peripheral pulses normal, no pedal edema, no clubbing or cyanosis  Skin - normal coloration and turgor, no rashes, no suspicious skin lesions noted    Review of Labs and Diagnostic Testing:    CBC:   Recent Labs     04/06/21  1330   WBC 11.1*   HGB 11.2*   HCT 35.7*   MCV 99.7*        BMP:   Recent Labs     04/06/21  1330      K 5.6*      CO2 21*   BUN 67*   CREATININE 2.4*   CALCIUM 9.3   GLUCOSE 214*     PT/INR: No results for input(s): PROTIME, INR in the last 72 hours. APTT: No results for input(s): APTT in the last 72 hours. Lipids:   Recent Labs     04/06/21  1330   ALKPHOS 64   ALT 17   AST 18   BILITOT 0.3   BILIDIR <0.2   LABALBU 4.2   LIPASE 67.9*     Troponin:   Recent Labs     04/06/21  1330   TROPONINT 0.027*        Imaging:  Echo Complete 2d W Doppler W Color    Result Date: 3/25/2021  Transthoracic Echocardiography Report (TTE)  Demographics   Patient Name   Santos Llanos  Gender              Male                 D   MR #           [id number]     Race                                                Ethnicity   Account #      [de-identified]     Room Number   Accession      [id number]    Date of Study       03/25/2021  Number   Date of Birth  1945    Referring Physician Umberto Maldonado MD   Age            68 year(s)    Sarah Erazo,                                                   RVT                                Interpreting        Sarita Ibarra MD                               Physician  Procedure Type of Study   TTE procedure:ECHOCARDIOGRAM COMPLETE 2D W DOPPLER W COLOR.   Procedure Date Date: 03/25/2021 Start: 08:44 AM Study Location: Echo Lab Technical Quality: Adequate visualization Indications:Complex sleep apnea. Additional Medical History:chronic kidney disease, hyperlipidemia, CABG, coronary artery dsiease, diabetes, obesity, asthma, carotid stenosis, MI, GERD, CVA, congestive heart failure, skin cancer Patient Status: Routine Height: 69 inches Weight: 220 pounds BSA: 2.15 m^2 BMI: 32.49 kg/m^2 BP: 135/63 mmHg Allergies   - No Known Allergies.   - No Known Allergies.   - No Known Allergies.   - No Known Allergies. - See Epic. Conclusions   Summary  Normal left ventricle size and systolic function. Ejection fraction was  estimated at -50  55. There were no regional left ventricular wall motion abnormalities and  wall thickness was within normal limits. Signature   ----------------------------------------------------------------  Electronically signed by Jayme Rangel MD (Interpreting  physician) on 03/25/2021 at 12:33 PM  ----------------------------------------------------------------   Findings   Mitral Valve  The mitral valve structure was normal with normal leaflet separation. DOPPLER: The transmitral velocity was within the normal range with no  evidence for mitral stenosis. There was MILD mitral regurgitation. Aortic Valve  The aortic valve was trileaflet with normal thickness and cuspal  separation. DOPPLER: Transaortic velocity was within the normal range with  no evidence of aortic stenosis. There was no evidence of aortic  regurgitation. Tricuspid Valve  The tricuspid valve structure was normal with normal leaflet separation. DOPPLER: There was no evidence of tricuspid stenosis. There was MILD f  tricuspid regurgitation. Pulmonic Valve  The pulmonic valve leaflets exhibited normal thickness, no calcification,  and normal cuspal separation. DOPPLER: The transpulmonic velocity was  within the normal range with no evidence for regurgitation. Left Atrium  Left atrial size was normal. No evidence of thrombus in the left atrial  appendage.    Left Ventricle  Normal left ventricle size and systolic function. Ejection fraction was  estimated at -50  55. There were no regional left ventricular wall motion abnormalities and  wall thickness was within normal limits. Right Atrium  Right atrial size was normal.   Right Ventricle  The right ventricular size was normal with normal systolic function and  wall thickness. Pericardial Effusion  The pericardium was normal in appearance with no evidence of a pericardial  effusion. Pleural Effusion  No evidence of pleural effusion. Aorta / Great Vessels  -Aortic root dimension within normal limits. -IVC size is within normal limits with normal respiratory phasic changes.   M-Mode/2D Measurements & Calculations   LV Diastolic    LV Systolic Dimension:    AV Cusp Separation: 1.3 cmLA  Dimension: 5.3  3.2 cm                    Dimension: 5.2 cmAO Root  cm              LV Volume Diastolic: 687  Dimension: 3.8 cmLA Area: 20.9  LV FS:39.6 %    ml                        cm^2  LV PW           LV Volume Systolic: 41 ml  Diastolic: 1.4  LV EDV/LV EDV Index: 135  cm              ml/63 m^2LV ESV/LV ESV  Septum          Index: 41 ml/19 m^2       RV Diastolic Dimension: 3.5 cm  Diastolic: 1.1  EF Calculated: 69.6 %  cm                                        LA/Aorta: 1.37                                             LA volume/Index: 64.8 ml /30m^2                  LVOT: 2 cm  Doppler Measurements & Calculations   MV Peak E-Wave: 144 cm/s   AV Peak Velocity: 151  LVOT Peak Velocity: 114  MV Peak A-Wave: 45.4 cm/s  cm/s                   cm/s  MV E/A Ratio: 3.17         AV Peak Gradient: 9.12 LVOT Peak Gradient: 5  MV Peak Gradient: 8.29     mmHg                   mmHg  mmHg                                                    TV Peak E-Wave: 36.1  MV Deceleration Time: 176                         cm/s  msec                                              TV Peak A-Wave: 47.6  MV P1/2t: 52 msec                                 cm/s  MVA by PHT:4.23 cm^2 a. Pt not fluid overloaded on exam   b. Noted last EF 50-55%  c. No SOB   d. Cont home meds with parameters; hold Entresto + Bumex due to MADI  6. Atrial fibrillation   a. Cont home meds   b. Hold Eliquis + start heparin drip d/t MADI and possible cath  7. DM2  a. Hold Metformin due to MADI   b. Re-ordered other home meds   c. Insulin SS with meals and HS  8. Leukocytosis   a. Pt has no sx  b. Check CBC in am    9. DVT prophylaxis   a. Heparin drip      Assessment and plan of care discussed with supervising physician. Electronically signed by KAVON Hardin CNP on 4/6/2021 at 3:52 PM         Copy: Primary Care Physician: Baldemar Castanon MD    I have discussed the case, including pertinent history and exam findings with the NP. I have seen and examined the patient and the key elements of the encounter have been performed by me. I agree with the assessment, plan and orders as documented by the NP.     Electronically signed by Martina Fairchild MD on 4/6/2021 at 6:09 PM

## 2021-04-06 NOTE — PROGRESS NOTES
Pt admitted to  31 17 09 in a wheelchair and from ED. Complaints: Chest pain / discomfort. IIV site free of s/s of infection or infiltration. Vital signs obtained. Assessment and data collection initiated. Two nurse skin assessment performed by Shereen Dejesus RN and Loraine Boone. Oriented to room. Policies and procedures for 3B explained. Bed alarm on. Call light in reach. Explained patients right to have family, representative or physician notified of their admission. Patient has Declined for physician to be notified. Patient has Declined for family/representative to be notified. All questions answered with no further questions at this time.        Which family member is the designated  Jessica Gomez (Spouse) number [phone]

## 2021-04-06 NOTE — ED NOTES
Patient to ED for intermittent chest pain. Patient states chest pain started last night. Patient took his nitro at home which helped with pain. Patient reports being up till 2am before falling asleep. Patient denies pain this AM however was concerned due to extensive heart hx. Patient alert and oriented. Patient denies nausea vomiting diarrhea.  [place] [id number] [address] [place], RN  04/06/21 0402

## 2021-04-06 NOTE — FLOWSHEET NOTE
04/06/21 1747   Provider Notification   Reason for Communication Evaluate   Provider Name Dr. Billie Baumgarten   Provider Notification Physician   Method of Communication Secure Message   Notification Time [phone]     New consult re: MADI.

## 2021-04-06 NOTE — FLOWSHEET NOTE
04/06/21 1758   Provider Notification   Reason for Communication Evaluate   Provider Name Dr. Abimbola Garcia   Provider Notification Physician   Method of Communication Face to face   Notification Time [phone] consult re: Elevated troponin, chest pain last night, new PVC's. This RN spoke with Dr. Abimbola Garcia face to face and he will see tomorrow.

## 2021-04-07 ENCOUNTER — APPOINTMENT (OUTPATIENT)
Dept: NON INVASIVE DIAGNOSTICS | Age: 76
DRG: 251 | End: 2021-04-07
Payer: MEDICARE

## 2021-04-07 LAB
ANION GAP SERPL CALCULATED.3IONS-SCNC: 12 MEQ/L (ref 8–16)
APTT: 123.2 SECONDS (ref 22–38)
APTT: 126.6 SECONDS (ref 22–38)
APTT: 80.2 SECONDS (ref 22–38)
BASOPHILS # BLD: 0.3 %
BASOPHILS ABSOLUTE: 0 THOU/MM3 (ref 0–0.1)
BUN BLDV-MCNC: 64 MG/DL (ref 7–22)
CALCIUM SERPL-MCNC: 8.5 MG/DL (ref 8.5–10.5)
CHLORIDE BLD-SCNC: 108 MEQ/L (ref 98–111)
CO2: 19 MEQ/L (ref 23–33)
CREAT SERPL-MCNC: 2.6 MG/DL (ref 0.4–1.2)
EOSINOPHIL # BLD: 0.9 %
EOSINOPHILS ABSOLUTE: 0.1 THOU/MM3 (ref 0–0.4)
ERYTHROCYTE [DISTWIDTH] IN BLOOD BY AUTOMATED COUNT: 13.9 % (ref 11.5–14.5)
ERYTHROCYTE [DISTWIDTH] IN BLOOD BY AUTOMATED COUNT: 50.4 FL (ref 35–45)
GFR SERPL CREATININE-BSD FRML MDRD: 24 ML/MIN/1.73M2
GLUCOSE BLD-MCNC: 120 MG/DL (ref 70–108)
GLUCOSE BLD-MCNC: 145 MG/DL (ref 70–108)
GLUCOSE BLD-MCNC: 147 MG/DL (ref 70–108)
GLUCOSE BLD-MCNC: 149 MG/DL (ref 70–108)
GLUCOSE BLD-MCNC: 152 MG/DL (ref 70–108)
GLUCOSE BLD-MCNC: 159 MG/DL (ref 70–108)
GLUCOSE BLD-MCNC: 64 MG/DL (ref 70–108)
HCT VFR BLD CALC: 31.3 % (ref 42–52)
HEMOGLOBIN: 9.7 GM/DL (ref 14–18)
IMMATURE GRANS (ABS): 0.06 THOU/MM3 (ref 0–0.07)
IMMATURE GRANULOCYTES: 0.6 %
LYMPHOCYTES # BLD: 14.4 %
LYMPHOCYTES ABSOLUTE: 1.5 THOU/MM3 (ref 1–4.8)
MAGNESIUM: 2.4 MG/DL (ref 1.6–2.4)
MCH RBC QN AUTO: 30.9 PG (ref 26–33)
MCHC RBC AUTO-ENTMCNC: 31 GM/DL (ref 32.2–35.5)
MCV RBC AUTO: 99.7 FL (ref 80–94)
MONOCYTES # BLD: 8.8 %
MONOCYTES ABSOLUTE: 0.9 THOU/MM3 (ref 0.4–1.3)
NUCLEATED RED BLOOD CELLS: 0 /100 WBC
PLATELET # BLD: 187 THOU/MM3 (ref 130–400)
PMV BLD AUTO: 10 FL (ref 9.4–12.4)
POTASSIUM SERPL-SCNC: 4.8 MEQ/L (ref 3.5–5.2)
RBC # BLD: 3.14 MILL/MM3 (ref 4.7–6.1)
REASON FOR REJECTION: NORMAL
REJECTED TEST: NORMAL
SEG NEUTROPHILS: 75 %
SEGMENTED NEUTROPHILS ABSOLUTE COUNT: 8 THOU/MM3 (ref 1.8–7.7)
SODIUM BLD-SCNC: 139 MEQ/L (ref 135–145)
WBC # BLD: 10.7 THOU/MM3 (ref 4.8–10.8)

## 2021-04-07 PROCEDURE — 83735 ASSAY OF MAGNESIUM: CPT

## 2021-04-07 PROCEDURE — 99232 SBSQ HOSP IP/OBS MODERATE 35: CPT | Performed by: INTERNAL MEDICINE

## 2021-04-07 PROCEDURE — 93017 CV STRESS TEST TRACING ONLY: CPT | Performed by: INTERNAL MEDICINE

## 2021-04-07 PROCEDURE — 2580000003 HC RX 258: Performed by: REGISTERED NURSE

## 2021-04-07 PROCEDURE — 6370000000 HC RX 637 (ALT 250 FOR IP): Performed by: INTERNAL MEDICINE

## 2021-04-07 PROCEDURE — 6360000002 HC RX W HCPCS: Performed by: REGISTERED NURSE

## 2021-04-07 PROCEDURE — 36415 COLL VENOUS BLD VENIPUNCTURE: CPT

## 2021-04-07 PROCEDURE — A9500 TC99M SESTAMIBI: HCPCS | Performed by: INTERNAL MEDICINE

## 2021-04-07 PROCEDURE — 78452 HT MUSCLE IMAGE SPECT MULT: CPT

## 2021-04-07 PROCEDURE — 99223 1ST HOSP IP/OBS HIGH 75: CPT | Performed by: INTERNAL MEDICINE

## 2021-04-07 PROCEDURE — 82948 REAGENT STRIP/BLOOD GLUCOSE: CPT

## 2021-04-07 PROCEDURE — 85730 THROMBOPLASTIN TIME PARTIAL: CPT

## 2021-04-07 PROCEDURE — 3430000000 HC RX DIAGNOSTIC RADIOPHARMACEUTICAL: Performed by: INTERNAL MEDICINE

## 2021-04-07 PROCEDURE — 94760 N-INVAS EAR/PLS OXIMETRY 1: CPT

## 2021-04-07 PROCEDURE — 80048 BASIC METABOLIC PNL TOTAL CA: CPT

## 2021-04-07 PROCEDURE — 85025 COMPLETE CBC W/AUTO DIFF WBC: CPT

## 2021-04-07 PROCEDURE — 6360000002 HC RX W HCPCS

## 2021-04-07 PROCEDURE — 2140000000 HC CCU INTERMEDIATE R&B

## 2021-04-07 RX ORDER — ALPRAZOLAM 0.25 MG/1
0.25 TABLET ORAL 3 TIMES DAILY PRN
COMMUNITY
End: 2021-12-02 | Stop reason: ALTCHOICE

## 2021-04-07 RX ORDER — ACETAMINOPHEN,DIPHENHYDRAMINE HCL 500; 25 MG/1; MG/1
1 TABLET, FILM COATED ORAL
COMMUNITY

## 2021-04-07 RX ADMIN — HYDRALAZINE HYDROCHLORIDE 25 MG: 25 TABLET, FILM COATED ORAL at 06:41

## 2021-04-07 RX ADMIN — CLOPIDOGREL BISULFATE 75 MG: 75 TABLET ORAL at 09:56

## 2021-04-07 RX ADMIN — Medication 31 MILLICURIE: at 14:40

## 2021-04-07 RX ADMIN — RANOLAZINE 500 MG: 500 TABLET, FILM COATED, EXTENDED RELEASE ORAL at 20:22

## 2021-04-07 RX ADMIN — SODIUM CHLORIDE: 9 INJECTION, SOLUTION INTRAVENOUS at 16:05

## 2021-04-07 RX ADMIN — HEPARIN SODIUM 12 UNITS/KG/HR: 10000 INJECTION, SOLUTION INTRAVENOUS at 12:44

## 2021-04-07 RX ADMIN — DILTIAZEM HYDROCHLORIDE 120 MG: 120 CAPSULE, EXTENDED RELEASE ORAL at 09:56

## 2021-04-07 RX ADMIN — ATORVASTATIN CALCIUM 40 MG: 40 TABLET, FILM COATED ORAL at 20:22

## 2021-04-07 RX ADMIN — Medication 9.2 MILLICURIE: at 13:20

## 2021-04-07 RX ADMIN — HYDRALAZINE HYDROCHLORIDE 25 MG: 25 TABLET, FILM COATED ORAL at 23:01

## 2021-04-07 RX ADMIN — HYDRALAZINE HYDROCHLORIDE 25 MG: 25 TABLET, FILM COATED ORAL at 16:13

## 2021-04-07 RX ADMIN — RANOLAZINE 500 MG: 500 TABLET, FILM COATED, EXTENDED RELEASE ORAL at 09:59

## 2021-04-07 ASSESSMENT — PAIN SCALES - GENERAL: PAINLEVEL_OUTOF10: 0

## 2021-04-07 NOTE — PROGRESS NOTES
Patient is laying in bed with eyes closed. Patient's wife is at bedside. Patient denies pain. Patient denies the need to use the bathroom. Bedside table is within reach. Pathway is clear. Patient is comfortable. Bed alarm is on. Call light is within reach. Mary Do RSC/SN.

## 2021-04-07 NOTE — CONSULTS
[address] [place] [address]                                  CONSULTATION    PATIENT NAME: Jordy Maria                     :        1945  MED REC NO:   [id number]                           ROOM:       0031  ACCOUNT NO:   [de-identified]                           ADMIT DATE: 2021  PROVIDER:     Wil Cao. NICOLA Rosas Found:  2021    PRIMARY CARDIOLOGIST:  Kaley Vang MD    REASON FOR CONSULTATION:  Presentation with irregular heart beat and  chest pain. HISTORY OF PRESENT ILLNESS:  This is a 70-year-old  gentleman  with past medical history of coronary artery disease with coronary  artery bypass in , multiple stent and most recent in 2019;  hypertension; hyperlipidemia; chronic kidney disease; congestive heart  failure; atrial fibrillation, status post ablation in ; CHF; and  diabetes, presented with chest pain to the emergency room on 2021. The patient actually had chest pain a day before the presentation that  is on 2021. The chest pain was retrosternal pressure, heaviness,  like 6-7/10, and lasted for 15 minutes. He took like 2 to 3 doses of  sublingual nitroglycerin, with that the chest pain resolved, but it was  not radiating, not associated with shortness of breath, diaphoresis,  nausea, or vomiting. He gets these type of chest pain once in a while  and usually, he gets chest pain when he does stuff or when he is exposed  to the cold temperature. So, he came in and found to be actually in acute kidney injury on  chronic kidney disease, and diuretics was held and admitted with a  diagnostic impression of chest pain, acute kidney injury on chronic  kidney disease. Cardiology evaluation was sought for further evaluation  and management. There is a minimal troponin in the patient as well. Currently, chest pain free and stable on the EKG.   He has been noted to  have frequent ventricular ectopic beat, which was in a bigeminy pattern  and today it got better. REVIEW OF SYSTEMS:  Negative except the above-mentioned ones. PAST MEDICAL HISTORY:  As I stated, coronary artery disease, status post  previous bypass and subsequent stent, last cath was in 2019, had RCA  stent; hypertension; hyperlipidemia; congestive heart failure; chronic  kidney disease; atrial fibrillation, status post ablation; obstructive  sleep apnea; diabetes; and history of TIA. PAST SURGICAL HISTORY:  Includes lithotripsy, coronary artery bypass and  coronary artery angioplasty, carotid endarterectomy, shoulder surgery,  cardiac catheterization, and PCI, last was in 2019. FAMILY HISTORY:  Positive for coronary artery disease, stroke, and  hypertension. SOCIAL HISTORY:  He has never smoked. No alcohol. No drug use. ALLERGIES:  No known drug allergies. HOME MEDICATIONS:  Include the following, prior to this admission, the  patient was taking Eliquis 5, Lipitor, Bumex 2 mg daily, Plavix,  Cardizem , hydralazine 25 every 8 hours, hydroxizine, Humalog,  Imdur 120, Glucophage, Toprol XL, multivitamin, Ranexa, and Entresto. PHYSICAL EXAMINATION:  GENERAL:  Looks sick, in mild respiratory distress. VITAL SIGNS:  Blood pressure 112/47, pulse rate 60, respiratory rate 18,  temperature 98. 3. HEENT:  Pink conjunctivae. Anicteric sclerae. Pupils are equal and  reactive bilateral to light. NECK:  No lymphadenopathy. No goiter. No JVD. CHEST:  Clear to auscultation, resonant to percussion. CARDIOVASCULAR:  Arteries felt; carotid, femoral, and pedal.  No bruits. S1 and S2 well heard. No murmur. No galloped rhythm. ABDOMEN:  Soft and nontender, but obese. GENITOURINARY:  No CVA, flank, or suprapubic tenderness. EXTREMITIES:  Locomotor:  No cyanosis, clubbing, or muscular or joint  tenderness. SKIN:  No rashes, ulcers, nodules.   CNS:  Alert, awake, oriented to time, person, and place.  Cranial nerves  intact. Sensation intact to touch and pain. Motor:  Normal muscle  strength and tone. Appropriate mood and affect. WORKUP:  EKG:  Sinus rhythm with frequent ventricular ectopic beats in a  bigeminy pattern and right bundle-branch block. No acute EKG  abnormality today. He has occasional ventricular ectopic beats with  bigeminy pattern, resolved, no new occasional ventricular ectopic beats. Sodium 139, potassium 4.8, BUN 64, creatinine 2.6. GFR of 24, yesterday  GFR of 26, so his baseline GFR was 39. His creatinine was 1.9, 1.7  normally, so there is significant worsening of his renal function. Troponin on this gentleman 0.027, 0.03, 0.027, 0.027 that is stable,  persistent elevation. Hematology:  White blood cell 10, hemoglobin 9.7,  and platelets 291. Echocardiogram:  He had an echocardiogram in  03/2021, ejection fraction 50%. He had a cardiac catheterization  apparently in 2018 and 2019. He had a stent placement in RCA in 2019. No recent stress test.    ASSESSMENT:  1. Acute kidney injury on chronic kidney disease. 2.  History of intermittent chest pain. 3.  Elevation of troponin, probably related to the acute kidney injury  on chronic kidney disease and probably partly chronic and partly a  little worsened from the acute kidney injury, otherwise the troponin  elevation is stable, it is not going up or down, it is like almost  stable and plateaued. So, at this level, it does not seem to suggest  any acute coronary syndrome from the troponin trend. 4.  Hyperkalemia. 5.  Chronic congestive heart failure, diastolic, stable. 6.  Paroxysmal atrial fibrillation. 7.  Coronary artery disease, status post previous bypass and previous  stent placement. 8.  History of hyperlipidemia. 9.  History of diabetes. PLAN AND RECOMMENDATIONS:  At this level, we will continue with optimal  medical therapy.   The troponin elevation seems to be related to the  acute kidney injury, partly chronic and no acute coronary syndrome at  this level, as the troponin is persistently stable all along this  admission. He is on IV hydration for the acute kidney injury, continue  the hydration, nephrologist is on board. Continue his home medications. Diuretics. His Bumex has been held because of acute kidney injury. I  will recommend functional study in view of the mild elevation of  troponin to rule out any significant flow-limiting coronary artery  disease or any significant eminent flow-limiting coronary artery  disease. I discussed with the patient's family by the bedside plan of  care and verbalized understanding and agreed with the plan of care. The  patient is on beta blocker, at this level, we will continue with that. His electrolytes, magnesium is 2.4 on this admission and potassium is  corrected to 4.8, so at this level, we will continue the beta blocker  and gentle hydration. Based on the course, we will gauge further care. Thank you for allowing me to participate in the care of this patient. I  will follow up with you. Ryan Nguyen.  Shital Amaya M.D.    D: 04/07/2021 11:33:24       T: 04/07/2021 13:06:47     [email or web address]  Job#: [id number]     Doc#: [id number]    CC:

## 2021-04-07 NOTE — FLOWSHEET NOTE
04/07/21 1546   Encounter Summary   Services provided to: Patient and family together   Referral/Consult From: Rounding   Continue Visiting Yes  (4/7)   Complexity of Encounter Moderate   Length of Encounter 15 minutes   Routine   Type Initial   Assessment Calm; Approachable   Intervention Nurtured hope   Outcome Acceptance;Comfort   Assessment: In my encounter with the 68 yr old patient the pts family was supportively present. While rounding the unit 3B,  I provided spiritual care to patient and their family through conversation, I also came to assess their spiritual needs present. The pt was admitted due to acute kidney injury. Interventions:  I provided, prayer, emotional support and words of comfort.  provided a listening presence and encouraged pt to share their beliefs and how they support him during their hospitalization. Outcomes: The patient was encouraged and didnt share any further spiritual needs at this time. The pt remains optimistic and hopeful. The pt shared that they were appreciative for the support. Plan:  1. Chaplains will follow-up at a later time for assessment of any spiritual care needs present.   The Chaplains will be available to provide further emotional support per request.

## 2021-04-07 NOTE — PROGRESS NOTES
Patient is alert and oriented x4. Patient denies pain. Full sensation of the upper extremities. Heart sounds are regular. Lung sounds are clear throughout. Symmetrical chest movement upon respirations. Abdomen is round, bowel sounds are heard in all four quadrants, no masses or tenderness upon palpation. Full sensation present in lower extremities. No edema of the lower extremities. Patient has full range of motion of all extremities. Patient denies the need to use the bathroom. Bedside table is within reach. Pathway is clear. Patient is comfortable. Bed alarm is on. Call light is within reach. Elaine HATHAWAY/SN.

## 2021-04-07 NOTE — PROGRESS NOTES
[facility]         Renal Inpatient Follow-Up note         4/7/2021 9:07 AM    Pt Name:   Jemima Jean  Birthdate:   [birth year]  Attending:   Brielle Munoz MD    Chief Complaint : Jemima Jean is a 68 y.o. male being followed by nephrology for acute kidney injury/CKD    Interval History :   Patient seen and examined by me. No distress  Feels the same denies any chest pains or significant shortness of breath. Overall stable     Scheduled Medications :    atorvastatin  40 mg Oral Nightly    clopidogrel  75 mg Oral Daily    dilTIAZem  120 mg Oral Daily    hydrALAZINE  25 mg Oral 3 times per day    isosorbide mononitrate  120 mg Oral Daily    metoprolol succinate  50 mg Oral Daily    ranolazine  500 mg Oral BID    sodium chloride flush  10 mL Intravenous 2 times per day    insulin lispro  0-12 Units Subcutaneous TID WC    insulin lispro  0-6 Units Subcutaneous Nightly    insulin lispro  10 Units Subcutaneous Daily    insulin lispro  7 Units Subcutaneous BID AC    insulin detemir  45 Units Subcutaneous BID      sodium chloride      sodium chloride 50 mL/hr at 04/06/21 1938    dextrose      heparin (PORCINE) Infusion 12 Units/kg/hr (04/07/21 0844)       Vitals :  BP (!) 114/46   Pulse 60   Temp 98.3 °F (36.8 °C) (Oral)   Resp 18   Ht 5' 9\" (1.753 m)   Wt 213 lb 11.2 oz (96.9 kg)   SpO2 97%   BMI 31.56 kg/m²     24HR INTAKE/OUTPUT:      Intake/Output Summary (Last 24 hours) at 4/7/2021 0907  Last data filed at 4/7/2021 0852  Gross per 24 hour   Intake 1366.3 ml   Output 625 ml   Net 741.3 ml     Last 3 weights  Wt Readings from Last 3 Encounters:   04/07/21 213 lb 11.2 oz (96.9 kg)   03/29/21 214 lb 6 oz (97.2 kg)   03/23/21 220 lb 6.4 oz (100 kg)           Physical Exam :  General Appearance:  Well developed.  No distress  Mouth/Throat:  Oral mucosa moist  Neck:  Supple, no JVD  Lungs:  Breath sounds: clear  Heart[de-identified]  S1,S2 heard  Abdomen:  Soft, non -

## 2021-04-07 NOTE — CARE COORDINATION
4/7/21, 7:34 AM EDT  DISCHARGE PLANNING EVALUATION:    Swapnil Pedersen       Admitted: 4/6/2021/ [address] day: 1   Location: -31/031-A Reason for admit: Acute kidney injury superimposed on CKD ([address] [N17.9, N18.9]   PMH:  has a past medical history of Adenomatous colon polyp, Angina at [facility]), Atrial fibrillation ([address] 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[address] BPH (benign prostatic hypertrophy), CAD (coronary artery disease), Carotid artery disease ([address] Carotid disease, bilateral ([address] Cerebral artery occlusion with cerebral infarction ([address] CHF (congestive heart failure) ([address] Chronic kidney disease, GERD (gastroesophageal reflux disease), Hyperlipidemia, Hypertension, Lumbar disc disease, MI (myocardial infarction) ([address] Nephrolithiasis, NIDDM (non-insulin dependent diabetes mellitus), RICCARDO (obstructive sleep apnea), Renal artery stenosis ([address] Renal insufficiency, S/P CABG (status post coronary artery bypass graft), Skin cancer, Squamous cell carcinoma, TIA (transient ischemic attack), and Wears partial dentures. Procedure: 4/6 Renal US -   1. Left renal cysts. 2. Mildly elevated bilateral arcuate resistive indices of uncertain clinical significance. 3. Large amount of postvoid residual urine but otherwise unremarkable urinary bladder. Barriers to Discharge:  Admitted through ED with chest pain. Potassium upon arrival was 5.6. BUN 67, creatinine 2.4. Troponins 0.027, 0.030, 0.027. WBC 11.1 and 11.7. Hgb 11.2, down to 9.7. IVF. Heparin gtt. Given Kayexalate. Consulting Cardiology and Nephrology. PCP: Maggy Abreu MD  Readmission Risk Score: 21%    Patient Goals/Plan/Treatment Preferences: Spoke with pt and wife. Pt lives at home with his wife (who is a retired RN). 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. Pt has a BiPAP at home. Denies other DME needs. Denies need for [facility]. Transportation/Food Security/Housekeeping Addressed:  No issues identified.

## 2021-04-07 NOTE — PROGRESS NOTES
Pharmacy Medication History Note      List of current medications patient is taking is complete. Source of information: Patient's wife, medication list brought in by the patient, and fill history from [facility] ([facility])    Changes made to medication list:    Medications added/doses adjusted: Added Tylenol PM 1 tab PO QHS   Added alprazolam 0.25 mg PO TID PRN for anxiety  Adjusted hydroxyzine 10 mg from PO PRN to PO QD    Other notes (ex. Recent course of antibiotics, Coumadin dosing):  Denies use of other OTC or herbal medications.     Electronically signed by Lydia Daniel on 4/7/2021 at 2:46 PM

## 2021-04-07 NOTE — PROGRESS NOTES
Patient left the unit for a stress test. SBAR report given to primary nurse, Char Telles. Arcelia HATHAWAY/.

## 2021-04-07 NOTE — PROGRESS NOTES
Progress note      [facility]             Patient:  Tiesha Olivares  YOB: 1945    MRN: [id number]   Acct:  [de-identified]   3B-31/031-A  Primary Care Physician: Stephanie Sumner MD    Admit Date: 4/6/2021           Subjective: Pt feeling better. Going for stress today. Denies CP and SOB.         Objective:      Physical Exam:    Vitals:  Patient Vitals for the past 24 hrs:   BP Temp Temp src Pulse Resp SpO2 Height Weight   04/07/21 0914 (!) 112/48 -- -- 60 -- -- -- --   04/07/21 0745 (!) 114/46 -- -- -- -- -- -- --   04/07/21 0741 (!) 96/38 98.3 °F (36.8 °C) Oral 60 18 97 % -- --   04/07/21 0647 -- -- -- -- -- -- -- 213 lb 11.2 oz (96.9 kg)   04/07/21 0639 (!) 121/58 -- -- -- -- -- -- --   04/07/21 0348 135/60 97.7 °F (36.5 °C) Oral 65 17 98 % -- --   04/06/21 2334 (!) 145/56 97.8 °F (36.6 °C) Oral 64 18 97 % -- --   04/06/21 2024 (!) 122/54 98.6 °F (37 °C) Oral 61 16 99 % -- --   04/06/21 1729 126/68 97.7 °F (36.5 °C) Oral 64 16 99 % -- --   04/06/21 1716 (!) 145/51 97.6 °F (36.4 °C) Oral 70 18 100 % -- --   04/06/21 1625 (!) 119/58 -- -- 60 15 97 % -- --   04/06/21 1551 (!) 143/49 -- -- 59 15 100 % -- --   04/06/21 1424 (!) 122/92 -- -- 66 14 100 % -- --   04/06/21 1311 (!) 141/49 -- -- -- -- -- -- --   04/06/21 1309 -- 97.8 °F (36.6 °C) Oral 72 20 100 % 5' 9\" (1.753 m) 210 lb (95.3 kg)     Weight: Weight: 213 lb 11.2 oz (96.9 kg)     24 hour intake/output:    Intake/Output Summary (Last 24 hours) at 4/7/2021 1145  Last data filed at 4/7/2021 0852  Gross per 24 hour   Intake 1366.3 ml   Output 625 ml   Net 741.3 ml       General appearance - alert, well appearing, and in no distress  Eyes - pupils equal and reactive, extraocular eye movements intact  Mouth - mucous membranes moist, pharynx normal without lesions  Neck - supple, no significant adenopathy  Chest - clear to auscultation, no wheezes, rales or rhonchi, symmetric air entry  Heart -NSR today with no PVC, normal S1, S2, no murmurs, rubs, clicks or gallops  Abdomen - soft, nontender, nondistended, no masses or organomegaly, pos bs. Neurological - alert, oriented, normal speech, no focal findings or movement disorder noted  Musculoskeletal - no joint tenderness, deformity or swelling  Extremities - peripheral pulses normal, no pedal edema, no clubbing or cyanosis  Skin - normal coloration and turgor, no rashes, no suspicious skin lesions noted    Review of Labs and Diagnostic Testing:    CBC:   Recent Labs     04/07/21  0647   WBC 10.7   HGB 9.7*   HCT 31.3*   MCV 99.7*        BMP:   Recent Labs     04/07/21  0647      K 4.8      CO2 19*   BUN 64*   CREATININE 2.6*   CALCIUM 8.5   GLUCOSE 64*     PT/INR: No results for input(s): PROTIME, INR in the last 72 hours. APTT:   Recent Labs     04/07/21  0647   APTT 126.6*      Lipids:   Recent Labs     04/06/21  1330   ALKPHOS 64   ALT 17   AST 18   BILITOT 0.3   BILIDIR <0.2   LABALBU 4.2   LIPASE 67.9*     Troponin:   Recent Labs     04/06/21  2139   TROPONINT 0.027*        Imaging:  [unfilled]    EKG:      Diet: DIET CARDIAC; Carb Control: 3 carb choices (45 gms)/meal; Low Sodium (2 GM);  Renal, Low Potassium        Data:   Scheduled Meds: Scheduled Meds:   atorvastatin  40 mg Oral Nightly    clopidogrel  75 mg Oral Daily    dilTIAZem  120 mg Oral Daily    hydrALAZINE  25 mg Oral 3 times per day    isosorbide mononitrate  120 mg Oral Daily    metoprolol succinate  50 mg Oral Daily    ranolazine  500 mg Oral BID    sodium chloride flush  10 mL Intravenous 2 times per day    insulin lispro  0-12 Units Subcutaneous TID WC    insulin lispro  0-6 Units Subcutaneous Nightly    insulin lispro  10 Units Subcutaneous Daily    insulin lispro  7 Units Subcutaneous BID AC    insulin detemir  45 Units Subcutaneous BID     Continuous Infusions:   sodium chloride      sodium chloride 50 mL/hr at 04/06/21 1938    dextrose      heparin (PORCINE) Infusion 12 Units/kg/hr (04/07/21 0844)     PRN Meds:.sodium chloride flush, sodium chloride, promethazine **OR** ondansetron, acetaminophen **OR** acetaminophen, glucose, dextrose, glucagon (rDNA), dextrose, heparin (porcine), heparin (porcine)  Continuous Infusions:   sodium chloride      sodium chloride 50 mL/hr at 04/06/21 1938    dextrose      heparin (PORCINE) Infusion 12 Units/kg/hr (04/07/21 0844)         Assessment/Plan   1. Chest Pain  a. Troponins stable  b. Cardiology to do a stress test topday  c. Heparin drip cont  d. Pt not currently having chest pain  e. Telemetry monitoring   2. MADI on CKD  a. Gentle hydration at 50/hr   b. Nephrology saw; noted to keep hydration at 50/hr  c. Monitor labs; Renal fx slightly worse today; nephrology following  d. K+ WNL today  e. Hold Bumex + Entresto + Metformin for now   3. Hyperkalemia   a. K+ back to normal after kayexalate yesterday  b. Monitor BMP  4. RICCARDO  a. Home BIPAP at night  5. CHF   a. Pt not fluid overloaded on exam   b. Noted last EF 50-55%  c. No SOB   d. Cont home meds with parameters; hold Entresto + Bumex due to MADI  6. Atrial fibrillation   a. Cont home meds   b. On heparin drip inpatient  c. Check with cardiology/nephrology when to restart Eliquis   7. DM2  a. Hold Metformin due to MADI   b. Insulin SS with meals and HS  8. Leukocytosis   a. Resolved  9. DVT prophylaxis   a. Heparin drip      Electronically signed by KAVON Clancy - CNP on 4/7/2021 at 11:45 AM    I have discussed the case, including pertinent history and exam findings with the NP. I have seen and examined the patient and the key elements of the encounter have been performed by me. I agree with the assessment, plan and orders as documented by the NP.     Electronically signed by Monik Fontaine MD on 4/7/2021 at 3:30 PM

## 2021-04-07 NOTE — PLAN OF CARE
Problem: Falls - Risk of:  Goal: Will remain free from falls  Description: Will remain free from falls  4/7/2021 1302 by Evan Briones RN  Outcome: Ongoing  Note: Assessment & interventions provided throughout shift. Bed locked & in low position, call light in reach, side-rails up x2, bed/chair alarm utilized, non-slip socks on when ambulating, reminded patient to use call light to call for assistance. Problem: Tissue Perfusion - Cardiopulmonary, Altered:  Goal: Absence of angina  Description: Absence of angina  4/7/2021 1302 by Evan Briones RN  Outcome: Ongoing  Note: Ongoing assessment & interventions provided throughout shift. Patient on continuous telemetry monitoring, heart tones, vitals & pulses checked at least 3 times per shift & PRN. Vitals within acceptable limits. Peripheral pulses palpable.

## 2021-04-07 NOTE — PLAN OF CARE
Problem: Falls - Risk of:  Goal: Will remain free from falls  Description: Will remain free from falls  Outcome: Met This Shift  Note: Patient remains free from falls at this time. Bed is locked in lowest position with alarm on and side rails up. Call light and personal belongings are within reach. \"Call, don't fall\" policy explained. Fall wristband and nonslip socks are on patient and fall risk sign is posted. Problem: Falls - Risk of:  Goal: Absence of physical injury  Description: Absence of physical injury  Outcome: Met This Shift  Note: Patient remains free from physical injury at this time. Safety precautions explained and in place per [facility] policy. Problem: Tissue Perfusion - Cardiopulmonary, Altered:  Goal: Absence of angina  Description: Absence of angina  Outcome: Met This Shift  Note: Patient denies angina during this shift. Pt reports no pain. Will continue to assess regularly and as needed. Care plan reviewed with patient. Patient verbalizes understanding of the plan of care and contributes to goal setting.

## 2021-04-07 NOTE — PROGRESS NOTES
Patient is alert and oriented x4. Patient denies any concerns or pain. Pupils are equal, round, and reactive to light. The upper extremities are warm, dry, with full sensation present, and no numbness or tingling. Cap refill and skin turgor are both less than 3 seconds. Hand grasp is strong bilaterally. Patient has full range of motion of the upper extremities. Arm drift is negative. Heart sounds are regular. Lung sounds are clear throughout. Symmetrical chest rise upon respirations. Abdomen is round, bowel sounds are heard in all four quadrants, no masses or tenderness upon palpation. The lower extremities are warm, dry, with full sensation present, and no numbness or tingling. There is no edema of the lower extremities. Patient has full range of motion of the lower extremities. Skin over the coccyx and heels is intact. Patient is able to turn self in bed. Patient denies the need to use the bathroom. Bedside table is within reach. Pathway is clear. Patient is comfortable. Bed alarm is on. Call light is within reach. Fela HATHAWAY/.

## 2021-04-07 NOTE — CONSULTS
Chest pain  MADI on CKD  CAD s/p CABG and pci of the RCA 2019      Plan  Emilee nuc  Hydration  OMT    [id number]      Alistair Christensen MD    IMPRESSION:  In summary:  1. Moderate pulmonary hypertension. 2.  No step-up in the oxygen saturation from different chambers of the  right side of the heart. 3.  Preserved systolic function of the left ventricle, ejection fraction  was 55% to 60%. 4.  No mitral regurg. No gradient across the aortic valve. 5.  Left ventricular end-diastolic pressure was between 22 to 27. Diastolic dysfunction of the left ventricle. 6.  The RCA is a dominant artery with severe stenosis in its midcourse. Moderate disease at the ostium, proximal portion of the RCA. 7.  The graft to the PDA of the RCA has been cold and still has some  flow through retrograde filling from the RCA. 8.  Patent short left main. 9.  Severe stenosis at the ostium of the circumflex. Total occlusion of  the second obtuse marginal.  10.  Severe stenosis at the ostium of the LAD, severe stenosis at the  ostium of diagonal artery, and severe stenosis of the mid diagonal  artery. 11. LIMA to the LAD was patent with good distal runoff. 12.  Saphenous vein graft to the obtuse marginal was patent with a good  distal runoff. 13.  Saphenous vein graft to the RCA was cold and occluded.     At this point, I will have the patient to have a BOSSMAN to rule out any  papillary muscle dysfunction from the RCA. The patient could be  considered for intervention of the mid RCA and then area that was _____  at [facility], which had restenosis and the patient could be  considered for intervention of the posterolateral branch, could be  considered for intervention of the LAD to the diagonal artery.   The  patient need to be also evaluated for pulmonary disease and for an  evaluation for possible pulmonary fibrosis and possible need for biopsy  for the _____ diagnosis as the symptoms of the patient are out of  proportion to his coronary artery findings.     We discussed the finding and the MI source with the patient and his  family.  Miguel Marte M.D.     D: 11/13/2018 6:50:43

## 2021-04-08 ENCOUNTER — APPOINTMENT (OUTPATIENT)
Dept: CARDIAC CATH/INVASIVE PROCEDURES | Age: 76
DRG: 251 | End: 2021-04-08
Payer: MEDICARE

## 2021-04-08 LAB
ABO: NORMAL
ACTIVATED CLOTTING TIME: 230 SECONDS (ref 1–150)
ANION GAP SERPL CALCULATED.3IONS-SCNC: 11 MEQ/L (ref 8–16)
ANTIBODY SCREEN: NORMAL
APTT: 58 SECONDS (ref 22–38)
APTT: 64.6 SECONDS (ref 22–38)
BASOPHILS # BLD: 0.3 %
BASOPHILS ABSOLUTE: 0 THOU/MM3 (ref 0–0.1)
BUN BLDV-MCNC: 51 MG/DL (ref 7–22)
CALCIUM SERPL-MCNC: 8.7 MG/DL (ref 8.5–10.5)
CHLORIDE BLD-SCNC: 109 MEQ/L (ref 98–111)
CO2: 18 MEQ/L (ref 23–33)
CREAT SERPL-MCNC: 1.6 MG/DL (ref 0.4–1.2)
EKG ATRIAL RATE: 63 BPM
EKG P AXIS: 72 DEGREES
EKG P-R INTERVAL: 204 MS
EKG Q-T INTERVAL: 444 MS
EKG QRS DURATION: 128 MS
EKG QTC CALCULATION (BAZETT): 454 MS
EKG R AXIS: 91 DEGREES
EKG T AXIS: 47 DEGREES
EKG VENTRICULAR RATE: 63 BPM
EOSINOPHIL # BLD: 0.7 %
EOSINOPHILS ABSOLUTE: 0.1 THOU/MM3 (ref 0–0.4)
ERYTHROCYTE [DISTWIDTH] IN BLOOD BY AUTOMATED COUNT: 13.9 % (ref 11.5–14.5)
ERYTHROCYTE [DISTWIDTH] IN BLOOD BY AUTOMATED COUNT: 52.9 FL (ref 35–45)
GFR SERPL CREATININE-BSD FRML MDRD: 42 ML/MIN/1.73M2
GLUCOSE BLD-MCNC: 122 MG/DL (ref 70–108)
GLUCOSE BLD-MCNC: 148 MG/DL (ref 70–108)
GLUCOSE BLD-MCNC: 161 MG/DL (ref 70–108)
GLUCOSE BLD-MCNC: 96 MG/DL (ref 70–108)
GLUCOSE BLD-MCNC: 96 MG/DL (ref 70–108)
HCT VFR BLD CALC: 34.3 % (ref 42–52)
HEMOGLOBIN: 10.1 GM/DL (ref 14–18)
IMMATURE GRANS (ABS): 0.04 THOU/MM3 (ref 0–0.07)
IMMATURE GRANULOCYTES: 0.3 %
INR BLD: 1.18 (ref 0.85–1.13)
LYMPHOCYTES # BLD: 11.7 %
LYMPHOCYTES ABSOLUTE: 1.4 THOU/MM3 (ref 1–4.8)
MCH RBC QN AUTO: 30.4 PG (ref 26–33)
MCHC RBC AUTO-ENTMCNC: 29.4 GM/DL (ref 32.2–35.5)
MCV RBC AUTO: 103.3 FL (ref 80–94)
MONOCYTES # BLD: 8.7 %
MONOCYTES ABSOLUTE: 1 THOU/MM3 (ref 0.4–1.3)
NUCLEATED RED BLOOD CELLS: 0 /100 WBC
PLATELET # BLD: 188 THOU/MM3 (ref 130–400)
PMV BLD AUTO: 9.8 FL (ref 9.4–12.4)
POTASSIUM SERPL-SCNC: 4.7 MEQ/L (ref 3.5–5.2)
RBC # BLD: 3.32 MILL/MM3 (ref 4.7–6.1)
RH FACTOR: NORMAL
SEG NEUTROPHILS: 78.3 %
SEGMENTED NEUTROPHILS ABSOLUTE COUNT: 9.2 THOU/MM3 (ref 1.8–7.7)
SODIUM BLD-SCNC: 138 MEQ/L (ref 135–145)
WBC # BLD: 11.8 THOU/MM3 (ref 4.8–10.8)

## 2021-04-08 PROCEDURE — 4A023N7 MEASUREMENT OF CARDIAC SAMPLING AND PRESSURE, LEFT HEART, PERCUTANEOUS APPROACH: ICD-10-PCS | Performed by: INTERNAL MEDICINE

## 2021-04-08 PROCEDURE — 6360000004 HC RX CONTRAST MEDICATION: Performed by: INTERNAL MEDICINE

## 2021-04-08 PROCEDURE — 02703ZZ DILATION OF CORONARY ARTERY, ONE ARTERY, PERCUTANEOUS APPROACH: ICD-10-PCS | Performed by: INTERNAL MEDICINE

## 2021-04-08 PROCEDURE — 93005 ELECTROCARDIOGRAM TRACING: CPT | Performed by: PHYSICIAN ASSISTANT

## 2021-04-08 PROCEDURE — 85347 COAGULATION TIME ACTIVATED: CPT

## 2021-04-08 PROCEDURE — 92920 PRQ TRLUML C ANGIOP 1ART&/BR: CPT | Performed by: INTERNAL MEDICINE

## 2021-04-08 PROCEDURE — 36415 COLL VENOUS BLD VENIPUNCTURE: CPT

## 2021-04-08 PROCEDURE — 2580000003 HC RX 258: Performed by: INTERNAL MEDICINE

## 2021-04-08 PROCEDURE — B2131ZZ FLUOROSCOPY OF MULTIPLE CORONARY ARTERY BYPASS GRAFTS USING LOW OSMOLAR CONTRAST: ICD-10-PCS | Performed by: INTERNAL MEDICINE

## 2021-04-08 PROCEDURE — C1887 CATHETER, GUIDING: HCPCS

## 2021-04-08 PROCEDURE — 99232 SBSQ HOSP IP/OBS MODERATE 35: CPT | Performed by: INTERNAL MEDICINE

## 2021-04-08 PROCEDURE — 85025 COMPLETE CBC W/AUTO DIFF WBC: CPT

## 2021-04-08 PROCEDURE — 2580000003 HC RX 258: Performed by: PHYSICIAN ASSISTANT

## 2021-04-08 PROCEDURE — C1760 CLOSURE DEV, VASC: HCPCS

## 2021-04-08 PROCEDURE — 85730 THROMBOPLASTIN TIME PARTIAL: CPT

## 2021-04-08 PROCEDURE — C1725 CATH, TRANSLUMIN NON-LASER: HCPCS

## 2021-04-08 PROCEDURE — B2111ZZ FLUOROSCOPY OF MULTIPLE CORONARY ARTERIES USING LOW OSMOLAR CONTRAST: ICD-10-PCS | Performed by: INTERNAL MEDICINE

## 2021-04-08 PROCEDURE — 6360000002 HC RX W HCPCS: Performed by: PHYSICIAN ASSISTANT

## 2021-04-08 PROCEDURE — 80048 BASIC METABOLIC PNL TOTAL CA: CPT

## 2021-04-08 PROCEDURE — 86900 BLOOD TYPING SEROLOGIC ABO: CPT

## 2021-04-08 PROCEDURE — 6370000000 HC RX 637 (ALT 250 FOR IP)

## 2021-04-08 PROCEDURE — 85610 PROTHROMBIN TIME: CPT

## 2021-04-08 PROCEDURE — 82948 REAGENT STRIP/BLOOD GLUCOSE: CPT

## 2021-04-08 PROCEDURE — 2500000003 HC RX 250 WO HCPCS

## 2021-04-08 PROCEDURE — 93455 CORONARY ART/GRFT ANGIO S&I: CPT | Performed by: INTERNAL MEDICINE

## 2021-04-08 PROCEDURE — 6370000000 HC RX 637 (ALT 250 FOR IP): Performed by: INTERNAL MEDICINE

## 2021-04-08 PROCEDURE — 99232 SBSQ HOSP IP/OBS MODERATE 35: CPT | Performed by: PHYSICIAN ASSISTANT

## 2021-04-08 PROCEDURE — 6360000002 HC RX W HCPCS

## 2021-04-08 PROCEDURE — 86850 RBC ANTIBODY SCREEN: CPT

## 2021-04-08 PROCEDURE — 2140000000 HC CCU INTERMEDIATE R&B

## 2021-04-08 PROCEDURE — 86901 BLOOD TYPING SEROLOGIC RH(D): CPT

## 2021-04-08 RX ORDER — DEXTROSE AND SODIUM CHLORIDE 5; .9 G/100ML; G/100ML
INJECTION, SOLUTION INTRAVENOUS CONTINUOUS
Status: DISCONTINUED | OUTPATIENT
Start: 2021-04-08 | End: 2021-04-08

## 2021-04-08 RX ORDER — SODIUM CHLORIDE 0.9 % (FLUSH) 0.9 %
5-40 SYRINGE (ML) INJECTION PRN
Status: DISCONTINUED | OUTPATIENT
Start: 2021-04-08 | End: 2021-04-09 | Stop reason: HOSPADM

## 2021-04-08 RX ORDER — SODIUM CHLORIDE 0.9 % (FLUSH) 0.9 %
5-40 SYRINGE (ML) INJECTION EVERY 12 HOURS SCHEDULED
Status: DISCONTINUED | OUTPATIENT
Start: 2021-04-08 | End: 2021-04-09 | Stop reason: HOSPADM

## 2021-04-08 RX ORDER — SODIUM CHLORIDE 9 MG/ML
INJECTION, SOLUTION INTRAVENOUS CONTINUOUS
Status: DISCONTINUED | OUTPATIENT
Start: 2021-04-08 | End: 2021-04-08

## 2021-04-08 RX ORDER — SODIUM CHLORIDE 9 MG/ML
INJECTION, SOLUTION INTRAVENOUS CONTINUOUS
Status: DISCONTINUED | OUTPATIENT
Start: 2021-04-08 | End: 2021-04-09 | Stop reason: HOSPADM

## 2021-04-08 RX ORDER — ACETYLCYSTEINE 200 MG/ML
1200 SOLUTION ORAL; RESPIRATORY (INHALATION) 2 TIMES DAILY
Status: DISCONTINUED | OUTPATIENT
Start: 2021-04-08 | End: 2021-04-09 | Stop reason: HOSPADM

## 2021-04-08 RX ORDER — SODIUM CHLORIDE 9 MG/ML
25 INJECTION, SOLUTION INTRAVENOUS PRN
Status: DISCONTINUED | OUTPATIENT
Start: 2021-04-08 | End: 2021-04-09 | Stop reason: HOSPADM

## 2021-04-08 RX ADMIN — HYDRALAZINE HYDROCHLORIDE 25 MG: 25 TABLET, FILM COATED ORAL at 23:01

## 2021-04-08 RX ADMIN — HYDRALAZINE HYDROCHLORIDE 25 MG: 25 TABLET, FILM COATED ORAL at 06:22

## 2021-04-08 RX ADMIN — RANOLAZINE 500 MG: 500 TABLET, FILM COATED, EXTENDED RELEASE ORAL at 23:01

## 2021-04-08 RX ADMIN — ACETYLCYSTEINE 1200 MG: 200 SOLUTION ORAL; RESPIRATORY (INHALATION) at 10:46

## 2021-04-08 RX ADMIN — DILTIAZEM HYDROCHLORIDE 120 MG: 120 CAPSULE, EXTENDED RELEASE ORAL at 08:51

## 2021-04-08 RX ADMIN — RANOLAZINE 500 MG: 500 TABLET, FILM COATED, EXTENDED RELEASE ORAL at 08:51

## 2021-04-08 RX ADMIN — DEXTROSE AND SODIUM CHLORIDE 50 ML/HR: 5; 900 INJECTION, SOLUTION INTRAVENOUS at 15:19

## 2021-04-08 RX ADMIN — SODIUM CHLORIDE: 9 INJECTION, SOLUTION INTRAVENOUS at 19:06

## 2021-04-08 RX ADMIN — HYDRALAZINE HYDROCHLORIDE 25 MG: 25 TABLET, FILM COATED ORAL at 15:01

## 2021-04-08 RX ADMIN — METOPROLOL SUCCINATE 50 MG: 50 TABLET, FILM COATED, EXTENDED RELEASE ORAL at 08:51

## 2021-04-08 RX ADMIN — IOPAMIDOL 100 ML: 755 INJECTION, SOLUTION INTRAVENOUS at 17:07

## 2021-04-08 RX ADMIN — CLOPIDOGREL BISULFATE 75 MG: 75 TABLET ORAL at 08:51

## 2021-04-08 RX ADMIN — ISOSORBIDE MONONITRATE 120 MG: 60 TABLET ORAL at 08:51

## 2021-04-08 RX ADMIN — ATORVASTATIN CALCIUM 40 MG: 40 TABLET, FILM COATED ORAL at 23:01

## 2021-04-08 RX ADMIN — SODIUM CHLORIDE: 9 INJECTION, SOLUTION INTRAVENOUS at 10:13

## 2021-04-08 ASSESSMENT — PAIN SCALES - GENERAL
PAINLEVEL_OUTOF10: 0

## 2021-04-08 NOTE — PROGRESS NOTES
Cardiology Progress Note      Patient:  Swapnil Pedersen  YOB: 1945  MRN: [id number]   Acct: [de-identified]  Admit Date:  4/6/2021  Primary Cardiologist: dr Urmila Ye    Note per dr Arlin Ivey:  Presentation with irregular heart beat and  chest pain.     HISTORY OF PRESENT ILLNESS:  This is a 68-year-old  gentleman  with past medical history of coronary artery disease with coronary  artery bypass in 1998, multiple stent and most recent in 2019;  hypertension; hyperlipidemia; chronic kidney disease; congestive heart  failure; atrial fibrillation, status post ablation in 2013; CHF; and  diabetes, presented with chest pain to the emergency room on 04/06/2021. The patient actually had chest pain a day before the presentation that  is on 04/05/2021. The chest pain was retrosternal pressure, heaviness,  like 6-7/10, and lasted for 15 minutes. He took like 2 to 3 doses of  sublingual nitroglycerin, with that the chest pain resolved, but it was  not radiating, not associated with shortness of breath, diaphoresis,  nausea, or vomiting. He gets these type of chest pain once in a while  and usually, he gets chest pain when he does stuff or when he is exposed  to the cold temperature.     So, he came in and found to be actually in acute kidney injury on  chronic kidney disease, and diuretics was held and admitted with a  diagnostic impression of chest pain, acute kidney injury on chronic  kidney disease. Cardiology evaluation was sought for further evaluation  and management. There is a minimal troponin in the patient as well. Currently, chest pain free and stable on the EKG. He has been noted to  have frequent ventricular ectopic beat, which was in a bigeminy pattern  and today it got better. \"    Subjective (Events in last 24 hours): pt awake and alert. NAD. No cp. Chronic IRVING, unchanged.   No edema or orthopnea  On heparin gtt      Objective:   BP (!) 166/49   Pulse 66   Temp Or  acetaminophen, 650 mg, Q6H PRN  glucose, 15 g, PRN  dextrose, 12.5 g, PRN  glucagon (rDNA), 1 mg, PRN  dextrose, 100 mL/hr, PRN  heparin (porcine), 80 Units/kg, PRN  heparin (porcine), 40 Units/kg, PRN        Diagnostics:  Stress test 4/7/21   ECG Findings   No ischemic EKG changes. Arrhythmias   Isolated PVC's at peak. Symptoms   After test completed, patient complained of substernal chest pain rated   \"5 / 10\", nonradiating with obvious dyspnea. Dr. Nichelle Melton called and patient   given 0.4 mg SL NTG. at 1454 . 1457 /60 . at 1459 Pain still \"5/10\"   2nd 0.4 mg SL NTG given. At 1504 Pain rated \"3\" and 3rd 0.4mg SL NTG   given. 1510 Pain rated \"2/10\" and Dr. Nichelle Melton update. No further orders   received. Also 3B RN updated. Stress Interpretation   No stress induced arrhythmia. Patient had chest pain during the stress and required 3 doses of   sublingual NTG for complete resolution   Lexiscan EKG stress test is not suggestive for ischemia. Imaging Protocols      Rest                                Stress      Isotope:Tc-99m Sestamibi            Isotope: Tc-99m Sestamibi   Isotope dose:9.2 mCi                Isotope dose:31 mCi   Date:04/07/2021 13:20               Date:04/07/2021 14:40      Technique:           SPECT          Technique:           Gated                                                            SPECT     Imaging Results:Calculated gated LVEF 38 %. There is mild attenuation artifact noted in the inferior wall seems to be  related to bowel artifact. The T.I.D. ratio was 1.10 . There was a moderate sized, moderately severe,  fixed myocardial perfusion defect of the inferior , inferolateral and  lateral walls with hypokinesis suggestive for old infarction. There was a small sized, mildly severe, partially reversible myocardial  perfusion defect of the inferolateral wall. The nuclear images is suggestive for mild dex-infarct ischemia in the  inferolateral region.   The nuclear images is suggestive for old infarction in the inferior ,  inferolateral and lateral regions      Conclusions      Summary   Patient had chest pain during the stress and required 3 doses of   sublingual NTG for complete resolution   Lexiscan EKG stress test is not suggestive for ischemia. Calculated gated LVEF 38 %. The nuclear images is suggestive for old infarction in the inferior ,   inferolateral and lateral regions   The nuclear images is suggestive for mild dex-infarct ischemia in the   inferolateral region. Recommendation   Clinical correlation is recommended. Signatures      ----------------------------------------------------------------   Electronically signed by Clay Us MD (Interpreting   Cardiologist) on 04/07/2021 at 17:35    Lab Data:    Cardiac Enzymes:  No results for input(s): CKTOTAL, CKMB, CKMBINDEX, TROPONINI in the last 72 hours.     CBC:   Lab Results   Component Value Date    WBC 11.8 04/08/2021    RBC 3.32 04/08/2021    RBC 3.80 03/24/2012    HGB 10.1 04/08/2021    HCT 34.3 04/08/2021     04/08/2021       CMP:    Lab Results   Component Value Date     04/08/2021    K 4.7 04/08/2021    K 4.7 03/14/2019     04/08/2021    CO2 18 04/08/2021    BUN 51 04/08/2021    CREATININE 1.6 04/08/2021    GFRAA >60 09/11/2016    AGRATIO 1.2 09/10/2016    LABGLOM 42 04/08/2021    GLUCOSE 96 04/08/2021    GLUCOSE 169 03/24/2012    CALCIUM 8.7 04/08/2021       Hepatic Function Panel:    Lab Results   Component Value Date    ALKPHOS 64 04/06/2021    ALT 17 04/06/2021    AST 18 04/06/2021    PROT 7.0 04/06/2021    BILITOT 0.3 04/06/2021    BILITOT NEGATIVE 06/18/2018    BILIDIR <0.2 04/06/2021    LABALBU 4.2 04/06/2021    LABALBU 4.5 03/24/2012       Magnesium:    Lab Results   Component Value Date    MG 2.4 04/07/2021       PT/INR:    Lab Results   Component Value Date    PROTIME 17.2 09/10/2016    INR 1.03 03/14/2019       HgBA1c:    Lab Results   Component Value Date LABA1C 7.0 04/06/2021       FLP:    Lab Results   Component Value Date    TRIG 167 01/24/2020    HDL 31 01/24/2020    LDLCALC 74 01/24/2020       TSH:    Lab Results   Component Value Date    TSH 2.260 09/11/2018         Assessment:    S/p chest pain   Elevated troponins - flat - chronically elevated  Abn stress test 4/7/21 - details above  Ef 50-55 per TTE 3/25/21  Hx CAD - s/p CABG, PCIs  HTN  Hx PAF  Dyslipidemia  DM  MADI/CKD - improved  Hyperkalemia - resolved    Plan:    Cont plavix/statin/BB/cdz/imdur/ranexa/hydralazine  No ace/arb due to MADI/CKD/hyperkalemia  Diuretics per renal  disucussed extensively with patient and wife valerie - our recommendation is to proceed for LHC. I discussed with dr Darcy Butt and he is agreeable as well. Will schedule today with dr elizalde.   Pt understands risk/benefit and agrees to proceed  Npo  LHC today  Per dr Darcy Butt cont ivf at 50 cc/hr and start mucomyst 1200 bid  Cont heparin gtt, current home eliquis on hold         Electronically signed by Hany Jesus PA-C on 4/8/2021 at 9:21 AM

## 2021-04-08 NOTE — PROGRESS NOTES
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    BPH (benign prostatic hypertrophy)     CAD (coronary artery disease) 1997    Carotid artery disease ([address] 1995    right    Carotid disease, bilateral ([address]     Cerebral artery occlusion with cerebral infarction ([address]     CHF (congestive heart failure) ([address]     Chronic kidney disease     GERD (gastroesophageal reflux disease)     Hyperlipidemia 1996    Hypertension     Lumbar disc disease 2001    MI (myocardial infarction) ([address] 1997    Nephrolithiasis 2002    NIDDM (non-insulin dependent diabetes mellitus) 1996    diagnosed 1996    RICCARDO (obstructive sleep apnea) 2005    cpap    Renal artery stenosis ([address] 2007    left    Renal insufficiency     S/P CABG (status post coronary artery bypass graft) 1997    5 vessels    Skin cancer     Squamous cell carcinoma     TIA (transient ischemic attack)     Wears partial dentures     upper and lower         Past Surgical History:   Procedure Laterality Date    ABLATION OF DYSRHYTHMIC FOCUS  3/13/13   [address]    CARDIAC SURGERY  3/13/13    oblation for irreg rythm    CARDIOVERSION  3/8/2012    CAROTID ENDARTERECTOMY  1997    CHOLECYSTECTOMY  4/1999    COLONOSCOPY  6/22/09, 4/2011,7/12/17    polyp removal    CORONARY ANGIOPLASTY WITH STENT PLACEMENT  2008 (2), 2009 (1)    x2    CORONARY ARTERY BYPASS GRAFT  8/1997    5 vessel    DIAGNOSTIC CARDIAC CATH LAB PROCEDURE      FOOT SURGERY  12/20/2013    pre-cancerous mole rt foot removed    LITHOTRIPSY  4/2002    MN OFFICE/OUTPT VISIT,PROCEDURE ONLY N/A 11/8/2018    TRANSESOPHAGEAL ECHOCARDIOGRAM performed by Gabrielle Silver MD at [facility]    PTCA  05/30/2018   [facility] SURGERY  2001, 2003    rt shoulder manipulation--frozen shoulder-01, Lt -03 release capsule TAKE 1 CAPSULE BY MOUTH  DAILY 90 capsule 3    Insulin Pen Needle (NOVOFINE) 32G X 6 MM MISC USE 1 NEW NEEDLE 6 TIMES  DAILY AND AS NEEDED 540 each 1    Accu-Chek FastClix Lancets MISC USE TO TEST BLOOD SUGARS 4 TIMES DAILY 360 each 15    blood glucose test strips (ACCU-CHEK COMPACT PLUS) strip DX E11.59, CHECK THE GLUCOSE 4 TIMES DAILY 408 strip 3    Multiple Vitamins-Minerals (THERAPEUTIC MULTIVITAMIN-MINERALS) tablet Take 1 tablet by mouth nightly      hydrALAZINE (APRESOLINE) 25 MG tablet Take 1 tablet by mouth every 8 hours 90 tablet 0    sacubitril-valsartan (ENTRESTO) 49-51 MG per tablet Take 1 tablet by mouth 2 times daily (Patient taking differently: Take 1 tablet by mouth daily ) 60 tablet 0    clopidogrel (PLAVIX) 75 MG tablet Take 75 mg by mouth daily morning      ranolazine (RANEXA) 500 MG extended release tablet Take 500 mg by mouth 2 times daily      isosorbide mononitrate (IMDUR) 120 MG extended release tablet Take 120 mg by mouth daily       NOVOFINE 30G X 8 MM MISC USE 1 NEW NEEDLE 6 TIMES A DAY AND AS NEEDED 600 each 3    [DISCONTINUED] Lancet Devices (ACCU-CHEK SOFTCLIX LANCET DEV) MISC Use one with each blood check 400 each 99       Current Facility-Administered Medications   Medication Dose Route Frequency Provider Last Rate Last Admin    acetylcysteine (MUCOMYST) 20 % solution 1,200 mg  1,200 mg Oral BID Samia Found, PA-C   1,200 mg at 04/08/21 1046    0.9 % sodium chloride infusion   Intravenous Continuous Samia Found, PA-C 50 mL/hr at 04/08/21 1013 New Bag at 04/08/21 1013    sodium chloride flush 0.9 % injection 5-40 mL  5-40 mL Intravenous 2 times per day Samia Found, PA-C        sodium chloride flush 0.9 % injection 5-40 mL  5-40 mL Intravenous PRN Samia Found, PA-C        0.9 % sodium chloride infusion  25 mL Intravenous PRN Samia Found, PA-C        atorvastatin (LIPITOR) tablet 40 mg  40 mg Oral Nightly Jake Hamilton MD   40 1736    insulin lispro (HUMALOG) injection vial 7 Units  7 Units Subcutaneous BID AC Skyler Kezia, APRN - CNP   7 Units at 04/08/21 0858    heparin (porcine) injection 7,620 Units  80 Units/kg Intravenous PRN Skyler Kezia, APRN - CNP        heparin (porcine) injection 3,810 Units  40 Units/kg Intravenous PRN Skyler Kezia, APRN - CNP        heparin 25,000 units in dextrose 5% 250 mL (premix) infusion  5-30 Units/kg/hr Intravenous Continuous Skyler Kezia, APRN - CNP 13.3 mL/hr at 04/08/21 1237 14 Units/kg/hr at 04/08/21 1237    insulin detemir (LEVEMIR) injection pen 45 Units  45 Units Subcutaneous BID Fara Magdaleno MD   45 Units at 04/08/21 0857             PHYSICAL:     /60   Pulse 60   Temp 99 °F (37.2 °C) (Oral)   Resp 14   Ht 5' 9\" (1.753 m)   Wt 215 lb 8 oz (97.8 kg)   SpO2 99%   BMI 31.82 kg/m²     Heart:  [x]Regular rate and rhythm  []Other:    Lungs:  [x]Clear    []Other:    Abdomen: [x]Soft    []Other:    Mental Status: [x]Alert & Oriented  []Other:   Ext:                [x]No edema       []Other:         No results for input(s): CKTOTAL, CKMB, CKMBINDEX, TROPONINI in the last 72 hours.     Lab Results   Component Value Date    WBC 11.8 04/08/2021    RBC 3.32 04/08/2021    RBC 3.80 03/24/2012    HGB 10.1 04/08/2021    HCT 34.3 04/08/2021    .3 04/08/2021    MCH 30.4 04/08/2021    MCHC 29.4 04/08/2021    RDW 13.7 06/07/2018     04/08/2021    MPV 9.8 04/08/2021       Lab Results   Component Value Date     04/08/2021    K 4.7 04/08/2021    K 4.7 03/14/2019     04/08/2021    CO2 18 04/08/2021    BUN 51 04/08/2021    LABALBU 4.2 04/06/2021    LABALBU 4.5 03/24/2012    CREATININE 1.6 04/08/2021    CALCIUM 8.7 04/08/2021    GFRAA >60 09/11/2016    LABGLOM 42 04/08/2021    GLUCOSE 96 04/08/2021    GLUCOSE 169 03/24/2012       Lab Results   Component Value Date    ALKPHOS 64 04/06/2021    ALT 17 04/06/2021    AST 18 04/06/2021    PROT 7.0 04/06/2021    BILITOT 0.3 04/06/2021    BILITOT NEGATIVE 06/18/2018    BILIDIR <0.2 04/06/2021    LABALBU 4.2 04/06/2021    LABALBU 4.5 03/24/2012       Lab Results   Component Value Date    MG 2.4 04/07/2021       No components found for: Isabel Pleasure    Lab Results   Component Value Date    LABA1C 7.0 04/06/2021       Lab Results   Component Value Date    TRIG 167 01/24/2020    HDL 31 01/24/2020    LDLCALC 74 01/24/2020       Lab Results   Component Value Date    TSH 2.260 09/11/2018            SEDATION  Planned agent:[x]Midazolam []Meperidine [x]Sublimaze []Morphine []Diazepam  []Other:         ASA Classification:  []1 [x]2 []3 []4 []5  Class 1: A normal healthy patient  Class 2: Pt with mild to moderate systemic disease  Class 3: Severe systemic disease or disturbance  Class 4: Severe systemic disorders that are already life threatening. Class 5: Moribund pt with little chances of survival, for more than 24 hours. Mallampati I Airway Classification:   []1 [x]2 []3 []4      [x]Pre-procedure diagnostic studies complete and results available. Comment:    [x]Previous sedation/anesthesia experiences assessed. Comment:    [x]The patient is an appropriate candidate to undergo the planned procedure sedation and anesthesia. (Refer to nursing sedation/analgesia documentation record)  [x]Formulation and discussion of sedation/procedure plan, risks, and expectations with patient and/or responsible adult completed. [x]Patient examined immediately prior to the procedure.  (Refer to nursing sedation/analgesia documentation record)        Kayleen Pierce MD, Janine Otto  Electronically signed 4/8/2021 at 3:07 PM

## 2021-04-08 NOTE — PROGRESS NOTES
tender  Musculoskeletal:  Edema -no significant edema noted         Last 3 CBC   Recent Labs     04/06/21  1918 04/07/21  0647 04/08/21  0441   WBC 11.7* 10.7 11.8*   RBC 3.48* 3.14* 3.32*   HGB 10.7* 9.7* 10.1*   HCT 34.9* 31.3* 34.3*    187 188     Last 3 CMP  Recent Labs     04/06/21  1330 04/06/21  1602 04/07/21  0647 04/08/21  0441     --  139 138   K 5.6* 5.2 4.8 4.7     --  108 109   CO2 21*  --  19* 18*   BUN 67*  --  64* 51*   CREATININE 2.4*  --  2.6* 1.6*   CALCIUM 9.3  --  8.5 8.7   LABALBU 4.2  --   --   --    BILITOT 0.3  --   --   --              ASSESSMENT / Plan     Assessment    1. Renal -acute kidney injury on chronic kidney disease stage III mostly due to multifactorial etiology  ? Patient does look slightly dry however he is also on Bumex, Entresto  ? Renal function improved with IV fluids currently back to baseline   ? Discontinue IV fluids and monitor BMP     2. Electrolytes -hyperkalemia secondary to acute kidney injury and Entresto. Doing better  3. Essential hypertension running reasonable continue current medications  4. Mild acidosis secondary to MADI closely follow  5. Chest pain being evaluated by cardiology mildly elevated troponins patient had stress test which showed mild dex-infarct ischemia in the inferior lateral region. 6. Hx of diabetes mellitus  7. History of severe CAD with almost 15 stents in place  8. Meds reviewed and discussed with patient, wife and nursing staff      GRAZYNA Raymond D.  [facility].

## 2021-04-08 NOTE — PROGRESS NOTES
Progress note      [facility]             Patient:  Dominik Abrams  YOB: 1945    MRN: [id number]   Acct:  [de-identified]   3B-31/031-A  Primary Care Physician: Sin Rodriguez MD    Admit Date: 4/6/2021           Subjective: He feels better. Initially planning for discharge but the cardiologist changed the plan for a cath today considering that he had chest pain in stress lab although the test turned out to be negative. Objective:      Physical Exam:    Vitals:  Patient Vitals for the past 24 hrs:   BP Temp Temp src Pulse Resp SpO2 Weight   04/08/21 0830 (!) 166/49 98.7 °F (37.1 °C) Oral 66 14 97 % --   04/08/21 0622 (!) 141/66 -- -- -- -- -- --   04/08/21 0417 (!) 140/58 98 °F (36.7 °C) Oral 61 18 97 % 215 lb 8 oz (97.8 kg)   04/07/21 2301 139/64 99.1 °F (37.3 °C) Oral 56 20 98 % --   04/07/21 2016 (!) 149/60 98.6 °F (37 °C) Oral 65 18 99 % --   04/07/21 1600 (!) 174/46 98.5 °F (36.9 °C) Oral -- 20 99 % --   04/07/21 1402 -- -- -- -- -- 98 % --   04/07/21 1200 (!) 122/48 98 °F (36.7 °C) Oral 65 18 99 % --     Weight: Weight: 215 lb 8 oz (97.8 kg)     24 hour intake/output:    Intake/Output Summary (Last 24 hours) at 4/8/2021 0941  Last data filed at 4/8/2021 0417  Gross per 24 hour   Intake 1916.58 ml   Output 0 ml   Net 1916.58 ml       General appearance - alert, well appearing, and in no distress  Eyes - pupils equal and reactive, extraocular eye movements intact  Mouth - mucous membranes moist, pharynx normal without lesions  Neck - supple, no significant adenopathy  Chest - clear to auscultation, no wheezes, rales or rhonchi, symmetric air entry  Heart -NSR today with no PVC, normal S1, S2, no murmurs, rubs, clicks or gallops  Abdomen - soft, nontender, nondistended, no masses or organomegaly, pos bs.   Neurological - alert, oriented, normal speech, no focal findings or movement disorder noted  Musculoskeletal - no joint tenderness, deformity or swelling  Extremities - peripheral pulses normal, no pedal edema, no clubbing or cyanosis  Skin - normal coloration and turgor, no rashes, no suspicious skin lesions noted    Review of Labs and Diagnostic Testing:    CBC:   Recent Labs     04/08/21  0441   WBC 11.8*   HGB 10.1*   HCT 34.3*   .3*        BMP:   Recent Labs     04/08/21  0441      K 4.7      CO2 18*   BUN 51*   CREATININE 1.6*   CALCIUM 8.7   GLUCOSE 96     PT/INR: No results for input(s): PROTIME, INR in the last 72 hours. APTT:   Recent Labs     04/07/21  2358   APTT 64.6*      Lipids:   Recent Labs     04/06/21  1330   ALKPHOS 64   ALT 17   AST 18   BILITOT 0.3   BILIDIR <0.2   LABALBU 4.2   LIPASE 67.9*     Troponin:   Recent Labs     04/06/21  2139   TROPONINT 0.027*        Imaging:  [unfilled]    EKG:      Diet: DIET CARDIAC; Carb Control: 3 carb choices (45 gms)/meal; Low Sodium (2 GM);  Renal, Low Potassium        Data:   Scheduled Meds: Scheduled Meds:   atorvastatin  40 mg Oral Nightly    clopidogrel  75 mg Oral Daily    dilTIAZem  120 mg Oral Daily    hydrALAZINE  25 mg Oral 3 times per day    isosorbide mononitrate  120 mg Oral Daily    metoprolol succinate  50 mg Oral Daily    ranolazine  500 mg Oral BID    sodium chloride flush  10 mL Intravenous 2 times per day    insulin lispro  0-12 Units Subcutaneous TID WC    insulin lispro  0-6 Units Subcutaneous Nightly    insulin lispro  10 Units Subcutaneous Daily    insulin lispro  7 Units Subcutaneous BID AC    insulin detemir  45 Units Subcutaneous BID     Continuous Infusions:   sodium chloride      dextrose      heparin (PORCINE) Infusion 12 Units/kg/hr (04/07/21 3235)     PRN Meds:.sodium chloride flush, sodium chloride, promethazine **OR** ondansetron, acetaminophen **OR** acetaminophen, glucose, dextrose, glucagon (rDNA), dextrose, heparin (porcine), heparin (porcine)  Continuous Infusions:   sodium chloride      dextrose      heparin (PORCINE) Infusion 12 Units/kg/hr (04/07/21 1281)         Assessment/Plan   1. Chest Pain  2. MADI on CKD  3. Hyperkalemia   4. RICCARDO  5. CHF   6. Atrial fibrillation   7. DM2  8. Leukocytosis   9.  DVT prophylaxis     Cont meds  Cath today      Electronically signed by Hardeep Espinal MD on 4/8/2021 at 9:41 AM

## 2021-04-08 NOTE — CARE COORDINATION
4/8/21, 2:14 PM EDT    DISCHARGE ON GOING [address] day: 2  Location: -31/031-A Reason for admit: Acute kidney injury superimposed on CKD ([facility] [N17.9, N18.9]   Procedure: 4/7 Stress test - Negative for ischemia. Patient had chest pain during the stress and required 3 doses of sublingual NTG for complete resolution. 4/8 Cardiac cath - scheduled for today. Barriers to Discharge: Pt's stress test was negative, but he had chest pain that required NTG during the stress. Planning heart cath today. Heparin gtt continues. BUN 51, creatinine 1.6. WBC 11.8 and Hgb 10.1. IVF. Mucomyst bid. Lipitor, Plavix, Cardizem CD, Hydralazine, Diabetes management, Imdur, Toprol XL, Ranexa. PCP: Janine Bravo MD  Readmission Risk Score: 24%  Patient Goals/Plan/Treatment Preferences: Plans home with wife (who is retired RN). Monitor for needs.

## 2021-04-09 VITALS
TEMPERATURE: 98.4 F | BODY MASS INDEX: 32.35 KG/M2 | RESPIRATION RATE: 20 BRPM | WEIGHT: 218.4 LBS | SYSTOLIC BLOOD PRESSURE: 138 MMHG | OXYGEN SATURATION: 100 % | HEIGHT: 69 IN | DIASTOLIC BLOOD PRESSURE: 52 MMHG | HEART RATE: 66 BPM

## 2021-04-09 LAB
ANION GAP SERPL CALCULATED.3IONS-SCNC: 9 MEQ/L (ref 8–16)
BUN BLDV-MCNC: 41 MG/DL (ref 7–22)
CALCIUM SERPL-MCNC: 8.6 MG/DL (ref 8.5–10.5)
CHLORIDE BLD-SCNC: 109 MEQ/L (ref 98–111)
CO2: 20 MEQ/L (ref 23–33)
CREAT SERPL-MCNC: 1.8 MG/DL (ref 0.4–1.2)
GFR SERPL CREATININE-BSD FRML MDRD: 37 ML/MIN/1.73M2
GLUCOSE BLD-MCNC: 107 MG/DL (ref 70–108)
GLUCOSE BLD-MCNC: 115 MG/DL (ref 70–108)
POTASSIUM SERPL-SCNC: 4.7 MEQ/L (ref 3.5–5.2)
SODIUM BLD-SCNC: 138 MEQ/L (ref 135–145)

## 2021-04-09 PROCEDURE — 80048 BASIC METABOLIC PNL TOTAL CA: CPT

## 2021-04-09 PROCEDURE — 36415 COLL VENOUS BLD VENIPUNCTURE: CPT

## 2021-04-09 PROCEDURE — 82948 REAGENT STRIP/BLOOD GLUCOSE: CPT

## 2021-04-09 PROCEDURE — 99232 SBSQ HOSP IP/OBS MODERATE 35: CPT | Performed by: PHYSICIAN ASSISTANT

## 2021-04-09 PROCEDURE — 92920 PRQ TRLUML C ANGIOP 1ART&/BR: CPT | Performed by: INTERNAL MEDICINE

## 2021-04-09 PROCEDURE — 6370000000 HC RX 637 (ALT 250 FOR IP): Performed by: INTERNAL MEDICINE

## 2021-04-09 PROCEDURE — 99232 SBSQ HOSP IP/OBS MODERATE 35: CPT | Performed by: INTERNAL MEDICINE

## 2021-04-09 PROCEDURE — 93455 CORONARY ART/GRFT ANGIO S&I: CPT | Performed by: INTERNAL MEDICINE

## 2021-04-09 PROCEDURE — 6360000002 HC RX W HCPCS: Performed by: PHYSICIAN ASSISTANT

## 2021-04-09 RX ORDER — ASPIRIN 81 MG/1
81 TABLET, CHEWABLE ORAL DAILY
Qty: 30 TABLET | Refills: 0 | Status: SHIPPED | OUTPATIENT
Start: 2021-04-10 | End: 2021-06-25 | Stop reason: ALTCHOICE

## 2021-04-09 RX ORDER — ASPIRIN 81 MG/1
81 TABLET, CHEWABLE ORAL DAILY
Status: DISCONTINUED | OUTPATIENT
Start: 2021-04-09 | End: 2021-04-09 | Stop reason: HOSPADM

## 2021-04-09 RX ADMIN — RANOLAZINE 500 MG: 500 TABLET, FILM COATED, EXTENDED RELEASE ORAL at 09:26

## 2021-04-09 RX ADMIN — ACETYLCYSTEINE 1200 MG: 200 SOLUTION ORAL; RESPIRATORY (INHALATION) at 00:34

## 2021-04-09 RX ADMIN — DILTIAZEM HYDROCHLORIDE 120 MG: 120 CAPSULE, EXTENDED RELEASE ORAL at 09:26

## 2021-04-09 RX ADMIN — ACETYLCYSTEINE 1200 MG: 200 SOLUTION ORAL; RESPIRATORY (INHALATION) at 09:28

## 2021-04-09 RX ADMIN — METOPROLOL SUCCINATE 50 MG: 50 TABLET, FILM COATED, EXTENDED RELEASE ORAL at 09:26

## 2021-04-09 RX ADMIN — HYDRALAZINE HYDROCHLORIDE 25 MG: 25 TABLET, FILM COATED ORAL at 06:41

## 2021-04-09 RX ADMIN — ISOSORBIDE MONONITRATE 120 MG: 60 TABLET ORAL at 09:26

## 2021-04-09 RX ADMIN — CLOPIDOGREL BISULFATE 75 MG: 75 TABLET ORAL at 09:26

## 2021-04-09 RX ADMIN — ASPIRIN 81 MG: 81 TABLET, CHEWABLE ORAL at 09:26

## 2021-04-09 ASSESSMENT — PAIN SCALES - GENERAL: PAINLEVEL_OUTOF10: 0

## 2021-04-09 NOTE — OP NOTE
[address] [address]  Sedation/Analgesia Post Sedation Record        Pt Name: Cynthia Castro  MRN: [id number]  YOB: 1945  Procedure Performed By: Johnnie Duckworth MD, [address]  Primary Care Physician: Alvaro Landaverde MD    POST-PROCEDURE                                  Sedation/Anesthesia:  Local Anesthesia and IV Conscious Sedation with continuous O2 monitoring    Estimated Blood Loss: 10 cc     Specimens Removed:  [x]None []Other:      Disposition of Specimen:  []Pathology []Other        Complications:   [x]None Immediate []Other:         Procedure Performed:  Left heart cath and cutting balloon angioplasty to RCA In-stent Restenosis    Post Procedure Diagnosis/Findings:  Coronary Artery Disease   Patent LIMA To LAD  Occluded SVG to OM  Occluded SVG to RCA       Recommendations:    Transfer to Tele unit   DAPT    Lipid lowering therapy   If symptoms persist, consider PCI of proximal LCx and OM   Cardiac rehab   Monitor access site closely for bleeding   IV Fluids    All questions and concerns were addressed and patient is in agreement with plan.                  Johnnie Duckworth MD, [address]  Electronically signed 4/9/2021 at 8:26 AM

## 2021-04-09 NOTE — DISCHARGE SUMMARY
Discharge Summary  4/9/2021  11:35 AM    Patient:  Ricardo Brown  YOB: 1945    MRN: [id number]   Acct: [de-identified]   3B-31/031-A   Primary Care Physician: Malgorzata Glass MD    Admit date:  4/6/2021    Discharge date:  4/9/2021    Discharge Diagnoses:    Patient Active Problem List   Diagnosis    CKD (chronic kidney disease) stage 3, GFR 30-59 ml/min ([facility])    S/P CABG (coronary artery bypass graft)    Hyperlipidemia    Renal artery stenosis ([facility])    Obstructive sleep apnea on CPAP    Coronary artery disease of native artery of native heart with stable angina pectoris ([facility])    Type 2 diabetes mellitus with circulatory disorder ([facility])    Status post angioplasty with stent    Elevated troponin    Shortness of breath    Wheezing    Asthma    Essential hypertension    Obesity (BMI 30-39. 9)    Elevated PSA    Hypertrophy of prostate with urinary obstruction    Adenomatous colon polyp    Angina, class III ([facility])    Hx of atrial fibrillation without current medication    Bilateral carotid artery stenosis    Hx of nonmelanoma skin cancer    Acute kidney injury superimposed on CKD [facility])       Discharge Medications:       Anat Hang   Home Medication Instructions [facility]:[phone]    Printed on:04/09/21 1134   Medication Information                      Accu-Chek FastClix Lancets MISC  USE TO TEST BLOOD SUGARS 4 TIMES DAILY             ALPRAZolam (XANAX) 0.25 MG tablet  Take 0.25 mg by mouth 3 times daily as needed for Anxiety.              apixaban (ELIQUIS) 5 MG TABS tablet  TAKE 1 TABLET BY MOUTH  TWICE A DAY             aspirin 81 MG chewable tablet  Take 1 tablet by mouth daily for 7 days             atorvastatin (LIPITOR) 40 MG tablet  Take 1 tablet by mouth nightly             BiPAP Machine MISC  by Does not apply route             blood glucose test strips (ACCU-CHEK COMPACT PLUS) strip  DX E11.59, CHECK THE GLUCOSE 4 TIMES DAILY             clopidogrel (PLAVIX) 75 MG 75 mg Oral Daily    dilTIAZem  120 mg Oral Daily    hydrALAZINE  25 mg Oral 3 times per day    isosorbide mononitrate  120 mg Oral Daily    metoprolol succinate  50 mg Oral Daily    ranolazine  500 mg Oral BID    insulin lispro  0-12 Units Subcutaneous TID WC    insulin lispro  0-6 Units Subcutaneous Nightly    insulin lispro  10 Units Subcutaneous Daily    insulin lispro  7 Units Subcutaneous BID AC    insulin detemir  45 Units Subcutaneous BID     Continuous Infusions:   sodium chloride      sodium chloride 50 mL/hr at 04/08/21 1906    sodium chloride      dextrose       PRN Meds:.sodium chloride flush, sodium chloride, sodium chloride, promethazine **OR** ondansetron, acetaminophen **OR** acetaminophen, glucose, dextrose, glucagon (rDNA), dextrose  Continuous Infusions:   sodium chloride      sodium chloride 50 mL/hr at 04/08/21 1906    sodium chloride      dextrose         Intake/Output Summary (Last 24 hours) at 4/9/2021 1135  Last data filed at 4/9/2021 0400  Gross per 24 hour   Intake 2070 ml   Output 0 ml   Net 2070 ml       Diet:  DIET CARDIAC; Carb Control: 3 carb choices (45 gms)/meal; Low Sodium (2 GM);  Renal, Low Potassium    Activity:  Activity as tolerated (Patient may move about with assist as indicated or with supervision.)    Follow-up:  in the next few weeks with Lata Peralta MD,  in 1 weeks With Dr Tony Ley     Consultants:  Cardiology, Nephrology    Procedures:  Stress Test, Cardiac Cath      Objective:  Lab Results   Component Value Date    WBC 11.8 04/08/2021    RBC 3.32 04/08/2021    RBC 3.80 03/24/2012    HGB 10.1 04/08/2021    HCT 34.3 04/08/2021    .3 04/08/2021    MCH 30.4 04/08/2021    MCHC 29.4 04/08/2021    RDW 13.7 06/07/2018     04/08/2021    MPV 9.8 04/08/2021     Lab Results   Component Value Date     04/09/2021    K 4.7 04/09/2021    K 4.7 03/14/2019     04/09/2021    CO2 20 04/09/2021    BUN 41 04/09/2021    CREATININE 1.8 04/09/2021    GLUCOSE 115 04/09/2021    GLUCOSE 169 03/24/2012    CALCIUM 8.6 04/09/2021     Lab Results   Component Value Date    CALCIUM 8.6 04/09/2021     No results found for: IONCA  Lab Results   Component Value Date    MG 2.4 04/07/2021     No results found for: PHOS  Lab Results   Component Value Date    .4 (H) 11/03/2012     Lab Results   Component Value Date    ALKPHOS 64 04/06/2021    ALT 17 04/06/2021    AST 18 04/06/2021    PROT 7.0 04/06/2021    BILITOT 0.3 04/06/2021    BILITOT NEGATIVE 06/18/2018    BILIDIR <0.2 04/06/2021    LABALBU 4.2 04/06/2021    LABALBU 4.5 03/24/2012     Lab Results   Component Value Date    LACTA 1.1 11/06/2018     No results found for: AMYLASE  Lab Results   Component Value Date    LIPASE 67.9 04/06/2021     Lab Results   Component Value Date    CHOL 138 01/24/2020    TRIG 167 01/24/2020    HDL 31 01/24/2020    LDLCALC 74 01/24/2020     Recent Labs     04/08/21  1819 04/08/21  2033 04/09/21  0823   POCGLU 122* 148* 107     No results for input(s): CKTOTAL, CKMB, TROPONINI in the last 72 hours. Lab Results   Component Value Date    LABA1C 7.0 04/06/2021     Lab Results   Component Value Date    INR 1.18 04/08/2021    PROTIME 17.2 09/10/2016     No results found for: PHART, PO2ART, [id number], [id number], [facility] Course: clinical course has improved, Pt was admitted on 4/6/21 for chest pain with elevated troponin and MADI. Pt was placed on a heparin drip and IVF. Nephrology saw while inpatient and held diuretics and Entresto and provided fluids and kidney function returned to baseline. Cardiology saw for chest pain and elevated troponin and performed a stress test and eventually a cardiac cath where the RCA was ballooned. IVF d/c after cardiac cath and kidney function cont at baseline. Pt is now free from chest pain and is ok to discharge per consultants. He is to follow up with Dr Yahir Pritchett, his home cardiologist, in 1 week.       Vitals: BP (!) 142/48   Pulse 62 Temp 98.4 °F (36.9 °C) (Oral)   Resp 20   Ht 5' 9\" (1.753 m)   Wt 218 lb 6.4 oz (99.1 kg)   SpO2 100%   BMI 32.25 kg/m²   Physical Exam:  General appearance - alert, well appearing, and in no distress  Eyes - pupils equal and reactive, extraocular eye movements intact  Neck - supple, no significant adenopathy  Chest - clear to auscultation, no wheezes, rales or rhonchi, symmetric air entry  Heart - normal rate, regular rhythm, normal S1, S2, no murmurs, rubs, clicks or gallops  Abdomen - soft, nontender, nondistended, no masses or organomegaly pos bs:   Neurological - alert, oriented, normal speech, no focal findings or movement disorder noted  Extremities - peripheral pulses normal, no pedal edema,       Disposition: home    Condition: Stable    Over 35 minutes spent on encounter  Assessment and plan of care discussed with supervising physician, Dr Jung Bates. Maebelle Schilder, CNP     Copy: Primary Care Physician: Silvia Colon MD  Internal Medicine    I have discussed the case, including pertinent history and exam findings with the NP. I have seen and examined the patient and the key elements of the encounter have been performed by me. I agree with the assessment, plan and orders as documented by the NP.     Electronically signed by Erin Rodriguez MD on 4/9/2021 at 12:39 PM

## 2021-04-09 NOTE — PROGRESS NOTES
Cardiology Progress Note      Patient:  Andrew Pond  YOB: 1945  MRN: [id number]   Acct: [de-identified]  Admit Date:  4/6/2021  Primary Cardiologist: dr Alexia Kumari    Note per dr Mary Duorn:  Presentation with irregular heart beat and  chest pain.     HISTORY OF PRESENT ILLNESS:  This is a 70-year-old  gentleman  with past medical history of coronary artery disease with coronary  artery bypass in 1998, multiple stent and most recent in 2019;  hypertension; hyperlipidemia; chronic kidney disease; congestive heart  failure; atrial fibrillation, status post ablation in 2013; CHF; and  diabetes, presented with chest pain to the emergency room on 04/06/2021. The patient actually had chest pain a day before the presentation that  is on 04/05/2021. The chest pain was retrosternal pressure, heaviness,  like 6-7/10, and lasted for 15 minutes. He took like 2 to 3 doses of  sublingual nitroglycerin, with that the chest pain resolved, but it was  not radiating, not associated with shortness of breath, diaphoresis,  nausea, or vomiting. He gets these type of chest pain once in a while  and usually, he gets chest pain when he does stuff or when he is exposed  to the cold temperature.     So, he came in and found to be actually in acute kidney injury on  chronic kidney disease, and diuretics was held and admitted with a  diagnostic impression of chest pain, acute kidney injury on chronic  kidney disease. Cardiology evaluation was sought for further evaluation  and management. There is a minimal troponin in the patient as well. Currently, chest pain free and stable on the EKG. He has been noted to  have frequent ventricular ectopic beat, which was in a bigeminy pattern  and today it got better. \"    Subjective (Events in last 24 hours):   Pt awake and alert. NAD. No cp. Has some chronic sob and mild IRVING.   On RA    Objective:   BP (!) 142/48   Pulse 62   Temp 98.4 °F (36.9 °C) (Oral)   Resp 20   Ht 5' 9\" (1.753 m)   Wt 218 lb 6.4 oz (99.1 kg)   SpO2 100%   BMI 32.25 kg/m²        TELEMETRY: nsr, few PVCs, one episode bigeminy this am    Physical Exam:  General Appearance: alert and oriented to person, place and time, in no acute distress  Cardiovascular: normal rate, regular rhythm, normal S1 and S2, no murmurs, rubs, clicks, or gallops, distal pulses intact, no carotid bruits, no JVD  Pulmonary/Chest: clear to auscultation bilaterally- no wheezes, rales or rhonchi, normal air movement, no respiratory distress  Abdomen: soft, non-tender, non-distended, normal bowel sounds, no masses Extremities: no cyanosis, clubbing or edema, pulse   Skin: warm and dry, no rash or erythema  Head: normocephalic and atraumatic  Eyes: pupils equal, round, and reactive to light  Neck: supple and non-tender without mass, no thyromegaly   Musculoskeletal: normal range of motion, no joint swelling, deformity or tenderness  Neurological: alert, oriented, normal speech, no focal findings or movement disorder noted  Right groin - no hematoma, +DP, +PT    Medications:    aspirin  81 mg Oral Daily    acetylcysteine  1,200 mg Oral BID    sodium chloride flush  5-40 mL Intravenous 2 times per day    atorvastatin  40 mg Oral Nightly    clopidogrel  75 mg Oral Daily    dilTIAZem  120 mg Oral Daily    hydrALAZINE  25 mg Oral 3 times per day    isosorbide mononitrate  120 mg Oral Daily    metoprolol succinate  50 mg Oral Daily    ranolazine  500 mg Oral BID    insulin lispro  0-12 Units Subcutaneous TID WC    insulin lispro  0-6 Units Subcutaneous Nightly    insulin lispro  10 Units Subcutaneous Daily    insulin lispro  7 Units Subcutaneous BID AC    insulin detemir  45 Units Subcutaneous BID      sodium chloride      sodium chloride 50 mL/hr at 04/08/21 1906    sodium chloride      dextrose      heparin (PORCINE) Infusion 14 Units/kg/hr (04/08/21 1237)     sodium chloride flush, 5-40 mL, PRN  sodium chloride, 25 mL, PRN  sodium chloride, 25 mL, PRN  promethazine, 12.5 mg, Q6H PRN    Or  ondansetron, 4 mg, Q6H PRN  acetaminophen, 650 mg, Q6H PRN    Or  acetaminophen, 650 mg, Q6H PRN  glucose, 15 g, PRN  dextrose, 12.5 g, PRN  glucagon (rDNA), 1 mg, PRN  dextrose, 100 mL/hr, PRN  heparin (porcine), 80 Units/kg, PRN  heparin (porcine), 40 Units/kg, PRN        Diagnostics:  Stress test 4/7/21   ECG Findings   No ischemic EKG changes. Arrhythmias   Isolated PVC's at peak. Symptoms   After test completed, patient complained of substernal chest pain rated   \"5 / 10\", nonradiating with obvious dyspnea. Dr. Marzella Cordon called and patient   given 0.4 mg SL NTG. at 1454 . 1457 /60 . at 1459 Pain still \"5/10\"   2nd 0.4 mg SL NTG given. At 1504 Pain rated \"3\" and 3rd 0.4mg SL NTG   given. 1510 Pain rated \"2/10\" and Dr. Kirstie Hauser update. No further orders   received. Also 3B RN updated. Stress Interpretation   No stress induced arrhythmia. Patient had chest pain during the stress and required 3 doses of   sublingual NTG for complete resolution   Lexiscan EKG stress test is not suggestive for ischemia. Imaging Protocols      Rest                                Stress      Isotope:Tc-99m Sestamibi            Isotope: Tc-99m Sestamibi   Isotope dose:9.2 mCi                Isotope dose:31 mCi   Date:04/07/2021 13:20               Date:04/07/2021 14:40      Technique:           SPECT          Technique:           Gated                                                            SPECT     Imaging Results:Calculated gated LVEF 38 %. There is mild attenuation artifact noted in the inferior wall seems to be  related to bowel artifact. The T.I.D. ratio was 1.10 . There was a moderate sized, moderately severe,  fixed myocardial perfusion defect of the inferior , inferolateral and  lateral walls with hypokinesis suggestive for old infarction.   There was a small sized, mildly severe, partially reversible 04/07/2021       PT/INR:    Lab Results   Component Value Date    PROTIME 17.2 09/10/2016    INR 1.18 04/08/2021       HgBA1c:    Lab Results   Component Value Date    LABA1C 7.0 04/06/2021       FLP:    Lab Results   Component Value Date    TRIG 167 01/24/2020    HDL 31 01/24/2020    LDLCALC 74 01/24/2020       TSH:    Lab Results   Component Value Date    TSH 2.260 09/11/2018         Assessment:    S/p chest pain   Elevated troponins - flat - chronically elevated  Abn stress test 4/7/21 - details above    S/p cath 4/8/21 -  Patent WILLAMS To LAD  Occluded SVG to OM  Occluded SVG to RCA   S/p cutting balloon angioplasty to RCA In-stent Restenosis    Ef 50-55 per TTE 3/25/21  Hx CAD - s/p CABG, PCIs  HTN  Hx PAF  Dyslipidemia  DM  MADI/CKD - improved, renal following  Hyperkalemia - resolved    Plan:    Cont asa/plavix/statin/BB/cdz/imdur/ranexa/hydralazine  No ace/arb due to MADI/CKD/hyperkalemia  Diuretics per renal  Stop aspirin 1 week to avoid triple therapy  If symptoms persist, consider PCI prox LCx and OM  Cardiac rehab  Sl ntg upon dc  Resume home radha  F/up dr Usman Lerma 1 week  Will see prn         Electronically signed by Sofía Coronado PA-C on 4/9/2021 at 10:04 AM

## 2021-04-09 NOTE — PROGRESS NOTES
[facility]         Renal Inpatient Follow-Up note         4/9/2021 8:09 AM    Pt Name:   Cynthia Castro  Birthdate:   [birth year]  Attending:   Becky Duran MD    Chief Complaint : Cynthia Csatro is a 68 y.o. male being followed by nephrology for acute kidney injury/CKD    Interval History :   Patient seen and examined by me. No distress  Feels the same denies any chest pains or significant shortness of breath. Overall stable. Had angioplasty done yesterday     Scheduled Medications :    acetylcysteine  1,200 mg Oral BID    sodium chloride flush  5-40 mL Intravenous 2 times per day    atorvastatin  40 mg Oral Nightly    clopidogrel  75 mg Oral Daily    dilTIAZem  120 mg Oral Daily    hydrALAZINE  25 mg Oral 3 times per day    isosorbide mononitrate  120 mg Oral Daily    metoprolol succinate  50 mg Oral Daily    ranolazine  500 mg Oral BID    insulin lispro  0-12 Units Subcutaneous TID WC    insulin lispro  0-6 Units Subcutaneous Nightly    insulin lispro  10 Units Subcutaneous Daily    insulin lispro  7 Units Subcutaneous BID AC    insulin detemir  45 Units Subcutaneous BID      sodium chloride      sodium chloride 50 mL/hr at 04/08/21 1906    sodium chloride      dextrose      heparin (PORCINE) Infusion 14 Units/kg/hr (04/08/21 1237)       Vitals :  BP (!) 160/36   Pulse 71   Temp 98.5 °F (36.9 °C) (Oral)   Resp 20   Ht 5' 9\" (1.753 m)   Wt 218 lb 6.4 oz (99.1 kg)   SpO2 95%   BMI 32.25 kg/m²     24HR INTAKE/OUTPUT:      Intake/Output Summary (Last 24 hours) at 4/9/2021 0809  Last data filed at 4/9/2021 0400  Gross per 24 hour   Intake 2220 ml   Output 0 ml   Net 2220 ml     Last 3 weights  Wt Readings from Last 3 Encounters:   04/09/21 218 lb 6.4 oz (99.1 kg)   03/29/21 214 lb 6 oz (97.2 kg)   03/23/21 220 lb 6.4 oz (100 kg)           Physical Exam :  General Appearance:  Well developed.  No distress  Mouth/Throat:  Oral mucosa moist  Neck:  Supple, no JVD  Lungs:  Breath sounds: clear  Heart[de-identified]  S1,S2 heard  Abdomen:  Soft, non - tender  Musculoskeletal:  Edema -no significant edema noted         Last 3 CBC   Recent Labs     04/06/21  1918 04/07/21  0647 04/08/21  0441   WBC 11.7* 10.7 11.8*   RBC 3.48* 3.14* 3.32*   HGB 10.7* 9.7* 10.1*   HCT 34.9* 31.3* 34.3*    187 188     Last 3 CMP  Recent Labs     04/06/21  1330 04/06/21  1330 04/07/21  0647 04/08/21  0441 04/09/21  0359     --  139 138 138   K 5.6*   < > 4.8 4.7 4.7     --  108 109 109   CO2 21*  --  19* 18* 20*   BUN 67*  --  64* 51* 41*   CREATININE 2.4*  --  2.6* 1.6* 1.8*   CALCIUM 9.3  --  8.5 8.7 8.6   LABALBU 4.2  --   --   --   --    BILITOT 0.3  --   --   --   --     < > = values in this interval not displayed. ASSESSMENT / Plan     Assessment    1. Renal -acute kidney injury on chronic kidney disease stage III mostly due to multifactorial etiology  ? Patient does look slightly dry however he was also on Bumex, Entresto  ? Renal function improved with IV fluids currently back to baseline . S/P cardiac cath yesterday. ? Discontinue IV fluids and monitor BMP     2. Electrolytes -within normal limits  3. Essential hypertension running reasonable continue current medications  4. Mild acidosis secondary to MADI closely follow  5. Chest pain being evaluated by cardiology status post cardiac cath with angioplasty4/8/21. .  6. Hx of diabetes mellitus  7. History of severe CAD with almost 15 stents in place  8. Meds reviewed and discussed with patient and nursing staff    Discussed with the patient is discharged today advised to hold the Bumex and Entresto over the weekend resume on Monday. Also will obtain a BMP next week. GRAZYNA Fung D.  [facility].

## 2021-04-09 NOTE — PROGRESS NOTES
Inpatient Cardiac Rehabilitation Consult    Received consult for Phase II Cardiac Rehabilitation. Cardiac Rehab education completed with patient. Pt is not interested at this time. Brochure given.

## 2021-04-12 ENCOUNTER — CARE COORDINATION (OUTPATIENT)
Dept: FAMILY MEDICINE CLINIC | Age: 76
End: 2021-04-12

## 2021-04-19 ENCOUNTER — NURSE ONLY (OUTPATIENT)
Dept: LAB | Age: 76
End: 2021-04-19

## 2021-04-19 ENCOUNTER — OFFICE VISIT (OUTPATIENT)
Dept: FAMILY MEDICINE CLINIC | Age: 76
End: 2021-04-19

## 2021-04-19 VITALS
DIASTOLIC BLOOD PRESSURE: 60 MMHG | HEART RATE: 64 BPM | SYSTOLIC BLOOD PRESSURE: 110 MMHG | WEIGHT: 215.38 LBS | RESPIRATION RATE: 16 BRPM | BODY MASS INDEX: 31.9 KG/M2 | TEMPERATURE: 98.1 F | HEIGHT: 69 IN

## 2021-04-19 DIAGNOSIS — R97.20 ELEVATED PSA: ICD-10-CM

## 2021-04-19 DIAGNOSIS — Z79.4 TYPE 2 DIABETES MELLITUS WITH MICROALBUMINURIA, WITH LONG-TERM CURRENT USE OF INSULIN (HCC): Primary | ICD-10-CM

## 2021-04-19 DIAGNOSIS — R80.9 TYPE 2 DIABETES MELLITUS WITH MICROALBUMINURIA, WITH LONG-TERM CURRENT USE OF INSULIN (HCC): Primary | ICD-10-CM

## 2021-04-19 DIAGNOSIS — E11.29 TYPE 2 DIABETES MELLITUS WITH MICROALBUMINURIA, WITH LONG-TERM CURRENT USE OF INSULIN (HCC): Primary | ICD-10-CM

## 2021-04-19 LAB — PROSTATE SPECIFIC ANTIGEN: 2.88 NG/ML (ref 0–1)

## 2021-04-19 PROCEDURE — 1111F DSCHRG MED/CURRENT MED MERGE: CPT | Performed by: FAMILY MEDICINE

## 2021-04-19 PROCEDURE — 99495 TRANSJ CARE MGMT MOD F2F 14D: CPT | Performed by: FAMILY MEDICINE

## 2021-04-19 NOTE — PROGRESS NOTES
Post-Discharge Transitional Care Management Services or Hospital Follow Up      Jemima January   YOB: 1945    Date of Office Visit:  4/19/2021  Date of Hospital Admission: 4/6/21  Date of Hospital Discharge: 4/9/21  Risk of hospital readmission (high >=14%. Medium >=10%) :Readmission Risk Score: 24      Care management risk score Rising risk (score 2-5) and Complex Care (Scores >=6): 8     Non face to face  following discharge, date last encounter closed (first attempt may have been earlier): 4/13/2021 10:33 AM    Call initiated 2 business days of discharge: Yes    Patient Active Problem List   Diagnosis    CKD (chronic kidney disease) stage 3, GFR 30-59 ml/min ([facility])    S/P CABG (coronary artery bypass graft)    Hyperlipidemia    [facility]    Obstructive sleep apnea on CPAP    Coronary artery disease of native artery of native heart with stable angina pectoris ([facility]    Type 2 diabetes mellitus with circulatory disorder ([facility]    Status post angioplasty with stent    Elevated troponin    Shortness of breath    Wheezing    Asthma    Essential hypertension    Obesity (BMI 30-39. 9)    Elevated PSA    Hypertrophy of prostate with urinary obstruction    Adenomatous colon polyp    Angina, class III ([facility])    Hx of atrial fibrillation without current medication    Bilateral carotid artery stenosis    Hx of nonmelanoma skin cancer    Acute kidney injury superimposed on CKD ([facility]       No Known Allergies    Medications listed as ordered at the time of discharge from hospital   Rivas MICHELLE   Home Medication Instructions MARISOL:    Printed on:04/19/21 1200   Medication Information                      Accu-Chek FastClix Lancets MISC  USE TO TEST BLOOD SUGARS 4 TIMES DAILY             ALPRAZolam (XANAX) 0.25 MG tablet  Take 0.25 mg by mouth 3 times daily as needed for Anxiety.              apixaban (ELIQUIS) 5 MG TABS tablet  TAKE 1 TABLET BY MOUTH  TWICE A DAY aspirin 81 MG chewable tablet  Take 1 tablet by mouth daily for 7 days             atorvastatin (LIPITOR) 40 MG tablet  Take 1 tablet by mouth nightly             BiPAP Machine MISC  by Does not apply route             blood glucose test strips (ACCU-CHEK COMPACT PLUS) strip  DX E11.59, CHECK THE GLUCOSE 4 TIMES DAILY             clopidogrel (PLAVIX) 75 MG tablet  Take 75 mg by mouth daily morning             dilTIAZem (CARDIZEM CD) 120 MG extended release capsule  TAKE 1 CAPSULE BY MOUTH  DAILY             diphenhydrAMINE-APAP, sleep, (TYLENOL PM EXTRA STRENGTH PO)  Take 1 tablet by mouth every evening             fluocinonide (LIDEX) 0.05 % cream  Apply 1 applicator topically as needed             hydrALAZINE (APRESOLINE) 25 MG tablet  Take 1 tablet by mouth every 8 hours             hydrOXYzine (ATARAX) 10 MG tablet  Take 10 mg by mouth daily              insulin detemir (LEVEMIR FLEXTOUCH) 100 UNIT/ML injection pen  50  units am and pm.             insulin lispro (HUMALOG) 100 UNIT/ML injection vial  Inject into the skin 3 times daily (before meals) 7 units plus sliding scale for both breakfast and lunch, 10 units plus sliding scale for supper             Insulin Pen Needle (NOVOFINE) 32G X 6 MM MISC  USE 1 NEW NEEDLE 6 TIMES  DAILY AND AS NEEDED             isosorbide mononitrate (IMDUR) 120 MG extended release tablet  Take 120 mg by mouth daily              metoprolol succinate (TOPROL XL) 50 MG extended release tablet  TAKE 1 TABLET BY MOUTH  DAILY             Multiple Vitamins-Minerals (THERAPEUTIC MULTIVITAMIN-MINERALS) tablet  Take 1 tablet by mouth nightly             nitroGLYCERIN (NITROLINGUAL) 0.4 MG/SPRAY 0.4 mg spray  USE 1 SPRAY ON OR UNDER THE TONGUE EVERY 5 MINUTES AS NEEDED FOR CHEST PAIN             NOVOFINE 30G X 8 MM MISC  USE 1 NEW NEEDLE 6 TIMES A DAY AND AS NEEDED             ranolazine (RANEXA) 500 MG extended release tablet  Take 500 mg by mouth 2 times daily             sodium chloride normal rate, normal S1 and S2 and no murmurs  Extremities: no cyanosis, no clubbing and no edema  Musculoskeletal: normal range of motion, no joint swelling, deformity or tenderness  Neurologic: gait and coordination normal and speech normal    Assessment/Plan:        Medical Decision Making: high complexity

## 2021-04-21 ENCOUNTER — OFFICE VISIT (OUTPATIENT)
Dept: UROLOGY | Age: 76
End: 2021-04-21
Payer: MEDICARE

## 2021-04-21 VITALS
HEIGHT: 69 IN | WEIGHT: 218 LBS | SYSTOLIC BLOOD PRESSURE: 118 MMHG | BODY MASS INDEX: 32.29 KG/M2 | DIASTOLIC BLOOD PRESSURE: 48 MMHG

## 2021-04-21 DIAGNOSIS — N40.1 HYPERTROPHY OF PROSTATE WITH URINARY OBSTRUCTION: ICD-10-CM

## 2021-04-21 DIAGNOSIS — R39.15 URINARY URGENCY: Primary | ICD-10-CM

## 2021-04-21 DIAGNOSIS — N13.8 HYPERTROPHY OF PROSTATE WITH URINARY OBSTRUCTION: ICD-10-CM

## 2021-04-21 DIAGNOSIS — R97.20 ELEVATED PSA: ICD-10-CM

## 2021-04-21 LAB — POST VOID RESIDUAL (PVR): 185 ML

## 2021-04-21 PROCEDURE — 1123F ACP DISCUSS/DSCN MKR DOCD: CPT | Performed by: UROLOGY

## 2021-04-21 PROCEDURE — 1111F DSCHRG MED/CURRENT MED MERGE: CPT | Performed by: UROLOGY

## 2021-04-21 PROCEDURE — 4040F PNEUMOC VAC/ADMIN/RCVD: CPT | Performed by: UROLOGY

## 2021-04-21 PROCEDURE — 99214 OFFICE O/P EST MOD 30 MIN: CPT | Performed by: UROLOGY

## 2021-04-21 PROCEDURE — 51798 US URINE CAPACITY MEASURE: CPT | Performed by: UROLOGY

## 2021-04-21 PROCEDURE — G8417 CALC BMI ABV UP PARAM F/U: HCPCS | Performed by: UROLOGY

## 2021-04-21 PROCEDURE — 1036F TOBACCO NON-USER: CPT | Performed by: UROLOGY

## 2021-04-21 PROCEDURE — G8427 DOCREV CUR MEDS BY ELIG CLIN: HCPCS | Performed by: UROLOGY

## 2021-04-21 RX ORDER — TAMSULOSIN HYDROCHLORIDE 0.4 MG/1
0.4 CAPSULE ORAL DAILY
Qty: 90 CAPSULE | Refills: 2 | Status: SHIPPED | OUTPATIENT
Start: 2021-04-21 | End: 2022-01-10

## 2021-04-21 NOTE — PROGRESS NOTES
Dr. Ruth Mcgrath MD  [facility])  Urology Clinic      Patient:  Saray Alex  YOB: 1945  Date: 4/21/2021    HISTORY OF PRESENT ILLNESS:   The patient is a 68 y.o. male who presents today for follow-up for the following problem(s): elevated PSA, history of retention, and BPH with PVR of 185mL. PVR is worsening this year. PSA is slightly worse today also, exacerbation. Overall the problem(s) : are worsening. Associated Symptoms: No dysuria, gross hematuria. Pain Severity: 0/10    Summary of old records:   Retention requiring lira in 2018. Imaging/Labs reviewed during today's visit:  I have independently reviewed and verified the following films during today's visit.   PSA    Last several PSA's:  Lab Results   Component Value Date    PSA 2.88 (H) 04/19/2021    PSA 1.42 (H) 12/16/2019    PSA 2.76 (H) 11/23/2018       Last total testosterone:  No results found for: TESTOSTERONE    Urinalysis today:  Results for POC orders placed in visit on 04/21/21   poct post void residual   Result Value Ref Range    post void residual 185 ml       Last BUN and creatinine:  Lab Results   Component Value Date    BUN 41 (H) 04/09/2021     Lab Results   Component Value Date    CREATININE 1.8 (H) 04/09/2021       Imaging Reviewed during this Office Visit:   (results were independently reviewed by physician and radiology report verified)    PAST MEDICAL, FAMILY AND SOCIAL HISTORY UPDATE:  Past Medical History:   Diagnosis Date    Adenomatous colon polyp 2009/2010, 6/'14,7/'17, 9/20    Angina at rest [facility])     Atrial fibrillation ([address] 12/11 and 11/12    cardioversion 12/11    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    BPH (benign prostatic hypertrophy)     CAD (coronary artery disease) 1997    Carotid artery disease ([address] 1995    right    Carotid disease, bilateral ([address]     Cerebral artery occlusion with cerebral infarction ([address]     CHF (congestive heart failure) ([address]  Chronic kidney disease     GERD (gastroesophageal reflux disease)     Hyperlipidemia 1996    Hypertension     Lumbar disc disease 2001    MI (myocardial infarction) ([address] 1997    Nephrolithiasis 2002    NIDDM (non-insulin dependent diabetes mellitus) 1996    diagnosed 1996    RICCARDO (obstructive sleep apnea) 2005    cpap    Renal artery stenosis ([address] 2007    left    Renal insufficiency     S/P CABG (status post coronary artery bypass graft) 1997    5 vessels    Skin cancer     Squamous cell carcinoma     TIA (transient ischemic attack)     Wears partial dentures     upper and lower     Past Surgical History:   Procedure Laterality Date    ABLATION OF DYSRHYTHMIC FOCUS  3/13/13   [address] CARDIAC SURGERY  3/13/13    oblation for irreg rythm    CARDIOVERSION  3/8/2012    CAROTID ENDARTERECTOMY  1997    CHOLECYSTECTOMY  4/1999    COLONOSCOPY  6/22/09, 4/2011,7/12/17    polyp removal    CORONARY ANGIOPLASTY WITH STENT PLACEMENT  2008 (2), 2009 (1)    x2    CORONARY ARTERY BYPASS GRAFT  8/1997    5 vessel    DIAGNOSTIC CARDIAC CATH LAB PROCEDURE      FOOT SURGERY  12/20/2013    pre-cancerous mole rt foot removed    LITHOTRIPSY  4/2002    NV OFFICE/OUTPT VISIT,PROCEDURE ONLY N/A 11/8/2018    TRANSESOPHAGEAL ECHOCARDIOGRAM performed by Jose Miguel Nevarez MD at [address] [place] [address] [facility]  05/30/2018   Alise  2001, 2003    rt shoulder manipulation--frozen shoulder-01, Lt -03    SKIN BIOPSY      SKIN CANCER EXCISION  12/20/13     R foot    SKIN CANCER EXCISION  1/2016    squamous cell Lt  upper cheek    URETER STENT PLACEMENT  4/2002    VASCULAR SURGERY      carotids & cabg harvests     Family History   Problem Relation Age of Onset    Cancer Mother     High Blood Pressure Mother     Stroke Father     High Blood Pressure Father     Heart Disease Brother     Cancer Brother         lung    Cancer Brother         melanoma     Outpatient Medications Marked as Taking for the 4/21/21 encounter (Office Visit) with Chica Toscano MD   Medication Sig Dispense Refill    insulin lispro (HUMALOG) 100 UNIT/ML injection vial Inject into the skin 3 times daily (before meals) 7 units plus sliding scale for both breakfast and lunch, 10 units plus sliding scale for supper      diphenhydrAMINE-APAP, sleep, (TYLENOL PM EXTRA STRENGTH PO) Take 1 tablet by mouth every evening      ALPRAZolam (XANAX) 0.25 MG tablet Take 0.25 mg by mouth 3 times daily as needed for Anxiety.       hydrOXYzine (ATARAX) 10 MG tablet Take 10 mg by mouth daily       fluocinonide (LIDEX) 0.05 % cream Apply 1 applicator topically as needed      BiPAP Machine MISC by Does not apply route      metoprolol succinate (TOPROL XL) 50 MG extended release tablet TAKE 1 TABLET BY MOUTH  DAILY 90 tablet 3    insulin detemir (LEVEMIR FLEXTOUCH) 100 UNIT/ML injection pen 50  units am and pm. (Patient taking differently: Inject 45 Units into the skin 2 times daily ) 90 mL 5    apixaban (ELIQUIS) 5 MG TABS tablet TAKE 1 TABLET BY MOUTH  TWICE A  tablet 3    sodium chloride nebulizer 0.9 % NEBU 30 mL with albuterol (5 MG/ML) 0.5% NEBU Inhale 1 puff into the lungs as needed      atorvastatin (LIPITOR) 40 MG tablet Take 1 tablet by mouth nightly 90 tablet 3    nitroGLYCERIN (NITROLINGUAL) 0.4 MG/SPRAY 0.4 mg spray USE 1 SPRAY ON OR UNDER THE TONGUE EVERY 5 MINUTES AS NEEDED FOR CHEST PAIN 4.9 g 6    dilTIAZem (CARDIZEM CD) 120 MG extended release capsule TAKE 1 CAPSULE BY MOUTH  DAILY 90 capsule 3    Insulin Pen Needle (NOVOFINE) 32G X 6 MM MISC USE 1 NEW NEEDLE 6 TIMES  DAILY AND AS NEEDED 540 each 1    Accu-Chek FastClix Lancets MISC USE TO TEST BLOOD SUGARS 4 TIMES DAILY 360 each 15    blood glucose test strips (ACCU-CHEK COMPACT PLUS) strip DX E11.59, CHECK THE GLUCOSE 4 TIMES DAILY 408 strip 3    Multiple Vitamins-Minerals (THERAPEUTIC MULTIVITAMIN-MINERALS) tablet Take 1 tablet by mouth nightly     

## 2021-04-22 ENCOUNTER — TELEPHONE (OUTPATIENT)
Dept: FAMILY MEDICINE CLINIC | Age: 76
End: 2021-04-22

## 2021-04-27 ENCOUNTER — CARE COORDINATION (OUTPATIENT)
Dept: FAMILY MEDICINE CLINIC | Age: 76
End: 2021-04-27

## 2021-04-28 ENCOUNTER — HOSPITAL ENCOUNTER (OUTPATIENT)
Dept: SLEEP CENTER | Age: 76
Discharge: HOME | End: 2021-04-30
Payer: MEDICARE

## 2021-04-28 ENCOUNTER — TELEPHONE (OUTPATIENT)
Dept: SLEEP CENTER | Age: 76
End: 2021-04-28

## 2021-04-28 DIAGNOSIS — G47.31 COMPLEX SLEEP APNEA SYNDROME: ICD-10-CM

## 2021-04-28 PROCEDURE — 95811 POLYSOM 6/>YRS CPAP 4/> PARM: CPT

## 2021-04-28 NOTE — TELEPHONE ENCOUNTER
I spoke with Katie Ellis and Katie Ellis. We decided to try reaching out to another DME to see if they would accept an ASV titration only and skip the bipap titration since he had failed numerous pressure changes.   They will get back to me regarding the next steps

## 2021-04-28 NOTE — TELEPHONE ENCOUNTER
Good Morning Magnolia Ran,   It was brought to my attention that the PAP re-titration order reads Bipap to ASV. I see his ECHO is complete, could you please direct us to one modality as we are unable to advance from Bipap to ASV per policy established by Dr. Fay Isidro, even when Dx of Complex Sleep Apnea is already made?     Thank you,   Leana Mo

## 2021-04-29 DIAGNOSIS — G47.33 OSA TREATED WITH BIPAP: Primary | ICD-10-CM

## 2021-04-29 LAB — STATUS: NORMAL

## 2021-04-29 NOTE — CARE COORDINATION
Danny Murillo is doing OK. He said he is still short of breath but I reassured him that may still improve as he recovers. He said he is feeling good. His voice was strong and he denied any additional needs at home at this time. I will follow up with him next week.

## 2021-04-29 NOTE — PROGRESS NOTES
[facility] DME for PAP supplies. Title:  CPAP/BiLevel Titration's    Approved by:  Luana Tamez MD      Approval Date:  December, 2019 Next Review:  December, 2021     Responsible Party:  [facility]/Entities Applies to:   Sloan Lee Number:  None    Document Type:  Such as Guideline, Policy, Policy & Procedure, or Procedure, Instructions  Manual:  Policy and Procedures    Section: IV Policy Start Date: October, 2440           POLICY: Positive airway pressure device is used to treat patients with sleep related breathing disorders characterized by full or partial occlusion of the upper airway during sleep. A patient must have undergone polysomnography and diagnosed with obstructive sleep apnea. All individuals who record sleep studies must follow best practices for CPAP/bilevel/ASV titrations in order to attain the ideal pressure setting for their patients. Too low of pressure may cause patients to either be sub-optimally treated or to wake up in a panic. Too much pressure may cause the patient to experience bloating or mask leakage. Determining the appropriate pressure setting for each patient will lead to improved adherence and outcome. Titrations are not an exact science, and it is understood that technologists may need to make minor changes for individual patients. The procedures outlined below are meant to be a guideline and follow the spirit of the AASM clinical guidelines. PROCEDURE:    CPAP:    1. Review the patients pertinent medical al history, previous sleep study or studies to ass the severity of sleep disordered breathing. Review of pertinent information will help to attain a better titration.    2. Applications of electrodes, montages, filters, sensitivities and scoring will be performed according to the current version of the AASM Scoring Manual.   3. Prior to initiating study collect all appropriate PAP needed in patients who are morbidly obese or who have neuromuscular diseases. 8. If apneas or frequent hypopneas are present, inspiratory and expiratory pressure settings should be increased by 2 cm H20. If occasional hypopneas, snoring, or mask flow limitation are present inspiratory and expiratory pressures settings should be increased by 1 cm h20 and maintained for at least 5 minutes to determine if events improve or resolve. Pressure settings may need to be increased more quickly during REM sleep given the limited amount of REM during sleep and the need to treat events during this stage. 9. If a mask leak occurs, the tech should first fix the leakage before raising the pressure. Otherwise, the final pressure setting chosen for the patient may be too high. Once the mask leak has been fixed, decrease the pressure to the last setting where mouth breathing and/or mask leakage was not present, and then re-titrate as indicated. Make sure to document directly on the study the steps taken to resolve the leak and the type of masks used. Pressure setting usually do not need to be set as high with a nasal-mask than with a full-face mask. 10. The recording technologist should document directly on the study at least every 30 minutes. 11. If the patient takes a break from wearing the mask, do not decrease the CPAP pressure on attempted return to sleep unless the patient remains awake for 15 minutes or the patient specifically requests that the pressure be lowered. 12. Do not raise pressure for central apneas. If the patient develops central apneas, pressure setting may need to be lowered. If the patient has central apneas on bilevel, the use of spontaneous (ST) mode may be indicated. a. ST mode may only be used in 2 cases  i. An order for ST mode with a primary diagnosis of central sleep apnea  ii.  During a titration if obstructive events are less than 5/ hour and centrals must be greater than 50% of total respiratory events. 13. Ensure that supine sleep has been seen on the chosen setting. Going above the chosen setting 1 or 2 cm H20 to show range may be helpful to ensure that the correct pressure has been established.

## 2021-04-30 NOTE — PROGRESS NOTES
[address] [place] [address]                               SLEEP STUDY REPORT    PATIENT NAME: Jaiden Cazares                     :        1945  MED REC NO:   [id number]                           ROOM:  ACCOUNT NO:   [de-identified]                           ADMIT DATE: 2021  PROVIDER:     Farhana Arsahd. MD Onur    DATE OF STUDY:  2021    BIPAP RE-TITRATION STUDY REPORT    REFERRING PROVIDER:  Lauren Hussein PA-C    The patient's height is 69 inches, weight is 224 pounds with a BMI of  33.1. HISTORY:  The patient is a 28-year-old gentleman diagnosed with severe  obstructive sleep apnea with an apnea-hypopnea index of 38.5 by sleep  study performed on 2005. The study was done at that time by  Hellen Stewart MD.  The patient currently following with Lauren Hussein PA-C. He had a recent followup with Zaira Lamb PA-C, on  2021. The patient currently using auto BiPAP device with IPAP  maximum of 15 and EPAP minimum of 10 and pressure support of 2 cm of  water. On above BiPAP settings, the patient found to have a residual  apnea-hypopnea index of 17.5.  95th percentile leak was 19.8 liters per  minute. Due to uncontrolled residual apnea-hypopnea index and  associated comorbidities including hypersomnia, the patient requested to  go for auto SV device with suspicion for complex sleep apnea. The  patient had an echocardiogram performed on 2021. The patient's  ejection fraction was found to be 50% to 55%.   The patient was scheduled  for a BiPAP re-titration by Zaira Lamb PA-C.    METHODS:  The patient underwent digital polysomnography in compliance  with the standards and specifications from the AASM Manual including the  simultaneous recording of 3 EEG channels (F4-M1, C4-M1, and O2-M1 with  back up electrodes F3-M2, C3-M2, and O1-M2), 2 EOG channels (E1-M2, and  E2-M1,), EMG (chin, left & right leg), EKG, Nonin pulse oximetry with  less than 2 second averaging time, body position, airflow recorded by  oral-nasal thermal sensor and nasal air pressure transducer, plus  respiratory effort recorded by calibrated respiratory inductance  plethysmography (RIP), flow volume loop, sound and video. Sleep staging  and scoring followed the standard put forth by the American Academy of  Sleep Medicine and utilized the 4A obstructive hypopnea event  desaturation of 4 percent or greater. INTERPRETATION:  This is a BiPAP re-titration study, and the study was  performed on 04/28/2021. The study was started at 09:37 p.m. and was  terminated at 05:08 a.m. with a total recording time of 451.5 minutes,  sleep period time was 445.7 minutes, total sleep time was 343.2 minutes,  and overall sleep efficiency was 76%. The sleep onset latency was 5.8  minutes, and wake after sleep onset was 102.5 minutes. REM sleep  latency was 182.5 minutes. SLEEP STAGING AND DISTRIBUTION SUMMARY:  Revealed the patient spent 84.7  minutes in stage I consisting of 24.7%, 203 minutes in stage II  consisting of 59.2%, 55.5 minutes in REM sleep consisting of 16.2% of  total sleep time. The patient had absent slow-wave sleep. BIPAP RE-TITRATION STUDY:  The BiPAP re-titration study was started with  a BiPAP pressure of 8/4 cm of water, and the BiPAP pressure was  gradually increased to a BiPAP pressure of 20/14 cm of water. At a  BiPAP pressure of 20/14 cm of water, the patient spent 1 hour 28 minutes  in bed. Out of 1 hour 28 minutes, the patient slept for a period of 2  minutes in REM sleep and 1 hour 5 minutes in non-REM sleep. At a BiPAP  pressure of 20/14 cm of water, the patient had a total of 4 central  apneas and 2 obstructive hypopneas with an apnea-hypopnea index of 5. The maximum oxygen desaturation recorded at this pressure was 92% with a  mean oxygen saturation of 95.5%.   This titration was performed on room air.    PERIODIC LIMB MOVEMENT ANALYSIS:  Revealed the patient had a total of  185 periodic limb movements. Out of 185, 7 of them were associated with  arousals with a PLM index of 32.3. PLM arousal index is 1.2. The  patient had a total of 34 spontaneous arousals with a spontaneous  arousal index of 5.9. EKG MONITORING:  Revealed atrial fibrillation with controlled  ventricular response. The patient had occasional premature ventricular  contractions. IMPRESSION:  1. Severe obstructive sleep apnea diagnosed by a sleep study performed  at [facility] on 05/31/2005. The study was ordered by  Alix Kimball MD.  The patient had optimal re-titration to a BiPAP  pressure of 20/14 cm of water with room air. 2.  Significant periodic limb movements with associated rare arousals. 3.  Chronic atrial fibrillation, on anticoagulation therapy. 4.  Coronary artery disease, status post bypass surgery. The patient  also underwent stent placement. 5.  Cerebrovascular accident. 6.  Hypertension. RECOMMENDATIONS:  1. For the patient's sleep disordered breathing, we recommend changing  his current auto BiPAP device to fixed BiPAP pressure settings with  pressure of 20/14 cm of water with room air. 2.  Specific recommendations include the patient's choice of interface  was large-size Simplus mask. 3.  The patient had an echocardiogram performed on 03/25/2021 with  ejection fraction 50% to 55%. 4.  The patient should be scheduled for followup with Ms. Thi Sotelo' clinic in six to eight weeks. I recommended BiPAP pressures for  clinical reevaluation with review of download. Thanks to Thi Sotelo PA-C, for giving me this opportunity to  participate in the care of this pleasant gentleman.         Kalani Means MD    D: 04/29/2021 18:06:32       T: 04/30/2021 5:16:42     [email or web address]  Job#: [id number]     Doc#: [id number]    CC:

## 2021-05-03 ENCOUNTER — CARE COORDINATION (OUTPATIENT)
Dept: FAMILY MEDICINE CLINIC | Age: 76
End: 2021-05-03

## 2021-05-03 ENCOUNTER — TELEPHONE (OUTPATIENT)
Dept: SLEEP CENTER | Age: 76
End: 2021-05-03

## 2021-05-03 NOTE — TELEPHONE ENCOUNTER
Faxed Pressure change  setup order to [facility] per patient's request.      Kamryn Field  5/3/2021

## 2021-05-03 NOTE — CARE COORDINATION
Yenny Elder had a sleep study done and they are waiting to hear about that. He is still SOB on exertion but he said that Dr La Morton told him that will not change. Overall his wife Juwan Leos stated he is doing good. He was getting ready to go to work at this time so he did not talk to me. He takes his medications as ordered and has no additional home needs at this time.

## 2021-05-09 PROBLEM — R79.89 ELEVATED TROPONIN: Status: RESOLVED | Noted: 2018-06-03 | Resolved: 2021-05-09

## 2021-05-09 PROBLEM — R77.8 ELEVATED TROPONIN: Status: RESOLVED | Noted: 2018-06-03 | Resolved: 2021-05-09

## 2021-05-10 ENCOUNTER — CARE COORDINATION (OUTPATIENT)
Dept: FAMILY MEDICINE CLINIC | Age: 76
End: 2021-05-10

## 2021-05-10 NOTE — CARE COORDINATION
Spoke with Cj Mckeon who stated that Laureen Zimmerman is doing well and he has no additional needs at this time. He keeps his follow up appointments with his physicians and takes his medications as ordered. She could not talk long because her home health nurse just arrived.

## 2021-05-12 ENCOUNTER — TELEPHONE (OUTPATIENT)
Dept: PULMONOLOGY | Age: 76
End: 2021-05-12

## 2021-05-12 NOTE — TELEPHONE ENCOUNTER
Patient states he feels his pap is not working correctly. He states he is not feeling well rested, he is taking 2-3 naps a day on a daily basis, and states he has had adjustments made and nothing seems to be helping. Download scanned to media. Please advise.

## 2021-05-19 ENCOUNTER — NURSE ONLY (OUTPATIENT)
Dept: LAB | Age: 76
End: 2021-05-19

## 2021-05-19 LAB
ANION GAP SERPL CALCULATED.3IONS-SCNC: 10 MEQ/L (ref 8–16)
BUN BLDV-MCNC: 27 MG/DL (ref 7–22)
CALCIUM SERPL-MCNC: 8.9 MG/DL (ref 8.5–10.5)
CHLORIDE BLD-SCNC: 104 MEQ/L (ref 98–111)
CO2: 26 MEQ/L (ref 23–33)
CREAT SERPL-MCNC: 1.8 MG/DL (ref 0.4–1.2)
ERYTHROCYTE [DISTWIDTH] IN BLOOD BY AUTOMATED COUNT: 14.9 % (ref 11.5–14.5)
ERYTHROCYTE [DISTWIDTH] IN BLOOD BY AUTOMATED COUNT: 55.3 FL (ref 35–45)
GFR SERPL CREATININE-BSD FRML MDRD: 37 ML/MIN/1.73M2
GLUCOSE BLD-MCNC: 103 MG/DL (ref 70–108)
HCT VFR BLD CALC: 30 % (ref 42–52)
HEMOGLOBIN: 9 GM/DL (ref 14–18)
MCH RBC QN AUTO: 30.8 PG (ref 26–33)
MCHC RBC AUTO-ENTMCNC: 30 GM/DL (ref 32.2–35.5)
MCV RBC AUTO: 102.7 FL (ref 80–94)
PLATELET # BLD: 236 THOU/MM3 (ref 130–400)
PMV BLD AUTO: 10.5 FL (ref 9.4–12.4)
POTASSIUM SERPL-SCNC: 4 MEQ/L (ref 3.5–5.2)
RBC # BLD: 2.92 MILL/MM3 (ref 4.7–6.1)
SODIUM BLD-SCNC: 140 MEQ/L (ref 135–145)
WBC # BLD: 8.3 THOU/MM3 (ref 4.8–10.8)

## 2021-05-20 ENCOUNTER — TELEPHONE (OUTPATIENT)
Dept: NEPHROLOGY | Age: 76
End: 2021-05-20

## 2021-05-20 DIAGNOSIS — N18.31 ANEMIA IN STAGE 3A CHRONIC KIDNEY DISEASE (HCC): ICD-10-CM

## 2021-05-20 DIAGNOSIS — D63.1 ANEMIA IN STAGE 3A CHRONIC KIDNEY DISEASE (HCC): ICD-10-CM

## 2021-05-20 DIAGNOSIS — N18.31 STAGE 3A CHRONIC KIDNEY DISEASE (HCC): ICD-10-CM

## 2021-05-20 DIAGNOSIS — I10 ESSENTIAL HYPERTENSION: ICD-10-CM

## 2021-05-20 DIAGNOSIS — N18.30 STAGE 3 CHRONIC KIDNEY DISEASE, UNSPECIFIED WHETHER STAGE 3A OR 3B CKD (HCC): Primary | ICD-10-CM

## 2021-05-20 LAB
FERRITIN: 54 NG/ML (ref 22–322)
IRON % SATURATION: 13 % (ref 20–50)
IRON: 30 UG/DL (ref 65–195)
TOTAL IRON BINDING CAPACITY: 223 UG/DL (ref 171–450)

## 2021-05-20 NOTE — TELEPHONE ENCOUNTER
Wife was notified and I did call lab and they can add iron iron sat and ferritin to the sample from yesterday

## 2021-05-20 NOTE — TELEPHONE ENCOUNTER
Wife phoned - dr Corinne Montez ordered labs yesterday and she was told by nurse at dr Yg Alexander office  that his hgb is 9.0 and hct is 30.0 - she was told by the nurse that dr Corinne Montez is out for 2 weeks and he would review the labs when he gets back - but wife phoned and is asking if dr Zachary Felix can review them ?

## 2021-05-20 NOTE — TELEPHONE ENCOUNTER
It is 9.0 compared to 10 prior. However please let wife know that we will order iron studies. Please see if you can add iron, iron stauration and ferritin level to yesterdays labs?

## 2021-05-24 ENCOUNTER — TELEPHONE (OUTPATIENT)
Dept: FAMILY MEDICINE CLINIC | Age: 76
End: 2021-05-24

## 2021-05-24 NOTE — TELEPHONE ENCOUNTER
When I called Cassy Yepez for her follow up she mentioned she was concerned that Wilfrid's feet and legs are having increased swelling. Dr Arpita Cast ordered labs and she would like Dr Betzy Burger  to take a look at them also. She is concerned because Dr Jono De La Fuente took him off his Stacey Schlatter and another medication when he was in the hospital. I told her I would let Dr Betzy Burger know.

## 2021-05-24 NOTE — TELEPHONE ENCOUNTER
With the legs swelling it would be good for her to inform Dr Rick Crawford as I'm not aware of the reason he stopped it. The iron studies seem to show an iron deficiency. She could check with Dr Lisseth Sen office to see if he agrees and what his plan is about that.

## 2021-05-26 DIAGNOSIS — N18.9 ANEMIA OF CHRONIC RENAL FAILURE, UNSPECIFIED CKD STAGE: ICD-10-CM

## 2021-05-26 DIAGNOSIS — N18.30 ANEMIA IN STAGE 3 CHRONIC KIDNEY DISEASE, UNSPECIFIED WHETHER STAGE 3A OR 3B CKD (HCC): ICD-10-CM

## 2021-05-26 DIAGNOSIS — D63.1 ANEMIA OF CHRONIC RENAL FAILURE, UNSPECIFIED CKD STAGE: ICD-10-CM

## 2021-05-26 DIAGNOSIS — D63.1 ANEMIA IN STAGE 3 CHRONIC KIDNEY DISEASE, UNSPECIFIED WHETHER STAGE 3A OR 3B CKD (HCC): ICD-10-CM

## 2021-05-26 RX ORDER — HEPARIN SODIUM (PORCINE) LOCK FLUSH IV SOLN 100 UNIT/ML 100 UNIT/ML
500 SOLUTION INTRAVENOUS PRN
Status: CANCELLED | OUTPATIENT
Start: 2021-05-28

## 2021-05-26 RX ORDER — SODIUM CHLORIDE 9 MG/ML
INJECTION, SOLUTION INTRAVENOUS CONTINUOUS
Status: CANCELLED | OUTPATIENT
Start: 2021-05-28

## 2021-05-26 RX ORDER — SODIUM CHLORIDE 9 MG/ML
25 INJECTION, SOLUTION INTRAVENOUS PRN
Status: CANCELLED | OUTPATIENT
Start: 2021-05-28

## 2021-05-26 RX ORDER — EPINEPHRINE 1 MG/ML
0.3 INJECTION, SOLUTION, CONCENTRATE INTRAVENOUS PRN
Status: CANCELLED | OUTPATIENT
Start: 2021-05-28

## 2021-05-26 RX ORDER — SODIUM CHLORIDE 0.9 % (FLUSH) 0.9 %
5-40 SYRINGE (ML) INJECTION PRN
Status: CANCELLED | OUTPATIENT
Start: 2021-05-28

## 2021-05-26 RX ORDER — METHYLPREDNISOLONE SODIUM SUCCINATE 125 MG/2ML
125 INJECTION, POWDER, LYOPHILIZED, FOR SOLUTION INTRAMUSCULAR; INTRAVENOUS ONCE
Status: CANCELLED | OUTPATIENT
Start: 2021-05-28 | End: 2021-05-28

## 2021-05-26 RX ORDER — DIPHENHYDRAMINE HYDROCHLORIDE 50 MG/ML
50 INJECTION INTRAMUSCULAR; INTRAVENOUS ONCE
Status: CANCELLED | OUTPATIENT
Start: 2021-05-28 | End: 2021-05-28

## 2021-05-28 ENCOUNTER — HOSPITAL ENCOUNTER (OUTPATIENT)
Dept: NURSING | Age: 76
Discharge: HOME | End: 2021-05-28
Payer: MEDICARE

## 2021-05-28 VITALS
HEART RATE: 58 BPM | HEIGHT: 69 IN | DIASTOLIC BLOOD PRESSURE: 63 MMHG | OXYGEN SATURATION: 97 % | SYSTOLIC BLOOD PRESSURE: 138 MMHG | RESPIRATION RATE: 16 BRPM | BODY MASS INDEX: 33.47 KG/M2 | TEMPERATURE: 97.9 F | WEIGHT: 226 LBS

## 2021-05-28 DIAGNOSIS — N18.30 ANEMIA IN STAGE 3 CHRONIC KIDNEY DISEASE, UNSPECIFIED WHETHER STAGE 3A OR 3B CKD (HCC): Primary | ICD-10-CM

## 2021-05-28 DIAGNOSIS — D63.1 ANEMIA IN STAGE 3 CHRONIC KIDNEY DISEASE, UNSPECIFIED WHETHER STAGE 3A OR 3B CKD (HCC): Primary | ICD-10-CM

## 2021-05-28 PROCEDURE — 2580000003 HC RX 258: Performed by: INTERNAL MEDICINE

## 2021-05-28 PROCEDURE — 6360000002 HC RX W HCPCS: Performed by: INTERNAL MEDICINE

## 2021-05-28 PROCEDURE — 96365 THER/PROPH/DIAG IV INF INIT: CPT

## 2021-05-28 RX ORDER — SODIUM CHLORIDE 9 MG/ML
25 INJECTION, SOLUTION INTRAVENOUS PRN
Status: CANCELLED | OUTPATIENT
Start: 2021-06-04

## 2021-05-28 RX ORDER — HEPARIN SODIUM (PORCINE) LOCK FLUSH IV SOLN 100 UNIT/ML 100 UNIT/ML
500 SOLUTION INTRAVENOUS PRN
Status: CANCELLED | OUTPATIENT
Start: 2021-06-04

## 2021-05-28 RX ORDER — SODIUM CHLORIDE 0.9 % (FLUSH) 0.9 %
5-40 SYRINGE (ML) INJECTION PRN
Status: CANCELLED | OUTPATIENT
Start: 2021-06-04

## 2021-05-28 RX ORDER — SODIUM CHLORIDE 9 MG/ML
INJECTION, SOLUTION INTRAVENOUS CONTINUOUS
Status: CANCELLED | OUTPATIENT
Start: 2021-06-04

## 2021-05-28 RX ORDER — DIPHENHYDRAMINE HYDROCHLORIDE 50 MG/ML
50 INJECTION INTRAMUSCULAR; INTRAVENOUS ONCE
Status: CANCELLED | OUTPATIENT
Start: 2021-06-04 | End: 2021-06-04

## 2021-05-28 RX ORDER — METHYLPREDNISOLONE SODIUM SUCCINATE 125 MG/2ML
125 INJECTION, POWDER, LYOPHILIZED, FOR SOLUTION INTRAMUSCULAR; INTRAVENOUS ONCE
Status: CANCELLED | OUTPATIENT
Start: 2021-06-04 | End: 2021-06-04

## 2021-05-28 RX ADMIN — FERRIC CARBOXYMALTOSE INJECTION 750 MG: 50 INJECTION, SOLUTION INTRAVENOUS at 09:51

## 2021-05-28 ASSESSMENT — PAIN SCALES - GENERAL: PAINLEVEL_OUTOF10: 0

## 2021-05-28 ASSESSMENT — PAIN - FUNCTIONAL ASSESSMENT: PAIN_FUNCTIONAL_ASSESSMENT: 0-10

## 2021-05-28 NOTE — PROGRESS NOTES
2909  Patient arrived to \[facility]\"" ambulatory for injectafer. Oriented to room and call light  PT RIGHTS AND RESPONSIBILITIES OFFERED TO PT.   8172  Medication started and he denies complaints    1030  Medication completed and he denies complaints. 1100  Patient denies complaints. Iv removed. Discharge instructions reviewed and he denies questions.   Discharged ambulatory      _M___ Safety:       (Environmental)  Mliss Pretzel to environment   Ensure ID band is correct and in place/ allergy band as needed   Assess for fall risk   Initiate fall precautions as applicable (fall band, side rails, etc.)   Call light within reach   Bed in low position/ wheels locked    _M___ Pain:        Assess pain level and characteristics   Administer analgesics as ordered   Assess effectiveness of pain management and report to MD as needed    _M___ Knowledge Deficit:   Assess baseline knowledge   Provide teaching at level of understanding   Provide teaching via preferred learning method   Evaluate teaching effectiveness    __M__ Hemodynamic/Respiratory Status:       (Pre and Post Procedure Monitoring)   Assess/Monitor vital signs and LOC   Assess Baseline SpO2 prior to any sedation   Obtain weight/height   Assess vital signs/ LOC until patient meets discharge criteria   Monitor procedure site and notify MD of any issues

## 2021-06-03 ENCOUNTER — OFFICE VISIT (OUTPATIENT)
Dept: NEPHROLOGY | Age: 76
End: 2021-06-03
Payer: MEDICARE

## 2021-06-03 VITALS
OXYGEN SATURATION: 98 % | BODY MASS INDEX: 33.37 KG/M2 | DIASTOLIC BLOOD PRESSURE: 41 MMHG | WEIGHT: 226 LBS | SYSTOLIC BLOOD PRESSURE: 132 MMHG | TEMPERATURE: 97.3 F | HEART RATE: 63 BPM

## 2021-06-03 DIAGNOSIS — I10 ESSENTIAL HYPERTENSION: ICD-10-CM

## 2021-06-03 DIAGNOSIS — N18.31 ANEMIA IN STAGE 3A CHRONIC KIDNEY DISEASE (HCC): ICD-10-CM

## 2021-06-03 DIAGNOSIS — D63.1 ANEMIA IN STAGE 3A CHRONIC KIDNEY DISEASE (HCC): ICD-10-CM

## 2021-06-03 DIAGNOSIS — N18.30 STAGE 3 CHRONIC KIDNEY DISEASE, UNSPECIFIED WHETHER STAGE 3A OR 3B CKD (HCC): Primary | ICD-10-CM

## 2021-06-03 DIAGNOSIS — I50.32 CHRONIC DIASTOLIC CONGESTIVE HEART FAILURE (HCC): ICD-10-CM

## 2021-06-03 PROCEDURE — 4040F PNEUMOC VAC/ADMIN/RCVD: CPT | Performed by: INTERNAL MEDICINE

## 2021-06-03 PROCEDURE — 99214 OFFICE O/P EST MOD 30 MIN: CPT | Performed by: INTERNAL MEDICINE

## 2021-06-03 PROCEDURE — G8417 CALC BMI ABV UP PARAM F/U: HCPCS | Performed by: INTERNAL MEDICINE

## 2021-06-03 PROCEDURE — G8427 DOCREV CUR MEDS BY ELIG CLIN: HCPCS | Performed by: INTERNAL MEDICINE

## 2021-06-03 PROCEDURE — 1123F ACP DISCUSS/DSCN MKR DOCD: CPT | Performed by: INTERNAL MEDICINE

## 2021-06-03 PROCEDURE — 1036F TOBACCO NON-USER: CPT | Performed by: INTERNAL MEDICINE

## 2021-06-03 RX ORDER — BUMETANIDE 2 MG/1
2 TABLET ORAL DAILY
Qty: 30 TABLET | Refills: 3
Start: 2021-06-03 | End: 2021-07-08 | Stop reason: ALTCHOICE

## 2021-06-03 NOTE — PROGRESS NOTES
[facility]        Outpatient Follow-Up note         6/3/2021 8:57 AM    Patient Name:   Galo Hi  Birthdate:    [birth year]  Primary Care Physician:  Lorenza Walker MD     Chief Complaint / Reason for follow-up : Follow Up of CKD     Interval History :  Patient seen and examined by me. Feels ok. No chest pain. Patient has been having shortness of breath since the last 2 months however recently has gotten a little bit worse after his discontinuation of the Bumex and Entresto. No other modifying factors.   He is not in any acute distress     Past History :  Past Medical History:   Diagnosis Date    Adenomatous colon polyp 2009/2010, 6/'14,7/'17, 9/20    Angina at rest 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    BPH (benign prostatic hypertrophy)     CAD (coronary artery disease) 1997    Carotid artery disease ([address] 1995    right    Carotid disease, bilateral ([address]     Cerebral artery occlusion with cerebral infarction ([address]     CHF (congestive heart failure) ([address]     Chronic kidney disease     GERD (gastroesophageal reflux disease)     Hyperlipidemia 1996    Hypertension     Lumbar disc disease 2001    MI (myocardial infarction) ([address] 1997    Nephrolithiasis 2002    NIDDM (non-insulin dependent diabetes mellitus) 1996    diagnosed 1996    RICCARDO (obstructive sleep apnea) 2005    cpap    Renal artery stenosis ([address] 2007    left    Renal insufficiency     S/P CABG (status post coronary artery bypass graft) 1997    5 vessels    Skin cancer     Squamous cell carcinoma     TIA (transient ischemic attack)     Wears partial dentures     upper and lower     Past Surgical History:   Procedure Laterality Date    ABLATION OF DYSRHYTHMIC FOCUS  3/13/13   [address]    CARDIAC SURGERY  3/13/13    oblation for irreg rythm    CARDIOVERSION  3/8/2012 3/19 4/20 1/21 5/21       Potassium 5.0 4.1 4.8 4.9 4.0       BUN 34 26 34 45 27       Creatinine 1.5 1.5 (1.7) 1.8 1.7 1.8       eGFR 46 46 37 39 37                   UPCR    60        [facility]                          Renal Ultrasound Scan -- 6/2018  Rt kidney 13.1 cm and left kidney 11.50  Left kidney cysts 5.7 cm. Assessment    1. Renal - Acute kidney injury on chronic kidney disease stage III most likely secondary to the use of diuretics. He is also on entresto .  -Overall renal function appears to be stable his GFR is around 37 mL/minute  -Definitely is having worsening leg swelling and shortness of breath.  -At this time I advised him to take Bumex 2 mg twice a day for 4 doses and subsequently 2 mg daily fluid restriction of 1500 mL and a BMP next week  2. Electrolytes - WNL  3. Coronary artery disease with CABG 20 years ago and 15 stents so far 9 of them have been placed in 2018   - again recent cardiac cath on 4/21 with angioplasty done  4. Diabetes mellitus, okay to continue metformin for now. If renal function declines to less than 30 we may need to consider discontinuation  5. Elevated PSA . BPH seen urology . 6. Acute on chronic diastolic congestive heart failure plan as mentioned above  7. Anemia - iron deficiency and getting IV iron   8. meds reviewed and D/W patient and wife  9. FU in 6 months. Also advised if symptoms worsen proceed to the ER. Tests and orders placed this Encounter     No orders of the defined types were placed in this encounter. Lor Silveira M.D  [facility].

## 2021-06-04 ENCOUNTER — HOSPITAL ENCOUNTER (OUTPATIENT)
Dept: NURSING | Age: 76
Discharge: HOME | End: 2021-06-04
Payer: MEDICARE

## 2021-06-04 ENCOUNTER — APPOINTMENT (OUTPATIENT)
Dept: NURSING | Age: 76
End: 2021-06-04
Payer: MEDICARE

## 2021-06-04 VITALS
DIASTOLIC BLOOD PRESSURE: 72 MMHG | SYSTOLIC BLOOD PRESSURE: 124 MMHG | HEART RATE: 56 BPM | TEMPERATURE: 96.7 F | OXYGEN SATURATION: 95 % | RESPIRATION RATE: 16 BRPM

## 2021-06-04 DIAGNOSIS — N18.30 ANEMIA IN STAGE 3 CHRONIC KIDNEY DISEASE, UNSPECIFIED WHETHER STAGE 3A OR 3B CKD (HCC): Primary | ICD-10-CM

## 2021-06-04 DIAGNOSIS — D63.1 ANEMIA IN STAGE 3 CHRONIC KIDNEY DISEASE, UNSPECIFIED WHETHER STAGE 3A OR 3B CKD (HCC): Primary | ICD-10-CM

## 2021-06-04 PROCEDURE — 6360000002 HC RX W HCPCS: Performed by: INTERNAL MEDICINE

## 2021-06-04 PROCEDURE — 96365 THER/PROPH/DIAG IV INF INIT: CPT

## 2021-06-04 PROCEDURE — 2580000003 HC RX 258: Performed by: INTERNAL MEDICINE

## 2021-06-04 RX ORDER — HEPARIN SODIUM (PORCINE) LOCK FLUSH IV SOLN 100 UNIT/ML 100 UNIT/ML
500 SOLUTION INTRAVENOUS PRN
Status: CANCELLED | OUTPATIENT
Start: 2021-06-04

## 2021-06-04 RX ORDER — SODIUM CHLORIDE 9 MG/ML
25 INJECTION, SOLUTION INTRAVENOUS PRN
Status: CANCELLED | OUTPATIENT
Start: 2021-06-04

## 2021-06-04 RX ORDER — METHYLPREDNISOLONE SODIUM SUCCINATE 125 MG/2ML
125 INJECTION, POWDER, LYOPHILIZED, FOR SOLUTION INTRAMUSCULAR; INTRAVENOUS ONCE
Status: CANCELLED | OUTPATIENT
Start: 2021-06-04 | End: 2021-06-04

## 2021-06-04 RX ORDER — DIPHENHYDRAMINE HYDROCHLORIDE 50 MG/ML
50 INJECTION INTRAMUSCULAR; INTRAVENOUS ONCE
Status: CANCELLED | OUTPATIENT
Start: 2021-06-04 | End: 2021-06-04

## 2021-06-04 RX ORDER — SODIUM CHLORIDE 0.9 % (FLUSH) 0.9 %
5-40 SYRINGE (ML) INJECTION PRN
Status: CANCELLED | OUTPATIENT
Start: 2021-06-04

## 2021-06-04 RX ORDER — SODIUM CHLORIDE 9 MG/ML
INJECTION, SOLUTION INTRAVENOUS CONTINUOUS
Status: CANCELLED | OUTPATIENT
Start: 2021-06-04

## 2021-06-04 RX ADMIN — FERRIC CARBOXYMALTOSE INJECTION 750 MG: 50 INJECTION, SOLUTION INTRAVENOUS at 09:17

## 2021-06-04 ASSESSMENT — PAIN SCALES - GENERAL: PAINLEVEL_OUTOF10: 0

## 2021-06-04 ASSESSMENT — PAIN - FUNCTIONAL ASSESSMENT: PAIN_FUNCTIONAL_ASSESSMENT: 0-10

## 2021-06-04 NOTE — PROGRESS NOTES
1554 Patient arrived to [facility] ambulatory for iron infusion. Oriented to room and call light  PT RIGHTS AND RESPONSIBILITIES OFFERED TO PT.    0919  Medication started and he denies complaints     1236  Medication completed and and he denies complaints. Iv  Removed. Discharge instructions reviewed and they deny questions.   Discharged ambulatory     __M__ Safety:       (Environmental)  Keo Harris to environment   Ensure ID band is correct and in place/ allergy band as needed   Assess for fall risk   Initiate fall precautions as applicable (fall band, side rails, etc.)   Call light within reach   Bed in low position/ wheels locked    __M__ Pain:        Assess pain level and characteristics   Administer analgesics as ordered   Assess effectiveness of pain management and report to MD as needed    __M__ Knowledge Deficit:   Assess baseline knowledge   Provide teaching at level of understanding   Provide teaching via preferred learning method   Evaluate teaching effectiveness    _M___ Hemodynamic/Respiratory Status:       (Pre and Post Procedure Monitoring)   Assess/Monitor vital signs and LOC   Assess Baseline SpO2 prior to any sedation   Obtain weight/height   Assess vital signs/ LOC until patient meets discharge criteria   Monitor procedure site and notify MD of any issues

## 2021-06-10 ENCOUNTER — NURSE ONLY (OUTPATIENT)
Dept: LAB | Age: 76
End: 2021-06-10

## 2021-06-10 DIAGNOSIS — I50.32 CHRONIC DIASTOLIC CONGESTIVE HEART FAILURE (HCC): ICD-10-CM

## 2021-06-10 DIAGNOSIS — N18.30 STAGE 3 CHRONIC KIDNEY DISEASE, UNSPECIFIED WHETHER STAGE 3A OR 3B CKD (HCC): ICD-10-CM

## 2021-06-10 DIAGNOSIS — N18.31 ANEMIA IN STAGE 3A CHRONIC KIDNEY DISEASE (HCC): ICD-10-CM

## 2021-06-10 DIAGNOSIS — D63.1 ANEMIA IN STAGE 3A CHRONIC KIDNEY DISEASE (HCC): ICD-10-CM

## 2021-06-10 DIAGNOSIS — I10 ESSENTIAL HYPERTENSION: ICD-10-CM

## 2021-06-10 LAB
ANION GAP SERPL CALCULATED.3IONS-SCNC: 9 MEQ/L (ref 8–16)
BUN BLDV-MCNC: 33 MG/DL (ref 7–22)
CALCIUM SERPL-MCNC: 9.2 MG/DL (ref 8.5–10.5)
CHLORIDE BLD-SCNC: 106 MEQ/L (ref 98–111)
CO2: 26 MEQ/L (ref 23–33)
CREAT SERPL-MCNC: 1.8 MG/DL (ref 0.4–1.2)
GFR SERPL CREATININE-BSD FRML MDRD: 37 ML/MIN/1.73M2
GLUCOSE BLD-MCNC: 201 MG/DL (ref 70–108)
POTASSIUM SERPL-SCNC: 4.1 MEQ/L (ref 3.5–5.2)
SODIUM BLD-SCNC: 141 MEQ/L (ref 135–145)

## 2021-06-17 ENCOUNTER — HOSPITAL ENCOUNTER (OUTPATIENT)
Age: 76
Discharge: HOME | End: 2021-06-17
Payer: MEDICARE

## 2021-06-17 ENCOUNTER — NURSE ONLY (OUTPATIENT)
Dept: LAB | Age: 76
End: 2021-06-17

## 2021-06-17 DIAGNOSIS — N18.31 ANEMIA IN STAGE 3A CHRONIC KIDNEY DISEASE (HCC): ICD-10-CM

## 2021-06-17 DIAGNOSIS — N18.31 STAGE 3A CHRONIC KIDNEY DISEASE (HCC): ICD-10-CM

## 2021-06-17 DIAGNOSIS — D63.1 ANEMIA IN STAGE 3A CHRONIC KIDNEY DISEASE (HCC): ICD-10-CM

## 2021-06-17 DIAGNOSIS — I10 ESSENTIAL HYPERTENSION: ICD-10-CM

## 2021-06-17 DIAGNOSIS — N18.30 STAGE 3 CHRONIC KIDNEY DISEASE, UNSPECIFIED WHETHER STAGE 3A OR 3B CKD (HCC): ICD-10-CM

## 2021-06-17 DIAGNOSIS — I50.32 CHRONIC DIASTOLIC CONGESTIVE HEART FAILURE (HCC): ICD-10-CM

## 2021-06-17 LAB
ALBUMIN SERPL-MCNC: 3.9 G/DL (ref 3.5–5.1)
ALP BLD-CCNC: 84 U/L (ref 38–126)
ALT SERPL-CCNC: 17 U/L (ref 11–66)
ANION GAP SERPL CALCULATED.3IONS-SCNC: 12 MEQ/L (ref 8–16)
AST SERPL-CCNC: 16 U/L (ref 5–40)
AVERAGE GLUCOSE: 129 MG/DL (ref 70–126)
BASOPHILS # BLD: 0.5 %
BASOPHILS ABSOLUTE: 0 THOU/MM3 (ref 0–0.1)
BILIRUB SERPL-MCNC: 0.3 MG/DL (ref 0.3–1.2)
BUN BLDV-MCNC: 36 MG/DL (ref 7–22)
CALCIUM SERPL-MCNC: 9.3 MG/DL (ref 8.5–10.5)
CHLORIDE BLD-SCNC: 105 MEQ/L (ref 98–111)
CO2: 25 MEQ/L (ref 23–33)
CREAT SERPL-MCNC: 1.8 MG/DL (ref 0.4–1.2)
EKG ATRIAL RATE: 61 BPM
EKG P AXIS: 94 DEGREES
EKG P-R INTERVAL: 208 MS
EKG Q-T INTERVAL: 486 MS
EKG QRS DURATION: 138 MS
EKG QTC CALCULATION (BAZETT): 489 MS
EKG R AXIS: 59 DEGREES
EKG T AXIS: 28 DEGREES
EKG VENTRICULAR RATE: 61 BPM
EOSINOPHIL # BLD: 2 %
EOSINOPHILS ABSOLUTE: 0.2 THOU/MM3 (ref 0–0.4)
ERYTHROCYTE [DISTWIDTH] IN BLOOD BY AUTOMATED COUNT: 15.9 % (ref 11.5–14.5)
ERYTHROCYTE [DISTWIDTH] IN BLOOD BY AUTOMATED COUNT: 59.8 FL (ref 35–45)
FERRITIN: 673 NG/ML (ref 22–322)
GFR SERPL CREATININE-BSD FRML MDRD: 37 ML/MIN/1.73M2
GLUCOSE BLD-MCNC: 159 MG/DL (ref 70–108)
HBA1C MFR BLD: 6.3 % (ref 4.4–6.4)
HCT VFR BLD CALC: 30.9 % (ref 42–52)
HEMOGLOBIN: 9.3 GM/DL (ref 14–18)
IMMATURE GRANS (ABS): 0.04 THOU/MM3 (ref 0–0.07)
IMMATURE GRANULOCYTES: 0.4 %
IRON SATURATION: 21 % (ref 20–50)
IRON: 47 UG/DL (ref 65–195)
LYMPHOCYTES # BLD: 9.8 %
LYMPHOCYTES ABSOLUTE: 0.9 THOU/MM3 (ref 1–4.8)
MCH RBC QN AUTO: 31 PG (ref 26–33)
MCHC RBC AUTO-ENTMCNC: 30.1 GM/DL (ref 32.2–35.5)
MCV RBC AUTO: 103 FL (ref 80–94)
MONOCYTES # BLD: 11.2 %
MONOCYTES ABSOLUTE: 1 THOU/MM3 (ref 0.4–1.3)
NUCLEATED RED BLOOD CELLS: 0 /100 WBC
PLATELET # BLD: 201 THOU/MM3 (ref 130–400)
PMV BLD AUTO: 10.7 FL (ref 9.4–12.4)
POTASSIUM SERPL-SCNC: 4.4 MEQ/L (ref 3.5–5.2)
RBC # BLD: 3 MILL/MM3 (ref 4.7–6.1)
SEG NEUTROPHILS: 76.1 %
SEGMENTED NEUTROPHILS ABSOLUTE COUNT: 7.1 THOU/MM3 (ref 1.8–7.7)
SODIUM BLD-SCNC: 142 MEQ/L (ref 135–145)
TOTAL IRON BINDING CAPACITY: 225 UG/DL (ref 171–450)
TOTAL PROTEIN: 6.7 G/DL (ref 6.1–8)
WBC # BLD: 9.3 THOU/MM3 (ref 4.8–10.8)

## 2021-06-17 PROCEDURE — 93005 ELECTROCARDIOGRAM TRACING: CPT | Performed by: PHYSICIAN ASSISTANT

## 2021-06-17 PROCEDURE — 93010 ELECTROCARDIOGRAM REPORT: CPT | Performed by: INTERNAL MEDICINE

## 2021-06-23 ENCOUNTER — OFFICE VISIT (OUTPATIENT)
Dept: PULMONOLOGY | Age: 76
End: 2021-06-23
Payer: MEDICARE

## 2021-06-23 VITALS
OXYGEN SATURATION: 99 % | BODY MASS INDEX: 33.89 KG/M2 | HEIGHT: 69 IN | WEIGHT: 228.8 LBS | SYSTOLIC BLOOD PRESSURE: 124 MMHG | DIASTOLIC BLOOD PRESSURE: 62 MMHG | TEMPERATURE: 97.7 F | HEART RATE: 69 BPM

## 2021-06-23 DIAGNOSIS — G47.10 HYPERSOMNIA: ICD-10-CM

## 2021-06-23 DIAGNOSIS — E66.9 OBESITY (BMI 30-39.9): ICD-10-CM

## 2021-06-23 DIAGNOSIS — I51.89 DIASTOLIC DYSFUNCTION: ICD-10-CM

## 2021-06-23 DIAGNOSIS — G47.33 OSA TREATED WITH BIPAP: Primary | ICD-10-CM

## 2021-06-23 DIAGNOSIS — D64.9 ANEMIA, UNSPECIFIED TYPE: ICD-10-CM

## 2021-06-23 DIAGNOSIS — N18.9 CHRONIC KIDNEY DISEASE, UNSPECIFIED CKD STAGE: ICD-10-CM

## 2021-06-23 PROCEDURE — G8427 DOCREV CUR MEDS BY ELIG CLIN: HCPCS | Performed by: PHYSICIAN ASSISTANT

## 2021-06-23 PROCEDURE — 99214 OFFICE O/P EST MOD 30 MIN: CPT | Performed by: PHYSICIAN ASSISTANT

## 2021-06-23 PROCEDURE — 1036F TOBACCO NON-USER: CPT | Performed by: PHYSICIAN ASSISTANT

## 2021-06-23 PROCEDURE — 1123F ACP DISCUSS/DSCN MKR DOCD: CPT | Performed by: PHYSICIAN ASSISTANT

## 2021-06-23 PROCEDURE — G8417 CALC BMI ABV UP PARAM F/U: HCPCS | Performed by: PHYSICIAN ASSISTANT

## 2021-06-23 PROCEDURE — 4040F PNEUMOC VAC/ADMIN/RCVD: CPT | Performed by: PHYSICIAN ASSISTANT

## 2021-06-23 ASSESSMENT — ENCOUNTER SYMPTOMS
DIARRHEA: 0
SHORTNESS OF BREATH: 1
CHEST TIGHTNESS: 0
STRIDOR: 0
BACK PAIN: 0
ALLERGIC/IMMUNOLOGIC NEGATIVE: 1
WHEEZING: 0
NAUSEA: 0
COUGH: 0
EYES NEGATIVE: 1

## 2021-06-23 NOTE — PROGRESS NOTES
[facility]      Dominik Abrams         [id number]  6/23/2021   Chief Complaint   Patient presents with    Follow-up     CSA 3 month sleep follow up with download         Pt of Dr. Johnie SANDOVAL Download:   Ashley Brine or initial AHI: 8.3     Date of initial study: 2/7/2018      Compliant  100%     Noncompliant 0 %     PAP Type Aircurve 10 VautoLevel  20/14   Avg Hrs/Day 8 hr 49 min  AHI: 12.8   Recorded compliance dates , 5/22/2021  to 6/20/2021   Machine/Mfg:   [x] ResMed    [] Respironics/Dreamstation   Interface:   [] Nasal    [] Nasal pillows   [x] FFM      Provider:      [x] [facility]     [][employer]     [] [employer]    [] [address]    [] [place]               [] [facility]      [] [employer]    [] [address]      [] Other    Neck Size: 17.5  Mallampati Mallampati 4  ESS:  10  SAQLI: 70    Here is a scan of the most recent download:              Presentation:   Jax Balderas presents for sleep medicine follow up for obstructive sleep apnea and probable complex sleep apnea  Since the last visit, Jax Balderas had a bipap titration. His AHI has been difficult to control and having both central and obstructive events. He was sent for bipap to ASV titration however, unable to perform both in one night. His bipap pressures were adjusted. His AHI was under reasonable control until the past month or so. He had an angioplasty and diagnosed with diastolic heart failure. Since then, his AHI has been poorly controlled. He is more tired. He is more SOB. He is taking more naps. He was also found to be anemic. Equipment issues: The pressure is  acceptable, the mask is acceptable     Sleep issues:  Do you feel better? No  More rested?no  Better concentration? no    Progress History:   Since last visit any new medical issues? Yes CAD, CHF, anemia  New ER or hospital visits? Yes   Any new or changes in medicines? Yes   Any new sleep medicines?  No    Review of Systems -   Review of Systems Constitutional: Positive for fatigue. Negative for activity change, appetite change, chills and fever. HENT: Negative for congestion and postnasal drip. Eyes: Negative. Respiratory: Positive for shortness of breath. Negative for cough, chest tightness, wheezing and stridor. Cardiovascular: Positive for leg swelling. Negative for chest pain. Gastrointestinal: Negative for diarrhea and nausea. Endocrine: Negative. Genitourinary: Negative. Musculoskeletal: Negative. Negative for arthralgias and back pain. Skin: Negative. Allergic/Immunologic: Negative. Neurological: Negative. Negative for dizziness and light-headedness. Psychiatric/Behavioral: Negative. All other systems reviewed and are negative. Physical Exam:    BMI:  Body mass index is 33.79 kg/m². Wt Readings from Last 3 Encounters:   06/23/21 228 lb 12.8 oz (103.8 kg)   06/03/21 226 lb (102.5 kg)   05/28/21 226 lb (102.5 kg)     Weight stable / unchanged  Vitals: /62 (Site: Left Upper Arm, Position: Sitting, Cuff Size: Large Adult)   Pulse 69   Temp 97.7 °F (36.5 °C) (Temporal)   Ht 5' 9\" (1.753 m)   Wt 228 lb 12.8 oz (103.8 kg)   SpO2 99% Comment: Room air at rest  BMI 33.79 kg/m²       Physical Exam  Constitutional:       Appearance: He is well-developed. HENT:      Head: Normocephalic and atraumatic. Right Ear: External ear normal.      Left Ear: External ear normal.   Eyes:      Conjunctiva/sclera: Conjunctivae normal.      Pupils: Pupils are equal, round, and reactive to light. Cardiovascular:      Rate and Rhythm: Normal rate and regular rhythm. Heart sounds: Normal heart sounds. Pulmonary:      Effort: Pulmonary effort is normal.      Breath sounds: Normal breath sounds. Musculoskeletal:         General: Normal range of motion. Cervical back: Normal range of motion and neck supple. Right lower leg: Edema present. Left lower leg: Edema present.    Skin:     General: Skin is warm and dry. Neurological:      Mental Status: He is alert and oriented to person, place, and time. Psychiatric:         Behavior: Behavior normal.         Thought Content: Thought content normal.         Judgment: Judgment normal.           ASSESSMENT/DIAGNOSIS     Diagnosis Orders   1. RICCARDO treated with BiPAP     2. Obesity (BMI 30-39.9)     3. Hypersomnia     4. Anemia, unspecified type     5. Diastolic dysfunction     6. Chronic kidney disease, unspecified CKD stage              Plan   Do you need any equipment today? No  - Will adjust pressure to try to control central events  - Suspect CHF has contributed to his poorly controlled AHI  - He would benefit from ASV however will need a new titration and he is hesistent  - Explained that if his AHI continues to be elevated, he will need a new titration  - will follow downloads. - Download reviewed and discussed with patient  - He  was advised to continue current positive airway pressure therapy with above described pressure. - He  advised to keep good compliance with current recommended pressure to get optimal results and clinical improvement  - Recommend 7-9 hours of sleep with PAP  - He was advised to call [facility] regarding supplies if needed.   -He call my office for earlier appointment if needed for worsening of sleep symptoms.   - He was instructed on weight loss  - Danny Murillo was educated about my impression and plan. Patient verbalizesunderstanding.   We will see Mau Fulton back in: 2 months with download    Information added by my medical assistant/LPN was reviewed today        Kaylah Espino PA-C, MPAS  6/23/2021

## 2021-06-25 ENCOUNTER — OFFICE VISIT (OUTPATIENT)
Dept: INTERNAL MEDICINE CLINIC | Age: 76
End: 2021-06-25
Payer: MEDICARE

## 2021-06-25 VITALS
WEIGHT: 227.2 LBS | DIASTOLIC BLOOD PRESSURE: 60 MMHG | TEMPERATURE: 97.2 F | SYSTOLIC BLOOD PRESSURE: 128 MMHG | HEIGHT: 69 IN | BODY MASS INDEX: 33.65 KG/M2 | HEART RATE: 58 BPM

## 2021-06-25 DIAGNOSIS — G47.33 OBSTRUCTIVE SLEEP APNEA ON CPAP: ICD-10-CM

## 2021-06-25 DIAGNOSIS — N18.30 STAGE 3 CHRONIC KIDNEY DISEASE, UNSPECIFIED WHETHER STAGE 3A OR 3B CKD (HCC): ICD-10-CM

## 2021-06-25 DIAGNOSIS — Z99.89 OBSTRUCTIVE SLEEP APNEA ON CPAP: ICD-10-CM

## 2021-06-25 DIAGNOSIS — R06.02 SOB (SHORTNESS OF BREATH): Primary | ICD-10-CM

## 2021-06-25 DIAGNOSIS — I25.118 CORONARY ARTERY DISEASE OF NATIVE ARTERY OF NATIVE HEART WITH STABLE ANGINA PECTORIS (HCC): ICD-10-CM

## 2021-06-25 PROCEDURE — G8427 DOCREV CUR MEDS BY ELIG CLIN: HCPCS | Performed by: INTERNAL MEDICINE

## 2021-06-25 PROCEDURE — 99214 OFFICE O/P EST MOD 30 MIN: CPT | Performed by: INTERNAL MEDICINE

## 2021-06-25 PROCEDURE — 4040F PNEUMOC VAC/ADMIN/RCVD: CPT | Performed by: INTERNAL MEDICINE

## 2021-06-25 PROCEDURE — G8417 CALC BMI ABV UP PARAM F/U: HCPCS | Performed by: INTERNAL MEDICINE

## 2021-06-25 PROCEDURE — 1036F TOBACCO NON-USER: CPT | Performed by: INTERNAL MEDICINE

## 2021-06-25 PROCEDURE — 1123F ACP DISCUSS/DSCN MKR DOCD: CPT | Performed by: INTERNAL MEDICINE

## 2021-06-25 RX ORDER — BUMETANIDE 2 MG/1
2 TABLET ORAL DAILY
Qty: 90 TABLET | Refills: 1 | Status: SHIPPED | OUTPATIENT
Start: 2021-06-25 | End: 2021-07-08 | Stop reason: SDUPTHER

## 2021-06-25 RX ORDER — RANOLAZINE 1000 MG/1
1000 TABLET, EXTENDED RELEASE ORAL 2 TIMES DAILY
Qty: 180 TABLET | Refills: 1 | Status: SHIPPED | OUTPATIENT
Start: 2021-06-25 | End: 2022-01-25

## 2021-06-25 NOTE — PROGRESS NOTES
[address] [facility]   [facility] [address], [address]  Dept: [phone]  Dept Fax: [phone]    YOB: 1945    Hypertension / SOB     68 y.o. male   here for follow-up of above issues. Normally follows Dr. Goyo Salguero in our office is currently of, Mabel Phillips has history of previous CABG along with CKD stage 3, diabetes mellitus type 2. His recent A1c of 6.3 on 6/17/2021 with an average glucose of 129 was noted H&H was 9 x 30.9 he does seem to be having some degree of chronic anemia but stable. Had leg swelling, no PND or orthopnea, uses BIPAP  At nights, followed by sleep center. Seen dr. Luis Eduardo Giles in the past, who felt that nothing else can be done. He has also seen dr. Yaima Carrillo , who has ordered Iron infusions, and back on bumex. 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 No recent CP, no LOC, has hemorrhoids. Last echo from 3/21 normal EF, without significant   Valvular disease. Current Outpatient Medications   Medication Sig Dispense Refill    ranolazine (RANEXA) 1000 MG extended release tablet Take 1 tablet by mouth 2 times daily New dose 180 tablet 1    bumetanide (BUMEX) 2 MG tablet Take 1 tablet by mouth daily 90 tablet 1    bumetanide (BUMEX) 2 MG tablet Take 1 tablet by mouth daily 30 tablet 3    tamsulosin (FLOMAX) 0.4 MG capsule Take 1 capsule by mouth daily 90 capsule 2    insulin lispro (HUMALOG) 100 UNIT/ML injection vial Inject into the skin 3 times daily (before meals) 7 units plus sliding scale for both breakfast and lunch, 10 units plus sliding scale for supper      diphenhydrAMINE-APAP, sleep, (TYLENOL PM EXTRA STRENGTH PO) Take 1 tablet by mouth every evening      ALPRAZolam (XANAX) 0.25 MG tablet Take 0.25 mg by mouth 3 times daily as needed for Anxiety.       hydrOXYzine (ATARAX) 10 MG tablet Take 10 mg by mouth daily       fluocinonide (LIDEX) 0.05 % cream Apply 1 applicator topically as needed      BiPAP Machine MISC by Does not apply route      metoprolol succinate (TOPROL XL) 50 MG extended release tablet TAKE 1 TABLET BY MOUTH  DAILY 90 tablet 3    insulin detemir (LEVEMIR FLEXTOUCH) 100 UNIT/ML injection pen 50  units am and pm. (Patient taking differently: Inject 45 Units into the skin 2 times daily ) 90 mL 5    apixaban (ELIQUIS) 5 MG TABS tablet TAKE 1 TABLET BY MOUTH  TWICE A  tablet 3    atorvastatin (LIPITOR) 40 MG tablet Take 1 tablet by mouth nightly 90 tablet 3    nitroGLYCERIN (NITROLINGUAL) 0.4 MG/SPRAY 0.4 mg spray USE 1 SPRAY ON OR UNDER THE TONGUE EVERY 5 MINUTES AS NEEDED FOR CHEST PAIN 4.9 g 6    dilTIAZem (CARDIZEM CD) 120 MG extended release capsule TAKE 1 CAPSULE BY MOUTH  DAILY 90 capsule 3    Insulin Pen Needle (NOVOFINE) 32G X 6 MM MISC USE 1 NEW NEEDLE 6 TIMES  DAILY AND AS NEEDED 540 each 1    Accu-Chek FastClix Lancets MISC USE TO TEST BLOOD SUGARS 4 TIMES DAILY 360 each 15    blood glucose test strips (ACCU-CHEK COMPACT PLUS) strip DX E11.59, CHECK THE GLUCOSE 4 TIMES DAILY 408 strip 3    Multiple Vitamins-Minerals (THERAPEUTIC MULTIVITAMIN-MINERALS) tablet Take 1 tablet by mouth nightly      hydrALAZINE (APRESOLINE) 25 MG tablet Take 1 tablet by mouth every 8 hours 90 tablet 0    clopidogrel (PLAVIX) 75 MG tablet Take 75 mg by mouth daily morning      isosorbide mononitrate (IMDUR) 120 MG extended release tablet Take 120 mg by mouth daily       NOVOFINE 30G X 8 MM MISC USE 1 NEW NEEDLE 6 TIMES A DAY AND AS NEEDED 600 each 3     No current facility-administered medications for this visit.        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    BPH (benign prostatic hypertrophy)     CAD (coronary artery disease) 1997    Carotid artery disease ([address] 1995    right    Carotid disease, bilateral [facility] [address] [facility]     [facility]bral [facility] occlusion with cerebral [facility]     CHF (congestive heart failure) (HCC)     Chronic kidney disease     GERD (gastroesophageal reflux disease)     Hyperlipidemia 1996    Hypertension     Lumbar disc disease 2001    MI (myocardial infarction) ([facility] [address] 1997    Nephrolithiasis 2002    NIDDM (non-insulin dependent diabetes mellitus) 1996    diagnosed 1996    RICCARDO (obstructive sleep apnea) 2005    cpap    Renal artery stenosis ([facility] 2007    left    Renal insufficiency     S/P CABG (status post coronary artery bypass graft) 1997    5 vessels    Skin cancer     Squamous cell carcinoma     TIA (transient ischemic attack)     Wears partial dentures     upper and lower       Social History     Socioeconomic History    Marital status:      Spouse name: Flori Bernal Number of children: 2    Years of education: Not on file    Highest education level: Not on file   Occupational History    Not on file   Tobacco Use    Smoking status: Never Smoker    Smokeless tobacco: Never Used   Vaping Use    Vaping Use: Never used   Substance and Sexual Activity    Alcohol use: No     Alcohol/week: 0.0 standard drinks    Drug use: No    Sexual activity: Yes     Partners: Female   Other Topics Concern    Not on file   Social History Narrative    Not on file     Social Determinants of Health     Financial Resource Strain: Low Risk     Difficulty of Paying Living Expenses: Not hard at all   Food Insecurity: No Food Insecurity    Worried About Running Out of Food in the Last Year: Never true    Rosa of Food in the Last Year: Never true   Transportation Needs: No Transportation Needs    Lack of Transportation (Medical): No    Lack of Transportation (Non-Medical):  No   Physical Activity:     Days of Exercise per Week:     Minutes of Exercise per Session:    Stress:     Feeling of Stress :    Social Connections:     Frequency of Communication with Friends and Family:     Frequency of Social Gatherings with Friends and Family:     Attends Rastafarian Services:     Active Member of Clubs or Organizations:     Attends Club or Organization Meetings:     Marital Status:    Intimate Partner Violence:     Fear of Current or Ex-Partner:     Emotionally Abused:     Physically Abused:     Sexually Abused:        Family History   Problem Relation Age of Onset    Cancer Mother     High Blood Pressure Mother     Stroke Father     High Blood Pressure Father     Heart Disease Brother     Cancer Brother         lung    Cancer Brother         melanoma       No Known Allergies    Review of Systems     /60 (Site: Left Upper Arm)   Pulse 58   Temp 97.2 °F (36.2 °C)   Ht 5' 9\" (1.753 m)   Wt 227 lb 3.2 oz (103.1 kg)   BMI 33.55 kg/m²      Physical Examination: General appearance - patient alert, well appearing, and in no distress  Mental status - alert and oriented    Neck - supple, no significant adenopathy  Chest - clear to auscultation, no wheezes, rales or rhonchi, symmetric air entry  Heart - normal rate, regular rhythm, normal S1, S2, no murmurs, rubs, clicks or gallops  Abdomen - soft, nontender, nondistended, no masses or organomegaly  no abdominal bruits. Obese Protuberant: no  Neurological - alert, oriented, normal speech, no focal findings or movement disorder noted, motor and sensory grossly normal bilaterally  Musculoskeletal - no joint tenderness, deformity or swelling  Extremities - peripheral pulses normal, 1 + pedal edema, no clubbing or cyanosis, El's sign negative bilaterally  Prosthesis: No  Skin - normal coloration and turgor, no rashes, no suspicious skin lesions noted      I have reviewed recent diagnostic testing including labs, and echo      Diagnosis Orders   1. SOB (shortness of breath)     2.  Coronary artery disease of native artery of native heart with stable angina pectoris (HCC)     3. Stage 3 chronic kidney disease, unspecified whether stage 3a or 3b CKD ([facility]     4. Obstructive sleep apnea on CPAP         No orders of the defined types were placed in this encounter. Outpatient Encounter Medications as of 6/25/2021   Medication Sig Dispense Refill    ranolazine (RANEXA) 1000 MG extended release tablet Take 1 tablet by mouth 2 times daily New dose 180 tablet 1    bumetanide (BUMEX) 2 MG tablet Take 1 tablet by mouth daily 90 tablet 1    bumetanide (BUMEX) 2 MG tablet Take 1 tablet by mouth daily 30 tablet 3    tamsulosin (FLOMAX) 0.4 MG capsule Take 1 capsule by mouth daily 90 capsule 2    insulin lispro (HUMALOG) 100 UNIT/ML injection vial Inject into the skin 3 times daily (before meals) 7 units plus sliding scale for both breakfast and lunch, 10 units plus sliding scale for supper      diphenhydrAMINE-APAP, sleep, (TYLENOL PM EXTRA STRENGTH PO) Take 1 tablet by mouth every evening      ALPRAZolam (XANAX) 0.25 MG tablet Take 0.25 mg by mouth 3 times daily as needed for Anxiety.       hydrOXYzine (ATARAX) 10 MG tablet Take 10 mg by mouth daily       fluocinonide (LIDEX) 0.05 % cream Apply 1 applicator topically as needed      BiPAP Machine MISC by Does not apply route      metoprolol succinate (TOPROL XL) 50 MG extended release tablet TAKE 1 TABLET BY MOUTH  DAILY 90 tablet 3    insulin detemir (LEVEMIR FLEXTOUCH) 100 UNIT/ML injection pen 50  units am and pm. (Patient taking differently: Inject 45 Units into the skin 2 times daily ) 90 mL 5    apixaban (ELIQUIS) 5 MG TABS tablet TAKE 1 TABLET BY MOUTH  TWICE A  tablet 3    atorvastatin (LIPITOR) 40 MG tablet Take 1 tablet by mouth nightly 90 tablet 3    nitroGLYCERIN (NITROLINGUAL) 0.4 MG/SPRAY 0.4 mg spray USE 1 SPRAY ON OR UNDER THE TONGUE EVERY 5 MINUTES AS NEEDED FOR CHEST PAIN 4.9 g 6    dilTIAZem (CARDIZEM CD) 120 MG extended release capsule TAKE 1 CAPSULE BY MOUTH  DAILY 90 capsule 3    Insulin Pen Needle (NOVOFINE) 32G X 6 MM MISC USE 1 NEW W F x 2 weeks   And call us with  updates. Spouse is a retired  RN, so she admits to taking care of him. CKD-3  Noted the last BMP from 6/17/21 , stable renal function  Along with other labs, d/w pt and spouse today . Hx of CAD - on CCB, hydralazine, and toprol 50 mg , increase the   Ranexa to 1000 mg BID, and noted the recent echo and cardiac work up. Encouraged him to continue to use CPAP for his RICCARDO      Signed by: Yesenia Crockett M.D.     [address] [facility]  [address] [facility], [facility] [address], [address]    Tel  [phone]   Fax [phone]

## 2021-06-28 RX ORDER — DILTIAZEM HYDROCHLORIDE 120 MG/1
CAPSULE, COATED, EXTENDED RELEASE ORAL
Qty: 90 CAPSULE | Refills: 3 | Status: SHIPPED | OUTPATIENT
Start: 2021-06-28 | End: 2021-07-28 | Stop reason: SDUPTHER

## 2021-06-29 RX ORDER — ATORVASTATIN CALCIUM 40 MG/1
TABLET, FILM COATED ORAL
Qty: 90 TABLET | Refills: 3 | Status: SHIPPED | OUTPATIENT
Start: 2021-06-29 | End: 2022-08-17

## 2021-07-06 RX ORDER — BLOOD SUGAR DIAGNOSTIC
STRIP MISCELLANEOUS
Qty: 300 STRIP | Refills: 5 | Status: SHIPPED | OUTPATIENT
Start: 2021-07-06 | End: 2022-07-14

## 2021-07-06 RX ORDER — LANCETS
EACH MISCELLANEOUS
Qty: 102 EACH | Refills: 63 | Status: SHIPPED | OUTPATIENT
Start: 2021-07-06 | End: 2022-07-18 | Stop reason: SDUPTHER

## 2021-07-06 NOTE — TELEPHONE ENCOUNTER
Date of last visit:  4/19/2021  Date of next visit:  Visit date not found    Requested Prescriptions     Pending Prescriptions Disp Refills    blood glucose test strips (ACCU-CHEK GUIDE) strip [Pharmacy Med Name: ACCU-CHEK GUIDE TEST STRIP] 300 strip 5     Sig: USE TO CHECK 4 TIMES DAILY    Accu-Chek FastClix Lancets MISC [Pharmacy Med Name: Amy Stable LANCET DRUM]  63     Sig: USE TO TEST BLOOD SUGARS 4 TIMES DAILY

## 2021-07-08 ENCOUNTER — OFFICE VISIT (OUTPATIENT)
Dept: INTERNAL MEDICINE CLINIC | Age: 76
End: 2021-07-08
Payer: MEDICARE

## 2021-07-08 VITALS
DIASTOLIC BLOOD PRESSURE: 56 MMHG | BODY MASS INDEX: 34.54 KG/M2 | WEIGHT: 233.2 LBS | SYSTOLIC BLOOD PRESSURE: 132 MMHG | OXYGEN SATURATION: 98 % | HEART RATE: 66 BPM | TEMPERATURE: 99.4 F | HEIGHT: 69 IN

## 2021-07-08 DIAGNOSIS — N18.32 STAGE 3B CHRONIC KIDNEY DISEASE (HCC): ICD-10-CM

## 2021-07-08 DIAGNOSIS — R22.43 LOCALIZED SWELLING OF BOTH LOWER LEGS: Primary | ICD-10-CM

## 2021-07-08 DIAGNOSIS — R14.0 ABDOMINAL DISTENSION: ICD-10-CM

## 2021-07-08 PROCEDURE — 1123F ACP DISCUSS/DSCN MKR DOCD: CPT | Performed by: STUDENT IN AN ORGANIZED HEALTH CARE EDUCATION/TRAINING PROGRAM

## 2021-07-08 PROCEDURE — G8427 DOCREV CUR MEDS BY ELIG CLIN: HCPCS | Performed by: STUDENT IN AN ORGANIZED HEALTH CARE EDUCATION/TRAINING PROGRAM

## 2021-07-08 PROCEDURE — 1036F TOBACCO NON-USER: CPT | Performed by: STUDENT IN AN ORGANIZED HEALTH CARE EDUCATION/TRAINING PROGRAM

## 2021-07-08 PROCEDURE — G8417 CALC BMI ABV UP PARAM F/U: HCPCS | Performed by: STUDENT IN AN ORGANIZED HEALTH CARE EDUCATION/TRAINING PROGRAM

## 2021-07-08 PROCEDURE — 4040F PNEUMOC VAC/ADMIN/RCVD: CPT | Performed by: STUDENT IN AN ORGANIZED HEALTH CARE EDUCATION/TRAINING PROGRAM

## 2021-07-08 PROCEDURE — 99214 OFFICE O/P EST MOD 30 MIN: CPT | Performed by: STUDENT IN AN ORGANIZED HEALTH CARE EDUCATION/TRAINING PROGRAM

## 2021-07-08 RX ORDER — FUROSEMIDE 40 MG/1
80 TABLET ORAL 2 TIMES DAILY
Qty: 60 TABLET | Refills: 3 | Status: ON HOLD | OUTPATIENT
Start: 2021-07-08 | End: 2021-07-21 | Stop reason: SDUPTHER

## 2021-07-08 NOTE — PROGRESS NOTES
Pharmacy Medication History Note      List of current medications patient is taking is complete. Source of information: patient     Medications removed (include reason, ex. therapy complete or physician discontinued):  · none    Medications added/doses adjusted:  · Bumex dose slightly increased    Other notes (ex. Recent course of antibiotics, Coumadin dosing):  · Fasting AM glucose: 100-130  · B: cereal +/- strawberries/peaches, L: soup, sometimes canned, chicken salad, Whopper Jr; D: meat, veggies (lima beans, corn); snack: popcorn  · Denies use of other OTC or herbal medications.       Allergies reviewed    Recommendations:   Discussed sources of sodium in diet - canned soups, popcorn, fast food    For Pharmacy Admin Tracking Only     Intervention Detail: Dose Adjustment: 1, reason: Patient Preference   Total # of Interventions Recommended: 1   Total # of Interventions Accepted: 1   Time Spent (min): 15      Electronically signed by [facility] on 7/8/2021 at 3:03 PM

## 2021-07-08 NOTE — PROGRESS NOTES
[address] [facility] Internal Medicine   [facility] [address] ESSIE YOUSSEF AM OFFALEXANDRA II.RACHEL, One Jareth Billy [facility]  Dept: [phone]  Dept Fax: [phone]    Tressa Mcguire (1945) is a 68 y.o. male Established patient, here for evaluation of the following chief complaint(s):  Chief Complaint   Patient presents with    Shortness of Breath     EF patient - also sees Dr. Ranjana Enciso     1. Localized swelling of both lower legs  -     Will obtain Venous VL duplex of both LE to rule out DVT/clots  -     Will obtain CT abdomen and pelvis WO contrast.  Rule out hepatic disease as cause of swelling. Distended abdomen is concerning.  -     Will replace Bumex with Lasix. Patient will take Lasix 80 mg in the morning, 40 mg in the afternoon. Switching to Lasix to account for CKD/low GFR, bumex isn't getting absorbed fully. 2. Abdominal distension  -     As stated above. CT A/P WO Contrast to assess for hepatic disease. 3. Stage 3b chronic kidney disease ([facility] [address]  -     Basic Metabolic Panel, will obtain in 2 weeks time given diuretic changes. Patient will follow up on 8/9/2021 to resume care with Dr. Delano Heck. Patient was instructed to come to the ED if his shortness of breath worsens or if starts to develop new symptoms, particularly chest pain. HPI     This is a pleasant 77-year-old  male who is here for a follow-up on his LE edema and worsening shortness of breath. We last saw him in office on 6/25/2021 for the same issues. Normally follows Dr. Delano Heck. Has a history of CABG along with CKD stage III, diabetes mellitus type 2. Most recent A1c 6.3 on 6/17/2021. Patient has had a notable anemia, thought to be VY which he is receiving iron transfusions as outpatient. Patient has had continued leg swelling, however continues to deny PND or orthopnea. Is compliant with his BiPAP at night and during naps.   During his last visit, we increased his Bumex to 2 mg in the morning and then 1 mg in the afternoon. He reports that this has not led to a improvement in his symptoms. Patient's wife is accompanying him. She is a retired RN who used to work in the coronary unit here at [facility]. Patient follows with pulmonology/sleep clinic here at [facility], sees Matilde Garcia. His nephrologist is Dr. Jose Maria Childress. Cardiologist is Dr. Gray Walden. Furthermore, he has had more shortness of breath since his last visit. Leg swelling has stayed about the same. No chest pain, no dizziness. Last echo from 3/21 with a normal EF. No significant valvular disease. Last colonoscopy revealed hemorrhoids, follows with Dr. Kaci Chavez at GI [facility] on [facility]. His abdomen is notably distended and appears rigid. Patient denies history of alcohol use, no recent use. Never been told he has liver issues. Overall his shortness of breath has worsened and his leg swelling has been persistent. No new symptoms. Only recent change in medications is that he is taking Flomax, 0.4 mg daily. ROS  Constitutional: Reports weight gain  Eyes:  Denies any blurring or double vision, no glaucoma  Ears/Nose/Mouth/Throat:  Denies any chronic sinus/rhinitis, sore throats  Cardiovascular: Denies any chest pain. Reports persistent leg swelling.   Respiratory:  Reports worsening shortness of breath  Genitourinary:  Denies difficulty with urination, denies blood in urine, denies foamy urine  Gastrointestinal:  Denies any new abdominal pain, denies changes in stool frequency, denies blood in stool  Musculoskeletal:  Denies any joint pain, back pain, or difficulty walking  Integumentary:  Denies any rash or lesions  Neurological:  No numbness or tingling  Endocrine:  Denies any heat or cold interolerance   Hematologic/Lymphatic:  Denies any new bruises, denies any new lumps or bumps    Past Medical History:  has a past medical history of Adenomatous colon polyp, Angina at [facility]), Atrial fibrillation ([facility] [address] 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[facility] [address] BPH (benign Refill    furosemide (LASIX) 40 MG tablet Take 2 tablets by mouth 2 times daily Stop taking Bumex, replace with Lasix. 60 tablet 3    blood glucose test strips (ACCU-CHEK GUIDE) strip USE TO CHECK 4 TIMES DAILY 300 strip 5    Accu-Chek FastClix Lancets MISC USE TO TEST BLOOD SUGARS 4 TIMES DAILY 102 each 63    atorvastatin (LIPITOR) 40 MG tablet TAKE 1 TABLET BY MOUTH AT  NIGHT 90 tablet 3    dilTIAZem (CARDIZEM CD) 120 MG extended release capsule TAKE 1 CAPSULE BY MOUTH  DAILY 90 capsule 3    ranolazine (RANEXA) 1000 MG extended release tablet Take 1 tablet by mouth 2 times daily New dose 180 tablet 1    tamsulosin (FLOMAX) 0.4 MG capsule Take 1 capsule by mouth daily 90 capsule 2    insulin lispro (HUMALOG) 100 UNIT/ML injection vial Inject into the skin 3 times daily (before meals) 7 units plus sliding scale for both breakfast and lunch, 10 units plus sliding scale for supper      diphenhydrAMINE-APAP, sleep, (TYLENOL PM EXTRA STRENGTH PO) Take 1 tablet by mouth every evening      ALPRAZolam (XANAX) 0.25 MG tablet Take 0.25 mg by mouth 3 times daily as needed for Anxiety.       hydrOXYzine (ATARAX) 10 MG tablet Take 10 mg by mouth daily       fluocinonide (LIDEX) 0.05 % cream Apply 1 applicator topically as needed      BiPAP Machine MISC by Does not apply route      metoprolol succinate (TOPROL XL) 50 MG extended release tablet TAKE 1 TABLET BY MOUTH  DAILY 90 tablet 3    insulin detemir (LEVEMIR FLEXTOUCH) 100 UNIT/ML injection pen 50  units am and pm. (Patient taking differently: Inject 45 Units into the skin 2 times daily ) 90 mL 5    apixaban (ELIQUIS) 5 MG TABS tablet TAKE 1 TABLET BY MOUTH  TWICE A  tablet 3    nitroGLYCERIN (NITROLINGUAL) 0.4 MG/SPRAY 0.4 mg spray USE 1 SPRAY ON OR UNDER THE TONGUE EVERY 5 MINUTES AS NEEDED FOR CHEST PAIN 4.9 g 6    Insulin Pen Needle (NOVOFINE) 32G X 6 MM MISC USE 1 NEW NEEDLE 6 TIMES  DAILY AND AS NEEDED 540 each 1    Multiple Vitamins-Minerals (THERAPEUTIC MULTIVITAMIN-MINERALS) tablet Take 1 tablet by mouth nightly      hydrALAZINE (APRESOLINE) 25 MG tablet Take 1 tablet by mouth every 8 hours 90 tablet 0    clopidogrel (PLAVIX) 75 MG tablet Take 75 mg by mouth daily morning      isosorbide mononitrate (IMDUR) 120 MG extended release tablet Take 120 mg by mouth daily       NOVOFINE 30G X 8 MM MISC USE 1 NEW NEEDLE 6 TIMES A DAY AND AS NEEDED 600 each 3        PHYSICAL EXAMINATION     Vitals:    07/08/21 1428   BP: (!) 132/56   Pulse: 66   Temp: 99.4 °F (37.4 °C)   SpO2: 98%       Body mass index is 34.44 kg/m². Constitutional: In no acute distress. Appears documented age. Pleasant and cooperative. HENT:   Head: Normocephalic and atraumatic. Mouth/Throat: Oropharynx is clear and moist.  No oral thrush. Eyes: Conjunctivae are normal. Pupils are equal, round, and reactive to light. No scleral icterus. Neck: Neck supple. No JVD present. Cardiovascular: Normal rate, regular rhythm, normal heart sounds. No murmur heard. Pulmonary/Chest: Effort normal and breath sounds normal. No stridor. No respiratory distress. No wheezes. No rales. Patient exhibits no tenderness. Abdominal: Soft. Patient exhibits no distension. No tenderness. Bowel sounds present. Musculoskeletal: Normal range of motion. Extremities: Dependent pitting edema, +2 bilaterally. No tenderness on palpitation. Lymphadenopathy: No cervical adenopathy. Neurological: Patient is alert and oriented to person, place, and time. Skin: Skin is warm and dry. Patient is not diaphoretic. No rashes or lesions. Psychiatric: Patient has a normal mood and affect.  Patient behavior is normal.     [address] [place] [address] Outpatient Visit on 06/17/2021   Component Date Value Ref Range Status    Ventricular Rate 06/17/2021 61  BPM Final    Atrial Rate 06/17/2021 61  BPM Final    P-R Interval 06/17/2021 208  ms Final    QRS Duration 06/17/2021 138  ms Final    Q-T Interval 06/17/2021 486  ms Final    QTc Calculation (Bazett) 06/17/2021 489  ms Final    P Axis 06/17/2021 94  degrees Final    R Axis 06/17/2021 59  degrees Final    T Axis 06/17/2021 28  degrees Final   Nurse Only on 06/17/2021   Component Date Value Ref Range Status    Glucose 06/17/2021 159* 70 - 108 mg/dL Final    CREATININE 06/17/2021 1.8* 0.4 - 1.2 mg/dL Final    BUN 06/17/2021 36* 7 - 22 mg/dL Final    Sodium 06/17/2021 142  135 - 145 meq/L Final    Potassium 06/17/2021 4.4  3.5 - 5.2 meq/L Final    Chloride 06/17/2021 105  98 - 111 meq/L Final    CO2 06/17/2021 25  23 - 33 meq/L Final    Calcium 06/17/2021 9.3  8.5 - 10.5 mg/dL Final    AST 06/17/2021 16  5 - 40 U/L Final    Alkaline Phosphatase 06/17/2021 84  38 - 126 U/L Final    Total Protein 06/17/2021 6.7  6.1 - 8.0 g/dL Final    Albumin 06/17/2021 3.9  3.5 - 5.1 g/dL Final    Total Bilirubin 06/17/2021 0.3  0.3 - 1.2 mg/dL Final    ALT 06/17/2021 17  11 - 66 U/L Final    Performed at [address], [address]    Ferritin 06/17/2021 673* 22 - 322 ng/mL Final    Performed at [address], [address]    Iron Saturation 06/17/2021 21  20 - 50 % Final    Performed at [address], [address]    Iron 06/17/2021 47* 65 - 195 ug/dL Final    Performed at [address], [address]    WBC 06/17/2021 9.3  4.8 - 10.8 thou/mm3 Final    RBC 06/17/2021 3.00* 4.70 - 6.10 mill/mm3 Final    Hemoglobin 06/17/2021 9.3* 14.0 - 18.0 gm/dl Final    Hematocrit 06/17/2021 30.9* 42.0 - 52.0 % Final    MCV 06/17/2021 103.0* 80.0 - 94.0 fL Final    MCH 06/17/2021 31.0  26.0 - 33.0 pg Final    MCHC 06/17/2021 30.1* 32.2 - 35.5 gm/dl Final    RDW-CV 06/17/2021 15.9* 11.5 - 14.5 % Final    RDW-SD 06/17/2021 59.8* 35.0 - 45.0 fL Final    Platelets 82/29/0593 201  130 - 400 thou/mm3 Final    MPV 06/17/2021 10.7  9.4 - 12.4 fL Final  Seg Neutrophils 06/17/2021 76.1  % Final    Lymphocytes 06/17/2021 9.8  % Final    Monocytes 06/17/2021 11.2  % Final    Eosinophils 06/17/2021 2.0  % Final    Basophils 06/17/2021 0.5  % Final    Immature Granulocytes 06/17/2021 0.4  % Final    Segs Absolute 06/17/2021 7.1  1 - 7 thou/mm3 Final    Lymphocytes Absolute 06/17/2021 0.9* 1.0 - 4.8 thou/mm3 Final    Monocytes Absolute 06/17/2021 1.0  0.4 - 1.3 thou/mm3 Final    Eosinophils Absolute 06/17/2021 0.2  0.0 - 0.4 thou/mm3 Final    Basophils Absolute 06/17/2021 0.0  0.0 - 0.1 thou/mm3 Final    Immature Grans (Abs) 06/17/2021 0.04  0.00 - 0.07 thou/mm3 Final    nRBC 06/17/2021 0  /100 wbc Final    Performed at [address], [address]    Hemoglobin A1C 06/17/2021 6.3  4.4 - 6.4 % Final    AVERAGE GLUCOSE 06/17/2021 129* 70 - 126 mg/dL Final    Performed at [address], [address]    TIBC 06/17/2021 225  171 - 450 ug/dL Final    Performed at [address], [address]    Anion Gap 06/17/2021 12.0  8.0 - 16.0 meq/L Final    Comment: ANION GAP = Sodium -(Chloride + CO2)  Performed at [address], [address]      Est, Glom Filt Rate 06/17/2021 37* ml/min/1.73m2 Final    Comment: Stage Description                    GFR, ml/min/1.73 m2   -   At increased risk               > or = 60 (with chronic                                       kidney disease risk factors)   1   Normal or increased GFR         > or = 90   2   Mildly or decreased GFR         60 - 89   3   Moderately decreased GFR        30 - 59   4   Severely decreased GFR          15 - 29   5   Kidney failure                  <15 (or dialysis)  Estimated GFR calculated using abbreviated MDRD formula as  recommended by [employer]. Calculation based  upon serum creatinine and adjusted for age, gender & race. Jasmin.  Internal Med., Vol. 139 (2) pg 137-147. Performed at [place] [facility], [address]     Nurse Only on 06/10/2021   Component Date Value Ref Range Status    Sodium 06/10/2021 141  135 - 145 meq/L Final    Potassium 06/10/2021 4.1  3.5 - 5.2 meq/L Final    Chloride 06/10/2021 106  98 - 111 meq/L Final    CO2 06/10/2021 26  23 - 33 meq/L Final    Glucose 06/10/2021 201* 70 - 108 mg/dL Final    BUN 06/10/2021 33* 7 - 22 mg/dL Final    CREATININE 06/10/2021 1.8* 0.4 - 1.2 mg/dL Final    Calcium 06/10/2021 9.2  8.5 - 10.5 mg/dL Final    Performed at [address], [address]    Anion Gap 06/10/2021 9.0  8.0 - 16.0 meq/L Final    Comment: ANION GAP = Sodium -(Chloride + CO2)  Performed at [address], [address]      Est, Glom Filt Rate 06/10/2021 37* ml/min/1.73m2 Final    Comment: Stage Description                    GFR, ml/min/1.73 m2   -   At increased risk               > or = 60 (with chronic                                       kidney disease risk factors)   1   Normal or increased GFR         > or = 90   2   Mildly or decreased GFR         60 - 89   3   Moderately decreased GFR        30 - 59   4   Severely decreased GFR          15 - 29   5   Kidney failure                  <15 (or dialysis)  Estimated GFR calculated using abbreviated MDRD formula as  recommended by [employer]. Calculation based  upon serum creatinine and adjusted for age, gender & race. Jasmin. Internal Med., Vol. 139 (2) pg 137-147.   Performed at [place] [facility], [address]     Nurse Only on 05/19/2021   Component Date Value Ref Range Status    Sodium 05/19/2021 140  135 - 145 meq/L Final    Potassium 05/19/2021 4.0  3.5 - 5.2 meq/L Final    Chloride 05/19/2021 104  98 - 111 meq/L Final    CO2 05/19/2021 26  23 - 33 meq/L Final    Glucose 05/19/2021 103  70 - 108 mg/dL Final    BUN 05/19/2021 27* 7 - 22 mg/dL Final    CREATININE 05/19/2021 1.8* 0.4 - 1.2 mg/dL Final    Calcium 05/19/2021 8.9  8.5 - 10.5 mg/dL Final    Performed at [address] [place] [id number]    WBC 05/19/2021 8.3  4.8 - 10.8 thou/mm3 Final    RBC 05/19/2021 2.92* 4.70 - 6.10 mill/mm3 Final    Hemoglobin 05/19/2021 9.0* 14.0 - 18.0 gm/dl Final    Hematocrit 05/19/2021 30.0* 42.0 - 52.0 % Final    MCV 05/19/2021 102.7* 80.0 - 94.0 fL Final    MCH 05/19/2021 30.8  26.0 - 33.0 pg Final    MCHC 05/19/2021 30.0* 32.2 - 35.5 gm/dl Final    RDW-CV 05/19/2021 14.9* 11.5 - 14.5 % Final    RDW-SD 05/19/2021 55.3* 35.0 - 45.0 fL Final    Platelets 38/21/5853 236  130 - 400 thou/mm3 Final    MPV 05/19/2021 10.5  9.4 - 12.4 fL Final    Performed at [address] [place] [address], [address]    Anion Gap 05/19/2021 10.0  8.0 - 16.0 meq/L Final    Comment: ANION GAP = Sodium -(Chloride + CO2)  Performed at [address] [place] [address], [address]      Est, Glom Filt Rate 05/19/2021 37* ml/min/1.73m2 Final    Comment: Stage Description                    GFR, ml/min/1.73 m2   -   At increased risk               > or = 60 (with chronic                                       kidney disease risk factors)   1   Normal or increased GFR         > or = 90   2   Mildly or decreased GFR         60 - 89   3   Moderately decreased GFR        30 - 59   4   Severely decreased GFR          15 - 29   5   Kidney failure                  <15 (or dialysis)  Estimated GFR calculated using abbreviated MDRD formula as  recommended by [employer]. Calculation based  upon serum creatinine and adjusted for age, gender & race. Jasmin. Internal Med., Vol. 139 (2) pg 137-147.   Performed at [address] [place] [address], [address]      Iron 05/19/2021 30* 65 - 195 ug/dL Final    Performed at [address] [place] [address], [address]    Ferritin 05/19/2021 54  22 - 322 ng/mL Final    Performed at [facility] [address] [facility], [address]    TIBC 05/19/2021 223  171 - 450 ug/dL Final    Performed at [address] [place] [id number]    Iron Saturation 05/19/2021 13* 20 - 50 % Final    Performed at [address] [place] [address], [facility] [address] Outpatient Visit on 04/28/2021   Component Date Value Ref Range Status    Status 04/28/2021 Interpreted   Final   Office Visit on 04/21/2021   Component Date Value Ref Range Status    post void residual 04/21/2021 185  ml Final    Bladder Scan   Nurse Only on 04/19/2021   Component Date Value Ref Range Status    PSA 04/19/2021 2.88* 0.00 - 1.00 ng/mL Final    Comment: [facility] Prostate Cancer Early Detection Guidelines    Age 39-70      PSA <1 ng/ml, RENETTA Normal(if done)-->repeat testing at    2-4 year intervals. PSA 1-3 ng/ml, RENETTA Normal(if done)-->repeat testing at    1-2 year intervals. PSA >3 ng/ml or very suspicious RENETTA-->Consider referral for    biopsy. Age >75, in select patients      PSA <3 ng/ml, RENETTA normal(if done, and no other indications    for biopsy) -->repeat testing in select patients at    1-4 year intervals. PSA >3 ng/ml or very suspicious RENETTA-->Consider referral for    biopsy. Performed at [place] [facility], [address]     No results displayed because visit has over 200 results.       Office Visit on 03/29/2021   Component Date Value Ref Range Status    Color, UA 03/29/2021 Yellow   Final    Clarity, UA 03/29/2021 Clear  Clear Final    Glucose, Ur 03/29/2021 neg   Final    Bilirubin Urine 03/29/2021 0  mg/dL Final    Ketones, Urine 03/29/2021 Positive   Final    5    Specific Gravity, UA 03/29/2021 1.010  1.005 - 1.030 Final    Blood, Urine 03/29/2021 Negative   Final    pH, UA 03/29/2021 5.0  4.5 - 8.0 Final    Protein, UA 03/29/2021 Positive* Negative Final    30    Nitrite, Urine 03/29/2021 Negative   Final    Leukocytes, UA 03/29/2021 neg   Final    Urobilinogen, Urine 03/29/2021 Normal   Final    rbc urine, poc 03/29/2021 none   Final    wbc urine, poc 03/29/2021 none   Final    bacteria urine, poc 03/29/2021 none   Final    epi cells urine, poc 03/29/2021 none   Final   Hospital Outpatient Visit on 03/25/2021   Component Date Value Ref Range Status    Left Ventricular Ejection Fraction 03/25/2021 70   Final-Edited    LVEF MODALITY 03/25/2021 ECHO   Final-Edited   There may be more visits with results that are not included. Other Studies  Lab Results   Component Value Date    LVEF 70 03/25/2021       An electronic signature was used to authenticate this note.     Electronically signed by Cecilio Mcfarland MD on 7/8/2021 at 5:17 PM  Internal Medicine PGY-2  [facility]  [address] [place] [address], [address]

## 2021-07-19 ENCOUNTER — TELEPHONE (OUTPATIENT)
Dept: INTERNAL MEDICINE CLINIC | Age: 76
End: 2021-07-19

## 2021-07-19 ENCOUNTER — TELEPHONE (OUTPATIENT)
Dept: NEPHROLOGY | Age: 76
End: 2021-07-19

## 2021-07-19 ENCOUNTER — APPOINTMENT (OUTPATIENT)
Dept: GENERAL RADIOLOGY | Age: 76
DRG: 377 | End: 2021-07-19
Payer: MEDICARE

## 2021-07-19 ENCOUNTER — HOSPITAL ENCOUNTER (INPATIENT)
Age: 76
LOS: 2 days | Discharge: HOME | DRG: 377 | End: 2021-07-21
Attending: EMERGENCY MEDICINE | Admitting: INTERNAL MEDICINE
Payer: MEDICARE

## 2021-07-19 DIAGNOSIS — R22.43 LOCALIZED SWELLING OF BOTH LOWER LEGS: ICD-10-CM

## 2021-07-19 DIAGNOSIS — R14.0 ABDOMINAL DISTENSION: ICD-10-CM

## 2021-07-19 DIAGNOSIS — K92.2 GASTROINTESTINAL HEMORRHAGE, UNSPECIFIED GASTROINTESTINAL HEMORRHAGE TYPE: Primary | ICD-10-CM

## 2021-07-19 DIAGNOSIS — D50.0 IRON DEFICIENCY ANEMIA DUE TO CHRONIC BLOOD LOSS: ICD-10-CM

## 2021-07-19 PROBLEM — K92.1 MELENA: Status: ACTIVE | Noted: 2021-07-19

## 2021-07-19 LAB
ABO: NORMAL
ANION GAP SERPL CALCULATED.3IONS-SCNC: 12 MEQ/L (ref 8–16)
ANISOCYTOSIS: PRESENT
ANTIBODY SCREEN: NORMAL
BASOPHILS # BLD: 0.4 %
BASOPHILS ABSOLUTE: 0 THOU/MM3 (ref 0–0.1)
BUN BLDV-MCNC: 55 MG/DL (ref 7–22)
CALCIUM SERPL-MCNC: 9.5 MG/DL (ref 8.5–10.5)
CHLORIDE BLD-SCNC: 105 MEQ/L (ref 98–111)
CO2: 22 MEQ/L (ref 23–33)
CREAT SERPL-MCNC: 2.1 MG/DL (ref 0.4–1.2)
DIFFERENTIAL TYPE: ABNORMAL
EOSINOPHIL # BLD: 1 %
EOSINOPHILS ABSOLUTE: 0.1 THOU/MM3 (ref 0–0.4)
ERYTHROCYTE [DISTWIDTH] IN BLOOD BY AUTOMATED COUNT: 15.9 % (ref 11.5–14.5)
ERYTHROCYTE [DISTWIDTH] IN BLOOD BY AUTOMATED COUNT: 59.9 FL (ref 35–45)
GFR SERPL CREATININE-BSD FRML MDRD: 31 ML/MIN/1.73M2
GLUCOSE BLD-MCNC: 138 MG/DL (ref 70–108)
GLUCOSE BLD-MCNC: 168 MG/DL (ref 70–108)
HCT VFR BLD CALC: 21 % (ref 42–52)
HEMOCCULT STL QL: POSITIVE
HEMOGLOBIN: 6.4 GM/DL (ref 14–18)
IMMATURE GRANS (ABS): 0.08 THOU/MM3 (ref 0–0.07)
IMMATURE GRANULOCYTES: 0.9 %
INR BLD: 1.73 (ref 0.85–1.13)
LYMPHOCYTES # BLD: 10 %
LYMPHOCYTES ABSOLUTE: 0.9 THOU/MM3 (ref 1–4.8)
MCH RBC QN AUTO: 31.8 PG (ref 26–33)
MCHC RBC AUTO-ENTMCNC: 30.5 GM/DL (ref 32.2–35.5)
MCV RBC AUTO: 104.5 FL (ref 80–94)
MONOCYTES # BLD: 10.6 %
MONOCYTES ABSOLUTE: 1 THOU/MM3 (ref 0.4–1.3)
NUCLEATED RED BLOOD CELLS: 0 /100 WBC
OSMOLALITY CALCULATION: 294.8 MOSMOL/KG (ref 275–300)
PATHOLOGIST REVIEW: ABNORMAL
PLATELET # BLD: 208 THOU/MM3 (ref 130–400)
PMV BLD AUTO: 10.3 FL (ref 9.4–12.4)
POTASSIUM REFLEX MAGNESIUM: 4.2 MEQ/L (ref 3.5–5.2)
PRO-BNP: 1823 PG/ML (ref 0–1800)
RBC # BLD: 2.01 MILL/MM3 (ref 4.7–6.1)
RH FACTOR: NORMAL
SCAN OF BLOOD SMEAR: NORMAL
SEG NEUTROPHILS: 77.1 %
SEGMENTED NEUTROPHILS ABSOLUTE COUNT: 7.2 THOU/MM3 (ref 1.8–7.7)
SODIUM BLD-SCNC: 139 MEQ/L (ref 135–145)
TROPONIN T: 0.04 NG/ML
TROPONIN T: 0.05 NG/ML
WBC # BLD: 9.3 THOU/MM3 (ref 4.8–10.8)

## 2021-07-19 PROCEDURE — 86901 BLOOD TYPING SEROLOGIC RH(D): CPT

## 2021-07-19 PROCEDURE — P9016 RBC LEUKOCYTES REDUCED: HCPCS

## 2021-07-19 PROCEDURE — 2140000000 HC CCU INTERMEDIATE R&B

## 2021-07-19 PROCEDURE — 2580000003 HC RX 258: Performed by: STUDENT IN AN ORGANIZED HEALTH CARE EDUCATION/TRAINING PROGRAM

## 2021-07-19 PROCEDURE — 80048 BASIC METABOLIC PNL TOTAL CA: CPT

## 2021-07-19 PROCEDURE — 2580000003 HC RX 258: Performed by: INTERNAL MEDICINE

## 2021-07-19 PROCEDURE — 86923 COMPATIBILITY TEST ELECTRIC: CPT

## 2021-07-19 PROCEDURE — 86900 BLOOD TYPING SEROLOGIC ABO: CPT

## 2021-07-19 PROCEDURE — 99285 EMERGENCY DEPT VISIT HI MDM: CPT

## 2021-07-19 PROCEDURE — 36415 COLL VENOUS BLD VENIPUNCTURE: CPT

## 2021-07-19 PROCEDURE — 6360000002 HC RX W HCPCS: Performed by: REGISTERED NURSE

## 2021-07-19 PROCEDURE — 82272 OCCULT BLD FECES 1-3 TESTS: CPT

## 2021-07-19 PROCEDURE — 71046 X-RAY EXAM CHEST 2 VIEWS: CPT

## 2021-07-19 PROCEDURE — 83880 ASSAY OF NATRIURETIC PEPTIDE: CPT

## 2021-07-19 PROCEDURE — 93005 ELECTROCARDIOGRAM TRACING: CPT | Performed by: EMERGENCY MEDICINE

## 2021-07-19 PROCEDURE — 2580000003 HC RX 258: Performed by: NURSE PRACTITIONER

## 2021-07-19 PROCEDURE — 2580000003 HC RX 258: Performed by: REGISTERED NURSE

## 2021-07-19 PROCEDURE — 6360000002 HC RX W HCPCS: Performed by: INTERNAL MEDICINE

## 2021-07-19 PROCEDURE — 6370000000 HC RX 637 (ALT 250 FOR IP): Performed by: INTERNAL MEDICINE

## 2021-07-19 PROCEDURE — C9113 INJ PANTOPRAZOLE SODIUM, VIA: HCPCS | Performed by: NURSE PRACTITIONER

## 2021-07-19 PROCEDURE — 86850 RBC ANTIBODY SCREEN: CPT

## 2021-07-19 PROCEDURE — 36430 TRANSFUSION BLD/BLD COMPNT: CPT

## 2021-07-19 PROCEDURE — 84484 ASSAY OF TROPONIN QUANT: CPT

## 2021-07-19 PROCEDURE — 85610 PROTHROMBIN TIME: CPT

## 2021-07-19 PROCEDURE — 82948 REAGENT STRIP/BLOOD GLUCOSE: CPT

## 2021-07-19 PROCEDURE — 6360000002 HC RX W HCPCS: Performed by: NURSE PRACTITIONER

## 2021-07-19 PROCEDURE — C9113 INJ PANTOPRAZOLE SODIUM, VIA: HCPCS | Performed by: INTERNAL MEDICINE

## 2021-07-19 PROCEDURE — 85025 COMPLETE CBC W/AUTO DIFF WBC: CPT

## 2021-07-19 PROCEDURE — 99222 1ST HOSP IP/OBS MODERATE 55: CPT | Performed by: INTERNAL MEDICINE

## 2021-07-19 RX ORDER — ONDANSETRON 2 MG/ML
4 INJECTION INTRAMUSCULAR; INTRAVENOUS EVERY 6 HOURS PRN
Status: DISCONTINUED | OUTPATIENT
Start: 2021-07-19 | End: 2021-07-21 | Stop reason: HOSPADM

## 2021-07-19 RX ORDER — METOPROLOL SUCCINATE 50 MG/1
50 TABLET, EXTENDED RELEASE ORAL DAILY
Status: DISCONTINUED | OUTPATIENT
Start: 2021-07-20 | End: 2021-07-21 | Stop reason: HOSPADM

## 2021-07-19 RX ORDER — FUROSEMIDE 10 MG/ML
40 INJECTION INTRAMUSCULAR; INTRAVENOUS EVERY 8 HOURS
Status: DISCONTINUED | OUTPATIENT
Start: 2021-07-19 | End: 2021-07-19

## 2021-07-19 RX ORDER — ACETAMINOPHEN 325 MG/1
650 TABLET ORAL EVERY 6 HOURS PRN
Status: DISCONTINUED | OUTPATIENT
Start: 2021-07-19 | End: 2021-07-21 | Stop reason: HOSPADM

## 2021-07-19 RX ORDER — TAMSULOSIN HYDROCHLORIDE 0.4 MG/1
0.4 CAPSULE ORAL DAILY
Status: DISCONTINUED | OUTPATIENT
Start: 2021-07-20 | End: 2021-07-21 | Stop reason: HOSPADM

## 2021-07-19 RX ORDER — HYDRALAZINE HYDROCHLORIDE 25 MG/1
25 TABLET, FILM COATED ORAL EVERY 8 HOURS SCHEDULED
Status: DISCONTINUED | OUTPATIENT
Start: 2021-07-19 | End: 2021-07-21 | Stop reason: HOSPADM

## 2021-07-19 RX ORDER — SODIUM CHLORIDE 9 MG/ML
INJECTION, SOLUTION INTRAVENOUS PRN
Status: COMPLETED | OUTPATIENT
Start: 2021-07-19 | End: 2021-07-19

## 2021-07-19 RX ORDER — NICOTINE POLACRILEX 4 MG
15 LOZENGE BUCCAL PRN
Status: DISCONTINUED | OUTPATIENT
Start: 2021-07-19 | End: 2021-07-21 | Stop reason: HOSPADM

## 2021-07-19 RX ORDER — SODIUM CHLORIDE 0.9 % (FLUSH) 0.9 %
5-40 SYRINGE (ML) INJECTION EVERY 12 HOURS SCHEDULED
Status: DISCONTINUED | OUTPATIENT
Start: 2021-07-19 | End: 2021-07-21 | Stop reason: HOSPADM

## 2021-07-19 RX ORDER — DEXTROSE MONOHYDRATE 25 G/50ML
12.5 INJECTION, SOLUTION INTRAVENOUS PRN
Status: DISCONTINUED | OUTPATIENT
Start: 2021-07-19 | End: 2021-07-21 | Stop reason: HOSPADM

## 2021-07-19 RX ORDER — ATORVASTATIN CALCIUM 40 MG/1
40 TABLET, FILM COATED ORAL NIGHTLY
Status: DISCONTINUED | OUTPATIENT
Start: 2021-07-19 | End: 2021-07-21 | Stop reason: HOSPADM

## 2021-07-19 RX ORDER — DILTIAZEM HYDROCHLORIDE 120 MG/1
120 CAPSULE, COATED, EXTENDED RELEASE ORAL DAILY
Status: DISCONTINUED | OUTPATIENT
Start: 2021-07-20 | End: 2021-07-21 | Stop reason: HOSPADM

## 2021-07-19 RX ORDER — INSULIN GLARGINE 100 [IU]/ML
45 INJECTION, SOLUTION SUBCUTANEOUS 2 TIMES DAILY
Status: DISCONTINUED | OUTPATIENT
Start: 2021-07-19 | End: 2021-07-19 | Stop reason: ALTCHOICE

## 2021-07-19 RX ORDER — SODIUM CHLORIDE 9 MG/ML
25 INJECTION, SOLUTION INTRAVENOUS PRN
Status: DISCONTINUED | OUTPATIENT
Start: 2021-07-19 | End: 2021-07-21 | Stop reason: HOSPADM

## 2021-07-19 RX ORDER — ACETAMINOPHEN 650 MG/1
650 SUPPOSITORY RECTAL EVERY 6 HOURS PRN
Status: DISCONTINUED | OUTPATIENT
Start: 2021-07-19 | End: 2021-07-21 | Stop reason: HOSPADM

## 2021-07-19 RX ORDER — CLOPIDOGREL BISULFATE 75 MG/1
75 TABLET ORAL DAILY
Status: DISCONTINUED | OUTPATIENT
Start: 2021-07-19 | End: 2021-07-21 | Stop reason: HOSPADM

## 2021-07-19 RX ORDER — DEXTROSE MONOHYDRATE 50 MG/ML
100 INJECTION, SOLUTION INTRAVENOUS PRN
Status: DISCONTINUED | OUTPATIENT
Start: 2021-07-19 | End: 2021-07-21 | Stop reason: HOSPADM

## 2021-07-19 RX ORDER — PANTOPRAZOLE SODIUM 40 MG/10ML
40 INJECTION, POWDER, LYOPHILIZED, FOR SOLUTION INTRAVENOUS 2 TIMES DAILY
Status: DISCONTINUED | OUTPATIENT
Start: 2021-07-19 | End: 2021-07-19

## 2021-07-19 RX ORDER — ISOSORBIDE MONONITRATE 60 MG/1
120 TABLET, EXTENDED RELEASE ORAL DAILY
Status: DISCONTINUED | OUTPATIENT
Start: 2021-07-20 | End: 2021-07-21 | Stop reason: HOSPADM

## 2021-07-19 RX ORDER — ONDANSETRON 4 MG/1
4 TABLET, ORALLY DISINTEGRATING ORAL EVERY 8 HOURS PRN
Status: DISCONTINUED | OUTPATIENT
Start: 2021-07-19 | End: 2021-07-21 | Stop reason: HOSPADM

## 2021-07-19 RX ORDER — SODIUM CHLORIDE 0.9 % (FLUSH) 0.9 %
5-40 SYRINGE (ML) INJECTION PRN
Status: DISCONTINUED | OUTPATIENT
Start: 2021-07-19 | End: 2021-07-21 | Stop reason: HOSPADM

## 2021-07-19 RX ORDER — FUROSEMIDE 10 MG/ML
40 INJECTION INTRAMUSCULAR; INTRAVENOUS 2 TIMES DAILY
Status: COMPLETED | OUTPATIENT
Start: 2021-07-19 | End: 2021-07-20

## 2021-07-19 RX ORDER — RANOLAZINE 500 MG/1
1000 TABLET, EXTENDED RELEASE ORAL 2 TIMES DAILY
Status: DISCONTINUED | OUTPATIENT
Start: 2021-07-19 | End: 2021-07-21 | Stop reason: HOSPADM

## 2021-07-19 RX ADMIN — FUROSEMIDE 40 MG: 40 INJECTION, SOLUTION INTRAMUSCULAR; INTRAVENOUS at 21:25

## 2021-07-19 RX ADMIN — RANOLAZINE 1000 MG: 500 TABLET, FILM COATED, EXTENDED RELEASE ORAL at 21:24

## 2021-07-19 RX ADMIN — PANTOPRAZOLE SODIUM 8 MG/HR: 40 INJECTION, POWDER, FOR SOLUTION INTRAVENOUS at 21:16

## 2021-07-19 RX ADMIN — ATORVASTATIN CALCIUM 40 MG: 40 TABLET, FILM COATED ORAL at 21:24

## 2021-07-19 RX ADMIN — SODIUM CHLORIDE, PRESERVATIVE FREE 10 ML: 5 INJECTION INTRAVENOUS at 21:29

## 2021-07-19 RX ADMIN — SODIUM CHLORIDE 80 MG: 9 INJECTION, SOLUTION INTRAVENOUS at 20:31

## 2021-07-19 RX ADMIN — PANTOPRAZOLE SODIUM 8 MG/HR: 40 INJECTION, POWDER, FOR SOLUTION INTRAVENOUS at 21:12

## 2021-07-19 RX ADMIN — SODIUM CHLORIDE: 9 INJECTION, SOLUTION INTRAVENOUS at 17:23

## 2021-07-19 ASSESSMENT — ENCOUNTER SYMPTOMS
STRIDOR: 0
BLOOD IN STOOL: 1
SHORTNESS OF BREATH: 1
ABDOMINAL PAIN: 0
TROUBLE SWALLOWING: 0
NAUSEA: 0
BACK PAIN: 0
CHEST TIGHTNESS: 0
DIARRHEA: 0
COUGH: 0
RECTAL PAIN: 0
WHEEZING: 0
CHEST TIGHTNESS: 1
VOMITING: 0
ABDOMINAL DISTENTION: 0
EYES NEGATIVE: 1
ANAL BLEEDING: 0

## 2021-07-19 NOTE — TELEPHONE ENCOUNTER
Spoke to wife to confirm her appt. She stated she is taking patient into ER. Stated that he is very fluild overloaded, very SOB. Stated that Dr. Renny Cruz had started him on Lasix 40 mg 2 tablets BID, but stated it is not helping him. Stated for the past two days, he has had black stools as well. Wanted to give you a heads up that they are headed to ER.

## 2021-07-19 NOTE — ED PROVIDER NOTES
[place] ENCOUNTER          Pt Name: Rosalia Tavares  MRN: [id number]  [place] 1945  Date of evaluation: 7/19/2021  Treating Resident Physician: Sujit Graham MD  Supervising Physician: Althea Smith, [address] [place] [address]       Chief Complaint   Patient presents with    Rectal Bleeding    Shortness of Breath     \"several weeks\"     History obtained from the patient, patient's wife, chart review. HISTORY OF PRESENT ILLNESS    Rosalia Tavares is a 68 y.o. male who presents to the emergency department for evaluation of dark stools that smell worse than normal for the past 4-5 days. Has not seen any blood mixed in with the stool or when he wipes. He has no abdominal pain. He has not eaten today, but notes no change in appetite. Also has not taken his apixaban or clopidrogrel today. Notes increasing shortness of breath and leg swelling with exertion past few weeks. His PCP and cardiologist have been changed his diuretic medications, and his wife reports he has lost 8 pounds in the last 10 days. He also notes 1 episode of chest pain last night which was relieved by nitroglycerin. He states this is normal for him to have episodes of chest pain that are relieved by nitroglycerin. The patient has no other acute complaints at this time. REVIEW OF SYSTEMS   Review of Systems   Constitutional: Negative for chills and fever. Wellness and wellness 10 days secondary to change of diuretic medications   HENT: Negative for trouble swallowing. Respiratory: Positive for shortness of breath. Negative for cough, chest tightness, wheezing and stridor. Dyspnea with exertion. Uses BiPAP at night for obstructive sleep apnea   Cardiovascular: Positive for chest pain and leg swelling. Negative for palpitations. 1 episode of chest pain last night relieved with nitroglycerin .   Patient states he regularly has episodes of chest pain that he takes nitroglycerin for   Gastrointestinal: Negative for abdominal distention, abdominal pain, anal bleeding, diarrhea, nausea, rectal pain and vomiting. Dark tarry stools for the last 4 to 5 days   Genitourinary: Negative for dysuria. Skin: Negative. Neurological: Negative for headaches. Psychiatric/Behavioral: Negative for agitation.          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    BPH (benign prostatic hypertrophy)     CAD (coronary artery disease) 1997    Carotid artery disease ([address] 1995    right    Carotid disease, bilateral ([address]     Cerebral artery occlusion with cerebral infarction ([address]     CHF (congestive heart failure) ([address]     Chronic kidney disease     GERD (gastroesophageal reflux disease)     Hyperlipidemia 1996    Hypertension     Lumbar disc disease 2001    MI (myocardial infarction) ([address] 1997    Nephrolithiasis 2002    NIDDM (non-insulin dependent diabetes mellitus) 1996    diagnosed 1996    RICCARDO (obstructive sleep apnea) 2005    cpap    PVC's (premature ventricular contractions)     Renal artery stenosis ([address] 2007    left    Renal insufficiency     S/P CABG (status post coronary artery bypass graft) 1997    5 vessels    Skin cancer     Squamous cell carcinoma     TIA (transient ischemic attack)     Wears partial dentures     upper and lower     Past Surgical History:   Procedure Laterality Date    ABLATION OF DYSRHYTHMIC FOCUS  3/13/13   [facility]    CARDIAC SURGERY  3/13/13    oblation for irreg rythm    CARDIOVERSION  3/8/2012    CAROTID ENDARTERECTOMY  1997    CHOLECYSTECTOMY  4/1999    COLONOSCOPY  6/22/09, 4/2011,7/12/17    polyp removal    CORONARY ANGIOPLASTY WITH STENT PLACEMENT  2008 (2), 2009 (1)    x2    CORONARY ARTERY BYPASS GRAFT  8/1997    5 vessel    DIAGNOSTIC CARDIAC CATH LAB PROCEDURE      FOOT SURGERY  12/20/2013    pre-cancerous mole rt foot removed    LITHOTRIPSY  4/2002    NE OFFICE/OUTPT VISIT,PROCEDURE ONLY N/A 11/8/2018    TRANSESOPHAGEAL ECHOCARDIOGRAM performed by Beatrice Thomas MD at [facility]    PTCA  05/30/2018   Mikemary  2001, 2003    rt shoulder manipulation--frozen shoulder-01, Lt -03    SKIN BIOPSY      SKIN CANCER EXCISION  12/20/13     R foot    SKIN CANCER EXCISION  1/2016    squamous cell Lt  upper cheek    URETER STENT PLACEMENT  4/2002    VASCULAR SURGERY      carotids & cabg harvests         MEDICATIONS     Current Facility-Administered Medications:     sodium chloride flush 0.9 % injection 5-40 mL, 5-40 mL, Intravenous, 2 times per day, Chaz Pu, APRN - CNP    sodium chloride flush 0.9 % injection 5-40 mL, 5-40 mL, Intravenous, PRN, Chaz Pu, APRN - CNP    0.9 % sodium chloride infusion, 25 mL, Intravenous, PRN, Chaz Pu, APRN - CNP    ondansetron (ZOFRAN-ODT) disintegrating tablet 4 mg, 4 mg, Oral, Q8H PRN **OR** ondansetron (ZOFRAN) injection 4 mg, 4 mg, Intravenous, Q6H PRN, Chaz Pu, APRN - CNP    acetaminophen (TYLENOL) tablet 650 mg, 650 mg, Oral, Q6H PRN **OR** acetaminophen (TYLENOL) suppository 650 mg, 650 mg, Rectal, Q6H PRN, Chaz Pu, APRN - CNP    insulin lispro (HUMALOG) injection vial 0-6 Units, 0-6 Units, Subcutaneous, TID WC, Anne-Marie Mahesh, APRN - CNP    insulin lispro (HUMALOG) injection vial 0-3 Units, 0-3 Units, Subcutaneous, Nightly, Anne-Marie Mahesh, APRN - CNP    glucose (GLUTOSE) 40 % oral gel 15 g, 15 g, Oral, PRN, Chaz Pu, APRN - CNP    dextrose 50 % IV solution, 12.5 g, Intravenous, PRN, Chaz Pu, APRN - CNP    glucagon (rDNA) injection 1 mg, 1 mg, Intramuscular, PRN, Chaz Pu, APRN - CNP    dextrose 5 % solution, 100 mL/hr, Intravenous, PRN, KAVON Meza - CNP    pantoprazole (PROTONIX) 80 mg in sodium chloride 0.9 % 100 mL infusion, 8 mg/hr, Intravenous, Continuous, KAVON Zepeda CNP    pantoprazole (PROTONIX) 80 mg in sodium chloride 0.9 % 50 mL bolus, 80 mg, Intravenous, Once, Sky Abrams MD    furosemide (LASIX) injection 40 mg, 40 mg, Intravenous, BID, KAVON Meza - KARLOS      SOCIAL HISTORY     Social History     Social History Narrative    Not on file     Social History     Tobacco Use    Smoking status: Never Smoker    Smokeless tobacco: Never Used   Vaping Use    Vaping Use: Never used   Substance Use Topics    Alcohol use: No     Alcohol/week: 0.0 standard drinks    Drug use: No         ALLERGIES   No Known Allergies      FAMILY HISTORY     Family History   Problem Relation Age of Onset    Cancer Mother     High Blood Pressure Mother     Stroke Father     High Blood Pressure Father     Heart Disease Brother     Cancer Brother         lung    Cancer Brother         melanoma         PREVIOUS RECORDS   Previous records reviewed: Last progress note with PCP. PHYSICAL EXAM     ED Triage Vitals [07/19/21 1156]   BP Temp Temp Source Pulse Resp SpO2 Height Weight   129/61 97.7 °F (36.5 °C) Oral 76 18 98 % -- 221 lb 12.8 oz (100.6 kg)     Initial vital signs and nursing assessment reviewed and normal. Body mass index is 32.75 kg/m². Pulsoximetry is normal per my interpretation. Additional Vital Signs:  Vitals:    07/19/21 1830   BP: 132/84   Pulse: 65   Resp: 18   Temp: 98 °F (36.7 °C)   SpO2: 96%       Physical Exam  Vitals and nursing note reviewed. Exam conducted with a chaperone present Hermila Kearns RN). Constitutional:       General: He is not in acute distress. Appearance: He is well-developed. He is not diaphoretic. HENT:      Head: Normocephalic and atraumatic. Cardiovascular:      Rate and Rhythm: Normal rate. Rhythm irregular. Pulses: Normal pulses. RDW-CV 15.9 (*)     RDW-SD 59.9 (*)     Lymphocytes Absolute 0.9 (*)     Immature Grans (Abs) 0.08 (*)     All other components within normal limits   TROPONIN - Abnormal; Notable for the following components:    Troponin T 0.037 (*)     All other components within normal limits   GLOMERULAR FILTRATION RATE, ESTIMATED - Abnormal; Notable for the following components:    Est, Glom Filt Rate 31 (*)     All other components within normal limits   PROTIME-INR - Abnormal; Notable for the following components:    INR 1.73 (*)     All other components within normal limits   ANION GAP   OSMOLALITY   SCAN OF BLOOD SMEAR   BLOOD OCCULT STOOL SCREEN #1   TROPONIN   TROPONIN   TROPONIN   HEMOGLOBIN AND HEMATOCRIT, BLOOD   BASIC METABOLIC PANEL W/ REFLEX TO MG FOR LOW K   CBC WITH AUTO DIFFERENTIAL   POCT GLUCOSE   POCT GLUCOSE   TYPE AND SCREEN   PREPARE RBC (CROSSMATCH)    Narrative:     [id number]     issued  [id number]     selected       Radiologic studies results:  XR CHEST (2 VW)   Final Result   1. Mild cardiomegaly and possible congestive heart failure in this patient status post previous sternotomy. 2. Slight thickening of the interstitial lung markings and increased density in both lung fields. 3. Thoracic spondylosis. 4. Otherwise negative chest x-ray. **This report has been created using voice recognition software. It may contain minor errors which are inherent in voice recognition technology. **      Final report electronically signed by DR Cody Lewis on 7/19/2021 1:07 PM          ED Medications administered this visit:   Medications   sodium chloride flush 0.9 % injection 5-40 mL (has no administration in time range)   sodium chloride flush 0.9 % injection 5-40 mL (has no administration in time range)   0.9 % sodium chloride infusion (has no administration in time range)   ondansetron (ZOFRAN-ODT) disintegrating tablet 4 mg (has no administration in time range)     Or   ondansetron (ZOFRAN) injection 4 mg (has no administration in time range)   acetaminophen (TYLENOL) tablet 650 mg (has no administration in time range)     Or   acetaminophen (TYLENOL) suppository 650 mg (has no administration in time range)   insulin lispro (HUMALOG) injection vial 0-6 Units (has no administration in time range)   insulin lispro (HUMALOG) injection vial 0-3 Units (has no administration in time range)   glucose (GLUTOSE) 40 % oral gel 15 g (has no administration in time range)   dextrose 50 % IV solution (has no administration in time range)   glucagon (rDNA) injection 1 mg (has no administration in time range)   dextrose 5 % solution (has no administration in time range)   pantoprazole (PROTONIX) 80 mg in sodium chloride 0.9 % 100 mL infusion (has no administration in time range)   pantoprazole (PROTONIX) 80 mg in sodium chloride 0.9 % 50 mL bolus (has no administration in time range)   furosemide (LASIX) injection 40 mg (has no administration in time range)   0.9 % sodium chloride infusion (0 mL/hr Intravenous Stopped 7/19/21 1733)         ED COURSE     ED Course as of Jul 19 1846   Mon Jul 19, 2021   1335 Troponin(!):    Troponin T 0.037(!) [SC]      ED Course User Index  [SC] Kimberlee Lau MD       Discussed this patient with the nurse practitioner covering admissions for his PCP group      MEDICATION CHANGES     Current Discharge Medication List            FINAL DISPOSITION     Final diagnoses:   Gastrointestinal hemorrhage, unspecified gastrointestinal hemorrhage type   Iron deficiency anemia due to chronic blood loss     Condition: condition: stable  Dispo: Admit to step down      This transcription was electronically signed. Parts of this transcriptions may have been dictated by use of voice recognition software and electronically transcribed, and parts may have been transcribed with the assistance of an ED scribe. The transcription may contain errors not detected in proofreading.   Please refer to my supervising physician's documentation if my documentation differs.     Electronically Signed: Castillo Lee MD, 07/19/21, 6:46 PM          Roland Srivastava MD  Resident  07/19/21 5137

## 2021-07-19 NOTE — ED NOTES
ED to inpatient nurses report    Chief Complaint   Patient presents with    Rectal Bleeding    Shortness of Breath     \"several weeks\"      Present to ED from home  LOC: alert and orientated to name, place, date  Vital signs   Vitals:    07/19/21 1618 07/19/21 1625 07/19/21 1632 07/19/21 1711   BP: 125/60 (!) 129/51 (!) 99/54 (!) 130/51   Pulse: 68 75 70 65   Resp: 18 16 16 14   Temp:       TempSrc:       SpO2: 100% 99% 100% 98%   Weight:          Oxygen Baseline room air    Current needs required room air Bipap/Cpap Yes  LDAs:   Peripheral IV 07/19/21 Left Antecubital (Active)   Site Assessment Clean;Dry; Intact 07/19/21 1320   Line Status Brisk blood return;Flushed;Normal saline locked 07/19/21 1320   Dressing Status Clean;Dry; Intact 07/19/21 1320   Dressing Intervention New 07/19/21 1320     Mobility: Requires assistance * 1  Pending ED orders: needs second unit of RBC  Present condition: stable, VSS    Electronically signed by Flora Vieira, RN on 7/19/2021 at 5:33 PM       Destiny Manzo RN  07/19/21 0769

## 2021-07-19 NOTE — CONSULTS
Consult History & Physical      Patient:  Lord Curiel  YOB: 1945  MRN: [id number]     Acct: [de-identified]    Chief Complaint:    Chief Complaint   Patient presents with    Rectal Bleeding    Shortness of Breath     \"several weeks\"       Date of Service: Pt seen/examined in consultation on 7/19/2021    History Of Present Illness:      68 y.o. male who we are asked to see/evaluate by Janey Walsh DO for medical management of melena. He came to the ED today for SOB. He has had SOB for several weeks, but noticed it to have worsened over the past week. He has a history of CHF and is on diuretics which he says have been adjusted a lot recently. CXR demonstrated mild cardiomegaly & possible CHF, slight thickening of the interstitial lung markings & increased density in both lung fields. He endorses melena that started a few days ago. Denies iron supplementation or Pepto Bismol use. His wife states he received IV iron a few months ago because of his anemia. His Hgb was noted to be 6.4 down from 9.3. He is going to received 2 units PRBC. FOBT positive. He is on Plavix and Eliquis for a history of afib s/p ablation and CAD s/p CABG. He last took them yesterday. Denies abdominal pain, nausea, vomiting, diarrhea, constipation, and hematochezia. His last bowel movement was yesterday. He has never had an EGD. Last colonoscopy 09/2020 demonstrated colon polyps and internal hemorrhoids. Pathology demonstrated tubular adenoma.      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    BPH (benign prostatic hypertrophy)     CAD (coronary artery disease) 1997    Carotid artery disease ([address] 1995    right    Carotid disease, bilateral ([facility]     Cerebral artery occlusion with cerebral infarction [facility])     CHF (congestive heart failure) (HCC)     Chronic kidney disease     GERD (gastroesophageal reflux disease)     Hyperlipidemia 1996    Hypertension     Lumbar disc disease 2001    MI (myocardial infarction) ([facility] 1997    Nephrolithiasis 2002    NIDDM (non-insulin dependent diabetes mellitus) 1996    diagnosed 1996    RICCARDO (obstructive sleep apnea) 2005    cpap    PVC's (premature ventricular contractions)     Renal artery stenosis ([facility] 2007    left    Renal insufficiency     S/P CABG (status post coronary artery bypass graft) 1997    5 vessels    Skin cancer     Squamous cell carcinoma     TIA (transient ischemic attack)     Wears partial dentures     upper and lower       Home Medications:  Prior to Admission medications    Medication Sig Start Date End Date Taking? Authorizing Provider   furosemide (LASIX) 40 MG tablet Take 2 tablets by mouth 2 times daily Stop taking Bumex, replace with Lasix.  7/8/21   Yoni Moreno MD   blood glucose test strips (ACCU-CHEK GUIDE) strip USE TO CHECK 4 TIMES DAILY 7/6/21   Liliana Perez MD   Accu-Chek FastClix Lancets MISC USE TO TEST BLOOD SUGARS 4 TIMES DAILY 7/6/21   Liliana Perez MD   atorvastatin (LIPITOR) 40 MG tablet TAKE 1 TABLET BY MOUTH AT  NIGHT 6/29/21   Carlos Casiano MD   dilTIAZem (CARDIZEM CD) 120 MG extended release capsule TAKE 1 CAPSULE BY MOUTH  DAILY 6/28/21   Carlos Casiano MD   ranolazine (RANEXA) 1000 MG extended release tablet Take 1 tablet by mouth 2 times daily New dose 6/25/21   Gray Irving MD   tamsulosin (FLOMAX) 0.4 MG capsule Take 1 capsule by mouth daily 4/21/21   Jignesh Franklin MD   insulin lispro (HUMALOG) 100 UNIT/ML injection vial Inject into the skin 3 times daily (before meals) 7 units plus sliding scale for both breakfast and lunch, 10 units plus sliding scale for supper    Historical Provider, MD   diphenhydrAMINE-APAP, sleep, (TYLENOL PM EXTRA STRENGTH PO) Take 1 tablet by mouth every evening    Historical Provider, MD   ALPRAZolam (XANAX) 0.25 MG tablet Take 0.25 mg by mouth 3 times daily as needed for Anxiety.     Historical Provider, MD   hydrOXYzine (ATARAX) 10 MG tablet Take 10 mg by mouth daily  3/13/21   Historical Provider, MD   fluocinonide (LIDEX) 0.05 % cream Apply 1 applicator topically as needed 12/29/20   Historical Provider, MD   BiPAP Machine MISC by Does not apply route    Historical Provider, MD   metoprolol succinate (TOPROL XL) 50 MG extended release tablet TAKE 1 TABLET BY MOUTH  DAILY 3/1/21   Guero Simental MD   insulin detemir (LEVEMIR FLEXTOUCH) 100 UNIT/ML injection pen 50  units am and pm.  Patient taking differently: Inject 45 Units into the skin 2 times daily  2/22/21   Orly Luna MD   apixaban (ELIQUIS) 5 MG TABS tablet TAKE 1 TABLET BY MOUTH  TWICE A DAY 1/14/21   Guero Simental MD   nitroGLYCERIN (NITROLINGUAL) 0.4 MG/SPRAY 0.4 mg spray USE 1 SPRAY ON OR UNDER THE TONGUE EVERY 5 MINUTES AS NEEDED FOR CHEST PAIN 7/20/20   Guero Simental MD   Insulin Pen Needle (NOVOFINE) 32G X 6 MM MISC USE 1 NEW NEEDLE 6 TIMES  DAILY AND AS NEEDED 7/17/20   Orly Luna MD   Multiple Vitamins-Minerals (THERAPEUTIC MULTIVITAMIN-MINERALS) tablet Take 1 tablet by mouth nightly    Historical Provider, MD   hydrALAZINE (APRESOLINE) 25 MG tablet Take 1 tablet by mouth every 8 hours 11/9/18   Samuel Velez MD   clopidogrel (PLAVIX) 75 MG tablet Take 75 mg by mouth daily morning    Historical Provider, MD   isosorbide mononitrate (IMDUR) 120 MG extended release tablet Take 120 mg by mouth daily  12/21/17   Historical Provider, MD   NOVOFINE 30G X 8 MM MISC USE 1 NEW NEEDLE 6 TIMES A DAY AND AS NEEDED 8/1/17   Orly Luna MD   Lancet Devices (ACCU-CHEK SOFTCLIX LANCET DEV) [address] [facility] Use one with each blood check 1/14/14 4/2/18  Orly Luna MD       Surgical History:  Past Surgical History:   Procedure Laterality Date    ABLATION OF DYSRHYTHMIC FOCUS 3/13/13   [address] CARDIAC SURGERY  3/13/13    oblation for irreg rythm    CARDIOVERSION  3/8/2012    CAROTID ENDARTERECTOMY  1997    CHOLECYSTECTOMY  4/1999    COLONOSCOPY  6/22/09, 4/2011,7/12/17    polyp removal    CORONARY ANGIOPLASTY WITH STENT PLACEMENT  2008 (2), 2009 (1)    x2    CORONARY ARTERY BYPASS GRAFT  8/1997    5 vessel    DIAGNOSTIC CARDIAC CATH LAB PROCEDURE      FOOT SURGERY  12/20/2013    pre-cancerous mole rt foot removed    LITHOTRIPSY  4/2002    MT OFFICE/OUTPT VISIT,PROCEDURE ONLY N/A 11/8/2018    TRANSESOPHAGEAL ECHOCARDIOGRAM performed by [address], MD at [address] [place] [address] PTCA  05/30/2018   Larena Salts  2001, 2003    rt shoulder manipulation--frozen shoulder-01, Lt -03    SKIN BIOPSY      SKIN CANCER EXCISION  12/20/13     R foot    SKIN CANCER EXCISION  1/2016    squamous cell Lt  upper cheek    URETER STENT PLACEMENT  4/2002    VASCULAR SURGERY      carotids & cabg harvests       Family History:  Family History   Problem Relation Age of Onset    Cancer Mother     High Blood Pressure Mother     Stroke Father     High Blood Pressure Father     Heart Disease Brother     Cancer Brother         lung    Cancer Brother         melanoma       Past GI History:  Colon polyps, internal hemorrhoids, colonoscopy, chronic anemia, [place]  Dr. Hull  patient    Allergies:  Patient has no known allergies. Social History:   TOBACCO:   reports that he has never smoked. He has never used smokeless tobacco.  ETOH:   reports no history of alcohol use. Review Of Systems  GENERAL: No fever, chills or weight loss. EYES:  No  blurred vision, double vision   CARDIOVASCULAR: No chest pain or palpitations. RESPIRATORY:  +dyspnea  GI:  See HPI  MUSCULOSKELETAL: No new painful or swollen joints or myalgias. :   No dysuria or hematuria. SKIN:  No rashes or jaundice.     NEUROLOGIC:  No headaches or seizures, numbness or tingling of arms, or legs. PSYCH:  No anxiety or depression. ENDOCRINE:  No polyuria or polydipsia. BLOOD:  +anemia +blood transfusion    PHYSICAL EXAM:  BP (!) 132/44   Pulse 67   Temp 97.6 °F (36.4 °C) (Oral)   Resp 18   Wt 221 lb 12.8 oz (100.6 kg)   SpO2 98%   BMI 32.75 kg/m²     General appearance: Chronically ill appearing male  HEENT: Normal cephalic, atraumatic without obvious deformity. Pupils equal, round, and reactive to light. Neck: Supple, with full range of motion. No jugular venous distention. Trachea midline. Respiratory:  Normal respiratory effort. Rales noted in the bilateral bases. Cardiovascular: Regular rate and rhythm without murmurs, rubs or gallops. Abdomen: Soft, obese, non-tender, non-distended with active bowel sounds. Musculoskeletal: No clubbing, cyanosis bilaterally. BLE pitting edema. Skin: Pale, warm, dry. No rashes or lesions. Psychiatric: Alert and oriented, thought content appropriate, normal insight    Labs:   Recent Labs     07/19/21  1229   WBC 9.3   HGB 6.4*   HCT 21.0*        Recent Labs     07/19/21  1229      K 4.2      CO2 22*   BUN 55*   CREATININE 2.1*   CALCIUM 9.5     Radiology:   CXR 07/19/21      Impression   1. Mild cardiomegaly and possible congestive heart failure in this patient status post previous sternotomy. 2. Slight thickening of the interstitial lung markings and increased density in both lung fields. 3. Thoracic spondylosis. 4. Otherwise negative chest x-ray. Code Status: Full Code    ASSESSMENT:  1. SOB, worse with exertion  2. Acute on chronic CHF  3. Acute on chronic anemia- s/p 2 units, FOBT+  4. Melena  5. Afib s/p ablation- on Eliquis  6. CAD s/p CABG- on Plavix  7. GERD  8. CKD  9. VY  10. DM  11. H/O HTN  12. HLD  13. H/O CVA/TIA- on Plavix  14.  RICCARDO    PLAN:     Monitor H & H, transfuse prn   Stop IVP PPI, switch to PPI gtt   Anemia labs   Nursing to monitor stool output & document   Clear liquid, carb count, low sodium, nothing red diet   NPO at midnight   Diuresis per primary   INR pending   Daily weights   Strict I & O   Will plan for EGD after diuresis & blood transfusion, when pulmonary status has improved, possibly tomorrow   Nephrology consulted by primary   Case discussed at length with primary [address] [place] [address] Supportive care per primary team  Will follow       Case reviewed and impression/plan reviewed in collaboration with Dr. Pamela Irving  Electronically signed by KAVON Martinez CNP on 7/19/2021 at 4:09 PM    [facility]  Thank you for the consultation.

## 2021-07-19 NOTE — ED TRIAGE NOTES
Pt to ED with c/o shortness of breath and black tarry stools x2 days. Shortness of breath has been on-going, being seen by multiple providers and placed on and off Bumex. Wife states pt is unable to walk 10ft without becoming short of breath. Pt states having intermittent chest pain as well, taking Nitro x2. Pt currently denies any pain. EKG completed.

## 2021-07-19 NOTE — H&P
Internal Medicine Specialties  H&P  7/19/2021  3:03 PM    Patient:  Jun Mustafa  YOB: 1945    MRN: [id number]   Acct:  [de-identified]   05/005A  Primary Care Physician: Ronald Yeung MD    Chief Complaint:  Chief Complaint   Patient presents with    Rectal Bleeding    Shortness of Breath     \"several weeks\"       History of Present Illness: The patient is a 68 y.o. male with pmhx of CKD stage III, CAD status post CABG, diabetes type 2 on insulin, atrial fibrillation status post ablation on Eliquis, CHF preserved ejection fraction who presents with increased shortness of breath for the last few days along with black tarry stools on and off for the last 2 days. Patient reports he does not see any kyra blood that his stools have just been dark. Patient also had intermittent chest pain last night and took a nitro which relieved the pain. Patient is status post cath in April 2021 with ballooning of his RCA. No stents were placed. No nausea or vomiting. Patient has had his oral diuretics adjusted a lot lately and was increased to this Lasix dose to 80 BID last week and is now down to 80 daily. Patient states he is unable to walk more than 10 feet without getting short of breath. In the emergency department creatinine 2.1 which is around his baseline, BNP 1823, troponin 0.037, hemoglobin 6.4, occult stool positive, chest x-ray shows increased pulmonary vascular congestion. Patient had 2 units of red blood cells ordered in the ED. Kati Stern Patient admitted for CHF exacerbation along with possible GI bleed.        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    BPH (benign prostatic hypertrophy)     CAD (coronary artery disease) 1997    Carotid artery disease ([facility] [address] 1995    right  Carotid disease, bilateral ([address]     Cerebral artery occlusion with cerebral infarction ([address]     CHF (congestive heart failure) (HCC)     Chronic kidney disease     GERD (gastroesophageal reflux disease)     Hyperlipidemia 1996    Hypertension     Lumbar disc disease 2001    MI (myocardial infarction) ([address] 1997    Nephrolithiasis 2002    NIDDM (non-insulin dependent diabetes mellitus) 1996    diagnosed 1996    RICCARDO (obstructive sleep apnea) 2005    cpap    PVC's (premature ventricular contractions)     Renal artery stenosis ([address] 2007    left    Renal insufficiency     S/P CABG (status post coronary artery bypass graft) 1997    5 vessels    Skin cancer     Squamous cell carcinoma     TIA (transient ischemic attack)     Wears partial dentures     upper and lower       Past Surgical History:        Procedure Laterality Date    ABLATION OF DYSRHYTHMIC FOCUS  3/13/13   [address] CARDIAC SURGERY  3/13/13    oblation for irreg rythm    CARDIOVERSION  3/8/2012    CAROTID ENDARTERECTOMY  1997    CHOLECYSTECTOMY  4/1999    COLONOSCOPY  6/22/09, 4/2011,7/12/17    polyp removal    CORONARY ANGIOPLASTY WITH STENT PLACEMENT  2008 (2), 2009 (1)    x2    CORONARY ARTERY BYPASS GRAFT  8/1997    5 vessel    DIAGNOSTIC CARDIAC CATH LAB PROCEDURE      FOOT SURGERY  12/20/2013    pre-cancerous mole rt foot removed    LITHOTRIPSY  4/2002    [place] OFFICE/OUTPT VISIT,PROCEDURE ONLY N/A 11/8/2018    TRANSESOPHAGEAL ECHOCARDIOGRAM performed by Doc Sebastian MD at [facility]    PTCA  05/30/2018   [facility] SURGERY  2001, 2003    rt shoulder manipulation--frozen shoulder-01, Lt -03    SKIN BIOPSY      SKIN CANCER EXCISION  12/20/13     R foot    SKIN CANCER EXCISION  1/2016    squamous cell Lt  upper cheek    URETER STENT PLACEMENT  4/2002    VASCULAR SURGERY      carotids & cabg harvests       Home Medications: Not in a hospital admission.       Allergies:  Patient has no known allergies. Social History:    reports that he has never smoked. He has never used smokeless tobacco. He reports that he does not drink alcohol and does not use drugs.         Family History:       Problem Relation Age of Onset    Cancer Mother     High Blood Pressure Mother     Stroke Father     High Blood Pressure Father     Heart Disease Brother     Cancer Brother         lung    Cancer Brother         melanoma       Review of Systems:    General ROS: Fatigue  Psychological ROS: negative  Hematological and Lymphatic ROS: negative  Endocrine ROS: negative  Vision:negative  ENT ROS: Denies  Respiratory ROS: Has shortness of breath, no cough or wheezing  Cardiovascular ROS: Has dyspnea on exertion, had intermittent chest pain last night  Gastrointestinal ROS: Melena, no nausea or vomiting  Genito-Urinary ROS: no dysuria, trouble voiding, or hematuria  Musculoskeletal ROS: negative  Neurological ROS: no TIA or stroke symptoms  Dermatological ROS: negative  Weight:no change in weight  Appetite:ok      Physical Exam:    Vitals:  Patient Vitals for the past 24 hrs:   BP Temp Temp src Pulse Resp SpO2 Weight   07/19/21 1446 (!) 116/52 -- -- 70 18 100 % --   07/19/21 1405 (!) 138/58 -- -- 70 11 100 % --   07/19/21 1302 (!) 106/47 -- -- 73 16 99 % --   07/19/21 1156 129/61 97.7 °F (36.5 °C) Oral 76 18 98 % 221 lb 12.8 oz (100.6 kg)     Weight: Weight: 221 lb 12.8 oz (100.6 kg)     24 hour intake/output:No intake or output data in the 24 hours ending 07/19/21 1503    General appearance - alert, well appearing, and in no distress  Eyes - pupils equal and reactive, extraocular eye movements intact  Ears - bilateral TM's and external ear canals normal  Nose - normal and patent, no erythema, discharge or polyps  Mouth - mucous membranes moist, pharynx normal without lesions  Neck - supple, no significant adenopathy  Lymphatics - no palpable lymphadenopathy, no hepatosplenomegaly  Chest -mild Rales in bibasilar bases  Distant Breath Sounds: No  Heart - normal rate, regular rhythm, normal S1, S2, no murmurs, rubs, clicks or gallops  Abdomen - soft, nontender, nondistended, no masses or organomegaly  Obese: Yes;  Neurological - alert, oriented, normal speech, no focal findings or movement disorder noted  Musculoskeletal - no joint tenderness, deformity or swelling  Extremities - peripheral pulses normal, 1+ pitting edema bilateral  Skin -pale     Review of Labs and Diagnostic Testing:    CBC:   Recent Labs     07/19/21  1229   WBC 9.3   HGB 6.4*   HCT 21.0*   .5*        BMP:   Recent Labs     07/19/21  1229      K 4.2      CO2 22*   BUN 55*   CREATININE 2.1*   CALCIUM 9.5   GLUCOSE 138*     PT/INR: No results for input(s): PROTIME, INR in the last 72 hours. APTT: No results for input(s): APTT in the last 72 hours. Lipids: No results for input(s): ALKPHOS, ALT, AST, BILITOT, BILIDIR, LABALBU, AMYLASE, LIPASE in the last 72 hours. Troponin:   Recent Labs     07/19/21  1229   TROPONINT 0.037*        Imaging:  XR CHEST (2 VW)    Result Date: 7/19/2021  PROCEDURE: XR CHEST (2 VW) CLINICAL INFORMATION: Shortness of breath. COMPARISON: Chest x-ray dated 6 April 2021. TECHNIQUE: PA and lateral views the chest. FINDINGS: There is mild cardiomegaly status post previous sternotomy. .  The mediastinum is not widened. There is slight thickening of the interstitial lung markings and increased density in both lung fields. The pulmonary vascularity is increased. There is thoracic spondylosis     1. Mild cardiomegaly and possible congestive heart failure in this patient status post previous sternotomy. 2. Slight thickening of the interstitial lung markings and increased density in both lung fields. 3. Thoracic spondylosis. 4. Otherwise negative chest x-ray. **This report has been created using voice recognition software. It may contain minor errors which are inherent in voice recognition technology. ** Final report electronically signed by [facility] on 7/19/2021 1:07 PM      Assessment/Plan:    1. GI Bleed with melena  a. GI consulted; discussed case with them and possible EGD tomorrow  b. Clear liquid diet for now   c. Trend HH  d. Receiving 2URBC  e. Transfuse if hemoglobin <7  f. Protonix drip  2. Acute CHF exac  a. IV Lasix 40 mg BID for 2 doses   3. Intermittent chest pain with elevated troponin s/p balloon 4/21  a. Consult pt's cardiology  b. Trend trops   c. Holding Plavix and Eliquis  4. CKD 3  a. Consulted pt's nephrologist; will follow and monitor renal fx  5. DM2  a. Resume pt's insulin  6. A fib s/p ablation on Eliquis  a. Cont Cardizem   b. Hold Eliquis due to GI bleed  7. PT/OT  8. DVT prophylaxis   a.  SCD    Assessment and plan of care discussed with supervising physician, Dr Sherrine Baumgarten.      Electronically signed by KAVON Russ CNP on 7/19/2021 at 3:03 PM         Copy: Primary Care Physician: Diamante Walker MD      Pt seen and examined by me  Chart reviewed  D/w Carl Hung   Admitted for melena with SOB  To receive PRBC   GI consulted   Also d/w Dr Carlyn Xiong as antiplatelets being held  Pt aware of risk of thromboembolism and cardiac events while all blood thinners on hold  D/w son at bedside  Electronically signed by Meaghan Stokes MD on 7/19/2021 at 5:32 PM

## 2021-07-19 NOTE — CONSULTS
[facility]          [facility]        [address]        [place] [facility]        [phone]           Inpatient Initial consult note         7/19/2021 4:43 PM    Patient Name:   Estelle Rosario:    [birth year]  Primary Care Physician:  Edie Olszewski, MD     History Obtained From:  patient     Consultation requested by : Carly Khalil DO    requested for  : Evaluation of worsening renal function and fluid overload     History of presentingillness   Aristides Kelley is a 68 y.o.   male with Past Medical History as stated below presented with a chief complaint of Rectal Bleeding and Shortness of Breath (\"several weeks\")   on 7/19/2021 . Patient has been having shortness of breath for the last several weeks, gradual onset significantly has gotten worse within the last 2 days associated with black tarry stools, no kyra blood no nausea vomiting did have some chest pain yesterday no other modifying factors. Symptoms so severe that the patient was even unable to even walk. No other modifying factors. Upon arrival to the ER patient was found to have slightly elevated creatinine and also hemoglobin of 6.4, positive occult stool and chest x-ray suggestive of possible congestion.   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    BPH (benign prostatic hypertrophy)     CAD (coronary artery disease) 1997    Carotid artery disease ([address] 1995    right    Carotid disease, bilateral ([address]     Cerebral artery occlusion with cerebral infarction ([address]     CHF (congestive heart failure) (HCC)     Chronic kidney disease     GERD (gastroesophageal reflux disease)     Hyperlipidemia 1996    Hypertension     Lumbar disc disease 2001    MI (myocardial infarction) ([address] 1997    Nephrolithiasis 2002    NIDDM (non-insulin dependent diabetes mellitus) 1996    diagnosed 1996    RICCARDO (obstructive sleep apnea) 2005    cpap    PVC's (premature ventricular contractions)     Renal artery stenosis ([address] 2007    left    Renal insufficiency     S/P CABG (status post coronary artery bypass graft) 1997    5 vessels    Skin cancer     Squamous cell carcinoma     TIA (transient ischemic attack)     Wears partial dentures     upper and lower     Past Surgical History:   Procedure Laterality Date    ABLATION OF DYSRHYTHMIC FOCUS  3/13/13   [address] CARDIAC SURGERY  3/13/13    oblation for irreg rythm    CARDIOVERSION  3/8/2012    CAROTID ENDARTERECTOMY  1997    CHOLECYSTECTOMY  4/1999    COLONOSCOPY  6/22/09, 4/2011,7/12/17    polyp removal    CORONARY ANGIOPLASTY WITH STENT PLACEMENT  2008 (2), 2009 (1)    x2    CORONARY ARTERY BYPASS GRAFT  8/1997    5 vessel    DIAGNOSTIC CARDIAC CATH LAB PROCEDURE      FOOT SURGERY  12/20/2013    pre-cancerous mole rt foot removed    LITHOTRIPSY  4/2002    [place] OFFICE/OUTPT VISIT,PROCEDURE ONLY N/A 11/8/2018    TRANSESOPHAGEAL ECHOCARDIOGRAM performed by Jayme Barfield MD at  [facility] [address] PTCA  05/30/2018   Alise  2001, 2003    rt shoulder manipulation--frozen shoulder-01, Lt -03    SKIN BIOPSY      SKIN CANCER EXCISION  12/20/13     R foot    SKIN CANCER EXCISION  1/2016    squamous cell Lt  upper cheek    URETER STENT PLACEMENT  4/2002    VASCULAR SURGERY      carotids & cabg harvests     Social History     Socioeconomic History    Marital status:      Spouse name: Susan Torres Number of children: 2    Years of education: Not on file    Highest education level: Not on file   Occupational History    Not on file   Tobacco Use    Smoking status: Never Smoker    Smokeless tobacco: Never Used   Vaping Use    Vaping Use: Never used   Substance and Sexual Activity    Alcohol use: No     Alcohol/week: 0.0 standard drinks    Drug use: No    Sexual activity: Yes     Partners: Female   Other Topics Concern    Not on file   Social History Narrative    Not on file     Social Determinants of Health     Financial Resource Strain: Low Risk     Difficulty of Paying Living Expenses: Not hard at all   Food Insecurity: No Food Insecurity    Worried About Running Out of Food in the Last Year: Never true    [address] in the Last Year: Never true   Transportation Needs: No Transportation Needs    Lack of Transportation (Medical): No    Lack of Transportation (Non-Medical): No   Physical Activity:     Days of Exercise per Week:     Minutes of Exercise per Session:    Stress:     Feeling of Stress :    Social Connections:     Frequency of Communication with Friends and Family:     Frequency of Social Gatherings with Friends and Family:     Attends Confucianist Services:     Active Member of Clubs or Organizations:     Attends Club or Organization Meetings:     Marital Status:    Intimate Partner Violence:     Fear of Current or Ex-Partner:     Emotionally Abused:     Physically Abused:     Sexually Abused:      Family History   Problem Relation Age of Onset    Cancer Mother     High Blood Pressure Mother     Stroke Father     High Blood Pressure Father     Heart Disease Brother     Cancer Brother         lung    Cancer Brother         melanoma     Medications & Allergies      Prior to Admission medications    Medication Sig Start Date End Date Taking? Authorizing Provider   furosemide (LASIX) 40 MG tablet Take 2 tablets by mouth 2 times daily Stop taking Bumex, replace with Lasix.  7/8/21   Peyton Toro MD   blood glucose test strips (ACCU-CHEK GUIDE) strip USE TO CHECK 4 TIMES DAILY 7/6/21   Edie Olszewski, MD   Accu-Chek FastClix Lancets MISC USE TO TEST BLOOD SUGARS 4 TIMES DAILY 7/6/21   Adeline Kaur MD   atorvastatin (LIPITOR) 40 MG tablet TAKE 1 TABLET BY MOUTH AT  NIGHT 6/29/21   Mariel Finley MD   dilTIAZem (CARDIZEM CD) 120 MG extended release capsule TAKE 1 CAPSULE BY MOUTH  DAILY 6/28/21   Mariel Finley MD   ranolazine (RANEXA) 1000 MG extended release tablet Take 1 tablet by mouth 2 times daily New dose 6/25/21   Gray Irving MD   tamsulosin (FLOMAX) 0.4 MG capsule Take 1 capsule by mouth daily 4/21/21   Thierry Massey MD   insulin lispro (HUMALOG) 100 UNIT/ML injection vial Inject into the skin 3 times daily (before meals) 7 units plus sliding scale for both breakfast and lunch, 10 units plus sliding scale for supper    Historical Provider, MD   diphenhydrAMINE-APAP, sleep, (TYLENOL PM EXTRA STRENGTH PO) Take 1 tablet by mouth every evening    Historical Provider, MD   ALPRAZolam (XANAX) 0.25 MG tablet Take 0.25 mg by mouth 3 times daily as needed for Anxiety.     Historical Provider, MD   hydrOXYzine (ATARAX) 10 MG tablet Take 10 mg by mouth daily  3/13/21   Historical Provider, MD   fluocinonide (LIDEX) 0.05 % cream Apply 1 applicator topically as needed 12/29/20   Historical Provider, MD   BiPAP Machine MISC by Does not apply route    Historical Provider, MD   metoprolol succinate (TOPROL XL) 50 MG extended release tablet TAKE 1 TABLET BY MOUTH  DAILY 3/1/21   Mariel Finley MD   insulin detemir (LEVEMIR FLEXTOUCH) 100 UNIT/ML injection pen 50  units am and pm.  Patient taking differently: Inject 45 Units into the skin 2 times daily  2/22/21   Adeline Kaur MD   apixaban (ELIQUIS) 5 MG TABS tablet TAKE 1 TABLET BY MOUTH  TWICE A DAY 1/14/21   Mariel Finley MD   nitroGLYCERIN (NITROLINGUAL) 0.4 MG/SPRAY 0.4 mg spray USE 1 SPRAY ON OR UNDER THE TONGUE EVERY 5 MINUTES AS NEEDED FOR CHEST PAIN 7/20/20   Mariel Finley MD   Insulin Pen Needle (NOVOFINE) 32G X 6 MM MISC USE 1 NEW NEEDLE 6 TIMES  DAILY AND AS NEEDED 7/17/20 Jama Bennett MD   Multiple Vitamins-Minerals (THERAPEUTIC MULTIVITAMIN-MINERALS) tablet Take 1 tablet by mouth nightly    Historical Provider, MD   hydrALAZINE (APRESOLINE) 25 MG tablet Take 1 tablet by mouth every 8 hours 11/9/18   Evan Benedict MD   clopidogrel (PLAVIX) 75 MG tablet Take 75 mg by mouth daily morning    Historical Provider, MD   isosorbide mononitrate (IMDUR) 120 MG extended release tablet Take 120 mg by mouth daily  12/21/17   Historical Provider, MD   NOVOFINE 30G X 8 MM MISC USE 1 NEW NEEDLE 6 TIMES A DAY AND AS NEEDED 8/1/17   Jama Bennett MD   Lancet Devices (ACCU-CHEK SOFTCLIX LANCET DEV) [address] Use one with each blood check 1/14/14 4/2/18  Jama Bennett MD     Allergies: Patient has no known allergies. IP meds : Scheduled Meds:   sodium chloride flush  5-40 mL Intravenous 2 times per day    insulin lispro  0-6 Units Subcutaneous TID WC    insulin lispro  0-3 Units Subcutaneous Nightly    pantoprazole (PROTONIX) bolus  80 mg Intravenous Once    furosemide  40 mg Intravenous BID     Continuous Infusions:   sodium chloride      sodium chloride      dextrose      pantoprozole (PROTONIX) infusion       Review of Systems Physical Exam   Review of Systems   Constitutional: Positive for activity change, appetite change and fatigue. Negative for fever. HENT: Negative. Eyes: Negative. Respiratory: Positive for chest tightness and shortness of breath. Negative for cough. Cardiovascular: Positive for chest pain and leg swelling. Gastrointestinal: Positive for blood in stool. Negative for abdominal pain, nausea and vomiting. Genitourinary: Negative for flank pain, frequency and hematuria. Musculoskeletal: Negative for back pain and neck pain. Skin: Negative for rash and wound. Neurological: Negative for dizziness, light-headedness and headaches. Psychiatric/Behavioral: Negative for agitation and confusion. Physical Exam  Vitals reviewed. Constitutional:       General: He is not in acute distress. Appearance: Normal appearance. He is not diaphoretic. HENT:      Head: Normocephalic and atraumatic. Right Ear: External ear normal.      Left Ear: External ear normal.      Nose: Nose normal.      Mouth/Throat:      Mouth: Mucous membranes are moist.   Eyes:      General: No scleral icterus. Right eye: No discharge. Left eye: No discharge. Conjunctiva/sclera: Conjunctivae normal.   Neck:      Thyroid: No thyromegaly. Vascular: No JVD. Cardiovascular:      Rate and Rhythm: Normal rate and regular rhythm. Heart sounds: Normal heart sounds. No murmur heard. Pulmonary:      Effort: Pulmonary effort is normal. No respiratory distress. Breath sounds: Normal breath sounds. No stridor. No wheezing or rales. Chest:      Chest wall: No tenderness. Abdominal:      General: Bowel sounds are normal. There is distension. Palpations: Abdomen is soft. Tenderness: There is no abdominal tenderness. Musculoskeletal:         General: Swelling present. No tenderness. Cervical back: Normal range of motion and neck supple. Right lower leg: Edema present. Left lower leg: Edema present. Skin:     General: Skin is warm and dry. Findings: No erythema or rash. Neurological:      General: No focal deficit present. Mental Status: He is alert and oriented to person, place, and time.    Psychiatric:         Mood and Affect: Mood normal.         Behavior: Behavior normal.           Vitals:    07/19/21 1632   BP: (!) 99/54   Pulse: 70   Resp: 16   Temp:    SpO2: 100%     Labs, Radiology and Tests       Recent Labs     07/19/21  1229   WBC 9.3   RBC 2.01*   HGB 6.4*   HCT 21.0*   .5*   MCH 31.8   MCHC 30.5*        Recent Labs     07/19/21  1229      K 4.2      CO2 22*   BUN 55*   CREATININE 2.1*   CALCIUM 9.5       Radiology : Chest x-ray reviewed by me shows no

## 2021-07-19 NOTE — ED NOTES
Pt appears to be comfortable, resting with eyes open. Family at bedside. Inpatient physician at bedside at this time performing assessment. Pt is alert and oriented, respirations are equal and unlabored.  Pt denies further needs at this time      Aylin Browne RN  07/19/21 [id number]

## 2021-07-19 NOTE — ED NOTES
Pt resting in bed, pt denies back pain, or SOB. Pt is alert and oriented, respirations are equal and unlabored. Pt finished first 15 minutes of blood transfusion at this time.  Pt denies further needs at this time, will continue to monitor      Mahi Barry RN  07/19/21 [id number]

## 2021-07-20 ENCOUNTER — ANESTHESIA EVENT (OUTPATIENT)
Dept: ENDOSCOPY | Age: 76
DRG: 377 | End: 2021-07-20
Payer: MEDICARE

## 2021-07-20 ENCOUNTER — ANESTHESIA (OUTPATIENT)
Dept: ENDOSCOPY | Age: 76
DRG: 377 | End: 2021-07-20
Payer: MEDICARE

## 2021-07-20 VITALS
SYSTOLIC BLOOD PRESSURE: 147 MMHG | DIASTOLIC BLOOD PRESSURE: 71 MMHG | OXYGEN SATURATION: 99 % | RESPIRATION RATE: 12 BRPM

## 2021-07-20 LAB
ANION GAP SERPL CALCULATED.3IONS-SCNC: 10 MEQ/L (ref 8–16)
BASOPHILS # BLD: 0.3 %
BASOPHILS ABSOLUTE: 0 THOU/MM3 (ref 0–0.1)
BUN BLDV-MCNC: 47 MG/DL (ref 7–22)
CALCIUM SERPL-MCNC: 9.2 MG/DL (ref 8.5–10.5)
CHLORIDE BLD-SCNC: 102 MEQ/L (ref 98–111)
CO2: 23 MEQ/L (ref 23–33)
CREAT SERPL-MCNC: 1.6 MG/DL (ref 0.4–1.2)
EKG ATRIAL RATE: 73 BPM
EKG P AXIS: 52 DEGREES
EKG P-R INTERVAL: 208 MS
EKG Q-T INTERVAL: 452 MS
EKG QRS DURATION: 146 MS
EKG QTC CALCULATION (BAZETT): 497 MS
EKG R AXIS: 63 DEGREES
EKG T AXIS: 29 DEGREES
EKG VENTRICULAR RATE: 73 BPM
EOSINOPHIL # BLD: 1.5 %
EOSINOPHILS ABSOLUTE: 0.1 THOU/MM3 (ref 0–0.4)
ERYTHROCYTE [DISTWIDTH] IN BLOOD BY AUTOMATED COUNT: 16.8 % (ref 11.5–14.5)
ERYTHROCYTE [DISTWIDTH] IN BLOOD BY AUTOMATED COUNT: 59.4 FL (ref 35–45)
GFR SERPL CREATININE-BSD FRML MDRD: 42 ML/MIN/1.73M2
GLUCOSE BLD-MCNC: 120 MG/DL (ref 70–108)
GLUCOSE BLD-MCNC: 128 MG/DL (ref 70–108)
GLUCOSE BLD-MCNC: 190 MG/DL (ref 70–108)
GLUCOSE BLD-MCNC: 194 MG/DL (ref 70–108)
HCT VFR BLD CALC: 22.5 % (ref 42–52)
HCT VFR BLD CALC: 23.6 % (ref 42–52)
HCT VFR BLD CALC: 26.2 % (ref 42–52)
HEMOGLOBIN: 7.2 GM/DL (ref 14–18)
HEMOGLOBIN: 7.3 GM/DL (ref 14–18)
HEMOGLOBIN: 8.5 GM/DL (ref 14–18)
IMMATURE GRANS (ABS): 0.03 THOU/MM3 (ref 0–0.07)
IMMATURE GRANULOCYTES: 0.4 %
LYMPHOCYTES # BLD: 9.7 %
LYMPHOCYTES ABSOLUTE: 0.8 THOU/MM3 (ref 1–4.8)
MCH RBC QN AUTO: 31.2 PG (ref 26–33)
MCHC RBC AUTO-ENTMCNC: 32 GM/DL (ref 32.2–35.5)
MCV RBC AUTO: 97.4 FL (ref 80–94)
MONOCYTES # BLD: 11.2 %
MONOCYTES ABSOLUTE: 0.9 THOU/MM3 (ref 0.4–1.3)
NUCLEATED RED BLOOD CELLS: 0 /100 WBC
OSMOLALITY CALCULATION: 283.6 MOSMOL/KG (ref 275–300)
PLATELET # BLD: 182 THOU/MM3 (ref 130–400)
PMV BLD AUTO: 9.8 FL (ref 9.4–12.4)
POTASSIUM REFLEX MAGNESIUM: 3.9 MEQ/L (ref 3.5–5.2)
RBC # BLD: 2.31 MILL/MM3 (ref 4.7–6.1)
SEG NEUTROPHILS: 76.9 %
SEGMENTED NEUTROPHILS ABSOLUTE COUNT: 6 THOU/MM3 (ref 1.8–7.7)
SODIUM BLD-SCNC: 135 MEQ/L (ref 135–145)
TROPONIN T: 0.06 NG/ML
TROPONIN T: 0.08 NG/ML
WBC # BLD: 7.8 THOU/MM3 (ref 4.8–10.8)

## 2021-07-20 PROCEDURE — 6370000000 HC RX 637 (ALT 250 FOR IP): Performed by: INTERNAL MEDICINE

## 2021-07-20 PROCEDURE — 2140000000 HC CCU INTERMEDIATE R&B

## 2021-07-20 PROCEDURE — 85025 COMPLETE CBC W/AUTO DIFF WBC: CPT

## 2021-07-20 PROCEDURE — 84484 ASSAY OF TROPONIN QUANT: CPT

## 2021-07-20 PROCEDURE — 80048 BASIC METABOLIC PNL TOTAL CA: CPT

## 2021-07-20 PROCEDURE — 3700000000 HC ANESTHESIA ATTENDED CARE: Performed by: INTERNAL MEDICINE

## 2021-07-20 PROCEDURE — 82948 REAGENT STRIP/BLOOD GLUCOSE: CPT

## 2021-07-20 PROCEDURE — 6360000002 HC RX W HCPCS: Performed by: NURSE PRACTITIONER

## 2021-07-20 PROCEDURE — 6360000002 HC RX W HCPCS: Performed by: REGISTERED NURSE

## 2021-07-20 PROCEDURE — 2580000003 HC RX 258: Performed by: NURSE PRACTITIONER

## 2021-07-20 PROCEDURE — 2580000003 HC RX 258: Performed by: REGISTERED NURSE

## 2021-07-20 PROCEDURE — 3609017100 HC EGD: Performed by: INTERNAL MEDICINE

## 2021-07-20 PROCEDURE — 2580000003 HC RX 258: Performed by: NURSE ANESTHETIST, CERTIFIED REGISTERED

## 2021-07-20 PROCEDURE — 99232 SBSQ HOSP IP/OBS MODERATE 35: CPT | Performed by: INTERNAL MEDICINE

## 2021-07-20 PROCEDURE — C9113 INJ PANTOPRAZOLE SODIUM, VIA: HCPCS | Performed by: NURSE PRACTITIONER

## 2021-07-20 PROCEDURE — 85014 HEMATOCRIT: CPT

## 2021-07-20 PROCEDURE — 6360000002 HC RX W HCPCS: Performed by: NURSE ANESTHETIST, CERTIFIED REGISTERED

## 2021-07-20 PROCEDURE — 0DJ08ZZ INSPECTION OF UPPER INTESTINAL TRACT, VIA NATURAL OR ARTIFICIAL OPENING ENDOSCOPIC: ICD-10-PCS | Performed by: INTERNAL MEDICINE

## 2021-07-20 PROCEDURE — 36415 COLL VENOUS BLD VENIPUNCTURE: CPT

## 2021-07-20 PROCEDURE — 7100000011 HC PHASE II RECOVERY - ADDTL 15 MIN: Performed by: INTERNAL MEDICINE

## 2021-07-20 PROCEDURE — 3700000001 HC ADD 15 MINUTES (ANESTHESIA): Performed by: INTERNAL MEDICINE

## 2021-07-20 PROCEDURE — 85018 HEMOGLOBIN: CPT

## 2021-07-20 PROCEDURE — P9016 RBC LEUKOCYTES REDUCED: HCPCS

## 2021-07-20 PROCEDURE — 2709999900 HC NON-CHARGEABLE SUPPLY: Performed by: INTERNAL MEDICINE

## 2021-07-20 PROCEDURE — 7100000010 HC PHASE II RECOVERY - FIRST 15 MIN: Performed by: INTERNAL MEDICINE

## 2021-07-20 PROCEDURE — 36430 TRANSFUSION BLD/BLD COMPNT: CPT

## 2021-07-20 RX ORDER — SODIUM CHLORIDE 9 MG/ML
INJECTION, SOLUTION INTRAVENOUS CONTINUOUS PRN
Status: DISCONTINUED | OUTPATIENT
Start: 2021-07-20 | End: 2021-07-20 | Stop reason: SDUPTHER

## 2021-07-20 RX ORDER — SODIUM CHLORIDE 450 MG/100ML
INJECTION, SOLUTION INTRAVENOUS CONTINUOUS
Status: DISCONTINUED | OUTPATIENT
Start: 2021-07-20 | End: 2021-07-21 | Stop reason: HOSPADM

## 2021-07-20 RX ORDER — SODIUM CHLORIDE 9 MG/ML
INJECTION, SOLUTION INTRAVENOUS PRN
Status: DISCONTINUED | OUTPATIENT
Start: 2021-07-20 | End: 2021-07-21 | Stop reason: HOSPADM

## 2021-07-20 RX ORDER — POLYETHYLENE GLYCOL 3350, SODIUM CHLORIDE, SODIUM BICARBONATE, POTASSIUM CHLORIDE 420; 11.2; 5.72; 1.48 G/4L; G/4L; G/4L; G/4L
4000 POWDER, FOR SOLUTION ORAL ONCE
Status: COMPLETED | OUTPATIENT
Start: 2021-07-20 | End: 2021-07-20

## 2021-07-20 RX ORDER — SODIUM CHLORIDE 0.9 % (FLUSH) 0.9 %
5-40 SYRINGE (ML) INJECTION EVERY 12 HOURS SCHEDULED
Status: DISCONTINUED | OUTPATIENT
Start: 2021-07-20 | End: 2021-07-20 | Stop reason: ALTCHOICE

## 2021-07-20 RX ORDER — SODIUM CHLORIDE 0.9 % (FLUSH) 0.9 %
5-40 SYRINGE (ML) INJECTION PRN
Status: DISCONTINUED | OUTPATIENT
Start: 2021-07-20 | End: 2021-07-20 | Stop reason: ALTCHOICE

## 2021-07-20 RX ORDER — LIDOCAINE HYDROCHLORIDE 20 MG/ML
INJECTION, SOLUTION INTRAVENOUS PRN
Status: DISCONTINUED | OUTPATIENT
Start: 2021-07-20 | End: 2021-07-20 | Stop reason: SDUPTHER

## 2021-07-20 RX ORDER — PROPOFOL 10 MG/ML
INJECTION, EMULSION INTRAVENOUS PRN
Status: DISCONTINUED | OUTPATIENT
Start: 2021-07-20 | End: 2021-07-20 | Stop reason: SDUPTHER

## 2021-07-20 RX ORDER — SODIUM CHLORIDE 9 MG/ML
25 INJECTION, SOLUTION INTRAVENOUS PRN
Status: DISCONTINUED | OUTPATIENT
Start: 2021-07-20 | End: 2021-07-20 | Stop reason: ALTCHOICE

## 2021-07-20 RX ADMIN — PANTOPRAZOLE SODIUM 8 MG/HR: 40 INJECTION, POWDER, FOR SOLUTION INTRAVENOUS at 07:31

## 2021-07-20 RX ADMIN — LIDOCAINE HYDROCHLORIDE 100 MG: 20 INJECTION, SOLUTION INTRAVENOUS at 13:19

## 2021-07-20 RX ADMIN — PROPOFOL 50 MG: 10 INJECTION, EMULSION INTRAVENOUS at 13:19

## 2021-07-20 RX ADMIN — FUROSEMIDE 40 MG: 40 INJECTION, SOLUTION INTRAMUSCULAR; INTRAVENOUS at 08:16

## 2021-07-20 RX ADMIN — RANOLAZINE 1000 MG: 500 TABLET, FILM COATED, EXTENDED RELEASE ORAL at 08:16

## 2021-07-20 RX ADMIN — RANOLAZINE 1000 MG: 500 TABLET, FILM COATED, EXTENDED RELEASE ORAL at 20:37

## 2021-07-20 RX ADMIN — HYDRALAZINE HYDROCHLORIDE 25 MG: 25 TABLET, FILM COATED ORAL at 21:21

## 2021-07-20 RX ADMIN — DILTIAZEM HYDROCHLORIDE 120 MG: 120 CAPSULE, COATED, EXTENDED RELEASE ORAL at 08:16

## 2021-07-20 RX ADMIN — HYDRALAZINE HYDROCHLORIDE 25 MG: 25 TABLET, FILM COATED ORAL at 06:25

## 2021-07-20 RX ADMIN — POLYETHYLENE GLYCOL 3350, SODIUM CHLORIDE, SODIUM BICARBONATE, POTASSIUM CHLORIDE 4000 ML: 420; 11.2; 5.72; 1.48 POWDER, FOR SOLUTION ORAL at 14:51

## 2021-07-20 RX ADMIN — ATORVASTATIN CALCIUM 40 MG: 40 TABLET, FILM COATED ORAL at 20:37

## 2021-07-20 RX ADMIN — METOPROLOL SUCCINATE 50 MG: 50 TABLET, FILM COATED, EXTENDED RELEASE ORAL at 08:16

## 2021-07-20 RX ADMIN — SODIUM CHLORIDE, PRESERVATIVE FREE 10 ML: 5 INJECTION INTRAVENOUS at 08:16

## 2021-07-20 RX ADMIN — TAMSULOSIN HYDROCHLORIDE 0.4 MG: 0.4 CAPSULE ORAL at 08:16

## 2021-07-20 RX ADMIN — PANTOPRAZOLE SODIUM 8 MG/HR: 40 INJECTION, POWDER, FOR SOLUTION INTRAVENOUS at 16:42

## 2021-07-20 RX ADMIN — ISOSORBIDE MONONITRATE 120 MG: 60 TABLET ORAL at 08:16

## 2021-07-20 RX ADMIN — PROPOFOL 30 MG: 10 INJECTION, EMULSION INTRAVENOUS at 13:22

## 2021-07-20 RX ADMIN — SODIUM CHLORIDE: 9 INJECTION, SOLUTION INTRAVENOUS at 13:18

## 2021-07-20 ASSESSMENT — PAIN - FUNCTIONAL ASSESSMENT
PAIN_FUNCTIONAL_ASSESSMENT: 0-10

## 2021-07-20 ASSESSMENT — ENCOUNTER SYMPTOMS: SHORTNESS OF BREATH: 1

## 2021-07-20 ASSESSMENT — PAIN SCALES - GENERAL
PAINLEVEL_OUTOF10: 0

## 2021-07-20 NOTE — OP NOTE
disease with functional limitations    MEDICATION:    MAC/Propofol/Anesthesia:  Yes     Photo:  Yes  Biopsy:  Yes      Description of Procedure: The risks and benefits of the procedure were described to the patient or their representative if unable to give consent including but not limited to bleeding, infection, poking a hole someplace requiring surgery to fix it, having a reaction to medication, and death. The patient or their representative if unable to give consent understood these risks and provided informed consent. Airway was assessed and is adequate for the planned sedation. Anesthesia: MAC  Estimated Blood Loss: less than 50   Complications: None  Specimens: Was Obtained:  see below     Sedation was administered by anesthesia who monitored the patient during the procedure. The patient was placed in the left lateral decubitus position. The perianal area was inspected, and a digital rectal exam was performed. A forward-viewing Olympus adult colonoscope was lubricated and inserted through the anus into the rectum. Under direct visualization, this was advanced to the cecum. Cecal base was identified by the ileocecal valve and appendiceal orifice. The scope was then slowly withdrawn with good views of mucosal surfaces. The scope was retroflexed in the rectum. Findings and maneuvers are listed in impression below. The patient tolerated the procedure well. The scope was removed. The patient was moved to the recovery area. There were no immediate complications. IMPRESSION:  1. Very difficult colonoscopy due to looping requiring placing patient in supine position and using splinting. 2.  8 mm ascending colon polyp with recent hemorrhage removed with cold snare   3. Two 3-4 mm transverse colon polyps removed with biopsy forceps   4.  5-6 mm descending colon polyp removed with cold snare   5.  5-6 mm sigmoid colon polyp removed with cold snare      RECOMMENDATIONS:    1. Await pathology  2. Resume Plavix  3. Restart Eliquis in one week  4. Repeat colonoscopy in 3-5 years depending upon pathology results  5. Follow-up with Tania Pretty in 6-8 weeks  [address] [place] [address], [address]   6.   OK per GI to discharge      Risa Yost MD, MD  3:36 PM 7/21/2021

## 2021-07-20 NOTE — PROGRESS NOTES
Report received from endoscopy from Sushila Lopez, [address] [place] [address]. Scope complete; patient being sent back to unit. PRBC's completed at [phone] prior to start of procedure. Patient tolerated well without any signs of reaction.

## 2021-07-20 NOTE — PROGRESS NOTES
IM Progress Note  Dr. Matthew Miller  7/20/2021 11:02 AM      Patient name Rashaad Hodgson  [id number]  PCP: Karan Jain MD  Admit Date: 7/19/2021  Acct No. [de-identified]    Subjective: Interval History:   Pt received 2 units PRBC  No cp  SOB not worse  Leg edema improved  D/w wife at bedside  D/w Dr Rafy Salomon ,may consider stopping eliquis as he has been in sinus for a while      Diet: Diet NPO    I/O last 3 completed shifts: In: 875.5 [Blood:875.5]  Out: 800 [Urine:800]  No intake/output data recorded. Admission weight: 221 lb 12.8 oz (100.6 kg) as of 7/19/2021 12:01 PM  Wt Readings from Last 3 Encounters:   07/19/21 219 lb 1.6 oz (99.4 kg)   07/08/21 233 lb 3.2 oz (105.8 kg)   06/25/21 227 lb 3.2 oz (103.1 kg)     Body mass index is 32.36 kg/m².     ROS   CVS;  no cp or palpitation  Resp: SOB not worse nocough  Neuro:  No numbness or weakness or dizziness  Abd: no nausea or vomiting or abd pain      Medications:   Scheduled Meds:   [Held by provider] apixaban  5 mg Oral BID    atorvastatin  40 mg Oral Nightly    [Held by provider] clopidogrel  75 mg Oral Daily    dilTIAZem  120 mg Oral Daily    hydrALAZINE  25 mg Oral 3 times per day    isosorbide mononitrate  120 mg Oral Daily    metoprolol succinate  50 mg Oral Daily    ranolazine  1,000 mg Oral BID    tamsulosin  0.4 mg Oral Daily    sodium chloride flush  5-40 mL Intravenous 2 times per day    insulin lispro  0-6 Units Subcutaneous TID WC    insulin lispro  0-3 Units Subcutaneous Nightly    insulin detemir  45 Units Subcutaneous BID     Continuous Infusions:   sodium chloride      sodium chloride      dextrose      pantoprozole (PROTONIX) infusion 8 mg/hr (07/20/21 0731)       Labs :     CBC:   Recent Labs     07/19/21  1229 07/20/21  0157 07/20/21  0623   WBC 9.3  --  7.8   HGB 6.4* 7.3* 7.2*     --  182     BMP:    Recent Labs     07/19/21  1229 07/20/21  0623    135   K 4.2 3.9    102   CO2 22* 23   BUN 55* 47* CREATININE 2.1* 1.6*   GLUCOSE 138* 120*     Hepatic: No results for input(s): AST, ALT, ALB, BILITOT, ALKPHOS in the last 72 hours. Troponin: No results for input(s): TROPONINI in the last 72 hours. BNP: No results for input(s): BNP in the last 72 hours. Lipids: No results for input(s): CHOL, HDL in the last 72 hours. Invalid input(s): LDLCALCU  INR:   Recent Labs     07/19/21  1600   INR 1.73*       Radiology    Objective:   Vitals: BP (!) 123/53   Pulse 78   Temp 97.9 °F (36.6 °C) (Oral)   Resp 18   Ht 5' 9\" (1.753 m)   Wt 219 lb 1.6 oz (99.4 kg)   SpO2 95%   BMI 32.36 kg/m²   HEENT: Head:pupils react  Neck: supple  Lungs: clear to auscultation no rales or rhonchi  Heart: regular rate and rhythm   Abdomen: soft BS heard   Extremities: warm  Trace ankle edema  Neurologic:  Alert, oriented X3    Impression:   :   Melena with anemia  Acute blood anemia s/p 2 units PRBC transfused  coronary artery disease, status post coronary bypass graft and 15 cardiac stents placed in the past. Now with elevated trop sec to anemia  Insulin-requiring diabetes mellitus. Acute kidney injury secondary with CKD improved  Acute on chronic congestive heart failure secondary to diastolic  dysfunction. Peripheral vascular disease, status post right carotid endarterectomy. Hypertensive urgency. Hyperlipidemia. Paroxysmal atrial fibrillation, status post ablation, on anticoagulation. Obstructive sleep apnea, on CPAP. Anxiety.     Plan:    Pt to receive another unit of PRBC  Cardio oked EGD  F/u on hb and hct  Cont diuresis per renal   F/u on creat  Activity as tolerated      Demi Eduardo MD, MD

## 2021-07-20 NOTE — PROGRESS NOTES
Patient is in phase 2 passing gas, .  discussed findings, plan of care, discharge instructions with pt and wife .  Report called to floor

## 2021-07-20 NOTE — CONSULTS
Patient was seen and examined face-to-face by me (Dr. Shawn Barron MD). The chart, progress notes, labs and radiographs were reviewed. Case discussed with patient's primary nurse. Questions and concerns were addressed.   I agree with the note as created with additional comments as noted john Mclain MD   Department of Cardiology

## 2021-07-20 NOTE — CARE COORDINATION
7/20/21, 7:51 AM EDT  DISCHARGE PLANNING EVALUATION:    Rosalia Tavares       Admitted: 7/19/2021/ Ignacio day: 1   Location: -31/031-A Reason for admit: Melena [K92.1]   PMH:  has a past medical history of Adenomatous colon polyp, Angina at [facility]), Atrial fibrillation ([address] 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[address] BPH (benign prostatic hypertrophy), CAD (coronary artery disease), Carotid artery disease ([address] Carotid disease, bilateral ([address] Cerebral artery occlusion with cerebral infarction ([address] CHF (congestive heart failure) ([address] Chronic kidney disease, GERD (gastroesophageal reflux disease), Hyperlipidemia, Hypertension, Lumbar disc disease, MI (myocardial infarction) ([address] Nephrolithiasis, NIDDM (non-insulin dependent diabetes mellitus), RICCARDO (obstructive sleep apnea), PVC's (premature ventricular contractions), Renal artery stenosis ([address] Renal insufficiency, S/P CABG (status post coronary artery bypass graft), Skin cancer, Squamous cell carcinoma, TIA (transient ischemic attack), and Wears partial dentures. Procedure:   7/19 CXR - Mild cardiomegaly and possible congestive heart failure in this patient status post previous sternotomy. Slight thickening of the interstitial lung markings and increased density in both lung fields. Thoracic spondylosis. Barriers to Discharge:  Admitted through ED with complaints of rectal bleeding and SOB. Pt also complains of intermittent chest pain. BNP 1823.0. Troponins 0.037, 0.048, 0.062 and 0.080. Hgb 6.4 and 7.2, transfused 2 units of PRBC. BUN 47 and 1.6. Lasix iv bid. Protonix gtt. Consulted GI and Nephrology. GI planning EGD. PCP: Jmaa Bennett MD  Readmission Risk Score: 26%    Patient Goals/Plan/Treatment Preferences: Spoke with Harrell Cowden and his wife. They live at home together. Harrell Cowden is independent and drives. Pt has a wheelchair, transport chair, shower chair, elevated toilet seat, walker at home, but does not use any of them. Denies any HH needs. Transportation/Food Security/Housekeeping Addressed:  No issues identified.

## 2021-07-20 NOTE — PLAN OF CARE
Problem: Pain:  Description: Pain management should include both nonpharmacologic and pharmacologic interventions. Goal: Pain level will decrease  Description: Pain level will decrease  Outcome: Ongoing  Goal: Control of acute pain  Description: Control of acute pain  Outcome: Ongoing     Problem: Falls - Risk of:  Goal: Will remain free from falls  Description: Will remain free from falls  Outcome: Ongoing  Goal: Absence of physical injury  Description: Absence of physical injury  Outcome: Ongoing     Care plan reviewed with patient. Patient verbalizes understanding of the plan of care and contributes to goal setting.

## 2021-07-20 NOTE — PROGRESS NOTES
[facility]         Renal Inpatient Follow-Up note         7/20/2021 7:18 AM    Pt Name:   Kobi Jha  Birthdate:   [birth year]  Attending:   Kacie Vergara MD    Chief Complaint : Kobi Jha is a 68 y.o. male being followed by nephrology for MADI/fluid overload    Interval History :   Patient seen and examined by me. No distress  Feels the same no chest pain no significant shortness of breath  Hemoglobin maintaining stable     Scheduled Medications :    [Held by provider] apixaban  5 mg Oral BID    atorvastatin  40 mg Oral Nightly    [Held by provider] clopidogrel  75 mg Oral Daily    dilTIAZem  120 mg Oral Daily    hydrALAZINE  25 mg Oral 3 times per day    isosorbide mononitrate  120 mg Oral Daily    metoprolol succinate  50 mg Oral Daily    ranolazine  1,000 mg Oral BID    tamsulosin  0.4 mg Oral Daily    sodium chloride flush  5-40 mL Intravenous 2 times per day    insulin lispro  0-6 Units Subcutaneous TID WC    insulin lispro  0-3 Units Subcutaneous Nightly    furosemide  40 mg Intravenous BID    insulin detemir  45 Units Subcutaneous BID      sodium chloride      dextrose      pantoprozole (PROTONIX) infusion 8 mg/hr (07/19/21 2116)       Vitals :  BP (!) 116/55   Pulse 68   Temp 98.2 °F (36.8 °C) (Oral)   Resp 18   Ht 5' 9\" (1.753 m)   Wt 219 lb 1.6 oz (99.4 kg)   SpO2 97%   BMI 32.36 kg/m²     24HR INTAKE/OUTPUT:      Intake/Output Summary (Last 24 hours) at 7/20/2021 0718  Last data filed at 7/20/2021 0405  Gross per 24 hour   Intake 875.5 ml   Output 800 ml   Net 75.5 ml     Last 3 weights  Wt Readings from Last 3 Encounters:   07/19/21 219 lb 1.6 oz (99.4 kg)   07/08/21 233 lb 3.2 oz (105.8 kg)   06/25/21 227 lb 3.2 oz (103.1 kg)           Physical Exam :  General Appearance:  Well developed.  No distress  Mouth/Throat:  Oral mucosa moist  Neck:  Supple, no JVD  Lungs:  Breath sounds: clear  Heart[de-identified]  S1,S2 heard  Abdomen:  Soft, non - tender  Musculoskeletal:  Edema -mild edema         Last 3 CBC   Recent Labs     07/19/21  1229 07/20/21  0157 07/20/21  0623   WBC 9.3  --  7.8   RBC 2.01*  --  2.31*   HGB 6.4* 7.3* 7.2*   HCT 21.0* 23.6* 22.5*     --  182     Last 3 CMP  Recent Labs     07/19/21  1229 07/20/21  0623    135   K 4.2 3.9    102   CO2 22* 23   BUN 55* 47*   CREATININE 2.1* 1.6*   CALCIUM 9.5 9.2             ASSESSMENT / Plan   1. Renal -mild acute kidney injury on chronic kidney disease stage III mostly due to renal hypoperfusion from anemia and possibly due to the use of diuretic. ? Clinically does not look overtly congested chest x-ray is reasonably clear is not requiring any oxygen. ? Status post blood transfusion and a dose of Lasix  ? Creatinine is much better close to in fact better than baseline  ? BMP in the morning     2. Electrolytes -appear to be within normal limits  3. Mild hypotension again possibly due to severe anemia doing better  4. Anemia due to GI bleed status post IV iron recently. Received 2 units of blood transfusion. Mostly for endoscopy soon  5. Shortness of breath mostly multifactorial but primarily due to severe anemia clinically stable can give another dose of Lasix this morning  6. Hx of diabetes mellitus  7. Coronary arteries disease status post multiple stents  Meds reviewed and discussed with patient and nursing staff  Dr. Anitha Bennett MD, M,D.  [facility].

## 2021-07-20 NOTE — ANESTHESIA PRE PROCEDURE
Department of Anesthesiology  Preprocedure Note       Name:  Wes Carson   Age:  68 y.o.  :  1945                                          MRN:  [id number]         Date:  2021      Surgeon: Meaghan Crockett):  Juanpablo Loera MD    Procedure: Procedure(s):  EGD ESOPHAGOGASTRODUODENOSCOPY    Medications prior to admission:   Prior to Admission medications    Medication Sig Start Date End Date Taking? Authorizing Provider   furosemide (LASIX) 40 MG tablet Take 2 tablets by mouth 2 times daily Stop taking Bumex, replace with Lasix. 21  Yes Mary Mcclain MD   atorvastatin (LIPITOR) 40 MG tablet TAKE 1 TABLET BY MOUTH AT  NIGHT 21  Yes Francisco Klinefelter, MD   dilTIAZem (CARDIZEM CD) 120 MG extended release capsule TAKE 1 CAPSULE BY MOUTH  DAILY 21  Yes Francisco Klinefelter, MD   tamsulosin [facility]) 0.4 MG capsule Take 1 capsule by mouth daily 21  Yes Cathleen Pruitt MD   insulin lispro (HUMALOG) 100 UNIT/ML injection vial Inject into the skin 3 times daily (before meals) 7 units plus sliding scale for both breakfast and lunch, 10 units plus sliding scale for supper   Yes Historical Provider, MD   diphenhydrAMINE-APAP, sleep, (TYLENOL PM EXTRA STRENGTH PO) Take 1 tablet by mouth every evening   Yes Historical Provider, MD   ALPRAZolam (XANAX) 0.25 MG tablet Take 0.25 mg by mouth 3 times daily as needed for Anxiety.    Yes Historical Provider, MD   hydrOXYzine (ATARAX) 10 MG tablet Take 10 mg by mouth daily  3/13/21  Yes Historical Provider, MD   fluocinonide (LIDEX) 0.05 % cream Apply 1 applicator topically as needed 20  Yes Historical Provider, MD   metoprolol succinate (TOPROL XL) 50 MG extended release tablet TAKE 1 TABLET BY MOUTH  DAILY 3/1/21  Yes Francisco Klinefelter, MD   insulin detemir (LEVEMIR FLEXTOUCH) 100 UNIT/ML injection pen 50  units am and pm.  Patient taking differently: Inject 45 Units into the skin 2 times daily  21  Yes Ben Castañeda MD   apixaban (ELIQUIS) 5 MG TABS tablet TAKE 1 TABLET BY MOUTH  TWICE A DAY 1/14/21  Yes Dayanara Sebastian MD   nitroGLYCERIN (NITROLINGUAL) 0.4 MG/SPRAY 0.4 mg spray USE 1 SPRAY ON OR UNDER THE TONGUE EVERY 5 MINUTES AS NEEDED FOR CHEST PAIN 7/20/20  Yes Dayanara Sebastian MD   Multiple Vitamins-Minerals (THERAPEUTIC MULTIVITAMIN-MINERALS) tablet Take 1 tablet by mouth nightly   Yes Historical Provider, MD   hydrALAZINE (APRESOLINE) 25 MG tablet Take 1 tablet by mouth every 8 hours 11/9/18  Yes Ant Copeland MD   clopidogrel (PLAVIX) 75 MG tablet Take 75 mg by mouth daily morning   Yes Historical Provider, MD   isosorbide mononitrate (IMDUR) 120 MG extended release tablet Take 120 mg by mouth daily  12/21/17  Yes Historical Provider, MD   blood glucose test strips (ACCU-CHEK GUIDE) strip USE TO CHECK 4 TIMES DAILY 7/6/21   Valery Mullins MD   Accu-Chek FastClix Lancets MISC USE TO TEST BLOOD SUGARS 4 TIMES DAILY 7/6/21   Valery Mullins MD   ranolazine (RANEXA) 1000 MG extended release tablet Take 1 tablet by mouth 2 times daily New dose 6/25/21   Gray Irving MD   BiPAP Machine MISC by Does not apply route    Historical Provider, MD   Insulin Pen Needle (NOVOFINE) 32G X 6 MM MISC USE 1 NEW NEEDLE 6 TIMES  DAILY AND AS NEEDED 7/17/20   Valery Mullins MD   NOVOFINE 30G X 8 MM MISC USE 1 NEW NEEDLE 6 TIMES A DAY AND AS NEEDED 8/1/17   Valery Mullins MD   Lancet Devices (ACCU-CHEK SOFTCLIX LANCET DEV) MISC Use one with each blood check 1/14/14 4/2/18  Valery Mullins MD       Current medications:    Current Facility-Administered Medications   Medication Dose Route Frequency Provider Last Rate Last Admin    0.9 % sodium chloride infusion   Intravenous PRN Weyman Schirmer, APRN - CNP        [Held by provider] apixaban (ELIQUIS) tablet 5 mg  5 mg Oral BID Ant Copeland MD        atorvastatin (LIPITOR) tablet 40 mg  40 mg Oral Nightly Ant Copeland MD   40 mg at 07/19/21 2124    [Held by provider] clopidogrel (PLAVIX) tablet 75 mg  75 mg Oral Daily Samuel Velez MD        dilTIAHellen GOLDSMITH [facility] CD) extended release capsule 120 mg  120 mg Oral Daily Samuel Velez MD   120 mg at 07/20/21 0816    hydrALAZINE (APRESOLINE) tablet 25 mg  25 mg Oral 3 times per day Samuel Velez MD   25 mg at 07/20/21 9824    isosorbide mononitrate (IMDUR) extended release tablet 120 mg  120 mg Oral Daily Samuel Velez MD   120 mg at 07/20/21 0816    metoprolol succinate (TOPROL XL) extended release tablet 50 mg  50 mg Oral Daily Samuel Velez MD   50 mg at 07/20/21 0816    ranolazine (RANEXA) extended release tablet 1,000 mg  1,000 mg Oral BID Samuel Velez MD   1,000 mg at 07/20/21 0816    tamsulosin (FLOMAX) capsule 0.4 mg  0.4 mg Oral Daily Samuel Velez MD   0.4 mg at 07/20/21 0816    sodium chloride flush 0.9 % injection 5-40 mL  5-40 mL Intravenous 2 times per day Lesley Montoya, APRN - CNP   10 mL at 07/20/21 0816    sodium chloride flush 0.9 % injection 5-40 mL  5-40 mL Intravenous PRN Lesley Pérezle, APRN - CNP        0.9 % sodium chloride infusion  25 mL Intravenous PRN Lesley Bible, APRN - CNP        ondansetron (ZOFRAN-ODT) disintegrating tablet 4 mg  4 mg Oral Q8H PRN Lesley Bible, APRN - CNP        Or    ondansetron (ZOFRAN) injection 4 mg  4 mg Intravenous Q6H PRN Lesley Bible, APRN - CNP        acetaminophen (TYLENOL) tablet 650 mg  650 mg Oral Q6H PRN Lesley Bible, APRN - CNP        Or    acetaminophen (TYLENOL) suppository 650 mg  650 mg Rectal Q6H PRN Lesley Bible, APRN - CNP        insulin lispro (HUMALOG) injection vial 0-6 Units  0-6 Units Subcutaneous TID WC Lesley Bible, APRN - CNP        insulin lispro (HUMALOG) injection vial 0-3 Units  0-3 Units Subcutaneous Nightly Lesley Bible, APRN - CNP   1 Units at 07/19/21 1449    glucose (GLUTOSE) 40 % oral gel 15 g  15 g Oral PRN KAVON Yanez CNP        dextrose 50 % IV solution  12.5 g Intravenous PRN Cydne Ng, APRN - CNP        glucagon (rDNA) injection 1 mg  1 mg Intramuscular PRN Cynele LISA NgN - CNP        dextrose 5 % solution  100 mL/hr Intravenous PRN Cynele Berenice, APRN - CNP        pantoprazole (PROTONIX) 80 mg in sodium chloride 0.9 % 100 mL infusion  8 mg/hr Intravenous Continuous KAVON Olivo CNP 10 mL/hr at 07/20/21 0731 8 mg/hr at 07/20/21 0731    [Held by provider] insulin detemir (LEVEMIR) injection vial 45 Units  45 Units Subcutaneous BID Cynele Berenice, APRN - CNP         Facility-Administered Medications Ordered in Other Encounters   Medication Dose Route Frequency Provider Last Rate Last Admin    0.9 % sodium chloride infusion   Intravenous Continuous PRN KAVON Escalona CRNA   New Bag at 07/20/21 1318       Allergies:  No Known Allergies    Problem List:    Patient Active Problem List   Diagnosis Code    CKD (chronic kidney disease) stage 3, GFR 30-59 ml/min ([facility]) N18.30    S/P CABG (coronary artery bypass graft) Z95.1    Hyperlipidemia E78.5    Renal artery stenosis ([facility]) I70.1    Obstructive sleep apnea on CPAP G47.33, Z99.89    Coronary artery disease of native artery of native heart with stable angina pectoris ([facility]) I25.118    Type 2 diabetes mellitus with circulatory disorder ([facility]) E11.59    Status post angioplasty with stent Z95.820    SOB (shortness of breath) R06.02    Wheezing R06.2    Asthma J45.909    Essential hypertension I10    Obesity (BMI 30-39. 9) E66.9    Elevated PSA R97.20    Hypertrophy of prostate with urinary obstruction N40.1, N13.8    Adenomatous colon polyp D12.6    Angina, class III ([facility]) I20.9    Hx of atrial fibrillation without current medication Z86.79    Bilateral carotid artery stenosis I65.23    Hx of nonmelanoma skin cancer Z85.828    Acute kidney injury superimposed on CKD ([facility]) N17.9, N18.9    Anemia of chronic renal failure N18.9, D63.1    Anemia in stage 3 chronic kidney disease ([address] N18.30, D63.1    Melena K92.1       Past Medical History:        Diagnosis Date    Adenomatous colon polyp 2009/2010, 6/'14,7/'17, 9/20    Angina at rest [facility])     Atrial fibrillation ([address] 12/11 and 11/12    cardioversion 12/11    Bladder cancer [facility]) 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    BPH (benign prostatic hypertrophy)     CAD (coronary artery disease) 1997    Carotid artery disease ([address] 1995    right    Carotid disease, bilateral ([address]     Cerebral artery occlusion with cerebral infarction ([address]     CHF (congestive heart failure) ([address]     Chronic kidney disease     GERD (gastroesophageal reflux disease)     Hyperlipidemia 1996    Hypertension     Lumbar disc disease 2001    MI (myocardial infarction) ([address] 1997    Nephrolithiasis 2002    NIDDM (non-insulin dependent diabetes mellitus) 1996    diagnosed 1996    RICCARDO (obstructive sleep apnea) 2005    cpap    PVC's (premature ventricular contractions)     Renal artery stenosis ([address] 2007    left    Renal insufficiency     S/P CABG (status post coronary artery bypass graft) 1997    5 vessels    Skin cancer     Squamous cell carcinoma     TIA (transient ischemic attack)     Wears partial dentures     upper and lower       Past Surgical History:        Procedure Laterality Date    ABLATION OF DYSRHYTHMIC FOCUS  3/13/13   [facility]    CARDIAC SURGERY  3/13/13    oblation for irreg rythm    CARDIOVERSION  3/8/2012    CAROTID ENDARTERECTOMY  1997    CHOLECYSTECTOMY  4/1999    COLONOSCOPY  6/22/09, 4/2011,7/12/17    polyp removal    CORONARY ANGIOPLASTY WITH STENT PLACEMENT  2008 (2), 2009 (1)    x2    CORONARY ARTERY BYPASS GRAFT  8/1997    5 vessel    DIAGNOSTIC CARDIAC CATH LAB PROCEDURE      FOOT SURGERY  12/20/2013    pre-cancerous mole rt foot removed    LITHOTRIPSY  4/2002    [place] OFFICE/OUTPT VISIT,PROCEDURE ONLY N/A 11/8/2018    TRANSESOPHAGEAL ECHOCARDIOGRAM performed by Pallavi GALLEGO Ihsan Josue MD at [facility]    PTCA  05/30/2018   [facility] SURGERY  2001, 2003    rt shoulder manipulation--frozen shoulder-01, [place] -    SKIN BIOPSY      SKIN CANCER EXCISION  12/20/13     R foot    SKIN CANCER EXCISION  1/2016    squamous cell Lt  upper cheek    URETER STENT PLACEMENT  4/2002    VASCULAR SURGERY      carotids & cabg harvests       Social History:    Social History     Tobacco Use    Smoking status: Never Smoker    Smokeless tobacco: Never Used   Substance Use Topics    Alcohol use: No     Alcohol/week: 0.0 standard drinks                                Counseling given: Not Answered      Vital Signs (Current):   Vitals:    07/20/21 1248 07/20/21 1251 07/20/21 1255 07/20/21 1303   BP: (!) 143/65 (!) 153/67 (!) 155/64 (!) 149/67   Pulse: 76 71 71 69   Resp: 18 18 18 18   Temp: 36.3 °C (97.4 °F) 37.1 °C (98.7 °F) 36.7 °C (98.1 °F) 36.6 °C (97.8 °F)   TempSrc: Temporal Temporal Oral Temporal   SpO2: 98% 99% 97%    Weight:       Height:                                                  BP Readings from Last 3 Encounters:   07/20/21 (!) 149/67   07/20/21 (!) 159/70   07/08/21 (!) 132/56       NPO Status: Time of last liquid consumption: 0800 (sips water with meds)                        Time of last solid consumption: 2300                        Date of last liquid consumption: 07/20/21                        Date of last solid food consumption: 07/18/21    BMI:   Wt Readings from Last 3 Encounters:   07/19/21 219 lb 1.6 oz (99.4 kg)   07/08/21 233 lb 3.2 oz (105.8 kg)   06/25/21 227 lb 3.2 oz (103.1 kg)     Body mass index is 32.36 kg/m².     CBC:   Lab Results   Component Value Date    WBC 7.8 07/20/2021    RBC 2.31 07/20/2021    RBC 3.80 03/24/2012    HGB 7.2 07/20/2021    HCT 22.5 07/20/2021    MCV 97.4 07/20/2021    RDW 13.7 06/07/2018     07/20/2021       CMP:   Lab Results   Component Value Date     07/20/2021    K 3.9 07/20/2021     07/20/2021    CO2 23 07/20/2021 BUN 47 07/20/2021    CREATININE 1.6 07/20/2021    GFRAA >60 09/11/2016    AGRATIO 1.2 09/10/2016    LABGLOM 42 07/20/2021    GLUCOSE 120 07/20/2021    GLUCOSE 169 03/24/2012    PROT 6.7 06/17/2021    CALCIUM 9.2 07/20/2021    BILITOT 0.3 06/17/2021    BILITOT NEGATIVE 06/18/2018    ALKPHOS 84 06/17/2021    AST 16 06/17/2021    ALT 17 06/17/2021       POC Tests:   Recent Labs     07/20/21  0741   POCGLU 128*       Coags:   Lab Results   Component Value Date    PROTIME 17.2 09/10/2016    INR 1.73 07/19/2021    APTT 58.0 04/08/2021       HCG (If Applicable): No results found for: PREGTESTUR, PREGSERUM, HCG, HCGQUANT     ABGs: No results found for: PHART, PO2ART, PTZ0UPR, TGJ6TIY, BEART, Z3VTVQSW     Type & Screen (If Applicable):  Lab Results   Component Value Date    LABRH POS 07/19/2021       Drug/Infectious Status (If Applicable):  No results found for: HIV, HEPCAB    COVID-19 Screening (If Applicable): No results found for: COVID19        Anesthesia Evaluation  Patient summary reviewed  Airway: Mallampati: III        Dental:    (+) lower dentures and other      Pulmonary: breath sounds clear to auscultation  (+) shortness of breath:  sleep apnea:  asthma: exercise-induced asthma,                            Cardiovascular:    (+) hypertension:, angina:, past MI:, CAD:, CABG/stent:, CHF:,         Rhythm: irregular  Rate: normal                    Neuro/Psych:   (+) CVA:, TIA,             GI/Hepatic/Renal:   (+) GERD:,           Endo/Other:    (+) Diabetes, . Abdominal:   (+) obese,           Vascular: Other Findings:             Anesthesia Plan      MAC     ASA 4       Induction: intravenous. Anesthetic plan and risks discussed with patient and spouse. Plan discussed with attending.                   Zunilda Freire, KAVON - LUKE   7/20/2021

## 2021-07-20 NOTE — H&P
[address] [address] Endoscopy    Pre-Endoscopy H&PE      Patient: Jyoti Haines    : 1945    Acct#: [de-identified]  Primary Care Physician: Pardeep Perez MD   Date of evaluation: 2021    Planned Procedure:    [x]EGD    []Enteroscopy    []PEG    []PEG change    []PEG removal  []Variceal banding    []Biopsy   []Dilation      [x]Control of bleeding  []Destruction of lesion by [facility]    []Stent Placement  []Foreign Body Removal    []Snare Polypectomy  []Other:       []Colonoscopy  []Flex Sigmoid []EUS, rectal      []Biopsy   []Dilation      []Control of bleeding  []Destruction of lesion by [facility]    []Stent Placement  []Foreign Body Removal    []Snare Polypectomy  []Other:      Planned Sedation  []Conscious Sedation [x]MAC/Propofol []Anesthesia    []None    Airway:  Adequate for planned sedation    Indication:   Profound anemia    History:  The patient is a 68 y.o. male with Afib on Eliquis and CAD on Plavix admitted 21 for dyspnea/CHF exacerbation with profound anemia. Eliquis and Plavix were held with last doses 21. Last colonoscopy was Sep 2020. Physical Exam:  VITALS: BP (!) 113/55   Pulse 66   Temp 97.8 °F (36.6 °C) (Oral)   Resp 18   Ht 5' 9\" (1.753 m)   Wt 219 lb 1.6 oz (99.4 kg)   SpO2 98%   BMI 32.36 kg/m²   The patient is a 68 y.o. male in no acute distress. HEAD: Normal cephalic/atraumatic. Extra-occular motions intact bilaterally. NECK: No lymphadenopathy or bruits. CHEST: Rises equally on inspiration. Clear to auscultation bilaterally. CARDIOVASCULAR: Regular rate and rhythm without murmurs, rubs or gallops. ABDOMEN: Soft, nontender, and nondistended with normal bowel sounds. No Hepatosplemomegaly. UPPER EXTREMITIES: no cyanosis, clubbing, or edema. DERM: no rash or jaundice. LOWER EXTREMITIES: no cyanosis, clubbing, or edema. NEURO: Alert and oriented times four. Patient moves all extremities and has gross sensation in all extremities.     ASA: ASA 3 - Patient with moderate systemic disease with functional limitations    See GI consult dated 7/19/21    Taylor Jaramillo MD, MD  1:09 PM 7/20/2021

## 2021-07-20 NOTE — OP NOTE
[address] [address] Endoscopy    Patient: Bambi Castellanos  : 1945  Acct#: [de-identified]  Date of evaluation: 2021  Primary Care Physician: Zoila Muller MD     Procedure:    [x]EGD    []Enteroscopy    []Biopsy   []Dilation      []PEG    []PEG change    []PEG removal  []Variceal banding    []Control of bleeding  []Destruction of lesion by [facility]    []Stent Placement  []Foreign Body Removal    []Snare Polypectomy  []Other:     []Aborted:        []Colonoscopy  []Flex Sigmoid []EUS, rectal     []Biopsy   []Snare Polypectomy    []Control of bleeding  []Destruction of lesion by [facility]    []Stent Placement  []Foreign Body Removal    []Dilation    []Other:    []Aborted:   []Tattoo    Indication:   Profound anemia, melena    History:  The patient is a 68 y.o. male with Afib on Eliquis and CAD on Plavix admitted 21 for dyspnea/CHF exacerbation with profound anemia. Eliquis and Plavix were held with last doses 21. Last colonoscopy was Sep 2020. He's been having melena for ~5 days. Physical Exam:  VITALS: BP (!) 113/55   Pulse 66   Temp 97.8 °F (36.6 °C) (Oral)   Resp 18   Ht 5' 9\" (1.753 m)   Wt 219 lb 1.6 oz (99.4 kg)   SpO2 98%   BMI 32.36 kg/m²   The patient is a 68 y.o. male in no acute distress. HEAD: Normal cephalic/atraumatic. Extra-occular motions intact bilaterally. NECK: No lymphadenopathy or bruits. CHEST: Rises equally on inspiration. Clear to auscultation bilaterally. CARDIOVASCULAR: Regular rate and rhythm without murmurs, rubs or gallops. ABDOMEN: Soft, nontender, and nondistended with normal bowel sounds. No Hepatosplemomegaly. UPPER EXTREMITIES: no cyanosis, clubbing, or edema. DERM: no rash or jaundice. LOWER EXTREMITIES: no cyanosis, clubbing, or edema. NEURO: Alert and oriented times four. Patient moves all extremities and has gross sensation in all extremities.     ASA: ASA 3 - Patient with moderate systemic disease with functional limitations    MEDICATION:    MAC/Propofol/Anesthesia:  Yes     Photo:  Yes  Biopsy:  No      Description of Procedure: The risks and benefits of the procedure were described to the patient or their representative if unable to give consent including but not limited to bleeding, infection, poking a hole someplace requiring surgery to fix it, having a reaction to medication, and death. The patient or their representative if unable to give consent understood these risks and provided informed consent. Airway was assessed and is adequate for the planned sedation. Anesthesia: MAC  Estimated Blood Loss: less than 50   Complications: None  Specimens: Was Not Obtained     Sedation was administered by anesthesia who monitored the patient during the procedure. The patient was placed in the left lateral decubitus position. A forward-viewing Olympus endoscope was lubricated and inserted through the mouth into the oropharynx. Under direct visualization, the upper esophagus was intubated. The scope was advanced through the esophagus and stomach to second portion of duodenum. Scope was slowly withdrawn with good views of mucosal surfaces. The scope was retroflexed in the fundus. Findings and maneuvers are listed in impression below. The patient tolerated the procedure well. The scope was removed. There were no immediate complications. IMPRESSION:  1. Esophagus - normal   2. Stomach - diffuse gastritis  3. Duodenum - normal   4. No signs of hemorrhage nor lesions which would cause anemia  5. Plavix and Eliquis use    RECOMMENDATIONS:    1. Colonoscopy tomorrow.     Nuala Sacks, MD, MD  1:40 PM 7/20/2021

## 2021-07-20 NOTE — PROGRESS NOTES
Gastroenterology Progress Note:     Patient Name:  Karen Barnhart   MRN: [id number]  [id number]  YOB: 1945  Admit Date: 7/19/2021 12:01 PM  Primary Care Physician: Erin Hansen MD   [id number]     Patient seen and examined. 24 hours events and chart reviewed. Subjective: Patient sitting up in the chair, wife present. He had 2 loose, dark stools last evening. He received the 2nd unit of PRBC overnight. Hgb 7.2 Troponin level has increased to 0.08, cardiology consulted. Denies chest pain currently, but was having some chest pain PTA. Objective:  BP (!) 123/53   Pulse 78   Temp 97.9 °F (36.6 °C) (Oral)   Resp 18   Ht 5' 9\" (1.753 m)   Wt 219 lb 1.6 oz (99.4 kg)   SpO2 95%   BMI 32.36 kg/m²     Physical Exam:    General:  Chronically ill appearing male  HEENT: Atraumatic, normocephalic. Moist oral mucous membranes. Neck: Supple without adenopathy, JVD, thyromegaly or masses. Trachea midline. CV: Heart RRR, no murmurs, rubs, gallops. Resp: Even, easy without cough or accessory use. Lungs clear, diminished to ascultation bilaterally. Abd: Round, soft, obese, nontender. No hepatosplenomegaly or mass present. Active bowel sounds heard. No distention noted. Ext:  Without cyanosis, clubbing. BLE pitting edema. Skin: Pink, warm, dry  Neuro:  Alert, oriented x 3 with no obvious deficits.        Rectal: deferred    Labs:   CBC:   Lab Results   Component Value Date    WBC 7.8 07/20/2021    HGB 7.2 07/20/2021    HCT 22.5 07/20/2021    MCV 97.4 07/20/2021     07/20/2021     BMP:   Lab Results   Component Value Date     07/20/2021    K 3.9 07/20/2021     07/20/2021    CO2 23 07/20/2021    BUN 47 07/20/2021    CREATININE 1.6 07/20/2021    CALCIUM 9.2 07/20/2021     PT/INR:   Lab Results   Component Value Date    PROTIME 17.2 09/10/2016    INR 1.73 07/19/2021     Lipids:   Lab Results   Component Value Date    ALKPHOS 84 06/17/2021    ALT 17 06/17/2021    AST 16 06/17/2021 BILITOT 0.3 06/17/2021    BILITOT NEGATIVE 06/18/2018    BILIDIR <0.2 04/06/2021    LABALBU 3.9 06/17/2021    LABALBU 4.5 03/24/2012    LIPASE 67.9 04/06/2021     Current Meds:  Scheduled Meds:   [Held by provider] apixaban  5 mg Oral BID    atorvastatin  40 mg Oral Nightly    [Held by provider] clopidogrel  75 mg Oral Daily    dilTIAZem  120 mg Oral Daily    hydrALAZINE  25 mg Oral 3 times per day    isosorbide mononitrate  120 mg Oral Daily    metoprolol succinate  50 mg Oral Daily    ranolazine  1,000 mg Oral BID    tamsulosin  0.4 mg Oral Daily    sodium chloride flush  5-40 mL Intravenous 2 times per day    insulin lispro  0-6 Units Subcutaneous TID WC    insulin lispro  0-3 Units Subcutaneous Nightly    insulin detemir  45 Units Subcutaneous BID     Continuous Infusions:   sodium chloride      dextrose      pantoprozole (PROTONIX) infusion 8 mg/hr (07/20/21 0731)       Assessment:  69 yo M admitted 07/19/21 for SOB & melena. H/O CHF, his diuretics have been adjusted frequently. Denies iron & Pepto Bismol use. Initial Hgb 6.4. FOBT positive. H/O afib, on Eliquis. H/O CAD s/p CABG & CVA, on Plavix. He has never had an EGD. 1. SOB, worse with exertion  2. Acute on chronic CHF  3. Acute on chronic anemia- s/p 2 units, FOBT+  4. Melena  5. Afib s/p ablation- on Eliquis  6. CAD s/p CABG- on Plavix  7. GERD  8. CKD  9. VY  10. DM  11. H/O HTN  12. HLD  13. H/O CVA/TIA- on Plavix  14. RICCARDO  15.  Elevated troponin- ACS vs demand ischemia      Plan:    · Monitor H & H, transfuse prn  · Continue PPI gtt  · Anemia labs  · Nursing to monitor stool output & document  · Clear liquid, carb count, low sodium, nothing red diet  · NPO at midnight  · Diuresis per primary  · Daily weights  · Strict I & O  · Transfuse 1 unit PRBC  · Electrolyte management per nephrology  · Cardiology consulted by primary  · Will plan for EGD tomorrow, 07/21/21, with Dr. J Luis Alatorre if cardiology clears patient  · Nephrology on board  · Supportive care per primary team  Will follow    Case reviewed and impression/plan reviewed in collaboration with Dr. Lavon Muñoz  Electronically signed by KAVON Fortune CNP on 7/20/2021 at 10:07 AM    [facility]     Late Entry:  Updated by Leana Adhikari. Dr. Jeffery Vera, cardiology, evaluated the patient in consult and has given cardiac clearance for EGD.  Per RN, patient has had nothing by mouth.  - NPO  - Will schedule for EGD today at 1400 with Dr. Lavon Muñoz

## 2021-07-20 NOTE — ANESTHESIA POSTPROCEDURE EVALUATION
Department of Anesthesiology  Postprocedure Note    Patient: Joss Arriaza  MRN: [id number]  YOB: 1945  Date of evaluation: 7/20/2021  Time:  1:36 PM     Procedure Summary     Date: 07/20/21 Room / Location: [address] [place] [address] / [address] [place] [address]    Anesthesia Start: 5068 Anesthesia Stop: 5444    Procedure: EGD ESOPHAGOGASTRODUODENOSCOPY (N/A ) Diagnosis: (MELANA)    Surgeons: Humberto Perry MD Responsible Provider: Ananya Payton DO    Anesthesia Type: MAC ASA Status: 4          Anesthesia Type: MAC    Natalie Phase I: Natalie Score: 10    Natalie Phase II:      Last vitals: Reviewed and per EMR flowsheets.        Anesthesia Post Evaluation    Patient location during evaluation: bedside  Patient participation: complete - patient participated  Level of consciousness: awake and alert  Pain score: 0  Airway patency: patent  Nausea & Vomiting: no nausea and no vomiting  Complications: no  Cardiovascular status: hemodynamically stable  Respiratory status: acceptable  Hydration status: euvolemic

## 2021-07-20 NOTE — PROGRESS NOTES
Physician Progress Note      Areli Frost  CSN #:                  [id number]  :                       1945  ADMIT DATE:       2021 12:01 PM  DISCH DATE:  RESPONDING  PROVIDER #:        ELEUTERIO LOPEZ          QUERY TEXT:    Pt admitted with GI bleed  and has CHF documented. If possible, please   document in progress notes and discharge summary further specificity regarding   the type and acuity of CHF:      The medical record reflects the following:  Risk Factors: Elderly  Clinical Indicators: Documented Acute CHF in H & P, EF 50-55%  Treatment: IV Lasix BID, I & O, daily weight    Thank You! Mariluz Veliz RN  RN Clinical   Q) [phone] (z) [phone]  Options provided:  -- Acute on Chronic Diastolic CHF/HFpEF  -- Acute on Chronic Systolic and Diastolic CHF  -- Acute Diastolic CHF/HFpEF  -- Acute Systolic and Diastolic CHF  -- Other - I will add my own diagnosis  -- Disagree - Not applicable / Not valid  -- Disagree - Clinically unable to determine / Unknown  -- Refer to Clinical Documentation Reviewer    PROVIDER RESPONSE TEXT:    This patient is in acute on chronic diastolic CHF/HFpEF.     Query created by: Bernadine Arce on 2021 10:25 AM      Electronically signed by:  Yanira Danielson 2021 6:18 PM

## 2021-07-21 ENCOUNTER — ANESTHESIA EVENT (OUTPATIENT)
Dept: ENDOSCOPY | Age: 76
DRG: 377 | End: 2021-07-21
Payer: MEDICARE

## 2021-07-21 ENCOUNTER — ANESTHESIA (OUTPATIENT)
Dept: ENDOSCOPY | Age: 76
DRG: 377 | End: 2021-07-21
Payer: MEDICARE

## 2021-07-21 VITALS
WEIGHT: 214.6 LBS | HEIGHT: 69 IN | OXYGEN SATURATION: 96 % | BODY MASS INDEX: 31.78 KG/M2 | HEART RATE: 58 BPM | DIASTOLIC BLOOD PRESSURE: 66 MMHG | TEMPERATURE: 97.9 F | SYSTOLIC BLOOD PRESSURE: 153 MMHG | RESPIRATION RATE: 16 BRPM

## 2021-07-21 VITALS
SYSTOLIC BLOOD PRESSURE: 121 MMHG | OXYGEN SATURATION: 98 % | RESPIRATION RATE: 12 BRPM | DIASTOLIC BLOOD PRESSURE: 55 MMHG

## 2021-07-21 LAB
ABSOLUTE RETIC #: 104 THOU/MM3 (ref 20–115)
ANION GAP SERPL CALCULATED.3IONS-SCNC: 13 MEQ/L (ref 8–16)
BUN BLDV-MCNC: 33 MG/DL (ref 7–22)
CALCIUM SERPL-MCNC: 9.3 MG/DL (ref 8.5–10.5)
CHLORIDE BLD-SCNC: 102 MEQ/L (ref 98–111)
CO2: 21 MEQ/L (ref 23–33)
CREAT SERPL-MCNC: 1.8 MG/DL (ref 0.4–1.2)
FERRITIN: 325 NG/ML (ref 22–322)
FOLATE: 18.7 NG/ML (ref 4.8–24.2)
GFR SERPL CREATININE-BSD FRML MDRD: 37 ML/MIN/1.73M2
GLUCOSE BLD-MCNC: 121 MG/DL (ref 70–108)
GLUCOSE BLD-MCNC: 129 MG/DL (ref 70–108)
GLUCOSE BLD-MCNC: 140 MG/DL (ref 70–108)
GLUCOSE BLD-MCNC: 153 MG/DL (ref 70–108)
HCT VFR BLD CALC: 26.9 % (ref 42–52)
HCT VFR BLD CALC: 27.1 % (ref 42–52)
HEMOGLOBIN: 8.6 GM/DL (ref 14–18)
HEMOGLOBIN: 8.7 GM/DL (ref 14–18)
IMMATURE RETIC FRACT: 29.9 % (ref 2.3–13.4)
IRON SATURATION: 22 % (ref 20–50)
IRON: 45 UG/DL (ref 65–195)
POTASSIUM SERPL-SCNC: 3.9 MEQ/L (ref 3.5–5.2)
RETIC HEMOGLOBIN: 36.8 PG (ref 28.2–35.7)
RETICULOCYTE ABSOLUTE COUNT: 3.8 % (ref 0.5–2)
SODIUM BLD-SCNC: 136 MEQ/L (ref 135–145)
TOTAL IRON BINDING CAPACITY: 201 UG/DL (ref 171–450)
VITAMIN B-12: 940 PG/ML (ref 211–911)

## 2021-07-21 PROCEDURE — 6360000002 HC RX W HCPCS: Performed by: NURSE PRACTITIONER

## 2021-07-21 PROCEDURE — 7100000010 HC PHASE II RECOVERY - FIRST 15 MIN: Performed by: INTERNAL MEDICINE

## 2021-07-21 PROCEDURE — 82746 ASSAY OF FOLIC ACID SERUM: CPT

## 2021-07-21 PROCEDURE — C9113 INJ PANTOPRAZOLE SODIUM, VIA: HCPCS | Performed by: NURSE PRACTITIONER

## 2021-07-21 PROCEDURE — 0DBL8ZX EXCISION OF TRANSVERSE COLON, VIA NATURAL OR ARTIFICIAL OPENING ENDOSCOPIC, DIAGNOSTIC: ICD-10-PCS | Performed by: INTERNAL MEDICINE

## 2021-07-21 PROCEDURE — 6360000002 HC RX W HCPCS: Performed by: NURSE ANESTHETIST, CERTIFIED REGISTERED

## 2021-07-21 PROCEDURE — 85018 HEMOGLOBIN: CPT

## 2021-07-21 PROCEDURE — 88305 TISSUE EXAM BY PATHOLOGIST: CPT

## 2021-07-21 PROCEDURE — 3609010600 HC COLONOSCOPY POLYPECTOMY SNARE/COLD BIOPSY: Performed by: INTERNAL MEDICINE

## 2021-07-21 PROCEDURE — 83540 ASSAY OF IRON: CPT

## 2021-07-21 PROCEDURE — 0DBM8ZX EXCISION OF DESCENDING COLON, VIA NATURAL OR ARTIFICIAL OPENING ENDOSCOPIC, DIAGNOSTIC: ICD-10-PCS | Performed by: INTERNAL MEDICINE

## 2021-07-21 PROCEDURE — 2720000010 HC SURG SUPPLY STERILE: Performed by: INTERNAL MEDICINE

## 2021-07-21 PROCEDURE — 82607 VITAMIN B-12: CPT

## 2021-07-21 PROCEDURE — 97530 THERAPEUTIC ACTIVITIES: CPT

## 2021-07-21 PROCEDURE — 97165 OT EVAL LOW COMPLEX 30 MIN: CPT

## 2021-07-21 PROCEDURE — 80048 BASIC METABOLIC PNL TOTAL CA: CPT

## 2021-07-21 PROCEDURE — 0DBK8ZX EXCISION OF ASCENDING COLON, VIA NATURAL OR ARTIFICIAL OPENING ENDOSCOPIC, DIAGNOSTIC: ICD-10-PCS | Performed by: INTERNAL MEDICINE

## 2021-07-21 PROCEDURE — 82948 REAGENT STRIP/BLOOD GLUCOSE: CPT

## 2021-07-21 PROCEDURE — 0DBN8ZX EXCISION OF SIGMOID COLON, VIA NATURAL OR ARTIFICIAL OPENING ENDOSCOPIC, DIAGNOSTIC: ICD-10-PCS | Performed by: INTERNAL MEDICINE

## 2021-07-21 PROCEDURE — 2709999900 HC NON-CHARGEABLE SUPPLY: Performed by: INTERNAL MEDICINE

## 2021-07-21 PROCEDURE — 2580000003 HC RX 258: Performed by: INTERNAL MEDICINE

## 2021-07-21 PROCEDURE — 6370000000 HC RX 637 (ALT 250 FOR IP): Performed by: INTERNAL MEDICINE

## 2021-07-21 PROCEDURE — 82728 ASSAY OF FERRITIN: CPT

## 2021-07-21 PROCEDURE — 85014 HEMATOCRIT: CPT

## 2021-07-21 PROCEDURE — 83550 IRON BINDING TEST: CPT

## 2021-07-21 PROCEDURE — 3700000000 HC ANESTHESIA ATTENDED CARE: Performed by: INTERNAL MEDICINE

## 2021-07-21 PROCEDURE — 85046 RETICYTE/HGB CONCENTRATE: CPT

## 2021-07-21 PROCEDURE — 99232 SBSQ HOSP IP/OBS MODERATE 35: CPT | Performed by: INTERNAL MEDICINE

## 2021-07-21 PROCEDURE — 2580000003 HC RX 258: Performed by: NURSE PRACTITIONER

## 2021-07-21 PROCEDURE — 3700000001 HC ADD 15 MINUTES (ANESTHESIA): Performed by: INTERNAL MEDICINE

## 2021-07-21 PROCEDURE — 36415 COLL VENOUS BLD VENIPUNCTURE: CPT

## 2021-07-21 RX ORDER — FUROSEMIDE 40 MG/1
80 TABLET ORAL DAILY
Qty: 60 TABLET | Refills: 0 | Status: SHIPPED | OUTPATIENT
Start: 2021-07-21 | End: 2021-07-28 | Stop reason: SDUPTHER

## 2021-07-21 RX ORDER — ISOSORBIDE MONONITRATE 120 MG/1
120 TABLET, EXTENDED RELEASE ORAL DAILY
Qty: 30 TABLET | Refills: 3 | Status: SHIPPED | OUTPATIENT
Start: 2021-07-22 | End: 2022-08-08 | Stop reason: SDUPTHER

## 2021-07-21 RX ORDER — PROPOFOL 10 MG/ML
INJECTION, EMULSION INTRAVENOUS PRN
Status: DISCONTINUED | OUTPATIENT
Start: 2021-07-21 | End: 2021-07-21 | Stop reason: SDUPTHER

## 2021-07-21 RX ADMIN — PROPOFOL 50 MG: 10 INJECTION, EMULSION INTRAVENOUS at 14:53

## 2021-07-21 RX ADMIN — SODIUM CHLORIDE: 4.5 INJECTION, SOLUTION INTRAVENOUS at 01:20

## 2021-07-21 RX ADMIN — PROPOFOL 50 MG: 10 INJECTION, EMULSION INTRAVENOUS at 15:07

## 2021-07-21 RX ADMIN — PANTOPRAZOLE SODIUM 8 MG/HR: 40 INJECTION, POWDER, FOR SOLUTION INTRAVENOUS at 12:26

## 2021-07-21 RX ADMIN — HYDRALAZINE HYDROCHLORIDE 25 MG: 25 TABLET, FILM COATED ORAL at 05:45

## 2021-07-21 RX ADMIN — PROPOFOL 50 MG: 10 INJECTION, EMULSION INTRAVENOUS at 15:17

## 2021-07-21 RX ADMIN — PROPOFOL 50 MG: 10 INJECTION, EMULSION INTRAVENOUS at 14:57

## 2021-07-21 RX ADMIN — METOPROLOL SUCCINATE 50 MG: 50 TABLET, FILM COATED, EXTENDED RELEASE ORAL at 09:02

## 2021-07-21 RX ADMIN — PROPOFOL 50 MG: 10 INJECTION, EMULSION INTRAVENOUS at 15:02

## 2021-07-21 RX ADMIN — PROPOFOL 100 MG: 10 INJECTION, EMULSION INTRAVENOUS at 14:51

## 2021-07-21 RX ADMIN — PROPOFOL 50 MG: 10 INJECTION, EMULSION INTRAVENOUS at 15:04

## 2021-07-21 RX ADMIN — DILTIAZEM HYDROCHLORIDE 120 MG: 120 CAPSULE, COATED, EXTENDED RELEASE ORAL at 09:02

## 2021-07-21 RX ADMIN — RANOLAZINE 1000 MG: 500 TABLET, FILM COATED, EXTENDED RELEASE ORAL at 09:03

## 2021-07-21 RX ADMIN — TAMSULOSIN HYDROCHLORIDE 0.4 MG: 0.4 CAPSULE ORAL at 09:02

## 2021-07-21 RX ADMIN — PROPOFOL 50 MG: 10 INJECTION, EMULSION INTRAVENOUS at 14:59

## 2021-07-21 RX ADMIN — HYDRALAZINE HYDROCHLORIDE 25 MG: 25 TABLET, FILM COATED ORAL at 16:51

## 2021-07-21 RX ADMIN — ISOSORBIDE MONONITRATE 120 MG: 60 TABLET ORAL at 09:02

## 2021-07-21 RX ADMIN — SODIUM CHLORIDE: 4.5 INJECTION, SOLUTION INTRAVENOUS at 12:46

## 2021-07-21 RX ADMIN — PANTOPRAZOLE SODIUM 8 MG/HR: 40 INJECTION, POWDER, FOR SOLUTION INTRAVENOUS at 01:20

## 2021-07-21 ASSESSMENT — PAIN SCALES - GENERAL: PAINLEVEL_OUTOF10: 0

## 2021-07-21 ASSESSMENT — PAIN - FUNCTIONAL ASSESSMENT: PAIN_FUNCTIONAL_ASSESSMENT: 0-10

## 2021-07-21 ASSESSMENT — ENCOUNTER SYMPTOMS: SHORTNESS OF BREATH: 1

## 2021-07-21 NOTE — PROGRESS NOTES
BMP:    Recent Labs     07/19/21  1229 07/20/21  0623 07/21/21  0423    135 136   K 4.2 3.9 3.9    102 102   CO2 22* 23 21*   BUN 55* 47* 33*   CREATININE 2.1* 1.6* 1.8*   GLUCOSE 138* 120* 121*     Hepatic: No results for input(s): AST, ALT, ALB, BILITOT, ALKPHOS in the last 72 hours. Troponin: No results for input(s): TROPONINI in the last 72 hours. BNP: No results for input(s): BNP in the last 72 hours. Lipids: No results for input(s): CHOL, HDL in the last 72 hours. Invalid input(s): LDLCALCU  INR:   Recent Labs     07/19/21  1600   INR 1.73*       Radiology    Objective:   Vitals: BP (!) 136/49   Pulse 70   Temp 98.6 °F (37 °C) (Oral)   Resp 18   Ht 5' 9\" (1.753 m)   Wt 214 lb 9.6 oz (97.3 kg)   SpO2 95%   BMI 31.69 kg/m²   HEENT: Head:pupils react  Neck: supple  Lungs: clear to auscultation no rales or rhonchi  Heart: regular rate and rhythm   Abdomen: soft BS heard   Extremities: warm  Trace ankle edema  Neurologic:  Alert, oriented X3    Impression:   :   Melena with anemia s/p EGD  Acute blood anemia s/p 3 units PRBC transfused  coronary artery disease, status post coronary bypass graft and 15 cardiac stents placed in the past. Now with elevated trop sec to anemia  Insulin-requiring diabetes mellitus. Acute kidney injury secondary with CKD improved  Acute on chronic congestive heart failure secondary to diastolic  dysfunction. Peripheral vascular disease, status post right carotid endarterectomy. Hypertensive urgency. Hyperlipidemia. Paroxysmal atrial fibrillation, status post ablation, on anticoagulation. Obstructive sleep apnea, on CPAP. Anxiety. Plan:     For colonoscopy today   F/u on Hb  F/u on renal function   Careful diuresis  Per renal     Nikhil Jesus MD, MD

## 2021-07-21 NOTE — ANESTHESIA PRE PROCEDURE
Department of Anesthesiology  Preprocedure Note       Name:  Jyoti Haines   Age:  68 y.o.  :  1945                                          MRN:  [id number]         Date:  2021      Surgeon: Lisa Man):  Juan Hook MD    Procedure: Procedure(s):  COLONOSCOPY DIAGNOSTIC    Medications prior to admission:   Prior to Admission medications    Medication Sig Start Date End Date Taking? Authorizing Provider   furosemide (LASIX) 40 MG tablet Take 2 tablets by mouth 2 times daily Stop taking Bumex, replace with Lasix. 21   Diana Calr MD   blood glucose test strips (ACCU-CHEK GUIDE) strip USE TO CHECK 4 TIMES DAILY 21   Pardeep Perez MD   Accu-Chek FastClix Lancets MISC USE TO TEST BLOOD SUGARS 4 TIMES DAILY 21   Pardeep Perez MD   atorvastatin (LIPITOR) 40 MG tablet TAKE 1 TABLET BY MOUTH AT  NIGHT 21   Jose Maria Rendon MD   dilTIAZem (CARDIZEM CD) 120 MG extended release capsule TAKE 1 CAPSULE BY MOUTH  DAILY 21   Jose Maria Rendon MD   ranolazine (RANEXA) 1000 MG extended release tablet Take 1 tablet by mouth 2 times daily New dose 21   Gray Irving MD   tamsulosin (FLOMAX) 0.4 MG capsule Take 1 capsule by mouth daily 21   Kristen Zaldivar MD   insulin lispro (HUMALOG) 100 UNIT/ML injection vial Inject into the skin 3 times daily (before meals) 7 units plus sliding scale for both breakfast and lunch, 10 units plus sliding scale for supper    Historical Provider, MD   diphenhydrAMINE-APAP, sleep, (TYLENOL PM EXTRA STRENGTH PO) Take 1 tablet by mouth every evening as needed    Historical Provider, MD   ALPRAZolam (XANAX) 0.25 MG tablet Take 0.25 mg by mouth 3 times daily as needed for Anxiety.     Historical Provider, MD   hydrOXYzine (ATARAX) 10 MG tablet Take 10 mg by mouth daily  3/13/21   Historical Provider, MD   fluocinonide (LIDEX) 0.05 % cream Apply 1 applicator topically as needed 20   Historical Provider, MD   BiPAP Machine MISC by Does not apply route    Historical Provider, MD   metoprolol succinate (TOPROL XL) 50 MG extended release tablet TAKE 1 TABLET BY MOUTH  DAILY 3/1/21   Jyoti Castaneda MD   insulin detemir (LEVEMIR FLEXTOUCH) 100 UNIT/ML injection pen 50  units am and pm.  Patient taking differently: Inject 45 Units into the skin 2 times daily  2/22/21   Judge Wallace MD   apixaban (ELIQUIS) 5 MG TABS tablet TAKE 1 TABLET BY MOUTH  TWICE A DAY 1/14/21   Jyoti Castaneda MD   nitroGLYCERIN (NITROLINGUAL) 0.4 MG/SPRAY 0.4 mg spray USE 1 SPRAY ON OR UNDER THE TONGUE EVERY 5 MINUTES AS NEEDED FOR CHEST PAIN 7/20/20   Jyoti Castaneda MD   Insulin Pen Needle (NOVOFINE) 32G X 6 MM MISC USE 1 NEW NEEDLE 6 TIMES  DAILY AND AS NEEDED 7/17/20   Judge Wallace MD   Multiple Vitamins-Minerals (THERAPEUTIC MULTIVITAMIN-MINERALS) tablet Take 1 tablet by mouth nightly    Historical Provider, MD   hydrALAZINE (APRESOLINE) 25 MG tablet Take 1 tablet by mouth every 8 hours 11/9/18   Casey Payton MD   clopidogrel (PLAVIX) 75 MG tablet Take 75 mg by mouth daily morning    Historical Provider, MD   isosorbide mononitrate (IMDUR) 120 MG extended release tablet Take 120 mg by mouth daily  12/21/17   Historical Provider, MD   Lancet Devices ([address]) MISC Use one with each blood check 1/14/14 4/2/18  Judge Wallace MD       Current medications:    No current facility-administered medications for this visit. No current outpatient medications on file.      Facility-Administered Medications Ordered in Other Visits   Medication Dose Route Frequency Provider Last Rate Last Admin    0.9 % sodium chloride infusion   Intravenous PRN KAVON Moraes - CNP        0.45 % sodium chloride infusion   Intravenous Continuous Angus Alvarado MD 75 mL/hr at 07/21/21 1246 New Bag at 07/21/21 1246    [Held by provider] apixaban (ELIQUIS) tablet 5 mg  5 mg Oral BID Casey Payton MD        atorvastatin (LIPITOR) tablet 40 mg  40 mg Oral Nightly Nikhil Jesus MD   40 mg at 07/20/21 2037    [Held by provider] clopidogrel (PLAVIX) tablet 75 mg  75 mg Oral Daily Nikhil Jesus MD        dilTIAZem (CARDIZEM CD) extended release capsule 120 mg  120 mg Oral Daily Nikhil Jesus MD   120 mg at 07/21/21 0902    hydrALAZINE (APRESOLINE) tablet 25 mg  25 mg Oral 3 times per day Nikhil Jesus MD   25 mg at 07/21/21 0545    isosorbide mononitrate (IMDUR) extended release tablet 120 mg  120 mg Oral Daily Nikhil Jesus MD   120 mg at 07/21/21 0902    metoprolol succinate (TOPROL XL) extended release tablet 50 mg  50 mg Oral Daily Nikhil Jesus MD   50 mg at 07/21/21 0902    ranolazine (RANEXA) extended release tablet 1,000 mg  1,000 mg Oral BID Nikhil Jesus MD   1,000 mg at 07/21/21 0903    tamsulosin (FLOMAX) capsule 0.4 mg  0.4 mg Oral Daily Nikhil Jesus MD   0.4 mg at 07/21/21 0902    sodium chloride flush 0.9 % injection 5-40 mL  5-40 mL Intravenous 2 times per day Emeliaorrah Sen, APRN - CNP   10 mL at 07/20/21 0816    sodium chloride flush 0.9 % injection 5-40 mL  5-40 mL Intravenous PRN Deborrah Sen, APRN - CNP        0.9 % sodium chloride infusion  25 mL Intravenous PRN Deborrah Sen, APRN - CNP        ondansetron (ZOFRAN-ODT) disintegrating tablet 4 mg  4 mg Oral Q8H PRN Deborrah Sen, APRN - CNP        Or    ondansetron (ZOFRAN) injection 4 mg  4 mg Intravenous Q6H PRN Deborrah Sen, APRN - CNP        acetaminophen (TYLENOL) tablet 650 mg  650 mg Oral Q6H PRN Deborrah Sen, APRN - CNP        Or    acetaminophen (TYLENOL) suppository 650 mg  650 mg Rectal Q6H PRN Deborrah Sen, APRN - CNP        insulin lispro (HUMALOG) injection vial 0-6 Units  0-6 Units Subcutaneous TID WC Deborrah Sen, APRN - CNP   1 Units at 07/20/21 1648    insulin lispro (HUMALOG) injection vial 0-3 Units  0-3 Units Subcutaneous Nightly [place] Sen APRN - CNP   1 Units at 07/20/21 2036  glucose (GLUTOSE) 40 % oral gel 15 g  15 g Oral PRN Nicolás Abraham, APRN - CNP        dextrose 50 % IV solution  12.5 g Intravenous PRN Nicolás Abraham, APRN - CNP        glucagon (rDNA) injection 1 mg  1 mg Intramuscular PRN Nicolás Abraham, APRN - CNP        dextrose 5 % solution  100 mL/hr Intravenous PRN Nicolás Abraham, APRN - CNP        pantoprazole (PROTONIX) 80 mg in sodium chloride 0.9 % 100 mL infusion  8 mg/hr Intravenous Continuous Aurther Rickey, APRN - CNP 10 mL/hr at 07/21/21 1226 8 mg/hr at 07/21/21 1226    [Held by provider] insulin detemir (LEVEMIR) injection vial 45 Units  45 Units Subcutaneous BID Nicolás Abraham, APRN - CNP           Allergies:  No Known Allergies    Problem List:    Patient Active Problem List   Diagnosis Code    CKD (chronic kidney disease) stage 3, GFR 30-59 ml/min ([facility]) N18.30    S/P CABG (coronary artery bypass graft) Z95.1    Hyperlipidemia E78.5    Renal artery stenosis ([facility]) I70.1    Obstructive sleep apnea on CPAP G47.33, Z99.89    Coronary artery disease of native artery of native heart with stable angina pectoris ([facility]) I25.118    Type 2 diabetes mellitus with circulatory disorder ([facility]) E11.59    Status post angioplasty with stent Z95.820    SOB (shortness of breath) R06.02    Wheezing R06.2    Asthma J45.909    Essential hypertension I10    Obesity (BMI 30-39. 9) E66.9    Elevated PSA R97.20    Hypertrophy of prostate with urinary obstruction N40.1, N13.8    Adenomatous colon polyp D12.6    Angina, class III ([facility]) I20.9    Hx of atrial fibrillation without current medication Z86.79    Bilateral carotid artery stenosis I65.23    Hx of nonmelanoma skin cancer Z85.828    Acute kidney injury superimposed on CKD (HCC) N17.9, N18.9    Anemia of chronic renal failure N18.9, D63.1    Anemia in stage 3 chronic kidney disease (HCC) N18.30, D63.1    Melena K92.1       Past Medical History:        Diagnosis Date    Adenomatous colon polyp 2009/2010, 6/'14,7/'17, 9/20    Angina at rest [facility])     Atrial fibrillation ([address] 12/11 and 11/12    cardioversion 12/11    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    BPH (benign prostatic hypertrophy)     CAD (coronary artery disease) 1997    Carotid artery disease ([address] 1995    right    Carotid disease, bilateral ([address]     Cerebral artery occlusion with cerebral infarction ([address]     CHF (congestive heart failure) (HCC)     Chronic kidney disease     GERD (gastroesophageal reflux disease)     Hyperlipidemia 1996    Hypertension     Lumbar disc disease 2001    MI (myocardial infarction) ([address] 1997    Nephrolithiasis 2002    NIDDM (non-insulin dependent diabetes mellitus) 1996    diagnosed 1996    RICCARDO (obstructive sleep apnea) 2005    cpap    PVC's (premature ventricular contractions)     Renal artery stenosis ([address] 2007    left    Renal insufficiency     S/P CABG (status post coronary artery bypass graft) 1997    5 vessels    Skin cancer     Squamous cell carcinoma     TIA (transient ischemic attack)     Wears partial dentures     upper and lower       Past Surgical History:        Procedure Laterality Date    ABLATION OF DYSRHYTHMIC FOCUS  3/13/13   [facility]    CARDIAC SURGERY  3/13/13    oblation for irreg rythm    CARDIOVERSION  3/8/2012    CAROTID ENDARTERECTOMY  1997    CHOLECYSTECTOMY  4/1999    COLONOSCOPY  6/22/09, 4/2011,7/12/17    polyp removal    CORONARY ANGIOPLASTY WITH STENT PLACEMENT  2008 (2), 2009 (1)    x2    CORONARY ARTERY BYPASS GRAFT  8/1997    5 vessel    DIAGNOSTIC CARDIAC CATH LAB PROCEDURE      FOOT SURGERY  12/20/2013    pre-cancerous mole rt foot removed    LITHOTRIPSY  4/2002    [place] OFFICE/OUTPT VISIT,PROCEDURE ONLY N/A 11/8/2018    TRANSESOPHAGEAL ECHOCARDIOGRAM performed by Hang Godoy MD at [facility]    PTCA  05/30/2018   [facility] SUR[facility]ERY  2001, 2003    rt shoulder manipulation--frozen shoulder-01, Lt -03    SKIN BIOPSY      SKIN CANCER EXCISION  12/20/13     R foot    SKIN CANCER EXCISION  1/2016    squamous cell Lt  upper cheek    URETER STENT PLACEMENT  4/2002    VASCULAR SURGERY      carotids & cabg harvests       Social History:    Social History     Tobacco Use    Smoking status: Never Smoker    Smokeless tobacco: Never Used   Substance Use Topics    Alcohol use: No     Alcohol/week: 0.0 standard drinks                                Counseling given: Not Answered      Vital Signs (Current): There were no vitals filed for this visit.                                            BP Readings from Last 3 Encounters:   07/21/21 (!) 148/67   07/20/21 (!) 147/71   07/08/21 (!) 132/56       NPO Status:                                                                                 BMI:   Wt Readings from Last 3 Encounters:   07/21/21 214 lb 9.6 oz (97.3 kg)   07/08/21 233 lb 3.2 oz (105.8 kg)   06/25/21 227 lb 3.2 oz (103.1 kg)     There is no height or weight on file to calculate BMI.    CBC:   Lab Results   Component Value Date    WBC 7.8 07/20/2021    RBC 2.31 07/20/2021    RBC 3.80 03/24/2012    HGB 8.6 07/21/2021    HCT 26.9 07/21/2021    MCV 97.4 07/20/2021    RDW 13.7 06/07/2018     07/20/2021       CMP:   Lab Results   Component Value Date     07/21/2021    K 3.9 07/21/2021    K 3.9 07/20/2021     07/21/2021    CO2 21 07/21/2021    BUN 33 07/21/2021    CREATININE 1.8 07/21/2021    GFRAA >60 09/11/2016    AGRATIO 1.2 09/10/2016    LABGLOM 37 07/21/2021    GLUCOSE 121 07/21/2021    GLUCOSE 169 03/24/2012    PROT 6.7 06/17/2021    CALCIUM 9.3 07/21/2021    BILITOT 0.3 06/17/2021    BILITOT NEGATIVE 06/18/2018    ALKPHOS 84 06/17/2021    AST 16 06/17/2021    ALT 17 06/17/2021       POC Tests:   Recent Labs     07/21/21  1219   POCGLU 153*       Coags:   Lab Results   Component Value Date    PROTIME 17.2 09/10/2016    INR 1.73 07/19/2021    APTT 58.0 04/08/2021       HCG (If Applicable): No results found for: PREGTESTUR, PREGSERUM, HCG, HCGQUANT     ABGs: No results found for: PHART, PO2ART, IJC6FXI, LLF4UBV, BEART, M6UNXJAK     Type & Screen (If Applicable):  Lab Results   Component Value Date    LABRH POS 07/19/2021       Drug/Infectious Status (If Applicable):  No results found for: HIV, HEPCAB    COVID-19 Screening (If Applicable): No results found for: COVID19        Anesthesia Evaluation  Patient summary reviewed  Airway: Mallampati: III        Dental:    (+) lower dentures and other      Pulmonary: breath sounds clear to auscultation  (+) shortness of breath:  sleep apnea:  asthma: exercise-induced asthma,                            Cardiovascular:    (+) hypertension:, angina:, past MI:, CAD:, CABG/stent:, CHF:,         Rhythm: irregular  Rate: normal                    Neuro/Psych:   (+) CVA:, TIA,             GI/Hepatic/Renal:   (+) GERD:,           Endo/Other:    (+) Diabetes, . Abdominal:   (+) obese,           Vascular: Other Findings:               Anesthesia Plan      MAC     ASA 4       Induction: intravenous. Anesthetic plan and risks discussed with patient and spouse. Plan discussed with attending.                   KAVON Booth - LUKE   7/21/2021

## 2021-07-21 NOTE — PROGRESS NOTES
[facility]  INPATIENT OCCUPATIONAL THERAPY  [facility] CCU-STEPDOWN 3B  EVALUATION    Time:   Time In: 3038  Time Out: 1150  Timed Code Treatment Minutes: 8 Minutes  Minutes: 18          Date: 2021  Patient Name: Bambi Castellanos,   Gender: male      MRN: [id number]  : 1945  (68 y.o.)  Referring Practitioner: KAVON Yin CNP  Diagnosis: melena  Additional Pertinent Hx: Per H&P:The patient is a 68 y.o. male with pmhx of CKD stage III, CAD status post CABG, diabetes type 2 on insulin, atrial fibrillation status post ablation on Eliquis, CHF preserved ejection fraction who presents with increased shortness of breath for the last few days along with black tarry stools on and off for the last 2 days. hemoglobin 6.4, occult stool positive, chest x-ray shows increased pulmonary vascular congestion. Patient had 2 units of red blood cells ordered in the ED    Restrictions/Precautions:  Restrictions/Precautions: General Precautions, Fall Risk    Subjective  Chart Reviewed: Yes, Orders, Progress Notes, History and Physical  Patient assessed for rehabilitation services?: Yes  Family / Caregiver Present: Yes (wife)    Subjective: Pt seated on bathroom toilet upon arrival, just finishing sponge bath with wife assisting. Pt agreeable to OT session.     Pain:  Pain Assessment  Patient Currently in Pain: Denies    Vitals: Vitals not assessed per clinical judgement, see nursing flowsheet    Social/Functional History:  Lives With: Spouse  Type of Home: House  Home Layout: Two level, Performs ADL's on one level, Able to Live on Main level with bedroom/bathroom, Laundry in basement (full bath on main)  Home Equipment: Rolling walker, Wheelchair-manual (transport chair)   Bathroom Shower/Tub: Tub/Shower unit  Bathroom Toilet: Standard  Bathroom Equipment: Commode, Hand-held shower, Toilet raiser       ADL Assistance: Independent  Homemaking Assistance: Independent (shares IADL tasks with wife)  Ambulation Assistance: Independent  Transfer Assistance: Independent    Active : Yes     Additional Comments: Pt reported was not using any AD prior to admission. VISION:WFL    HEARING:  WFL    COGNITION: WFL    RANGE OF MOTION:  Bilateral Upper Extremity:  WFL    STRENGTH:  Bilateral Upper Extremity:  WFL    ADL:   Grooming: Independent. brushing teeth while standing at sink  Toilet Transfer: Modified Independent. Tere Noel Pt/wife reported had no difficulty completing spongebath/dressing tasks on own. BALANCE:  Sitting Balance:  Independent. Standing Balance: Independent. BED MOBILITY:  Sit to Supine: Independent      TRANSFERS:  Sit to Stand:  Independent. Stand to Sit: Independent. FUNCTIONAL MOBILITY:  Assistive Device: None (able to push IV pole without difficulty)  Assist Level: Independent. Distance: To and from bathroom, and household distances in hallway  Steady, no LOB. Minimal SOB towards end of walk (which pt reported is much improved compared to before admission). Minimal cues for pursed lip breathing. Functional mobility completed to increase overall activity tolerance needed for ADL tasks. Activity Tolerance:  Patient tolerance of  treatment: good. Assessment:  Assessment: Pt currently at baseline for ADLs, mobility, and transfers and safe to return home when medically stable. No further OT services warranted at this time. Prognosis: Good  REQUIRES OT FOLLOW UP: No  No Skilled OT: Independent with functional mobility, Independent with ADL's, At baseline function, Safe to return home, No OT goals identified  Decision Making: Low Complexity    Treatment Initiated: Treatment and education initiated within context of evaluation.   Evaluation time included review of current medical information, gathering information related to past medical, social and functional history, completion of standardized testing, formal and informal observation of tasks, assessment of data and development of plan of care and goals. Treatment time included skilled education and facilitation of tasks to increase safety and independence with ADL's for improved functional independence and quality of life. Discharge Recommendations:  Home with assist PRN, Defer OT at this time    Patient Education:  OT Education: OT Role, Plan of Care (walking halls 3x/day)    Equipment Recommendations:  Equipment Needed: No    Plan:  Times per week: N/A. Goals:     Short term goals  Time Frame for Short term goals: N/A         Following session, patient left in safe position with all fall risk precautions in place.

## 2021-07-21 NOTE — PROGRESS NOTES
Pharmacy Medication History Note      List of current medications patient is taking is complete. Source of information: Patient's wife, patient list and refill history    Changes made to medication list:  Medications removed (include reason, ex. therapy complete or physician discontinued):  Novofine 11J x 8mm - duplicate     Medications added/doses adjusted:  Patient only taking 2 tablets of furosemide 80 mg every morning   Insulin detemir dose changed to 45 units twice daily    Other notes (ex. Recent course of antibiotics, Coumadin dosing):  Tylenol PM used PRN not every evening   Patient struggles to take 2nd dose of hydralazine daily. Denies use of other OTC or herbal medications.       Allergies reviewed      Electronically signed by Cary Brink on 7/21/2021 at 11:05 AM

## 2021-07-21 NOTE — PROGRESS NOTES
Dr Jorge Luis Mcmillan updated on patients return from colonoscopy and when GI is recommending resuming Eliquis and Plavix. Dr Jorge Luis Mcmillan okay with recommendations. Dr Raysa Maravilla updated.

## 2021-07-21 NOTE — PLAN OF CARE
Problem: Pain:  Goal: Pain level will decrease  Description: Pain level will decrease  Outcome: Ongoing  Note: Patients pain level is a 0/10. Patients pain goal is no pain. Pain goal met at this time. Problem: Falls - Risk of:  Goal: Will remain free from falls  Description: Will remain free from falls  Outcome: Ongoing  Note: Patient free from falls. Bed alarm, chair alarm, call light within reach, non skid footwear on when up. Problem: Falls - Risk of:  Goal: Absence of physical injury  Description: Absence of physical injury  Outcome: Ongoing  Note: Patient free from falls. Bed alarm, chair alarm, call light within reach, non skid footwear on when up. Problem: Bowel/Gastric:  Goal: Ability to achieve a regular elimination pattern will improve  Description: Ability to achieve a regular elimination pattern will improve  Outcome: Ongoing  Note: Bowel sounds active x4 quads. Colonoscopy today. Problem: Discharge Planning:  Goal: Discharged to appropriate level of care  Description: Discharged to appropriate level of care  Outcome: Ongoing  Note: Plans to return home with wife. Problem: Bleeding:  Goal: Will show no signs and symptoms of excessive bleeding  Description: Will show no signs and symptoms of excessive bleeding  Outcome: Ongoing  Note: Continue to monitor H&H. Care plan reviewed with patient. Patient verbalize understanding of the plan of care and contribute to goal setting.

## 2021-07-21 NOTE — PROGRESS NOTES
Discharge teaching and instructions for diagnosis/procedure of colon polyps completed with patient using teachback method. AVS reviewed. Printed prescriptions given to patient. Patient voiced understanding regarding prescriptions, follow up appointments, and care of self at home.  Discharged in a wheelchair to  home with support per family

## 2021-07-21 NOTE — ANESTHESIA POSTPROCEDURE EVALUATION
Department of Anesthesiology  Postprocedure Note    Patient: Shruthi Clark  MRN: [id number]  YOB: 1945  Date of evaluation: 7/21/2021  Time:  3:29 PM     Procedure Summary     Date: 07/21/21 Room / Location: [address] [place] [address] Marcosmalishahla [place] / Bassam Gardiner    Anesthesia Start: 3197 Anesthesia Stop: 8931    Procedure: COLONOSCOPY POLYPECTOMY SNARE/COLD BIOPSY (N/A ) Diagnosis: (RECTAL BLEEDING)    Surgeons: Nuala Sacks, MD Responsible Provider: Na Rouse MD    Anesthesia Type: MAC ASA Status: 4          Anesthesia Type: MAC    Natalie Phase I: Natalie Score: 10    Natalie Phase II:      Last vitals: Reviewed and per EMR flowsheets.        Anesthesia Post Evaluation    Patient location during evaluation: bedside  Patient participation: complete - patient participated  Level of consciousness: awake  Pain score: 0  Airway patency: patent  Nausea & Vomiting: no nausea and no vomiting  Complications: no  Cardiovascular status: hemodynamically stable  Respiratory status: acceptable  Hydration status: euvolemic

## 2021-07-21 NOTE — PLAN OF CARE
Problem: Pain:  Goal: Pain level will decrease  Description: Pain level will decrease  7/20/2021 2114 by Mathieu Amaya RN  Outcome: Ongoing  Note: Denies pain  7/20/2021 1716 by Marlon Cabot  Outcome: Ongoing  Goal: Control of acute pain  Description: Control of acute pain  7/20/2021 2114 by Mathieu Amaya RN  Outcome: Ongoing  Note: Denies pain  7/20/2021 1716 by Marlon Cabot  Outcome: Ongoing     Problem: Falls - Risk of:  Goal: Will remain free from falls  Description: Will remain free from falls  7/20/2021 2114 by Mathieu Amaya RN  Outcome: Ongoing  Note: Free from falls  7/20/2021 1716 by Marlon Cabot  Outcome: Ongoing  Goal: Absence of physical injury  Description: Absence of physical injury  7/20/2021 2114 by Mathieu Amaya RN  Outcome: Ongoing  Note: No physical injury  7/20/2021 1716 by Marlon Cabot  Outcome: Ongoing     Care plan reviewed with patient. Patient verbalize understanding of the plan of care and contribute to goal setting.

## 2021-07-21 NOTE — CARE COORDINATION
7/21/21, 2:38 PM EDT    DISCHARGE ON GOING EVALUATION    Christina Wellington day: 2  Location: -31/031-A  Reason for admit: Melena [K92.1]   Procedure:   7/20 EGD -  Stomach - diffuse gastritis. 7/21 Colonoscopy - today  Barriers to Discharge: IM, Nephrology, GI, Cardiology following. EGD done yesterday, showed diffuse gastritis. Colonoscopy to be done today. BUN 33, creatinine 1.8. Hgb 8.6. Has received total of 3 units of blood. Ferritin 325. Iron 45. IVF. Protonix gtt. PCP: Pavel Machuca MD  Readmission Risk Score: 26%  Patient Goals/Plan/Treatment Preferences: Plans home with wife. Denies any needs. Monitor for home going needs.

## 2021-07-21 NOTE — PROGRESS NOTES
[facility]         Renal Inpatient Follow-Up note         7/21/2021 8:53 AM    Pt Name:   Genesis Pierce:   [birth year]  Attending:   Abraham Elizabeth MD    Chief Complaint : Kobi Jha is a 68 y.o. male being followed by nephrology for MADI/fluid overload    Interval History :   Patient seen and examined by me. No distress. Feels the same no chest pain no significant shortness of breath  Hemoglobin maintaining stable. EGD negative. Scheduled Medications :    [Held by provider] apixaban  5 mg Oral BID    atorvastatin  40 mg Oral Nightly    [Held by provider] clopidogrel  75 mg Oral Daily    dilTIAZem  120 mg Oral Daily    hydrALAZINE  25 mg Oral 3 times per day    isosorbide mononitrate  120 mg Oral Daily    metoprolol succinate  50 mg Oral Daily    ranolazine  1,000 mg Oral BID    tamsulosin  0.4 mg Oral Daily    sodium chloride flush  5-40 mL Intravenous 2 times per day    insulin lispro  0-6 Units Subcutaneous TID WC    insulin lispro  0-3 Units Subcutaneous Nightly    [Held by provider] insulin detemir  45 Units Subcutaneous BID      sodium chloride      sodium chloride 75 mL/hr at 07/21/21 0120    sodium chloride      dextrose      pantoprozole (PROTONIX) infusion 8 mg/hr (07/21/21 0120)       Vitals :  BP (!) 123/58   Pulse 67   Temp 98.2 °F (36.8 °C) (Oral)   Resp 18   Ht 5' 9\" (1.753 m)   Wt 214 lb 9.6 oz (97.3 kg)   SpO2 96%   BMI 31.69 kg/m²     24HR INTAKE/OUTPUT:      Intake/Output Summary (Last 24 hours) at 7/21/2021 0853  Last data filed at 7/21/2021 0401  Gross per 24 hour   Intake 2599.97 ml   Output 0 ml   Net 2599.97 ml     Last 3 weights  Wt Readings from Last 3 Encounters:   07/21/21 214 lb 9.6 oz (97.3 kg)   07/08/21 233 lb 3.2 oz (105.8 kg)   06/25/21 227 lb 3.2 oz (103.1 kg)           Physical Exam :  General Appearance:  Well developed.  No distress  Mouth/Throat:  Oral mucosa moist  Neck:  Supple, no JVD  Lungs: Breath sounds: clear  Heart[de-identified]  S1,S2 heard  Abdomen:  Soft, non - tender  Musculoskeletal:  Edema -mild edema         Last 3 CBC   Recent Labs     07/19/21  1229 07/20/21  0157 07/20/21  0623 07/20/21  0623 07/20/21  1702 07/21/21  0041 07/21/21  0423   WBC 9.3  --  7.8  --   --   --   --    RBC 2.01*  --  2.31*  --   --   --   --    HGB 6.4*   < > 7.2*   < > 8.5* 8.7* 8.6*   HCT 21.0*   < > 22.5*   < > 26.2* 27.1* 26.9*     --  182  --   --   --   --     < > = values in this interval not displayed. Last 3 CMP  Recent Labs     07/19/21  1229 07/20/21  0623 07/21/21  0423    135 136   K 4.2 3.9 3.9    102 102   CO2 22* 23 21*   BUN 55* 47* 33*   CREATININE 2.1* 1.6* 1.8*   CALCIUM 9.5 9.2 9.3             ASSESSMENT / Plan   1. Renal -mild acute kidney injury on chronic kidney disease stage III mostly due to renal hypoperfusion from anemia and possibly due to the use of diuretic. ? Clinically does not look overtly congested chest x-ray is reasonably clear is not requiring any oxygen. ? Status post blood transfusion and a dose of Lasix  ? Creatinine is much better close to in fact better than baseline  ? BMP in the morning .      2. Electrolytes -appear to be within normal limits  3. Mild hypotension again possibly due to severe anemia doing better  4. Anemia due to GI bleed status post IV iron recently. Received 2 units of blood transfusion. Mostly for colonoscopy today . 5. Shortness of breath mostly multifactorial but primarily due to severe anemia clinically stable . Prn diuretic. 6. Hx of diabetes mellitus. 7. Coronary arteries disease status post multiple stents. 8. Meds reviewed and discussed with patient and nursing staff. Dr. Rachel Chiang MD, M,D.  [facility].

## 2021-07-22 ENCOUNTER — CARE COORDINATION (OUTPATIENT)
Dept: FAMILY MEDICINE CLINIC | Age: 76
End: 2021-07-22

## 2021-07-22 NOTE — CARE COORDINATION
Late Entry    7/22/21, 8:57 AM EDT    Patient goals/plan/ treatment preferences discussed by  and . Patient goals/plan/ treatment preferences reviewed with patient/ family. Patient/ family verbalize understanding of discharge plan and are in agreement with goal/plan/treatment preferences. Understanding was demonstrated using the teach back method. AVS provided by RN at time of discharge, which includes all necessary medical information pertaining to the patients current course of illness, treatment, post-discharge goals of care, and treatment preferences. IMM Letter  IMM Letter given to Patient/Family/Significant other/Guardian/POA/by[de-identified] Pt Access  IMM Letter date given[de-identified] 07/19/21  IMM Letter time given[de-identified] 0144       Discharged home. No needs.

## 2021-07-22 NOTE — CARE COORDINATION
[facility] 45 Transitions Initial Follow Up Call    Outreach made within 2 business days of discharge: Yes    Patient: Tati Reed Patient : 1945   MRN: [id number]  Reason for Admission: GI Bleed  Discharge Date: 21       Spoke with: Марина Sales    Discharge department/facility:[place] [address] Interactive Patient Contact:  Was patient able to fill all prescriptions: Yes  Was patient instructed to bring all medications to the follow-up visit: Yes  Is patient taking all medications as directed in the discharge summary? Yes  Does patient understand their discharge instructions: Yes  Does patient have questions or concerns that need addressed prior to 7-14 day follow up office visit: no    Scheduled appointment with PCP within 7-14 days    Follow Up  Future Appointments   Date Time Provider Idris Cardenasi   2021  8:30 AM Jaki Issa MD [facility] [place]   2021  [address] [place] [address]   2021  9:20 AM Ian Blackwell MD [facility] [place]   2021  9:00 AM Linnette Ch RN [facility] [place]   10/4/2021  8:30 AM Jaki Issa MD [facility] [place]   2022  8:00 AM Primo Do MD [facility]   Марина Salazar is doing fine. He has no additional home needs. He has an appointment with Dr Sanjana Do for a pre op so he declined to make a follow up appointment at this time. His medications were reconciled.     Yuan Cancino RN

## 2021-07-22 NOTE — DISCHARGE SUMMARY
Discharge Summary    Date:7/22/2021        Patient Name:Wilfrid Dukes     YOB: 1945     Age:76 y.o. Admit Date:7/19/2021   Admission Condition:good   Discharged Condition:good  Discharge Date: 7/21/2021     Discharge Diagnoses   Melena with anemia s/p EGD that was unremarkable. Status post colonoscopy had few colon polyps removed. Acute blood anemia s/p 3 units PRBC transfused  coronary artery disease, status post coronary bypass graft and 15 cardiac stents placed in the past. Now with elevated trop sec to anemia  Insulin-requiring diabetes mellitus. Acute kidney injury secondary with CKD improved  Acute on chronic congestive heart failure secondary to diastolic  dysfunction. Peripheral vascular disease, status post right carotid endarterectomy. Hypertensive urgency. Hyperlipidemia. Paroxysmal atrial fibrillation, status post ablation, on anticoagulation. Obstructive sleep apnea, on CPAP. Anxiety. Hospital Stay   Narrative of Hospital Course:   68 y.o. male with pmhx of CKD stage III, CAD status post CABG, diabetes type 2 on insulin, atrial fibrillation status post ablation on Eliquis, CHF preserved ejection fraction who presents with increased shortness of breath for the last few days along with black tarry stools on and off for the last 2 days. Patient reports he does not see any kyra blood that his stools have just been dark. Patient also had intermittent chest pain last night and took a nitro which relieved the pain. Patient is status post cath in April 2021 with ballooning of his RCA. No stents were placed.     We did have nephrology address his renal status who was managing his diuretics  Also discussed with cardiology as both his Plavix and Eliquis had to be held and were agreeable to holding his blood thinners  GI was consulted patient did have a total of 3 units packed RBC transfused he did undergo EGD which showed no active bleeding the following day he did undergo colonoscopy and had polyps removed. His hemoglobin was stable at around 8.6 on the day of discharge. Consultants were okay for patient to be discharged and follow-up as outpatient. His Plavix was okay to be resumed at the time of discharge but Eliquis had to be held for a week. Plan of care was discussed with patient by GI and patient was stable at time of discharge    Consultants:   IP CONSULT TO GI  IP CONSULT TO NEPHROLOGY  IP CONSULT TO CARDIOLOGY    Time Spent on Discharge:  35 minutes were spent in patient examination, evaluation, counseling as well as medication reconciliation, prescriptions for required medications, discharge plan and follow up.       Surgeries/Procedures Performed:  Procedure(s):  COLONOSCOPY POLYPECTOMY SNARE/COLD BIOPSY     Significant Diagnostic Studies:   Recent Labs:  CBC:   Lab Results   Component Value Date    WBC 7.8 07/20/2021    RBC 2.31 07/20/2021    RBC 3.80 03/24/2012    HGB 8.6 07/21/2021    HCT 26.9 07/21/2021    MCV 97.4 07/20/2021    MCH 31.2 07/20/2021    MCHC 32.0 07/20/2021    RDW 13.7 06/07/2018     07/20/2021     BMP:    Lab Results   Component Value Date    GLUCOSE 121 07/21/2021    GLUCOSE 169 03/24/2012     07/21/2021    K 3.9 07/21/2021    K 3.9 07/20/2021     07/21/2021    CO2 21 07/21/2021    ANIONGAP 13.0 07/21/2021    BUN 33 07/21/2021    CREATININE 1.8 07/21/2021    CALCIUM 9.3 07/21/2021    LABGLOM 37 07/21/2021    GFRAA >60 09/11/2016     HFP:    Lab Results   Component Value Date    PROT 6.7 06/17/2021     CMP:    Lab Results   Component Value Date    GLUCOSE 121 07/21/2021    GLUCOSE 169 03/24/2012     07/21/2021    K 3.9 07/21/2021    K 3.9 07/20/2021     07/21/2021    CO2 21 07/21/2021    BUN 33 07/21/2021    CREATININE 1.8 07/21/2021    ANIONGAP 13.0 07/21/2021    ALKPHOS 84 06/17/2021    ALT 17 06/17/2021    AST 16 06/17/2021    BILITOT 0.3 06/17/2021    BILITOT NEGATIVE 06/18/2018    LABALBU 3.9 06/17/2021    LABALBU 4.5 03/24/2012    LABGLOM 37 07/21/2021    GFRAA >60 09/11/2016    PROT 6.7 06/17/2021    CALCIUM 9.3 07/21/2021     PT/INR:    Lab Results   Component Value Date    PROTIME 17.2 09/10/2016    INR 1.73 07/19/2021     PTT:   Lab Results   Component Value Date    APTT 58.0 04/08/2021     FLP:    Lab Results   Component Value Date    CHOL 138 01/24/2020    TRIG 167 01/24/2020    HDL 31 01/24/2020     U/A:    Lab Results   Component Value Date    COLORU YELLOW 04/06/2021    SPECGRAV 1.008 04/06/2021    PHUR 5.0 04/06/2021    PROTEINU NEGATIVE 04/06/2021    GLUCOSEU neg 03/29/2021    KETUA NEGATIVE 04/06/2021    BILIRUBINUR NEGATIVE 04/06/2021    UROBILINOGEN 0.2 04/06/2021    NITRU NEGATIVE 04/06/2021    LEUKOCYTESUR NEGATIVE 04/06/2021     TSH:    Lab Results   Component Value Date    TSH 2.260 09/11/2018       Radiology Last 7 Days:  XR CHEST (2 VW)    Result Date: 7/19/2021  1. Mild cardiomegaly and possible congestive heart failure in this patient status post previous sternotomy. 2. Slight thickening of the interstitial lung markings and increased density in both lung fields. 3. Thoracic spondylosis. 4. Otherwise negative chest x-ray. **This report has been created using voice recognition software. It may contain minor errors which are inherent in voice recognition technology. ** Final report electronically signed by DR Tram Dillard on 7/19/2021 1:07 PM      Discharge Plan   Disposition: Home    Provider Follow-Up:   Pavel Machuca MD  [address] [facility] [address]    In 1 week      [address] [place] [address], [address] [place] [address]     Schedule an appointment as soon as possible for a visit  Hospital follow up for in 4 weeks    Betzaida Mendenhall, [address] [facility]  [phone]    Schedule an appointment as soon as possible for a visit  Hospital follow up for in 6-8 weeks       Hospital/Incidental Findings Requiring Follow-Up:  Gastroenterology nephrology    Patient Instructions   Diet: cardiac diet    Activity: activity as tolerated        Discharge Medications         Medication List        START taking these medications      insulin detemir 100 UNIT/ML injection vial  Commonly known as: LEVEMIR  Inject 45 Units into the skin 2 times daily  Replaces: Levemir FlexTouch 100 UNIT/ML injection pen            CHANGE how you take these medications      apixaban 5 MG Tabs tablet  Commonly known as: Eliquis  Take 1 tablet by mouth 2 times daily TAKE 1 TABLET BY MOUTH  TWICE A DAY STARTING 7/28/21  Start taking on: July 28, 2021  What changed:   how much to take  how to take this  when to take this  additional instructions  These instructions start on July 28, 2021. If you are unsure what to do until then, ask your doctor or other care provider. furosemide 40 MG tablet  Commonly known as: Lasix  Take 2 tablets by mouth daily Stop taking Bumex, replace with Lasix. What changed: when to take this     NovoFine 32G X 6 MM Misc  Generic drug: Insulin Pen Needle  USE 1 NEW NEEDLE 6 TIMES  DAILY AND AS NEEDED  What changed: Another medication with the same name was removed. Continue taking this medication, and follow the directions you see here.             CONTINUE taking these medications      Accu-Chek FastClix Lancets Misc  USE TO TEST BLOOD SUGARS 4 TIMES DAILY     Accu-Chek Guide strip  Generic drug: blood glucose test strips  USE TO CHECK 4 TIMES DAILY     ALPRAZolam 0.25 MG tablet  Commonly known as: XANAX     atorvastatin 40 MG tablet  Commonly known as: LIPITOR  TAKE 1 TABLET BY MOUTH AT  NIGHT     BiPAP Machine Misc     clopidogrel 75 MG tablet  Commonly known as: PLAVIX     dilTIAZem 120 MG extended release capsule  Commonly known as: CARDIZEM CD  TAKE 1 CAPSULE BY MOUTH  DAILY     fluocinonide 0.05 % cream  Commonly known as: LIDEX     hydrALAZINE 25 MG tablet  Commonly known as: APRESOLINE  Take 1 tablet by mouth every 8 hours     hydrOXYzine 10 MG tablet  Commonly known as: ATARAX     isosorbide mononitrate 120 MG extended release tablet  Commonly known as: IMDUR  Take 1 tablet by mouth daily     metoprolol succinate 50 MG extended release tablet  Commonly known as: TOPROL XL  TAKE 1 TABLET BY MOUTH  DAILY     nitroGLYCERIN 0.4 MG/SPRAY 0.4 mg spray  Commonly known as: NITROLINGUAL  USE 1 SPRAY ON OR UNDER THE TONGUE EVERY 5 MINUTES AS NEEDED FOR CHEST PAIN     ranolazine 1000 MG extended release tablet  Commonly known as: RANEXA  Take 1 tablet by mouth 2 times daily New dose     tamsulosin 0.4 MG capsule  Commonly known as: Flomax  Take 1 capsule by mouth daily     therapeutic multivitamin-minerals tablet     TYLENOL PM EXTRA STRENGTH PO            STOP taking these medications      insulin lispro 100 UNIT/ML injection vial  Commonly known as: HUMALOG     Levemir FlexTouch 100 UNIT/ML injection pen  Generic drug: insulin detemir  Replaced by: insulin detemir 100 UNIT/ML injection vial               Where to Get Your Medications        These medications were sent to [facility]/pharmacy #[id number] [place] [address] - [facility] [facility] -  [phone] - F [phone]  [address] [place] [address]      Phone: [phone]   apixaban 5 MG Tabs tablet  furosemide 40 MG tablet  insulin detemir 100 UNIT/ML injection vial  isosorbide mononitrate 120 MG extended release tablet         Electronically signed by Ant Copeland MD on 7/22/21 at 1:30 PM EDT

## 2021-07-22 NOTE — PROGRESS NOTES
Physician Progress Note      Areli Frost  CSN #:                  [id number]  :                       1945  ADMIT DATE:       2021 12:01 PM  Reese Salazar DATE:        2021 6:45 PM  RESPONDING  PROVIDER #:        Juancarlos Rogers MD          QUERY TEXT:    Patient admitted with GI bleeding  and is on chronic anticoagulation. If   possible, please document in the progress notes and discharge summary if you   are evaluating and/or treating any of the following: The medical record reflects the following:  Risk Factors: Elderly,CHF  Clinical Indicators: Pt having melena while taking Plavix and Eliquis  Treatment: Medication on hold, PRBC, lab monitoring, GI consulted    Thank You! Mariluz Veliz RN  RN Clinical   (M) [phone] (I) [phone]  Options provided:  -- Bleeding associated with Plavix and Eliquis. -- Bleeding unrelated to anticoagulation  -- Other - I will add my own diagnosis  -- Disagree - Not applicable / Not valid  -- Disagree - Clinically unable to determine / Unknown  -- Refer to Clinical Documentation Reviewer    PROVIDER RESPONSE TEXT:    Patient bleeding is unrelated to anticoagulation.     Query created by: Bernadine Arce on 2021 10:31 AM      Electronically signed by:  Juancarlos Rogers MD 2021 1:34 PM

## 2021-07-28 ENCOUNTER — OFFICE VISIT (OUTPATIENT)
Dept: INTERNAL MEDICINE CLINIC | Age: 76
End: 2021-07-28
Payer: MEDICARE

## 2021-07-28 ENCOUNTER — TELEPHONE (OUTPATIENT)
Dept: INTERNAL MEDICINE CLINIC | Age: 76
End: 2021-07-28

## 2021-07-28 ENCOUNTER — NURSE ONLY (OUTPATIENT)
Dept: LAB | Age: 76
End: 2021-07-28

## 2021-07-28 VITALS
HEIGHT: 69 IN | SYSTOLIC BLOOD PRESSURE: 140 MMHG | BODY MASS INDEX: 32.5 KG/M2 | DIASTOLIC BLOOD PRESSURE: 60 MMHG | TEMPERATURE: 97.4 F | OXYGEN SATURATION: 98 % | HEART RATE: 60 BPM | WEIGHT: 219.4 LBS

## 2021-07-28 DIAGNOSIS — I25.10 CAD S/P PERCUTANEOUS CORONARY ANGIOPLASTY: Primary | ICD-10-CM

## 2021-07-28 DIAGNOSIS — Z79.4 TYPE 2 DIABETES MELLITUS WITH DIABETIC PERIPHERAL ANGIOPATHY WITHOUT GANGRENE, WITH LONG-TERM CURRENT USE OF INSULIN (HCC): ICD-10-CM

## 2021-07-28 DIAGNOSIS — R14.0 ABDOMINAL DISTENSION: ICD-10-CM

## 2021-07-28 DIAGNOSIS — N18.2 CKD (CHRONIC KIDNEY DISEASE) STAGE 2, GFR 60-89 ML/MIN: ICD-10-CM

## 2021-07-28 DIAGNOSIS — I25.118 CORONARY ARTERY DISEASE OF NATIVE ARTERY OF NATIVE HEART WITH STABLE ANGINA PECTORIS (HCC): ICD-10-CM

## 2021-07-28 DIAGNOSIS — I48.0 PAROXYSMAL ATRIAL FIBRILLATION (HCC): ICD-10-CM

## 2021-07-28 DIAGNOSIS — E11.51 TYPE 2 DIABETES MELLITUS WITH DIABETIC PERIPHERAL ANGIOPATHY WITHOUT GANGRENE, WITH LONG-TERM CURRENT USE OF INSULIN (HCC): ICD-10-CM

## 2021-07-28 DIAGNOSIS — D62 ACUTE BLOOD LOSS ANEMIA: ICD-10-CM

## 2021-07-28 DIAGNOSIS — R22.43 LOCALIZED SWELLING OF BOTH LOWER LEGS: ICD-10-CM

## 2021-07-28 DIAGNOSIS — Z98.61 CAD S/P PERCUTANEOUS CORONARY ANGIOPLASTY: Primary | ICD-10-CM

## 2021-07-28 LAB
ANION GAP SERPL CALCULATED.3IONS-SCNC: 10 MEQ/L (ref 8–16)
BUN BLDV-MCNC: 29 MG/DL (ref 7–22)
CALCIUM SERPL-MCNC: 9 MG/DL (ref 8.5–10.5)
CHLORIDE BLD-SCNC: 106 MEQ/L (ref 98–111)
CO2: 23 MEQ/L (ref 23–33)
CREAT SERPL-MCNC: 1.7 MG/DL (ref 0.4–1.2)
CREATININE, URINE: 126.5 MG/DL
ERYTHROCYTE [DISTWIDTH] IN BLOOD BY AUTOMATED COUNT: 16 % (ref 11.5–14.5)
ERYTHROCYTE [DISTWIDTH] IN BLOOD BY AUTOMATED COUNT: 59.7 FL (ref 35–45)
GFR SERPL CREATININE-BSD FRML MDRD: 39 ML/MIN/1.73M2
GLUCOSE BLD-MCNC: 110 MG/DL (ref 70–108)
HCT VFR BLD CALC: 29.2 % (ref 42–52)
HEMOGLOBIN: 9 GM/DL (ref 14–18)
MCH RBC QN AUTO: 31.3 PG (ref 26–33)
MCHC RBC AUTO-ENTMCNC: 30.8 GM/DL (ref 32.2–35.5)
MCV RBC AUTO: 101.4 FL (ref 80–94)
MICROALBUMIN UR-MCNC: 10.97 MG/DL
MICROALBUMIN/CREAT UR-RTO: 87 MG/G (ref 0–30)
PLATELET # BLD: 253 THOU/MM3 (ref 130–400)
PMV BLD AUTO: 10.1 FL (ref 9.4–12.4)
POTASSIUM SERPL-SCNC: 4 MEQ/L (ref 3.5–5.2)
RBC # BLD: 2.88 MILL/MM3 (ref 4.7–6.1)
SODIUM BLD-SCNC: 139 MEQ/L (ref 135–145)
WBC # BLD: 7.7 THOU/MM3 (ref 4.8–10.8)

## 2021-07-28 PROCEDURE — G8417 CALC BMI ABV UP PARAM F/U: HCPCS | Performed by: INTERNAL MEDICINE

## 2021-07-28 PROCEDURE — 1123F ACP DISCUSS/DSCN MKR DOCD: CPT | Performed by: INTERNAL MEDICINE

## 2021-07-28 PROCEDURE — 99213 OFFICE O/P EST LOW 20 MIN: CPT | Performed by: INTERNAL MEDICINE

## 2021-07-28 PROCEDURE — 4040F PNEUMOC VAC/ADMIN/RCVD: CPT | Performed by: INTERNAL MEDICINE

## 2021-07-28 PROCEDURE — 1111F DSCHRG MED/CURRENT MED MERGE: CPT | Performed by: INTERNAL MEDICINE

## 2021-07-28 PROCEDURE — G8427 DOCREV CUR MEDS BY ELIG CLIN: HCPCS | Performed by: INTERNAL MEDICINE

## 2021-07-28 PROCEDURE — 1036F TOBACCO NON-USER: CPT | Performed by: INTERNAL MEDICINE

## 2021-07-28 RX ORDER — NITROGLYCERIN 400 UG/1
SPRAY ORAL
Qty: 4.9 G | Refills: 6 | Status: SHIPPED | OUTPATIENT
Start: 2021-07-28 | End: 2022-07-06 | Stop reason: SDUPTHER

## 2021-07-28 RX ORDER — FUROSEMIDE 40 MG/1
80 TABLET ORAL DAILY
Qty: 60 TABLET | Refills: 1 | Status: ON HOLD | OUTPATIENT
Start: 2021-07-28 | End: 2021-08-27 | Stop reason: HOSPADM

## 2021-07-28 RX ORDER — DILTIAZEM HYDROCHLORIDE 120 MG/1
CAPSULE, COATED, EXTENDED RELEASE ORAL
Qty: 90 CAPSULE | Refills: 3 | Status: SHIPPED | OUTPATIENT
Start: 2021-07-28 | End: 2022-06-06

## 2021-07-28 NOTE — TELEPHONE ENCOUNTER
Pts wife was notified that microalbumin/creatinine ratio is elevated and he will send a note to dr Corby Barakat. Anali Sales is wondering if ok to resume eliquis?   He was supposed to restart it today but wanted Dr. Maradiaga Ranks on this

## 2021-07-28 NOTE — TELEPHONE ENCOUNTER
----- Message from Liz Del Rio MD sent at 7/28/2021 12:39 PM EDT -----  Tell him it is elevated and I sent note to Dr Sandy Vera about it

## 2021-07-28 NOTE — PROGRESS NOTES
Bambi Castellanos (:  1945) is a 68 y.o. male,Established patient, here for evaluation of the following chief complaint(s):  Anemia (in [facility] hosp--discharged on ), Other (kacie), Congestive Heart Failure, and Pre-op Exam (dr Rebecca Sorto wants to do a [facility]-near [facility]-no date set )         ASSESSMENT/PLAN:  1. CAD S/P percutaneous coronary angioplasty  -     nitroGLYCERIN (NITROLINGUAL) 0.4 MG/SPRAY 0.4 mg spray; USE 1 SPRAY ON OR UNDER THE TONGUE EVERY 5 MINUTES AS NEEDED FOR CHEST PAIN, Disp-4.9 g, R-6Normal  Has periodic chest pain  2. Localized swelling of both lower legs - improved; worse when recently hosp for gi bleed  -     furosemide (LASIX) 40 MG tablet; Take 2 tablets by mouth daily, Disp-60 tablet, R-1Normal  3. Abdominal distension-= resolved  -     furosemide (LASIX) 40 MG tablet; Take 2 tablets by mouth daily, Disp-60 tablet, R-1Normal  4. Coronary artery disease of native artery of native heart with stable angina pectoris ([facility] [address] See above    5. Type 2 diabetes mellitus with diabetic peripheral angiopathy without gangrene, with long-term current use of insulin (HCC)  -     Microalbumin / Creatinine Urine Ratio; Future  6. CKD (chronic kidney disease) stage 2, GFR 60-89 ml/min  -     Basic Metabolic Panel; Future- had worsening during recent hosp  7. Acute blood loss anemia  -     CBC; Future  8. Paroxysmal atrial fibrillation (HCC) event recorder      No follow-ups on file. Subjective   SUBJECTIVE/OBJECTIVE:  HPI pt with cad, ckd, hx afib ablated, chronic angina, had recent hosp with melena and blood loss anemia. Transfused, had endoscopy which did not find source. No other tests planned. Still gets sob.   Was having angina when h/h low (6.4)    Review of Systems    diabetic    Objective   Physical Exam    General appearance - overweight and chronically ill appearing    Mental Status - alert, oriented to person, place, and time          Neck - supple, no significant adenopathy Chest - clear to auscultation, no wheezes, rales or rhonchi, symmetric air entry     Heart - no murmurs noted, no gallops noted, no JVD, occasional extrasystoles     Abdomen - protuberant         Neurological - alert, oriented, normal speech, no focal findings or movement disorder noted     Musculoskeletal -   msk exam:[id number]}     Extremities - no pedal edema noted     Skin - normal coloration and turgor, no rashes, no suspicious skin lesions noted            An electronic signature was used to authenticate this note.     --Johnie Blair MD

## 2021-07-29 ENCOUNTER — CARE COORDINATION (OUTPATIENT)
Dept: FAMILY MEDICINE CLINIC | Age: 76
End: 2021-07-29

## 2021-08-05 ENCOUNTER — HOSPITAL ENCOUNTER (OUTPATIENT)
Dept: NON INVASIVE DIAGNOSTICS | Age: 76
Discharge: HOME | End: 2021-08-05
Payer: MEDICARE

## 2021-08-05 DIAGNOSIS — I48.0 PAROXYSMAL ATRIAL FIBRILLATION (HCC): ICD-10-CM

## 2021-08-05 PROCEDURE — 93270 REMOTE 30 DAY ECG REV/REPORT: CPT

## 2021-08-05 NOTE — PROCEDURES
The skin was prepped and a 14 day cardiac event monitor was applied. The patient was instructed on the documentation of symptoms and the purpose of the monitor as well as the things to avoid while wearing the monitor. The patient was instructed to remove and return the monitor on 09/04/2021.   The serial number of the monitor that was applied is [id number]

## 2021-08-09 ENCOUNTER — TELEPHONE (OUTPATIENT)
Dept: INTERNAL MEDICINE CLINIC | Age: 76
End: 2021-08-09

## 2021-08-09 DIAGNOSIS — D62 ACUTE BLOOD LOSS ANEMIA: Primary | ICD-10-CM

## 2021-08-09 NOTE — TELEPHONE ENCOUNTER
----- Message from Jaki Issa MD sent at 8/9/2021 10:23 AM EDT -----  Tell him his monitor does show bouts of afib so best to stay on his anticoagulant

## 2021-08-10 ENCOUNTER — NURSE ONLY (OUTPATIENT)
Dept: LAB | Age: 76
End: 2021-08-10

## 2021-08-10 DIAGNOSIS — D62 ACUTE BLOOD LOSS ANEMIA: ICD-10-CM

## 2021-08-10 LAB
HCT VFR BLD CALC: 27.9 % (ref 42–52)
HEMOGLOBIN: 8.5 GM/DL (ref 14–18)

## 2021-08-10 NOTE — TELEPHONE ENCOUNTER
pts wife was notified that strips show bouts of a fib and best to stay on anticoagulant. Wife states pt had a bloody nose today. He is very tired and puffy again. SOB. Per EF/Can get H&H. Wife was notified.

## 2021-08-11 ENCOUNTER — TELEPHONE (OUTPATIENT)
Dept: INTERNAL MEDICINE CLINIC | Age: 76
End: 2021-08-11

## 2021-08-11 ENCOUNTER — CARE COORDINATION (OUTPATIENT)
Dept: FAMILY MEDICINE CLINIC | Age: 76
End: 2021-08-11

## 2021-08-11 DIAGNOSIS — D62 ACUTE BLOOD LOSS ANEMIA: Primary | ICD-10-CM

## 2021-08-11 RX ORDER — ASCORBIC ACID 500 MG
500 TABLET ORAL EVERY OTHER DAY
COMMUNITY
End: 2022-08-08

## 2021-08-11 RX ORDER — FERROUS SULFATE 325(65) MG
325 TABLET ORAL EVERY OTHER DAY
COMMUNITY
End: 2022-07-21 | Stop reason: SINTOL

## 2021-08-11 NOTE — CARE COORDINATION
I spoke with Keon Fontaine and he is OK. He said it has been a long summer. He naps about every day. He denies any home needs and is aware of his follow up appointments. His wife was at therapy. He confirmed they have no additional home needs at this time.

## 2021-08-11 NOTE — TELEPHONE ENCOUNTER
----- Message from Jagjit Castro MD sent at 8/11/2021  5:30 AM EDT -----  Tell him hgb stable; should go on ferrous sulfate 325 mg qod with vitamin C 500 mg

## 2021-08-11 NOTE — TELEPHONE ENCOUNTER
Pts wife was notified HGB stable and should go on ferrous sulfate 325 mg. And vitamin C 500 mg. Qod. She verbalizes understanding. When do you want to repeat H&H? They are getting labs for  on 8/16/21.

## 2021-08-17 ENCOUNTER — TELEPHONE (OUTPATIENT)
Dept: INTERNAL MEDICINE CLINIC | Age: 76
End: 2021-08-17

## 2021-08-17 NOTE — TELEPHONE ENCOUNTER
----- Message from Francisco Klinefelter, MD sent at 8/16/2021 12:21 PM EDT -----  Tell pt the recording mainly shows frequent bouts of afib, rate not fast

## 2021-08-20 ENCOUNTER — CARE COORDINATION (OUTPATIENT)
Dept: FAMILY MEDICINE CLINIC | Age: 76
End: 2021-08-20

## 2021-08-20 NOTE — CARE COORDINATION
Rashard Costello is doing OK. He said he still feels weak. He attributes that to the fact that they are still trying to figure out why his hgb is so low. He says he has good days and bad days. I encouraged him to be patient with himself and enjoy the good days. He has no additional needs at home. I encouraged him that he can call me if he needs anything or has any questions. He thanked me for calling him.

## 2021-08-20 NOTE — TELEPHONE ENCOUNTER
Date of last visit:  4/19/2021  Date of next visit:  Visit date not found    Requested Prescriptions     Pending Prescriptions Disp Refills    insulin detemir (LEVEMIR) 100 UNIT/ML injection vial 1 vial 0     Sig: Inject 45 Units into the skin 2 times daily

## 2021-08-23 ENCOUNTER — APPOINTMENT (OUTPATIENT)
Dept: GENERAL RADIOLOGY | Age: 76
DRG: 291 | End: 2021-08-23
Payer: MEDICARE

## 2021-08-23 ENCOUNTER — HOSPITAL ENCOUNTER (INPATIENT)
Age: 76
LOS: 4 days | Discharge: HOME | DRG: 291 | End: 2021-08-27
Attending: EMERGENCY MEDICINE | Admitting: INTERNAL MEDICINE
Payer: MEDICARE

## 2021-08-23 ENCOUNTER — TELEPHONE (OUTPATIENT)
Dept: NEPHROLOGY | Age: 76
End: 2021-08-23

## 2021-08-23 DIAGNOSIS — R06.09 DYSPNEA ON EXERTION: Primary | ICD-10-CM

## 2021-08-23 DIAGNOSIS — N17.9 ACUTE KIDNEY INJURY SUPERIMPOSED ON CKD (HCC): ICD-10-CM

## 2021-08-23 DIAGNOSIS — N18.9 ACUTE KIDNEY INJURY SUPERIMPOSED ON CKD (HCC): ICD-10-CM

## 2021-08-23 PROBLEM — R77.8 ELEVATED TROPONIN: Status: ACTIVE | Noted: 2021-08-23

## 2021-08-23 PROBLEM — R79.89 ELEVATED TROPONIN: Status: ACTIVE | Noted: 2021-08-23

## 2021-08-23 LAB
ANION GAP SERPL CALCULATED.3IONS-SCNC: 9 MEQ/L (ref 8–16)
BASOPHILS # BLD: 0.5 %
BASOPHILS ABSOLUTE: 0 THOU/MM3 (ref 0–0.1)
BUN BLDV-MCNC: 50 MG/DL (ref 7–22)
CALCIUM SERPL-MCNC: 9.3 MG/DL (ref 8.5–10.5)
CHLORIDE BLD-SCNC: 104 MEQ/L (ref 98–111)
CO2: 25 MEQ/L (ref 23–33)
CREAT SERPL-MCNC: 1.9 MG/DL (ref 0.4–1.2)
EKG ATRIAL RATE: 57 BPM
EKG P AXIS: 82 DEGREES
EKG P-R INTERVAL: 250 MS
EKG Q-T INTERVAL: 476 MS
EKG QRS DURATION: 130 MS
EKG QTC CALCULATION (BAZETT): 463 MS
EKG R AXIS: 58 DEGREES
EKG T AXIS: 43 DEGREES
EKG VENTRICULAR RATE: 57 BPM
EOSINOPHIL # BLD: 3.5 %
EOSINOPHILS ABSOLUTE: 0.3 THOU/MM3 (ref 0–0.4)
ERYTHROCYTE [DISTWIDTH] IN BLOOD BY AUTOMATED COUNT: 15.8 % (ref 11.5–14.5)
ERYTHROCYTE [DISTWIDTH] IN BLOOD BY AUTOMATED COUNT: 58.5 FL (ref 35–45)
GFR SERPL CREATININE-BSD FRML MDRD: 35 ML/MIN/1.73M2
GLUCOSE BLD-MCNC: 111 MG/DL (ref 70–108)
GLUCOSE BLD-MCNC: 153 MG/DL (ref 70–108)
HCT VFR BLD CALC: 25.9 % (ref 42–52)
HEMOGLOBIN: 7.9 GM/DL (ref 14–18)
IMMATURE GRANS (ABS): 0.03 THOU/MM3 (ref 0–0.07)
IMMATURE GRANULOCYTES: 0.4 %
LYMPHOCYTES # BLD: 9.1 %
LYMPHOCYTES ABSOLUTE: 0.7 THOU/MM3 (ref 1–4.8)
MCH RBC QN AUTO: 31 PG (ref 26–33)
MCHC RBC AUTO-ENTMCNC: 30.5 GM/DL (ref 32.2–35.5)
MCV RBC AUTO: 101.6 FL (ref 80–94)
MONOCYTES # BLD: 10.3 %
MONOCYTES ABSOLUTE: 0.8 THOU/MM3 (ref 0.4–1.3)
NUCLEATED RED BLOOD CELLS: 0 /100 WBC
OSMOLALITY CALCULATION: 289.7 MOSMOL/KG (ref 275–300)
PLATELET # BLD: 188 THOU/MM3 (ref 130–400)
PMV BLD AUTO: 10.4 FL (ref 9.4–12.4)
POTASSIUM REFLEX MAGNESIUM: 5.1 MEQ/L (ref 3.5–5.2)
PRO-BNP: 1769 PG/ML (ref 0–1800)
RBC # BLD: 2.55 MILL/MM3 (ref 4.7–6.1)
SEG NEUTROPHILS: 76.2 %
SEGMENTED NEUTROPHILS ABSOLUTE COUNT: 5.6 THOU/MM3 (ref 1.8–7.7)
SODIUM BLD-SCNC: 138 MEQ/L (ref 135–145)
TROPONIN T: 0.02 NG/ML
WBC # BLD: 7.4 THOU/MM3 (ref 4.8–10.8)

## 2021-08-23 PROCEDURE — 71046 X-RAY EXAM CHEST 2 VIEWS: CPT

## 2021-08-23 PROCEDURE — 2500000003 HC RX 250 WO HCPCS: Performed by: INTERNAL MEDICINE

## 2021-08-23 PROCEDURE — 84484 ASSAY OF TROPONIN QUANT: CPT

## 2021-08-23 PROCEDURE — 2140000000 HC CCU INTERMEDIATE R&B

## 2021-08-23 PROCEDURE — 85025 COMPLETE CBC W/AUTO DIFF WBC: CPT

## 2021-08-23 PROCEDURE — 6370000000 HC RX 637 (ALT 250 FOR IP): Performed by: INTERNAL MEDICINE

## 2021-08-23 PROCEDURE — 93005 ELECTROCARDIOGRAM TRACING: CPT | Performed by: STUDENT IN AN ORGANIZED HEALTH CARE EDUCATION/TRAINING PROGRAM

## 2021-08-23 PROCEDURE — 82948 REAGENT STRIP/BLOOD GLUCOSE: CPT

## 2021-08-23 PROCEDURE — 93010 ELECTROCARDIOGRAM REPORT: CPT | Performed by: INTERNAL MEDICINE

## 2021-08-23 PROCEDURE — 2580000003 HC RX 258: Performed by: INTERNAL MEDICINE

## 2021-08-23 PROCEDURE — 99282 EMERGENCY DEPT VISIT SF MDM: CPT

## 2021-08-23 PROCEDURE — 80048 BASIC METABOLIC PNL TOTAL CA: CPT

## 2021-08-23 PROCEDURE — 36415 COLL VENOUS BLD VENIPUNCTURE: CPT

## 2021-08-23 PROCEDURE — 83880 ASSAY OF NATRIURETIC PEPTIDE: CPT

## 2021-08-23 RX ORDER — ALPRAZOLAM 0.25 MG/1
0.25 TABLET ORAL 3 TIMES DAILY PRN
Status: DISCONTINUED | OUTPATIENT
Start: 2021-08-23 | End: 2021-08-27 | Stop reason: HOSPADM

## 2021-08-23 RX ORDER — POTASSIUM CHLORIDE 7.45 MG/ML
10 INJECTION INTRAVENOUS PRN
Status: DISCONTINUED | OUTPATIENT
Start: 2021-08-23 | End: 2021-08-27 | Stop reason: HOSPADM

## 2021-08-23 RX ORDER — FUROSEMIDE 10 MG/ML
40 INJECTION INTRAMUSCULAR; INTRAVENOUS ONCE
Status: DISCONTINUED | OUTPATIENT
Start: 2021-08-23 | End: 2021-08-27 | Stop reason: HOSPADM

## 2021-08-23 RX ORDER — DILTIAZEM HYDROCHLORIDE 120 MG/1
120 CAPSULE, COATED, EXTENDED RELEASE ORAL DAILY
Status: DISCONTINUED | OUTPATIENT
Start: 2021-08-24 | End: 2021-08-27 | Stop reason: HOSPADM

## 2021-08-23 RX ORDER — ACETAMINOPHEN,DIPHENHYDRAMINE HCL 500; 25 MG/1; MG/1
1 TABLET, FILM COATED ORAL NIGHTLY PRN
Status: DISCONTINUED | OUTPATIENT
Start: 2021-08-24 | End: 2021-08-23 | Stop reason: CLARIF

## 2021-08-23 RX ORDER — RANOLAZINE 500 MG/1
1000 TABLET, EXTENDED RELEASE ORAL 2 TIMES DAILY
Status: DISCONTINUED | OUTPATIENT
Start: 2021-08-23 | End: 2021-08-27 | Stop reason: HOSPADM

## 2021-08-23 RX ORDER — HYDROXYZINE HYDROCHLORIDE 10 MG/1
10 TABLET, FILM COATED ORAL DAILY
Status: DISCONTINUED | OUTPATIENT
Start: 2021-08-24 | End: 2021-08-27 | Stop reason: HOSPADM

## 2021-08-23 RX ORDER — POTASSIUM CHLORIDE 20 MEQ/1
40 TABLET, EXTENDED RELEASE ORAL PRN
Status: DISCONTINUED | OUTPATIENT
Start: 2021-08-23 | End: 2021-08-27 | Stop reason: HOSPADM

## 2021-08-23 RX ORDER — NITROGLYCERIN 0.4 MG/1
0.4 TABLET SUBLINGUAL EVERY 5 MIN PRN
Refills: 6 | Status: DISCONTINUED | OUTPATIENT
Start: 2021-08-23 | End: 2021-08-27 | Stop reason: HOSPADM

## 2021-08-23 RX ORDER — FERROUS SULFATE 325(65) MG
325 TABLET ORAL EVERY OTHER DAY
Status: DISCONTINUED | OUTPATIENT
Start: 2021-08-25 | End: 2021-08-27 | Stop reason: HOSPADM

## 2021-08-23 RX ORDER — HYDRALAZINE HYDROCHLORIDE 25 MG/1
25 TABLET, FILM COATED ORAL EVERY 8 HOURS
Status: DISCONTINUED | OUTPATIENT
Start: 2021-08-23 | End: 2021-08-27 | Stop reason: HOSPADM

## 2021-08-23 RX ORDER — ISOSORBIDE MONONITRATE 60 MG/1
120 TABLET, EXTENDED RELEASE ORAL DAILY
Status: DISCONTINUED | OUTPATIENT
Start: 2021-08-24 | End: 2021-08-27 | Stop reason: HOSPADM

## 2021-08-23 RX ORDER — TAMSULOSIN HYDROCHLORIDE 0.4 MG/1
0.4 CAPSULE ORAL DAILY
Status: DISCONTINUED | OUTPATIENT
Start: 2021-08-24 | End: 2021-08-27 | Stop reason: HOSPADM

## 2021-08-23 RX ORDER — METOPROLOL SUCCINATE 50 MG/1
50 TABLET, EXTENDED RELEASE ORAL DAILY
Status: DISCONTINUED | OUTPATIENT
Start: 2021-08-24 | End: 2021-08-27 | Stop reason: HOSPADM

## 2021-08-23 RX ORDER — FUROSEMIDE 40 MG/1
80 TABLET ORAL DAILY
Status: DISCONTINUED | OUTPATIENT
Start: 2021-08-24 | End: 2021-08-24

## 2021-08-23 RX ORDER — ACETAMINOPHEN 500 MG
500 TABLET ORAL NIGHTLY PRN
Status: DISCONTINUED | OUTPATIENT
Start: 2021-08-23 | End: 2021-08-27 | Stop reason: HOSPADM

## 2021-08-23 RX ORDER — ASCORBIC ACID 500 MG
500 TABLET ORAL EVERY OTHER DAY
Status: DISCONTINUED | OUTPATIENT
Start: 2021-08-25 | End: 2021-08-27 | Stop reason: HOSPADM

## 2021-08-23 RX ORDER — ATORVASTATIN CALCIUM 40 MG/1
40 TABLET, FILM COATED ORAL NIGHTLY
Status: DISCONTINUED | OUTPATIENT
Start: 2021-08-24 | End: 2021-08-27 | Stop reason: HOSPADM

## 2021-08-23 RX ORDER — DIPHENHYDRAMINE HCL 25 MG
25 TABLET ORAL NIGHTLY PRN
Status: DISCONTINUED | OUTPATIENT
Start: 2021-08-23 | End: 2021-08-27 | Stop reason: HOSPADM

## 2021-08-23 RX ORDER — M-VIT,TX,IRON,MINS/CALC/FOLIC 27MG-0.4MG
1 TABLET ORAL NIGHTLY
Status: DISCONTINUED | OUTPATIENT
Start: 2021-08-24 | End: 2021-08-27 | Stop reason: HOSPADM

## 2021-08-23 RX ORDER — CLOPIDOGREL BISULFATE 75 MG/1
75 TABLET ORAL DAILY
Status: DISCONTINUED | OUTPATIENT
Start: 2021-08-24 | End: 2021-08-27 | Stop reason: HOSPADM

## 2021-08-23 RX ADMIN — APIXABAN 5 MG: 5 TABLET, FILM COATED ORAL at 23:21

## 2021-08-23 RX ADMIN — BUMETANIDE 1 MG/HR: 0.25 INJECTION INTRAMUSCULAR; INTRAVENOUS at 22:16

## 2021-08-23 RX ADMIN — RANOLAZINE 1000 MG: 500 TABLET, FILM COATED, EXTENDED RELEASE ORAL at 23:20

## 2021-08-23 RX ADMIN — HYDRALAZINE HYDROCHLORIDE 25 MG: 25 TABLET, FILM COATED ORAL at 23:20

## 2021-08-23 RX ADMIN — INSULIN LISPRO 1 UNITS: 100 INJECTION, SOLUTION INTRAVENOUS; SUBCUTANEOUS at 22:27

## 2021-08-23 ASSESSMENT — ENCOUNTER SYMPTOMS
ABDOMINAL PAIN: 0
COUGH: 0
SHORTNESS OF BREATH: 1
BLOOD IN STOOL: 0
RHINORRHEA: 0
VOMITING: 0
NAUSEA: 0

## 2021-08-23 NOTE — ED TRIAGE NOTES
Pt presents from Dr Srinivas Gibson for sob and leg swelling. Tried for direct admit but no beds available.  Pt denies CP

## 2021-08-23 NOTE — ED PROVIDER NOTES
[place] ENCOUNTER          Pt Name: Enedina Nageotte  MRN: [id number]  Armsjosegfmatt 1945  Date of evaluation: 8/23/2021  Treating Resident Physician: Gabrielle Kennedy MD  Supervising Physician: Lalita Stafford MD    [address] [place] [address]       Chief Complaint   Patient presents with    Shortness of Breath    Leg Swelling     History obtained from chart review and the patient. HISTORY OF PRESENT ILLNESS    Enedina Nageotte is a 68 y.o. male who presents to the emergency department for evaluation of shortness of breath. He states that he has had shortness of breath for the last 2 months. He was seen in Dr. Odalys De La Fuente office earlier today for a routine visit. Patient states that Dr. Agustin Lugo wanted him to be direct admitted to the hospital for further work-up; however there were no beds available and he was instructed to come to the emergency room for further evaluation. Patient states that he has been on Lasix 40 mg twice a day for shortness of breath and lower extremity swelling. States he follows with Dr. Mary Garnica, as well. States that his shortness of breath has been worsening recently. Patient denies any fevers, chills, cough, orthopnea, PND, chest pain, abdominal pain, nausea, vomiting, diarrhea. Patient states that he has a history of an acute GI bleed in the past where his hemoglobin fell close to 6 and he was short of breath at that time. Patient denies any hematuria or blood in stool. The patient has no other acute complaints at this time. REVIEW OF SYSTEMS   Review of Systems   Constitutional: Positive for fatigue. Negative for chills and fever. HENT: Negative for congestion and rhinorrhea. Respiratory: Positive for shortness of breath. Negative for cough. Cardiovascular: Negative for chest pain and palpitations. Gastrointestinal: Negative for abdominal pain, blood in stool, nausea and vomiting. Genitourinary: Negative for hematuria. Skin: Negative for rash. Neurological: Negative for light-headedness and headaches.          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    BPH (benign prostatic hypertrophy)     CAD (coronary artery disease) 1997    Carotid artery disease ([address] 1995    right    Carotid disease, bilateral ([address]     Cerebral artery occlusion with cerebral infarction ([address]     CHF (congestive heart failure) ([address]     Chronic kidney disease     GERD (gastroesophageal reflux disease)     Hyperlipidemia 1996    Hypertension     Lumbar disc disease 2001    MI (myocardial infarction) ([address] 1997    Nephrolithiasis 2002    NIDDM (non-insulin dependent diabetes mellitus) 1996    diagnosed 1996    RICCARDO (obstructive sleep apnea) 2005    cpap    PVC's (premature ventricular contractions)     Renal artery stenosis ([address] 2007    left    Renal insufficiency     S/P CABG (status post coronary artery bypass graft) 1997    5 vessels    Skin cancer     Squamous cell carcinoma     TIA (transient ischemic attack)     Wears partial dentures     upper and lower     Past Surgical History:   Procedure Laterality Date    ABLATION OF DYSRHYTHMIC FOCUS  3/13/13   [facility]    CARDIAC SURGERY  3/13/13    oblation for irreg rythm    CARDIOVERSION  3/8/2012    CAROTID ENDARTERECTOMY  1997    CHOLECYSTECTOMY  4/1999    COLONOSCOPY  6/22/09, 4/2011,7/12/17    polyp removal    COLONOSCOPY N/A 7/21/2021    COLONOSCOPY POLYPECTOMY SNARE/COLD BIOPSY performed by Paco Juares MD at [address] [place] [address]  2008 (2), 2009 (1)    x2    CORONARY ARTERY BYPASS GRAFT  8/1997    5 vessel    DIAGNOSTIC CARDIAC CATH LAB PROCEDURE  FOOT SURGERY  12/20/2013    pre-cancerous mole rt foot removed    LITHOTRIPSY  4/2002    OK OFFICE/OUTPT VISIT,PROCEDURE ONLY N/A 11/8/2018    TRANSESOPHAGEAL ECHOCARDIOGRAM performed by Lilia Puente MD at [address] [facility]    PTCA  05/30/2018   [facility]  2001, 2003    rt shoulder manipulation--frozen shoulder-01, Lt -03    SKIN BIOPSY      SKIN CANCER EXCISION  12/20/13     R foot    SKIN CANCER EXCISION  1/2016    squamous cell Lt  upper cheek    UPPER GASTROINTESTINAL ENDOSCOPY N/A 7/20/2021    EGD ESOPHAGOGASTRODUODENOSCOPY performed by Bertram Mahoney MD at [address] [facility] [address]  4/2002    VASCULAR SURGERY      carotids & cabg harvests         MEDICATIONS     Current Facility-Administered Medications:     furosemide (LASIX) injection 40 mg, 40 mg, Intravenous, Once, Amelia Best MD    Current Outpatient Medications:     insulin detemir (LEVEMIR) 100 UNIT/ML injection vial, Inject 45 Units into the skin 2 times daily, Disp: 1 vial, Rfl: 11    ferrous sulfate (IRON 325) 325 (65 Fe) MG tablet, Take 325 mg by mouth every other day, Disp: , Rfl:     ascorbic acid (VITAMIN C) 500 MG tablet, Take 500 mg by mouth every other day, Disp: , Rfl:     furosemide (LASIX) 40 MG tablet, Take 2 tablets by mouth daily, Disp: 60 tablet, Rfl: 1    nitroGLYCERIN (NITROLINGUAL) 0.4 MG/SPRAY 0.4 mg spray, USE 1 SPRAY ON OR UNDER THE TONGUE EVERY 5 MINUTES AS NEEDED FOR CHEST PAIN, Disp: 4.9 g, Rfl: 6    dilTIAZem (CARDIZEM CD) 120 MG extended release capsule, TAKE 1 CAPSULE BY MOUTH  DAILY, Disp: 90 capsule, Rfl: 3    insulin lispro (HUMALOG) 100 UNIT/ML injection vial, Inject into the skin 7 units in am and 7 units at lunch and 10 units at supper plus sliding scale, Disp: , Rfl:     isosorbide mononitrate (IMDUR) 120 MG extended release tablet, Take 1 tablet by mouth daily, Disp: 30 tablet, Rfl: 3    apixaban (ELIQUIS) 5 MG TABS tablet, Take 1 tablet by mouth 2 times daily TAKE 1 TABLET BY MOUTH  TWICE A DAY STARTING 7/28/21 (Patient not taking: Reported on 7/28/2021), Disp: 60 tablet, Rfl: 0    blood glucose test strips (ACCU-CHEK GUIDE) strip, USE TO CHECK 4 TIMES DAILY, Disp: 300 strip, Rfl: 5    Accu-Chek FastClix Lancets MISC, USE TO TEST BLOOD SUGARS 4 TIMES DAILY, Disp: 102 each, Rfl: 63    atorvastatin (LIPITOR) 40 MG tablet, TAKE 1 TABLET BY MOUTH AT  NIGHT, Disp: 90 tablet, Rfl: 3    ranolazine (RANEXA) 1000 MG extended release tablet, Take 1 tablet by mouth 2 times daily New dose, Disp: 180 tablet, Rfl: 1    tamsulosin (FLOMAX) 0.4 MG capsule, Take 1 capsule by mouth daily, Disp: 90 capsule, Rfl: 2    diphenhydrAMINE-APAP, sleep, (TYLENOL PM EXTRA STRENGTH PO), Take 1 tablet by mouth every evening as needed, Disp: , Rfl:     ALPRAZolam (XANAX) 0.25 MG tablet, Take 0.25 mg by mouth 3 times daily as needed for Anxiety. , Disp: , Rfl:     hydrOXYzine (ATARAX) 10 MG tablet, Take 10 mg by mouth daily , Disp: , Rfl:     fluocinonide (LIDEX) 0.05 % cream, Apply 1 applicator topically as needed, Disp: , Rfl:     BiPAP Machine MIS, by Does not apply route, Disp: , Rfl:     metoprolol succinate (TOPROL XL) 50 MG extended release tablet, TAKE 1 TABLET BY MOUTH  DAILY, Disp: 90 tablet, Rfl: 3    Insulin Pen Needle (NOVOFINE) 32G X 6 MM MISC, USE 1 NEW NEEDLE 6 TIMES  DAILY AND AS NEEDED, Disp: 540 each, Rfl: 1    Multiple Vitamins-Minerals (THERAPEUTIC MULTIVITAMIN-MINERALS) tablet, Take 1 tablet by mouth nightly, Disp: , Rfl:     hydrALAZINE (APRESOLINE) 25 MG tablet, Take 1 tablet by mouth every 8 hours, Disp: 90 tablet, Rfl: 0    clopidogrel (PLAVIX) 75 MG tablet, Take 75 mg by mouth daily morning, Disp: , Rfl:       SOCIAL HISTORY     Social History     Social History Narrative    Not on file     Social History     Tobacco Use    Smoking status: Never Smoker    Smokeless tobacco: Never Used   Vaping Use    Vaping Use: Never used   Substance Use Topics    Alcohol use: No     Alcohol/week: 0.0 standard drinks    Drug use: No         ALLERGIES   No Known Allergies      FAMILY HISTORY     Family History   Problem Relation Age of Onset    Cancer Mother     High Blood Pressure Mother     Stroke Father     High Blood Pressure Father     Heart Disease Brother     Cancer Brother         lung    Cancer Brother         melanoma         PREVIOUS RECORDS   Previous records reviewed: Reviewed. PHYSICAL EXAM     ED Triage Vitals [08/23/21 1656]   BP Temp Temp Source Pulse Resp SpO2 Height Weight   (!) 128/59 98.7 °F (37.1 °C) Oral 59 16 99 % -- --     Initial vital signs and nursing assessment reviewed and normal. There is no height or weight on file to calculate BMI. Pulsoximetry is normal per my interpretation. Additional Vital Signs:  Vitals:    08/23/21 1754   BP:    Pulse: 61   Resp: 18   Temp:    SpO2: 99%       Physical Exam  Vitals reviewed. Constitutional:       General: He is in acute distress. Appearance: He is obese. HENT:      Head: Normocephalic and atraumatic. Cardiovascular:      Rate and Rhythm: Normal rate. Rhythm irregular. Pulmonary:      Breath sounds: Examination of the right-upper field reveals rhonchi. Examination of the left-upper field reveals rhonchi. Examination of the right-lower field reveals decreased breath sounds. Examination of the left-lower field reveals decreased breath sounds. Decreased breath sounds and rhonchi present. Musculoskeletal:      Right lower leg: Edema present. Left lower leg: Edema present. Skin:     General: Skin is warm and dry. Neurological:      Mental Status: He is oriented to person, place, and time. Psychiatric:         Mood and Affect: Mood normal.         Behavior: Behavior normal.             MEDICAL DECISION MAKING   Initial Assessment:   68year old male with PMHx significant for CAD s/p PCI, A fib on [address] [place] [address] Road, DM, HTN, HFpEF presents to the ED for shortness of breath.  Patient was seen at Dr. Chase Luevano office earlier today and instructed to come to the ED for further evaluation. Plan:    CBC, CMP, Troponin, BNP   CXR   EKG   IV Lasix 40mg once        ED RESULTS   Laboratory results:  Labs Reviewed   BASIC METABOLIC PANEL W/ REFLEX TO MG FOR LOW K - Abnormal; Notable for the following components:       Result Value    Glucose 111 (*)     BUN 50 (*)     CREATININE 1.9 (*)     All other components within normal limits   CBC WITH AUTO DIFFERENTIAL - Abnormal; Notable for the following components:    RBC 2.55 (*)     Hemoglobin 7.9 (*)     Hematocrit 25.9 (*)     .6 (*)     MCHC 30.5 (*)     RDW-CV 15.8 (*)     RDW-SD 58.5 (*)     Lymphocytes Absolute 0.7 (*)     All other components within normal limits   TROPONIN - Abnormal; Notable for the following components:    Troponin T 0.017 (*)     All other components within normal limits   GLOMERULAR FILTRATION RATE, ESTIMATED - Abnormal; Notable for the following components:    Est, Glom Filt Rate 35 (*)     All other components within normal limits   BRAIN NATRIURETIC PEPTIDE   ANION GAP   OSMOLALITY       Radiologic studies results:  XR CHEST (2 VW)   Final Result   1. No acute intrathoracic process. 2. Mild stable cardiomegaly. **This report has been created using voice recognition software. It may contain minor errors which are inherent in voice recognition technology. **      Final report electronically signed by Dr. Suzy Cummings on 8/23/2021 5:50 PM          ED Medications administered this visit:   Medications   furosemide (LASIX) injection 40 mg (has no administration in time range)         ED COURSE     ED Course as of Aug 23 1946   Mon Aug 23, 2021   1800 IMPRESSION:  1. No acute intrathoracic process. 2. Mild stable cardiomegaly. XR CHEST (2 VW) [LR]   1800 EKG interpretation: Performed at [id number].   Bradycardia at 57 bpm.  Sinus rhythm with first-degree AV block right bundle branch block is present with QRS duration 130 ms. No ST changes or T wave inversions to suggest acute ischemia. EKG 12 Lead [LR]   1892 Dr. Hannah Mendieta paged regarding patient's labs and work-up so far. [SS]   7970 Spoke to Dr. Hannah Mendieta - will admit patient. Admission order placed. [SS]      ED Course User Index  [LR] Beau Rosario MD  [SS] Tessy Hanson MD       Troponin elevated to 0.017. Cr 1.9, however at baseline. Hgb 7.9 (last Hgb noted to be 8.6 on 8/16/2021). EKG interpretation showing borderline criteria for inferior infarct, CO interval increased. Discussed with Dr. Hannah Mendieta regarding lab findings and EKG findings. Dr. Hannah Mendieta will admit patient, admit order to be placed and Dr. Hannah Mendieta will follow. MEDICATION CHANGES     New Prescriptions    No medications on file         FINAL DISPOSITION     Final diagnoses:   Dyspnea on exertion     Condition: condition: fair  Dispo: Admit to CCU      This transcription was electronically signed. Parts of this transcriptions may have been dictated by use of voice recognition software and electronically transcribed, and parts may have been transcribed with the assistance of an ED scribe. The transcription may contain errors not detected in proofreading. Please refer to my supervising physician's documentation if my documentation differs.     Electronically Signed: Tessy Hanson MD, 08/23/21, 7:46 Daniela Regan MD  Resident  08/23/21 9119

## 2021-08-23 NOTE — TELEPHONE ENCOUNTER
Brandi Pinzon called to inform patient going to ER for iv diuretics. Informed her to consult Dr. Merary Bell if admitted.

## 2021-08-24 ENCOUNTER — TELEPHONE (OUTPATIENT)
Dept: INTERNAL MEDICINE CLINIC | Age: 76
End: 2021-08-24

## 2021-08-24 DIAGNOSIS — E11.29 TYPE 2 DIABETES MELLITUS WITH MICROALBUMINURIA, WITH LONG-TERM CURRENT USE OF INSULIN (HCC): ICD-10-CM

## 2021-08-24 DIAGNOSIS — Z79.4 TYPE 2 DIABETES MELLITUS WITH MICROALBUMINURIA, WITH LONG-TERM CURRENT USE OF INSULIN (HCC): ICD-10-CM

## 2021-08-24 DIAGNOSIS — R80.9 TYPE 2 DIABETES MELLITUS WITH MICROALBUMINURIA, WITH LONG-TERM CURRENT USE OF INSULIN (HCC): ICD-10-CM

## 2021-08-24 LAB
ANION GAP SERPL CALCULATED.3IONS-SCNC: 11 MEQ/L (ref 8–16)
BASOPHILS # BLD: 0.5 %
BASOPHILS ABSOLUTE: 0 THOU/MM3 (ref 0–0.1)
BUN BLDV-MCNC: 45 MG/DL (ref 7–22)
CALCIUM SERPL-MCNC: 9.2 MG/DL (ref 8.5–10.5)
CHLORIDE BLD-SCNC: 101 MEQ/L (ref 98–111)
CO2: 24 MEQ/L (ref 23–33)
CREAT SERPL-MCNC: 1.9 MG/DL (ref 0.4–1.2)
EOSINOPHIL # BLD: 3.9 %
EOSINOPHILS ABSOLUTE: 0.3 THOU/MM3 (ref 0–0.4)
ERYTHROCYTE [DISTWIDTH] IN BLOOD BY AUTOMATED COUNT: 15.9 % (ref 11.5–14.5)
ERYTHROCYTE [DISTWIDTH] IN BLOOD BY AUTOMATED COUNT: 59 FL (ref 35–45)
GFR SERPL CREATININE-BSD FRML MDRD: 35 ML/MIN/1.73M2
GLUCOSE BLD-MCNC: 203 MG/DL (ref 70–108)
GLUCOSE BLD-MCNC: 211 MG/DL (ref 70–108)
GLUCOSE BLD-MCNC: 85 MG/DL (ref 70–108)
GLUCOSE BLD-MCNC: 97 MG/DL (ref 70–108)
GLUCOSE BLD-MCNC: 98 MG/DL (ref 70–108)
HCT VFR BLD CALC: 25 % (ref 42–52)
HEMOGLOBIN: 7.8 GM/DL (ref 14–18)
IMMATURE GRANS (ABS): 0.02 THOU/MM3 (ref 0–0.07)
IMMATURE GRANULOCYTES: 0.3 %
LYMPHOCYTES # BLD: 8.8 %
LYMPHOCYTES ABSOLUTE: 0.6 THOU/MM3 (ref 1–4.8)
MCH RBC QN AUTO: 31.8 PG (ref 26–33)
MCHC RBC AUTO-ENTMCNC: 31.2 GM/DL (ref 32.2–35.5)
MCV RBC AUTO: 102 FL (ref 80–94)
MONOCYTES # BLD: 11 %
MONOCYTES ABSOLUTE: 0.7 THOU/MM3 (ref 0.4–1.3)
NUCLEATED RED BLOOD CELLS: 0 /100 WBC
PLATELET # BLD: 173 THOU/MM3 (ref 130–400)
PMV BLD AUTO: 10.4 FL (ref 9.4–12.4)
POTASSIUM REFLEX MAGNESIUM: 4.4 MEQ/L (ref 3.5–5.2)
RBC # BLD: 2.45 MILL/MM3 (ref 4.7–6.1)
SEG NEUTROPHILS: 75.5 %
SEGMENTED NEUTROPHILS ABSOLUTE COUNT: 4.9 THOU/MM3 (ref 1.8–7.7)
SODIUM BLD-SCNC: 136 MEQ/L (ref 135–145)
TROPONIN T: 0.01 NG/ML
WBC # BLD: 6.5 THOU/MM3 (ref 4.8–10.8)

## 2021-08-24 PROCEDURE — 6370000000 HC RX 637 (ALT 250 FOR IP): Performed by: INTERNAL MEDICINE

## 2021-08-24 PROCEDURE — 85025 COMPLETE CBC W/AUTO DIFF WBC: CPT

## 2021-08-24 PROCEDURE — 80048 BASIC METABOLIC PNL TOTAL CA: CPT

## 2021-08-24 PROCEDURE — 82948 REAGENT STRIP/BLOOD GLUCOSE: CPT

## 2021-08-24 PROCEDURE — 2140000000 HC CCU INTERMEDIATE R&B

## 2021-08-24 PROCEDURE — 84484 ASSAY OF TROPONIN QUANT: CPT

## 2021-08-24 PROCEDURE — 36415 COLL VENOUS BLD VENIPUNCTURE: CPT

## 2021-08-24 RX ORDER — INSULIN DETEMIR 100 [IU]/ML
47 INJECTION, SOLUTION SUBCUTANEOUS 2 TIMES DAILY
COMMUNITY
End: 2021-10-04 | Stop reason: SDUPTHER

## 2021-08-24 RX ADMIN — CLOPIDOGREL BISULFATE 75 MG: 75 TABLET ORAL at 09:26

## 2021-08-24 RX ADMIN — HYDRALAZINE HYDROCHLORIDE 25 MG: 25 TABLET, FILM COATED ORAL at 05:52

## 2021-08-24 RX ADMIN — ISOSORBIDE MONONITRATE 120 MG: 60 TABLET ORAL at 09:26

## 2021-08-24 RX ADMIN — ATORVASTATIN CALCIUM 40 MG: 40 TABLET, FILM COATED ORAL at 20:29

## 2021-08-24 RX ADMIN — TAMSULOSIN HYDROCHLORIDE 0.4 MG: 0.4 CAPSULE ORAL at 09:26

## 2021-08-24 RX ADMIN — METOPROLOL SUCCINATE 50 MG: 50 TABLET, FILM COATED, EXTENDED RELEASE ORAL at 09:26

## 2021-08-24 RX ADMIN — Medication 1 TABLET: at 20:35

## 2021-08-24 RX ADMIN — RANOLAZINE 1000 MG: 500 TABLET, FILM COATED, EXTENDED RELEASE ORAL at 09:26

## 2021-08-24 RX ADMIN — DILTIAZEM HYDROCHLORIDE 120 MG: 120 CAPSULE, COATED, EXTENDED RELEASE ORAL at 09:27

## 2021-08-24 RX ADMIN — RANOLAZINE 1000 MG: 500 TABLET, FILM COATED, EXTENDED RELEASE ORAL at 20:34

## 2021-08-24 RX ADMIN — HYDROXYZINE HYDROCHLORIDE 10 MG: 10 TABLET ORAL at 09:26

## 2021-08-24 RX ADMIN — HYDRALAZINE HYDROCHLORIDE 25 MG: 25 TABLET, FILM COATED ORAL at 20:34

## 2021-08-24 ASSESSMENT — PAIN SCALES - GENERAL
PAINLEVEL_OUTOF10: 0
PAINLEVEL_OUTOF10: 0

## 2021-08-24 NOTE — FLOWSHEET NOTE
Report given to [facility]. Patient respirations unlabored. Patient sleeping in bed, call light within reach, and bed alarm is on. Cynthia [facility] SURGICAL [place] Student Nurse.

## 2021-08-24 NOTE — TELEPHONE ENCOUNTER
FYI-Pts wife called in saying they saw Dr. Shira Zuniga yesterday and he was admitted and put on a bumex drip.  He will be in at least one more night

## 2021-08-24 NOTE — PROGRESS NOTES
Pharmacy Medication History Note      List of current medications patient is taking is complete. Source of information: patient, patient home medication list, patient outside dispense report    Changes made to medication list:  Medications removed (include reason, ex. therapy complete or physician discontinued):  Eliquis 5 mg daily (wrong directions)  Levemir 100 units/mL (decreased units being used)    Medications added/doses adjusted:  Eliquis 5 mg BID  Levemir 100 units/mL - 48 units SQ BID  Strength of Tylenol PM updated  Hydroxyzine 10 mg changed to PRN    Other notes (ex. Recent course of antibiotics, Coumadin dosing):  Patient was a good oral historian of medication history. Family member also helped with medications and provided an up to date medication list.  Patient stated that he used a sliding scale on top of his base insulin doses but was unsure of the exact details and typically guessed at coverage. Furosemide was confirmed to be 2 40 mg tablets once daily. Patient stated he was currently using 48 units of Levemir twice daily. Denies use of other OTC or herbal medications.       Allergies reviewed      Electronically signed by Alfredo Pereira on 8/24/2021 at 9:23 AM

## 2021-08-24 NOTE — TELEPHONE ENCOUNTER
Tan Gant called stating [facility] sent us a request for Levemir vials by mistake. She cancelled the refill and req a refill for  Levemir flex pens.     Pt uses 48 units BID,    [facility]

## 2021-08-24 NOTE — CARE COORDINATION
8/24/21, 6:57 AM EDT  DISCHARGE PLANNING EVALUATION:    Chicho Fuller       Admitted: 8/23/2021/ [address] day: 1   Location: -21/021-A Reason for admit: Dyspnea on exertion [R06.00]  Elevated troponin [R77.8]   PMH:  has a past medical history of Adenomatous colon polyp, Angina at rest [facility]), Atrial fibrillation ([facility] [address] 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[facility] [address] BPH (benign prostatic hypertrophy), CAD (coronary artery disease), Carotid artery disease ([facility] [address] Carotid disease, bilateral ([facility] [address] Cerebral artery occlusion with cerebral infarction ([facility] [address] CHF (congestive heart failure) ([facility] [address] Chronic kidney disease, GERD (gastroesophageal reflux disease), Hyperlipidemia, Hypertension, Lumbar disc disease, MI (myocardial infarction) ([facility] [address] Nephrolithiasis, NIDDM (non-insulin dependent diabetes mellitus), RICCARDO (obstructive sleep apnea), PVC's (premature ventricular contractions), Renal artery stenosis ([address] Renal insufficiency, S/P CABG (status post coronary artery bypass graft), Skin cancer, Squamous cell carcinoma, TIA (transient ischemic attack), and Wears partial dentures. Procedure: None  Barriers to Discharge:  Admitted through ED with SOB and leg swelling. Was to be a direct admit, but no beds were available. Sent to ED from Dr. Dominique  office. BNP 1769.0. Troponin 0.017 and 0.014. Hgb 8.6, 7.9 and 7.8. BUN 35, 50 and 45. Creatinine 2.0, 1.9 and 1.9. Bumex gtt started. Eliquis, Plavix, Cardizem, Lasix iv x1 then 80 mg po daily, Hydralazine, Atarax, diabetes management, Imdur, Toprol XL, Ranexa, Flomax, Vit C, Lipitor, Iron tabs, MVI. PCP: Kevan Sung MD  Readmission Risk Score: 27%    Patient Goals/Plan/Treatment Preferences:  Spoke with Marti Tena and his wife. They live at home together. Marti Tena is independent and drives. Pt has a wheelchair, transport chair, shower chair, elevated toilet seat, walker at home, but does not use any of them. Denies any HH needs.  Spoke with Marti Tena and his wife regarding CHF clinic, wife is interested, Summer Sweeney states if ok with Dr. Sherrie Torres. Will address with Dr. Sherrie Torres. Transportation/Food Security/Housekeeping Addressed:  No issues identified.

## 2021-08-25 LAB
ABO: NORMAL
ANION GAP SERPL CALCULATED.3IONS-SCNC: 14 MEQ/L (ref 8–16)
ANTIBODY SCREEN: NORMAL
BUN BLDV-MCNC: 53 MG/DL (ref 7–22)
CALCIUM SERPL-MCNC: 9.3 MG/DL (ref 8.5–10.5)
CHLORIDE BLD-SCNC: 97 MEQ/L (ref 98–111)
CO2: 27 MEQ/L (ref 23–33)
CREAT SERPL-MCNC: 2.5 MG/DL (ref 0.4–1.2)
ERYTHROCYTE [DISTWIDTH] IN BLOOD BY AUTOMATED COUNT: 15.8 % (ref 11.5–14.5)
ERYTHROCYTE [DISTWIDTH] IN BLOOD BY AUTOMATED COUNT: 55.6 FL (ref 35–45)
FERRITIN: 254 NG/ML (ref 22–322)
FOLATE: > 20 NG/ML (ref 4.8–24.2)
GFR SERPL CREATININE-BSD FRML MDRD: 25 ML/MIN/1.73M2
GLUCOSE BLD-MCNC: 124 MG/DL (ref 70–108)
GLUCOSE BLD-MCNC: 127 MG/DL (ref 70–108)
GLUCOSE BLD-MCNC: 152 MG/DL (ref 70–108)
GLUCOSE BLD-MCNC: 159 MG/DL (ref 70–108)
GLUCOSE BLD-MCNC: 208 MG/DL (ref 70–108)
GLUCOSE BLD-MCNC: 272 MG/DL (ref 70–108)
GLUCOSE BLD-MCNC: 58 MG/DL (ref 70–108)
GLUCOSE BLD-MCNC: 78 MG/DL (ref 70–108)
HCT VFR BLD CALC: 26 % (ref 42–52)
HEMOGLOBIN: 8.2 GM/DL (ref 14–18)
MCH RBC QN AUTO: 30.9 PG (ref 26–33)
MCHC RBC AUTO-ENTMCNC: 31.5 GM/DL (ref 32.2–35.5)
MCV RBC AUTO: 98.1 FL (ref 80–94)
PLATELET # BLD: 214 THOU/MM3 (ref 130–400)
PMV BLD AUTO: 9.9 FL (ref 9.4–12.4)
POTASSIUM REFLEX MAGNESIUM: 4 MEQ/L (ref 3.5–5.2)
RBC # BLD: 2.65 MILL/MM3 (ref 4.7–6.1)
RH FACTOR: NORMAL
SODIUM BLD-SCNC: 138 MEQ/L (ref 135–145)
VITAMIN B-12: 709 PG/ML (ref 211–911)
WBC # BLD: 9.9 THOU/MM3 (ref 4.8–10.8)

## 2021-08-25 PROCEDURE — 82607 VITAMIN B-12: CPT

## 2021-08-25 PROCEDURE — 86850 RBC ANTIBODY SCREEN: CPT

## 2021-08-25 PROCEDURE — 2500000003 HC RX 250 WO HCPCS: Performed by: INTERNAL MEDICINE

## 2021-08-25 PROCEDURE — 82746 ASSAY OF FOLIC ACID SERUM: CPT

## 2021-08-25 PROCEDURE — 85027 COMPLETE CBC AUTOMATED: CPT

## 2021-08-25 PROCEDURE — 2140000000 HC CCU INTERMEDIATE R&B

## 2021-08-25 PROCEDURE — 6370000000 HC RX 637 (ALT 250 FOR IP): Performed by: INTERNAL MEDICINE

## 2021-08-25 PROCEDURE — 36415 COLL VENOUS BLD VENIPUNCTURE: CPT

## 2021-08-25 PROCEDURE — 82948 REAGENT STRIP/BLOOD GLUCOSE: CPT

## 2021-08-25 PROCEDURE — 99221 1ST HOSP IP/OBS SF/LOW 40: CPT | Performed by: NURSE PRACTITIONER

## 2021-08-25 PROCEDURE — 86900 BLOOD TYPING SEROLOGIC ABO: CPT

## 2021-08-25 PROCEDURE — 2580000003 HC RX 258: Performed by: INTERNAL MEDICINE

## 2021-08-25 PROCEDURE — 86901 BLOOD TYPING SEROLOGIC RH(D): CPT

## 2021-08-25 PROCEDURE — 82728 ASSAY OF FERRITIN: CPT

## 2021-08-25 PROCEDURE — 80048 BASIC METABOLIC PNL TOTAL CA: CPT

## 2021-08-25 PROCEDURE — 99222 1ST HOSP IP/OBS MODERATE 55: CPT | Performed by: INTERNAL MEDICINE

## 2021-08-25 RX ORDER — BUMETANIDE 1 MG/1
2 TABLET ORAL DAILY
Status: DISCONTINUED | OUTPATIENT
Start: 2021-08-25 | End: 2021-08-27 | Stop reason: HOSPADM

## 2021-08-25 RX ADMIN — HYDRALAZINE HYDROCHLORIDE 25 MG: 25 TABLET, FILM COATED ORAL at 21:14

## 2021-08-25 RX ADMIN — HYDRALAZINE HYDROCHLORIDE 25 MG: 25 TABLET, FILM COATED ORAL at 14:39

## 2021-08-25 RX ADMIN — ISOSORBIDE MONONITRATE 120 MG: 60 TABLET ORAL at 09:51

## 2021-08-25 RX ADMIN — BUMETANIDE 2 MG: 1 TABLET ORAL at 11:53

## 2021-08-25 RX ADMIN — FERROUS SULFATE TAB 325 MG (65 MG ELEMENTAL FE) 325 MG: 325 (65 FE) TAB at 09:51

## 2021-08-25 RX ADMIN — TAMSULOSIN HYDROCHLORIDE 0.4 MG: 0.4 CAPSULE ORAL at 09:50

## 2021-08-25 RX ADMIN — RANOLAZINE 1000 MG: 500 TABLET, FILM COATED, EXTENDED RELEASE ORAL at 21:14

## 2021-08-25 RX ADMIN — ATORVASTATIN CALCIUM 40 MG: 40 TABLET, FILM COATED ORAL at 21:14

## 2021-08-25 RX ADMIN — HYDROXYZINE HYDROCHLORIDE 10 MG: 10 TABLET ORAL at 21:14

## 2021-08-25 RX ADMIN — Medication 1 TABLET: at 21:14

## 2021-08-25 RX ADMIN — CLOPIDOGREL BISULFATE 75 MG: 75 TABLET ORAL at 09:51

## 2021-08-25 RX ADMIN — DILTIAZEM HYDROCHLORIDE 120 MG: 120 CAPSULE, COATED, EXTENDED RELEASE ORAL at 09:51

## 2021-08-25 RX ADMIN — OXYCODONE HYDROCHLORIDE AND ACETAMINOPHEN 500 MG: 500 TABLET ORAL at 09:51

## 2021-08-25 RX ADMIN — METOPROLOL SUCCINATE 50 MG: 50 TABLET, FILM COATED, EXTENDED RELEASE ORAL at 09:51

## 2021-08-25 RX ADMIN — RANOLAZINE 1000 MG: 500 TABLET, FILM COATED, EXTENDED RELEASE ORAL at 09:51

## 2021-08-25 RX ADMIN — BUMETANIDE 1 MG/HR: 0.25 INJECTION INTRAMUSCULAR; INTRAVENOUS at 01:15

## 2021-08-25 RX ADMIN — HYDRALAZINE HYDROCHLORIDE 25 MG: 25 TABLET, FILM COATED ORAL at 05:43

## 2021-08-25 ASSESSMENT — ENCOUNTER SYMPTOMS
EYES NEGATIVE: 1
BACK PAIN: 0
COUGH: 0
SORE THROAT: 0
VOMITING: 0
DIARRHEA: 0
SHORTNESS OF BREATH: 1
ABDOMINAL PAIN: 0
EYE PAIN: 0
NAUSEA: 0
BLOOD IN STOOL: 0

## 2021-08-25 ASSESSMENT — PAIN SCALES - GENERAL: PAINLEVEL_OUTOF10: 0

## 2021-08-25 NOTE — CONSULTS
[facility]          [facility]        [address]        [place], [facility]        [phone]           Inpatient Initial consult note         8/25/2021 3:34 PM    Patient Name:   Jamal Bourne:    [id number]  Primary Care Physician:  Pavel Machuca MD     History Obtained From:  patient, spouse     Consultation requested by : Jacob Parker MD    requested for  : Evaluation of  Worsening renal function     History of presentingillness   Ricardo Couch is a 68 y.o.   male with Past Medical History as stated below presented with a chief complaint of Shortness of Breath and Leg Swelling   on 8/23/2021 . Patient presented with chief complaints of shortness of breath, this has been ongoing for the last 2 months, gradually getting worse symptoms moderate severity associated with bilateral lower extremity edema and a weight gain of almost 20 pounds no associated fever chills cough orthopnea or PND, no chest pain abdominal pain nausea or vomiting. Patient apparently has been taking Lasix for 80 daily without much improvement. Not exactly clear about the compliance. Patient was seen as a follow-up in Dr. Bakari Martin office and subsequently was admitted as a direct admit from the ER.     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    BPH (benign prostatic hypertrophy)     CAD (coronary artery disease) 1997    Carotid artery disease ([address] 1995    right    Carotid disease, bilateral ([address]     Cerebral artery occlusion with cerebral infarction ([address]     CHF (congestive heart failure) (HCC)     Chronic kidney disease     GERD (gastroesophageal reflux disease)     Hyperlipidemia 1996    Hypertension     Lumbar disc disease 2001    MI (myocardial infarction) ([address] 1997    Nephrolithiasis 2002    NIDDM (non-insulin dependent diabetes mellitus) 1996    diagnosed 1996    RICCARDO (obstructive sleep apnea) 2005    cpap    PVC's (premature ventricular contractions)     Renal artery stenosis ([address] 2007    left    Renal insufficiency     S/P CABG (status post coronary artery bypass graft) 1997    5 vessels    Skin cancer     Squamous cell carcinoma     TIA (transient ischemic attack)     Wears partial dentures     upper and lower     Past Surgical History:   Procedure Laterality Date    ABLATION OF DYSRHYTHMIC FOCUS  3/13/13   [address] CARDIAC SURGERY  3/13/13    oblation for irreg rythm    CARDIOVERSION  3/8/2012    CAROTID ENDARTERECTOMY  1997    CHOLECYSTECTOMY  4/1999    COLONOSCOPY  6/22/09, 4/2011,7/12/17    polyp removal    COLONOSCOPY N/A 7/21/2021    COLONOSCOPY POLYPECTOMY SNARE/COLD BIOPSY performed by Nuala Sacks, MD at [address] [place] [address]  2008 (2), 2009 (1)    x2    CORONARY ARTERY BYPASS GRAFT  8/1997    5 vessel    DIAGNOSTIC CARDIAC CATH LAB PROCEDURE      FOOT SURGERY  12/20/2013    pre-cancerous mole rt foot removed    LITHOTRIPSY  4/2002    [place] OFFICE/OUTPT VISIT,PROCEDURE ONLY N/A 11/8/2018    TRANSESOPHAGEAL ECHOCARDIOGRAM performed by Jim Godwin MD at [facility]    PTCA  05/30/2018   [facility] SURGERY  2001, 2003    rt shoulder manipulation--frozen shoulder-01, Lt -03    SKIN BIOPSY      SKIN CANCER EXCISION  12/20/13     R foot    SKIN CANCER EXCISION  1/2016    squamous cell Lt  upper cheek    UPPER GASTROINTESTINAL ENDOSCOPY N/A 7/20/2021    EGD ESOPHAGOGASTRODUODENOSCOPY performed by Tatiana Gonzales MD at [address]  4/2002    VASCULAR SURGERY      carotids & cabg harvests     Social History     Socioeconomic History    Marital status:      Spouse name: Nida Magdaleno Number of children: 2    Years of education: Not on file    Highest education level: Not on file   Occupational History    Not on file   Tobacco Use    Smoking status: Never Smoker    Smokeless tobacco: Never Used   Vaping Use    Vaping Use: Never used   Substance and Sexual Activity    Alcohol use: No     Alcohol/week: 0.0 standard drinks    Drug use: No    Sexual activity: Yes     Partners: Female   Other Topics Concern    Not on file   Social History Narrative    Not on file     Social Determinants of Health     Financial Resource Strain: Low Risk     Difficulty of Paying Living Expenses: Not hard at all   Food Insecurity: No Food Insecurity    Worried About Running Out of Food in the Last Year: Never true    Rosa of Food in the Last Year: Never true   Transportation Needs: No Transportation Needs    Lack of Transportation (Medical): No    Lack of Transportation (Non-Medical):  No   Physical Activity:     Days of Exercise per Week:     Minutes of Exercise per Session:    Stress:     Feeling of Stress :    Social Connections:     Frequency of Communication with Friends and Family:     Frequency of Social Gatherings with Friends and Family:     Attends Yazidism Services:     Active Member of Clubs or Organizations:     Attends Club or Organization Meetings:     Marital Status:    Intimate Partner Violence:     Fear of Current or Ex-Partner:     Emotionally Abused:     Physically Abused:     Sexually Abused:      Family History   Problem Relation Age of Onset    Cancer Mother     High Blood Pressure Mother     Stroke Father     High Blood Pressure Father     Heart Disease Brother     Cancer Brother         lung    Cancer Brother         melanoma     Medications & Allergies      Prior to Admission medications    Medication Sig Start Date End Date Taking? Authorizing Provider   apixaban (ELIQUIS) 5 MG TABS tablet Take 5 mg by mouth 2 times daily   Yes Historical Provider, MD   insulin detemir (LEVEMIR) 100 UNIT/ML injection vial Inject 48 Units into the skin 2 times daily   Yes Historical Provider, MD   ferrous sulfate (IRON 325) 325 (65 Fe) MG tablet Take 325 mg by mouth every other day   Yes Historical Provider, MD   ascorbic acid (VITAMIN C) 500 MG tablet Take 500 mg by mouth every other day   Yes Historical Provider, MD   furosemide (LASIX) 40 MG tablet Take 2 tablets by mouth daily 7/28/21  Yes Jyoti Castaneda MD   nitroGLYCERIN (NITROLINGUAL) 0.4 MG/SPRAY 0.4 mg spray USE 1 SPRAY ON OR UNDER THE TONGUE EVERY 5 MINUTES AS NEEDED FOR CHEST PAIN 7/28/21  Yes Jyoti Castaneda MD   dilTIAZem (CARDIZEM CD) 120 MG extended release capsule TAKE 1 CAPSULE BY MOUTH  DAILY 7/28/21  Yes Jyoti Castaneda MD   insulin lispro (HUMALOG) 100 UNIT/ML injection vial Inject into the skin 7 units in am and 7 units at lunch and 10 units at supper plus sliding scale during meals and at bedtime   Yes Historical Provider, MD   isosorbide mononitrate (IMDUR) 120 MG extended release tablet Take 1 tablet by mouth daily 7/22/21  Yes KAVON Costello - CNP   atorvastatin (LIPITOR) 40 MG tablet TAKE 1 TABLET BY MOUTH AT  NIGHT 6/29/21  Yes Jyoti Castaneda MD   ranolazine (RANEXA) 1000 MG extended release tablet Take 1 tablet by mouth 2 times daily New dose 6/25/21  Yes Sherice Woody MD   tamsulosin (FLOMAX) 0.4 MG capsule Take 1 capsule by mouth daily 4/21/21  Yes Nhan Peterson MD   diphenhydrAMINE-APAP, sleep, (TYLENOL PM EXTRA STRENGTH)  MG tablet Take 1 tablet by mouth every evening as needed   Yes Historical Provider, MD   ALPRAZolam (XANAX) 0.25 MG tablet Take 0.25 mg by mouth 3 times daily as needed for Anxiety.    Yes Historical Provider, MD hydrOXYzine (ATARAX) 10 MG tablet Take 10 mg by mouth daily as needed  3/13/21  Yes Historical Provider, MD   fluocinonide (LIDEX) 0.05 % cream Apply 1 applicator topically as needed 12/29/20  Yes Historical Provider, MD   BiPAP Machine MISC by Does not apply route   Yes Historical Provider, MD   metoprolol succinate (TOPROL XL) 50 MG extended release tablet TAKE 1 TABLET BY MOUTH  DAILY 3/1/21  Yes Jyoti Castaneda MD   Multiple Vitamins-Minerals (THERAPEUTIC MULTIVITAMIN-MINERALS) tablet Take 1 tablet by mouth nightly   Yes Historical Provider, MD   hydrALAZINE (APRESOLINE) 25 MG tablet Take 1 tablet by mouth every 8 hours 11/9/18  Yes Casey Payton MD   clopidogrel (PLAVIX) 75 MG tablet Take 75 mg by mouth daily morning   Yes Historical Provider, MD   blood glucose test strips (ACCU-CHEK GUIDE) strip USE TO CHECK 4 TIMES DAILY 7/6/21   Judge Wallace MD   Accu-Chek FastClix Lancets MISC USE TO TEST BLOOD SUGARS 4 TIMES DAILY 7/6/21   Judge Wallace MD   Insulin Pen Needle (NOVOFINE) 32G X 6 MM MISC USE 1 NEW NEEDLE 6 TIMES  DAILY AND AS NEEDED 7/17/20   Judge Wallace MD   Lancet Devices (ACCU-CHEK SOFTCLIX LANCET DEV) MISC Use one with each blood check 1/14/14 4/2/18  Judge Wallace MD     Allergies: Patient has no known allergies.   IP meds : Scheduled Meds:   bumetanide  2 mg Oral Daily    furosemide  40 mg IntraVENous Once    clopidogrel  75 mg Oral Daily    hydrALAZINE  25 mg Oral Q8H    therapeutic multivitamin-minerals  1 tablet Oral Nightly    metoprolol succinate  50 mg Oral Daily    hydrOXYzine  10 mg Oral Daily    tamsulosin  0.4 mg Oral Daily    ranolazine  1,000 mg Oral BID    atorvastatin  40 mg Oral Nightly    isosorbide mononitrate  120 mg Oral Daily    insulin lispro  7 Units Subcutaneous BID     dilTIAZem  120 mg Oral Daily    ferrous sulfate  325 mg Oral Every Other Day    ascorbic acid  500 mg Oral Every Other Day    insulin detemir  50 Units Subcutaneous BID    insulin lispro  0-12 Units Subcutaneous TID WC    insulin lispro  0-6 Units Subcutaneous Nightly     Continuous Infusions:  Review of Systems Physical Exam   Review of Systems   Constitutional: Positive for fatigue. Negative for activity change, chills and fever. HENT: Negative. Negative for ear pain and sore throat. Eyes: Negative. Negative for pain. Respiratory: Positive for shortness of breath. Negative for cough. Cardiovascular: Positive for leg swelling. Negative for chest pain. Gastrointestinal: Negative for abdominal pain, blood in stool, diarrhea, nausea and vomiting. Genitourinary: Negative for dysuria, frequency and hematuria. Musculoskeletal: Negative for back pain and neck pain. Skin: Negative for rash and wound. Neurological: Negative for dizziness, light-headedness and headaches. Psychiatric/Behavioral: Negative for agitation and confusion. Physical Exam  Vitals reviewed. Constitutional:       General: He is not in acute distress. Appearance: Normal appearance. He is not diaphoretic. HENT:      Head: Normocephalic and atraumatic. Right Ear: External ear normal.      Left Ear: External ear normal.      Nose: Nose normal.      Mouth/Throat:      Mouth: Mucous membranes are moist.   Eyes:      General: No scleral icterus. Right eye: No discharge. Left eye: No discharge. Conjunctiva/sclera: Conjunctivae normal.   Neck:      Thyroid: No thyromegaly. Vascular: No JVD. Cardiovascular:      Rate and Rhythm: Normal rate and regular rhythm. Heart sounds: Normal heart sounds. No murmur heard. Pulmonary:      Effort: Pulmonary effort is normal. No respiratory distress. Breath sounds: Normal breath sounds. No stridor. No wheezing or rales. Chest:      Chest wall: No tenderness. Abdominal:      General: Bowel sounds are normal. There is no distension. Palpations: Abdomen is soft. Tenderness:  There is in extreme detail      **This report has been created using voice recognition software. It maycontain minor  errors which are inherent in voice recognition technology. **    Charline Hameed MD,M.D  [facility].

## 2021-08-25 NOTE — PLAN OF CARE
Problem: Falls - Risk of:  Goal: Will remain free from falls  Description: Will remain free from falls  Note: Patient safety maintained. Call light within reach. Bed in lowest position. Will continue to monitor.

## 2021-08-25 NOTE — PROCEDURES
[address] [place] [address]                                 EVENT MONITOR    PATIENT NAME: Neetu Duque                     :        1945  MED REC NO:   [id number]                           ROOM:  ACCOUNT NO:   [de-identified]                           ADMIT DATE: 2021  PROVIDER:     Arron Brothers M.D.    TEST TYPE:  Event monitor. CLINICAL HISTORY AND INDICATION:  This is a patient with AFib. EVENT MONITOR DESCRIPTION:  Event monitor was attached to the patient  between 2021 and 08/15/2021    EVENT MONITOR FINDINGS:  Baseline rhythm showed sinus rhythm with rare  PACs. Atrial fibrillation was noted intermittently with controlled  heart rate. No V-tach and no pauses. CONCLUSION:  Paroxysmal atrial fibrillation alternating with sinus  rhythm, heart rate controlled. Clinical correlation is recommended.         Ajit Nixon M.D.    D: 2021 6:52:22       T: 2021 9:57:10     LAMIN/MI_ALAN_TICO  Job#: [id number]     Doc#: [id number]    CC:

## 2021-08-25 NOTE — CONSULTS
[facility]    Patient - Estrella Simmons   MRN -  [id number]   Acct # - [de-identified]   - 1945      Date of Admission -  2021  4:58 PM  Date of evaluation -  2021  Room - 3B--A   Hospital Day - 2  Emani Huffman MD Primary Care Physician - Diamante Walker MD     Inpatient consult to Oncology  Consult performed by: Jacquelynn Favre, APRN - CNP  Consult ordered by: Alex Shipley MD           Reason for Consult    Chronic anemia  Active Hospital Problem List      C[facility] [address] Problems    Diagnosis Date Noted    Elevated troponin [R77.8] 2021     HPI   Estrella Simmons is a 68 y.o. male admitted 2021 for evaluation of shortness of breath and BLE edema. Pt was to be direct admitted by Dr. Carlyn Xiong but there were no beds available so pt was directed to ED to be admitted. Pt reports SOB x 2 mos with progressive worsening. Pt has recent history of GI bleed with hospitalization on 2021. History of anemia since at least  with acute drop in Hgb to 6.4 on 2021 with acute GI bleed. It looks like patient's baseline Hgb is 10-11s. PMH includes RICCARDO with use of CPAP, CKD stage 3, hyperlipidemia, s/p CABG, renal artery stenosis, CAD, DM2, asthma, HTN, adenomatous colon polyp, Hx Afib, bilateral carotid artery stenosis, melena, anemia of chronic renal failure. Pt is under the care of Dr. Sully Chen for his chronic kidney disease, of whom has given the patient IV iron in the past with most recent infusion on 2021 & 2021 with injectafer. Oncology consulted for chronic anemia. 2021:  Pt resting in bed, family at bedside. Pt reports worsening SOB at home over the last 2 months. Pt reports BLE edema, has been on a Bumex drip with large amounts of urine output. Pt reports recent GI bleed in July. Pt reports mild epistaxis at times, no gushing.   Pt includes meat in his diet, no endemic travel, no recent infections, no dark urine, no jaundice. Gallbladder removed 1999. No history of gastric bypass. Pt reports chronic arthritis in his back, wife states recent \"flare\" of worsening. Pt denies H/A, dizziness, cough, CP, heart palpitations, abd pain, N/V/C/D, peripheral neuropathy, other s/s bleeding. Hx Afib with irregular rate/rhythm noted.     Oncology History    None  Meds    Current Medications    bumetanide  2 mg Oral Daily    furosemide  40 mg IntraVENous Once    clopidogrel  75 mg Oral Daily    hydrALAZINE  25 mg Oral Q8H    therapeutic multivitamin-minerals  1 tablet Oral Nightly    metoprolol succinate  50 mg Oral Daily    hydrOXYzine  10 mg Oral Daily    tamsulosin  0.4 mg Oral Daily    ranolazine  1,000 mg Oral BID    atorvastatin  40 mg Oral Nightly    isosorbide mononitrate  120 mg Oral Daily    insulin lispro  7 Units Subcutaneous BID WC    dilTIAZem  120 mg Oral Daily    ferrous sulfate  325 mg Oral Every Other Day    ascorbic acid  500 mg Oral Every Other Day    insulin detemir  50 Units Subcutaneous BID    insulin lispro  0-12 Units Subcutaneous TID WC    insulin lispro  0-6 Units Subcutaneous Nightly     ALPRAZolam, nitroGLYCERIN, acetaminophen **AND** diphenhydrAMINE, potassium chloride **OR** potassium alternative oral replacement **OR** potassium chloride, potassium chloride  IV Drips/Infusions    Past Medical History         Diagnosis Date    Adenomatous colon polyp 2009/2010, 6/'14,7/'17, 9/20, 7/21    Angina at rest [facility])     Atrial fibrillation ([address] 12/11 and 11/12    cardioversion 12/11    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    BPH (benign prostatic hypertrophy)     CAD (coronary artery disease) 1997    Carotid artery disease ([address] 1995    right    Carotid disease, bilateral ([address]     Cerebral artery occlusion with cerebral infarction ([address]     CHF (congestive heart failure) (HCC)     Chronic kidney disease     GERD (gastroesophageal reflux disease)     Hyperlipidemia 1996    Hypertension     Lumbar disc disease 2001    MI ([facility] infarction) [facility] 1997    Nephrolithiasis 2002    NIDDM (non-insulin dependent diabetes mellitus) 1996    diagnosed 1996    RICCARDO (obstructive sleep apnea) 2005    cpap    PVC's (premature ventricular contractions)     Renal artery stenosis ([facility] 2007    left    Renal insufficiency     S/P CABG (status post coronary artery bypass graft) 1997    5 vessels    Skin cancer     Squamous cell carcinoma     TIA (transient ischemic attack)     Wears partial dentures     upper and lower      Past Surgical History           Procedure Laterality Date    ABLATION OF DYSRHYTHMIC FOCUS  3/13/13   [address] CARDIAC SURGERY  3/13/13    oblation for irreg rythm    CARDIOVERSION  3/8/2012    CAROTID ENDARTERECTOMY  1997    CHOLECYSTECTOMY  4/1999    COLONOSCOPY  6/22/09, 4/2011,7/12/17    polyp removal    COLONOSCOPY N/A 7/21/2021    COLONOSCOPY POLYPECTOMY SNARE/COLD BIOPSY performed by Rosina Farfan MD at [address] [place] [address]  2008 (2), 2009 (1)    x2    CORONARY ARTERY BYPASS GRAFT  8/1997    5 vessel    DIAGNOSTIC CARDIAC CATH LAB PROCEDURE      FOOT SURGERY  12/20/2013    pre-cancerous mole rt foot removed    LITHOTRIPSY  4/2002    NV OFFICE/OUTPT VISIT,PROCEDURE ONLY N/A 11/8/2018    TRANSESOPHAGEAL ECHOCARDIOGRAM performed by Rebeca Castillo MD at [address] [place] [address] [facility]  05/30/2018   [place]  2001, 2003    rt shoulder manipulation--frozen shoulder-01, Lt -03    SKIN BIOPSY      SKIN CANCER EXCISION  12/20/13     R foot    SKIN CANCER EXCISION  1/2016    squamous cell Lt  upper cheek    UPPER GASTROINTESTINAL ENDOSCOPY N/A 7/20/2021    EGD ESOPHAGOGASTRODUODENOSCOPY performed by Rosina Farfan MD at [address] [place] [address]  4/2002    VASCULAR SURGERY      carotids & cabg harvests     Diet    ADULT DIET; Regular  Allergies    Patient has no known allergies. Social History     Social History     Socioeconomic History    Marital status:      Spouse name: Marquis Rivas Number of children: 2    Years of education: Not on file    Highest education level: Not on file   Occupational History    Not on file   Tobacco Use    Smoking status: Never Smoker    Smokeless tobacco: Never Used   Vaping Use    Vaping Use: Never used   Substance and Sexual Activity    Alcohol use: No     Alcohol/week: 0.0 standard drinks    Drug use: No    Sexual activity: Yes     Partners: Female   Other Topics Concern    Not on file   Social History Narrative    Not on file     Social Determinants of Health     Financial Resource Strain: Low Risk     Difficulty of Paying Living Expenses: Not hard at all   Food Insecurity: No Food Insecurity    Worried About Running Out of Food in the Last Year: Never true    Rosa of Food in the Last Year: Never true   Transportation Needs: No Transportation Needs    Lack of Transportation (Medical): No    Lack of Transportation (Non-Medical):  No   Physical Activity:     Days of Exercise per Week:     Minutes of Exercise per Session:    Stress:     Feeling of Stress :    Social Connections:     Frequency of Communication with Friends and Family:     Frequency of Social Gatherings with Friends and Family:     Attends Voodoo Services:     Active Member of Clubs or Organizations:     Attends Club or Organization Meetings:     Marital Status:    Intimate Partner Violence:     Fear of Current or Ex-Partner:     Emotionally Abused:     Physically Abused:     Sexually Abused:      Family History          Problem Relation Age of Onset    Cancer Mother     High Blood Pressure Mother     Stroke Father     High Blood Pressure Father     Heart Disease Brother     Cancer Brother         lung    Cancer Brother         melanoma     ROS     Review of Systems   Pertinent review of systems noted in HPI, all other ROS negative. Vitals     height is 5' 9\" (1.753 m) and weight is 221 lb 3.2 oz (100.3 kg). His oral temperature is 98.6 °F (37 °C). His blood pressure is 142/48 (abnormal) and his pulse is 64. His respiration is 18 and oxygen saturation is 99%. Exam   Physical Exam   General appearance: No apparent distress, calm and cooperative. HEENT: Pupils equal, round, and reactive to light. Conjunctivae/corneas clear. Oral mucosa dry. Neck: Supple, with full range of motion. Trachea midline. Respiratory:  Normal respiratory effort. Clear to auscultation all lung fields. Cardiovascular:  Irregular rate/rhythm, S1/S2. Abdomen: Soft, non-tender, non-distended with active BS x 4. Musculoskeletal: No clubbing, cyanosis bilaterally. BLE edema noted. Pt resting in bed. Skin: Skin color, texture, turgor normal.  No visible rashes or lesions. Neurologic:  Neurovascularly intact without any focal sensory/motor deficits. Cranial nerves: II-XII intact, grossly non-focal.  Psychiatric: Alert and oriented x 3, thought content appropriate, normal insight  Capillary Refill: < 3 seconds   Peripheral Pulses: +2 palpable, equal bilaterally        Labs   CBC  Recent Labs     08/23/21 1747 08/24/21  0428 08/25/21  0340   WBC 7.4 6.5 9.9   RBC 2.55* 2.45* 2.65*   HGB 7.9* 7.8* 8.2*   HCT 25.9* 25.0* 26.0*   .6* 102.0* 98.1*   MCH 31.0 31.8 30.9   MCHC 30.5* 31.2* 31.5*    173 214   MPV 10.4 10.4 9.9      BMP  Recent Labs     08/23/21  1747 08/24/21  0428 08/25/21  0340    136 138   K 5.1 4.4 4.0    101 97*   CO2 25 24 27   BUN 50* 45* 53*   CREATININE 1.9* 1.9* 2.5*   GLUCOSE 111* 85 124*   CALCIUM 9.3 9.2 9.3     LFT  No results for input(s): AST, ALT, ALB, BILITOT, ALKPHOS, LIPASE in the last 72 hours. Invalid input(s): AMYLASE  INR  No results for input(s): INR, PROTIME in the last 72 hours. PTT  No results for input(s): APTT in the last 72 hours.     Radiology XR CHEST (2 VW)    Result Date: 8/23/2021  PROCEDURE: XR CHEST (2 VW) CLINICAL INFORMATION: Dyspnea on exertion TECHNIQUE: PA and lateral chest radiographs. COMPARISON: PA and lateral chest radiographs 7/19/2021 FINDINGS: Median sternotomy wires are again noted. There is mild stable enlargement of the cardiac silhouette. No lung consolidations are identified. Degenerative changes in the thoracic spine are poorly visualized. 1. No acute intrathoracic process. 2. Mild stable cardiomegaly. **This report has been created using voice recognition software. It may contain minor errors which are inherent in voice recognition technology. ** Final report electronically signed by Dr. Lilli Sommers on 8/23/2021 5:50 PM      Assessment/Recommendations    1. Macrocytic anemia - Hx anemia since at least 2011 with acute drop in Hgb with GI bleed in July 2021. Baseline Hgb 10-11s. Pt received IV injectafer by Dr. Aurea Asencio on 5/28/2021 & 6/4/2021. Will recheck fasting iron panel, Vit B12/folate pending. Obtain CRP, Sed rate, LDH, haptoglobin, LFTs, TSH/T4, EPO, retic ct. Likely ACD, related to CKD stage 3/Anemia of chronic renal failure. eGFR 25, creat 2.5. Pt on eliquis, plavix at home as well as oral iron every other day. \"Thank you for asking us to see this interesting patient\"    Case discussed with nurse and patient/family. Questions and concerns addressed. Plan made in collaboration with Dr. Mika Silveira.     Electronically signed by   KAVON Campos CNP on 8/25/2021 at 11:14 AM

## 2021-08-25 NOTE — PROGRESS NOTES
Admit Date: 8/23/2021  Hospital day 2    Subjective:     Patient has sob . Medication side effects: none    Scheduled Meds:   bumetanide  2 mg Oral Daily    furosemide  40 mg IntraVENous Once    clopidogrel  75 mg Oral Daily    hydrALAZINE  25 mg Oral Q8H    therapeutic multivitamin-minerals  1 tablet Oral Nightly    metoprolol succinate  50 mg Oral Daily    hydrOXYzine  10 mg Oral Daily    tamsulosin  0.4 mg Oral Daily    ranolazine  1,000 mg Oral BID    atorvastatin  40 mg Oral Nightly    isosorbide mononitrate  120 mg Oral Daily    insulin lispro  7 Units Subcutaneous BID WC    dilTIAZem  120 mg Oral Daily    ferrous sulfate  325 mg Oral Every Other Day    ascorbic acid  500 mg Oral Every Other Day    insulin detemir  50 Units Subcutaneous BID    insulin lispro  0-12 Units Subcutaneous TID WC    insulin lispro  0-6 Units Subcutaneous Nightly     Continuous Infusions:  PRN Meds:ALPRAZolam, nitroGLYCERIN, acetaminophen **AND** diphenhydrAMINE, potassium chloride **OR** potassium alternative oral replacement **OR** potassium chloride, potassium chloride    Review of Systems  Pertinent items are noted in HPI. Objective:     Patient Vitals for the past 8 hrs:   BP Temp Temp src Pulse Resp SpO2   08/25/21 1459 127/60 97.8 °F (36.6 °C) Oral 61 17 96 %   08/25/21 1436 (!) 128/48 -- -- -- -- --   08/25/21 1159 (!) 151/67 97.6 °F (36.4 °C) Oral 64 17 99 %     I/O last 3 completed shifts: In: 1164.8 [P.O.:1010; I.V.:154.8]  Out: 5625 [Urine:5625]    No change     ECG: normal sinus rhythm, no blocks or conduction defects, no ischemic changes.    Data Review:   CBC:  Lab Results   Component Value Date    WBC 9.9 08/25/2021    RBC 2.65 08/25/2021    RBC 3.80 03/24/2012    HGB 8.2 08/25/2021    HCT 26.0 08/25/2021    MCV 98.1 08/25/2021    MCH 30.9 08/25/2021    MCHC 31.5 08/25/2021    RDW 13.7 06/07/2018     08/25/2021    MPV 9.9 08/25/2021     BMP  Lab Results   Component Value Date     08/25/2021    K 4.0 08/25/2021    CL 97 08/25/2021    CO2 27 08/25/2021    BUN 53 08/25/2021    CREATININE 2.5 08/25/2021    CALCIUM 9.3 08/25/2021      COAG PROFILE:  Lab Results   Component Value Date    APTT 58.0 04/08/2021    INR 1.73 07/19/2021       Assessment:     Active Problems:    Elevated troponin  Resolved Problems:    * No resolved hospital problems.  *      Plan:      still sob    acute renal failure    nephrology consult    anaemai    haematology consult    extensive cad patient dose not want heart cath    intermittent atrial fib    consider Watchman device    anxiety    medication adjusted    poor prognosis    ambulate

## 2021-08-26 ENCOUNTER — TELEPHONE (OUTPATIENT)
Dept: INTERNAL MEDICINE CLINIC | Age: 76
End: 2021-08-26

## 2021-08-26 LAB
ABSOLUTE RETIC #: 59 THOU/MM3 (ref 20–115)
ALBUMIN SERPL-MCNC: 3.9 G/DL (ref 3.5–5.1)
ALBUMIN SERPL-MCNC: 4 G/DL (ref 3.5–5.1)
ALP BLD-CCNC: 85 U/L (ref 38–126)
ALP BLD-CCNC: 86 U/L (ref 38–126)
ALT SERPL-CCNC: 12 U/L (ref 11–66)
ALT SERPL-CCNC: 12 U/L (ref 11–66)
ANION GAP SERPL CALCULATED.3IONS-SCNC: 15 MEQ/L (ref 8–16)
AST SERPL-CCNC: 13 U/L (ref 5–40)
AST SERPL-CCNC: 16 U/L (ref 5–40)
BASOPHILS # BLD: 0.4 %
BASOPHILS ABSOLUTE: 0 THOU/MM3 (ref 0–0.1)
BILIRUB SERPL-MCNC: 0.5 MG/DL (ref 0.3–1.2)
BILIRUB SERPL-MCNC: 0.5 MG/DL (ref 0.3–1.2)
BILIRUBIN DIRECT: < 0.2 MG/DL (ref 0–0.3)
BUN BLDV-MCNC: 57 MG/DL (ref 7–22)
C-REACTIVE PROTEIN: 0.56 MG/DL (ref 0–1)
CALCIUM SERPL-MCNC: 9.2 MG/DL (ref 8.5–10.5)
CHLORIDE BLD-SCNC: 95 MEQ/L (ref 98–111)
CO2: 27 MEQ/L (ref 23–33)
CREAT SERPL-MCNC: 2.4 MG/DL (ref 0.4–1.2)
EOSINOPHIL # BLD: 4.3 %
EOSINOPHILS ABSOLUTE: 0.4 THOU/MM3 (ref 0–0.4)
ERYTHROCYTE [DISTWIDTH] IN BLOOD BY AUTOMATED COUNT: 15.7 % (ref 11.5–14.5)
ERYTHROCYTE [DISTWIDTH] IN BLOOD BY AUTOMATED COUNT: 55.8 FL (ref 35–45)
GFR SERPL CREATININE-BSD FRML MDRD: 26 ML/MIN/1.73M2
GLUCOSE BLD-MCNC: 156 MG/DL (ref 70–108)
GLUCOSE BLD-MCNC: 221 MG/DL (ref 70–108)
GLUCOSE BLD-MCNC: 244 MG/DL (ref 70–108)
GLUCOSE BLD-MCNC: 73 MG/DL (ref 70–108)
GLUCOSE BLD-MCNC: 99 MG/DL (ref 70–108)
HCT VFR BLD CALC: 27.6 % (ref 42–52)
HEMOGLOBIN: 8.8 GM/DL (ref 14–18)
IMMATURE GRANS (ABS): 0.03 THOU/MM3 (ref 0–0.07)
IMMATURE GRANULOCYTES: 0.3 %
IMMATURE RETIC FRACT: 25.7 % (ref 2.3–13.4)
IRON SATURATION: 19 % (ref 20–50)
IRON: 43 UG/DL (ref 65–195)
LD: 156 U/L (ref 100–190)
LYMPHOCYTES # BLD: 9.6 %
LYMPHOCYTES ABSOLUTE: 1 THOU/MM3 (ref 1–4.8)
MCH RBC QN AUTO: 31.3 PG (ref 26–33)
MCHC RBC AUTO-ENTMCNC: 31.9 GM/DL (ref 32.2–35.5)
MCV RBC AUTO: 98.2 FL (ref 80–94)
MONOCYTES # BLD: 10.9 %
MONOCYTES ABSOLUTE: 1.1 THOU/MM3 (ref 0.4–1.3)
NUCLEATED RED BLOOD CELLS: 0 /100 WBC
PLATELET # BLD: 264 THOU/MM3 (ref 130–400)
PMV BLD AUTO: 10 FL (ref 9.4–12.4)
POTASSIUM SERPL-SCNC: 3.8 MEQ/L (ref 3.5–5.2)
RBC # BLD: 2.81 MILL/MM3 (ref 4.7–6.1)
RETIC HEMOGLOBIN: 35.1 PG (ref 28.2–35.7)
RETICULOCYTE ABSOLUTE COUNT: 2.3 % (ref 0.5–2)
SEDIMENTATION RATE, ERYTHROCYTE: 52 MM/HR (ref 0–10)
SEG NEUTROPHILS: 74.5 %
SEGMENTED NEUTROPHILS ABSOLUTE COUNT: 7.5 THOU/MM3 (ref 1.8–7.7)
SODIUM BLD-SCNC: 137 MEQ/L (ref 135–145)
TOTAL IRON BINDING CAPACITY: 223 UG/DL (ref 171–450)
TOTAL PROTEIN: 6.4 G/DL (ref 6.1–8)
TOTAL PROTEIN: 6.5 G/DL (ref 6.1–8)
TSH SERPL DL<=0.05 MIU/L-ACNC: 2.46 UIU/ML (ref 0.4–4.2)
WBC # BLD: 10.1 THOU/MM3 (ref 4.8–10.8)

## 2021-08-26 PROCEDURE — 85046 RETICYTE/HGB CONCENTRATE: CPT

## 2021-08-26 PROCEDURE — 36415 COLL VENOUS BLD VENIPUNCTURE: CPT

## 2021-08-26 PROCEDURE — 2580000003 HC RX 258: Performed by: NURSE PRACTITIONER

## 2021-08-26 PROCEDURE — 80053 COMPREHEN METABOLIC PANEL: CPT

## 2021-08-26 PROCEDURE — 6360000002 HC RX W HCPCS: Performed by: NURSE PRACTITIONER

## 2021-08-26 PROCEDURE — 99233 SBSQ HOSP IP/OBS HIGH 50: CPT | Performed by: NURSE PRACTITIONER

## 2021-08-26 PROCEDURE — 84443 ASSAY THYROID STIM HORMONE: CPT

## 2021-08-26 PROCEDURE — 83010 ASSAY OF HAPTOGLOBIN QUANT: CPT

## 2021-08-26 PROCEDURE — 82948 REAGENT STRIP/BLOOD GLUCOSE: CPT

## 2021-08-26 PROCEDURE — 86140 C-REACTIVE PROTEIN: CPT

## 2021-08-26 PROCEDURE — 82668 ASSAY OF ERYTHROPOIETIN: CPT

## 2021-08-26 PROCEDURE — 83540 ASSAY OF IRON: CPT

## 2021-08-26 PROCEDURE — 85651 RBC SED RATE NONAUTOMATED: CPT

## 2021-08-26 PROCEDURE — 2140000000 HC CCU INTERMEDIATE R&B

## 2021-08-26 PROCEDURE — 85025 COMPLETE CBC W/AUTO DIFF WBC: CPT

## 2021-08-26 PROCEDURE — 99232 SBSQ HOSP IP/OBS MODERATE 35: CPT | Performed by: INTERNAL MEDICINE

## 2021-08-26 PROCEDURE — 83615 LACTATE (LD) (LDH) ENZYME: CPT

## 2021-08-26 PROCEDURE — 83550 IRON BINDING TEST: CPT

## 2021-08-26 PROCEDURE — 6370000000 HC RX 637 (ALT 250 FOR IP): Performed by: INTERNAL MEDICINE

## 2021-08-26 RX ADMIN — RANOLAZINE 1000 MG: 500 TABLET, FILM COATED, EXTENDED RELEASE ORAL at 20:37

## 2021-08-26 RX ADMIN — CLOPIDOGREL BISULFATE 75 MG: 75 TABLET ORAL at 09:15

## 2021-08-26 RX ADMIN — HYDROXYZINE HYDROCHLORIDE 10 MG: 10 TABLET ORAL at 09:15

## 2021-08-26 RX ADMIN — IRON SUCROSE 200 MG: 20 INJECTION, SOLUTION INTRAVENOUS at 14:14

## 2021-08-26 RX ADMIN — ISOSORBIDE MONONITRATE 120 MG: 60 TABLET ORAL at 09:15

## 2021-08-26 RX ADMIN — ATORVASTATIN CALCIUM 40 MG: 40 TABLET, FILM COATED ORAL at 20:37

## 2021-08-26 RX ADMIN — HYDRALAZINE HYDROCHLORIDE 25 MG: 25 TABLET, FILM COATED ORAL at 05:44

## 2021-08-26 RX ADMIN — DILTIAZEM HYDROCHLORIDE 120 MG: 120 CAPSULE, COATED, EXTENDED RELEASE ORAL at 09:15

## 2021-08-26 RX ADMIN — HYDRALAZINE HYDROCHLORIDE 25 MG: 25 TABLET, FILM COATED ORAL at 14:15

## 2021-08-26 RX ADMIN — HYDRALAZINE HYDROCHLORIDE 25 MG: 25 TABLET, FILM COATED ORAL at 20:37

## 2021-08-26 RX ADMIN — RANOLAZINE 1000 MG: 500 TABLET, FILM COATED, EXTENDED RELEASE ORAL at 09:15

## 2021-08-26 RX ADMIN — INSULIN LISPRO 2 UNITS: 100 INJECTION, SOLUTION INTRAVENOUS; SUBCUTANEOUS at 21:13

## 2021-08-26 RX ADMIN — METOPROLOL SUCCINATE 50 MG: 50 TABLET, FILM COATED, EXTENDED RELEASE ORAL at 09:19

## 2021-08-26 RX ADMIN — TAMSULOSIN HYDROCHLORIDE 0.4 MG: 0.4 CAPSULE ORAL at 09:19

## 2021-08-26 RX ADMIN — Medication 1 TABLET: at 20:37

## 2021-08-26 ASSESSMENT — PAIN SCALES - GENERAL
PAINLEVEL_OUTOF10: 0

## 2021-08-26 NOTE — TELEPHONE ENCOUNTER
I notified pts wife event recorder shows interimittent a fib. She states pt may get to come today. They are discussing watchman. Have taken 6 quarts of fluid off.

## 2021-08-26 NOTE — TELEPHONE ENCOUNTER
----- Message from Jacob Farr MD sent at 8/25/2021  3:32 PM EDT -----  Let him know just intermittent afib

## 2021-08-26 NOTE — CARE COORDINATION
Discharge Planning Update:     Spoke with pt, he is planning discharge today. Per Nephrology planning labs in am due to creatinine still being elevated at 2.4. Awaiting Cardiology to see today. Pt and wife state that they discussed HF clinic with Dr. Yusef Orr as well and he does not feel that is pt's issue.

## 2021-08-26 NOTE — PROGRESS NOTES
[facility]         Renal Inpatient Follow-Up note         8/26/2021 9:51 AM    Pt Name:   Enrico Moreno:   [birth year]  Attending:   Sabrina Vines MD    Chief Complaint : Antoinette Sibley is a 68 y.o. male being followed by nephrology for MADI/CKD    Interval History :   Patient seen and examined by me. No distress  Feels much better no chest pain no shortness of breath  Weight apparently is down to 209 pounds     Scheduled Medications :    [Held by provider] bumetanide  2 mg Oral Daily    furosemide  40 mg IntraVENous Once    clopidogrel  75 mg Oral Daily    hydrALAZINE  25 mg Oral Q8H    therapeutic multivitamin-minerals  1 tablet Oral Nightly    metoprolol succinate  50 mg Oral Daily    hydrOXYzine  10 mg Oral Daily    tamsulosin  0.4 mg Oral Daily    ranolazine  1,000 mg Oral BID    atorvastatin  40 mg Oral Nightly    isosorbide mononitrate  120 mg Oral Daily    insulin lispro  7 Units Subcutaneous BID WC    dilTIAZem  120 mg Oral Daily    ferrous sulfate  325 mg Oral Every Other Day    ascorbic acid  500 mg Oral Every Other Day    insulin detemir  50 Units Subcutaneous BID    insulin lispro  0-12 Units Subcutaneous TID WC    insulin lispro  0-6 Units Subcutaneous Nightly         Vitals :  BP (!) 136/58   Pulse 65   Temp 98 °F (36.7 °C) (Oral)   Resp 18   Ht 5' 9\" (1.753 m)   Wt 209 lb 9.6 oz (95.1 kg)   SpO2 97%   BMI 30.95 kg/m²     24HR INTAKE/OUTPUT:      Intake/Output Summary (Last 24 hours) at 8/26/2021 0951  Last data filed at 8/26/2021 0098  Gross per 24 hour   Intake 600 ml   Output 22831 ml   Net -86148 ml     Last 3 weights  Wt Readings from Last 3 Encounters:   08/26/21 209 lb 9.6 oz (95.1 kg)   07/28/21 219 lb 6.4 oz (99.5 kg)   07/21/21 214 lb 9.6 oz (97.3 kg)           Physical Exam :  General Appearance:  Well developed.  No distress  Mouth/Throat:  Oral mucosa moist  Neck:  Supple, no JVD  Lungs:  Breath sounds: clear  Heart[de-identified]  S1,S2 heard  Abdomen:  Soft, non - tender  Musculoskeletal:  Edema -minimal/trace         Last 3 CBC   Recent Labs     08/23/21  1747 08/24/21  0428 08/25/21  0340   WBC 7.4 6.5 9.9   RBC 2.55* 2.45* 2.65*   HGB 7.9* 7.8* 8.2*   HCT 25.9* 25.0* 26.0*    173 214     Last 3 CMP  Recent Labs     08/24/21  0428 08/25/21  0340 08/26/21  0325    138 137   K 4.4 4.0 3.8    97* 95*   CO2 24 27 27   BUN 45* 53* 57*   CREATININE 1.9* 2.5* 2.4*   CALCIUM 9.2 9.3 9.2   LABALBU  --   --  3.9  4.0   BILITOT  --   --  0.5  0.5             ASSESSMENT / Plan   1. Renal -acute kidney injury on chronic kidney disease stage III/IV, overall renal function has been stable at presentation  ? Gotten worse most likely secondary to cardiorenal and overdiuresis  ? Still patient is having bilateral lower extremity edema  ? Creatinine is 2.4 this morning held the oral Bumex today  ? Follow BMP in the morning can give diuretic if needed     2. Electrolytes -appear to be within normal limits  3. Essential hypertension running well continue current medications  4. Hx of diabetes mellitus  5. Hx of coronary artery disease with multiple stents in the past. Cardiology on board  6. Fluid overload plan as mentioned above  7. Meds reviewed and discussed with patient     Dr. Raymond Mejia MD, M,D.  [facility].

## 2021-08-26 NOTE — PROGRESS NOTES
[facility]    Patient - Candice Tom   MRN -  [id number]   Acct # - [de-identified]   - 1945      Date of Admission -  2021  4:58 PM  Date of evaluation -  2021  Room - 3B--A   Mahesh Conde MD Primary Care Physician - Porfirio Dawkins MD       Reason for Consult    Chronic anemia  Active Hospital Problem List      C/Devora Calderon Vish[facility] [address] Problems    Diagnosis Date Noted    Elevated troponin [R77.8] 2021     HPI/Subjective   Candice Tom is a 68 y.o. male admitted for 2021 for evaluation of shortness of breath and BLE edema. Pt was to be direct admitted by Dr. Cleo Staley but there were no beds available so pt was directed to ED to be admitted. Pt reports SOB x 2 mos with progressive worsening. Pt has recent history of GI bleed with hospitalization on 2021. History of anemia since at least  with acute drop in Hgb to 6.4 on 2021 with acute GI bleed. It looks like patient's baseline Hgb is 10-11s. PMH includes RICCARDO with use of CPAP, CKD stage 3, hyperlipidemia, s/p CABG, renal artery stenosis, CAD, DM2, asthma, HTN, adenomatous colon polyp, Hx Afib, bilateral carotid artery stenosis, melena, anemia of chronic renal failure. Pt is under the care of Dr. Malick Toribio for his chronic kidney disease, of whom has given the patient IV iron in the past with most recent infusion on 2021 & 2021 with injectafer. Oncology consulted for chronic anemia.     2021:  Pt resting in bed, family at bedside. Pt reports worsening SOB at home over the last 2 months. Pt reports BLE edema, has been on a Bumex drip with large amounts of urine output. Pt reports recent GI bleed in July. Pt reports mild epistaxis at times, no gushing. Pt includes meat in his diet, no endemic travel, no recent infections, no dark urine, no jaundice. Gallbladder removed . No history of gastric bypass.   Pt reports chronic arthritis in his back, wife states recent \"flare\" of worsening. Pt denies H/A, dizziness, cough, CP, heart palpitations, abd pain, N/V/C/D, peripheral neuropathy, other s/s bleeding. Hx Afib with irregular rate/rhythm noted. 8/26/2021:  Pt resting in bed, family not present. Pt reports SOB mild, improved. Pt reports BLE edema improved. Pt continues to endorse mild epistaxis at times, no gushing. Pt has no other complaints or other s/s bleeding.          Oncology History   None  Meds    Current Medications    [Held by provider] bumetanide  2 mg Oral Daily    furosemide  40 mg IntraVENous Once    clopidogrel  75 mg Oral Daily    hydrALAZINE  25 mg Oral Q8H    therapeutic multivitamin-minerals  1 tablet Oral Nightly    metoprolol succinate  50 mg Oral Daily    hydrOXYzine  10 mg Oral Daily    tamsulosin  0.4 mg Oral Daily    ranolazine  1,000 mg Oral BID    atorvastatin  40 mg Oral Nightly    isosorbide mononitrate  120 mg Oral Daily    insulin lispro  7 Units Subcutaneous BID WC    dilTIAZem  120 mg Oral Daily    ferrous sulfate  325 mg Oral Every Other Day    ascorbic acid  500 mg Oral Every Other Day    insulin detemir  50 Units Subcutaneous BID    insulin lispro  0-12 Units Subcutaneous TID WC    insulin lispro  0-6 Units Subcutaneous Nightly     ALPRAZolam, nitroGLYCERIN, acetaminophen **AND** diphenhydrAMINE, potassium chloride **OR** potassium alternative oral replacement **OR** potassium chloride, potassium chloride  IV Drips/Infusions    Past Medical History         Diagnosis Date    Adenomatous colon polyp 2009/2010, 6/'14,7/'17, 9/20, 7/21    Angina at rest [facility])     Atrial fibrillation ([facility] [address] 12/11 and 11/12    cardioversion 12/11    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)     CHF (congestive heart failure) (HCC)     Chronic kidney disease     GERD (gastroesophageal reflux disease)     Hyperlipidemia 1996    Hypertension     Lumbar disc disease 2001    MI (myocardial infarction) ([facility] [address] 1997    Nephrolithiasis 2002    NIDDM (non-insulin dependent diabetes mellitus) 1996    diagnosed 1996    RICCARDO (obstructive sleep apnea) 2005    cpap    PVC's (premature ventricular contractions)     Renal artery stenosis ([facility] [address] 2007    left    Renal insufficiency     S/P CABG (status post coronary artery bypass graft) 1997    5 vessels    Skin cancer     Squamous cell carcinoma     TIA (transient ischemic attack)     Wears partial dentures     upper and lower      Past Surgical History           Procedure Laterality Date    ABLATION OF DYSRHYTHMIC FOCUS  3/13/13   [address] CARDIAC SURGERY  3/13/13    oblation for irreg rythm    CARDIOVERSION  3/8/2012    CAROTID ENDARTERECTOMY  1997    CHOLECYSTECTOMY  4/1999    COLONOSCOPY  6/22/09, 4/2011,7/12/17    polyp removal    COLONOSCOPY N/A 7/21/2021    COLONOSCOPY POLYPECTOMY SNARE/COLD BIOPSY performed by Humberto Perry MD at [facility]  2008 (2), 2009 (1)    x2    CORONARY ARTERY BYPASS GRAFT  8/1997    5 vessel    DIAGNOSTIC CARDIAC CATH LAB PROCEDURE      FOOT SURGERY  12/20/2013    pre-cancerous mole rt foot removed    LITHOTRIPSY  4/2002    [place] OFFICE/OUTPT VISIT,PROCEDURE ONLY N/A 11/8/2018    TRANSESOPHAGEAL ECHOCARDIOGRAM performed by Jack Mcdonnell MD at [address] Endoscopy    PTCA  05/30/2018   [facility] SURGERY  2001, 2003    rt shoulder manipulation--frozen shoulder-01, Lt -03    SKIN BIOPSY      SKIN CANCER EXCISION  12/20/13     R foot    SKIN CANCER EXCISION  1/2016    squamous cell Lt  upper cheek    UPPER GASTROINTESTINAL ENDOSCOPY N/A 7/20/2021    EGD ESOPHAGOGASTRODUODENOSCOPY performed by Humberto Perry MD at [address]   4/2002    VASCULAR SURGERY      carotids & cabg harvests     Diet    ADULT DIET; Regular  Allergies    Patient has no known allergies. Social History     Social History     Socioeconomic History    Marital status:      Spouse name: Nathan Munoz Number of children: 2    Years of education: Not on file    Highest education level: Not on file   Occupational History    Not on file   Tobacco Use    Smoking status: Never Smoker    Smokeless tobacco: Never Used   Vaping Use    Vaping Use: Never used   Substance and Sexual Activity    Alcohol use: No     Alcohol/week: 0.0 standard drinks    Drug use: No    Sexual activity: Yes     Partners: Female   Other Topics Concern    Not on file   Social History Narrative    Not on file     Social Determinants of Health     Financial Resource Strain: Low Risk     Difficulty of Paying Living Expenses: Not hard at all   Food Insecurity: No Food Insecurity    Worried About Running Out of Food in the Last Year: Never true    Rosa of Food in the Last Year: Never true   Transportation Needs: No Transportation Needs    Lack of Transportation (Medical): No    Lack of Transportation (Non-Medical):  No   Physical Activity:     Days of Exercise per Week:     Minutes of Exercise per Session:    Stress:     Feeling of Stress :    Social Connections:     Frequency of Communication with Friends and Family:     Frequency of Social Gatherings with Friends and Family:     Attends Jain Services:     Active Member of Clubs or Organizations:     Attends Club or Organization Meetings:     Marital Status:    Intimate Partner Violence:     Fear of Current or Ex-Partner:     Emotionally Abused:     Physically Abused:     Sexually Abused:      Family History          Problem Relation Age of Onset    Cancer Mother     High Blood Pressure Mother     Stroke Father     High Blood Pressure Father     Heart Disease Brother  Cancer Brother         lung    Cancer Brother         melanoma     ROS     Review of Systems   Pertinent review of systems noted in HPI, all other ROS negative. Vitals     height is 5' 9\" (1.753 m) and weight is 209 lb 9.6 oz (95.1 kg). His oral temperature is 98.3 °F (36.8 °C). His blood pressure is 137/65 and his pulse is 60. His respiration is 17 and oxygen saturation is 97%. Exam   Physical Exam   General appearance: No apparent distress, calm and cooperative. HEENT: Pupils equal, round, and reactive to light. Conjunctivae/corneas clear. Oral mucosa dry. Neck: Supple, with full range of motion. Trachea midline. Respiratory:  Normal respiratory effort. Clear to auscultation, bilaterally diminished. Cardiovascular:  Irregular rate/rhythm, S1/S2. Abdomen: Soft, non-tender, non-distended with active BS x 4. Musculoskeletal: No clubbing, cyanosis. Mild BLE noted, improved. Pt resting in bed. Skin: Skin color, texture, turgor normal.  No visible rashes or lesions. Neurologic:  Neurovascularly intact without any focal sensory/motor deficits.  Cranial nerves: II-XII intact, grossly non-focal.  Psychiatric: Alert and oriented x 3, thought content appropriate, normal insight  Capillary Refill: Brisk,< 3 seconds   Peripheral Pulses: +2 palpable, equal bilaterally        Labs   CBC  Recent Labs     08/24/21 0428 08/25/21  0340 08/26/21  1008   WBC 6.5 9.9 10.1   RBC 2.45* 2.65* 2.81*   HGB 7.8* 8.2* 8.8*   HCT 25.0* 26.0* 27.6*   .0* 98.1* 98.2*   MCH 31.8 30.9 31.3   MCHC 31.2* 31.5* 31.9*    214 264   MPV 10.4 9.9 10.0      BMP  Recent Labs     08/24/21  0428 08/25/21  0340 08/26/21  0325    138 137   K 4.4 4.0 3.8    97* 95*   CO2 24 27 27   BUN 45* 53* 57*   CREATININE 1.9* 2.5* 2.4*   GLUCOSE 85 124* 73   CALCIUM 9.2 9.3 9.2     LFT  Recent Labs     08/26/21  0325   AST 13  16   ALT 12  12   BILITOT 0.5  0.5   ALKPHOS 85  86     INR  No results for input(s): INR, PROTIME in the last 72 hours. PTT  No results for input(s): APTT in the last 72 hours. Radiology        XR CHEST (2 VW)    Result Date: 8/23/2021  PROCEDURE: XR CHEST (2 VW) CLINICAL INFORMATION: Dyspnea on exertion TECHNIQUE: PA and lateral chest radiographs. COMPARISON: PA and lateral chest radiographs 7/19/2021 FINDINGS: Median sternotomy wires are again noted. There is mild stable enlargement of the cardiac silhouette. No lung consolidations are identified. Degenerative changes in the thoracic spine are poorly visualized. 1. No acute intrathoracic process. 2. Mild stable cardiomegaly. **This report has been created using voice recognition software. It may contain minor errors which are inherent in voice recognition technology. ** Final report electronically signed by Dr. Jade Richards on 8/23/2021 5:50 PM      Assessment/Recommendations    1. Macrocytic anemia - Hx anemia since at least 2011 with acute drop in Hgb with GI bleed in July 2021. Baseline Hgb 10-11s. Pt received IV injectafer by Dr. Sully Chen on 5/28/2021 & 6/4/2021. Vit B12/folate WNL. CRP 0.56, Sed rate 52, LDH WNL, haptoglobin pending , LFTs WNL, TSH WNL, EPO pending, retic ct increased. Likely ACD, related to CKD stage 3/Anemia of chronic renal failure. eGFR 26, creat 2.4 today. Pt on eliquis, plavix at home as well as oral iron every other day. H/H today improved to 8.8/27. 6. MCV 98.2. IV iron today. F/U with Dr. Sully Chen as outpt, who has been managing his ACD/chronic renal failure with prn IV iron infusions.     Case discussed with nurse and patient. Questions and concerns addressed. Plan made in collaboration with Dr. Mary Jacome.     Electronically signed by   Jacquelynn Favre, APRN - CNP on 8/26/2021 at 11:44 AM

## 2021-08-27 VITALS
OXYGEN SATURATION: 96 % | HEIGHT: 69 IN | SYSTOLIC BLOOD PRESSURE: 144 MMHG | HEART RATE: 60 BPM | WEIGHT: 212.5 LBS | TEMPERATURE: 97.7 F | RESPIRATION RATE: 18 BRPM | DIASTOLIC BLOOD PRESSURE: 63 MMHG | BODY MASS INDEX: 31.47 KG/M2

## 2021-08-27 DIAGNOSIS — R80.9 TYPE 2 DIABETES MELLITUS WITH MICROALBUMINURIA, WITH LONG-TERM CURRENT USE OF INSULIN (HCC): ICD-10-CM

## 2021-08-27 DIAGNOSIS — E11.29 TYPE 2 DIABETES MELLITUS WITH MICROALBUMINURIA, WITH LONG-TERM CURRENT USE OF INSULIN (HCC): ICD-10-CM

## 2021-08-27 DIAGNOSIS — Z79.4 TYPE 2 DIABETES MELLITUS WITH MICROALBUMINURIA, WITH LONG-TERM CURRENT USE OF INSULIN (HCC): ICD-10-CM

## 2021-08-27 LAB
ANION GAP SERPL CALCULATED.3IONS-SCNC: 14 MEQ/L (ref 8–16)
BUN BLDV-MCNC: 64 MG/DL (ref 7–22)
CALCIUM SERPL-MCNC: 8.8 MG/DL (ref 8.5–10.5)
CHLORIDE BLD-SCNC: 95 MEQ/L (ref 98–111)
CO2: 25 MEQ/L (ref 23–33)
CREAT SERPL-MCNC: 2.7 MG/DL (ref 0.4–1.2)
GFR SERPL CREATININE-BSD FRML MDRD: 23 ML/MIN/1.73M2
GLUCOSE BLD-MCNC: 131 MG/DL (ref 70–108)
GLUCOSE BLD-MCNC: 132 MG/DL (ref 70–108)
GLUCOSE BLD-MCNC: 165 MG/DL (ref 70–108)
GLUCOSE BLD-MCNC: 223 MG/DL (ref 70–108)
POTASSIUM SERPL-SCNC: 4.2 MEQ/L (ref 3.5–5.2)
SODIUM BLD-SCNC: 134 MEQ/L (ref 135–145)

## 2021-08-27 PROCEDURE — 80048 BASIC METABOLIC PNL TOTAL CA: CPT

## 2021-08-27 PROCEDURE — 36415 COLL VENOUS BLD VENIPUNCTURE: CPT

## 2021-08-27 PROCEDURE — 99232 SBSQ HOSP IP/OBS MODERATE 35: CPT | Performed by: INTERNAL MEDICINE

## 2021-08-27 PROCEDURE — 82948 REAGENT STRIP/BLOOD GLUCOSE: CPT

## 2021-08-27 PROCEDURE — 99223 1ST HOSP IP/OBS HIGH 75: CPT | Performed by: INTERNAL MEDICINE

## 2021-08-27 PROCEDURE — 6370000000 HC RX 637 (ALT 250 FOR IP): Performed by: INTERNAL MEDICINE

## 2021-08-27 RX ORDER — INSULIN DETEMIR 100 [IU]/ML
INJECTION, SOLUTION SUBCUTANEOUS
Qty: 90 ML | Refills: 5 | Status: CANCELLED | OUTPATIENT
Start: 2021-08-27

## 2021-08-27 RX ORDER — BUMETANIDE 2 MG/1
2 TABLET ORAL 2 TIMES DAILY
Qty: 60 TABLET | Refills: 3 | Status: ON HOLD | OUTPATIENT
Start: 2021-08-27 | End: 2021-11-11 | Stop reason: ALTCHOICE

## 2021-08-27 RX ORDER — INSULIN DETEMIR 100 [IU]/ML
48 INJECTION, SOLUTION SUBCUTANEOUS 2 TIMES DAILY
Qty: 90 ML | Refills: 3 | Status: SHIPPED | OUTPATIENT
Start: 2021-08-27 | End: 2022-07-06

## 2021-08-27 RX ADMIN — CLOPIDOGREL BISULFATE 75 MG: 75 TABLET ORAL at 08:23

## 2021-08-27 RX ADMIN — RANOLAZINE 1000 MG: 500 TABLET, FILM COATED, EXTENDED RELEASE ORAL at 08:22

## 2021-08-27 RX ADMIN — HYDRALAZINE HYDROCHLORIDE 25 MG: 25 TABLET, FILM COATED ORAL at 06:08

## 2021-08-27 RX ADMIN — OXYCODONE HYDROCHLORIDE AND ACETAMINOPHEN 500 MG: 500 TABLET ORAL at 08:24

## 2021-08-27 RX ADMIN — FERROUS SULFATE TAB 325 MG (65 MG ELEMENTAL FE) 325 MG: 325 (65 FE) TAB at 08:23

## 2021-08-27 RX ADMIN — HYDRALAZINE HYDROCHLORIDE 25 MG: 25 TABLET, FILM COATED ORAL at 14:17

## 2021-08-27 RX ADMIN — ISOSORBIDE MONONITRATE 120 MG: 60 TABLET ORAL at 08:23

## 2021-08-27 RX ADMIN — DILTIAZEM HYDROCHLORIDE 120 MG: 120 CAPSULE, COATED, EXTENDED RELEASE ORAL at 08:23

## 2021-08-27 RX ADMIN — TAMSULOSIN HYDROCHLORIDE 0.4 MG: 0.4 CAPSULE ORAL at 08:22

## 2021-08-27 RX ADMIN — METOPROLOL SUCCINATE 50 MG: 50 TABLET, FILM COATED, EXTENDED RELEASE ORAL at 08:22

## 2021-08-27 ASSESSMENT — PAIN SCALES - GENERAL: PAINLEVEL_OUTOF10: 0

## 2021-08-27 NOTE — PROGRESS NOTES
[facility]         Renal Inpatient Follow-Up note         8/27/2021 7:51 AM    Pt Name:   Jazzmine Robbins:   [birth year]  Attending:   Areli Ribeiro MD    Chief Complaint : Shruthi Clark is a 68 y.o. male being followed by nephrology for MADI/CKD    Interval History :   Patient seen and examined by me. No distress  Feels much better no chest pain no shortness of breath  Weight apparently is around 212 pounds     Scheduled Medications :    [Held by provider] bumetanide  2 mg Oral Daily    furosemide  40 mg IntraVENous Once    clopidogrel  75 mg Oral Daily    hydrALAZINE  25 mg Oral Q8H    therapeutic multivitamin-minerals  1 tablet Oral Nightly    metoprolol succinate  50 mg Oral Daily    hydrOXYzine  10 mg Oral Daily    tamsulosin  0.4 mg Oral Daily    ranolazine  1,000 mg Oral BID    atorvastatin  40 mg Oral Nightly    isosorbide mononitrate  120 mg Oral Daily    insulin lispro  7 Units Subcutaneous BID WC    dilTIAZem  120 mg Oral Daily    ferrous sulfate  325 mg Oral Every Other Day    ascorbic acid  500 mg Oral Every Other Day    insulin detemir  50 Units Subcutaneous BID    insulin lispro  0-12 Units Subcutaneous TID WC    insulin lispro  0-6 Units Subcutaneous Nightly         Vitals :  BP (!) 126/58   Pulse 60   Temp 98.3 °F (36.8 °C) (Oral)   Resp 16   Ht 5' 9\" (1.753 m)   Wt 212 lb 8 oz (96.4 kg)   SpO2 91%   BMI 31.38 kg/m²     24HR INTAKE/OUTPUT:      Intake/Output Summary (Last 24 hours) at 8/27/2021 0751  Last data filed at 8/27/2021 0317  Gross per 24 hour   Intake 660 ml   Output 650 ml   Net 10 ml     Last 3 weights  Wt Readings from Last 3 Encounters:   08/27/21 212 lb 8 oz (96.4 kg)   07/28/21 219 lb 6.4 oz (99.5 kg)   07/21/21 214 lb 9.6 oz (97.3 kg)           Physical Exam :  General Appearance:  Well developed.  No distress  Mouth/Throat:  Oral mucosa moist  Neck:  Supple, no JVD  Lungs:  Breath sounds: clear  Heart[de-identified]  S1,S2 heard  Abdomen:  Soft, non - tender  Musculoskeletal:  Edema -minimal/trace         Last 3 CBC   Recent Labs     08/25/21  0340 08/26/21  1008   WBC 9.9 10.1   RBC 2.65* 2.81*   HGB 8.2* 8.8*   HCT 26.0* 27.6*    264     Last 3 CMP  Recent Labs     08/25/21  0340 08/26/21  0325 08/27/21  0401    137 134*   K 4.0 3.8 4.2   CL 97* 95* 95*   CO2 27 27 25   BUN 53* 57* 64*   CREATININE 2.5* 2.4* 2.7*   CALCIUM 9.3 9.2 8.8   LABALBU  --  3.9  4.0  --    BILITOT  --  0.5  0.5  --              ASSESSMENT / Plan   1. Renal -acute kidney injury on chronic kidney disease stage III/IV, overall renal function has been stable at presentation  ? Gotten worse most likely secondary to cardiorenal and overdiuresis today weight is up to 2.7  ? No significant edema. Hold the diuretics for now  ? Encourage slightly increased oral intake of liquids  ? BMP in the morning     2. Electrolytes -appear to be within normal limits mild hyponatremia follow for now  3. Essential hypertension running well continue current medications  4. Hx of diabetes mellitus  5. Hx of coronary artery disease with multiple stents in the past. Cardiology on board  6. Fluid overload plan as mentioned above  7. Meds reviewed and discussed with patient and wife in detail  8. Patient is eager to go home if the patient is being discharged today okay from renal standpoint hold Bumex needs a BMP on Monday and based on that we will adjust the diuretics    Dr. Antonio Garcia MD, M,D.  [facility].

## 2021-08-27 NOTE — CONSULTS
[facility] [employer] [facility]    Patient's Name/Date of Birth: Marcus Jain / 1945 (65 y.o.)    Date: August 27, 2021     Referring Provider: Anna Marie Nix MD    CHIEF COMPLAINT: Anemia & Afib       HPI: This is a pleasant 68 y.o. male presents with severe SOB and progressive LE and pedal edema. PMHx includes angina pectoris class III, CKD, HTN, papillary transitional cell carcinoma of bladder, DM2, Afib, carotid endarterectomy, CVA, GI bleed and CAD with history of CABG, multiple stents and grafts to RCA, OM & LAD. Cutting balloon angioplasty for mid-distal RCA was done in April 2021. He has a history of prior interventions in multiple areas at multiple institutions. Due to his history of paroxysmal Afib in the past he has been on eliquis. This is along with plavix for his stents. Last month pt was admitted to the hospital for a GI bleed. He was transfused with 3 units and subsequently found to have a negative EGD and polyps on his colonoscopy. After the transfusions, pt was stable and discharged home with instructions to continue plavix but hold eliquis for 1 week. At home he experienced some nose bleeds after restarting his eliquis. Pt is currently not bleeding however is anemic with a Hgb of 8.8. Both pt and his wife state that he is not tolerating two blood thinners. EF 50-55% on ECHO in March 2021. Pt has about 15 stents placed over the years, last stent placed in PDA of the RCA & RCA in 3/19. Event monitor this visit showed paroxysmal atrial fibrillation with controlled heart rate.        Echo: Results for orders placed during the hospital encounter of 03/25/21    ECHO Complete 2D W Doppler W Color    Narrative  Transthoracic Echocardiography Report (TTE)    Demographics    Patient Name   Augustus Franco  Gender              Male  D    MR #           [id number]     Race                    Ethnicity    Account #      [de-identified]     Room Number    Accession      [id number] Date of Study       03/25/2021  Number    Date of Birth  1945    Referring Physician Yadiel Clemons MD    Age            68 year(s)    Joselito De La Garza,  T    Interpreting        Reji Godoy MD  Physician    Procedure    Type of Study    TTE procedure:ECHOCARDIOGRAM COMPLETE 2D W DOPPLER W COLOR. Procedure Date  Date: 03/25/2021 Start: 08:44 AM    Study Location: Echo Lab  Technical Quality: Adequate visualization    Indications:Complex sleep apnea. Additional Medical History:chronic kidney disease, hyperlipidemia, CABG,  coronary artery dsiease, diabetes, obesity, asthma, carotid stenosis, MI,  GERD, CVA, congestive heart failure, skin cancer    Patient Status: Routine    Height: 69 inches Weight: 220 pounds BSA: 2.15 m^2 BMI: 32.49 kg/m^2    BP: 135/63 mmHg    Allergies  - No Known Allergies.    - No Known Allergies.    - No Known Allergies.    - No Known Allergies. - See Epic. Conclusions    Summary  Normal left ventricle size and systolic function. Ejection fraction was  estimated at -50  55. There were no regional left ventricular wall motion abnormalities and  wall thickness was within normal limits. Signature    ----------------------------------------------------------------  Electronically signed by Reji Godoy MD (Interpreting  physician) on 03/25/2021 at 12:33 PM  ----------------------------------------------------------------    Findings    Mitral Valve  The mitral valve structure was normal with normal leaflet separation. DOPPLER: The transmitral velocity was within the normal range with no  evidence for mitral stenosis. There was MILD mitral regurgitation. Aortic Valve  The aortic valve was trileaflet with normal thickness and cuspal  separation. DOPPLER: Transaortic velocity was within the normal range with  no evidence of aortic stenosis. There was no evidence of aortic  regurgitation.     Tricuspid Valve  The tricuspid valve structure was normal with normal leaflet separation. DOPPLER: There was no evidence of tricuspid stenosis. There was MILD f  tricuspid regurgitation. Pulmonic Valve  The pulmonic valve leaflets exhibited normal thickness, no calcification,  and normal cuspal separation. DOPPLER: The transpulmonic velocity was  within the normal range with no evidence for regurgitation. Left Atrium  Left atrial size was normal. No evidence of thrombus in the left atrial  appendage. Left Ventricle  Normal left ventricle size and systolic function. Ejection fraction was  estimated at -50  55. There were no regional left ventricular wall motion abnormalities and  wall thickness was within normal limits. Right Atrium  Right atrial size was normal.    Right Ventricle  The right ventricular size was normal with normal systolic function and  wall thickness. Pericardial Effusion  The pericardium was normal in appearance with no evidence of a pericardial  effusion. Pleural Effusion  No evidence of pleural effusion. Aorta / Great Vessels  -Aortic root dimension within normal limits. -IVC size is within normal limits with normal respiratory phasic changes.     M-Mode/2D Measurements & Calculations    LV Diastolic    LV Systolic Dimension:    AV Cusp Separation: 1.3 cmLA  Dimension: 5.3  3.2 cm                    Dimension: 5.2 cmAO Root  cm              LV Volume Diastolic: 297  Dimension: 3.8 cmLA Area: 20.9  LV FS:39.6 %    ml                        cm^2  LV PW           LV Volume Systolic: 41 ml  Diastolic: 1.4  LV EDV/LV EDV Index: 135  cm              ml/63 m^2LV ESV/LV ESV  Septum          Index: 41 ml/19 m^2       RV Diastolic Dimension: 3.5 cm  Diastolic: 1.1  EF Calculated: 69.6 %  cm                                        LA/Aorta: 1.37    LA volume/Index: 64.8 ml /30m^2  LVOT: 2 cm    Doppler Measurements & Calculations    MV Peak E-Wave: 144 cm/s   AV Peak Velocity: 151  LVOT Peak (myocardial infarction) ([facility] [address] 1997    Nephrolithiasis 2002    NIDDM (non-insulin dependent diabetes mellitus) 1996    diagnosed 1996    RICCARDO (obstructive sleep apnea) 2005    cpap    PVC's (premature ventricular contractions)     Renal artery stenosis ([facility] [address] 2007    left    Renal insufficiency     S/P CABG (status post coronary artery bypass graft) 1997    5 vessels    Skin cancer     Squamous cell carcinoma     TIA (transient ischemic attack)     Wears partial dentures     upper and lower     Past Surgical History:   Procedure Laterality Date    ABLATION OF DYSRHYTHMIC FOCUS  3/13/13   [address] SURGERY  3/13/13    oblation for irreg rythm    CARDIOVERSION  3/8/2012    CAROTID ENDARTERECTOMY  1997    CHOLECYSTECTOMY  4/1999    COLONOSCOPY  6/22/09, 4/2011,7/12/17    polyp removal    COLONOSCOPY N/A 7/21/2021    COLONOSCOPY POLYPECTOMY SNARE/COLD BIOPSY performed by Marilu Roland MD at [facility]  2008 (2), 2009 (1)    x2    CORONARY ARTERY BYPASS GRAFT  8/1997    5 vessel    DIAGNOSTIC CARDIAC CATH LAB PROCEDURE      FOOT SURGERY  12/20/2013    pre-cancerous mole rt foot removed    LITHOTRIPSY  4/2002    AK OFFICE/OUTPT VISIT,PROCEDURE ONLY N/A 11/8/2018    TRANSESOPHAGEAL ECHOCARDIOGRAM performed by Ludmila Lubin MD at [address] [place] [address] [facility]  05/30/2018   [place]  2001, 2003    rt shoulder manipulation--frozen shoulder-01, Lt -03    SKIN BIOPSY      SKIN CANCER EXCISION  12/20/13     R foot    SKIN CANCER EXCISION  1/2016    squamous cell Lt  upper cheek    UPPER GASTROINTESTINAL ENDOSCOPY N/A 7/20/2021    EGD ESOPHAGOGASTRODUODENOSCOPY performed by Marilu Roland MD at [facility]  4/2002    VASCULAR SURGERY      carotids & cabg harvests     Current Facility-Administered Medications   Medication Dose Route Frequency Provider Last Rate Last Admin    [Held by provider] bumetanide (BUMEX) tablet 2 mg  2 mg Oral Daily Kaley Felipe MD   2 mg at 08/25/21 1153    furosemide (LASIX) injection 40 mg  40 mg IntraVENous Once Leena Galarza MD        clopidogrel (PLAVIX) tablet 75 mg  75 mg Oral Daily Russ Rubio MD   75 mg at 08/27/21 8062    hydrALAZINE (APRESOLINE) tablet 25 mg  25 mg Oral Q8H Kaley Felipe MD   25 mg at 08/27/21 3814    therapeutic multivitamin-minerals 1 tablet  1 tablet Oral Nightly Russ Rubio MD   1 tablet at 08/26/21 2037    metoprolol succinate (TOPROL XL) extended release tablet 50 mg  50 mg Oral Daily Russ Rubio MD   50 mg at 08/27/21 8031    hydrOXYzine (ATARAX) tablet 10 mg  10 mg Oral Daily Russ Rubio MD   10 mg at 08/26/21 0915    ALPRAZolam (XANAX) tablet 0.25 mg  0.25 mg Oral TID PRN Russ Rubio MD        [facility]) capsule 0.4 mg  0.4 mg Oral Daily uRss Rubio MD   0.4 mg at 08/27/21 5901    ranolazine (RANEXA) extended release tablet 1,000 mg  1,000 mg Oral BID Russ Rubio MD   1,000 mg at 08/27/21 7798    atorvastatin (LIPITOR) tablet 40 mg  40 mg Oral Nightly Russ Rubio MD   40 mg at 08/26/21 2037    isosorbide mononitrate (IMDUR) extended release tablet 120 mg  120 mg Oral Daily Russ Rubio MD   120 mg at 08/27/21 0823    insulin lispro (HUMALOG) injection vial 7 Units  7 Units Subcutaneous BID WC Russ Rubio MD   7 Units at 08/27/21 0934    nitroGLYCERIN (NITROSTAT) SL tablet 0.4 mg  0.4 mg Sublingual Q5 Min PRN Kaley Felipe MD        dilTIAZem (CARDIZEM CD) extended release capsule 120 mg  120 mg Oral Daily Russ Rubio MD   120 mg at 08/27/21 6528    ferrous sulfate (IRON 325) tablet 325 mg  325 mg Oral Every Other Day Russ Rubio MD   325 mg at 08/27/21 0823    ascorbic acid (VITAMIN C) tablet 500 mg  500 mg Oral Every Other Day Russ Rubio MD   500 mg at 08/27/21 0824    insulin detemir (LEVEMIR) injection vial 50 Units  50 Units Subcutaneous BID Browning Zane Jimenez MD   50 Units at 08/27/21 0933    insulin lispro (HUMALOG) injection vial 0-12 Units  0-12 Units Subcutaneous TID WC Robyn Jackson MD   9 Units at 08/27/21 1243    insulin lispro (HUMALOG) injection vial 0-6 Units  0-6 Units Subcutaneous Nightly Robyn Jackson MD   2 Units at 08/26/21 2113    acetaminophen (TYLENOL) tablet 500 mg  500 mg Oral Nightly PRN Kaley Jimenez MD        And    diphenhydrAMINE (BENADRYL) tablet 25 mg  25 mg Oral Nightly PRN Kaley Jimenez MD        potassium chloride (KLOR-CON M) extended release tablet 40 mEq  40 mEq Oral PRN Kaley Jimenez MD        Or    potassium bicarb-citric acid (EFFER-K) effervescent tablet 40 mEq  40 mEq Oral PRN Kaley Jimenez MD        Or    potassium chloride 10 mEq/100 mL IVPB (Peripheral Line)  10 mEq IntraVENous PRN Kaley Jimenez MD        potassium chloride 10 mEq/100 mL IVPB (Peripheral Line)  10 mEq IntraVENous PRN Robyn Jackson MD         Prior to Admission medications    Medication Sig Start Date End Date Taking?  Authorizing Provider   apixaban (ELIQUIS) 5 MG TABS tablet Take 5 mg by mouth 2 times daily   Yes Historical Provider, MD   insulin detemir (LEVEMIR) 100 UNIT/ML injection vial Inject 48 Units into the skin 2 times daily   Yes Historical Provider, MD   ferrous sulfate (IRON 325) 325 (65 Fe) MG tablet Take 325 mg by mouth every other day   Yes Historical Provider, MD   ascorbic acid (VITAMIN C) 500 MG tablet Take 500 mg by mouth every other day   Yes Historical Provider, MD   furosemide (LASIX) 40 MG tablet Take 2 tablets by mouth daily 7/28/21  Yes Katie Mathew MD   nitroGLYCERIN (NITROLINGUAL) 0.4 MG/SPRAY 0.4 mg spray USE 1 SPRAY ON OR UNDER THE TONGUE EVERY 5 MINUTES AS NEEDED FOR CHEST PAIN 7/28/21  Yes Katie Mathew MD   dilTIAZem (CARDIZEM CD) 120 MG extended release capsule TAKE 1 CAPSULE BY MOUTH  DAILY 7/28/21  Yes Katie Mathew MD   insulin lispro (HUMALOG) 100 UNIT/ML injection vial Inject into the skin 7 units in am and 7 units at lunch and 10 units at supper plus sliding scale during meals and at bedtime   Yes Historical Provider, MD   isosorbide mononitrate (IMDUR) 120 MG extended release tablet Take 1 tablet by mouth daily 7/22/21  Yes KAVON Torres - CNP   atorvastatin (LIPITOR) 40 MG tablet TAKE 1 TABLET BY MOUTH AT  NIGHT 6/29/21  Yes Ysabel Sam MD   ranolazine (RANEXA) 1000 MG extended release tablet Take 1 tablet by mouth 2 times daily New dose 6/25/21  Yes Khloe Jason MD   tamsulosin (FLOMAX) 0.4 MG capsule Take 1 capsule by mouth daily 4/21/21  Yes Abdias Romero MD   diphenhydrAMINE-APAP, sleep, (TYLENOL PM EXTRA STRENGTH)  MG tablet Take 1 tablet by mouth every evening as needed   Yes Historical Provider, MD   ALPRAZolam (XANAX) 0.25 MG tablet Take 0.25 mg by mouth 3 times daily as needed for Anxiety.    Yes Historical Provider, MD   hydrOXYzine (ATARAX) 10 MG tablet Take 10 mg by mouth daily as needed  3/13/21  Yes Historical Provider, MD   fluocinonide (LIDEX) 0.05 % cream Apply 1 applicator topically as needed 12/29/20  Yes Historical Provider, MD   BiPAP Machine MISC by Does not apply route   Yes Historical Provider, MD   metoprolol succinate (TOPROL XL) 50 MG extended release tablet TAKE 1 TABLET BY MOUTH  DAILY 3/1/21  Yes Ysabel Sam MD   Multiple Vitamins-Minerals (THERAPEUTIC MULTIVITAMIN-MINERALS) tablet Take 1 tablet by mouth nightly   Yes Historical Provider, MD   hydrALAZINE (APRESOLINE) 25 MG tablet Take 1 tablet by mouth every 8 hours 11/9/18  Yes Harshad Busch MD   clopidogrel (PLAVIX) 75 MG tablet Take 75 mg by mouth daily morning   Yes Historical Provider, MD   blood glucose test strips (ACCU-CHEK GUIDE) strip USE TO CHECK 4 TIMES DAILY 7/6/21   Bunny Smith MD   Accu-Chek FastClix Lancets MISC USE TO TEST BLOOD SUGARS 4 TIMES DAILY 7/6/21   Bunny Smith MD   Insulin Pen Needle (NOVOFINE) 32G X 6 MM MISC USE 1 NEW NEEDLE 6 TIMES  DAILY AND AS NEEDED 7/17/20   Tara Batista MD   Lancet Devices (ACCU-CHEK SOFTCLIX LANCET DEV) MISC Use one with each blood check 1/14/14 4/2/18  Tara Batista MD   Scheduled Meds:  Uzma Singh by provider] bumetanide  2 mg Oral Daily    furosemide  40 mg IntraVENous Once    clopidogrel  75 mg Oral Daily    hydrALAZINE  25 mg Oral Q8H    therapeutic multivitamin-minerals  1 tablet Oral Nightly    metoprolol succinate  50 mg Oral Daily    hydrOXYzine  10 mg Oral Daily    tamsulosin  0.4 mg Oral Daily    ranolazine  1,000 mg Oral BID    atorvastatin  40 mg Oral Nightly    isosorbide mononitrate  120 mg Oral Daily    insulin lispro  7 Units Subcutaneous BID WC    dilTIAZem  120 mg Oral Daily    ferrous sulfate  325 mg Oral Every Other Day    ascorbic acid  500 mg Oral Every Other Day    insulin detemir  50 Units Subcutaneous BID    insulin lispro  0-12 Units Subcutaneous TID WC    insulin lispro  0-6 Units Subcutaneous Nightly     Continuous Infusions:  PRN Meds:. ALPRAZolam, nitroGLYCERIN, acetaminophen **AND** diphenhydrAMINE, potassium chloride **OR** potassium alternative oral replacement **OR** potassium chloride, potassium chloride    No Known Allergies  Family History   Problem Relation Age of Onset    Cancer Mother     High Blood Pressure Mother     Stroke Father     High Blood Pressure Father     Heart Disease Brother     Cancer Brother         lung    Cancer Brother         melanoma     Social History     Socioeconomic History    Marital status:      Spouse name: Peg Fill Number of children: 2    Years of education: Not on file    Highest education level: Not on file   Occupational History    Not on file   Tobacco Use    Smoking status: Never Smoker    Smokeless tobacco: Never Used   Vaping Use    Vaping Use: Never used   Substance and Sexual Activity    Alcohol use: No     Alcohol/week: 0.0 standard drinks    Drug use: No    Sexual activity: Yes     Partners: Female Other Topics Concern    Not on file   Social History Narrative    Not on file     Social Determinants of Health     Financial Resource Strain: Low Risk     Difficulty of Paying Living Expenses: Not hard at all   Food Insecurity: No Food Insecurity    Worried About Running Out of Food in the Last Year: Never true    [address] in the Last Year: Never true   Transportation Needs: No Transportation Needs    Lack of Transportation (Medical): No    Lack of Transportation (Non-Medical): No   Physical Activity:     Days of Exercise per Week:     Minutes of Exercise per Session:    Stress:     Feeling of Stress :    Social Connections:     Frequency of Communication with Friends and Family:     Frequency of Social Gatherings with Friends and Family:     Attends Zoroastrian Services:     Active Member of Clubs or Organizations:     Attends Club or Organization Meetings:     Marital Status:    Intimate Partner Violence:     Fear of Current or Ex-Partner:     Emotionally Abused:     Physically Abused:     Sexually Abused:      ROS:   Constitutional: Denies any recent wt change. Eyes:  Denies any blurring or double vision, no glaucoma  Ears/Nose/Mouth/Throat:  Denies any chronic sinus/rhinitis, bleeding gums  Cardiovascular:  As described above. Respiratory:  Denies any frequent cough, wheezing or coughing up blood  Genitourinary:  Denies difficulty with urination and kidney stones  Gastrointestinal:  Denies any chronic problems with abdominal pain, nausea, vomiting or diarrhea  Musculoskeletal:  Denies any joint pain, back pain, or difficulty walking  Integumentary:  Denies any rash  Neurological:  No numbness or tingling  Endocrine:  Denies any polydipsia. Hematologic/Lymphatic:  Denies any hemorrhage or lymphatic drainage problems.   Labs:  CBC:   Recent Labs     08/25/21  0340 08/26/21  1008   WBC 9.9 10.1   HGB 8.2* 8.8*   HCT 26.0* 27.6*   MCV 98.1* 98.2*    264     BMP:   Recent Labs 08/25/21  0340 08/26/21  0325 08/27/21  0401    137 134*   K 4.0 3.8 4.2   CL 97* 95* 95*   CO2 27 27 25   BUN 53* 57* 64*   CREATININE 2.5* 2.4* 2.7*     Accucheck Glucoses:   Recent Labs     08/26/21  1125 08/26/21  1741 08/26/21  1941 08/27/21  0737 08/27/21  1152   POCGLU 156* 221* 244* 131* 165*     Cardiac Enzymes: No results for input(s): CKTOTAL, CKMB, CKMBINDEX, TROPONINI in the last 72 hours. PT/INR: No results for input(s): PROTIME, INR in the last 72 hours. APTT: No results for input(s): APTT in the last 72 hours.   Liver Profile:  Lab Results   Component Value Date    AST 16 08/26/2021    AST 13 08/26/2021    ALT 12 08/26/2021    ALT 12 08/26/2021    BILIDIR <0.2 08/26/2021    BILITOT 0.5 08/26/2021    BILITOT 0.5 08/26/2021    BILITOT NEGATIVE 06/18/2018    ALKPHOS 86 08/26/2021    ALKPHOS 85 08/26/2021     Lab Results   Component Value Date    CHOL 138 01/24/2020    HDL 31 01/24/2020    TRIG 167 01/24/2020     TSH:   Lab Results   Component Value Date    TSH 2.460 08/26/2021     UA:   Lab Results   Component Value Date    COLORU YELLOW 04/06/2021    PHUR 5.0 04/06/2021    LABCAST NONE SEEN 04/06/2021    LABCAST NONE SEEN 04/06/2021    WBCUA NONE SEEN 04/06/2021    RBCUA NONE SEEN 04/06/2021    YEAST NONE SEEN 04/06/2021    BACTERIA NONE SEEN 04/06/2021    CLARITYU Clear 03/29/2021    SPECGRAV 1.008 04/06/2021    LEUKOCYTESUR NEGATIVE 04/06/2021    UROBILINOGEN 0.2 04/06/2021    BILIRUBINUR NEGATIVE 04/06/2021    BLOODU NEGATIVE 04/06/2021    GLUCOSEU neg 03/29/2021         Physical Exam:  Vitals:    08/27/21 1051   BP: 128/62   Pulse: 60   Resp: 20   Temp: 97.9 °F (36.6 °C)   SpO2: 96%        Intake/Output Summary (Last 24 hours) at 8/27/2021 1319  Last data filed at 8/27/2021 1311  Gross per 24 hour   Intake 1240 ml   Output 900 ml   Net 340 ml      General:  No acute distress  Neck: Supple, no JVD, no carotid bruits  Heart: Regular rhythm normal S1 and S2, no murmurs rubs, or gallops  Lungs: clear to ascultation no rales, wheezes, or rhonchi  Abdomen: positive bowel sounds, soft, non-tender, non-distended, no bruits, no masses  Extremities:no clubbing, cyanosis or edema  Neurologic: alert and oriented x 3, cranial nerves 2-12 grossly intact, motor and sensory intact, moving all extremities  Skin: No rashes    Assessment:  Paroxysmal Atrial Fibrillation - CHADsVASC 8, HAS-BLED 5   Chronic anemia due to unspecified source, Hgb 8.8  Primary HTN   CAD s/p CABG & stents on plavix   Acute on chronic diastolic HF preserved EF 45-29%    Angina pectoris class II  Acute on chronic CKD III/IV  IDDM2    Plan:  1. Due to anemia and pt intolerable to anticoagulants with HAS-BLED score 5, pt will be worked up for [employer] procedure   2. Hold eliquis right now due to anemia, pt understands risks of stroke   3. Continue cardiac medical management per Dr. Brayan Stout   4. Further recommendations based on clinical course and findings. Thank you for allowing us to participate in the care of this patient. Please do not hesitate to call us with questions. Electronically signed by Tank Ronquillo DO on 8/27/2021 at 1:19 PM     Attending Supervising Physician's DEYANIRA Montes 106  I performed a history and physical examination on the patient and discussed the management with the resident physician. I reviewed and agree with the findings and plan as documented in the resident's note except for as noted below. Paroxysmal Atrial Fibrillation, [id number] = 8    We had a long discussion with the patient and myself regarding the risks/benefits of stroke prophylaxis and anticoagulation using ACC guidelines and risk calculators. Shared decision making has been completed with her primary physician (see EMR). We discussed the options including no prophylaxis, aspirin only, DOACs, Warfarin, and mechanical occlusion of WADE (Watchman).  The patient was able to comprehend their overall risk of stroke versus bleeding. The patient agreed that the patient was not a candidate for long-term anticoagulation due to the above issues. The patient is clinically a candidate for left atrial appendage occlusion using the Watchman device. Patient and family were informed of the risk/benefits of the procedure, the need for further imaging pre-procedure (BOSSMAN/CTA) and post-procedure (BOSSMAN) at specified intervals. Further, the patient understands that they will require anticoagulation before the procedure and after the procedure in the short-term. The patient is clinically a candidate for short-term anticoagulation. Further, discussion regarding the possibility of CVA related to other etiologies other than afib were also discussed including intracranial disease, carotid disease, cerebral vascular malformation, aortic atheroma, non-WADE cardioembolic source, etc.  Patient understood that the WATCHMAN device is designed to reduce the risk of CVA in the setting of afib only and does not reduce the risk of CVA related to any other cause of stroke. All parties also understand that if post-procedure the patient develops another reason for anti-coagulation that this is a separate issue from the stroke prophylaxis for atrial fibrillation and that the device does not negate need for anticoagulation for other reasons. At this time, the only reason for anticoagulation is atrial fibrillation. All of the patient's/family's questions were answered to their satisfaction and they have decided to move forward with further work-up for the Watchman device. We will follow-up with the patient and primary cardiologist/caregivers throughout the process.       Electronically signed by Sandra Merlos MD on 8/29/21 at 7:26 PM EDT    Interventional Cardiology - [facility]

## 2021-08-27 NOTE — CARE COORDINATION
8/27/21, 9:48 AM EDT    Patient goals/plan/ treatment preferences discussed by  and . Patient goals/plan/ treatment preferences reviewed with patient/ family. Patient/ family verbalize understanding of discharge plan and are in agreement with goal/plan/treatment preferences. Understanding was demonstrated using the teach back method. AVS provided by RN at time of discharge, which includes all necessary medical information pertaining to the patients current course of illness, treatment, post-discharge goals of care, and treatment preferences. IMM Letter  IMM Letter given to Patient/Family/Significant other/Guardian/POA/by[de-identified]   IMM Letter date given[de-identified] 08/26/21  IMM Letter time given[de-identified] 5       Spoke with Dr. Rafy Salomon, planning discharge today. Spoke with pt and wife, decline discharge needs.      Electronically signed by Kiersten Herrera RN on 8/27/2021 at 9:48 AM

## 2021-08-27 NOTE — H&P
[address] [place] [address]                              HISTORY AND PHYSICAL    PATIENT NAME: Mikayla Ta                     :        1945  MED REC NO:   [id number]                           ROOM:       0021  ACCOUNT NO:   [de-identified]                           ADMIT DATE: 2021  PROVIDER:     Arnold Abdul. Jeffery Vera M.D. HISTORY OF PRESENT ILLNESS:  This is a patient, 66-year-old gentleman,  admitted to the hospital through the emergency room. The patient was in  the office complaining of severe shortness of breath, progressive edema  and has pedal edema. The patient got _____, could not put his shoes on. The patient admitted to the hospital for that. The patient is known to  have a history of coronary artery disease. He had a history of CABG. He had a history of prior interventions in multiple areas at multiple  institutions. The patient has severely diffuse coronary artery disease,  very hard to pinpoint culprit lesion and very hard to manage. The  patient has small capillary coronary artery disease, makes intervention  less rewarding. The patient has angina pectoris class III. He had a  history of atrial fibrillation in the past, has been on the Eliquis. The patient has, however, preserved systolic function of the left  ventricle, has diastolic dysfunction of the left ventricle. REVIEW OF SYSTEMS:  This patient has a history of chronic renal failure. The patient has a history of carotid artery disease with prior  intervention, history of CABG, history of intervention through the  grafts through the native coronary arteries, history of hypertension,  history of enlarged prostate. 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 He has a history of  diabetes mellitus and history of atrial fibrillation. The patient has  history of squamous cell carcinoma.   The patient has a history of CVA in  the past.  He has history of arthritis. He denies fever, chills, or  rigors. The patient has a history of arthritis, history of shoulder  surgery, carotid endarterectomy. The patient had history of the  ablation treatment. He had a history of EGD and colonoscopy. The  patient has a history of prior stenting of his ureter, history of  lithotripsy. The patient has a history of cholecystectomy. As mentioned, he has a history of CABG, history of multiple stenting. The patient has a history of GI bleed. Last GI workup was negative. He  is anemic. SOCIAL HISTORY:  Denies smoking or alcohol abuse. ALLERGIES:  HE HAS NO KNOWN ALLERGIES. CODE STATUS:  He is a full code. PHYSICAL EXAMINATION:  VITAL SIGNS:  Showed his blood pressure is 140/80. He is in sinus  rhythm. He was afebrile. Respiratory rate was 18. NEUROLOGICAL:  The patient has no focal neurological deficits. NECK:  He has no JVD. LUNGS:  He has scattered rhonchi and he has few basal rales, few basal  crackles. CHEST:  2/6 systolic murmur. ABDOMEN:  Soft. EXTREMITIES:  2+ pedal edema. IMPRESSION:  1. The patient with an acute exacerbation of chronic diastolic  congestive heart failure. 2.  History of extensive coronary artery disease, CABG and prior  intervention. 3.  History of hypertension. 4.  History of paroxysmal atrial fib. 5.  History of anemia, blood transfusion, no source of bleeding  identified. 6.  Diabetes mellitus. 7.  Dyspnea on exertion. 8.  Anxiety and possible depression. 9.  Obesity. 10.  Sleep apnea. The patient admitted to the telemetry bed, treated with IV Bumex, placed  on sliding scale, home medication, cardiac enzymes. Pending his  clinical course, further recommendation will be made. Taylor Katz M.D.    D: 08/26/2021 14:51:59       T: 08/26/2021 16:47:08     [email or web address]  Job#: [id number]     Doc#: [id number]    CC:  Kaley Olivares M.D.

## 2021-08-27 NOTE — PROGRESS NOTES
Patient's wife Mary Prescott indicated she will telephone Dr. Claudia Garcia office to schedule follow up appointment in one month.

## 2021-08-27 NOTE — PROGRESS NOTES
Saundra serve to Dr. Reese Staff checking to see if he is still planning on seeing the patient before the patient discharges.

## 2021-08-27 NOTE — FLOWSHEET NOTE
SPIRITUAL CARE PROGRESS NOTE    Spiritual Assessment:  encountered patient and patient spouse as part of spiritual care rounds. Patient and spouse were approachable, receptive of 's presence. Patient shared feeling hopeful as he was expected to be discharged today. Patient shared he has been coping with hospitalization and has received good care. Patient's spouse shared they both have emotional support available to them through their son and other family members. Intervention:  engaged patient and spouse through active listening while also being a spiritual presence.  also nurtured hope to patient and spouse. Outcome: Patient and spouse were informed additional spiritual care services are available upon request. Patient and spouse expressed gratitude for the visit by the . 08/27/21 1008   Encounter Summary   Services provided to: Patient and family together   Referral/Consult From: [address] [place] [address] Significant other;Children   Continue Visiting Yes  (8/27/2021)   Complexity of Encounter Low   Length of Encounter 15 minutes   Routine   Type Initial   Assessment Calm; Approachable;Coping   Intervention Explored coping resources; Active listening;Nurtured hope;Sustaining presence/ Ministry of presence   Outcome Expressed gratitude   Spiritual/Religion   Type Spiritual support     Electronically signed by Libra Gamboa on 8/27/2021 at [address] [place] [address]

## 2021-08-27 NOTE — PLAN OF CARE
Problem: Falls - Risk of:  Goal: Will remain free from falls  Description: Will remain free from falls  8/27/2021 1710 by Sky Oseguera RN  Outcome: Completed  8/27/2021 1249 by Sky Oseguera RN  Note: Patient safety maintained. Call light within reach. Bed in lowest position. No falls or injuries to this point in shift. Will continue to monitor.      Problem: Falls - Risk of:  Goal: Absence of physical injury  Description: Absence of physical injury  Outcome: Completed

## 2021-08-27 NOTE — PROGRESS NOTES
Admit Date: 8/23/2021  Hospital day 3    Subjective:     Patient mild sob . Medication side effects: none    Scheduled Meds:   [Held by provider] bumetanide  2 mg Oral Daily    furosemide  40 mg IntraVENous Once    clopidogrel  75 mg Oral Daily    hydrALAZINE  25 mg Oral Q8H    therapeutic multivitamin-minerals  1 tablet Oral Nightly    metoprolol succinate  50 mg Oral Daily    hydrOXYzine  10 mg Oral Daily    tamsulosin  0.4 mg Oral Daily    ranolazine  1,000 mg Oral BID    atorvastatin  40 mg Oral Nightly    isosorbide mononitrate  120 mg Oral Daily    insulin lispro  7 Units Subcutaneous BID WC    dilTIAZem  120 mg Oral Daily    ferrous sulfate  325 mg Oral Every Other Day    ascorbic acid  500 mg Oral Every Other Day    insulin detemir  50 Units Subcutaneous BID    insulin lispro  0-12 Units Subcutaneous TID WC    insulin lispro  0-6 Units Subcutaneous Nightly     Continuous Infusions:  PRN Meds:ALPRAZolam, nitroGLYCERIN, acetaminophen **AND** diphenhydrAMINE, potassium chloride **OR** potassium alternative oral replacement **OR** potassium chloride, potassium chloride    Review of Systems  Pertinent items are noted in HPI. Objective:     Patient Vitals for the past 8 hrs:   BP Temp Temp src Pulse Resp SpO2   08/27/21 1449 (!) 144/63 97.7 °F (36.5 °C) Oral 60 18 96 %   08/27/21 1414 131/61 -- -- 59 -- --   08/27/21 1051 128/62 97.9 °F (36.6 °C) Oral 60 20 96 %   08/27/21 0812 (!) 117/37 98.6 °F (37 °C) Oral 59 18 97 %     I/O last 3 completed shifts: In: 1020 [P.O.:1020]  Out: 450 [Urine:450]    No change     ECG: normal sinus rhythm, no blocks or conduction defects, no ischemic changes.    Data Review:   CBC:  Lab Results   Component Value Date    WBC 10.1 08/26/2021    RBC 2.81 08/26/2021    RBC 3.80 03/24/2012    HGB 8.8 08/26/2021    HCT 27.6 08/26/2021    MCV 98.2 08/26/2021    MCH 31.3 08/26/2021    MCHC 31.9 08/26/2021    RDW 13.7 06/07/2018     08/26/2021    MPV 10.0 08/26/2021     BMP  Lab Results   Component Value Date     08/27/2021    K 4.2 08/27/2021    K 4.0 08/25/2021    CL 95 08/27/2021    CO2 25 08/27/2021    BUN 64 08/27/2021    CREATININE 2.7 08/27/2021    CALCIUM 8.8 08/27/2021      COAG PROFILE:  Lab Results   Component Value Date    APTT 58.0 04/08/2021    INR 1.73 07/19/2021       Assessment:     Active Problems:    Elevated troponin  Resolved Problems:    * No resolved hospital problems.  *      Plan:   Pedal oedema is less    renal function stable h/h stable    po medication    ambulate    home soon

## 2021-08-27 NOTE — PROGRESS NOTES
Referral received from Dr. Dalton Schmidt    This patient was admitted for elevated tropinin with history of anemia and recent GIB in July 2021,     This patient was previously diagnosed with paroxysmal A. Fib and was on Eliquis 5mg BID. Clinically appears to be a good candidate for WATCHMAN.  Would need to be further evaluated by Dr. Brianna Israel with appointment scheduled on 9/1/2021 [place] [facility] [place]     Primary Cardiologist: Dr. Dalton Schmidt     Will d/w primary cardiologist.    Thank you for the referral!

## 2021-08-28 LAB
ERYTHROPOIETIN: 20 MU/ML (ref 4–27)
HAPTOGLOBIN: 228 MG/DL (ref 30–200)

## 2021-08-28 NOTE — DISCHARGE SUMMARY
[address] [place] [address]                               DISCHARGE SUMMARY    PATIENT NAME: Indra Vicente                     :        1945  MED REC NO:   [id number]                           ROOM:       0021  ACCOUNT NO:   [de-identified]                           ADMIT DATE: 2021  PROVIDER:     Hang Sutton. Carlyn Xiong M.D.               Marquise Chester: 2021    FINAL DIAGNOSES:  1. Acute exacerbation of chronic systolic and diastolic congestive  heart failure. 2.  History of paroxysmal atrial fib. 3.  History of extensive coronary artery disease, CABG on intervention  multiple times. 4.  History of diabetes mellitus. 5.  History of hypertension. 6.  Dyspnea on exertion and significant shortness of breath. 7.  Anemia. 8.  Chronic renal failure with an acute exacerbation with the diuretics. 9.  Pedal edema. CONSULTATIONS:  1. Seen by Hematology service. 2.  Seen by Nephrology service. BRIEF HISTORY:  This is a patient who is a 77-year-old gentleman with  extensive and multiple medical problems. The patient was seen in the  office with significant pedal edema with again very short of breath. The patient had a history of coronary artery disease, history of CABG,  history of multiple interventions. The patient has chronic renal  failure, had chronic anemia. For the rest of H and P, please refer to  my H and P.    HOSPITALIZATION COURSE:  The patient admitted to the tele bed. He was  treated with IV Bumex. He had a good diuresis. His pedal edema  improved. Of course, his renal function deteriorated. The patient had  low hemoglobin. Hematology consultation was obtained. The patient also  had a GI workup in the past.  It was negative. The patient had a  history of blood transfusion. His iron studies were okay. The patient  was seen by Nephrology service.   The patient was referred to have a  heart cath only

## 2021-08-30 ENCOUNTER — CARE COORDINATION (OUTPATIENT)
Dept: FAMILY MEDICINE CLINIC | Age: 76
End: 2021-08-30

## 2021-08-30 ENCOUNTER — TELEPHONE (OUTPATIENT)
Dept: NEPHROLOGY | Age: 76
End: 2021-08-30

## 2021-08-30 ENCOUNTER — TELEPHONE (OUTPATIENT)
Dept: INTERNAL MEDICINE CLINIC | Age: 76
End: 2021-08-30

## 2021-08-30 ENCOUNTER — NURSE ONLY (OUTPATIENT)
Dept: LAB | Age: 76
End: 2021-08-30

## 2021-08-30 DIAGNOSIS — D62 ACUTE BLOOD LOSS ANEMIA: ICD-10-CM

## 2021-08-30 DIAGNOSIS — N17.9 ACUTE KIDNEY INJURY SUPERIMPOSED ON CKD (HCC): ICD-10-CM

## 2021-08-30 DIAGNOSIS — N18.9 ACUTE KIDNEY INJURY SUPERIMPOSED ON CKD (HCC): ICD-10-CM

## 2021-08-30 DIAGNOSIS — N18.30 STAGE 3 CHRONIC KIDNEY DISEASE, UNSPECIFIED WHETHER STAGE 3A OR 3B CKD (HCC): Primary | ICD-10-CM

## 2021-08-30 LAB
ANION GAP SERPL CALCULATED.3IONS-SCNC: 8 MEQ/L (ref 8–16)
BUN BLDV-MCNC: 57 MG/DL (ref 7–22)
CALCIUM SERPL-MCNC: 9.4 MG/DL (ref 8.5–10.5)
CHLORIDE BLD-SCNC: 99 MEQ/L (ref 98–111)
CO2: 27 MEQ/L (ref 23–33)
CREAT SERPL-MCNC: 2.3 MG/DL (ref 0.4–1.2)
GFR SERPL CREATININE-BSD FRML MDRD: 28 ML/MIN/1.73M2
GLUCOSE BLD-MCNC: 97 MG/DL (ref 70–108)
HCT VFR BLD CALC: 26.8 % (ref 42–52)
HEMOGLOBIN: 8.2 GM/DL (ref 14–18)
POTASSIUM SERPL-SCNC: 4.4 MEQ/L (ref 3.5–5.2)
SODIUM BLD-SCNC: 134 MEQ/L (ref 135–145)

## 2021-08-30 NOTE — TELEPHONE ENCOUNTER
----- Message from Belle Church MD sent at 8/30/2021  2:56 PM EDT -----  Please check with the patient at his weight is today

## 2021-08-30 NOTE — TELEPHONE ENCOUNTER
Please start taking Bumex 2 mg p.o. daily or if he is having Lasix 80 mg p.o. daily and continue 1500 mm fluid restriction  Repeat labs in a week, if the weight continues to get worse by the end of the week please let us know

## 2021-08-30 NOTE — TELEPHONE ENCOUNTER
----- Message from Luiza Talley MD sent at 8/30/2021  1:12 PM EDT -----  Tell pt h/h stable; dr Joselyn Bui to address bmp

## 2021-08-30 NOTE — TELEPHONE ENCOUNTER
Patient weighs 217.8lbs. took one bumex this am. Only bumex hes had since hospital. He is up 4 pounds since discharge.

## 2021-08-31 ENCOUNTER — TELEPHONE (OUTPATIENT)
Dept: INTERNAL MEDICINE CLINIC | Age: 76
End: 2021-08-31

## 2021-08-31 NOTE — TELEPHONE ENCOUNTER
Patient was discharged from [address] [place] [address] on 8/27/2021. I called to touch base with him in regards to his diabetes. Patient stated that they did not change any of his diabetes medications while admitted and his blood sugars are doing well. I let him know that if he had any issues to call and that his next appointment with us in 9/27/2021. Patient verbalized understanding.

## 2021-09-03 ENCOUNTER — NURSE ONLY (OUTPATIENT)
Dept: LAB | Age: 76
End: 2021-09-03

## 2021-09-03 DIAGNOSIS — N18.30 STAGE 3 CHRONIC KIDNEY DISEASE, UNSPECIFIED WHETHER STAGE 3A OR 3B CKD (HCC): ICD-10-CM

## 2021-09-03 LAB
ANION GAP SERPL CALCULATED.3IONS-SCNC: 10 MEQ/L (ref 8–16)
BUN BLDV-MCNC: 41 MG/DL (ref 7–22)
CALCIUM SERPL-MCNC: 9.4 MG/DL (ref 8.5–10.5)
CHLORIDE BLD-SCNC: 102 MEQ/L (ref 98–111)
CO2: 26 MEQ/L (ref 23–33)
CREAT SERPL-MCNC: 2 MG/DL (ref 0.4–1.2)
GFR SERPL CREATININE-BSD FRML MDRD: 33 ML/MIN/1.73M2
GLUCOSE BLD-MCNC: 102 MG/DL (ref 70–108)
POTASSIUM SERPL-SCNC: 4 MEQ/L (ref 3.5–5.2)
SODIUM BLD-SCNC: 138 MEQ/L (ref 135–145)

## 2021-09-08 ENCOUNTER — CARE COORDINATION (OUTPATIENT)
Dept: FAMILY MEDICINE CLINIC | Age: 76
End: 2021-09-08

## 2021-09-08 ENCOUNTER — TELEPHONE (OUTPATIENT)
Dept: CARDIOLOGY CLINIC | Age: 76
End: 2021-09-08

## 2021-09-13 ENCOUNTER — OFFICE VISIT (OUTPATIENT)
Dept: PULMONOLOGY | Age: 76
End: 2021-09-13
Payer: MEDICARE

## 2021-09-13 VITALS
BODY MASS INDEX: 33.18 KG/M2 | SYSTOLIC BLOOD PRESSURE: 136 MMHG | HEIGHT: 69 IN | DIASTOLIC BLOOD PRESSURE: 60 MMHG | TEMPERATURE: 97.9 F | HEART RATE: 64 BPM | WEIGHT: 224 LBS | OXYGEN SATURATION: 98 %

## 2021-09-13 DIAGNOSIS — E66.9 OBESITY (BMI 30-39.9): ICD-10-CM

## 2021-09-13 DIAGNOSIS — I51.89 DIASTOLIC DYSFUNCTION: ICD-10-CM

## 2021-09-13 DIAGNOSIS — D64.9 ANEMIA, UNSPECIFIED TYPE: ICD-10-CM

## 2021-09-13 DIAGNOSIS — G47.33 OSA TREATED WITH BIPAP: Primary | ICD-10-CM

## 2021-09-13 DIAGNOSIS — G47.10 HYPERSOMNIA: ICD-10-CM

## 2021-09-13 PROCEDURE — 1036F TOBACCO NON-USER: CPT | Performed by: PHYSICIAN ASSISTANT

## 2021-09-13 PROCEDURE — 1111F DSCHRG MED/CURRENT MED MERGE: CPT | Performed by: PHYSICIAN ASSISTANT

## 2021-09-13 PROCEDURE — G8427 DOCREV CUR MEDS BY ELIG CLIN: HCPCS | Performed by: PHYSICIAN ASSISTANT

## 2021-09-13 PROCEDURE — 99214 OFFICE O/P EST MOD 30 MIN: CPT | Performed by: PHYSICIAN ASSISTANT

## 2021-09-13 PROCEDURE — G8417 CALC BMI ABV UP PARAM F/U: HCPCS | Performed by: PHYSICIAN ASSISTANT

## 2021-09-13 PROCEDURE — 4040F PNEUMOC VAC/ADMIN/RCVD: CPT | Performed by: PHYSICIAN ASSISTANT

## 2021-09-13 PROCEDURE — 1123F ACP DISCUSS/DSCN MKR DOCD: CPT | Performed by: PHYSICIAN ASSISTANT

## 2021-09-13 ASSESSMENT — ENCOUNTER SYMPTOMS
CHEST TIGHTNESS: 0
ALLERGIC/IMMUNOLOGIC NEGATIVE: 1
EYES NEGATIVE: 1
COUGH: 0
NAUSEA: 0
STRIDOR: 0
SHORTNESS OF BREATH: 0
DIARRHEA: 0
WHEEZING: 0
BACK PAIN: 0

## 2021-09-13 NOTE — PROGRESS NOTES
[facility]      Joss Arriaza         [id number]  9/13/2021   Chief Complaint   Patient presents with    Follow-up     RICCARDO 3 month follow up with download BIPAP         Pt of Dr. Tere Banks    PAP Download:   Karen Torres or initial AHI: 8.3     Date of initial study: 2/7/2018      Compliant  97%     Noncompliant 3 %     PAP Type Aircurve 10 Vauto Level  18/15   Avg Hrs/Day 9 hr 18 min  AHI: 7.0   Recorded compliance dates , 8/11/2021  to 9/9/2021   Machine/Mfg:   [x] ResMed    [] Respironics/Dreamstation   Interface:   [] Nasal    [] Nasal pillows   [x] FFM      Provider:      [x] ROMEL     []Yomi     [] Brisa    [] Noe Siddiqui    [] Vel               [] [facility]      [] Adaptive    [] [facility]:      [] Other    Neck Size: 17.5  Mallampati Mallampati 4  ESS:  11  SAQLI: 93     Here is a scan of the most recent download:          Presentation:   Janae Connelly presents for sleep medicine follow up for obstructive sleep apnea  Since the last visit, Janae Connelly is doing well with PAP. AHI is still elevated but the best its been. He is sleeping ok and feels mostly rested. He was is the hospital for CHF recently. Equipment issues: The pressure is  acceptable, the mask is acceptable     Sleep issues:  Do you feel better? Yes  More rested? Yes   Better concentration? yes    Progress History:   Since last visit any new medical issues? Yes CHF- diastolic  New ER or hospital visits? Yes   Any new or changes in medicines? No  Any new sleep medicines? No    Review of Systems -   Review of Systems   Constitutional: Negative for activity change, appetite change, chills and fever. HENT: Negative for congestion and postnasal drip. Eyes: Negative. Respiratory: Negative for cough, chest tightness, shortness of breath, wheezing and stridor. Cardiovascular: Negative for chest pain and leg swelling. Gastrointestinal: Negative for diarrhea and nausea. Endocrine: Negative. Genitourinary: Negative. Musculoskeletal: Negative. Negative for arthralgias and back pain. Skin: Negative. Allergic/Immunologic: Negative. Neurological: Negative. Negative for dizziness and light-headedness. Psychiatric/Behavioral: Negative. All other systems reviewed and are negative. Physical Exam:  BMI:  Body mass index is 33.08 kg/m². Wt Readings from Last 3 Encounters:   09/13/21 224 lb (101.6 kg)   08/27/21 212 lb 8 oz (96.4 kg)   07/28/21 219 lb 6.4 oz (99.5 kg)     Weight gained 12 lbs over 1 months  Vitals: /60 (Site: Left Upper Arm, Position: Sitting, Cuff Size: Large Adult)   Pulse 64   Temp 97.9 °F (36.6 °C) (Temporal)   Ht 5' 9\" (1.753 m)   Wt 224 lb (101.6 kg)   SpO2 98% Comment: rm air at rest  BMI 33.08 kg/m²       Physical Exam  Constitutional:       Appearance: He is well-developed. HENT:      Head: Normocephalic and atraumatic. Right Ear: External ear normal.      Left Ear: External ear normal.   Eyes:      Conjunctiva/sclera: Conjunctivae normal.      Pupils: Pupils are equal, round, and reactive to light. Cardiovascular:      Rate and Rhythm: Normal rate and regular rhythm. Heart sounds: Normal heart sounds. Pulmonary:      Effort: Pulmonary effort is normal.      Breath sounds: Normal breath sounds. Musculoskeletal:         General: Normal range of motion. Cervical back: Normal range of motion and neck supple. Skin:     General: Skin is warm and dry. Neurological:      Mental Status: He is alert and oriented to person, place, and time. Psychiatric:         Behavior: Behavior normal.         Thought Content: Thought content normal.         Judgment: Judgment normal.           ASSESSMENT/DIAGNOSIS     Diagnosis Orders   1. RICCARDO treated with BiPAP  DME Order for CPAP as OP   2. Obesity (BMI 30-39.9)     3. Diastolic dysfunction     4. Hypersomnia     5.  Anemia, unspecified type              Plan   Do you need any equipment today? No  - AHI is acceptable and the best its been for quite some time  - he is not likely an ASV candidate since having CHF issues  - Download reviewed and discussed with patient  - He  was advised to continue current positive airway pressure therapy with above described pressure. - He  advised to keep good compliance with current recommended pressure to get optimal results and clinical improvement  - Recommend 7-9 hours of sleep with PAP  - He was advised to call [employer] regarding supplies if needed.   -He call my office for earlier appointment if needed for worsening of sleep symptoms.   - He was instructed on weight loss  - Rashard Costello was educated about my impression and plan. Patient verbalizesunderstanding.   We will see Jyoti Pop back in: 6 months with download    Information added by my medical assistant/LPN was reviewed today       Elida Davis PA-C, MPAS  9/13/2021

## 2021-09-14 ENCOUNTER — TELEPHONE (OUTPATIENT)
Dept: CARDIOLOGY CLINIC | Age: 76
End: 2021-09-14

## 2021-09-15 ENCOUNTER — CARE COORDINATION (OUTPATIENT)
Dept: FAMILY MEDICINE CLINIC | Age: 76
End: 2021-09-15

## 2021-09-15 NOTE — CARE COORDINATION
Santiago Tamez is feeling stronger than the last time when I spoke with Ronda Pollack. His voice was clear and he had no cough. He said they were jeannie salsa today. He said his wife puts him to work. I told him that work isn't a bad thing. He denies any home needs at this time but will call if he needs anything.

## 2021-09-17 ENCOUNTER — OFFICE VISIT (OUTPATIENT)
Dept: NEPHROLOGY | Age: 76
End: 2021-09-17
Payer: MEDICARE

## 2021-09-17 VITALS
BODY MASS INDEX: 32.49 KG/M2 | SYSTOLIC BLOOD PRESSURE: 129 MMHG | WEIGHT: 220 LBS | HEART RATE: 61 BPM | OXYGEN SATURATION: 98 % | DIASTOLIC BLOOD PRESSURE: 44 MMHG

## 2021-09-17 DIAGNOSIS — I10 ESSENTIAL HYPERTENSION: ICD-10-CM

## 2021-09-17 DIAGNOSIS — D63.1 ANEMIA IN STAGE 3A CHRONIC KIDNEY DISEASE (HCC): Primary | ICD-10-CM

## 2021-09-17 DIAGNOSIS — N18.30 STAGE 3 CHRONIC KIDNEY DISEASE, UNSPECIFIED WHETHER STAGE 3A OR 3B CKD (HCC): ICD-10-CM

## 2021-09-17 DIAGNOSIS — I50.32 CHRONIC DIASTOLIC CONGESTIVE HEART FAILURE (HCC): ICD-10-CM

## 2021-09-17 DIAGNOSIS — N18.31 ANEMIA IN STAGE 3A CHRONIC KIDNEY DISEASE (HCC): Primary | ICD-10-CM

## 2021-09-17 PROCEDURE — 1036F TOBACCO NON-USER: CPT | Performed by: INTERNAL MEDICINE

## 2021-09-17 PROCEDURE — G8417 CALC BMI ABV UP PARAM F/U: HCPCS | Performed by: INTERNAL MEDICINE

## 2021-09-17 PROCEDURE — 99214 OFFICE O/P EST MOD 30 MIN: CPT | Performed by: INTERNAL MEDICINE

## 2021-09-17 PROCEDURE — G8428 CUR MEDS NOT DOCUMENT: HCPCS | Performed by: INTERNAL MEDICINE

## 2021-09-17 PROCEDURE — 1123F ACP DISCUSS/DSCN MKR DOCD: CPT | Performed by: INTERNAL MEDICINE

## 2021-09-17 PROCEDURE — 1111F DSCHRG MED/CURRENT MED MERGE: CPT | Performed by: INTERNAL MEDICINE

## 2021-09-17 PROCEDURE — 4040F PNEUMOC VAC/ADMIN/RCVD: CPT | Performed by: INTERNAL MEDICINE

## 2021-09-17 RX ORDER — SODIUM CHLORIDE 0.9 % (FLUSH) 0.9 %
5-40 SYRINGE (ML) INJECTION PRN
Status: CANCELLED | OUTPATIENT
Start: 2021-09-17

## 2021-09-17 RX ORDER — METHYLPREDNISOLONE SODIUM SUCCINATE 125 MG/2ML
125 INJECTION, POWDER, LYOPHILIZED, FOR SOLUTION INTRAMUSCULAR; INTRAVENOUS ONCE
Status: CANCELLED | OUTPATIENT
Start: 2021-09-17 | End: 2021-09-17

## 2021-09-17 RX ORDER — SODIUM CHLORIDE 9 MG/ML
INJECTION, SOLUTION INTRAVENOUS CONTINUOUS
Status: CANCELLED | OUTPATIENT
Start: 2021-09-17

## 2021-09-17 RX ORDER — SODIUM CHLORIDE 9 MG/ML
25 INJECTION, SOLUTION INTRAVENOUS PRN
Status: CANCELLED | OUTPATIENT
Start: 2021-09-17

## 2021-09-17 RX ORDER — HEPARIN SODIUM (PORCINE) LOCK FLUSH IV SOLN 100 UNIT/ML 100 UNIT/ML
500 SOLUTION INTRAVENOUS PRN
Status: CANCELLED | OUTPATIENT
Start: 2021-09-17

## 2021-09-17 RX ORDER — DIPHENHYDRAMINE HYDROCHLORIDE 50 MG/ML
50 INJECTION INTRAMUSCULAR; INTRAVENOUS ONCE
Status: CANCELLED | OUTPATIENT
Start: 2021-09-17 | End: 2021-09-17

## 2021-09-17 NOTE — PROGRESS NOTES
\[facility]\""[facility]        Outpatient Follow-Up note         9/17/2021 9:47 AM    Patient Name:   Jennifer Sharp  Birthdate:    [birth year]  Primary Care Physician:  Valery Mullins MD     Chief Complaint / Reason for follow-up : Follow Up of CKD     Interval History :  Patient seen and examined by me. Feels ok. No chest pain. No SOB. Feels weak and tired . Was recently in the hospital for congestive heart failure and his weight was almost 228 pounds.      Past History :  Past Medical History:   Diagnosis Date    Adenomatous colon polyp 2009/2010, 6/'14,7/'17, 9/20, 7/21    Angina at rest [facility])     Atrial fibrillation ([address] 12/11 and 11/12    cardioversion 12/11    Bladder cancer [facility]) 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    BPH (benign prostatic hypertrophy)     CAD (coronary artery disease) 1997    Carotid artery disease ([address] 1995    right    Carotid disease, bilateral ([address]     Cerebral artery occlusion with cerebral infarction ([address]     CHF (congestive heart failure) ([address]     Chronic kidney disease     GERD (gastroesophageal reflux disease)     Hyperlipidemia 1996    Hypertension     Lumbar disc disease 2001    MI (myocardial infarction) ([address] 1997    Nephrolithiasis 2002    NIDDM (non-insulin dependent diabetes mellitus) 1996    diagnosed 1996    RICCARDO (obstructive sleep apnea) 2005    cpap    PVC's (premature ventricular contractions)     Renal artery stenosis ([address] 2007    left    Renal insufficiency     S/P CABG (status post coronary artery bypass graft) 1997    5 vessels    Skin cancer     Squamous cell carcinoma     TIA (transient ischemic attack)     Wears partial dentures     upper and lower     Past Surgical History:   Procedure Laterality Date    ABLATION OF DYSRHYTHMIC FOCUS  3/13/13   [address] CARDIAC SURGERY  3/13/13    oblation for irreg rythm    CARDIOVERSION  3/8/2012    CAROTID ENDARTERECTOMY  1997    CHOLECYSTECTOMY  4/1999    COLONOSCOPY  6/22/09, 4/2011,7/12/17    polyp removal    COLONOSCOPY N/A 7/21/2021    COLONOSCOPY POLYPECTOMY SNARE/COLD BIOPSY performed by Shantanu Hook MD at [address]  2008 (2), 2009 (1)    x2    CORONARY ARTERY BYPASS GRAFT  8/1997    5 vessel    DIAGNOSTIC CARDIAC CATH LAB PROCEDURE      FOOT SURGERY  12/20/2013    pre-cancerous mole rt foot removed    LITHOTRIPSY  4/2002    PA OFFICE/OUTPT VISIT,PROCEDURE ONLY N/A 11/8/2018    TRANSESOPHAGEAL ECHOCARDIOGRAM performed by Nelia Ash MD at [address] [place] [address] [facility]  05/30/2018   Yshilo  2001, 2003    rt shoulder manipulation--frozen shoulder-01, Lt -03    SKIN BIOPSY      SKIN CANCER EXCISION  12/20/13     R foot    SKIN CANCER EXCISION  1/2016    squamous cell Lt  upper cheek    UPPER GASTROINTESTINAL ENDOSCOPY N/A 7/20/2021    EGD ESOPHAGOGASTRODUODENOSCOPY performed by Shantanu Hook MD at [address] [facility] [address]  4/2002    VASCULAR SURGERY      carotids & cabg harvests        Medications :     Outpatient Medications Marked as Taking for the 9/17/21 encounter (Office Visit) with Ruy Espana MD   Medication Sig Dispense Refill    NOVOFINE 32G X 6 MM MISC USE 1 NEW NEEDLE 6 TIMES  DAILY AND AS NEEDED 540 each 1    bumetanide (BUMEX) 2 MG tablet Take 1 tablet by mouth 2 times daily (Patient taking differently: Take 2 mg by mouth daily ) 60 tablet 3    insulin detemir (LEVEMIR FLEXTOUCH) 100 UNIT/ML injection pen Inject 48 Units into the skin 2 times daily 90 mL 3    insulin detemir (LEVEMIR) 100 UNIT/ML injection vial Inject 48 Units into the skin 2 times daily       ferrous sulfate (IRON 325) 325 (65 Fe) MG tablet Take 325 mg by mouth every other day      ascorbic acid (VITAMIN C) 500 MG tablet Take 500 mg by mouth every other day      nitroGLYCERIN (NITROLINGUAL) 0.4 MG/SPRAY 0.4 mg spray USE 1 SPRAY ON OR UNDER THE TONGUE EVERY 5 MINUTES AS NEEDED FOR CHEST PAIN 4.9 g 6    dilTIAZem (CARDIZEM CD) 120 MG extended release capsule TAKE 1 CAPSULE BY MOUTH  DAILY 90 capsule 3    insulin lispro (HUMALOG) 100 UNIT/ML injection vial Inject into the skin 7 units in am and 7 units at lunch and 10 units at supper plus sliding scale during meals and at bedtime      isosorbide mononitrate (IMDUR) 120 MG extended release tablet Take 1 tablet by mouth daily 30 tablet 3    blood glucose test strips (ACCU-CHEK GUIDE) strip USE TO CHECK 4 TIMES DAILY 300 strip 5    Accu-Chek FastClix Lancets MISC USE TO TEST BLOOD SUGARS 4 TIMES DAILY 102 each 63    atorvastatin (LIPITOR) 40 MG tablet TAKE 1 TABLET BY MOUTH AT  NIGHT 90 tablet 3    ranolazine (RANEXA) 1000 MG extended release tablet Take 1 tablet by mouth 2 times daily New dose 180 tablet 1    tamsulosin (FLOMAX) 0.4 MG capsule Take 1 capsule by mouth daily 90 capsule 2    diphenhydrAMINE-APAP, sleep, (TYLENOL PM EXTRA STRENGTH)  MG tablet Take 1 tablet by mouth every evening as needed      ALPRAZolam (XANAX) 0.25 MG tablet Take 0.25 mg by mouth 3 times daily as needed for Anxiety.       hydrOXYzine (ATARAX) 10 MG tablet Take 10 mg by mouth daily as needed       fluocinonide (LIDEX) 0.05 % cream Apply 1 applicator topically as needed      BiPAP Machine MISC by Does not apply route      metoprolol succinate (TOPROL XL) 50 MG extended release tablet TAKE 1 TABLET BY MOUTH  DAILY 90 tablet 3    Multiple Vitamins-Minerals (THERAPEUTIC MULTIVITAMIN-MINERALS) tablet Take 1 tablet by mouth nightly      hydrALAZINE (APRESOLINE) 25 MG tablet Take 1 tablet by mouth every 8 hours 90 tablet 0    clopidogrel (PLAVIX) 75 MG tablet Take 75 mg by mouth daily morning         Vitals     BP (!) 129/44 (Site: Left Upper Arm, Position: Sitting, Cuff Size: Medium Adult)   Pulse 61   Wt 220 lb (99.8 kg)   SpO2 98%   BMI 32.49 kg/m²  Wt Readings from Last 3 Encounters:   09/17/21 220 lb (99.8

## 2021-09-20 ENCOUNTER — NURSE ONLY (OUTPATIENT)
Dept: LAB | Age: 76
End: 2021-09-20

## 2021-09-20 DIAGNOSIS — N18.31 ANEMIA IN STAGE 3A CHRONIC KIDNEY DISEASE (HCC): ICD-10-CM

## 2021-09-20 DIAGNOSIS — I10 ESSENTIAL HYPERTENSION: ICD-10-CM

## 2021-09-20 DIAGNOSIS — D63.1 ANEMIA IN STAGE 3A CHRONIC KIDNEY DISEASE (HCC): ICD-10-CM

## 2021-09-20 DIAGNOSIS — I50.32 CHRONIC DIASTOLIC CONGESTIVE HEART FAILURE (HCC): ICD-10-CM

## 2021-09-20 DIAGNOSIS — N18.30 STAGE 3 CHRONIC KIDNEY DISEASE, UNSPECIFIED WHETHER STAGE 3A OR 3B CKD (HCC): ICD-10-CM

## 2021-09-20 LAB — HEMOGLOBIN: 9.2 GM/DL (ref 14–18)

## 2021-09-20 NOTE — CONSULTS
[address] [place] [address]                                  CONSULTATION    PATIENT NAME: Luis Fernando Jensen                     :        1945  MED REC NO:   [id number]                           ROOM:       [id number]  ACCOUNT NO:   [de-identified]                           ADMIT DATE: 2021  PROVIDER:     Sara Murray. Agustin Lugo M.D.    Cely Ohms:  2021    REASON FOR CONSULTATION:  Anticoagulation in the setting of anemia. HISTORY OF PRESENT ILLNESS:  This is a patient who is a 51-year-old  gentleman who is known to have a history of coronary artery disease. He  has a history of CABG and he had a history of prior intervention. The  patient had multiple stentings and he had a stenting in this facility  and he had a stenting in [facility]. The patient has a history of  paroxysmal atrial fib. He has been on anticoagulation for that. The  patient has multiple stents and has been on antiplatelet treatment. He  apparently presented with significant anemia that necessitated blood  transfusion. He is to have an EGD. The patient as usual has chest pain and shortness of breath. He had  also acute exacerbation of his congestive heart failure. REVIEW OF SYSTEMS:  The patient had a history of CVA and a history of  carotid endarterectomy in the past.  The patient had a history of  hypertension, hypertensive emergency in the past.  The patient had a  history of chronic renal insufficiency, history of dyslipidemia, history  of chronic back pain, history of prior MI, nephrolithiasis, and history  of enlarged prostate. The patient had a history of bladder cancer and  history of sleep apnea, with CPAP. Diabetes mellitus, paroxysmal atrial  fib, PVCs. The patient has no change in his weight. He denied fever,  chills, or rigors. He denied dizziness or syncopal episode.   The  patient has history of cholecystectomy, history of ablation treatment,  history of cardioversion, shoulder surgery. ALLERGIES:  THE PATIENT HAS NO KNOWN ALLERGY. CODE STATUS:  He is a full code. PHYSICAL EXAMINATION:  VITAL SIGNS:  Showed his blood pressure was 140/80. He was in sinus  rhythm. He was afebrile. Respiratory rate was 18. NEUROLOGIC:  He has no focal neurological deficits. NECK:  He has positive JVD. LUNGS:  Scattered expiratory rhonchi. HEART:  Apical pulsation was in the fifth intercostal space. 2/6  systolic murmur. ABDOMEN:  Soft. EXTREMITIES:  Lower limbs, there is no edema. LABORATORY DATA:  This patient's _____ were negative. The patient was  anemic. He did receive blood transfusion. Hemoglobin on admission was  6.4, hematocrit was 21. His BNP was 1800. His BUN was 55, creatinine  2.1. Blood sugar 168. Troponin is mildly elevated. IMPRESSION:  1. The patient was admitted with GI bleed. Holding anticoagulation. Holding his dual antiplatelet treatment. Of course, this does carry  some risk of in-stent thrombosis as the patient had history of CABG and  extensive interventions in the past.  2.  Congestive heart failure, probably exacerbated by his anemia. 3.  Renal failure. 4.  Diabetes mellitus. 5.  COPD. 6.  Anxiety. PLAN:  From the cardiac standpoint, it is okay for the patient to have  an endoscopy. The patient needed blood transfusion to keep his  hemoglobin as high as possible. Monitor his renal function and monitor  his congestive heart failure. This is a difficult case and the treatment of his different medical  issues will antagonize each other and we will try to find a balance  between the treatment of his coronary artery disease, atrial fib, his  anemia, and GI bleed. We thank you very much for allowing us to share in the management of  this patient. We will follow up with you.         Amena Nuno M.D.    D: 09/19/2021 19:48:30       T: 09/19/2021 23:33:21     AS/ZULEYKA  Job#: [id number] Doc#: [id number]    CC:

## 2021-09-21 ENCOUNTER — CARE COORDINATION (OUTPATIENT)
Dept: FAMILY MEDICINE CLINIC | Age: 76
End: 2021-09-21

## 2021-09-21 ENCOUNTER — HOSPITAL ENCOUNTER (OUTPATIENT)
Dept: NURSING | Age: 76
Discharge: HOME | End: 2021-09-21
Payer: MEDICARE

## 2021-09-21 VITALS
BODY MASS INDEX: 32.19 KG/M2 | SYSTOLIC BLOOD PRESSURE: 141 MMHG | WEIGHT: 218 LBS | OXYGEN SATURATION: 98 % | HEART RATE: 66 BPM | TEMPERATURE: 97.4 F | DIASTOLIC BLOOD PRESSURE: 63 MMHG | RESPIRATION RATE: 18 BRPM

## 2021-09-21 DIAGNOSIS — D63.1 ANEMIA OF CHRONIC RENAL FAILURE, STAGE 4 (SEVERE) (HCC): Primary | ICD-10-CM

## 2021-09-21 DIAGNOSIS — N18.4 ANEMIA OF CHRONIC RENAL FAILURE, STAGE 4 (SEVERE) (HCC): Primary | ICD-10-CM

## 2021-09-21 PROCEDURE — 6360000002 HC RX W HCPCS: Performed by: INTERNAL MEDICINE

## 2021-09-21 PROCEDURE — 2580000003 HC RX 258: Performed by: INTERNAL MEDICINE

## 2021-09-21 PROCEDURE — 96372 THER/PROPH/DIAG INJ SC/IM: CPT

## 2021-09-21 PROCEDURE — 96365 THER/PROPH/DIAG IV INF INIT: CPT

## 2021-09-21 RX ORDER — HEPARIN SODIUM (PORCINE) LOCK FLUSH IV SOLN 100 UNIT/ML 100 UNIT/ML
500 SOLUTION INTRAVENOUS PRN
Status: CANCELLED | OUTPATIENT
Start: 2021-09-28

## 2021-09-21 RX ORDER — DIPHENHYDRAMINE HYDROCHLORIDE 50 MG/ML
50 INJECTION INTRAMUSCULAR; INTRAVENOUS ONCE
Status: CANCELLED | OUTPATIENT
Start: 2021-09-28 | End: 2021-09-28

## 2021-09-21 RX ORDER — SODIUM CHLORIDE 9 MG/ML
INJECTION, SOLUTION INTRAVENOUS CONTINUOUS
Status: CANCELLED | OUTPATIENT
Start: 2021-09-28

## 2021-09-21 RX ORDER — SODIUM CHLORIDE 9 MG/ML
25 INJECTION, SOLUTION INTRAVENOUS PRN
Status: CANCELLED | OUTPATIENT
Start: 2021-09-28

## 2021-09-21 RX ORDER — METHYLPREDNISOLONE SODIUM SUCCINATE 125 MG/2ML
125 INJECTION, POWDER, LYOPHILIZED, FOR SOLUTION INTRAMUSCULAR; INTRAVENOUS ONCE
Status: CANCELLED | OUTPATIENT
Start: 2021-09-28 | End: 2021-09-28

## 2021-09-21 RX ORDER — SODIUM CHLORIDE 0.9 % (FLUSH) 0.9 %
5-40 SYRINGE (ML) INJECTION PRN
Status: DISCONTINUED | OUTPATIENT
Start: 2021-09-21 | End: 2021-09-22 | Stop reason: HOSPADM

## 2021-09-21 RX ORDER — SODIUM CHLORIDE 0.9 % (FLUSH) 0.9 %
5-40 SYRINGE (ML) INJECTION PRN
Status: CANCELLED | OUTPATIENT
Start: 2021-09-28

## 2021-09-21 RX ADMIN — DARBEPOETIN ALFA 40 MCG: 40 INJECTION, SOLUTION INTRAVENOUS; SUBCUTANEOUS at 08:04

## 2021-09-21 RX ADMIN — Medication 10 ML: at 08:13

## 2021-09-21 RX ADMIN — Medication 10 ML: at 08:31

## 2021-09-21 RX ADMIN — FERRIC CARBOXYMALTOSE INJECTION 750 MG: 50 INJECTION, SOLUTION INTRAVENOUS at 08:13

## 2021-09-21 ASSESSMENT — PAIN - FUNCTIONAL ASSESSMENT: PAIN_FUNCTIONAL_ASSESSMENT: 0-10

## 2021-09-21 NOTE — PROGRESS NOTES
___M_ Safety:       (Environmental)   [place] to environment   Ensure ID band is correct and in place/ allergy band as needed   Assess for fall risk   Initiate fall precautions as applicable (fall band, side rails, etc.)   Call light within reach   Bed in low position/ wheels locked    __M__ Pain:        Assess pain level and characteristics   Administer analgesics as ordered   Assess effectiveness of pain management and report to MD as needed    _M___ Knowledge Deficit:   Assess baseline knowledge   Provide teaching at level of understanding   Provide teaching via preferred learning method   Evaluate teaching effectiveness    __M__ Hemodynamic/Respiratory Status:       (Pre and Post Procedure Monitoring)   Assess/Monitor vital signs and LOC   Assess Baseline SpO2 prior to any sedation   Obtain weight/height   Assess vital signs/ LOC until patient meets discharge criteria   Monitor procedure site and notify MD of any issues    __

## 2021-09-21 NOTE — CARE COORDINATION
Nay Morales is walking a little more every day. He said the swelling in his feet and legs are better and he is doing what he is able to remain active. He received iron a few days ago. He has no c/o or needs at this time.

## 2021-09-22 PROBLEM — R79.89 ELEVATED TROPONIN: Status: RESOLVED | Noted: 2021-08-23 | Resolved: 2021-09-22

## 2021-09-22 PROBLEM — R77.8 ELEVATED TROPONIN: Status: RESOLVED | Noted: 2021-08-23 | Resolved: 2021-09-22

## 2021-09-27 ENCOUNTER — OFFICE VISIT (OUTPATIENT)
Dept: INTERNAL MEDICINE CLINIC | Age: 76
End: 2021-09-27
Payer: MEDICARE

## 2021-09-27 ENCOUNTER — NURSE ONLY (OUTPATIENT)
Dept: LAB | Age: 76
End: 2021-09-27

## 2021-09-27 VITALS
WEIGHT: 216 LBS | BODY MASS INDEX: 31.99 KG/M2 | HEART RATE: 61 BPM | SYSTOLIC BLOOD PRESSURE: 137 MMHG | DIASTOLIC BLOOD PRESSURE: 60 MMHG | TEMPERATURE: 98 F | HEIGHT: 69 IN

## 2021-09-27 DIAGNOSIS — I50.32 CHRONIC DIASTOLIC CONGESTIVE HEART FAILURE (HCC): ICD-10-CM

## 2021-09-27 DIAGNOSIS — E11.51 TYPE 2 DIABETES MELLITUS WITH DIABETIC PERIPHERAL ANGIOPATHY WITHOUT GANGRENE, WITH LONG-TERM CURRENT USE OF INSULIN (HCC): Primary | ICD-10-CM

## 2021-09-27 DIAGNOSIS — I10 ESSENTIAL HYPERTENSION: ICD-10-CM

## 2021-09-27 DIAGNOSIS — N18.30 STAGE 3 CHRONIC KIDNEY DISEASE, UNSPECIFIED WHETHER STAGE 3A OR 3B CKD (HCC): ICD-10-CM

## 2021-09-27 DIAGNOSIS — Z79.4 TYPE 2 DIABETES MELLITUS WITH DIABETIC PERIPHERAL ANGIOPATHY WITHOUT GANGRENE, WITH LONG-TERM CURRENT USE OF INSULIN (HCC): Primary | ICD-10-CM

## 2021-09-27 DIAGNOSIS — D63.1 ANEMIA IN STAGE 3A CHRONIC KIDNEY DISEASE (HCC): ICD-10-CM

## 2021-09-27 DIAGNOSIS — N18.31 ANEMIA IN STAGE 3A CHRONIC KIDNEY DISEASE (HCC): ICD-10-CM

## 2021-09-27 LAB — HEMOGLOBIN: 9.6 GM/DL (ref 14–18)

## 2021-09-27 PROCEDURE — 83036 HEMOGLOBIN GLYCOSYLATED A1C: CPT | Performed by: INTERNAL MEDICINE

## 2021-09-27 PROCEDURE — G0108 DIAB MANAGE TRN  PER INDIV: HCPCS | Performed by: INTERNAL MEDICINE

## 2021-09-27 NOTE — PATIENT INSTRUCTIONS
Try to get exercise every day         If you can't walk or ride bike, think about using the therapy bands       Sitting in your chair after supper          10-20 minutes with arms/ legs.  Keep a pace that is comfortable                  For your breathing  Try using Humalog 12 units with your evening meal               --when supper is being prepared, you or Stewart Kirby put your Humalog              Pen on your plate, so when you are ready to eat, you'll remember               Your supper shot!!        -you may need 14 units on the nights you eat spagetti  Always carry something with you for a low blood sugar   Blood sugar goal

## 2021-09-27 NOTE — PROGRESS NOTES
[facility]  [address] Renée Amato, [address]  [phone] (phone)  [phone] (fax)    Patient ID: Marcus Jain 1945  Referring Provider: Dr. Talat Cooley     Patient's name and  were verified. Subjective:    He presents for His follow-up diabetic visit. He has type 2 diabetes mellitus. Home regimen includes: Insulin He is noncompliant some of the time. Assessment:     Lab Results   Component Value Date    LABA1C 6.3 2021    BUN 47 2021    CREATININE 2.2 2021     There were no vitals filed for this visit. Wt Readings from Last 3 Encounters:   21 218 lb (98.9 kg)   21 220 lb (99.8 kg)   21 224 lb (101.6 kg)     Ht Readings from Last 3 Encounters:   21 5' 9\" (1.753 m)   21 5' 9\" (1.753 m)   21 5' 9.02\" (1.753 m)       Glucose at 0.5 hrs PPD today resulted at 211mg/dl cereal  Current monitoring regimen: home blood tests - 3 times daily  Home blood sugar trends: FBS's 92- 151. Dinner 126-171. -289  Any episodes of hypoglycemia? yes - less than once per week  Depression screening completed 3/29/2021  Previous visit with dietician: yes - 2020  Current diet: B cereal/ fruit                       L pizza thin crust 2pieces; apple                       D 6-7pm meat/ veg. fruit                       BT snack nuts, popcorn, few doritos, tomato  Current exercise: ADL's; limited r/t SOB and low back pain  Eye exam current (within one year): no Dr. Gayla Thurston 2020. Needs to make appt  Any history of foot problems? no  Last foot exam: 21 Pedal pulses:   peripheral pulses symmetrical   Results of monofilament test: 10/10              Skin noted to be warm and hair is reduced. 1+ edema                     Left over right  Immunizations up to date: yes -   Taking ASA:  No - If not why? On blood thinners  Appropriate for use of MyChart Glucose Grid:  Yes    Focus:     Diabetes education.  A1C today is 6.5%, but Yaron Malone continues to be treated for anemia and it may be skewed slightly. Meaghan Medina stopped wearing the Rice sensor because it never read \"correctly\" when trying to match up with his meter. He became frustrated with lack of correlation between readings. Encouraged to try again (may have been impacted by severe anemia). Glucose levels during the day are doing fairly well, but bedtime readings are always elevated. He reports checking bedtime readings 2.5 hours after snack 4-5 days per week and that 2 days per week he doesn't take his Humalog until after eating (forgets). But every bedtime reading is above goal. He will try 12 units with supper, but will resume 10 units if any low BS's occur. Reinforced safety issues with taking Humalog corrections at bedtime/ risk of lows during the night. Overall, Meaghan Medina is doing fair. He is planned to have a Watchman procedure 11/2/21. He is battling fluid retention with some SOB/ CKD and lower back pain and anemia. Much encouragement given. Follow up 6 months. DSME PLAN:   Discussed general issues about diabetes pathophysiology and management. Counseling at today's visit: BG goals; meal coverage; insulin action; carbs; exercise. Try to get exercise every day         If you can't walk or ride bike, think about using the therapy bands       Sitting in your chair after supper          10-20 minutes with arms/ legs. Keep a pace that is comfortable                  For your breathing  Try using Humalog 12 units with your evening meal               --when supper is being prepared, you or Noy Echols put your Humalog              Pen on your plate, so when you are ready to eat, you'll remember               Your supper shot!!        -you may need 14 units on the nights you eat spagetti  Always carry something with you for a low blood sugar   Blood sugar goal   Meter download, medications, PMH and nursing assessment reviewed.   Candice Tom states He is willing to participate in this plan of care and verbalized understanding of all instructions provided. Teach back used to verify comprehension. Total time involved in direct patient education: 60 minutes.

## 2021-09-28 ENCOUNTER — CARE COORDINATION (OUTPATIENT)
Dept: FAMILY MEDICINE CLINIC | Age: 76
End: 2021-09-28

## 2021-09-28 ENCOUNTER — HOSPITAL ENCOUNTER (OUTPATIENT)
Dept: NURSING | Age: 76
Discharge: HOME | End: 2021-09-28
Payer: MEDICARE

## 2021-09-28 VITALS
DIASTOLIC BLOOD PRESSURE: 57 MMHG | OXYGEN SATURATION: 98 % | SYSTOLIC BLOOD PRESSURE: 123 MMHG | HEART RATE: 67 BPM | TEMPERATURE: 98.1 F | RESPIRATION RATE: 18 BRPM

## 2021-09-28 DIAGNOSIS — D63.1 ANEMIA OF CHRONIC RENAL FAILURE, STAGE 4 (SEVERE) (HCC): Primary | ICD-10-CM

## 2021-09-28 DIAGNOSIS — N18.4 ANEMIA OF CHRONIC RENAL FAILURE, STAGE 4 (SEVERE) (HCC): Primary | ICD-10-CM

## 2021-09-28 PROCEDURE — 2580000003 HC RX 258: Performed by: INTERNAL MEDICINE

## 2021-09-28 PROCEDURE — 6360000002 HC RX W HCPCS: Performed by: INTERNAL MEDICINE

## 2021-09-28 PROCEDURE — 96365 THER/PROPH/DIAG IV INF INIT: CPT

## 2021-09-28 RX ORDER — SODIUM CHLORIDE 9 MG/ML
INJECTION, SOLUTION INTRAVENOUS CONTINUOUS
Status: CANCELLED | OUTPATIENT
Start: 2021-10-05

## 2021-09-28 RX ORDER — METHYLPREDNISOLONE SODIUM SUCCINATE 125 MG/2ML
125 INJECTION, POWDER, LYOPHILIZED, FOR SOLUTION INTRAMUSCULAR; INTRAVENOUS ONCE
Status: CANCELLED | OUTPATIENT
Start: 2021-10-05 | End: 2021-10-05

## 2021-09-28 RX ORDER — HEPARIN SODIUM (PORCINE) LOCK FLUSH IV SOLN 100 UNIT/ML 100 UNIT/ML
500 SOLUTION INTRAVENOUS PRN
Status: CANCELLED | OUTPATIENT
Start: 2021-10-05

## 2021-09-28 RX ORDER — DIPHENHYDRAMINE HYDROCHLORIDE 50 MG/ML
50 INJECTION INTRAMUSCULAR; INTRAVENOUS ONCE
Status: CANCELLED | OUTPATIENT
Start: 2021-10-05 | End: 2021-10-05

## 2021-09-28 RX ORDER — SODIUM CHLORIDE 9 MG/ML
25 INJECTION, SOLUTION INTRAVENOUS PRN
Status: CANCELLED | OUTPATIENT
Start: 2021-10-05

## 2021-09-28 RX ORDER — SODIUM CHLORIDE 0.9 % (FLUSH) 0.9 %
5-40 SYRINGE (ML) INJECTION PRN
Status: CANCELLED | OUTPATIENT
Start: 2021-10-05

## 2021-09-28 RX ADMIN — FERRIC CARBOXYMALTOSE INJECTION 750 MG: 50 INJECTION, SOLUTION INTRAVENOUS at 08:03

## 2021-09-28 NOTE — PROGRESS NOTES
__M__ Safety:       (Environmental)   [place] to environment   Ensure ID band is correct and in place/ allergy band as needed   Assess for fall risk   Initiate fall precautions as applicable (fall band, side rails, etc.)   Call light within reach   Bed in low position/ wheels locked    ___M_ Pain:        Assess pain level and characteristics   Administer analgesics as ordered   Assess effectiveness of pain management and report to MD as needed    _M___ Knowledge Deficit:   Assess baseline knowledge   Provide teaching at level of understanding   Provide teaching via preferred learning method   Evaluate teaching effectiveness    _M___ Hemodynamic/Respiratory Status:       (Pre and Post Procedure Monitoring)   Assess/Monitor vital signs and LOC   Assess Baseline SpO2 prior to any sedation   Obtain weight/height   Assess vital signs/ LOC until patient meets discharge criteria   Monitor procedure site and notify MD of any issues

## 2021-10-04 ENCOUNTER — NURSE ONLY (OUTPATIENT)
Dept: LAB | Age: 76
End: 2021-10-04

## 2021-10-04 ENCOUNTER — OFFICE VISIT (OUTPATIENT)
Dept: INTERNAL MEDICINE CLINIC | Age: 76
End: 2021-10-04
Payer: MEDICARE

## 2021-10-04 VITALS
WEIGHT: 218.2 LBS | SYSTOLIC BLOOD PRESSURE: 130 MMHG | DIASTOLIC BLOOD PRESSURE: 50 MMHG | HEIGHT: 69 IN | TEMPERATURE: 97.3 F | BODY MASS INDEX: 32.32 KG/M2 | HEART RATE: 70 BPM

## 2021-10-04 DIAGNOSIS — Z79.4 TYPE 2 DIABETES MELLITUS WITH DIABETIC PERIPHERAL ANGIOPATHY WITHOUT GANGRENE, WITH LONG-TERM CURRENT USE OF INSULIN (HCC): ICD-10-CM

## 2021-10-04 DIAGNOSIS — I50.32 CHRONIC DIASTOLIC CONGESTIVE HEART FAILURE (HCC): ICD-10-CM

## 2021-10-04 DIAGNOSIS — I10 ESSENTIAL HYPERTENSION: ICD-10-CM

## 2021-10-04 DIAGNOSIS — N18.30 STAGE 3 CHRONIC KIDNEY DISEASE, UNSPECIFIED WHETHER STAGE 3A OR 3B CKD (HCC): ICD-10-CM

## 2021-10-04 DIAGNOSIS — N18.30 ANEMIA OF CHRONIC RENAL FAILURE, STAGE 3 (MODERATE), UNSPECIFIED WHETHER STAGE 3A OR 3B CKD (HCC): ICD-10-CM

## 2021-10-04 DIAGNOSIS — I20.9 ANGINA, CLASS III (HCC): Primary | ICD-10-CM

## 2021-10-04 DIAGNOSIS — N18.32 STAGE 3B CHRONIC KIDNEY DISEASE (HCC): ICD-10-CM

## 2021-10-04 DIAGNOSIS — D63.1 ANEMIA OF CHRONIC RENAL FAILURE, STAGE 3 (MODERATE), UNSPECIFIED WHETHER STAGE 3A OR 3B CKD (HCC): ICD-10-CM

## 2021-10-04 DIAGNOSIS — E11.51 TYPE 2 DIABETES MELLITUS WITH DIABETIC PERIPHERAL ANGIOPATHY WITHOUT GANGRENE, WITH LONG-TERM CURRENT USE OF INSULIN (HCC): ICD-10-CM

## 2021-10-04 DIAGNOSIS — D63.1 ANEMIA IN STAGE 3A CHRONIC KIDNEY DISEASE (HCC): ICD-10-CM

## 2021-10-04 DIAGNOSIS — N18.31 ANEMIA IN STAGE 3A CHRONIC KIDNEY DISEASE (HCC): ICD-10-CM

## 2021-10-04 LAB
ALBUMIN SERPL-MCNC: 4.4 G/DL (ref 3.5–5.1)
ALP BLD-CCNC: 97 U/L (ref 38–126)
ALT SERPL-CCNC: 25 U/L (ref 11–66)
ANION GAP SERPL CALCULATED.3IONS-SCNC: 12 MEQ/L (ref 8–16)
AST SERPL-CCNC: 20 U/L (ref 5–40)
BILIRUB SERPL-MCNC: 0.4 MG/DL (ref 0.3–1.2)
BUN BLDV-MCNC: 39 MG/DL (ref 7–22)
CALCIUM SERPL-MCNC: 9 MG/DL (ref 8.5–10.5)
CHLORIDE BLD-SCNC: 102 MEQ/L (ref 98–111)
CO2: 25 MEQ/L (ref 23–33)
CREAT SERPL-MCNC: 2.1 MG/DL (ref 0.4–1.2)
GFR SERPL CREATININE-BSD FRML MDRD: 31 ML/MIN/1.73M2
GLUCOSE BLD-MCNC: 158 MG/DL (ref 70–108)
HEMOGLOBIN: 9.9 GM/DL (ref 14–18)
POTASSIUM SERPL-SCNC: 4.2 MEQ/L (ref 3.5–5.2)
SODIUM BLD-SCNC: 139 MEQ/L (ref 135–145)
TOTAL PROTEIN: 6.8 G/DL (ref 6.1–8)

## 2021-10-04 PROCEDURE — G8417 CALC BMI ABV UP PARAM F/U: HCPCS | Performed by: INTERNAL MEDICINE

## 2021-10-04 PROCEDURE — 1036F TOBACCO NON-USER: CPT | Performed by: INTERNAL MEDICINE

## 2021-10-04 PROCEDURE — G8427 DOCREV CUR MEDS BY ELIG CLIN: HCPCS | Performed by: INTERNAL MEDICINE

## 2021-10-04 PROCEDURE — 1123F ACP DISCUSS/DSCN MKR DOCD: CPT | Performed by: INTERNAL MEDICINE

## 2021-10-04 PROCEDURE — G8484 FLU IMMUNIZE NO ADMIN: HCPCS | Performed by: INTERNAL MEDICINE

## 2021-10-04 PROCEDURE — 4040F PNEUMOC VAC/ADMIN/RCVD: CPT | Performed by: INTERNAL MEDICINE

## 2021-10-04 PROCEDURE — 99214 OFFICE O/P EST MOD 30 MIN: CPT | Performed by: INTERNAL MEDICINE

## 2021-10-04 NOTE — PROGRESS NOTES
Selena Vance (:  1945) is a 68 y.o. male,Established patient, here for evaluation of the following chief complaint(s):  Atrial Fibrillation (watchman is scheduled tentatively -still of eliquis), Anemia (has had 2 iron infusions-dr alves following), Other (kacie-dr alves following), Other (has some skin cancers on his face that need removed-sees dr Shahla Borden like to get done before goes back on eliquis for watchman), and Fall (about 2 weeks ago fell 3 x-can't keep up with the momentum-hit head 3 x )         ASSESSMENT/PLAN:  1. Angina, class III ([address]- he denies any  2. Anemia of chronic renal failure, stage 3 (moderate), unspecified whether stage 3a or 3b CKD ([address]- hgb stable at 9s; gets weekly h/h  3. Stage 3b chronic kidney disease ([address]- creat stable at 2.2- sees Dr Jose Elias Zhong  4. Type 2 diabetes mellitus with diabetic peripheral angiopathy without gangrene, with long-term current use of insulin ([address]- last A1C in 6s; few lows      Return in about 3 months (around 2022). Subjective   SUBJECTIVE/OBJECTIVE:  HPI pt with advanced cad, dm, ckd, afib seen in f/u. Had recent hosp for diuresis with expected worsening of renal fx. He feels better with less sob and no cp    His sugars are good    He is scheduled to have some skin lesions removed per Dr Haydee Hazel. He is stable for these procedures. He is to get a Watchman in November unless he can be moved up    He has chronic back problems; he is to get an xr. Review of Systems    Twelve point ROS completed and found to be negative except as noted above.       Objective   Physical Exam    General appearance - chronically ill appearing    Mental Status - alert, oriented to person, place, and time          Neck - supple, no significant adenopathy        Chest - clear to auscultation, no wheezes, rales or rhonchi, symmetric air entry     Heart - no murmurs noted, no gallops noted, no JVD, occasional extras     Abdomen - protuberant Neurological - alert, oriented, normal speech, no focal findings or movement disorder noted     Extremities - no pedal edema noted     Skin - lesions on face          An electronic signature was used to authenticate this note.     --Tank Greene MD

## 2021-10-05 ENCOUNTER — TELEPHONE (OUTPATIENT)
Dept: INTERNAL MEDICINE CLINIC | Age: 76
End: 2021-10-05

## 2021-10-05 NOTE — TELEPHONE ENCOUNTER
Per vo from Dr. Jackson Bain would like pt to get back on his eliquis. Pt can hold it 4 days prior to his skin cancer removal under dr Merle Dancer. (no date set yet)  I spoke to pts wife and she will have him go back on eliquis tonight.

## 2021-10-11 ENCOUNTER — NURSE ONLY (OUTPATIENT)
Dept: LAB | Age: 76
End: 2021-10-11

## 2021-10-11 DIAGNOSIS — I50.32 CHRONIC DIASTOLIC CONGESTIVE HEART FAILURE (HCC): ICD-10-CM

## 2021-10-11 DIAGNOSIS — N18.31 ANEMIA IN STAGE 3A CHRONIC KIDNEY DISEASE (HCC): ICD-10-CM

## 2021-10-11 DIAGNOSIS — I10 ESSENTIAL HYPERTENSION: ICD-10-CM

## 2021-10-11 DIAGNOSIS — N18.30 STAGE 3 CHRONIC KIDNEY DISEASE, UNSPECIFIED WHETHER STAGE 3A OR 3B CKD (HCC): ICD-10-CM

## 2021-10-11 DIAGNOSIS — D63.1 ANEMIA IN STAGE 3A CHRONIC KIDNEY DISEASE (HCC): ICD-10-CM

## 2021-10-11 LAB — HEMOGLOBIN: 10.1 GM/DL (ref 14–18)

## 2021-10-14 ENCOUNTER — TELEPHONE (OUTPATIENT)
Dept: NEPHROLOGY | Age: 76
End: 2021-10-14

## 2021-10-14 NOTE — TELEPHONE ENCOUNTER
This patient would like to move forward with the WATCHMAN procedure with Dr. Bhavya Godinez possibly later next week. Wilfrid's last creatinine was 2.1 on 10/4/2021. Dr. Bhavya Godinez would like to bring this patient in for pre-hydration and hold Bumex the following day post WATCHMAN. From a renal standpoint do you have any further recommendations prior to moving forward?

## 2021-11-04 ENCOUNTER — TELEPHONE (OUTPATIENT)
Dept: CARDIOLOGY CLINIC | Age: 76
End: 2021-11-04

## 2021-11-04 DIAGNOSIS — Z95.818 PRESENCE OF WATCHMAN LEFT ATRIAL APPENDAGE CLOSURE DEVICE: Primary | ICD-10-CM

## 2021-11-04 DIAGNOSIS — I48.0 PAROXYSMAL ATRIAL FIBRILLATION (HCC): ICD-10-CM

## 2021-11-04 NOTE — TELEPHONE ENCOUNTER
WATCHMAN shared decision form obtained from Dr. Enrico Amezcua. Orders received from Dr. Mary Anne Ramirez to schedule the patient for a WATCHMAN procedure, Eliquis to be held 2 days prior, bring the patient in early for pre-hydration and hold the following medications the morning of the procedure: Bumex and insulin. Eliquis 5mg BID is to be held starting on 11/9/2021  WATCHMAN: 11/11/2021 at 1100 have the patient arrive at 0500  Discharge caregiver: wife      Post procedural orders: Structural heart follow up appointment on POD 1, have the patient restrart Eliquis 5mg on POD 1 post implant, obtain a CBC, BMP and COVID prior to the 45 day BOSSMAN (12/26/2021).       CBC/BMP: 12/29/2021  BOSSMAN: 12/29/2021  Follow up date with primary cardiologist: Dr. Puneet Yoo follows the patient     Eren Overcast, can you please schedule this patients BOSSMAN?

## 2021-11-04 NOTE — TELEPHONE ENCOUNTER
Patient scheduled for BOSSMAN with Dewayne Louise on 12.29.2021 Arrival time 1pm.   Scheduled with the ANILA Parks

## 2021-11-09 NOTE — PROGRESS NOTES
SPOKE WITH VINNY AT  DR. Ding [place]  OFFICE . INFORMED THAT PATIENT'S WIFE STATES PATIENT IS HAVING A WATCHMAN PROCEDURE DONE 11/11/2021 AND THAT SHE WILL FIND OUT STATUS REGARDING 'S ANTICOAGULATION MEDICATION. AND WILL LET OFFICE KNOW.

## 2021-11-10 NOTE — PROGRESS NOTES
EKG AND MEDICAL HISTORY REVIEWED PER ANESTHESIA DR. PRAJAPATI.  ANGELA TO PROCEED AT [facility] FOR PROCEDURE ON 11/17/2021

## 2021-11-15 ENCOUNTER — CARE COORDINATION (OUTPATIENT)
Dept: FAMILY MEDICINE CLINIC | Age: 76
End: 2021-11-15

## 2021-11-15 NOTE — CARE COORDINATION
[facility] Transitions Initial Follow Up Call    Outreach made within 2 business days of discharge: Yes    Patient: Jarrod Wells Patient : 1945   MRN: [id number]  Reason for Admission: There are no discharge diagnoses documented for the most recent discharge. Discharge Date: 21       Spoke with:Leidy    Discharge department/facility: [facility] Interactive Patient Contact:  Was patient able to fill all prescriptions: Yes  Was patient instructed to bring all medications to the follow-up visit: Yes  Is patient taking all medications as directed in the discharge summary? Yes  Does patient understand their discharge instructions: Yes  Does patient have questions or concerns that need addressed prior to 7-14 day follow up office visit: no    Scheduled appointment with PCP within 7-14 days    Follow Up  Future Appointments   Date Time Provider Idris Cardenasi   2022  9:15 AM Maryellen Tavares MD [facility] [address] [place] [address]   2022  9:20 AM Tanner Haddad MD [facility]   3/14/2022 10:15 AM Sumeet Rey PA-C [facility]   3/28/2022 10:00 AM [facility]val[facility]   2022  8:00 AM Vani Green MD [facility] was not able to have the watchman d/t the anatomy of his heart. He is disappointed he will have to stay on blood thinners. He gets occasional nosebleeds and was hoping the watchman would work. Otherwise he is doing well. He has some bruising from the groin but it is resolving. He has no additional needs at home at this time.     Benancio Duane, RN

## 2021-11-16 ENCOUNTER — TELEPHONE (OUTPATIENT)
Dept: FAMILY MEDICINE CLINIC | Age: 76
End: 2021-11-16

## 2021-11-16 ENCOUNTER — TELEPHONE (OUTPATIENT)
Dept: INTERNAL MEDICINE CLINIC | Age: 76
End: 2021-11-16

## 2021-11-16 ENCOUNTER — ANESTHESIA EVENT (OUTPATIENT)
Dept: OPERATING ROOM | Age: 76
End: 2021-11-16
Payer: MEDICARE

## 2021-11-16 DIAGNOSIS — R04.0 EPISTAXIS: Primary | ICD-10-CM

## 2021-11-16 NOTE — TELEPHONE ENCOUNTER
Patient was in the hospital to have a Watchman implanted but was unsuccessful due to not being compatible. I called to see how his blood sugars were doing and if they made any changes to his diabetes medications while in the hospital.  Kim Smart stated that his blood sugars are doing well and that there were no changes made and that he is also coming to the hospital tomorrow to have a skin lesion removed. They would like to keep his appointments as scheduled. I let her know that if he has any issues to call and we can get him in sooner. She verbalized understanding.

## 2021-11-16 NOTE — TELEPHONE ENCOUNTER
Catarinapino Anusha called wanting a referral to an ent because Austin Ford is having nosebleeds. She knows the elquis is contributing but she would like a referral to an ENT.

## 2021-11-17 ENCOUNTER — ANESTHESIA (OUTPATIENT)
Dept: OPERATING ROOM | Age: 76
End: 2021-11-17
Payer: MEDICARE

## 2021-11-17 ENCOUNTER — HOSPITAL ENCOUNTER (OUTPATIENT)
Age: 76
Setting detail: OUTPATIENT SURGERY
Discharge: HOME | End: 2021-11-17
Attending: SPECIALIST | Admitting: SPECIALIST
Payer: MEDICARE

## 2021-11-17 VITALS
HEART RATE: 65 BPM | OXYGEN SATURATION: 100 % | WEIGHT: 221 LBS | BODY MASS INDEX: 32.73 KG/M2 | RESPIRATION RATE: 10 BRPM | HEIGHT: 69 IN | DIASTOLIC BLOOD PRESSURE: 70 MMHG | SYSTOLIC BLOOD PRESSURE: 150 MMHG | TEMPERATURE: 97.1 F

## 2021-11-17 VITALS
SYSTOLIC BLOOD PRESSURE: 132 MMHG | OXYGEN SATURATION: 100 % | RESPIRATION RATE: 8 BRPM | DIASTOLIC BLOOD PRESSURE: 61 MMHG

## 2021-11-17 LAB — GLUCOSE BLD-MCNC: 100 MG/DL (ref 70–108)

## 2021-11-17 PROCEDURE — 82948 REAGENT STRIP/BLOOD GLUCOSE: CPT

## 2021-11-17 PROCEDURE — 3700000000 HC ANESTHESIA ATTENDED CARE: Performed by: SPECIALIST

## 2021-11-17 PROCEDURE — 7100000000 HC PACU RECOVERY - FIRST 15 MIN: Performed by: SPECIALIST

## 2021-11-17 PROCEDURE — 7100000011 HC PHASE II RECOVERY - ADDTL 15 MIN: Performed by: SPECIALIST

## 2021-11-17 PROCEDURE — 7100000010 HC PHASE II RECOVERY - FIRST 15 MIN: Performed by: SPECIALIST

## 2021-11-17 PROCEDURE — 3600000002 HC SURGERY LEVEL 2 BASE: Performed by: SPECIALIST

## 2021-11-17 PROCEDURE — 2580000003 HC RX 258: Performed by: SPECIALIST

## 2021-11-17 PROCEDURE — 2709999900 HC NON-CHARGEABLE SUPPLY: Performed by: SPECIALIST

## 2021-11-17 PROCEDURE — 88331 PATH CONSLTJ SURG 1 BLK 1SPC: CPT

## 2021-11-17 PROCEDURE — 6360000002 HC RX W HCPCS: Performed by: SPECIALIST

## 2021-11-17 PROCEDURE — 7100000001 HC PACU RECOVERY - ADDTL 15 MIN: Performed by: SPECIALIST

## 2021-11-17 PROCEDURE — 3700000001 HC ADD 15 MINUTES (ANESTHESIA): Performed by: SPECIALIST

## 2021-11-17 PROCEDURE — 88305 TISSUE EXAM BY PATHOLOGIST: CPT

## 2021-11-17 PROCEDURE — 3600000012 HC SURGERY LEVEL 2 ADDTL 15MIN: Performed by: SPECIALIST

## 2021-11-17 PROCEDURE — 2500000003 HC RX 250 WO HCPCS: Performed by: NURSE ANESTHETIST, CERTIFIED REGISTERED

## 2021-11-17 PROCEDURE — 6360000002 HC RX W HCPCS: Performed by: NURSE ANESTHETIST, CERTIFIED REGISTERED

## 2021-11-17 PROCEDURE — 2500000003 HC RX 250 WO HCPCS: Performed by: SPECIALIST

## 2021-11-17 RX ORDER — LIDOCAINE HYDROCHLORIDE AND EPINEPHRINE 10; 10 MG/ML; UG/ML
INJECTION, SOLUTION INFILTRATION; PERINEURAL PRN
Status: DISCONTINUED | OUTPATIENT
Start: 2021-11-17 | End: 2021-11-17 | Stop reason: ALTCHOICE

## 2021-11-17 RX ORDER — MIDAZOLAM HYDROCHLORIDE 1 MG/ML
INJECTION INTRAMUSCULAR; INTRAVENOUS PRN
Status: DISCONTINUED | OUTPATIENT
Start: 2021-11-17 | End: 2021-11-17 | Stop reason: SDUPTHER

## 2021-11-17 RX ORDER — FENTANYL CITRATE 50 UG/ML
25 INJECTION, SOLUTION INTRAMUSCULAR; INTRAVENOUS EVERY 5 MIN PRN
Status: CANCELLED | OUTPATIENT
Start: 2021-11-17

## 2021-11-17 RX ORDER — GLYCOPYRROLATE 1 MG/5 ML
SYRINGE (ML) INTRAVENOUS PRN
Status: DISCONTINUED | OUTPATIENT
Start: 2021-11-17 | End: 2021-11-17 | Stop reason: SDUPTHER

## 2021-11-17 RX ORDER — CEFAZOLIN SODIUM 2 G/100ML
2000 INJECTION, SOLUTION INTRAVENOUS
Status: COMPLETED | OUTPATIENT
Start: 2021-11-17 | End: 2021-11-17

## 2021-11-17 RX ORDER — ONDANSETRON 2 MG/ML
INJECTION INTRAMUSCULAR; INTRAVENOUS PRN
Status: DISCONTINUED | OUTPATIENT
Start: 2021-11-17 | End: 2021-11-17 | Stop reason: SDUPTHER

## 2021-11-17 RX ORDER — LABETALOL 20 MG/4 ML (5 MG/ML) INTRAVENOUS SYRINGE
5 EVERY 10 MIN PRN
Status: CANCELLED | OUTPATIENT
Start: 2021-11-17

## 2021-11-17 RX ORDER — PROMETHAZINE HYDROCHLORIDE 25 MG/ML
12.5 INJECTION, SOLUTION INTRAMUSCULAR; INTRAVENOUS
Status: CANCELLED | OUTPATIENT
Start: 2021-11-17 | End: 2021-11-17

## 2021-11-17 RX ORDER — LIDOCAINE HYDROCHLORIDE 20 MG/ML
INJECTION, SOLUTION EPIDURAL; INFILTRATION; INTRACAUDAL; PERINEURAL PRN
Status: DISCONTINUED | OUTPATIENT
Start: 2021-11-17 | End: 2021-11-17 | Stop reason: SDUPTHER

## 2021-11-17 RX ORDER — FENTANYL CITRATE 50 UG/ML
50 INJECTION, SOLUTION INTRAMUSCULAR; INTRAVENOUS EVERY 5 MIN PRN
Status: CANCELLED | OUTPATIENT
Start: 2021-11-17

## 2021-11-17 RX ORDER — SODIUM CHLORIDE 9 MG/ML
INJECTION, SOLUTION INTRAVENOUS CONTINUOUS
Status: DISCONTINUED | OUTPATIENT
Start: 2021-11-17 | End: 2021-11-17 | Stop reason: HOSPADM

## 2021-11-17 RX ORDER — MEPERIDINE HYDROCHLORIDE 25 MG/ML
12.5 INJECTION INTRAMUSCULAR; INTRAVENOUS; SUBCUTANEOUS EVERY 5 MIN PRN
Status: CANCELLED | OUTPATIENT
Start: 2021-11-17

## 2021-11-17 RX ORDER — PROPOFOL 10 MG/ML
INJECTION, EMULSION INTRAVENOUS PRN
Status: DISCONTINUED | OUTPATIENT
Start: 2021-11-17 | End: 2021-11-17 | Stop reason: SDUPTHER

## 2021-11-17 RX ORDER — FENTANYL CITRATE 50 UG/ML
INJECTION, SOLUTION INTRAMUSCULAR; INTRAVENOUS PRN
Status: DISCONTINUED | OUTPATIENT
Start: 2021-11-17 | End: 2021-11-17 | Stop reason: SDUPTHER

## 2021-11-17 RX ADMIN — LIDOCAINE HYDROCHLORIDE 50 MG: 20 INJECTION, SOLUTION EPIDURAL; INFILTRATION; INTRACAUDAL; PERINEURAL at 10:30

## 2021-11-17 RX ADMIN — Medication 0.2 MG: at 10:45

## 2021-11-17 RX ADMIN — PROPOFOL 150 MG: 10 INJECTION, EMULSION INTRAVENOUS at 10:30

## 2021-11-17 RX ADMIN — MIDAZOLAM 2 MG: 1 INJECTION INTRAMUSCULAR; INTRAVENOUS at 10:29

## 2021-11-17 RX ADMIN — SODIUM CHLORIDE: 9 INJECTION, SOLUTION INTRAVENOUS at 10:26

## 2021-11-17 RX ADMIN — FENTANYL CITRATE 50 MCG: 50 INJECTION, SOLUTION INTRAMUSCULAR; INTRAVENOUS at 10:29

## 2021-11-17 RX ADMIN — Medication 0.2 MG: at 10:41

## 2021-11-17 RX ADMIN — ONDANSETRON HYDROCHLORIDE 4 MG: 4 INJECTION, SOLUTION INTRAMUSCULAR; INTRAVENOUS at 11:06

## 2021-11-17 RX ADMIN — FENTANYL CITRATE 50 MCG: 50 INJECTION, SOLUTION INTRAMUSCULAR; INTRAVENOUS at 11:13

## 2021-11-17 RX ADMIN — CEFAZOLIN SODIUM 2000 MG: 2 INJECTION, SOLUTION INTRAVENOUS at 10:29

## 2021-11-17 ASSESSMENT — PULMONARY FUNCTION TESTS
PIF_VALUE: 0
PIF_VALUE: 23
PIF_VALUE: 10
PIF_VALUE: 9
PIF_VALUE: 1
PIF_VALUE: 8
PIF_VALUE: 9
PIF_VALUE: 21
PIF_VALUE: 3
PIF_VALUE: 9
PIF_VALUE: 1
PIF_VALUE: 8
PIF_VALUE: 4
PIF_VALUE: 9
PIF_VALUE: 9
PIF_VALUE: 0
PIF_VALUE: 9
PIF_VALUE: 0
PIF_VALUE: 2
PIF_VALUE: 9
PIF_VALUE: 24
PIF_VALUE: 9
PIF_VALUE: 10
PIF_VALUE: 9
PIF_VALUE: 10
PIF_VALUE: 10
PIF_VALUE: 20
PIF_VALUE: 17
PIF_VALUE: 16
PIF_VALUE: 8
PIF_VALUE: 9
PIF_VALUE: 20
PIF_VALUE: 21
PIF_VALUE: 9
PIF_VALUE: 20
PIF_VALUE: 0
PIF_VALUE: 8
PIF_VALUE: 9
PIF_VALUE: 10
PIF_VALUE: 24
PIF_VALUE: 10
PIF_VALUE: 23
PIF_VALUE: 10
PIF_VALUE: 9
PIF_VALUE: 10
PIF_VALUE: 9
PIF_VALUE: 10
PIF_VALUE: 11
PIF_VALUE: 0
PIF_VALUE: 9
PIF_VALUE: 6
PIF_VALUE: 9
PIF_VALUE: 9
PIF_VALUE: 10

## 2021-11-17 ASSESSMENT — ENCOUNTER SYMPTOMS: SHORTNESS OF BREATH: 1

## 2021-11-17 ASSESSMENT — PAIN - FUNCTIONAL ASSESSMENT: PAIN_FUNCTIONAL_ASSESSMENT: 0-10

## 2021-11-17 NOTE — OP NOTE
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 The area was then prepped and draped in the standard surgical fashion. The skin cancer was excised and sent for fresh frozen evaluation. Pathology called back to the room and stated indeed the margin were clear. This left a 4cm2 defect, which could not be closed primarily due to it causing ectropion. Therefore, a 8cm2 sum of defect/adjacent tissue transfer (laterally based superior advancement flap) was then designed, back cut, elevated and inset with 4-0 Monocryl suture placed in interrupted buried fashion. The Burrow's triangles were removed with final closure being 5-0 fast absorbing with ophthalmic erythromyacin being applied. The patient tolerated the procedure quite well and remained hemodynamically stable throughout the procedure and was quite comfortable throughout the operative course.     Clinical staging for cancer cases:  Ct  Cn  Cm    Timbo Escalante MD  Electronically signed by me on 2021 at 11:15 AM  Operative Note      Patient: Bryan Garay  YOB: 1945  MRN: [id number]    Date of Procedure: 2021    Pre-Op Diagnosis: SCC RIGHT LOWER LATERAL EYELID    Post-Op Diagnosis: Same       Procedure(s):  EXCISION SCC RIGHT LOWER LATERAL EYELID WITH FROZEN SECTION    Surgeon(s):  Glory Farah MD    Assistant:   Physician Assistant: Giana Martin PA-C    Anesthesia: General    Estimated Blood Loss (mL): Minimal    Complications: None    Specimens:   ID Type Source Tests Collected by Time Destination   A :  Tissue Face SURGICAL PATHOLOGY Glory Farah MD 11/17/2021 1042        Implants:  * No implants in log *      Drains: * No LDAs found *    Findings: 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

## 2021-11-17 NOTE — H&P
[facility]  History and Physical Update    Pt Name: Mayra Quezada  MRN: [id number]  YOB: 1945  Date of evaluation: 11/17/2021    I have examined the patient and reviewed the H&P/Consult and there are no changes to the patient or plans.       Violet Mckinley MD  Electronically signed 11/17/2021 at 7:00 AM

## 2021-11-17 NOTE — ANESTHESIA POSTPROCEDURE EVALUATION
Department of Anesthesiology  Postprocedure Note    Patient: Bhavik Pulliam  MRN: [id number]  YOB: 1945  Date of evaluation: 11/17/2021  Time:  12:03 PM     Procedure Summary     Date: 11/17/21 Room / Location: [address] [place] [address] 04 / 138 [facility]    Anesthesia Start: 1026 Anesthesia Stop: 7845    Procedure: EXCISION SCC RIGHT LOWER LATERAL EYELID WITH FROZEN SECTION (Right Eye) Diagnosis: (SCC RIGHT LOWER LATERAL EYELID)    Surgeons: Cris Blas MD Responsible Provider: Nate Clark DO    Anesthesia Type: general ASA Status: 3          Anesthesia Type: general    Natalie Phase I: Natalie Score: 5    Natalie Phase II:      Last vitals: Reviewed and per EMR flowsheets.        Anesthesia Post Evaluation    Patient location during evaluation: PACU  Patient participation: complete - patient participated  Level of consciousness: awake  Airway patency: patent  Nausea & Vomiting: no nausea  Complications: no  Cardiovascular status: hemodynamically stable  Respiratory status: acceptable  Hydration status: stable

## 2021-11-17 NOTE — ANESTHESIA PRE PROCEDURE
Department of Anesthesiology  Preprocedure Note       Name:  Nataly Melgar   Age:  68 y.o.  :  1945                                          MRN:  [id number]         Date:  2021      Surgeon: Bello Lopes):  Gladys Herr MD    Procedure: Procedure(s):  EXCISION SCC RIGHT LOWER LATERAL EYELID WITH FROZEN SECTION    Medications prior to admission:   Prior to Admission medications    Medication Sig Start Date End Date Taking?  Authorizing Provider   bumetanide (BUMEX) 2 MG tablet Take 2 mg by mouth daily   Yes Historical Provider, MD   insulin detemir (LEVEMIR FLEXTOUCH) 100 UNIT/ML injection pen Inject 48 Units into the skin 2 times daily 21  Yes Smita Davis MD   dilTIAZem (CARDIZEM CD) 120 MG extended release capsule TAKE 1 CAPSULE BY MOUTH  DAILY 21  Yes Sylvain Farmer MD   insulin lispro (HUMALOG) 100 UNIT/ML injection vial Inject into the skin 7 units in am and 7 units at lunch and 12 units at supper plus sliding scale during meals and at bedtime   Yes Historical Provider, MD   isosorbide mononitrate (IMDUR) 120 MG extended release tablet Take 1 tablet by mouth daily 21  Yes Pablo Cook APRN - CNP   atorvastatin (LIPITOR) 40 MG tablet TAKE 1 TABLET BY MOUTH AT  NIGHT 21  Yes Sylvain Farmer MD   ranolazine (RANEXA) 1000 MG extended release tablet Take 1 tablet by mouth 2 times daily New dose 21  Yes Nicki Flores MD   tamsulosin (FLOMAX) 0.4 MG capsule Take 1 capsule by mouth daily 21  Yes Noe Campos MD   diphenhydrAMINE-APAP, sleep, (TYLENOL PM EXTRA STRENGTH)  MG tablet Take 1 tablet by mouth every evening as needed   Yes Historical Provider, MD   hydrOXYzine (ATARAX) 10 MG tablet Take 10 mg by mouth daily as needed  3/13/21  Yes Historical Provider, MD   fluocinonide (LIDEX) 0.05 % cream Apply 1 applicator topically as needed 20  Yes Historical Provider, MD   metoprolol succinate (TOPROL XL) 50 MG extended release tablet TAKE 1 TABLET BY MOUTH  DAILY 3/1/21  Yes Cornel Cah MD   Multiple Vitamins-Minerals (THERAPEUTIC MULTIVITAMIN-MINERALS) tablet Take 1 tablet by mouth nightly   Yes Historical Provider, MD   hydrALAZINE (APRESOLINE) 25 MG tablet Take 1 tablet by mouth every 8 hours 11/9/18  Yes Saravanan Ascencio MD   apixaban (ELIQUIS) 5 MG TABS tablet Take 1 tablet by mouth 2 times daily 11/12/21   Marshal Rm PA-C   NOVOFINE 32G X 6 MM MISC USE 1 NEW NEEDLE 6 TIMES  DAILY AND AS NEEDED 9/9/21   Taras Hogue MD   ferrous sulfate (IRON 325) 325 (65 Fe) MG tablet Take 325 mg by mouth every other day    Historical Provider, MD   ascorbic acid (VITAMIN C) 500 MG tablet Take 500 mg by mouth every other day    Historical Provider, MD   nitroGLYCERIN (NITROLINGUAL) 0.4 MG/SPRAY 0.4 mg spray USE 1 SPRAY ON OR UNDER THE TONGUE EVERY 5 MINUTES AS NEEDED FOR CHEST PAIN 7/28/21   Cornel Cha MD   blood glucose test strips (ACCU-CHEK GUIDE) strip USE TO CHECK 4 TIMES DAILY 7/6/21   Taras Hogue MD   Accu-Chek FastClix Lancets MISC USE TO TEST BLOOD SUGARS 4 TIMES DAILY 7/6/21   Taras Hogue MD   ALPRAZolam Manuel Paci) 0.25 MG tablet Take 0.25 mg by mouth 3 times daily as needed for Anxiety.     Historical Provider, MD   BiPAP Machine MISC by Does not apply route    Historical Provider, MD   clopidogrel (PLAVIX) 75 MG tablet Take 75 mg by mouth daily morning    Historical Provider, MD   Lancet Devices (ACCU-CHEK SOFTCLIX LANCET DEV) MISC Use one with each blood check 1/14/14 4/2/18  Taras Hogue MD       Current medications:    Current Facility-Administered Medications   Medication Dose Route Frequency Provider Last Rate Last Admin    0.9 % sodium chloride infusion   IntraVENous Continuous Jaimee Lane MD        ceFAZolin (ANCEF) 2000 mg in dextrose 4 % 100 mL IVPB (premix)  2,000 mg IntraVENous On Call to 56 Ariana Saldana MD           Allergies:  No Known Allergies    Problem List:    Patient Active Problem List   Diagnosis Code    CKD (chronic kidney disease) stage 3, GFR 30-59 ml/min ([facility]) N18.30    Paroxysmal atrial fibrillation (HCC) I48.0    S/P CABG (coronary artery bypass graft) Z95.1    Hyperlipidemia E78.5    Renal artery stenosis (HCC) I70.1    Obstructive sleep apnea on CPAP G47.33, Z99.89    Coronary artery disease of native artery of native heart with stable angina pectoris ([facility]) I25.118    Type 2 diabetes mellitus with circulatory disorder ([facility]) E11.59    Status post angioplasty with stent Z95.820    SOB (shortness of breath) R06.02    Wheezing R06.2    Asthma J45.909    Essential hypertension I10    Obesity (BMI 30-39. 9) E66.9    Elevated PSA R97.20    Hypertrophy of prostate with urinary obstruction N40.1, N13.8    Adenomatous colon polyp D12.6    Angina, class III ([facility]) I20.9    Hx of atrial fibrillation without current medication Z86.79    Bilateral carotid artery stenosis I65.23    Hx of nonmelanoma skin cancer Z85.828    Acute kidney injury superimposed on CKD ([address] N17.9, N18.9    Anemia of chronic renal failure N18.9, D63.1    Anemia in stage 3 chronic kidney disease (HCC) N18.30, D63.1    Melena K92.1       Past Medical History:        Diagnosis Date    Adenomatous colon polyp 2009/2010, 6/'14,7/'17, 9/20, 7/21    Angina at rest 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    BPH (benign prostatic hypertrophy)     CAD (coronary artery disease) 1997    Carotid artery disease ([address] 1995    right    Carotid disease, bilateral ([address]     Cerebral artery occlusion with cerebral infarction ([address]     CHF (congestive heart failure) ([address]     Chronic kidney disease     GERD (gastroesophageal reflux disease)     GI bleed 07/2021    Hyperlipidemia 1996    Hypertension     Lumbar disc disease 2001    MI (myocardial infarction) ([address] 1997    Nephrolithiasis 2002    NIDDM (non-insulin dependent diabetes mellitus) 1996    diagnosed 1996    RICCARDO (obstructive sleep apnea) 2005    cpap    PVC's (premature ventricular contractions)     Renal artery stenosis ([address] 2007    left    Renal insufficiency     S/P CABG (status post coronary artery bypass graft) 1997    5 vessels    Skin cancer     Squamous cell carcinoma     TIA (transient ischemic attack)     Wears partial dentures     upper and lower       Past Surgical History:        Procedure Laterality Date    ABLATION OF DYSRHYTHMIC FOCUS  3/13/13   [address] SURGERY  3/13/13    oblation for irreg rythm    CARDIOVERSION  3/8/2012    CAROTID ENDARTERECTOMY  1997    CHOLECYSTECTOMY  4/1999    COLONOSCOPY  6/22/09, 4/2011,7/12/17    polyp removal    COLONOSCOPY N/A 7/21/2021    COLONOSCOPY POLYPECTOMY SNARE/COLD BIOPSY performed by Naresh Fong MD at [facility]  2008 (2), 2009 (1)    x2    CORONARY ARTERY BYPASS GRAFT  8/1997    5 vessel    DIAGNOSTIC CARDIAC CATH LAB PROCEDURE      ENDOSCOPY, COLON, DIAGNOSTIC      FOOT SURGERY  12/20/2013    pre-cancerous mole rt foot removed    LITHOTRIPSY  4/2002    SD OFFICE/OUTPT VISIT,PROCEDURE ONLY N/A 11/8/2018    TRANSESOPHAGEAL ECHOCARDIOGRAM performed by Valentín Sanabria MD at [address] [place] [address] PTCA  05/30/2018   [place]  2001, 2003    rt shoulder manipulation--frozen shoulder-01, Lt -03    SKIN BIOPSY      SKIN CANCER EXCISION  12/20/13     R foot    SKIN CANCER EXCISION  1/2016    squamous cell Lt  upper cheek    UPPER GASTROINTESTINAL ENDOSCOPY N/A 7/20/2021    EGD ESOPHAGOGASTRODUODENOSCOPY performed by Naresh Fong MD at [facility]  4/2002    VASCULAR SURGERY      carotids & cabg harvests       Social History:    Social History     Tobacco Use    Smoking status: Never Smoker    Smokeless tobacco: Never Used   Substance Use Topics    Alcohol use: No     Alcohol/week: 0.0 standard drinks                                Counseling given: Not Answered      Vital Signs (Current):   Vitals:    11/09/21 1017 11/17/21 0847   BP:  (!) 152/70   Pulse:  59   Resp:  16   Temp:  98 °F (36.7 °C)   TempSrc:  Temporal   SpO2:  98%   Weight: 215 lb (97.5 kg) 221 lb (100.2 kg)   Height: 5' 9\" (1.753 m) 5' 9\" (1.753 m)                                              BP Readings from Last 3 Encounters:   11/17/21 (!) 152/70   11/11/21 (!) 131/43   10/04/21 (!) 130/50       NPO Status: Time of last liquid consumption: 0700                        Time of last solid consumption: 2100                        Date of last liquid consumption: 11/17/21                        Date of last solid food consumption: 11/16/21    BMI:   Wt Readings from Last 3 Encounters:   11/17/21 221 lb (100.2 kg)   11/11/21 217 lb (98.4 kg)   10/04/21 218 lb 3.2 oz (99 kg)     Body mass index is 32.64 kg/m².     CBC:   Lab Results   Component Value Date    WBC 6.9 11/11/2021    RBC 3.12 11/11/2021    RBC 3.80 03/24/2012    HGB 10.1 11/11/2021    HCT 31.0 11/11/2021    MCV 99.4 11/11/2021    RDW 13.7 06/07/2018    PLT see below 11/11/2021       CMP:   Lab Results   Component Value Date     11/11/2021    K 4.5 11/11/2021    K 4.0 08/25/2021     11/11/2021    CO2 20 11/11/2021    BUN 34 11/11/2021    CREATININE 1.8 11/11/2021    GFRAA >60 09/11/2016    AGRATIO 1.2 09/10/2016    LABGLOM 37 11/11/2021    GLUCOSE 100 11/11/2021    GLUCOSE 169 03/24/2012    PROT 6.5 11/11/2021    CALCIUM 9.0 11/11/2021    BILITOT 0.4 11/11/2021    BILITOT NEGATIVE 06/18/2018    ALKPHOS 88 11/11/2021    AST 19 11/11/2021    ALT 24 11/11/2021       POC Tests:   Recent Labs     11/17/21  0909   POCGLU 100       Coags:   Lab Results   Component Value Date    PROTIME 17.2 09/10/2016    INR 1.10 11/11/2021    APTT 33.5 11/11/2021       HCG (If Applicable): No results found for: PREGTESTUR, PREGSERUM, HCG, HCGQUANT     ABGs: No results found for: PHART, PO2ART, GFD3ACZ, ZRD0RXT, BEART, [id number]     Type & Screen (If Applicable):  Lab Results   Component Value Date    LABRH POS 11/11/2021       Drug/Infectious Status (If Applicable):  No results found for: HIV, HEPCAB    COVID-19 Screening (If Applicable): No results found for: COVID19        Anesthesia Evaluation  Patient summary reviewed and Nursing notes reviewed no history of anesthetic complications:   Airway: Mallampati: II        Dental:          Pulmonary:   (+) shortness of breath: chronic,  sleep apnea: on CPAP,  decreased breath sounds,  asthma:                            Cardiovascular:  Exercise tolerance: poor (<4 METS),   (+) hypertension:, angina: no interval change, past MI:, CAD:, CABG/stent:, dysrhythmias: atrial fibrillation,       ECG reviewed  Rhythm: regular  Rate: normal                    Neuro/Psych:   (+) TIA,             GI/Hepatic/Renal:   (+) GERD: well controlled,           Endo/Other:    (+) Diabeteswell controlled, , .                 Abdominal:       Abdomen: soft. Vascular: Other Findings:             Anesthesia Plan      general     ASA 3       Induction: intravenous. MIPS: Postoperative opioids intended and Prophylactic antiemetics administered. Anesthetic plan and risks discussed with patient and spouse. Plan discussed with CRNA.                   Vish Mcgee DO   11/17/2021

## 2021-11-17 NOTE — PROGRESS NOTES
200 Wife at side, denies pain or needs. 5 Dr Mayorga Place in to see patient. Snacks given. 1234 Denies needs. 124 Ariana Billings for discharge. 1255 Discharge instructions given, understanding voiced, questions answered. New cold compress given and applied.

## 2021-11-22 ENCOUNTER — CARE COORDINATION (OUTPATIENT)
Dept: FAMILY MEDICINE CLINIC | Age: 76
End: 2021-11-22

## 2021-11-23 NOTE — CARE COORDINATION
Frank Rosales is doing well. He does not sound short of breath and he denies any needs at this time. I will follow up with him next week after the holiday.

## 2021-11-30 ENCOUNTER — CARE COORDINATION (OUTPATIENT)
Dept: FAMILY MEDICINE CLINIC | Age: 76
End: 2021-11-30

## 2021-12-02 ENCOUNTER — OFFICE VISIT (OUTPATIENT)
Dept: ENT CLINIC | Age: 76
End: 2021-12-02
Payer: MEDICARE

## 2021-12-02 VITALS
RESPIRATION RATE: 14 BRPM | OXYGEN SATURATION: 98 % | SYSTOLIC BLOOD PRESSURE: 132 MMHG | HEART RATE: 75 BPM | DIASTOLIC BLOOD PRESSURE: 72 MMHG | WEIGHT: 221 LBS | TEMPERATURE: 98.6 F | BODY MASS INDEX: 32.64 KG/M2

## 2021-12-02 DIAGNOSIS — R04.0 EPISTAXIS: Primary | ICD-10-CM

## 2021-12-02 PROCEDURE — 1123F ACP DISCUSS/DSCN MKR DOCD: CPT | Performed by: OTOLARYNGOLOGY

## 2021-12-02 PROCEDURE — G8484 FLU IMMUNIZE NO ADMIN: HCPCS | Performed by: OTOLARYNGOLOGY

## 2021-12-02 PROCEDURE — 4040F PNEUMOC VAC/ADMIN/RCVD: CPT | Performed by: OTOLARYNGOLOGY

## 2021-12-02 PROCEDURE — 99203 OFFICE O/P NEW LOW 30 MIN: CPT | Performed by: OTOLARYNGOLOGY

## 2021-12-02 PROCEDURE — G8427 DOCREV CUR MEDS BY ELIG CLIN: HCPCS | Performed by: OTOLARYNGOLOGY

## 2021-12-02 PROCEDURE — G8417 CALC BMI ABV UP PARAM F/U: HCPCS | Performed by: OTOLARYNGOLOGY

## 2021-12-02 PROCEDURE — 1036F TOBACCO NON-USER: CPT | Performed by: OTOLARYNGOLOGY

## 2021-12-02 NOTE — PATIENT INSTRUCTIONS
Patient Education        Nosebleeds: Care Instructions  Your Care Instructions     Nosebleeds are common, especially if you have colds or allergies. Many things can cause a nosebleed. Some nosebleeds stop on their own with pressure. Others need packing. Some get cauterized (sealed). If you have gauze or other packing materials in your nose, you will need to follow up with your doctor to have the packing removed. You may need more treatment if you get nosebleeds a lot. The doctor has checked you carefully, but problems can develop later. If you notice any problems or new symptoms, get medical treatment right away. Follow-up care is a key part of your treatment and safety. Be sure to make and go to all appointments, and call your doctor if you are having problems. It's also a good idea to know your test results and keep a list of the medicines you take. How can you care for yourself at home? · If you get another nosebleed:  ? Sit up and tilt your head slightly forward. This keeps blood from going down your throat. ? Use your thumb and index finger to pinch your nose shut for 10 minutes. Use a clock. Do not check to see if the bleeding has stopped before the 10 minutes are up. If the bleeding has not stopped, pinch your nose shut for another 10 minutes. ? When the bleeding has stopped, try not to pick, rub, or blow your nose for 12 hours. Avoiding these things helps keep your nose from bleeding again. · If your doctor prescribed antibiotics, take them as directed. Do not stop taking them just because you feel better. You need to take the full course of antibiotics. To prevent nosebleeds  · Do not blow your nose too hard. · Try not to lift or strain after a nosebleed. · Raise your head on a pillow while you sleep. · Put a thin layer of a saline- or water-based nasal gel, such as NasoGel, inside your nose. Put it on the septum, which divides your nostrils.  This will prevent dryness that can cause nosebleeds. · Use a vaporizer or humidifier to add moisture to your bedroom. Follow the directions for cleaning the machine. · Do not use aspirin, ibuprofen (Advil, Motrin), or naproxen (Aleve) for 36 to 48 hours after a nosebleed unless your doctor tells you to. You can use acetaminophen (Tylenol) for pain relief. · Talk to your doctor about stopping any other medicines you are taking. Some medicines may make you more likely to get a nosebleed. · Do not use cold medicines or nasal sprays without first talking to your doctor. They can make your nose dry. When should you call for help? Call 911 anytime you think you may need emergency care. For example, call if:    · You passed out (lost consciousness). Call your doctor now or seek immediate medical care if:    · You get another nosebleed and your nose is still bleeding after you have applied pressure 3 times for 10 minutes each time (30 minutes total).     · There is a lot of blood running down the back of your throat even after you pinch your nose and tilt your head forward.     · You have a fever.     · You have sinus pain. Watch closely for changes in your health, and be sure to contact your doctor if:    · You get nosebleeds often, even if they stop.     · You do not get better as expected. Where can you learn more? Go to [email or web address] org and sign in to your [employer] account. Enter S156 in the [employer] box to learn more about \"Nosebleeds: Care Instructions. \"     If you do not have an account, please click on the \"Sign Up Now\" link. Current as of: July 1, 2021               Content Version: 13.0  © 7711-5457 [employer], Inc[employer]. Care instructions adapted under license by [facility] ([facility]). If you have questions about a medical condition or this instruction, always ask your healthcare professional. [place] [address] any warranty or liability for your use of this information.

## 2021-12-02 NOTE — PROGRESS NOTES
Jose, NOSE AND THROAT  [address] [facility]  Dept: [phone]  Dept Fax: [phone]: [phone]       Dear Lissy Luis MD, thank you for referring Krystal Flores in consultation for a chief complaint of: Epistaxis. My full evaluation is as follows:     HPI:     As you recall, Krystal Flores is a 68 y.o. male who presents today for recurrent epistaxis. He is on 2 blood thinners, and has started having frequent nosebleeds, usually out of the left side. His nosebleeds have lasted for up to an hour. He usually places cotton in his nose to get them to stop. He has never required nasal packing. He does not currently use any nasal sprays. He is on BiPAP at night.        History:     No Known Allergies  Current Outpatient Medications   Medication Sig Dispense Refill    bumetanide (BUMEX) 2 MG tablet Take 2 mg by mouth daily      apixaban (ELIQUIS) 5 MG TABS tablet Take 1 tablet by mouth 2 times daily 60 tablet 1    NOVOFINE 32G X 6 MM MISC USE 1 NEW NEEDLE 6 TIMES  DAILY AND AS NEEDED 540 each 1    insulin detemir (LEVEMIR FLEXTOUCH) 100 UNIT/ML injection pen Inject 48 Units into the skin 2 times daily 90 mL 3    ferrous sulfate (IRON 325) 325 (65 Fe) MG tablet Take 325 mg by mouth every other day      ascorbic acid (VITAMIN C) 500 MG tablet Take 500 mg by mouth every other day      nitroGLYCERIN (NITROLINGUAL) 0.4 MG/SPRAY 0.4 mg spray USE 1 SPRAY ON OR UNDER THE TONGUE EVERY 5 MINUTES AS NEEDED FOR CHEST PAIN 4.9 g 6    dilTIAZem (CARDIZEM CD) 120 MG extended release capsule TAKE 1 CAPSULE BY MOUTH  DAILY 90 capsule 3    insulin lispro (HUMALOG) 100 UNIT/ML injection vial Inject into the skin 7 units in am and 7 units at lunch and 12 units at supper plus sliding scale during meals and at bedtime      isosorbide mononitrate (IMDUR) 120 MG extended release tablet Take 1 tablet by mouth daily 30 tablet 3  blood glucose test strips (ACCU-CHEK GUIDE) strip USE TO CHECK 4 TIMES DAILY 300 strip 5    Accu-Chek FastClix Lancets MISC USE TO TEST BLOOD SUGARS 4 TIMES DAILY 102 each 63    atorvastatin (LIPITOR) 40 MG tablet TAKE 1 TABLET BY MOUTH AT  NIGHT 90 tablet 3    ranolazine (RANEXA) 1000 MG extended release tablet Take 1 tablet by mouth 2 times daily New dose 180 tablet 1    tamsulosin (FLOMAX) 0.4 MG capsule Take 1 capsule by mouth daily 90 capsule 2    diphenhydrAMINE-APAP, sleep, (TYLENOL PM EXTRA STRENGTH)  MG tablet Take 1 tablet by mouth every evening as needed      hydrOXYzine (ATARAX) 10 MG tablet Take 10 mg by mouth daily as needed       fluocinonide (LIDEX) 0.05 % cream Apply 1 applicator topically as needed      BiPAP Machine MISC by Does not apply route      metoprolol succinate (TOPROL XL) 50 MG extended release tablet TAKE 1 TABLET BY MOUTH  DAILY 90 tablet 3    Multiple Vitamins-Minerals (THERAPEUTIC MULTIVITAMIN-MINERALS) tablet Take 1 tablet by mouth nightly      hydrALAZINE (APRESOLINE) 25 MG tablet Take 1 tablet by mouth every 8 hours 90 tablet 0    clopidogrel (PLAVIX) 75 MG tablet Take 75 mg by mouth daily morning       No current facility-administered medications for this visit.      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    BPH (benign prostatic hypertrophy)     CAD (coronary artery disease) 1997    Carotid artery disease ([address] 1995    right    Carotid disease, bilateral ([address]     Cerebral artery occlusion with cerebral infarction ([address]     CHF (congestive heart failure) (HCC)     Chronic kidney disease     GERD (gastroesophageal reflux disease)     GI bleed 07/2021    Hyperlipidemia 1996    Hypertension     Lumbar disc disease 2001    [facility] infarction) [facility] 1997    Nephrolithiasis 2002    NIDDM (non-insulin dependent diabetes mellitus) 1996    diagnosed 1996    RICCARDO (obstructive sleep apnea) 2005    cpap    PVC's (premature ventricular contractions)     Renal artery stenosis ([facility] 2007    left    Renal insufficiency     S/P CABG (status post coronary artery bypass graft) 1997    5 vessels    Skin cancer     Squamous cell carcinoma     TIA (transient ischemic attack)     Wears partial dentures     upper and lower      Past Surgical History:   Procedure Laterality Date    ABLATION OF DYSRHYTHMIC FOCUS  3/13/13   [address] SURGERY  3/13/13    oblation for irreg rythm    CARDIOVERSION  3/8/2012    CAROTID ENDARTERECTOMY  1997    CHOLECYSTECTOMY  4/1999    COLONOSCOPY  6/22/09, 4/2011,7/12/17    polyp removal    COLONOSCOPY N/A 7/21/2021    COLONOSCOPY POLYPECTOMY SNARE/COLD BIOPSY performed by Danelle Rosales MD at [address] [place] [address]  2008 (2), 2009 (1)    x2    CORONARY ARTERY BYPASS GRAFT  8/1997    5 vessel    DIAGNOSTIC CARDIAC CATH LAB PROCEDURE      ENDOSCOPY, COLON, DIAGNOSTIC      EYE LID SURGERY Right 11/17/2021    EXCISION SCC RIGHT LOWER LATERAL EYELID WITH FROZEN SECTION performed by Kermit Martinez MD at [id number] [place] [address]  12/20/2013    pre-cancerous mole rt foot removed    LITHOTRIPSY  4/2002    ID OFFICE/OUTPT VISIT,PROCEDURE ONLY N/A 11/8/2018    TRANSESOPHAGEAL ECHOCARDIOGRAM performed by Lanette Sylvester MD at [facility]    PTCA  05/30/2018   Al[place]  2001, 2003    rt shoulder manipulation--frozen shoulder-01, Lt -03    SKIN BIOPSY      SKIN CANCER EXCISION  12/20/13     R foot    SKIN CANCER EXCISION  1/2016    squamous cell Lt  upper cheek    UPPER GASTROINTESTINAL ENDOSCOPY N/A 7/20/2021    EGD ESOPHAGOGASTRODUODENOSCOPY performed by Danelle Rosales MD at [address] [place] [address] STENT PLACEMENT  4/2002    VASCULAR SURGERY      carotids & cabg harvests     Family History   Problem Relation Age of Onset    Cancer Mother     High Blood Pressure Mother     Stroke Father     High Blood Pressure Father     Heart Disease Brother     Cancer Brother         lung    Cancer Brother         melanoma     Social History     Tobacco Use    Smoking status: Never Smoker    Smokeless tobacco: Never Used   Substance Use Topics    Alcohol use: No     Alcohol/week: 0.0 standard drinks       All of the past medical history, past surgical history, family history,social history, allergies and current medications were reviewed with the patient. Review of Systems      A complete review of systems was obtained by the patient intake form, which is attached to this visit. Objective:   /72 (Site: Right Upper Arm, Position: Sitting)   Pulse 75   Temp 98.6 °F (37 °C) (Infrared)   Resp 14   Wt 221 lb (100.2 kg)   SpO2 98%   BMI 32.64 kg/m²     PHYSICAL EXAM  ABNORMAL OTOLARYNGOLOGIC EXAM FINDINGS: Anterior rhinoscopy reveals a small linear scab on his left anterior septum. OTHER PERTINENT EXAM FINDINGS:  GENERAL: Awake, NAD, Non-toxic appearing. Normal voice quality  NEUROLOGICAL: GCS 15, Cranial nerves II-VI, VIII-XII grossly intact,  Facial nerve (VII) w/ House-Brackman Grade 1 of 6 bilaterally, No evidence of nystagmus or gross asymmetry    EARS: Pinna well-formed,  EAC non-stenotic w/o cerumen impaction AU,  TM's intact AU w/o effusion or active infection appreciated  EYES: EOMI, No ptosis appreciated   NOSE: Dorsum w/o scar or deformity. ORAL CAVITY: No mucosal masses/lesions appreciated, Tongue with FROM. Appropriate WILLIAM w/o trismus. OROPHARYNX: No mucosal masses or lesions appreciated. Symmetric palatal elevation w/o draping. NECK: Soft, supple. No crepitus or masses appreciated,  Trachea midline. No thyromegaly or thyroid tenderness. Data:  The relevant prior clinic notes were reviewed today, including the referring physicians notes. Imaging: None       Assessment & Plan   Melissa Guillen is a 68 y.o. male with:   1. Epistaxis         Recurrent epistaxis  -I discussed the etiology of the problem with the patient. We discussed the mainstay of therapy, which is improved nasal hygiene. I recommended nasal saline sprays at least 3 times a day, with nasal irrigations anytime nasal crusting develops. I will also have him apply Vaseline to the septum nightly for the next 2 weeks. We also discussed a bedside humidifier, and personal steamer, which will be particularly useful during the wintertime. We discussed management regimens of epistaxis, including holding pressure for 15 minutes without a break, and the sparing use of Afrin. No follow-ups on file. Thank you for involving me in this patient's care. Please do not hesitate to call with any questions or concerns. Sincerely,    Magdy Conteh M.D. Otolaryngology-Head and Neck Surgery    **This report has been created using voice recognition software. It may contain minor errors which are inherent in voice recognition technology. **

## 2021-12-07 ENCOUNTER — NURSE ONLY (OUTPATIENT)
Dept: LAB | Age: 76
End: 2021-12-07

## 2021-12-07 ENCOUNTER — TELEPHONE (OUTPATIENT)
Dept: NEPHROLOGY | Age: 76
End: 2021-12-07

## 2021-12-07 ENCOUNTER — CARE COORDINATION (OUTPATIENT)
Dept: FAMILY MEDICINE CLINIC | Age: 76
End: 2021-12-07

## 2021-12-07 DIAGNOSIS — D63.1 ANEMIA OF CHRONIC RENAL FAILURE, STAGE 3B (HCC): ICD-10-CM

## 2021-12-07 DIAGNOSIS — N18.32 ANEMIA OF CHRONIC RENAL FAILURE, STAGE 3B (HCC): ICD-10-CM

## 2021-12-07 DIAGNOSIS — N18.32 ANEMIA OF CHRONIC RENAL FAILURE, STAGE 3B (HCC): Primary | ICD-10-CM

## 2021-12-07 DIAGNOSIS — D63.1 ANEMIA OF CHRONIC RENAL FAILURE, STAGE 3B (HCC): Primary | ICD-10-CM

## 2021-12-07 LAB
ERYTHROCYTE [DISTWIDTH] IN BLOOD BY AUTOMATED COUNT: 15.2 % (ref 11.5–14.5)
ERYTHROCYTE [DISTWIDTH] IN BLOOD BY AUTOMATED COUNT: 58.5 FL (ref 35–45)
HCT VFR BLD CALC: 31.2 % (ref 42–52)
HEMOGLOBIN: 9.7 GM/DL (ref 14–18)
MCH RBC QN AUTO: 32.4 PG (ref 26–33)
MCHC RBC AUTO-ENTMCNC: 31.1 GM/DL (ref 32.2–35.5)
MCV RBC AUTO: 104.3 FL (ref 80–94)
PLATELET # BLD: 120 THOU/MM3 (ref 130–400)
PMV BLD AUTO: 11.3 FL (ref 9.4–12.4)
RBC # BLD: 2.99 MILL/MM3 (ref 4.7–6.1)
WBC # BLD: 8.2 THOU/MM3 (ref 4.8–10.8)

## 2021-12-07 NOTE — TELEPHONE ENCOUNTER
Yes you can order a CBC.   However 3 weeks ago is hemoglobin is 10.1 and is WBC is normal is not low

## 2021-12-07 NOTE — TELEPHONE ENCOUNTER
I called and informed pt of this information. He will go to [facility] this week to have done.

## 2021-12-08 NOTE — CARE COORDINATION
Pankaj Naranjo is doing OK. He has seen the ENT because he still has occasional epistaxis. He is tired and going for therapy for his back. I spoke with Rafaela Tavares because he had just left. He has no additional home needs and I will check on him next week.

## 2021-12-13 ENCOUNTER — CARE COORDINATION (OUTPATIENT)
Dept: FAMILY MEDICINE CLINIC | Age: 76
End: 2021-12-13

## 2021-12-28 NOTE — TELEPHONE ENCOUNTER
Date of last visit:  4/19/2021  Date of next visit:  4/8/2022    Requested Prescriptions     Pending Prescriptions Disp Refills    insulin lispro (HUMALOG) 100 UNIT/ML injection vial 10 mL      Sig: Inject into the skin 7 units in am and 7 units at lunch and 12 units at supper plus sliding scale during meals and at bedtime

## 2021-12-29 ENCOUNTER — TELEPHONE (OUTPATIENT)
Dept: FAMILY MEDICINE CLINIC | Age: 76
End: 2021-12-29

## 2021-12-29 ENCOUNTER — HOSPITAL ENCOUNTER (OUTPATIENT)
Age: 76
Discharge: HOME | End: 2021-12-29
Payer: MEDICARE

## 2021-12-29 DIAGNOSIS — Z20.822 SUSPECTED COVID-19 VIRUS INFECTION: ICD-10-CM

## 2021-12-29 DIAGNOSIS — Z20.822 SUSPECTED COVID-19 VIRUS INFECTION: Primary | ICD-10-CM

## 2021-12-29 PROCEDURE — 87636 SARSCOV2 & INF A&B AMP PRB: CPT

## 2021-12-29 NOTE — TELEPHONE ENCOUNTER
Mandeep Mora called, patient C/O runny nose, occasional cough but would like Covid test at [facility].     Please call Mandeep Mora

## 2021-12-30 LAB
INFLUENZA A: NOT DETECTED
INFLUENZA B: NOT DETECTED
SARS-COV-2 RNA, RT PCR: NOT DETECTED

## 2022-01-10 RX ORDER — TAMSULOSIN HYDROCHLORIDE 0.4 MG/1
0.4 CAPSULE ORAL DAILY
Qty: 90 CAPSULE | Refills: 2 | Status: ON HOLD | OUTPATIENT
Start: 2022-01-10 | End: 2022-05-04 | Stop reason: SDUPTHER

## 2022-01-10 NOTE — TELEPHONE ENCOUNTER
Shyla Brochure called requesting a refill on the following medications:  Requested Prescriptions     Pending Prescriptions Disp Refills    tamsulosin (FLOMAX) 0.4 MG capsule [Pharmacy Med Name: Tamsulosin HCl 0.4 MG Oral Capsule] 90 capsule 2     Sig: TAKE 1 [address] verified:  .[email or web address]      Date of last visit: 04/21/2021  Date of next visit (if applicable): Visit date not found

## 2022-01-12 DIAGNOSIS — D63.1 ANEMIA IN STAGE 3 CHRONIC KIDNEY DISEASE, UNSPECIFIED WHETHER STAGE 3A OR 3B CKD (HCC): Primary | ICD-10-CM

## 2022-01-12 DIAGNOSIS — N18.30 ANEMIA IN STAGE 3 CHRONIC KIDNEY DISEASE, UNSPECIFIED WHETHER STAGE 3A OR 3B CKD (HCC): Primary | ICD-10-CM

## 2022-01-14 ENCOUNTER — NURSE ONLY (OUTPATIENT)
Dept: LAB | Age: 77
End: 2022-01-14

## 2022-01-14 LAB
ALBUMIN SERPL-MCNC: 4.2 G/DL (ref 3.5–5.1)
ALP BLD-CCNC: 82 U/L (ref 38–126)
ALT SERPL-CCNC: 22 U/L (ref 11–66)
ANION GAP SERPL CALCULATED.3IONS-SCNC: 12 MEQ/L (ref 8–16)
AST SERPL-CCNC: 16 U/L (ref 5–40)
BILIRUB SERPL-MCNC: 0.3 MG/DL (ref 0.3–1.2)
BUN BLDV-MCNC: 49 MG/DL (ref 7–22)
CALCIUM SERPL-MCNC: 9.2 MG/DL (ref 8.5–10.5)
CHLORIDE BLD-SCNC: 102 MEQ/L (ref 98–111)
CO2: 23 MEQ/L (ref 23–33)
CREAT SERPL-MCNC: 2 MG/DL (ref 0.4–1.2)
ERYTHROCYTE [DISTWIDTH] IN BLOOD BY AUTOMATED COUNT: 13.2 % (ref 11.5–14.5)
ERYTHROCYTE [DISTWIDTH] IN BLOOD BY AUTOMATED COUNT: 51.1 FL (ref 35–45)
GFR SERPL CREATININE-BSD FRML MDRD: 33 ML/MIN/1.73M2
GLUCOSE BLD-MCNC: 167 MG/DL (ref 70–108)
HCT VFR BLD CALC: 29.4 % (ref 42–52)
HEMOGLOBIN: 9.3 GM/DL (ref 14–18)
MCH RBC QN AUTO: 33.2 PG (ref 26–33)
MCHC RBC AUTO-ENTMCNC: 31.6 GM/DL (ref 32.2–35.5)
MCV RBC AUTO: 105 FL (ref 80–94)
PLATELET # BLD: 165 THOU/MM3 (ref 130–400)
PMV BLD AUTO: 10.2 FL (ref 9.4–12.4)
POTASSIUM SERPL-SCNC: 4.7 MEQ/L (ref 3.5–5.2)
RBC # BLD: 2.8 MILL/MM3 (ref 4.7–6.1)
SODIUM BLD-SCNC: 137 MEQ/L (ref 135–145)
TOTAL PROTEIN: 6.3 G/DL (ref 6.1–8)
WBC # BLD: 9.2 THOU/MM3 (ref 4.8–10.8)

## 2022-01-19 ENCOUNTER — PATIENT MESSAGE (OUTPATIENT)
Dept: NEPHROLOGY | Age: 77
End: 2022-01-19

## 2022-01-19 ENCOUNTER — OFFICE VISIT (OUTPATIENT)
Dept: NEPHROLOGY | Age: 77
End: 2022-01-19
Payer: MEDICARE

## 2022-01-19 ENCOUNTER — TELEPHONE (OUTPATIENT)
Dept: NEPHROLOGY | Age: 77
End: 2022-01-19

## 2022-01-19 VITALS
TEMPERATURE: 98.6 F | BODY MASS INDEX: 32.14 KG/M2 | WEIGHT: 217 LBS | OXYGEN SATURATION: 98 % | HEART RATE: 60 BPM | SYSTOLIC BLOOD PRESSURE: 121 MMHG | DIASTOLIC BLOOD PRESSURE: 42 MMHG | HEIGHT: 69 IN

## 2022-01-19 DIAGNOSIS — D63.1 ANEMIA OF CHRONIC RENAL FAILURE, STAGE 3B (HCC): Primary | ICD-10-CM

## 2022-01-19 DIAGNOSIS — I50.32 CHRONIC DIASTOLIC CONGESTIVE HEART FAILURE (HCC): ICD-10-CM

## 2022-01-19 DIAGNOSIS — I10 ESSENTIAL HYPERTENSION: ICD-10-CM

## 2022-01-19 DIAGNOSIS — N18.32 ANEMIA OF CHRONIC RENAL FAILURE, STAGE 3B (HCC): Primary | ICD-10-CM

## 2022-01-19 PROCEDURE — G8427 DOCREV CUR MEDS BY ELIG CLIN: HCPCS | Performed by: INTERNAL MEDICINE

## 2022-01-19 PROCEDURE — 1036F TOBACCO NON-USER: CPT | Performed by: INTERNAL MEDICINE

## 2022-01-19 PROCEDURE — 1123F ACP DISCUSS/DSCN MKR DOCD: CPT | Performed by: INTERNAL MEDICINE

## 2022-01-19 PROCEDURE — 4040F PNEUMOC VAC/ADMIN/RCVD: CPT | Performed by: INTERNAL MEDICINE

## 2022-01-19 PROCEDURE — 99213 OFFICE O/P EST LOW 20 MIN: CPT | Performed by: INTERNAL MEDICINE

## 2022-01-19 PROCEDURE — G8417 CALC BMI ABV UP PARAM F/U: HCPCS | Performed by: INTERNAL MEDICINE

## 2022-01-19 PROCEDURE — G8484 FLU IMMUNIZE NO ADMIN: HCPCS | Performed by: INTERNAL MEDICINE

## 2022-01-19 NOTE — TELEPHONE ENCOUNTER
It is scheduled for 1/25/22 at 11 arrive at 10:50 am. Please called [facility] schedulding if it needs rescheduled.

## 2022-01-19 NOTE — PROGRESS NOTES
[facility]        Outpatient Follow-Up note         1/19/2022 10:25 AM    Patient Name:   Gurmeet Covington  Birthdate:    [birth year]  Primary Care Physician:  Joel Marin MD     Chief Complaint / Reason for follow-up : Follow Up of CKD     Interval History :  Patient seen and examined by me. Feels ok. No chest pain. also SOB. Feels weak and tired . Off eliquis due to nose bleeds.       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    BPH (benign prostatic hypertrophy)     CAD (coronary artery disease) 1997    Carotid artery disease ([address] 1995    right    Carotid disease, bilateral ([address]     Cerebral artery occlusion with cerebral infarction ([address]     CHF (congestive heart failure) ([address]     Chronic kidney disease     GERD (gastroesophageal reflux disease)     GI bleed 07/2021    Hyperlipidemia 1996    Hypertension     Lumbar disc disease 2001    MI (myocardial infarction) ([address] 1997    Nephrolithiasis 2002    NIDDM (non-insulin dependent diabetes mellitus) 1996    diagnosed 1996    RICCARDO (obstructive sleep apnea) 2005    cpap    PVC's (premature ventricular contractions)     Renal artery stenosis ([address] 2007    left    Renal insufficiency     S/P CABG (status post coronary artery bypass graft) 1997    5 vessels    Skin cancer     Squamous cell carcinoma     TIA (transient ischemic attack)     Wears partial dentures     upper and lower     Past Surgical History:   Procedure Laterality Date    ABLATION OF DYSRHYTHMIC FOCUS  3/13/13   [address]    CARDIAC SURGERY  3/13/13    oblation for irreg rythm    CARDIOVERSION  3/8/2012    CAROTID ENDARTERECTOMY  1997    CHOLECYSTECTOMY  4/1999    COLONOSCOPY 6/22/09, 4/2011,7/12/17    polyp removal    COLONOSCOPY N/A 7/21/2021    COLONOSCOPY POLYPECTOMY SNARE/COLD BIOPSY performed by Anna Marie Manrique MD at [address] [facility]  2008 (2), 2009 (1)    x2    CORONARY ARTERY BYPASS GRAFT  8/1997    5 vessel    DIAGNOSTIC CARDIAC CATH LAB PROCEDURE      ENDOSCOPY, COLON, DIAGNOSTIC      EYE LID SURGERY Right 11/17/2021    EXCISION SCC RIGHT LOWER LATERAL EYELID WITH FROZEN SECTION performed by Columba Broussard MD at [address] [facility]  12/20/2013    pre-cancerous mole rt foot removed    LITHOTRIPSY  4/2002    VT OFFICE/OUTPT VISIT,PROCEDURE ONLY N/A 11/8/2018    TRANSESOPHAGEAL ECHOCARDIOGRAM performed by Jean Bacon MD at [address] [facility] PTCA  05/30/2018   [place]  2001, 2003    rt shoulder manipulation--frozen shoulder-01, Lt -03    SKIN BIOPSY      SKIN CANCER EXCISION  12/20/13     R foot    SKIN CANCER EXCISION  1/2016    squamous cell Lt  upper cheek    UPPER GASTROINTESTINAL ENDOSCOPY N/A 7/20/2021    EGD ESOPHAGOGASTRODUODENOSCOPY performed by Anna Marie Manrique MD at [address] [facility]  4/2002    VASCULAR SURGERY      carotids & cabg harvests        Medications :     Outpatient Medications Marked as Taking for the 1/19/22 encounter (Office Visit) with Lisbet Otero MD   Medication Sig Dispense Refill    tamsulosin (FLOMAX) 0.4 MG capsule TAKE 1 CAPSULE BY MOUTH  DAILY 90 capsule 2    insulin lispro (HUMALOG) 100 UNIT/ML injection vial 7 units in am and 7 units at lunch and 12 units at supper plus sliding scale during meals and at bedtime 10 mL 1    bumetanide (BUMEX) 2 MG tablet Take 2 mg by mouth daily      apixaban (ELIQUIS) 5 MG TABS tablet Take 1 tablet by mouth 2 times daily 60 tablet 1    NOVOFINE 32G X 6 MM MISC USE 1 NEW NEEDLE 6 TIMES  DAILY AND AS NEEDED 540 each 1    insulin detemir (LEVEMIR FLEXTOUCH) 100 UNIT/ML injection pen Inject 48 Units into the skin 2 times daily 90 mL 3    ferrous sulfate (IRON 325) 325 (65 Fe) MG tablet Take 325 mg by mouth every other day      ascorbic acid (VITAMIN C) 500 MG tablet Take 500 mg by mouth every other day      nitroGLYCERIN (NITROLINGUAL) 0.4 MG/SPRAY 0.4 mg spray USE 1 SPRAY ON OR UNDER THE TONGUE EVERY 5 MINUTES AS NEEDED FOR CHEST PAIN 4.9 g 6    dilTIAZem (CARDIZEM CD) 120 MG extended release capsule TAKE 1 CAPSULE BY MOUTH  DAILY 90 capsule 3    isosorbide mononitrate (IMDUR) 120 MG extended release tablet Take 1 tablet by mouth daily 30 tablet 3    blood glucose test strips (ACCU-CHEK GUIDE) strip USE TO CHECK 4 TIMES DAILY 300 strip 5    Accu-Chek FastClix Lancets MISC USE TO TEST BLOOD SUGARS 4 TIMES DAILY 102 each 63    atorvastatin (LIPITOR) 40 MG tablet TAKE 1 TABLET BY MOUTH AT  NIGHT 90 tablet 3    ranolazine (RANEXA) 1000 MG extended release tablet Take 1 tablet by mouth 2 times daily New dose 180 tablet 1    diphenhydrAMINE-APAP, sleep, (TYLENOL PM EXTRA STRENGTH)  MG tablet Take 1 tablet by mouth every evening as needed      hydrOXYzine (ATARAX) 10 MG tablet Take 10 mg by mouth daily as needed       fluocinonide (LIDEX) 0.05 % cream Apply 1 applicator topically as needed      BiPAP Machine MISC by Does not apply route      metoprolol succinate (TOPROL XL) 50 MG extended release tablet TAKE 1 TABLET BY MOUTH  DAILY 90 tablet 3    Multiple Vitamins-Minerals (THERAPEUTIC MULTIVITAMIN-MINERALS) tablet Take 1 tablet by mouth nightly      hydrALAZINE (APRESOLINE) 25 MG tablet Take 1 tablet by mouth every 8 hours 90 tablet 0    clopidogrel (PLAVIX) 75 MG tablet Take 75 mg by mouth daily morning         Vitals     BP (!) 121/42 (Site: Left Upper Arm, Position: Sitting, Cuff Size: Large Adult)   Pulse 60   Temp 98.6 °F (37 °C)   Ht 5' 9\" (1.753 m)   Wt 217 lb (98.4 kg)   SpO2 98%   BMI 32.05 kg/m²  Wt Readings from Last 3 Encounters:   01/19/22 217 lb (98.4 kg) 12/02/21 221 lb (100.2 kg)   11/17/21 221 lb (100.2 kg)        Physical Exam   General -- no distress  Lungs -- clear  Heart -- S1, S2 heard, JVD - no  Abdomen - soft, non-tender  Extremities --trace edema  CNS - awake and alert    Labs, Radiology and Tests    Labs -    9/18 3/19 4/20 1/21 5/21 9/21 1/22     Potassium 5.0 4.1 4.8 4.9 4.0 4.0 4.7     BUN 34 26 34 45 27 47 49     Creatinine 1.5 1.5 (1.7) 1.8 1.7 1.8 2.2 2.0     eGFR 46 46 37 39 37 29 33                 UPCR    60        [facility]                          Renal Ultrasound Scan -- 6/2018  Rt kidney 13.1 cm and left kidney 11.50  Left kidney cysts 5.7 cm. Assessment    1. Renal - Acute kidney injury on chronic kidney disease stage III most likely secondary to the use of diuretics.    -Overall renal function appears to be stable. -Volume status okay continue current dose of diuretics  2. Electrolytes - WNL  3. Coronary artery disease with CABG 20 years ago and 15 stents so far 9 of them have been placed in 2018   - again recent cardiac cath on 4/21 with angioplasty done  4. Diabetes mellitus, stable   5. Elevated PSA . BPH seen urology . 6. Acute on chronic diastolic congestive heart failure plan as mentioned above  7. Anemia - iron deficiency . Start bolivar . 8. meds reviewed and D/W patient and wife  9. FU in 4 months. Tests and orders placed this Encounter     Orders Placed This Encounter   Procedures    Basic Metabolic Panel    CBC    Hemoglobin and Hematocrit, Blood       Yared Giordano M.D  [facility].

## 2022-01-25 RX ORDER — RANOLAZINE 1000 MG/1
TABLET, EXTENDED RELEASE ORAL
Qty: 180 TABLET | Refills: 3 | Status: SHIPPED | OUTPATIENT
Start: 2022-01-25 | End: 2022-08-16 | Stop reason: SDUPTHER

## 2022-01-27 ENCOUNTER — HOSPITAL ENCOUNTER (OUTPATIENT)
Dept: NURSING | Age: 77
Discharge: HOME | End: 2022-01-27
Payer: MEDICARE

## 2022-01-27 VITALS
DIASTOLIC BLOOD PRESSURE: 67 MMHG | RESPIRATION RATE: 20 BRPM | SYSTOLIC BLOOD PRESSURE: 155 MMHG | HEART RATE: 57 BPM | TEMPERATURE: 97.8 F | OXYGEN SATURATION: 99 %

## 2022-01-27 PROCEDURE — 6360000002 HC RX W HCPCS: Performed by: INTERNAL MEDICINE

## 2022-01-27 PROCEDURE — 96372 THER/PROPH/DIAG INJ SC/IM: CPT

## 2022-01-27 RX ADMIN — DARBEPOETIN ALFA 60 MCG: 60 INJECTION, SOLUTION INTRAVENOUS; SUBCUTANEOUS at 09:34

## 2022-01-27 ASSESSMENT — PAIN - FUNCTIONAL ASSESSMENT: PAIN_FUNCTIONAL_ASSESSMENT: 0-10

## 2022-02-02 DIAGNOSIS — I25.10 CORONARY ARTERY DISEASE INVOLVING NATIVE CORONARY ARTERY: ICD-10-CM

## 2022-02-04 RX ORDER — METOPROLOL SUCCINATE 50 MG/1
TABLET, EXTENDED RELEASE ORAL
Qty: 90 TABLET | Refills: 3 | Status: SHIPPED | OUTPATIENT
Start: 2022-02-04 | End: 2022-08-17 | Stop reason: SDUPTHER

## 2022-02-07 RX ORDER — INSULIN LISPRO 100 [IU]/ML
INJECTION, SOLUTION INTRAVENOUS; SUBCUTANEOUS
Qty: 15 ML | Refills: 11 | Status: SHIPPED | OUTPATIENT
Start: 2022-02-07 | End: 2022-07-21 | Stop reason: ALTCHOICE

## 2022-02-07 NOTE — TELEPHONE ENCOUNTER
Date of last visit:  4/19/2021  Date of next visit:  4/8/2022    Requested Prescriptions     Pending Prescriptions Disp Refills    HUMALOG KWIKPEN 100 UNIT/ML SOPN [Pharmacy Med Name: HumaLOG KwikPen 100 UNIT/ML Subcutaneous Solution Pen-injector] 15 mL 11     Sig: INJECT SUBCUTANEOUSLY 7 UNITS IN THE MORNING , 7 UNITS AT LUNCH AND 12 UNITS AT SUPPER PLUS SLIDING SCALE DURING MEALS AND AT BEDTIME

## 2022-02-09 ENCOUNTER — HOSPITAL ENCOUNTER (OUTPATIENT)
Age: 77
Discharge: HOME | End: 2022-02-09
Payer: MEDICARE

## 2022-02-09 LAB
EKG ATRIAL RATE: 65 BPM
EKG Q-T INTERVAL: 456 MS
EKG QRS DURATION: 130 MS
EKG QTC CALCULATION (BAZETT): 481 MS
EKG R AXIS: 48 DEGREES
EKG T AXIS: 41 DEGREES
EKG VENTRICULAR RATE: 67 BPM

## 2022-02-09 PROCEDURE — 93005 ELECTROCARDIOGRAM TRACING: CPT | Performed by: PHYSICIAN ASSISTANT

## 2022-02-09 PROCEDURE — 93010 ELECTROCARDIOGRAM REPORT: CPT | Performed by: NUCLEAR MEDICINE

## 2022-02-10 ENCOUNTER — OFFICE VISIT (OUTPATIENT)
Dept: INTERNAL MEDICINE CLINIC | Age: 77
End: 2022-02-10
Payer: MEDICARE

## 2022-02-10 VITALS
WEIGHT: 217.8 LBS | HEIGHT: 69 IN | SYSTOLIC BLOOD PRESSURE: 120 MMHG | BODY MASS INDEX: 32.26 KG/M2 | DIASTOLIC BLOOD PRESSURE: 50 MMHG | HEART RATE: 60 BPM | TEMPERATURE: 97.9 F

## 2022-02-10 DIAGNOSIS — I48.0 PAROXYSMAL ATRIAL FIBRILLATION (HCC): ICD-10-CM

## 2022-02-10 DIAGNOSIS — I25.10 CAD IN NATIVE ARTERY: ICD-10-CM

## 2022-02-10 DIAGNOSIS — D63.1 ANEMIA IN STAGE 3 CHRONIC KIDNEY DISEASE, UNSPECIFIED WHETHER STAGE 3A OR 3B CKD (HCC): ICD-10-CM

## 2022-02-10 DIAGNOSIS — Z95.1 S/P CABG (CORONARY ARTERY BYPASS GRAFT): ICD-10-CM

## 2022-02-10 DIAGNOSIS — N18.30 ANEMIA IN STAGE 3 CHRONIC KIDNEY DISEASE, UNSPECIFIED WHETHER STAGE 3A OR 3B CKD (HCC): ICD-10-CM

## 2022-02-10 DIAGNOSIS — C67.9 MALIGNANT NEOPLASM OF URINARY BLADDER, UNSPECIFIED SITE (HCC): ICD-10-CM

## 2022-02-10 DIAGNOSIS — E11.51 TYPE 2 DIABETES MELLITUS WITH DIABETIC PERIPHERAL ANGIOPATHY WITHOUT GANGRENE, WITH LONG-TERM CURRENT USE OF INSULIN (HCC): ICD-10-CM

## 2022-02-10 DIAGNOSIS — N18.4 CHRONIC KIDNEY DISEASE, STAGE 4 (SEVERE) (HCC): Primary | ICD-10-CM

## 2022-02-10 DIAGNOSIS — I20.9 ANGINA, CLASS III (HCC): ICD-10-CM

## 2022-02-10 DIAGNOSIS — Z79.4 TYPE 2 DIABETES MELLITUS WITH DIABETIC PERIPHERAL ANGIOPATHY WITHOUT GANGRENE, WITH LONG-TERM CURRENT USE OF INSULIN (HCC): ICD-10-CM

## 2022-02-10 DIAGNOSIS — E78.2 MIXED HYPERLIPIDEMIA: ICD-10-CM

## 2022-02-10 PROCEDURE — 99213 OFFICE O/P EST LOW 20 MIN: CPT | Performed by: INTERNAL MEDICINE

## 2022-02-10 PROCEDURE — 4040F PNEUMOC VAC/ADMIN/RCVD: CPT | Performed by: INTERNAL MEDICINE

## 2022-02-10 PROCEDURE — 1036F TOBACCO NON-USER: CPT | Performed by: INTERNAL MEDICINE

## 2022-02-10 PROCEDURE — G8427 DOCREV CUR MEDS BY ELIG CLIN: HCPCS | Performed by: INTERNAL MEDICINE

## 2022-02-10 PROCEDURE — 1123F ACP DISCUSS/DSCN MKR DOCD: CPT | Performed by: INTERNAL MEDICINE

## 2022-02-10 PROCEDURE — G8417 CALC BMI ABV UP PARAM F/U: HCPCS | Performed by: INTERNAL MEDICINE

## 2022-02-10 PROCEDURE — G8484 FLU IMMUNIZE NO ADMIN: HCPCS | Performed by: INTERNAL MEDICINE

## 2022-02-10 NOTE — PROGRESS NOTES
Mehul Barboza (:  1945) is a 68 y.o. male,Established patient, here for evaluation of the following chief complaint(s): Other (having skin cancer removal on -Dr medrano at surgery ctr-rt face- want hm to hold asa 14 days and plavix 5 days prior ), Atrial Fibrillation, Chronic Kidney Disease, Other (angina), and Other (had aranesp injection a couple of weeks ago)         ASSESSMENT/PLAN:  1. Chronic kidney disease, stage 4 (severe) ([facility] discussed GLP1 agonist; willing; will check coverage  2. Anemia in stage 3 chronic kidney disease, unspecified whether stage 3a or 3b CKD ([facility] improved after aranesp  3. Type 2 diabetes mellitus with diabetic peripheral angiopathy without gangrene, with long-term current use of insulin ([facility] A1C  Good at MAY USE UP TO 3. Some lows  4. Paroxysmal atrial fibrillation ([facility]- could not do watchman; currently in nsr; off [address] [place] [address] Road  5. Angina, class III ([facility]- reports none  6. Malignant neoplasm of urinary bladder, unspecified site [facility])- no data    8. Mixed hyperlipidemia- controlled  9. Skin cancer- to be removed by Dr Stephanie Patel; appears stable for procedure but prefer he stay on asa due to multiple stents  10. Suspect PAD by dopplers although no sxs claudication  11. Reviewed labs; cbc, bmp from 1.22      Return in about 6 months (around 8/10/2022). Subjective   SUBJECTIVE/OBJECTIVE:  HPI. Pt with cad, cabg, stents, hx afib with ablation,ckd, seen in f/u.  sxs have been stable. He is diabetic with good control. He has ckd. No unusual sxs recently. Review of Systems    Twelve point ROS completed and found to be negative except as noted above.       Objective   Physical Exam    BP (!) 120/50 (Site: Left Upper Arm)   Pulse 60   Temp 97.9 °F (36.6 °C)   Ht 5' 9.02\" (1.753 m)   Wt 217 lb 12.8 oz (98.8 kg)   BMI 32.15 kg/m²     General appearance - overweight and chronically ill appearing    Mental Status - alert, oriented to person, place, and time          Neck

## 2022-02-11 ENCOUNTER — TELEPHONE (OUTPATIENT)
Dept: INTERNAL MEDICINE CLINIC | Age: 77
End: 2022-02-11

## 2022-02-11 NOTE — TELEPHONE ENCOUNTER
Per vo from Dr. Alexia Moseley faxed note from yesterday to dr Sheryl Samson office that it is in patient's best interest to stay on ASA for procedure. I also called their office and advised them of this and that it is ok go off plavix for 5 days. I also spoke with pts wife and made sure she was aware also.

## 2022-02-15 ENCOUNTER — TELEPHONE (OUTPATIENT)
Dept: INTERNAL MEDICINE CLINIC | Age: 77
End: 2022-02-15

## 2022-02-15 NOTE — TELEPHONE ENCOUNTER
Researched affordability for GLP-1 agonists with [employer] Prescription plan:    Ozempic or Trulicity would be the same cost, as both are Tier 3 (preferred brands). Initial copay: $47/month  During coverage gap (patient would reach after ~5 months): $242/month  After coverage gap: $47/month    If this is not affordable, Beulah Vital may qualify to receive Trulicity through [employer]:   -Household size:1 income < $51,520   -Household size:2 income < $84,780      Let me know if you'd like me to reach out to discuss with Beulah Vital.     For Domingo Pepper in place:  No   Recommendation Provided To: Provider: 1 via Note to Provider   Intervention Detail: Patient Access Assistance/Sample Provided   Gap Closed?: No    Intervention Accepted By: Provider: 1   Time Spent (min): [address], PharmD, [facility] Pending sale to [facility]  Internal Medicine Clinical Pharmacist  [phone]

## 2022-02-21 RX ORDER — ASPIRIN 81 MG/1
81 TABLET ORAL DAILY
COMMUNITY

## 2022-02-21 NOTE — PROGRESS NOTES
Patient EKG, history & Clearance reviewed per Dr. Esperanza Garcia.  OK to proceed with surgery at [facility]- with Dr. Chuck Holloway [place] [address].

## 2022-02-21 NOTE — PROGRESS NOTES
Patient instructed not to eat or drink anything after midnight the day before surgery. Take heart & blood pressure medications in the morning with a small sip of water. Please bring list of medications with the dosages & when you take them. If you do not  have a list bring the medications bottles with you. If having a MAC or general anesthetic you MUST have a . Bring photo ID & insurance information. Leave jewelry (watch ,rings, peircings) & other valuables, including extra cash, at home. Wear comfortable clean clothing. When showering or bathing the night before & morning of surgery please use  antibacterial soap. Wear Mask.

## 2022-02-22 NOTE — TELEPHONE ENCOUNTER
Called and discussed with [employer]wife. They would be interested if they can qualify through [facility]. They will call will annual income this week.

## 2022-02-28 ENCOUNTER — ANESTHESIA EVENT (OUTPATIENT)
Dept: OPERATING ROOM | Age: 77
End: 2022-02-28
Payer: MEDICARE

## 2022-02-28 ENCOUNTER — HOSPITAL ENCOUNTER (OUTPATIENT)
Age: 77
Setting detail: OUTPATIENT SURGERY
Discharge: HOME | End: 2022-02-28
Attending: SPECIALIST | Admitting: SPECIALIST
Payer: MEDICARE

## 2022-02-28 ENCOUNTER — ANESTHESIA (OUTPATIENT)
Dept: OPERATING ROOM | Age: 77
End: 2022-02-28
Payer: MEDICARE

## 2022-02-28 VITALS
HEIGHT: 69 IN | BODY MASS INDEX: 32.17 KG/M2 | DIASTOLIC BLOOD PRESSURE: 59 MMHG | WEIGHT: 217.2 LBS | TEMPERATURE: 98.3 F | RESPIRATION RATE: 16 BRPM | OXYGEN SATURATION: 94 % | HEART RATE: 63 BPM | SYSTOLIC BLOOD PRESSURE: 139 MMHG

## 2022-02-28 VITALS
SYSTOLIC BLOOD PRESSURE: 125 MMHG | RESPIRATION RATE: 16 BRPM | OXYGEN SATURATION: 98 % | DIASTOLIC BLOOD PRESSURE: 58 MMHG

## 2022-02-28 LAB — GLUCOSE BLD-MCNC: 149 MG/DL (ref 70–108)

## 2022-02-28 PROCEDURE — 88331 PATH CONSLTJ SURG 1 BLK 1SPC: CPT

## 2022-02-28 PROCEDURE — 3600000012 HC SURGERY LEVEL 2 ADDTL 15MIN: Performed by: SPECIALIST

## 2022-02-28 PROCEDURE — 3700000001 HC ADD 15 MINUTES (ANESTHESIA): Performed by: SPECIALIST

## 2022-02-28 PROCEDURE — 3600000002 HC SURGERY LEVEL 2 BASE: Performed by: SPECIALIST

## 2022-02-28 PROCEDURE — 82948 REAGENT STRIP/BLOOD GLUCOSE: CPT

## 2022-02-28 PROCEDURE — 88305 TISSUE EXAM BY PATHOLOGIST: CPT

## 2022-02-28 PROCEDURE — 2500000003 HC RX 250 WO HCPCS: Performed by: SPECIALIST

## 2022-02-28 PROCEDURE — 2709999900 HC NON-CHARGEABLE SUPPLY: Performed by: SPECIALIST

## 2022-02-28 PROCEDURE — 3700000000 HC ANESTHESIA ATTENDED CARE: Performed by: SPECIALIST

## 2022-02-28 PROCEDURE — 6360000002 HC RX W HCPCS: Performed by: NURSE ANESTHETIST, CERTIFIED REGISTERED

## 2022-02-28 PROCEDURE — 2500000003 HC RX 250 WO HCPCS: Performed by: NURSE ANESTHETIST, CERTIFIED REGISTERED

## 2022-02-28 PROCEDURE — 2580000003 HC RX 258: Performed by: SPECIALIST

## 2022-02-28 PROCEDURE — 7100000011 HC PHASE II RECOVERY - ADDTL 15 MIN: Performed by: SPECIALIST

## 2022-02-28 PROCEDURE — 7100000010 HC PHASE II RECOVERY - FIRST 15 MIN: Performed by: SPECIALIST

## 2022-02-28 PROCEDURE — 6360000002 HC RX W HCPCS: Performed by: SPECIALIST

## 2022-02-28 RX ORDER — PROPOFOL 10 MG/ML
INJECTION, EMULSION INTRAVENOUS PRN
Status: DISCONTINUED | OUTPATIENT
Start: 2022-02-28 | End: 2022-02-28 | Stop reason: SDUPTHER

## 2022-02-28 RX ORDER — FENTANYL CITRATE 50 UG/ML
INJECTION, SOLUTION INTRAMUSCULAR; INTRAVENOUS PRN
Status: DISCONTINUED | OUTPATIENT
Start: 2022-02-28 | End: 2022-02-28 | Stop reason: SDUPTHER

## 2022-02-28 RX ORDER — SODIUM CHLORIDE 9 MG/ML
INJECTION, SOLUTION INTRAVENOUS CONTINUOUS
Status: DISCONTINUED | OUTPATIENT
Start: 2022-02-28 | End: 2022-02-28 | Stop reason: HOSPADM

## 2022-02-28 RX ORDER — LIDOCAINE HYDROCHLORIDE AND EPINEPHRINE 10; 10 MG/ML; UG/ML
INJECTION, SOLUTION INFILTRATION; PERINEURAL PRN
Status: DISCONTINUED | OUTPATIENT
Start: 2022-02-28 | End: 2022-02-28 | Stop reason: ALTCHOICE

## 2022-02-28 RX ORDER — LIDOCAINE HYDROCHLORIDE 20 MG/ML
INJECTION, SOLUTION EPIDURAL; INFILTRATION; INTRACAUDAL; PERINEURAL PRN
Status: DISCONTINUED | OUTPATIENT
Start: 2022-02-28 | End: 2022-02-28 | Stop reason: SDUPTHER

## 2022-02-28 RX ADMIN — CEFAZOLIN SODIUM 2000 MG: 10 INJECTION, POWDER, FOR SOLUTION INTRAVENOUS at 10:28

## 2022-02-28 RX ADMIN — FENTANYL CITRATE 50 MCG: 50 INJECTION, SOLUTION INTRAMUSCULAR; INTRAVENOUS at 10:26

## 2022-02-28 RX ADMIN — FENTANYL CITRATE 50 MCG: 50 INJECTION, SOLUTION INTRAMUSCULAR; INTRAVENOUS at 10:42

## 2022-02-28 RX ADMIN — SODIUM CHLORIDE: 9 INJECTION, SOLUTION INTRAVENOUS at 10:27

## 2022-02-28 RX ADMIN — PROPOFOL 50 MG: 10 INJECTION, EMULSION INTRAVENOUS at 10:27

## 2022-02-28 RX ADMIN — LIDOCAINE HYDROCHLORIDE 80 MG: 20 INJECTION, SOLUTION EPIDURAL; INFILTRATION; INTRACAUDAL; PERINEURAL at 10:25

## 2022-02-28 ASSESSMENT — PULMONARY FUNCTION TESTS
PIF_VALUE: 0
PIF_VALUE: 0
PIF_VALUE: 1
PIF_VALUE: 0
PIF_VALUE: 1
PIF_VALUE: 0
PIF_VALUE: 0
PIF_VALUE: 1
PIF_VALUE: 0
PIF_VALUE: 1
PIF_VALUE: 0

## 2022-02-28 ASSESSMENT — PAIN SCALES - GENERAL: PAINLEVEL_OUTOF10: 0

## 2022-02-28 ASSESSMENT — ENCOUNTER SYMPTOMS: SHORTNESS OF BREATH: 1

## 2022-02-28 ASSESSMENT — PAIN - FUNCTIONAL ASSESSMENT: PAIN_FUNCTIONAL_ASSESSMENT: 0-10

## 2022-02-28 NOTE — ANESTHESIA PRE PROCEDURE
Department of Anesthesiology  Preprocedure Note       Name:  Mehul Setting   Age:  68 y.o.  :  1945                                          MRN:  [id number]         Date:  2022      Surgeon: Kinza Ruffin):  Jaron Aquino MD    Procedure: Procedure(s):  EXCISION SCC RIGHT ZYGOMA WITH POSSIBLE FULL THICKNESS SKIN GRAFT    Medications prior to admission:   Prior to Admission medications    Medication Sig Start Date End Date Taking?  Authorizing Provider   aspirin 81 MG EC tablet Take 81 mg by mouth daily    Historical Provider, MD   HUMALOG KWIKPEN 100 UNIT/ML SOPN INJECT SUBCUTANEOUSLY 7 UNITS IN THE MORNING , 7 UNITS AT LUNCH AND 12 UNITS AT SUPPER PLUS SLIDING SCALE DURING MEALS AND AT BEDTIME 22   Mahesh Yee MD   metoprolol succinate (TOPROL XL) 50 MG extended release tablet TAKE 1 TABLET BY MOUTH  DAILY 22   Koki Ontiveros MD   ranolazine (RANEXA) 1000 MG extended release tablet TAKE 1 TABLET BY MOUTH  TWICE DAILY NEW DOSE 22   Koki Ontiveros MD   tamsulosin (FLOMAX) 0.4 MG capsule TAKE 1 CAPSULE BY MOUTH  DAILY 1/10/22   KAVON Robins - CNP   bumetanide (BUMEX) 2 MG tablet Take 2 mg by mouth daily    Historical Provider, MD   NOVOFINE 32G X 6 MM MISC USE 1 NEW NEEDLE 6 TIMES  DAILY AND AS NEEDED 21   Mahesh Yee MD   insulin detemir (LEVEMIR FLEXTOUCH) 100 UNIT/ML injection pen Inject 48 Units into the skin 2 times daily 21   Mahesh Yee MD   ferrous sulfate (IRON 325) 325 (65 Fe) MG tablet Take 325 mg by mouth every other day    Historical Provider, MD   ascorbic acid (VITAMIN C) 500 MG tablet Take 500 mg by mouth every other day    Historical Provider, MD   nitroGLYCERIN (NITROLINGUAL) 0.4 MG/SPRAY 0.4 mg spray USE 1 SPRAY ON OR UNDER THE TONGUE EVERY 5 MINUTES AS NEEDED FOR CHEST PAIN 21   Koki Ontiveros MD   dilTIAZem (CARDIZEM CD) 120 MG extended release capsule TAKE 1 CAPSULE BY MOUTH  DAILY 21   Koki Ontiveros MD   isosorbide mononitrate (IMDUR) 120 MG extended release tablet Take 1 tablet by mouth daily 7/22/21   KAVON Mejía - CNP   blood glucose test strips (ACCU-CHEK GUIDE) strip USE TO CHECK 4 TIMES DAILY 7/6/21   Destiny Odonnell MD   Accu-Chek FastClix Lancets MISC USE TO TEST BLOOD SUGARS 4 TIMES DAILY 7/6/21   Destiny Odonnell MD   atorvastatin (LIPITOR) 40 MG tablet TAKE 1 TABLET BY MOUTH AT  NIGHT 6/29/21   Nayla Forman MD   diphenhydrAMINE-APAP, sleep, (TYLENOL PM EXTRA STRENGTH)  MG tablet Take 1 tablet by mouth every evening as needed    Historical Provider, MD   hydrOXYzine (ATARAX) 10 MG tablet Take 10 mg by mouth daily as needed  3/13/21   Historical Provider, MD   fluocinonide (LIDEX) 0.05 % cream Apply 1 applicator topically as needed 12/29/20   Historical Provider, MD   BiPAP Machine MISC by Does not apply route    Historical Provider, MD   Multiple Vitamins-Minerals (THERAPEUTIC MULTIVITAMIN-MINERALS) tablet Take 1 tablet by mouth nightly    Historical Provider, MD   hydrALAZINE (APRESOLINE) 25 MG tablet Take 1 tablet by mouth every 8 hours 11/9/18   Mandeep Diehl MD   clopidogrel (PLAVIX) 75 MG tablet Take 75 mg by mouth daily morning    Historical Provider, MD   Lancet Devices ([address]) MISC Use one with each blood check 1/14/14 4/2/18  Destiny Odonnell MD       Current medications:    No current facility-administered medications for this visit. No current outpatient medications on file.      Facility-Administered Medications Ordered in Other Visits   Medication Dose Route Frequency Provider Last Rate Last Admin    0.9 % sodium chloride infusion   IntraVENous Continuous Rashi Duggan MD        ceFAZolin (ANCEF) 2000 mg in dextrose 5 % 50 mL IVPB  2,000 mg IntraVENous Once Rashi Duggan MD           Allergies:  No Known Allergies    Problem List:    Patient Active Problem List   Diagnosis Code    CKD (chronic kidney disease) stage 3, GFR 30-59 ml/min ([address] N18.30    Paroxysmal atrial fibrillation (HCC) I48.0    S/P CABG (coronary artery bypass graft) Z95.1    Hyperlipidemia E78.5    Renal artery stenosis (HCC) I70.1    Obstructive sleep apnea on CPAP G47.33, Z99.89    Coronary artery disease of native artery of native heart with stable angina pectoris (HCC) I25.118    Type 2 diabetes mellitus with circulatory disorder (HCC) E11.59    Status post angioplasty with stent Z95.820    SOB (shortness of breath) R06.02    Wheezing R06.2    Asthma J45.909    Essential hypertension I10    Obesity (BMI 30-39. 9) E66.9    Elevated PSA R97.20    Hypertrophy of prostate with urinary obstruction N40.1, N13.8    Adenomatous colon polyp D12.6    Angina, class III (HCC) I20.9    Hx of atrial fibrillation without current medication Z86.79    Bilateral carotid artery stenosis I65.23    Hx of nonmelanoma skin cancer Z85.828    Acute kidney injury superimposed on CKD (HCC) N17.9, N18.9    Anemia of chronic renal failure N18.9, D63.1    Anemia in stage 3 chronic kidney disease (HCC) N18.30, D63.1    Melena K92.1    Chronic kidney disease, stage 4 (severe) (HCC) N18.4       Past Medical History:        Diagnosis Date    Adenomatous colon polyp 2009/2010, 6/'14,7/'17, 9/20, 7/21    Angina at rest [facility])     Arthritis     back    Atrial fibrillation ([address] 12/11 and 11/12    cardioversion 12/11    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    BPH (benign prostatic hypertrophy)     CAD (coronary artery disease) 1997    Carotid artery disease ([address] 1995    right    Carotid disease, bilateral ([address]     Cerebral artery occlusion with cerebral infarction ([address]     CHF (congestive heart failure) (HCC)     Chronic kidney disease     GERD (gastroesophageal reflux disease)     GI bleed 07/2021    History of blood transfusion 2021    GI bleed    History of blood transfusion 1997    with heart surgery    Hyperlipidemia 1996    Hypertension     Lumbar disc disease 2001    MI (myocardial infarction) ([facility] 1997    Nephrolithiasis 2002    NIDDM (non-insulin dependent diabetes mellitus) 1996    diagnosed 1996    RICCARDO (obstructive sleep apnea) 2005    cpap    PVC's (premature ventricular contractions)     Renal artery stenosis ([facility] 2007    left    Renal insufficiency     S/P CABG (status post coronary artery bypass graft) 1997    5 vessels    Skin cancer     Squamous cell carcinoma     TIA (transient ischemic attack)     Wears partial dentures     upper and lower       Past Surgical History:        Procedure Laterality Date    ABLATION OF DYSRHYTHMIC FOCUS  3/13/13    CARDIAC CATHETERIZATION  2008 - 2019    stents x15   [address]  3/13/13    oblation for irreg rythm    CARDIOVERSION  3/8/2012    CAROTID ENDARTERECTOMY  1997    CHOLECYSTECTOMY  4/1999    COLONOSCOPY  6/22/09, 4/2011,7/12/17    polyp removal    COLONOSCOPY N/A 7/21/2021    COLONOSCOPY POLYPECTOMY SNARE/COLD BIOPSY performed by Carlos Gong MD at [address] [facility]  2008 (2), 2009 (1)    x2    CORONARY ARTERY BYPASS GRAFT  8/1997    5 vessel    DIAGNOSTIC CARDIAC CATH LAB PROCEDURE      ENDOSCOPY, COLON, DIAGNOSTIC      EYE LID SURGERY Right 11/17/2021    EXCISION SCC RIGHT LOWER LATERAL EYELID WITH FROZEN SECTION performed by Charlotte Busch MD at [address] [facility]  12/20/2013    pre-cancerous mole rt foot removed    LITHOTRIPSY  4/2002    IA OFFICE/OUTPT VISIT,PROCEDURE ONLY N/A 11/8/2018    TRANSESOPHAGEAL ECHOCARDIOGRAM performed by Yadira Lew MD at [address] [facility]    PTCA  05/30/2018   [facility] SURGERY  2001, 2003    rt shoulder manipulation--frozen shoulder-01, Lt -03    SHOULDER SURGERY  2001 - 2003    manipulation - bilateral    SKIN BIOPSY      SKIN CANCER EXCISION  12/20/13     R foot    SKIN CANCER EXCISION  1/2016    squamous cell Lt  upper cheek    UPPER GASTROINTESTINAL ENDOSCOPY N/A 7/20/2021    EGD ESOPHAGOGASTRODUODENOSCOPY performed by Ibis Love MD at [address]  4/2002    VASCULAR SURGERY      carotids & cabg harvests       Social History:    Social History     Tobacco Use    Smoking status: Never Smoker    Smokeless tobacco: Never Used   Substance Use Topics    Alcohol use: No     Alcohol/week: 0.0 standard drinks                                Counseling given: Not Answered      Vital Signs (Current): There were no vitals filed for this visit. BP Readings from Last 3 Encounters:   02/10/22 (!) 120/50   01/27/22 (!) 155/67   01/19/22 (!) 121/42       NPO Status:                                                                                 BMI:   Wt Readings from Last 3 Encounters:   02/21/22 213 lb (96.6 kg)   02/10/22 217 lb 12.8 oz (98.8 kg)   01/19/22 217 lb (98.4 kg)     There is no height or weight on file to calculate BMI.    CBC:   Lab Results   Component Value Date    WBC 6.3 02/07/2022    RBC 3.31 02/07/2022    RBC 3.80 03/24/2012    HGB 11.3 02/07/2022    HCT 35.6 02/07/2022    .6 02/07/2022    RDW 13.7 06/07/2018     02/07/2022       CMP:   Lab Results   Component Value Date     02/07/2022    K 4.4 02/07/2022    K 4.0 08/25/2021     02/07/2022    CO2 26 02/07/2022    BUN 36 02/07/2022    CREATININE 1.7 02/07/2022    GFRAA >60 09/11/2016    AGRATIO 1.2 09/10/2016    LABGLOM 39 02/07/2022    GLUCOSE 89 02/07/2022    GLUCOSE 169 03/24/2012    PROT 6.3 01/14/2022    CALCIUM 9.2 02/07/2022    BILITOT 0.3 01/14/2022    BILITOT NEGATIVE 06/18/2018    ALKPHOS 82 01/14/2022    AST 16 01/14/2022    ALT 22 01/14/2022       POC Tests:   No results for input(s): POCGLU, POCNA, POCK, POCCL, POCBUN, POCHEMO, POCHCT in the last 72 hours.     Coags:   Lab Results   Component Value Date    PROTIME 17.2 09/10/2016    INR 1.10 11/11/2021    APTT 33.5 11/11/2021       HCG (If Applicable): No results found for: PREGTESTUR, PREGSERUM, HCG, HCGQUANT     ABGs: No results found for: PHART, PO2ART, VUI6GFO, KVR4LGI, BEART, G8JIUJQU     Type & Screen (If Applicable):  Lab Results   Component Value Date    LABRH POS 11/11/2021       Drug/Infectious Status (If Applicable):  No results found for: HIV, HEPCAB    COVID-19 Screening (If Applicable):   Lab Results   Component Value Date    COVID19 NOT DETECTED 12/29/2021           Anesthesia Evaluation  Patient summary reviewed and Nursing notes reviewed no history of anesthetic complications:   Airway: Mallampati: II        Dental:    (+) upper dentures and lower dentures      Pulmonary:normal exam    (+) shortness of breath: chronic,  sleep apnea: on CPAP,  asthma:                            Cardiovascular:  Exercise tolerance: poor (<4 METS),   (+) hypertension:, angina: no interval change, past MI:, CAD:, CABG/stent (15 stents): no interval change, dysrhythmias: atrial fibrillation,       ECG reviewed  Rhythm: regular  Rate: normal                    Neuro/Psych:   (+) TIA,             GI/Hepatic/Renal:   (+) GERD: well controlled,           Endo/Other:    (+) Diabeteswell controlled, , .                 Abdominal:   (+) obese,           Vascular: Other Findings:               Anesthesia Plan      MAC     ASA 3       Induction: intravenous. MIPS: Postoperative opioids intended and Prophylactic antiemetics administered. Anesthetic plan and risks discussed with patient and spouse. Plan discussed with CRNA.                   Abbey White DO   2/28/2022

## 2022-02-28 NOTE — PROGRESS NOTES
1108-  Patient arrived to phase II via cart. Spontaneous respiraitons even and unlabored. Placed on monitor--VSS. Report received from [facility].    1234-  Assessment completed. Patient is alert and oriented x4. IV capped off-- no complications. Patient denies pain--will monitor. See in incision charting. 1112-  Snack and drink given to patient. Family in room. 1125-  All belongings in room. 1130-  IV removed-- no complications. Bandage applied. 1135-  Discharge instructions given. Understanding verbalized. Cool compress given to patient. 1140-  Patient dressing. 1147-  Patient discharged in stable condition with all belongings. This RN walked patient to car.

## 2022-02-28 NOTE — OP NOTE
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 The area was then prepped and draped in the standard surgical fashion. The skin cancer was excised with 2.5cm diameter margin and sent for fresh frozen evaluation. Pathology called back to the room and stated indeed the margin were clear. This left a sizeable (5cm2) defect, which could not be closed primarily. Therefore, a 12cm2 sum of defect/adjacent tissue transfer (rhomboid flap) was then designed, back cut, elevated and inset with 4-0 Monocryl suture placed in interrupted buried fashion. The Burrow's triangles were resected with final closure was completed using 5-0 fast absorbing & Histoacryl. The patient tolerated the procedure quite well and remained hemodynamically stable throughout the procedure and was quite comfortable throughout the operative course.     Clinical staging for cancer cases:  Ct  Cn  Cm    Tyler Rahman MD  Electronically signed by me on 2022 at 11:03 AM  Operative Note      Patient: Constantino Rees  YOB: 1945  MRN: [id number]    Date of Procedure: 2022    Pre-Op Diagnosis: SCC RIGHT ZYGOMA    Post-Op Diagnosis: Same       Procedure(s):  EXCISION SCC RIGHT ZYGOMA    Surgeon(s):  Balaji Johnson MD    Assistant:   * No surgical staff found *    Anesthesia: Monitor Anesthesia Care    Estimated Blood Loss (mL): Minimal    Complications: None    Specimens:   ID Type Source Tests Collected by Time Destination   A : SQUAMOUS CELL CARCINOMA IN SITU Tissue Face SURGICAL PATHOLOGY Balaji Johnson MD 2/28/2022 1035        Implants:  * No implants in log *      Drains: * No LDAs found *    Findings: 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

## 2022-02-28 NOTE — ANESTHESIA POSTPROCEDURE EVALUATION
Department of Anesthesiology  Postprocedure Note    Patient: Brad Palumbo  MRN: [id number]  YOB: 1945  Date of evaluation: 2/28/2022  Time:  2:01 PM     Procedure Summary     Date: 02/28/22 Room / Location: [facility] 04 [facility] [address] [facility]    Anesthesia Start: 9310 Anesthesia Stop: 1108    Procedure: EXCISION SCC RIGHT ZYGOMA (Right ) Diagnosis: (SCC RIGHT ZYGOMA)    Surgeons: Balaji Johnson MD Responsible Provider: Inna Toro DO    Anesthesia Type: MAC ASA Status: 3          Anesthesia Type: MAC    Natalie Phase I:      Natalie Phase II: Natalie Score: 10    Last vitals: Reviewed and per EMR flowsheets.        Anesthesia Post Evaluation    Patient location during evaluation: PACU  Patient participation: complete - patient participated  Level of consciousness: awake and alert  Airway patency: patent  Nausea & Vomiting: no vomiting and no nausea  Complications: no  Cardiovascular status: hemodynamically stable  Respiratory status: acceptable  Hydration status: stable

## 2022-03-08 ENCOUNTER — NURSE ONLY (OUTPATIENT)
Dept: LAB | Age: 77
End: 2022-03-08

## 2022-03-08 DIAGNOSIS — N18.32 ANEMIA OF CHRONIC RENAL FAILURE, STAGE 3B (HCC): ICD-10-CM

## 2022-03-08 DIAGNOSIS — I50.32 CHRONIC DIASTOLIC CONGESTIVE HEART FAILURE (HCC): ICD-10-CM

## 2022-03-08 DIAGNOSIS — D63.1 ANEMIA OF CHRONIC RENAL FAILURE, STAGE 3B (HCC): ICD-10-CM

## 2022-03-08 DIAGNOSIS — I10 ESSENTIAL HYPERTENSION: ICD-10-CM

## 2022-03-08 LAB
HCT VFR BLD CALC: 33.3 % (ref 42–52)
HEMOGLOBIN: 10.5 GM/DL (ref 14–18)

## 2022-03-14 ENCOUNTER — OFFICE VISIT (OUTPATIENT)
Dept: PULMONOLOGY | Age: 77
End: 2022-03-14
Payer: MEDICARE

## 2022-03-14 VITALS
TEMPERATURE: 97.8 F | HEIGHT: 69 IN | HEART RATE: 83 BPM | SYSTOLIC BLOOD PRESSURE: 130 MMHG | OXYGEN SATURATION: 99 % | WEIGHT: 223.6 LBS | BODY MASS INDEX: 33.12 KG/M2 | DIASTOLIC BLOOD PRESSURE: 70 MMHG

## 2022-03-14 DIAGNOSIS — E66.9 OBESITY (BMI 30-39.9): ICD-10-CM

## 2022-03-14 DIAGNOSIS — I51.89 DIASTOLIC DYSFUNCTION: ICD-10-CM

## 2022-03-14 DIAGNOSIS — G47.33 OSA TREATED WITH BIPAP: Primary | ICD-10-CM

## 2022-03-14 PROCEDURE — 1036F TOBACCO NON-USER: CPT | Performed by: PHYSICIAN ASSISTANT

## 2022-03-14 PROCEDURE — 99214 OFFICE O/P EST MOD 30 MIN: CPT | Performed by: PHYSICIAN ASSISTANT

## 2022-03-14 PROCEDURE — G8484 FLU IMMUNIZE NO ADMIN: HCPCS | Performed by: PHYSICIAN ASSISTANT

## 2022-03-14 PROCEDURE — 1123F ACP DISCUSS/DSCN MKR DOCD: CPT | Performed by: PHYSICIAN ASSISTANT

## 2022-03-14 PROCEDURE — 4040F PNEUMOC VAC/ADMIN/RCVD: CPT | Performed by: PHYSICIAN ASSISTANT

## 2022-03-14 PROCEDURE — G8427 DOCREV CUR MEDS BY ELIG CLIN: HCPCS | Performed by: PHYSICIAN ASSISTANT

## 2022-03-14 PROCEDURE — G8417 CALC BMI ABV UP PARAM F/U: HCPCS | Performed by: PHYSICIAN ASSISTANT

## 2022-03-14 ASSESSMENT — ENCOUNTER SYMPTOMS
WHEEZING: 0
STRIDOR: 0
SHORTNESS OF BREATH: 0
NAUSEA: 0
CHEST TIGHTNESS: 0
BACK PAIN: 0
COUGH: 0
DIARRHEA: 0
EYES NEGATIVE: 1
ALLERGIC/IMMUNOLOGIC NEGATIVE: 1

## 2022-03-14 NOTE — PROGRESS NOTES
[facility]      Shima Madrigal         [id number]  3/14/2022   Chief Complaint   Patient presents with    6 Month Follow-Up     RICCARDO with download BIPAP         Pt of Dr. Baldomero Morales    PAP Download:   Funmilayo Clifton or initial AHI: 8.3     Date of initial study: 2/7/2018      Compliant  77%     Noncompliant 13 %     PAP Type Aircurve 10 Vauto Level  18/15   Avg Hrs/Day 7 hr 7 min  AHI: 7.2   Recorded compliance dates , 2/8/2022  to 3/9/2022   Machine/Mfg:   [x] ResMed    [] Respironics/Dreamstation   Interface:   [] Nasal    [] Nasal pillows   [x] FFM      Provider:      [x] [facility]     [][employer]     [] [employer]    [] [address]    [] [place]               [] [employer]      [] [employer]    [] [address]      [] Other    Neck Size: 17.5  Mallampati Mallampati 4  ESS:  11  SAQLI: 66    Here is a scan of the most recent download:                  Presentation:   Yash Gill presents for sleep medicine follow up for obstructive sleep apnea  Since the last visit, Yash Gill is doing well with PAP. ASV mildly elevated but the best its been. Large mask leak. He is tired during the day. Taking Tylenol PM at night, but only 1/2 dose. He is more tired during the day. He is more SOB and following with Dr Domonique Bahena for cardiology. Equipment issues: The pressure is  acceptable, the mask is unacceptable     Sleep issues:  Do you feel better? No  More rested? No   Better concentration? no    Progress History:   Since last visit any new medical issues? No  New ER or hospital visits? No  Any new or changes in medicines? No  Any new sleep medicines? No    Review of Systems -   Review of Systems   Constitutional: Negative for activity change, appetite change, chills and fever. HENT: Negative for congestion and postnasal drip. Eyes: Negative. Respiratory: Negative for cough, chest tightness, shortness of breath, wheezing and stridor. Cardiovascular: Negative for chest pain and leg swelling. Gastrointestinal: Negative for diarrhea and nausea. Endocrine: Negative. Genitourinary: Negative. Musculoskeletal: Negative. Negative for arthralgias and back pain. Skin: Negative. Allergic/Immunologic: Negative. Neurological: Negative. Negative for dizziness and light-headedness. Psychiatric/Behavioral: Negative. All other systems reviewed and are negative. Physical Exam:    BMI:  Body mass index is 33.02 kg/m². Wt Readings from Last 3 Encounters:   03/14/22 223 lb 9.6 oz (101.4 kg)   02/28/22 217 lb 3.2 oz (98.5 kg)   02/10/22 217 lb 12.8 oz (98.8 kg)     Weight gained 6 lbs over 1 month  Vitals: /70 (Site: Left Upper Arm, Position: Sitting, Cuff Size: Large Adult)   Pulse 83   Temp 97.8 °F (36.6 °C) (Temporal)   Ht 5' 9\" (1.753 m)   Wt 223 lb 9.6 oz (101.4 kg)   SpO2 99%   BMI 33.02 kg/m²       Physical Exam  Constitutional:       Appearance: He is well-developed. HENT:      Head: Normocephalic and atraumatic. Right Ear: External ear normal.      Left Ear: External ear normal.   Eyes:      Conjunctiva/sclera: Conjunctivae normal.      Pupils: Pupils are equal, round, and reactive to light. Cardiovascular:      Rate and Rhythm: Normal rate and regular rhythm. Heart sounds: Normal heart sounds. Pulmonary:      Effort: Pulmonary effort is normal.      Breath sounds: Normal breath sounds. Musculoskeletal:         General: Normal range of motion. Cervical back: Normal range of motion and neck supple. Skin:     General: Skin is warm and dry. Neurological:      Mental Status: He is alert and oriented to person, place, and time. Psychiatric:         Behavior: Behavior normal.         Thought Content: Thought content normal.         Judgment: Judgment normal.           ASSESSMENT/DIAGNOSIS     Diagnosis Orders   1. RICCARDO treated with BiPAP     2. Obesity (BMI 30-39.9)     3.  Diastolic dysfunction              Plan   Do you need any equipment today?yes mask fitting to improve AHI  - Hypersomnia related to cardiac issues and anemia  - Will try to increase sleep time by one hour  - Download reviewed and discussed with patient  - He  was advised to continue current positive airway pressure therapy with above described pressure. - He  advised to keep good compliance with current recommended pressure to get optimal results and clinical improvement  - Recommend 7-9 hours of sleep with PAP  - He was advised to call [employer] regarding supplies if needed.   -He call my office for earlier appointment if needed for worsening of sleep symptoms.   - He was instructed on weight loss  - Radha Guillaume was educated about my impression and plan. Patient verbalizesunderstanding.   We will see Gurmeet Covington back in: 6 months with download    Information added by my medical assistant/LPN was reviewed today         Jameel Vital PA-C, MPAS  3/14/2022

## 2022-03-16 ENCOUNTER — TELEPHONE (OUTPATIENT)
Dept: NEPHROLOGY | Age: 77
End: 2022-03-16

## 2022-03-16 NOTE — TELEPHONE ENCOUNTER
Wife phoned in wants dr Jena Christensen to look at the H&H that was done on 3-8 - pt feeling run down and tired

## 2022-03-16 NOTE — TELEPHONE ENCOUNTER
Wife notified. Stated he is just so tired. I explained that with hemoglobin, insurance will not cover until it drops below 10. Wife verbalized understanding.

## 2022-03-21 RX ORDER — PEN NEEDLE, DIABETIC 32 GX 1/4"
NEEDLE, DISPOSABLE MISCELLANEOUS
Qty: 540 EACH | Refills: 1 | OUTPATIENT
Start: 2022-03-21

## 2022-03-28 ENCOUNTER — PHARMACY VISIT (OUTPATIENT)
Dept: INTERNAL MEDICINE CLINIC | Age: 77
End: 2022-03-28
Payer: MEDICARE

## 2022-03-28 VITALS
SYSTOLIC BLOOD PRESSURE: 120 MMHG | WEIGHT: 218.4 LBS | DIASTOLIC BLOOD PRESSURE: 64 MMHG | HEART RATE: 71 BPM | TEMPERATURE: 97.9 F | BODY MASS INDEX: 32.25 KG/M2

## 2022-03-28 DIAGNOSIS — E11.51 TYPE 2 DIABETES MELLITUS WITH DIABETIC PERIPHERAL ANGIOPATHY WITHOUT GANGRENE, WITH LONG-TERM CURRENT USE OF INSULIN (HCC): ICD-10-CM

## 2022-03-28 DIAGNOSIS — Z79.4 TYPE 2 DIABETES MELLITUS WITH DIABETIC PERIPHERAL ANGIOPATHY WITHOUT GANGRENE, WITH LONG-TERM CURRENT USE OF INSULIN (HCC): ICD-10-CM

## 2022-03-28 PROCEDURE — G0108 DIAB MANAGE TRN  PER INDIV: HCPCS | Performed by: INTERNAL MEDICINE

## 2022-03-28 NOTE — PROGRESS NOTES
[facility]  [address] [address] [address], [facility], [address]  [phone] (phone)  [phone] (fax)    Patient ID: Catie Barrera [id number]  Referring Provider: Dr. Angus Shen     Patient's name and  were verified. Subjective:    He presents for His follow-up diabetic visit. He has type 2 diabetes mellitus. Home regimen includes: Insulin He is compliant most of the time. Assessment:     Lab Results   Component Value Date    LABA1C 6.3 2022    BUN 36 2022    CREATININE 1.7 2022     Vitals:    22 1506 22 1507   BP: (!) 146/74 120/64   Pulse: 71    Temp: 97.9 °F (36.6 °C)    Weight: 218 lb 6.4 oz (99.1 kg)      Wt Readings from Last 3 Encounters:   22 218 lb 6.4 oz (99.1 kg)   22 223 lb 9.6 oz (101.4 kg)   22 217 lb 3.2 oz (98.5 kg)     Ht Readings from Last 3 Encounters:   22 5' 9\" (1.753 m)   22 5' 9\" (1.753 m)   02/10/22 5' 9.02\" (1.753 m)       Diabetes Pharmacotherapy:  Humalog - Inject 8 units before breakfast, 8 units before lunch, and 12 units before dinner plus group 1 scale  Levemir 48 units BID    Glucose Trends:   Current monitoring regimen: home blood tests - 4 times daily  Home blood sugar trends:    -Fasting AM: Av ()   -Before lunch: Av (115-251)   -Before dinner: Av ()   -Bedtime: Av ()  Any episodes of hypoglycemia?  No true lows   -Treats mild lows with chocolate milk, orange juice    Lifestyle Factors:   Previous visit with dietician: yes - 2020  Current diet: B: cereal or oatmeal +/- fruit            L: canned or homemade soup                       D: similar to above                       Snacks: cheeze-its, popcorn                       Beverages: crystal light, water  Current exercise: low back pain, shortness of breath limits activity    Health Maintenance:  Eye exam current (within one year): 2021 Dr. Garcia Comment  Any history of foot problems? no  Last foot exam: 9/2021  Immunizations up to date: No - due for Pneumonia vaccine  The 10-year ASCVD risk score (Cliff Lipscomb, et al., 2013) is: 49%  Last panel - LDL:   Lab Results   Component Value Date    LDLCALC 74 01/24/2020   Taking ASA:  Yes   Taking statin: atorvastatin   Last microalbumin/creatinine ratio: 87 (7/28/2021)  Taking ACE/ARB: No - discontinued due to poor renal function    Focus:   Diabetes Education. Last A1C was at goal at 6.3%. SMBG readings are at goal on average, with no episodes of true lows in the past month. Again discussed possibility of Trulicity addition to reduce basal/bolus insulin requirements, decrease hypoglycemic risk, provide CV and renal protection, and promote weight loss. [facility] would be amenable to this change, pending [facility] (income estimate not yet completed, will call when available). Reviewed Trulicity dosing, mechanism, and ADRs. Reviewed diet, which contains a moderate amount of carbohydrates. Encouraged physical activity in the form of therapy exercises to improve response to insulin and maintain mobility. DSME PLAN:   Discussed general issues about diabetes pathophysiology and management. Counseling at today's visit: CGM. Encouraged aerobic exercise. 1. Call the pharmacist when you hear back about your income level- we can try to get Trulicity approved    2. If you ever want to decrease your fingersticks, let the clinic know and we can write for the Dexcom continuous glucose monitor      STR Group 1 Correction Scale  Blood Sugar Dose fast acting insulin    None   140-199 1 unit   200-249 2 units   250-299 3 units   300-349 4 units   350-399 5 units   400 and above 6 units       Meter download, medications, PMH and nursing assessment reviewed. Brady Osorio states He is willing to participate in this plan of care and verbalized understanding of all instructions provided. Teach back used to verify comprehension.       Follow-up: 5 month Dr. Essence Gardner, 6 month [facility] Clinic RN    Total time involved in direct patient education: 60 minutes.      For Pharmacy Admin Tracking Only     Time Spent (min): [address], PharmD, [facility]  Internal Medicine Clinical Pharmacist  [phone]

## 2022-03-28 NOTE — PATIENT INSTRUCTIONS
1. Call the pharmacist when you hear back about your income level- we can try to get Trulicity approved    2.  If you ever want to decrease your fingersticks, let the clinic know and we can write for the Dexcom continuous glucose monitor      STR Group 1 Correction Scale  Blood Sugar Dose fast acting insulin    None   140-199 1 unit   200-249 2 units   250-299 3 units   300-349 4 units   350-399 5 units   400 and above 6 units

## 2022-04-01 RX ORDER — PEN NEEDLE, DIABETIC 32 GX 1/4"
1 NEEDLE, DISPOSABLE MISCELLANEOUS
Qty: 600 EACH | Refills: 3 | Status: SHIPPED | OUTPATIENT
Start: 2022-04-01

## 2022-04-01 NOTE — TELEPHONE ENCOUNTER
Date of last visit:  2/10/2022  Date of next visit:  4/20/2022    Requested Prescriptions     Pending Prescriptions Disp Refills    Insulin Pen Needle (NOVOFINE PEN NEEDLE) 32G X 6 MM MISC       Sig: by Does not apply route

## 2022-04-06 ENCOUNTER — CARE COORDINATION (OUTPATIENT)
Dept: CARE COORDINATION | Age: 77
End: 2022-04-06

## 2022-04-06 ENCOUNTER — TELEPHONE (OUTPATIENT)
Dept: INTERNAL MEDICINE CLINIC | Age: 77
End: 2022-04-06

## 2022-04-06 NOTE — TELEPHONE ENCOUNTER
Received call back from Audrey Ross (wife) about income level - income is low enough to qualify for [employer]. Will refer to Mitzi Schwartz for enrollment in [facility] for initiation of Trulicity 9.82LG. For Domingo Pepper in place:   Yes   Recommendation Provided To: Patient/Caregiver: 1 via Telephone   Intervention Detail: Patient Access Assistance/Sample Provided   Gap Closed?: No    Intervention Accepted By: Patient/Caregiver: 1   Time Spent (min): [address], PharmD, [facility]  Internal Medicine Clinical Pharmacist  [phone]

## 2022-04-06 NOTE — CARE COORDINATION
I spoke with Darcy Moran (wife) and am going to try to get him assistance on the following medications, Trulicity, Humalog, and Basaglar. I faxed Fredy Zamora and am mailing his portion to him.         [facility]   Medication Assistance  [facility], and [employer]    (D) [phone]  (d) [phone]

## 2022-04-08 ENCOUNTER — NURSE ONLY (OUTPATIENT)
Dept: LAB | Age: 77
End: 2022-04-08

## 2022-04-08 DIAGNOSIS — D63.1 ANEMIA OF CHRONIC RENAL FAILURE, STAGE 3B (HCC): ICD-10-CM

## 2022-04-08 DIAGNOSIS — N18.32 ANEMIA OF CHRONIC RENAL FAILURE, STAGE 3B (HCC): ICD-10-CM

## 2022-04-08 DIAGNOSIS — I50.32 CHRONIC DIASTOLIC CONGESTIVE HEART FAILURE (HCC): ICD-10-CM

## 2022-04-08 DIAGNOSIS — I10 ESSENTIAL HYPERTENSION: ICD-10-CM

## 2022-04-08 LAB
HCT VFR BLD CALC: 31.1 % (ref 42–52)
HEMOGLOBIN: 9.8 GM/DL (ref 14–18)

## 2022-04-12 ENCOUNTER — TELEPHONE (OUTPATIENT)
Dept: NEPHROLOGY | Age: 77
End: 2022-04-12

## 2022-04-12 NOTE — TELEPHONE ENCOUNTER
Spoke to Noe in Scheduling. Patient is scheduled for Thursday 4/14/22 at 11:15am.    Patient's wife notified and verbalized understanding.

## 2022-04-14 ENCOUNTER — HOSPITAL ENCOUNTER (OUTPATIENT)
Dept: NURSING | Age: 77
Discharge: HOME | End: 2022-04-14
Payer: MEDICARE

## 2022-04-14 VITALS
OXYGEN SATURATION: 100 % | DIASTOLIC BLOOD PRESSURE: 67 MMHG | TEMPERATURE: 97.1 F | HEART RATE: 63 BPM | RESPIRATION RATE: 20 BRPM | SYSTOLIC BLOOD PRESSURE: 168 MMHG

## 2022-04-14 PROCEDURE — 96372 THER/PROPH/DIAG INJ SC/IM: CPT

## 2022-04-14 PROCEDURE — 6360000002 HC RX W HCPCS: Performed by: INTERNAL MEDICINE

## 2022-04-14 RX ADMIN — DARBEPOETIN ALFA 60 MCG: 60 INJECTION, SOLUTION INTRAVENOUS; SUBCUTANEOUS at 11:22

## 2022-04-14 NOTE — PROGRESS NOTES
1115: Patient arrived ambulatory for aranesp injection. Patient rights and responsibilities offered to patient. BP within parameters for injection. Injection administered. Patient tolerated well. AVS reviewed with patient, voiced understanding. Patient discharged ambulatory.                              _m___ Safety:       (Environmental)   [place] to environment   Ensure ID band is correct and in place/ allergy band as needed   Assess for fall risk   Initiate fall precautions as applicable (fall band, side rails, etc.)   Call light within reach   Bed in low position/ wheels locked    _m___ Pain:        Assess pain level and characteristics   Administer analgesics as ordered   Assess effectiveness of pain management and report to MD as needed    _m___ Knowledge Deficit:   Assess baseline knowledge   Provide teaching at level of understanding   Provide teaching via preferred learning method   Evaluate teaching effectiveness    _m___ Hemodynamic/Respiratory Status:       (Pre and Post Procedure Monitoring)   Assess/Monitor vital signs and LOC   Assess Baseline SpO2 prior to any sedation   Obtain weight/height   Assess vital signs/ LOC until patient meets discharge criteria   Monitor procedure site and notify MD of any issues

## 2022-04-20 ENCOUNTER — OFFICE VISIT (OUTPATIENT)
Dept: FAMILY MEDICINE CLINIC | Age: 77
End: 2022-04-20

## 2022-04-20 VITALS
DIASTOLIC BLOOD PRESSURE: 58 MMHG | HEIGHT: 69 IN | BODY MASS INDEX: 32.79 KG/M2 | SYSTOLIC BLOOD PRESSURE: 118 MMHG | HEART RATE: 60 BPM | WEIGHT: 221.38 LBS | TEMPERATURE: 97 F | RESPIRATION RATE: 18 BRPM

## 2022-04-20 DIAGNOSIS — Z00.00 MEDICARE ANNUAL WELLNESS VISIT, SUBSEQUENT: Primary | ICD-10-CM

## 2022-04-20 DIAGNOSIS — R80.9 TYPE 2 DIABETES MELLITUS WITH MICROALBUMINURIA, WITH LONG-TERM CURRENT USE OF INSULIN (HCC): ICD-10-CM

## 2022-04-20 DIAGNOSIS — Z79.4 TYPE 2 DIABETES MELLITUS WITH MICROALBUMINURIA, WITH LONG-TERM CURRENT USE OF INSULIN (HCC): ICD-10-CM

## 2022-04-20 DIAGNOSIS — I20.8 CHRONIC STABLE ANGINA (HCC): ICD-10-CM

## 2022-04-20 DIAGNOSIS — E11.29 TYPE 2 DIABETES MELLITUS WITH MICROALBUMINURIA, WITH LONG-TERM CURRENT USE OF INSULIN (HCC): ICD-10-CM

## 2022-04-20 PROBLEM — I25.10 CORONARY ATHEROSCLEROSIS: Status: ACTIVE | Noted: 2018-02-26

## 2022-04-20 PROCEDURE — 1036F TOBACCO NON-USER: CPT | Performed by: FAMILY MEDICINE

## 2022-04-20 PROCEDURE — 1123F ACP DISCUSS/DSCN MKR DOCD: CPT | Performed by: FAMILY MEDICINE

## 2022-04-20 PROCEDURE — G8427 DOCREV CUR MEDS BY ELIG CLIN: HCPCS | Performed by: FAMILY MEDICINE

## 2022-04-20 PROCEDURE — G8417 CALC BMI ABV UP PARAM F/U: HCPCS | Performed by: FAMILY MEDICINE

## 2022-04-20 PROCEDURE — G0439 PPPS, SUBSEQ VISIT: HCPCS | Performed by: FAMILY MEDICINE

## 2022-04-20 SDOH — ECONOMIC STABILITY: FOOD INSECURITY: WITHIN THE PAST 12 MONTHS, THE FOOD YOU BOUGHT JUST DIDN'T LAST AND YOU DIDN'T HAVE MONEY TO GET MORE.: NEVER TRUE

## 2022-04-20 SDOH — ECONOMIC STABILITY: FOOD INSECURITY: WITHIN THE PAST 12 MONTHS, YOU WORRIED THAT YOUR FOOD WOULD RUN OUT BEFORE YOU GOT MONEY TO BUY MORE.: NEVER TRUE

## 2022-04-20 ASSESSMENT — COLUMBIA-SUICIDE SEVERITY RATING SCALE - C-SSRS
6. HAVE YOU EVER DONE ANYTHING, STARTED TO DO ANYTHING, OR PREPARED TO DO ANYTHING TO END YOUR LIFE?: NO
2. HAVE YOU ACTUALLY HAD ANY THOUGHTS OF KILLING YOURSELF?: NO
1. WITHIN THE PAST MONTH, HAVE YOU WISHED YOU WERE DEAD OR WISHED YOU COULD GO TO SLEEP AND NOT WAKE UP?: NO

## 2022-04-20 ASSESSMENT — PATIENT HEALTH QUESTIONNAIRE - PHQ9
2. FEELING DOWN, DEPRESSED OR HOPELESS: 2
3. TROUBLE FALLING OR STAYING ASLEEP: 3
4. FEELING TIRED OR HAVING LITTLE ENERGY: 3
7. TROUBLE CONCENTRATING ON THINGS, SUCH AS READING THE NEWSPAPER OR WATCHING TELEVISION: 0
SUM OF ALL RESPONSES TO PHQ QUESTIONS 1-9: 11
5. POOR APPETITE OR OVEREATING: 0
6. FEELING BAD ABOUT YOURSELF - OR THAT YOU ARE A FAILURE OR HAVE LET YOURSELF OR YOUR FAMILY DOWN: 1
SUM OF ALL RESPONSES TO PHQ9 QUESTIONS 1 & 2: 3
10. IF YOU CHECKED OFF ANY PROBLEMS, HOW DIFFICULT HAVE THESE PROBLEMS MADE IT FOR YOU TO DO YOUR WORK, TAKE CARE OF THINGS AT HOME, OR GET ALONG WITH OTHER PEOPLE: 0
SUM OF ALL RESPONSES TO PHQ QUESTIONS 1-9: 11
SUM OF ALL RESPONSES TO PHQ QUESTIONS 1-9: 11
SUM OF ALL RESPONSES TO PHQ QUESTIONS 1-9: 10
9. THOUGHTS THAT YOU WOULD BE BETTER OFF DEAD, OR OF HURTING YOURSELF: 1
1. LITTLE INTEREST OR PLEASURE IN DOING THINGS: 1
8. MOVING OR SPEAKING SO SLOWLY THAT OTHER PEOPLE COULD HAVE NOTICED. OR THE OPPOSITE, BEING SO FIGETY OR RESTLESS THAT YOU HAVE BEEN MOVING AROUND A LOT MORE THAN USUAL: 0

## 2022-04-20 ASSESSMENT — SOCIAL DETERMINANTS OF HEALTH (SDOH): HOW HARD IS IT FOR YOU TO PAY FOR THE VERY BASICS LIKE FOOD, HOUSING, MEDICAL CARE, AND HEATING?: NOT HARD AT ALL

## 2022-04-20 ASSESSMENT — LIFESTYLE VARIABLES: HOW OFTEN DO YOU HAVE A DRINK CONTAINING ALCOHOL: NEVER

## 2022-04-20 NOTE — PATIENT INSTRUCTIONS
Personalized Preventive Plan for Sameer Mcconnell - 4/20/2022  Medicare offers a range of preventive health benefits. Some of the tests and screenings are paid in full while other may be subject to a deductible, co-insurance, and/or copay. Some of these benefits include a comprehensive review of your medical history including lifestyle, illnesses that may run in your family, and various assessments and screenings as appropriate. After reviewing your medical record and screening and assessments performed today your provider may have ordered immunizations, labs, imaging, and/or referrals for you. A list of these orders (if applicable) as well as your Preventive Care list are included within your After Visit Summary for your review. Other Preventive Recommendations:    · A preventive eye exam performed by an eye specialist is recommended every 1-2 years to screen for glaucoma; cataracts, macular degeneration, and other eye disorders. · A preventive dental visit is recommended every 6 months. · Try to get at least 150 minutes of exercise per week or 10,000 steps per day on a pedometer . · Order or download the FREE \"Exercise & Physical Activity: Your Everyday Guide\" from [employer]. Call 8-565.774.8471 or search [employer] online. · You need 0546-1967 mg of calcium and 0926-1476 IU of vitamin D per day. It is possible to meet your calcium requirement with diet alone, but a vitamin D supplement is usually necessary to meet this goal.  · When exposed to the sun, use a sunscreen that protects against both UVA and UVB radiation with an SPF of 30 or greater. Reapply every 2 to 3 hours or after sweating, drying off with a towel, or swimming. · Always wear a seat belt when traveling in a car. Always wear a helmet when riding a bicycle or motorcycle.

## 2022-04-20 NOTE — PROGRESS NOTES
Medicare Annual Wellness Visit    Michele Rao is here for Medicare AWV    Assessment & Plan   Medicare annual wellness visit, subsequent  -     VA OFFICE/OUTPATIENT ESTABLISHED HIGH MDM 40-54 MIN [81527]  Type 2 diabetes mellitus with microalbuminuria, with long-term current use of insulin (HCC)  -     VA OFFICE/OUTPATIENT ESTABLISHED HIGH MDM 40-54 MIN [91257]  Chronic stable angina (HCC)  -     VA OFFICE/OUTPATIENT ESTABLISHED HIGH MDM 40-54 MIN [82968]      Recommendations for Preventive Services Due: see orders and patient instructions/AVS.  Recommended screening schedule for the next 5-10 years is provided to the patient in written form: see Patient Instructions/AVS.     Return in 1 year (on 4/20/2023) for Medicare Annual Wellness Visit in 1 year. Subjective   1. He states that the SOB continues. His wife states that it it thought to be heart related. 2. The Hgb is down and the nephrologist manages that  3. His back bothers him if he's up much. He takes tylenol and had an epidural at [facility] that didn't help. Patient's complete Health Risk Assessment and screening values have been reviewed and are found in Flowsheets. The following problems were reviewed today and where indicated follow up appointments were made and/or referrals ordered. Positive Risk Factor Screenings with Interventions:    Fall Risk:  Do you feel unsteady or are you worried about falling? : no  2 or more falls in past year?: (!) yes  Fall with injury in past year?: no     Fall Risk Interventions:    · he fell about 3 times. the balance is poor and the legs are weak. He had PT at [facility] in the past year.      Depression:  PHQ-2 Score: 3  PHQ-9 Total Score: 11    Severity:1-4 = minimal depression, 5-9 = mild depression, 10-14 = moderate depression, 15-19 = moderately severe depression, 20-27 = severe depression    Depression Interventions:  · his symptoms are minimal            General Health and ACP:  General  In general, how would you say your health is?: (!) Poor  In the past 7 days, have you experienced any of the following: New or Increased Pain, New or Increased Fatigue, Loneliness, Social Isolation, Stress or Anger?: No  Do you get the social and emotional support that you need?: Yes  Do you have a Living Will?: Yes    Advance Directives     Power of  Living Will ACP-Advance Directive ACP-Power of     Not on File Filed on 04/08/15 Filed Not on File      General Health Risk Interventions:  · he has numerous worsening health problems    Health Habits/Nutrition:     Physical Activity: Inactive    Days of Exercise per Week: 0 days    Minutes of Exercise per Session: 0 min     Have you lost any weight without trying in the past 3 months?: No  Body mass index: (!) 32.69  Have you seen the dentist within the past year?: Yes    Health Habits/Nutrition Interventions:  · his weight is up from being less active             Objective   Vitals:    04/20/22 1348   BP: (!) 118/58   Site: Right Upper Arm   Position: Sitting   Cuff Size: Medium Adult   Pulse: 60   Resp: 18   Temp: 97 °F (36.1 °C)   TempSrc: Skin   Weight: 221 lb 6 oz (100.4 kg)   Height: 5' 9\" (1.753 m)      Body mass index is 32.69 kg/m².         General Appearance: alert and oriented to person, place and time, well-developed and well-nourished, in no acute distress  Skin: warm and dry, no rash or erythema  Head: normocephalic and atraumatic  Eyes: pupils equal, round, and reactive to light, extraocular eye movements intact, conjunctivae normal  ENT: tympanic membrane, external ear and ear canal normal bilaterally, oropharynx clear and moist with normal mucous membranes  Neck: neck supple and non tender without mass, no thyromegaly or thyroid nodules, no cervical lymphadenopathy   Pulmonary/Chest: clear to auscultation bilaterally- no wheezes, rales or rhonchi, normal air movement, no respiratory distress  Cardiovascular: normal rate, regular rhythm, normal S1 and S2, no murmurs and no gallops  Abdomen: soft, non-tender, non-distended, normal bowel sounds, no masses or organomegaly  Extremities: no cyanosis, no clubbing and no edema  Musculoskeletal: normal range of motion, no joint swelling, deformity or tenderness  Neurologic: gait and coordination normal and speech normal       No Known Allergies  Prior to Visit Medications    Medication Sig Taking?  Authorizing Provider   Insulin Pen Needle (NOVOFINE PEN NEEDLE) 32G X 6 MM MISC 1 each by Does not apply route 6 times daily  Fabienne Teresa MD   aspirin 81 MG EC tablet Take 81 mg by mouth daily  Historical Provider, MD   HUMALOG KWIKPEN 100 UNIT/ML SOPN INJECT SUBCUTANEOUSLY 7 UNITS IN THE MORNING , 7 UNITS AT LUNCH AND 12 UNITS AT SUPPER PLUS SLIDING SCALE DURING MEALS AND AT BEDTIME  Patient taking differently: Inject 8 units before breakfast, 8 units before lunch, and 12 units before dinner plus group 1 scale  Fabienne Teresa MD   metoprolol succinate (TOPROL XL) 50 MG extended release tablet TAKE 1 TABLET BY MOUTH  DAILY  Lisbet Toscano MD   ranolazine (RANEXA) 1000 MG extended release tablet TAKE 1 TABLET BY MOUTH  TWICE DAILY NEW DOSE  Lisbet Toscano MD   tamsulosin (FLOMAX) 0.4 MG capsule TAKE 1 CAPSULE BY MOUTH  DAILY  KAVON Segal - CNP   bumetanide (BUMEX) 2 MG tablet Take 2 mg by mouth daily  Historical Provider, MD   NOVOFINE 32G X 6 MM MISC USE 1 NEW NEEDLE 6 TIMES  DAILY AND AS NEEDED  Fabienne Teresa MD   insulin detemir (LEVEMIR FLEXTOUCH) 100 UNIT/ML injection pen Inject 48 Units into the skin 2 times daily  Fabienne Teresa MD   ferrous sulfate (IRON 325) 325 (65 Fe) MG tablet Take 325 mg by mouth every other day  Historical Provider, MD   ascorbic acid (VITAMIN C) 500 MG tablet Take 500 mg by mouth every other day  Historical Provider, MD   nitroGLYCERIN (NITROLINGUAL) 0.4 MG/SPRAY 0.4 mg spray USE 1 SPRAY ON OR UNDER THE TONGUE EVERY 5 MINUTES AS NEEDED FOR CHEST PAIN  Lisbet Toscano MD dilTIAZem (CARDIZEM CD) 120 MG extended release capsule TAKE 1 CAPSULE BY MOUTH  DAILY  Jyoti Castaneda MD   isosorbide mononitrate (IMDUR) 120 MG extended release tablet Take 1 tablet by mouth daily  KAVON Costello - CNP   blood glucose test strips (ACCU-CHEK GUIDE) strip USE TO CHECK 4 TIMES DAILY  Judge Wallace MD   Accu-Chek FastClix Lancets MISC USE TO TEST BLOOD SUGARS 4 TIMES DAILY  Judge Wallace MD   atorvastatin (LIPITOR) 40 MG tablet TAKE 1 TABLET BY MOUTH AT  NIGHT  Jyoti Castaneda MD   diphenhydrAMINE-APAP, sleep, (TYLENOL PM EXTRA STRENGTH)  MG tablet Take 1 tablet by mouth every evening as needed  Historical Provider, MD   hydrOXYzine (ATARAX) 10 MG tablet Take 10 mg by mouth daily as needed   Historical Provider, MD   fluocinonide (LIDEX) 0.05 % cream Apply 1 applicator topically as needed  Historical Provider, MD   BiPAP Machine MISC by Does not apply route  Historical Provider, MD   Multiple Vitamins-Minerals (THERAPEUTIC MULTIVITAMIN-MINERALS) tablet Take 1 tablet by mouth nightly  Historical Provider, MD   hydrALAZINE (APRESOLINE) 25 MG tablet Take 1 tablet by mouth every 8 hours  Caesy Payton MD   clopidogrel (PLAVIX) 75 MG tablet Take 75 mg by mouth daily morning  Historical Provider, MD   Lancet Devices ([address]) MISC Use one with each blood check  Judge Wallace MD       [facility] (Including outside providers/suppliers regularly involved in providing care):   Patient Care Team:  Jyoti Castaneda MD as PCP - General (Hospitalist)  Judge Wallace MD as PCP - [facility]  Selma Selby MD as Consulting Physician (Cardiology)  Jyoti Castaneda MD as Consulting Physician (Hospitalist)  Nelli Wall MD as Consulting Physician (Gastroenterology)  Yuan Mcdonnell MD as Surgeon (General Surgery)  Makenzie Moore MD as Consulting Physician (Dermatology)  Juan R Sutton DO as Consulting Physician (Nephrology)  Delfina Richard as     Reviewed and updated this visit:  Tobacco  Allergies  Meds  Med Hx  Surg Hx  Soc Hx  Fam Hx

## 2022-04-22 ENCOUNTER — CARE COORDINATION (OUTPATIENT)
Dept: CARE COORDINATION | Age: 77
End: 2022-04-22

## 2022-04-22 NOTE — CARE COORDINATION
I was able to fax in his application through [facility] for assistance on his insulins.       [facility]   Medication Assistance  Aly Barrera [address] (V) [phone]  (L) [phone]

## 2022-04-29 ENCOUNTER — CARE COORDINATION (OUTPATIENT)
Dept: CARE COORDINATION | Age: 77
End: 2022-04-29

## 2022-04-29 NOTE — CARE COORDINATION
I called [address] [place] [address]  to check on the status of his application on Trulicity , basaglar, and humalog. He was approved until 12/31/22 to get the medication for no charge, I spoke with his wife and gave her their phone number.           [address] [place] [address]   Medication Assistance  Aly Barrera [address]    (H) [phone]  (B) [phone]

## 2022-04-30 ENCOUNTER — ANESTHESIA (OUTPATIENT)
Dept: OPERATING ROOM | Age: 77
DRG: 336 | End: 2022-04-30
Payer: MEDICARE

## 2022-04-30 ENCOUNTER — ANESTHESIA EVENT (OUTPATIENT)
Dept: OPERATING ROOM | Age: 77
DRG: 336 | End: 2022-04-30
Payer: MEDICARE

## 2022-04-30 ENCOUNTER — APPOINTMENT (OUTPATIENT)
Dept: CT IMAGING | Age: 77
DRG: 336 | End: 2022-04-30
Payer: MEDICARE

## 2022-04-30 ENCOUNTER — HOSPITAL ENCOUNTER (INPATIENT)
Age: 77
LOS: 4 days | Discharge: HOME | DRG: 336 | End: 2022-05-04
Attending: SURGERY | Admitting: SURGERY
Payer: MEDICARE

## 2022-04-30 VITALS
DIASTOLIC BLOOD PRESSURE: 58 MMHG | SYSTOLIC BLOOD PRESSURE: 119 MMHG | RESPIRATION RATE: 12 BRPM | OXYGEN SATURATION: 99 %

## 2022-04-30 DIAGNOSIS — N18.9 ACUTE KIDNEY INJURY SUPERIMPOSED ON CKD (HCC): ICD-10-CM

## 2022-04-30 DIAGNOSIS — K42.0 STRANGULATED UMBILICAL HERNIA: ICD-10-CM

## 2022-04-30 DIAGNOSIS — N17.9 ACUTE KIDNEY INJURY SUPERIMPOSED ON CKD (HCC): ICD-10-CM

## 2022-04-30 DIAGNOSIS — K56.609 SMALL BOWEL OBSTRUCTION (HCC): Primary | ICD-10-CM

## 2022-04-30 LAB
ALBUMIN SERPL-MCNC: 4.2 G/DL (ref 3.5–5.1)
ALP BLD-CCNC: 96 U/L (ref 38–126)
ALT SERPL-CCNC: 20 U/L (ref 11–66)
ANION GAP SERPL CALCULATED.3IONS-SCNC: 15 MEQ/L (ref 8–16)
AST SERPL-CCNC: 19 U/L (ref 5–40)
BASOPHILS # BLD: 0.3 %
BASOPHILS ABSOLUTE: 0 THOU/MM3 (ref 0–0.1)
BILIRUB SERPL-MCNC: 0.7 MG/DL (ref 0.3–1.2)
BILIRUBIN DIRECT: < 0.2 MG/DL (ref 0–0.3)
BUN BLDV-MCNC: 45 MG/DL (ref 7–22)
CALCIUM SERPL-MCNC: 9.9 MG/DL (ref 8.5–10.5)
CHLORIDE BLD-SCNC: 98 MEQ/L (ref 98–111)
CO2: 25 MEQ/L (ref 23–33)
CREAT SERPL-MCNC: 2.1 MG/DL (ref 0.4–1.2)
EOSINOPHIL # BLD: 1.2 %
EOSINOPHILS ABSOLUTE: 0.1 THOU/MM3 (ref 0–0.4)
ERYTHROCYTE [DISTWIDTH] IN BLOOD BY AUTOMATED COUNT: 13.4 % (ref 11.5–14.5)
ERYTHROCYTE [DISTWIDTH] IN BLOOD BY AUTOMATED COUNT: 51.4 FL (ref 35–45)
GFR SERPL CREATININE-BSD FRML MDRD: 31 ML/MIN/1.73M2
GLUCOSE BLD-MCNC: 160 MG/DL (ref 70–108)
GLUCOSE BLD-MCNC: 167 MG/DL (ref 70–108)
GLUCOSE BLD-MCNC: 185 MG/DL (ref 70–108)
GLUCOSE BLD-MCNC: 234 MG/DL (ref 70–108)
HCT VFR BLD CALC: 35.9 % (ref 42–52)
HEMOGLOBIN: 11.3 GM/DL (ref 14–18)
IMMATURE GRANS (ABS): 0.05 THOU/MM3 (ref 0–0.07)
IMMATURE GRANULOCYTES: 0.4 %
LIPASE: 32.4 U/L (ref 5.6–51.3)
LYMPHOCYTES # BLD: 6.5 %
LYMPHOCYTES ABSOLUTE: 0.7 THOU/MM3 (ref 1–4.8)
MCH RBC QN AUTO: 32.9 PG (ref 26–33)
MCHC RBC AUTO-ENTMCNC: 31.5 GM/DL (ref 32.2–35.5)
MCV RBC AUTO: 104.7 FL (ref 80–94)
MONOCYTES # BLD: 10.2 %
MONOCYTES ABSOLUTE: 1.2 THOU/MM3 (ref 0.4–1.3)
NUCLEATED RED BLOOD CELLS: 0 /100 WBC
OSMOLALITY CALCULATION: 290.6 MOSMOL/KG (ref 275–300)
PLATELET # BLD: 207 THOU/MM3 (ref 130–400)
PMV BLD AUTO: 9.7 FL (ref 9.4–12.4)
POTASSIUM REFLEX MAGNESIUM: 4.5 MEQ/L (ref 3.5–5.2)
PRO-BNP: 1538 PG/ML (ref 0–1800)
RBC # BLD: 3.43 MILL/MM3 (ref 4.7–6.1)
SEG NEUTROPHILS: 81.4 %
SEGMENTED NEUTROPHILS ABSOLUTE COUNT: 9.2 THOU/MM3 (ref 1.8–7.7)
SODIUM BLD-SCNC: 138 MEQ/L (ref 135–145)
TOTAL PROTEIN: 7.8 G/DL (ref 6.1–8)
TROPONIN T: 0.01 NG/ML
WBC # BLD: 11.3 THOU/MM3 (ref 4.8–10.8)

## 2022-04-30 PROCEDURE — 85025 COMPLETE CBC W/AUTO DIFF WBC: CPT

## 2022-04-30 PROCEDURE — 3700000001 HC ADD 15 MINUTES (ANESTHESIA): Performed by: SURGERY

## 2022-04-30 PROCEDURE — 83880 ASSAY OF NATRIURETIC PEPTIDE: CPT

## 2022-04-30 PROCEDURE — 2580000003 HC RX 258: Performed by: ANESTHESIOLOGY

## 2022-04-30 PROCEDURE — 84484 ASSAY OF TROPONIN QUANT: CPT

## 2022-04-30 PROCEDURE — 83690 ASSAY OF LIPASE: CPT

## 2022-04-30 PROCEDURE — 0DBU0ZZ EXCISION OF OMENTUM, OPEN APPROACH: ICD-10-PCS | Performed by: SURGERY

## 2022-04-30 PROCEDURE — 0WQF4ZZ REPAIR ABDOMINAL WALL, PERCUTANEOUS ENDOSCOPIC APPROACH: ICD-10-PCS | Performed by: SURGERY

## 2022-04-30 PROCEDURE — 0DJD0ZZ INSPECTION OF LOWER INTESTINAL TRACT, OPEN APPROACH: ICD-10-PCS | Performed by: SURGERY

## 2022-04-30 PROCEDURE — 3700000000 HC ANESTHESIA ATTENDED CARE: Performed by: SURGERY

## 2022-04-30 PROCEDURE — 99223 1ST HOSP IP/OBS HIGH 75: CPT | Performed by: SURGERY

## 2022-04-30 PROCEDURE — 2500000003 HC RX 250 WO HCPCS: Performed by: SURGERY

## 2022-04-30 PROCEDURE — 80048 BASIC METABOLIC PNL TOTAL CA: CPT

## 2022-04-30 PROCEDURE — 0DN84ZZ RELEASE SMALL INTESTINE, PERCUTANEOUS ENDOSCOPIC APPROACH: ICD-10-PCS | Performed by: SURGERY

## 2022-04-30 PROCEDURE — 7100000000 HC PACU RECOVERY - FIRST 15 MIN: Performed by: SURGERY

## 2022-04-30 PROCEDURE — 3600000002 HC SURGERY LEVEL 2 BASE: Performed by: SURGERY

## 2022-04-30 PROCEDURE — 3600000012 HC SURGERY LEVEL 2 ADDTL 15MIN: Performed by: SURGERY

## 2022-04-30 PROCEDURE — 80076 HEPATIC FUNCTION PANEL: CPT

## 2022-04-30 PROCEDURE — 6360000002 HC RX W HCPCS: Performed by: SURGERY

## 2022-04-30 PROCEDURE — 7100000001 HC PACU RECOVERY - ADDTL 15 MIN: Performed by: SURGERY

## 2022-04-30 PROCEDURE — 82948 REAGENT STRIP/BLOOD GLUCOSE: CPT

## 2022-04-30 PROCEDURE — 6370000000 HC RX 637 (ALT 250 FOR IP): Performed by: SURGERY

## 2022-04-30 PROCEDURE — 99223 1ST HOSP IP/OBS HIGH 75: CPT | Performed by: PHYSICIAN ASSISTANT

## 2022-04-30 PROCEDURE — 99285 EMERGENCY DEPT VISIT HI MDM: CPT

## 2022-04-30 PROCEDURE — 6360000002 HC RX W HCPCS: Performed by: NURSE ANESTHETIST, CERTIFIED REGISTERED

## 2022-04-30 PROCEDURE — 1200000003 HC TELEMETRY R&B

## 2022-04-30 PROCEDURE — 93005 ELECTROCARDIOGRAM TRACING: CPT | Performed by: PHYSICIAN ASSISTANT

## 2022-04-30 PROCEDURE — 74176 CT ABD & PELVIS W/O CONTRAST: CPT

## 2022-04-30 PROCEDURE — 2580000003 HC RX 258: Performed by: SURGERY

## 2022-04-30 PROCEDURE — 2709999900 HC NON-CHARGEABLE SUPPLY: Performed by: SURGERY

## 2022-04-30 PROCEDURE — 36415 COLL VENOUS BLD VENIPUNCTURE: CPT

## 2022-04-30 PROCEDURE — 49561 PR REPAIR INCISIONAL HERNIA,STRANG: CPT | Performed by: SURGERY

## 2022-04-30 PROCEDURE — 2500000003 HC RX 250 WO HCPCS: Performed by: NURSE ANESTHETIST, CERTIFIED REGISTERED

## 2022-04-30 RX ORDER — HYDRALAZINE HYDROCHLORIDE 25 MG/1
25 TABLET, FILM COATED ORAL EVERY 8 HOURS SCHEDULED
Status: DISCONTINUED | OUTPATIENT
Start: 2022-04-30 | End: 2022-05-04 | Stop reason: HOSPADM

## 2022-04-30 RX ORDER — DILTIAZEM HYDROCHLORIDE 120 MG/1
120 CAPSULE, COATED, EXTENDED RELEASE ORAL DAILY
Status: DISCONTINUED | OUTPATIENT
Start: 2022-04-30 | End: 2022-05-04 | Stop reason: HOSPADM

## 2022-04-30 RX ORDER — MORPHINE SULFATE 2 MG/ML
2 INJECTION, SOLUTION INTRAMUSCULAR; INTRAVENOUS
Status: DISCONTINUED | OUTPATIENT
Start: 2022-04-30 | End: 2022-05-04 | Stop reason: HOSPADM

## 2022-04-30 RX ORDER — PROPOFOL 10 MG/ML
INJECTION, EMULSION INTRAVENOUS PRN
Status: DISCONTINUED | OUTPATIENT
Start: 2022-04-30 | End: 2022-04-30 | Stop reason: SDUPTHER

## 2022-04-30 RX ORDER — MORPHINE SULFATE 2 MG/ML
2 INJECTION, SOLUTION INTRAMUSCULAR; INTRAVENOUS EVERY 5 MIN PRN
Status: DISCONTINUED | OUTPATIENT
Start: 2022-04-30 | End: 2022-04-30

## 2022-04-30 RX ORDER — ONDANSETRON 4 MG/1
4 TABLET, ORALLY DISINTEGRATING ORAL EVERY 8 HOURS PRN
Status: DISCONTINUED | OUTPATIENT
Start: 2022-04-30 | End: 2022-05-04 | Stop reason: HOSPADM

## 2022-04-30 RX ORDER — SODIUM CHLORIDE 450 MG/100ML
INJECTION, SOLUTION INTRAVENOUS CONTINUOUS
Status: DISCONTINUED | OUTPATIENT
Start: 2022-04-30 | End: 2022-05-01

## 2022-04-30 RX ORDER — HYDRALAZINE HYDROCHLORIDE 20 MG/ML
5 INJECTION INTRAMUSCULAR; INTRAVENOUS EVERY 6 HOURS PRN
Status: DISCONTINUED | OUTPATIENT
Start: 2022-04-30 | End: 2022-05-04 | Stop reason: HOSPADM

## 2022-04-30 RX ORDER — DIPHENHYDRAMINE HCL 25 MG
25 TABLET ORAL NIGHTLY PRN
Status: DISCONTINUED | OUTPATIENT
Start: 2022-04-30 | End: 2022-05-04 | Stop reason: HOSPADM

## 2022-04-30 RX ORDER — ROCURONIUM BROMIDE 10 MG/ML
INJECTION, SOLUTION INTRAVENOUS PRN
Status: DISCONTINUED | OUTPATIENT
Start: 2022-04-30 | End: 2022-04-30 | Stop reason: SDUPTHER

## 2022-04-30 RX ORDER — SUCCINYLCHOLINE/SOD CL,ISO/PF 200MG/10ML
SYRINGE (ML) INTRAVENOUS PRN
Status: DISCONTINUED | OUTPATIENT
Start: 2022-04-30 | End: 2022-04-30 | Stop reason: SDUPTHER

## 2022-04-30 RX ORDER — MORPHINE SULFATE 4 MG/ML
4 INJECTION, SOLUTION INTRAMUSCULAR; INTRAVENOUS
Status: DISCONTINUED | OUTPATIENT
Start: 2022-04-30 | End: 2022-05-04 | Stop reason: HOSPADM

## 2022-04-30 RX ORDER — ACETAMINOPHEN 500 MG
500 TABLET ORAL NIGHTLY PRN
Status: DISCONTINUED | OUTPATIENT
Start: 2022-04-30 | End: 2022-05-04 | Stop reason: HOSPADM

## 2022-04-30 RX ORDER — FENTANYL CITRATE 50 UG/ML
50 INJECTION, SOLUTION INTRAMUSCULAR; INTRAVENOUS EVERY 5 MIN PRN
Status: DISCONTINUED | OUTPATIENT
Start: 2022-04-30 | End: 2022-04-30

## 2022-04-30 RX ORDER — TAMSULOSIN HYDROCHLORIDE 0.4 MG/1
0.4 CAPSULE ORAL DAILY
Status: DISCONTINUED | OUTPATIENT
Start: 2022-04-30 | End: 2022-05-04

## 2022-04-30 RX ORDER — INSULIN LISPRO 100 [IU]/ML
0-6 INJECTION, SOLUTION INTRAVENOUS; SUBCUTANEOUS NIGHTLY
Status: DISCONTINUED | OUTPATIENT
Start: 2022-04-30 | End: 2022-05-04 | Stop reason: HOSPADM

## 2022-04-30 RX ORDER — BUPIVACAINE HYDROCHLORIDE AND EPINEPHRINE 5; 5 MG/ML; UG/ML
INJECTION, SOLUTION EPIDURAL; INTRACAUDAL; PERINEURAL PRN
Status: DISCONTINUED | OUTPATIENT
Start: 2022-04-30 | End: 2022-04-30 | Stop reason: ALTCHOICE

## 2022-04-30 RX ORDER — SODIUM CHLORIDE 0.9 % (FLUSH) 0.9 %
5-40 SYRINGE (ML) INJECTION PRN
Status: DISCONTINUED | OUTPATIENT
Start: 2022-04-30 | End: 2022-05-04 | Stop reason: HOSPADM

## 2022-04-30 RX ORDER — SODIUM CHLORIDE 0.9 % (FLUSH) 0.9 %
5-40 SYRINGE (ML) INJECTION EVERY 12 HOURS SCHEDULED
Status: DISCONTINUED | OUTPATIENT
Start: 2022-04-30 | End: 2022-04-30

## 2022-04-30 RX ORDER — ONDANSETRON 2 MG/ML
4 INJECTION INTRAMUSCULAR; INTRAVENOUS EVERY 6 HOURS PRN
Status: DISCONTINUED | OUTPATIENT
Start: 2022-04-30 | End: 2022-05-04 | Stop reason: HOSPADM

## 2022-04-30 RX ORDER — SODIUM CHLORIDE 0.9 % (FLUSH) 0.9 %
5-40 SYRINGE (ML) INJECTION EVERY 12 HOURS SCHEDULED
Status: DISCONTINUED | OUTPATIENT
Start: 2022-04-30 | End: 2022-05-04 | Stop reason: HOSPADM

## 2022-04-30 RX ORDER — MAGNESIUM SULFATE 1 G/100ML
1000 INJECTION INTRAVENOUS PRN
Status: DISCONTINUED | OUTPATIENT
Start: 2022-04-30 | End: 2022-05-04 | Stop reason: HOSPADM

## 2022-04-30 RX ORDER — DEXTROSE MONOHYDRATE 50 MG/ML
100 INJECTION, SOLUTION INTRAVENOUS PRN
Status: DISCONTINUED | OUTPATIENT
Start: 2022-04-30 | End: 2022-05-04 | Stop reason: HOSPADM

## 2022-04-30 RX ORDER — INSULIN LISPRO 100 [IU]/ML
0-12 INJECTION, SOLUTION INTRAVENOUS; SUBCUTANEOUS
Status: DISCONTINUED | OUTPATIENT
Start: 2022-04-30 | End: 2022-05-04 | Stop reason: HOSPADM

## 2022-04-30 RX ORDER — ONDANSETRON 2 MG/ML
INJECTION INTRAMUSCULAR; INTRAVENOUS PRN
Status: DISCONTINUED | OUTPATIENT
Start: 2022-04-30 | End: 2022-04-30 | Stop reason: SDUPTHER

## 2022-04-30 RX ORDER — POTASSIUM CHLORIDE 7.45 MG/ML
10 INJECTION INTRAVENOUS PRN
Status: DISCONTINUED | OUTPATIENT
Start: 2022-04-30 | End: 2022-05-04 | Stop reason: HOSPADM

## 2022-04-30 RX ORDER — NEOSTIGMINE METHYLSULFATE 5 MG/5 ML
SYRINGE (ML) INTRAVENOUS PRN
Status: DISCONTINUED | OUTPATIENT
Start: 2022-04-30 | End: 2022-04-30 | Stop reason: SDUPTHER

## 2022-04-30 RX ORDER — GLYCOPYRROLATE 1 MG/5 ML
SYRINGE (ML) INTRAVENOUS PRN
Status: DISCONTINUED | OUTPATIENT
Start: 2022-04-30 | End: 2022-04-30 | Stop reason: SDUPTHER

## 2022-04-30 RX ORDER — SODIUM CHLORIDE 9 MG/ML
INJECTION, SOLUTION INTRAVENOUS CONTINUOUS
Status: DISCONTINUED | OUTPATIENT
Start: 2022-04-30 | End: 2022-04-30

## 2022-04-30 RX ORDER — DEXAMETHASONE SODIUM PHOSPHATE 10 MG/ML
INJECTION, EMULSION INTRAMUSCULAR; INTRAVENOUS PRN
Status: DISCONTINUED | OUTPATIENT
Start: 2022-04-30 | End: 2022-04-30 | Stop reason: SDUPTHER

## 2022-04-30 RX ORDER — ASPIRIN 81 MG/1
81 TABLET ORAL DAILY
Status: DISCONTINUED | OUTPATIENT
Start: 2022-04-30 | End: 2022-05-04 | Stop reason: HOSPADM

## 2022-04-30 RX ORDER — SODIUM CHLORIDE 9 MG/ML
INJECTION, SOLUTION INTRAVENOUS PRN
Status: DISCONTINUED | OUTPATIENT
Start: 2022-04-30 | End: 2022-04-30

## 2022-04-30 RX ORDER — RANOLAZINE 500 MG/1
1000 TABLET, EXTENDED RELEASE ORAL 2 TIMES DAILY
Status: DISCONTINUED | OUTPATIENT
Start: 2022-04-30 | End: 2022-05-04 | Stop reason: HOSPADM

## 2022-04-30 RX ORDER — LIDOCAINE HCL/PF 100 MG/5ML
SYRINGE (ML) INJECTION PRN
Status: DISCONTINUED | OUTPATIENT
Start: 2022-04-30 | End: 2022-04-30 | Stop reason: SDUPTHER

## 2022-04-30 RX ORDER — BUMETANIDE 1 MG/1
2 TABLET ORAL DAILY
Status: DISCONTINUED | OUTPATIENT
Start: 2022-04-30 | End: 2022-05-04 | Stop reason: HOSPADM

## 2022-04-30 RX ORDER — ISOSORBIDE MONONITRATE 60 MG/1
120 TABLET, EXTENDED RELEASE ORAL DAILY
Status: DISCONTINUED | OUTPATIENT
Start: 2022-04-30 | End: 2022-05-04 | Stop reason: HOSPADM

## 2022-04-30 RX ORDER — ATORVASTATIN CALCIUM 40 MG/1
40 TABLET, FILM COATED ORAL NIGHTLY
Status: DISCONTINUED | OUTPATIENT
Start: 2022-04-30 | End: 2022-05-04 | Stop reason: HOSPADM

## 2022-04-30 RX ORDER — TRAMADOL HYDROCHLORIDE 50 MG/1
50 TABLET ORAL EVERY 6 HOURS PRN
Status: DISCONTINUED | OUTPATIENT
Start: 2022-04-30 | End: 2022-05-04 | Stop reason: HOSPADM

## 2022-04-30 RX ORDER — FENTANYL CITRATE 50 UG/ML
INJECTION, SOLUTION INTRAMUSCULAR; INTRAVENOUS PRN
Status: DISCONTINUED | OUTPATIENT
Start: 2022-04-30 | End: 2022-04-30 | Stop reason: SDUPTHER

## 2022-04-30 RX ORDER — HYDRALAZINE HYDROCHLORIDE 20 MG/ML
10 INJECTION INTRAMUSCULAR; INTRAVENOUS
Status: DISCONTINUED | OUTPATIENT
Start: 2022-04-30 | End: 2022-04-30

## 2022-04-30 RX ORDER — DEXTROSE MONOHYDRATE 25 G/50ML
12.5 INJECTION, SOLUTION INTRAVENOUS PRN
Status: DISCONTINUED | OUTPATIENT
Start: 2022-04-30 | End: 2022-04-30 | Stop reason: CLARIF

## 2022-04-30 RX ORDER — SODIUM CHLORIDE 0.9 % (FLUSH) 0.9 %
5-40 SYRINGE (ML) INJECTION PRN
Status: DISCONTINUED | OUTPATIENT
Start: 2022-04-30 | End: 2022-04-30

## 2022-04-30 RX ORDER — ENOXAPARIN SODIUM 100 MG/ML
40 INJECTION SUBCUTANEOUS DAILY
Status: DISCONTINUED | OUTPATIENT
Start: 2022-05-01 | End: 2022-05-01

## 2022-04-30 RX ORDER — ACETAMINOPHEN,DIPHENHYDRAMINE HCL 500; 25 MG/1; MG/1
1 TABLET, FILM COATED ORAL NIGHTLY PRN
Status: DISCONTINUED | OUTPATIENT
Start: 2022-04-30 | End: 2022-04-30 | Stop reason: CLARIF

## 2022-04-30 RX ORDER — NICOTINE POLACRILEX 4 MG
15 LOZENGE BUCCAL PRN
Status: DISCONTINUED | OUTPATIENT
Start: 2022-04-30 | End: 2022-04-30 | Stop reason: CLARIF

## 2022-04-30 RX ORDER — SODIUM CHLORIDE 9 MG/ML
INJECTION, SOLUTION INTRAVENOUS PRN
Status: DISCONTINUED | OUTPATIENT
Start: 2022-04-30 | End: 2022-05-04 | Stop reason: HOSPADM

## 2022-04-30 RX ORDER — METOPROLOL SUCCINATE 50 MG/1
50 TABLET, EXTENDED RELEASE ORAL DAILY
Status: DISCONTINUED | OUTPATIENT
Start: 2022-04-30 | End: 2022-05-04 | Stop reason: HOSPADM

## 2022-04-30 RX ORDER — NITROGLYCERIN 0.4 MG/1
0.4 TABLET SUBLINGUAL EVERY 5 MIN PRN
Status: DISCONTINUED | OUTPATIENT
Start: 2022-04-30 | End: 2022-05-04 | Stop reason: HOSPADM

## 2022-04-30 RX ORDER — TRAMADOL HYDROCHLORIDE 50 MG/1
100 TABLET ORAL EVERY 6 HOURS PRN
Status: DISCONTINUED | OUTPATIENT
Start: 2022-04-30 | End: 2022-05-04 | Stop reason: HOSPADM

## 2022-04-30 RX ADMIN — SUGAMMADEX 200 MG: 100 INJECTION, SOLUTION INTRAVENOUS at 13:56

## 2022-04-30 RX ADMIN — CEFOXITIN SODIUM 2000 MG: 1 POWDER, FOR SOLUTION INTRAVENOUS at 13:12

## 2022-04-30 RX ADMIN — FENTANYL CITRATE 50 MCG: 50 INJECTION, SOLUTION INTRAMUSCULAR; INTRAVENOUS at 13:26

## 2022-04-30 RX ADMIN — HYDRALAZINE HYDROCHLORIDE 25 MG: 25 TABLET ORAL at 20:32

## 2022-04-30 RX ADMIN — ONDANSETRON 4 MG: 2 INJECTION INTRAMUSCULAR; INTRAVENOUS at 13:19

## 2022-04-30 RX ADMIN — SODIUM CHLORIDE, PRESERVATIVE FREE 10 ML: 5 INJECTION INTRAVENOUS at 20:32

## 2022-04-30 RX ADMIN — SODIUM CHLORIDE: 9 INJECTION, SOLUTION INTRAVENOUS at 13:01

## 2022-04-30 RX ADMIN — Medication 5 MG: at 13:39

## 2022-04-30 RX ADMIN — Medication 100 MG: at 13:09

## 2022-04-30 RX ADMIN — ATORVASTATIN CALCIUM 40 MG: 40 TABLET, FILM COATED ORAL at 20:32

## 2022-04-30 RX ADMIN — Medication 140 MG: at 13:09

## 2022-04-30 RX ADMIN — CEFOXITIN SODIUM 1000 MG: 1 POWDER, FOR SOLUTION INTRAVENOUS at 18:36

## 2022-04-30 RX ADMIN — PROPOFOL 150 MG: 10 INJECTION, EMULSION INTRAVENOUS at 13:09

## 2022-04-30 RX ADMIN — Medication 0.8 MG: at 13:39

## 2022-04-30 RX ADMIN — RANOLAZINE 1000 MG: 500 TABLET, FILM COATED, EXTENDED RELEASE ORAL at 20:32

## 2022-04-30 RX ADMIN — DEXAMETHASONE SODIUM PHOSPHATE 10 MG: 10 INJECTION, EMULSION INTRAMUSCULAR; INTRAVENOUS at 13:19

## 2022-04-30 RX ADMIN — ROCURONIUM BROMIDE 30 MG: 10 INJECTION INTRAVENOUS at 13:15

## 2022-04-30 RX ADMIN — FENTANYL CITRATE 50 MCG: 50 INJECTION, SOLUTION INTRAMUSCULAR; INTRAVENOUS at 13:45

## 2022-04-30 ASSESSMENT — PULMONARY FUNCTION TESTS
PIF_VALUE: 23
PIF_VALUE: 23
PIF_VALUE: 15
PIF_VALUE: 21
PIF_VALUE: 24
PIF_VALUE: 15
PIF_VALUE: 20
PIF_VALUE: 25
PIF_VALUE: 15
PIF_VALUE: 23
PIF_VALUE: 15
PIF_VALUE: 23
PIF_VALUE: 1
PIF_VALUE: 23
PIF_VALUE: 20
PIF_VALUE: 14
PIF_VALUE: 24
PIF_VALUE: 20
PIF_VALUE: 0
PIF_VALUE: 23
PIF_VALUE: 24
PIF_VALUE: 23
PIF_VALUE: 24
PIF_VALUE: 23
PIF_VALUE: 1
PIF_VALUE: 15
PIF_VALUE: 36
PIF_VALUE: 16
PIF_VALUE: 24
PIF_VALUE: 21
PIF_VALUE: 2
PIF_VALUE: 20
PIF_VALUE: 24
PIF_VALUE: 23
PIF_VALUE: 3
PIF_VALUE: 23
PIF_VALUE: 23
PIF_VALUE: 22
PIF_VALUE: 2
PIF_VALUE: 23
PIF_VALUE: 2
PIF_VALUE: 15
PIF_VALUE: 20
PIF_VALUE: 0
PIF_VALUE: 4
PIF_VALUE: 23
PIF_VALUE: 5
PIF_VALUE: 23
PIF_VALUE: 24
PIF_VALUE: 23

## 2022-04-30 ASSESSMENT — PAIN SCALES - GENERAL
PAINLEVEL_OUTOF10: 0
PAINLEVEL_OUTOF10: 2
PAINLEVEL_OUTOF10: 0

## 2022-04-30 ASSESSMENT — PAIN DESCRIPTION - ORIENTATION: ORIENTATION: MID;LOWER

## 2022-04-30 ASSESSMENT — ENCOUNTER SYMPTOMS
ABDOMINAL DISTENTION: 0
VOMITING: 0
SORE THROAT: 0
DIARRHEA: 0
CONSTIPATION: 0
ABDOMINAL PAIN: 1
EYES NEGATIVE: 1
SHORTNESS OF BREATH: 0
NAUSEA: 0
COUGH: 0

## 2022-04-30 ASSESSMENT — PAIN - FUNCTIONAL ASSESSMENT: PAIN_FUNCTIONAL_ASSESSMENT: 0-10

## 2022-04-30 ASSESSMENT — PAIN DESCRIPTION - LOCATION: LOCATION: ABDOMEN

## 2022-04-30 NOTE — ANESTHESIA PRE PROCEDURE
Department of Anesthesiology  Preprocedure Note       Name:  Cheikh Springer   Age:  68 y.o.  :  1945                                          MRN:  [id number]         Date:  2022      Surgeon: Giovanni Lewis):  Lola Huynh MD    Procedure: Procedure(s): HERNIA VENTRAL REPAIR    Medications prior to admission:   Prior to Admission medications    Medication Sig Start Date End Date Taking?  Authorizing Provider   Insulin Pen Needle (NOVOFINE PEN NEEDLE) 32G X 6 MM MISC 1 each by Does not apply route 6 times daily 22   Judge Wallace MD   aspirin 81 MG EC tablet Take 81 mg by mouth daily    Historical Provider, MD   HUMALOG KWIKPEN 100 UNIT/ML SOPN INJECT SUBCUTANEOUSLY 7 UNITS IN THE MORNING , 7 UNITS AT LUNCH AND 12 UNITS AT SUPPER PLUS SLIDING SCALE DURING MEALS AND AT BEDTIME  Patient taking differently: Inject 8 units before breakfast, 8 units before lunch, and 12 units before dinner plus group 1 scale 22   Judge Wallace MD   metoprolol succinate (TOPROL XL) 50 MG extended release tablet TAKE 1 TABLET BY MOUTH  DAILY 22   Jyoti Castaneda MD   ranolazine (RANEXA) 1000 MG extended release tablet TAKE 1 TABLET BY MOUTH  TWICE DAILY NEW DOSE 22   Jyoti Castaneda MD   tamsulosin (FLOMAX) 0.4 MG capsule TAKE 1 CAPSULE BY MOUTH  DAILY 1/10/22   KAVON Collins - CNP   bumetanide (BUMEX) 2 MG tablet Take 2 mg by mouth daily    Historical Provider, MD   NOVOFINE 32G X 6 MM MISC USE 1 NEW NEEDLE 6 TIMES  DAILY AND AS NEEDED 21   Judge Wallace MD   insulin detemir (LEVEMIR FLEXTOUCH) 100 UNIT/ML injection pen Inject 48 Units into the skin 2 times daily 21   Judge Wallace MD   ferrous sulfate (IRON 325) 325 (65 Fe) MG tablet Take 325 mg by mouth every other day    Historical Provider, MD   ascorbic acid (VITAMIN C) 500 MG tablet Take 500 mg by mouth every other day    Historical Provider, MD   nitroGLYCERIN (NITROLINGUAL) 0.4 MG/SPRAY 0.4 mg spray USE 1 SPRAY ON OR UNDER THE TONGUE EVERY 5 MINUTES AS NEEDED FOR CHEST PAIN 7/28/21   Tiana Cristina MD   dilTIAZem (CARDIZEM CD) 120 MG extended release capsule TAKE 1 CAPSULE BY MOUTH  DAILY 7/28/21   Tiana Cristina MD   isosorbide mononitrate (IMDUR) 120 MG extended release tablet Take 1 tablet by mouth daily 7/22/21   KAVON Murillo - CNP   blood glucose test strips (ACCU-CHEK GUIDE) strip USE TO CHECK 4 TIMES DAILY 7/6/21   Jt Pratt MD   Accu-Chek FastClix Lancets MISC USE TO TEST BLOOD SUGARS 4 TIMES DAILY 7/6/21   Jt Pratt MD   atorvastatin (LIPITOR) 40 MG tablet TAKE 1 TABLET BY MOUTH AT  NIGHT 6/29/21   Tiana Cristina MD   diphenhydrAMINE-APAP, sleep, (TYLENOL PM EXTRA STRENGTH)  MG tablet Take 1 tablet by mouth every evening as needed    Historical Provider, MD   hydrOXYzine (ATARAX) 10 MG tablet Take 10 mg by mouth daily as needed  3/13/21   Historical Provider, MD   fluocinonide (LIDEX) 0.05 % cream Apply 1 applicator topically as needed 12/29/20   Historical Provider, MD   BiPAP Machine MISC by Does not apply route    Historical Provider, MD   Multiple Vitamins-Minerals (THERAPEUTIC MULTIVITAMIN-MINERALS) tablet Take 1 tablet by mouth nightly    Historical Provider, MD   hydrALAZINE (APRESOLINE) 25 MG tablet Take 1 tablet by mouth every 8 hours 11/9/18   Kang Lott MD   clopidogrel (PLAVIX) 75 MG tablet Take 75 mg by mouth daily morning    Historical Provider, MD   Lancet Devices (ACCU-CHEK SOFTCLIX LANCET DEV) MISC Use one with each blood check 1/14/14 4/2/18  Jt Pratt MD       Current medications:    Current Facility-Administered Medications   Medication Dose Route Frequency Provider Last Rate Last Admin    cefOXitin (MEFOXIN) 1,000 mg in sterile water 10 mL IV syringe  1,000 mg IntraVENous Once Michele William MD         Current Outpatient Medications   Medication Sig Dispense Refill    Insulin Pen Needle (NOVOFINE PEN NEEDLE) 32G X 6 MM MISC 1 each by Does not apply route as needed       fluocinonide (LIDEX) 0.05 % cream Apply 1 applicator topically as needed      BiPAP Machine MISC by Does not apply route      Multiple Vitamins-Minerals (THERAPEUTIC MULTIVITAMIN-MINERALS) tablet Take 1 tablet by mouth nightly      hydrALAZINE (APRESOLINE) 25 MG tablet Take 1 tablet by mouth every 8 hours 90 tablet 0    clopidogrel (PLAVIX) 75 MG tablet Take 75 mg by mouth daily morning         Allergies:  No Known Allergies    Problem List:    Patient Active Problem List   Diagnosis Code    CKD (chronic kidney disease) stage 3, GFR 30-59 ml/min ([facility]) N18.30    Paroxysmal atrial fibrillation ([facility]) I48.0    S/P CABG (coronary artery bypass graft) Z95.1    Hyperlipidemia E78.5    Renal artery stenosis ([facility]) I70.1    Obstructive sleep apnea on CPAP G47.33, Z99.89    Coronary atherosclerosis I25.10    Type 2 diabetes mellitus with circulatory disorder ([facility]) E11.59    Status post angioplasty with stent Z95.820    SOB (shortness of breath) R06.02    Wheezing R06.2    Asthma J45.909    Essential hypertension I10    Obesity (BMI 30-39. 9) E66.9    Elevated PSA R97.20    Hypertrophy of prostate with urinary obstruction N40.1, N13.8    Adenomatous colon polyp D12.6    Angina, class III ([facility]) I20.9    Hx of atrial fibrillation without current medication Z86.79    Bilateral carotid artery stenosis I65.23    Hx of nonmelanoma skin cancer Z85.828    Acute kidney injury superimposed on CKD (HCC) N17.9, N18.9    Anemia of chronic renal failure N18.9, D63.1    Anemia in stage 3 chronic kidney disease (HCC) N18.30, D63.1    Melena K92.1    Chronic kidney disease, stage 4 (severe) (HCC) N18.4    Strangulated umbilical hernia Y07.0       Past Medical History:        Diagnosis Date    Adenomatous colon polyp 2009/2010, 6/'14,7/'17, 9/20, 7/21    Angina at rest [facility])     Arthritis     back    Atrial fibrillation ([address] 12/11 and 11/12    cardioversion 12/11    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    BPH (benign prostatic hypertrophy)     CAD (coronary artery disease) 1997    Carotid artery disease ([address] 1995    right    Carotid disease, bilateral ([address]     Cerebral artery occlusion with cerebral infarction ([address]     CHF (congestive heart failure) (HCC)     Chronic kidney disease     GERD (gastroesophageal reflux disease)     GI bleed 07/2021    History of blood transfusion 2021    GI bleed    History of blood transfusion 1997    with heart surgery    Hyperlipidemia 1996    Hypertension     Lumbar disc disease 2001    MI (myocardial infarction) ([address] 1997    Nephrolithiasis 2002    NIDDM (non-insulin dependent diabetes mellitus) 1996    diagnosed 1996    RICCARDO (obstructive sleep apnea) 2005    cpap    PVC's (premature ventricular contractions)     Renal artery stenosis ([address] 2007    left    Renal insufficiency     S/P CABG (status post coronary artery bypass graft) 1997    5 vessels    Skin cancer     Squamous cell carcinoma     Stenosis of right subclavian artery ([address]     TIA (transient ischemic attack)     Wears partial dentures     upper and lower       Past Surgical History:        Procedure Laterality Date    ABLATION OF DYSRHYTHMIC FOCUS  3/13/13    CARDIAC CATHETERIZATION  2008 - 2019    stents x15   [address]  3/13/13    oblation for irreg rythm    CARDIOVERSION  3/8/2012    CAROTID ENDARTERECTOMY  1997    CHOLECYSTECTOMY  4/1999    COLONOSCOPY  6/22/09, 4/2011,7/12/17    polyp removal    COLONOSCOPY N/A 7/21/2021    COLONOSCOPY POLYPECTOMY SNARE/COLD BIOPSY performed by Apolonia Schulz MD at [address] [facility]  2008 (2), 2009 (1)    x2    CORONARY ARTERY BYPASS GRAFT  8/1997    5 vessel    DIAGNOSTIC CARDIAC CATH LAB PROCEDURE      ENDOSCOPY, COLON, DIAGNOSTIC      EYE LID SURGERY Right 11/17/2021    EXCISION SCC RIGHT LOWER LATERAL EYELID WITH FROZEN SECTION performed by Taylor Ruiz MD at [address] Right 2/28/2022    EXCISION SCC RIGHT ZYGOMA performed by Taylor Ruiz MD at [address]  12/20/2013    pre-cancerous mole rt foot removed    LITHOTRIPSY  4/2002    PA OFFICE/OUTPT VISIT,PROCEDURE ONLY N/A 11/8/2018    TRANSESOPHAGEAL ECHOCARDIOGRAM performed by Fior Iverson MD at [facility]    PTCA  05/30/2018   [facility] SURGERY  2001, 2003    rt shoulder manipulation--frozen shoulder-01, Lt -03    SHOULDER SURGERY  2001 - 2003    manipulation - bilateral    SKIN BIOPSY      SKIN CANCER EXCISION  12/20/13     R foot    SKIN CANCER EXCISION  1/2016    squamous cell Lt  upper cheek    UPPER GASTROINTESTINAL ENDOSCOPY N/A 7/20/2021    EGD ESOPHAGOGASTRODUODENOSCOPY performed by Little Marie MD at [address]  4/2002    VASCULAR SURGERY      carotids & cabg harvests       Social History:    Social History     Tobacco Use    Smoking status: Never Smoker    Smokeless tobacco: Never Used   Substance Use Topics    Alcohol use: No     Alcohol/week: 0.0 standard drinks                                Counseling given: Not Answered      Vital Signs (Current):   Vitals:    04/30/22 1041 04/30/22 1228   BP: (!) 170/101 (!) 178/103   Pulse: 63 59   Resp: 18 17   Temp: 98.1 °F (36.7 °C)    TempSrc: Oral    SpO2: 98% 97%   Weight: 213 lb (96.6 kg)    Height: 5' 9\" (1.753 m)                                               BP Readings from Last 3 Encounters:   04/30/22 (!) 178/103   04/20/22 (!) 118/58   04/14/22 (!) 168/67       NPO Status:                                                                                 BMI:   Wt Readings from Last 3 Encounters:   04/30/22 213 lb (96.6 kg)   04/20/22 221 lb 6 oz (100.4 kg)   03/28/22 218 lb 6.4 oz (99.1 kg)     Body mass index is 31.45 kg/m².     CBC:   Lab Results   Component Value Date    WBC 11.3 04/30/2022    RBC 3.43 04/30/2022    RBC 3.80 03/24/2012    HGB 11.3 04/30/2022    HCT 35.9 04/30/2022    .7 04/30/2022    RDW 13.7 06/07/2018     04/30/2022       CMP:   Lab Results   Component Value Date     04/30/2022    K 4.5 04/30/2022    CL 98 04/30/2022    CO2 25 04/30/2022    BUN 45 04/30/2022    CREATININE 2.1 04/30/2022    GFRAA >60 09/11/2016    AGRATIO 1.2 09/10/2016    LABGLOM 31 04/30/2022    GLUCOSE 160 04/30/2022    GLUCOSE 169 03/24/2012    PROT 7.8 04/30/2022    CALCIUM 9.9 04/30/2022    BILITOT 0.7 04/30/2022    BILITOT NEGATIVE 06/18/2018    ALKPHOS 96 04/30/2022    AST 19 04/30/2022    ALT 20 04/30/2022       POC Tests: No results for input(s): POCGLU, POCNA, POCK, POCCL, POCBUN, POCHEMO, POCHCT in the last 72 hours. Coags:   Lab Results   Component Value Date    PROTIME 17.2 09/10/2016    INR 1.10 11/11/2021    APTT 33.5 11/11/2021       HCG (If Applicable): No results found for: PREGTESTUR, PREGSERUM, HCG, HCGQUANT     ABGs: No results found for: PHART, PO2ART, XMV2WCL, UVS5UDS, BEART, A3WSMGHI     Type & Screen (If Applicable):  Lab Results   Component Value Date    LABRH POS 11/11/2021       Drug/Infectious Status (If Applicable):  No results found for: HIV, HEPCAB    COVID-19 Screening (If Applicable):   Lab Results   Component Value Date    COVID19 NOT DETECTED 12/29/2021           Anesthesia Evaluation    Airway: Mallampati: II  TM distance: >3 FB   Neck ROM: full  Mouth opening: > = 3 FB Dental:          Pulmonary:   (+) sleep apnea:  decreased breath sounds,  asthma:                            Cardiovascular:    (+) hypertension:, CAD:, CABG/stent:, CHF:,         Rhythm: regular                      Neuro/Psych:   (+) CVA:, TIA,             GI/Hepatic/Renal:   (+) GERD:, renal disease:,           Endo/Other:    (+) Diabetes, . Abdominal:   (+) obese,           Vascular:           Other Findings:             Anesthesia Plan      general     ASA 4 - emergent       Induction: intravenous. MIPS: Postoperative opioids intended and Prophylactic antiemetics administered. Anesthetic plan and risks discussed with patient and spouse. Plan discussed with CRNA.                   Marry Sheth MD   4/30/2022

## 2022-04-30 NOTE — ANESTHESIA POSTPROCEDURE EVALUATION
Department of Anesthesiology  Postprocedure Note    Patient: Basilio Golden  MRN: [id number]  YOB: 1945  Date of evaluation: 4/30/2022  Time:  2:12 PM     Procedure Summary     Date: 04/30/22 Room / Location: [address] [facility] [place]    Anesthesia Start: 1305 Anesthesia Stop: 80    Procedure: exploratory laparotomy with repair of incarcerated hernia (N/A Abdomen) Diagnosis: (Strangulated Hernia)    Surgeons: Siva Navarro MD Responsible Provider: Dereje Stewart MD    Anesthesia Type: general ASA Status: 4 - Emergent          Anesthesia Type: general    Natalie Phase I: Natalie Score: 8    Natalie Phase II:      Last vitals: Reviewed and per EMR flowsheets.        Anesthesia Post Evaluation    Patient location during evaluation: PACU  Patient participation: complete - patient participated  Level of consciousness: awake  Airway patency: patent  Nausea & Vomiting: no vomiting and no nausea  Complications: no  Cardiovascular status: hemodynamically stable  Respiratory status: acceptable and nasal cannula  Hydration status: stable

## 2022-04-30 NOTE — CONSULTS
Hospitalist Consult Note      Patient:  Estrella Simmons    Unit/Bed:5K-24/024-A  YOB: 1945  MRN: [id number]   Acct: [de-identified]   PCP: Diamante Walker MD  Code Status: Full Code  Date of Admission: 4/30/2022  Date of Service: Pt seen/examined in consultation on 4/30/2022      Chief Complaint:  Abdominal pain   Reason for consult:  Med management     Assessment and Plan:    1. Incarcerated strangulated ventral hernia: s/p exploratory laparotomy, lysis of small bowel adhesions with reduction of incarcerated loop of small bowel with primary repair of hernia defect with Dr. Freddi Burkitt today. Management per primary    2. IDDMII: We will resume home insulin regimen when no longer NPO. Continue SSI, Accu-Cheks, hypoglycemia protocol in place. 3. PAF:  Not on anticoagulation per patient. Rate controlled on Cardizem, metoprolol. 4. Hx CAD s/p CABG, PCI: No chest pain. Resume home meds. 5. Chronic HFpEF: No overt signs of decompensation. Resume home meds. Careful use of IVF postop. Strict I/Os, daily weights. 6. Essential HTN: BP stable, cont home meds. Hydralazine ordered prn.     7. Urinary retention: reported per pt post op. Will check bladder scan and straight cath prn. History Of Present Illness:    [place] [id number] y.o. male who we are asked to see/evaluate by Vitor Marcos MD for medical management of DM, CAD, AF, CHF, CKD. The patient presented to the ED for abdominal pain and nausea. CT revealed incarcerated loop of small bowel in the midabdomen resulting in SBO. Patient underwent exploratory laparotomy, lysis of small bowel adhesions with reduction of incarcerated loop of small bowel with primary repair of hernia defect with Dr. Freddi Burkitt today. Patient reports a history of diabetes, states that he takes 8 units of Humalog tidwc, 48 units of Levemir twice daily. He reports history of PCI and CABG.   Patient reports history of A. fib, states that he was taken off Eliquis approximately a year ago and is only on aspirin and Plavix. Patient seen postop and is doing well. Abdominal pain is well controlled. He denies any fever/chills, chest pain, shortness of breath, N/V/D. Patient states he has not urinated since surgery. Hospitalist will continue to follow. Review of systems:   Pertinent positives as noted in the HPI. All other systems reviewed and negative.     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    BPH (benign prostatic hypertrophy)     CAD (coronary artery disease) 1997    Carotid artery disease ([address] 1995    right    Carotid disease, bilateral ([address]     Cerebral artery occlusion with cerebral infarction ([address]     CHF (congestive heart failure) ([address]     Chronic kidney disease     GERD (gastroesophageal reflux disease)     GI bleed 07/2021    History of blood transfusion 2021    GI bleed    History of blood transfusion 1997    with heart surgery    Hyperlipidemia 1996    Hypertension     Lumbar disc disease 2001    MI (myocardial infarction) ([address] 1997    Nephrolithiasis 2002    NIDDM (non-insulin dependent diabetes mellitus) 1996    diagnosed 1996    RICCARDO (obstructive sleep apnea) 2005    cpap    PVC's (premature ventricular contractions)     Renal artery stenosis ([address] 2007    left    Renal insufficiency     S/P CABG (status post coronary artery bypass graft) 1997    5 vessels    Skin cancer     Squamous cell carcinoma     Stenosis of right subclavian artery ([address]     TIA (transient ischemic attack)     Wears partial dentures     upper and lower       Past Surgical History:        Procedure Laterality Date    ABLATION OF DYSRHYTHMIC FOCUS  3/13/13    CARDIAC CATHETERIZATION  2008 - 2019    stents x15 [address] [facility] SURGERY  3/13/13    oblation for irreg rythm    CARDIOVERSION  3/8/2012    CAROTID ENDARTERECTOMY  1997    CHOLECYSTECTOMY  4/1999    COLONOSCOPY  6/22/09, 4/2011,7/12/17    polyp removal    COLONOSCOPY N/A 7/21/2021    COLONOSCOPY POLYPECTOMY SNARE/COLD BIOPSY performed by Chalo Brush MD at [facility] [place] [facility]  2008 (2), 2009 (1)    x2    CORONARY ARTERY BYPASS GRAFT  8/1997    5 vessel    DIAGNOSTIC CARDIAC CATH LAB PROCEDURE      ENDOSCOPY, COLON, DIAGNOSTIC      EYE LID SURGERY Right 11/17/2021    EXCISION SCC RIGHT LOWER LATERAL EYELID WITH FROZEN SECTION performed by Tomas Luna MD at [address] [facility] Right 2/28/2022    [facility]  12/20/2013    pre-cancerous mole rt foot removed    LITHOTRIPSY  4/2002    PA OFFICE/OUTPT VISIT,PROCEDURE ONLY N/A 11/8/2018    TRANSESOPHAGEAL [facility]OCARDIOGRAM [facility]    PTCA  05/30/2018   [place]  2001, 2003    rt shoulder manipulation--frozen shoulder-01, Lt -03    SHOULDER SURGERY  2001 - 2003    manipulation - bilateral    SKIN BIOPSY      SKIN CANCER EXCISION  12/20/13     R foot    SKIN CANCER EXCISION  1/2016    squamous cell Lt  upper cheek    UPPER GASTROINTESTINAL ENDOSCOPY N/A 7/20/2021    EGD ESOPHAGOGASTRODUODENOSCOPY performed by Chalo Brush MD at [address] [address]  4/2002    VASCULAR SURGERY      carotids & cabg harvests       Home Medications:   No current facility-administered medications on file prior to encounter.      Current Outpatient Medications on File Prior to Encounter   Medication Sig Dispense Refill    Insulin Pen Needle (NOVOFINE PEN NEEDLE) 32G X 6 MM MISC 1 each by Does not apply route 6 times daily 600 each 3    aspirin 81 MG EC tablet Take 81 mg by mouth daily      HUMALOG KWIKPEN 100 UNIT/ML SOPN INJECT SUBCUTANEOUSLY 7 UNITS IN THE MORNING , 7 UNITS AT LUNCH AND 12 UNITS AT SUPPER PLUS SLIDING SCALE DURING MEALS AND AT BEDTIME (Patient taking differently: Inject 8 units before breakfast, 8 units before lunch, and 12 units before dinner plus group 1 scale) 15 mL 11    metoprolol succinate (TOPROL XL) 50 MG extended release tablet TAKE 1 TABLET BY MOUTH  DAILY 90 tablet 3    ranolazine (RANEXA) 1000 MG extended release tablet TAKE 1 TABLET BY MOUTH  TWICE DAILY NEW DOSE 180 tablet 3    tamsulosin (FLOMAX) 0.4 MG capsule TAKE 1 CAPSULE BY MOUTH  DAILY 90 capsule 2    bumetanide (BUMEX) 2 MG tablet Take 2 mg by mouth daily      NOVOFINE 32G X 6 MM MISC USE 1 NEW NEEDLE 6 TIMES  DAILY AND AS NEEDED 540 each 1    insulin detemir (LEVEMIR FLEXTOUCH) 100 UNIT/ML injection pen Inject 48 Units into the skin 2 times daily 90 mL 3    ferrous sulfate (IRON 325) 325 (65 Fe) MG tablet Take 325 mg by mouth every other day      ascorbic acid (VITAMIN C) 500 MG tablet Take 500 mg by mouth every other day      nitroGLYCERIN (NITROLINGUAL) 0.4 MG/SPRAY 0.4 mg spray USE 1 SPRAY ON OR UNDER THE TONGUE EVERY 5 MINUTES AS NEEDED FOR CHEST PAIN 4.9 g 6    dilTIAZem (CARDIZEM CD) 120 MG extended release capsule TAKE 1 CAPSULE BY MOUTH  DAILY 90 capsule 3    isosorbide mononitrate (IMDUR) 120 MG extended release tablet Take 1 tablet by mouth daily 30 tablet 3    blood glucose test strips (ACCU-CHEK GUIDE) strip USE TO CHECK 4 TIMES DAILY 300 strip 5    Accu-Chek FastClix Lancets MISC USE TO TEST BLOOD SUGARS 4 TIMES DAILY 102 each 63    atorvastatin (LIPITOR) 40 MG tablet TAKE 1 TABLET BY MOUTH AT  NIGHT 90 tablet 3    diphenhydrAMINE-APAP, sleep, (TYLENOL PM EXTRA STRENGTH)  MG tablet Take 1 tablet by mouth every evening as needed      hydrOXYzine (ATARAX) 10 MG tablet Take 10 mg by mouth daily as needed       fluocinonide (LIDEX) 0.05 % cream Apply 1 applicator topically as needed      BiPAP Machine MISC by Does not apply route      Multiple Vitamins-Minerals (THERAPEUTIC MULTIVITAMIN-MINERALS) tablet Take 1 tablet by mouth nightly      hydrALAZINE (APRESOLINE) 25 MG tablet Take 1 tablet by mouth every 8 hours 90 tablet 0    clopidogrel (PLAVIX) 75 MG tablet Take 75 mg by mouth daily morning      [DISCONTINUED] Lancet Devices (ACCU-CHEK SOFTCLIX LANCET DEV) MISC Use one with each blood check 400 each 99       Allergies:    Patient has no known allergies. Social History:    reports that he has never smoked. He has never used smokeless tobacco. He reports that he does not drink alcohol and does not use drugs. Family History:       Problem Relation Age of Onset    Cancer Mother     High Blood Pressure Mother     Stroke Father     High Blood Pressure Father     Heart Disease Brother     Cancer Brother         lung    Cancer Brother         melanoma       Diet:  Diet NPO Exceptions are: Sips of Water with Meds, Sips of Clear Liquids      PHYSICAL EXAM:  BP (!) 171/77   Pulse 60   Temp 98.2 °F (36.8 °C) (Oral)   Resp 18   Ht 5' 9\" (1.753 m)   Wt 213 lb (96.6 kg)   SpO2 96%   BMI 31.45 kg/m²   General appearance: No apparent distress, appears stated age and cooperative. HEENT: Normal cephalic, atraumatic without obvious deformity. Pupils equal, round, and reactive to light. Extra ocular muscles intact. Conjunctivae/corneas clear. Neck: Supple, with full range of motion. No jugular venous distention. Trachea midline. Respiratory:  Normal respiratory effort. Clear to auscultation, bilaterally without Rales/Wheezes/Rhonchi. Cardiovascular: Regular rate and rhythm with normal S1/S2 without murmurs, rubs or gallops. Abdomen: Soft, non-tender, non-distended with normal bowel sounds. Musculoskeletal:  No clubbing, cyanosis or edema bilaterally. Skin: Skin color, texture, turgor normal.  No rashes or lesions.   Neurologic:  Neurovascularly intact without any focal sensory/motor deficits. Cranial nerves: II-XII intact, grossly non-focal.  Psychiatric: Alert and oriented, thought content appropriate, normal insight  Capillary Refill: Brisk,< 3 seconds   Peripheral Pulses: +2 palpable, equal bilaterally     Labs:   Recent Labs     04/30/22  1112   WBC 11.3*   HGB 11.3*   HCT 35.9*        Recent Labs     04/30/22  1112      K 4.5   CL 98   CO2 25   BUN 45*   CREATININE 2.1*   CALCIUM 9.9     Recent Labs     04/30/22  1112   AST 19   ALT 20   BILIDIR <0.2   BILITOT 0.7   ALKPHOS 96     No results for input(s): INR in the last 72 hours. No results for input(s): Katerin Lowers in the last 72 hours. Urinalysis:    Lab Results   Component Value Date    NITRU NEGATIVE 04/06/2021    WBCUA NONE SEEN 04/06/2021    BACTERIA NONE SEEN 04/06/2021    RBCUA NONE SEEN 04/06/2021    BLOODU NEGATIVE 04/06/2021    SPECGRAV 1.008 04/06/2021    GLUCOSEU neg 03/29/2021       Radiology:   CT ABDOMEN PELVIS WO CONTRAST Additional Contrast? None   Final Result   Mechanical small bowel obstruction secondary to a strangulated umbilical hernia            **This report has been created using voice recognition software. It may contain minor errors which are inherent in voice recognition technology. **      Final report electronically signed by Dr. Juan Jose Issa on 4/30/2022 12:03 PM        CT ABDOMEN PELVIS WO CONTRAST Additional Contrast? None    Result Date: 4/30/2022  PROCEDURE: CT ABDOMEN PELVIS WO CONTRAST CLINICAL INFORMATION: possible incarcerated umbilical hernia . TECHNIQUE: 2-D multiplanar noncontrast CT abdomen pelvis All CT scans at this facility use dose modulation, iterative reconstruction, and/or weight-based dosing when appropriate to reduce radiation dose to as low as reasonably achievable.  COMPARISON: 12/17/2018 FINDINGS: Lung bases Lung bases are clear undersurface the heart shows coronary artery calcifications Abdomen pelvis solid organs and hollow viscera evaluation limited without contrast. There are multiple dilated air and fluid-filled loops of small bowel. There is gas and stool in the colon. Colon is smaller in size than the dilated small bowel loops. This is secondary to a strangulated umbilical hernia. Transition point is at the umbilical hernia. There is no free air seen. Liver, spleen, and pancreas are within normal limits. Status post cholecystectomy. The adrenals are normal. Decrease in size Bosniak 2F cyst left kidney there are 2 adjacent remaining cysts larger of the 2 is 4 cm at the interpolar portion. Aorta is heavily atherosclerotic mesenteric vessels are atherosclerotic. No adenopathy seen. Urinary bladder is distended. Bilateral fat-containing inguinal hernias. No suspicious bone lesions. Mechanical small bowel obstruction secondary to a strangulated umbilical hernia **This report has been created using voice recognition software. It may contain minor errors which are inherent in voice recognition technology. ** Final report electronically signed by Dr. Jeffery White on 4/30/2022 12:03 PM        EKG: NSR, wide QRS. Thank you for allowing us to participate in this patient's care. Please do not hesitate to call us directly with further questions.      Electronically signed by Francie Martinez PA-C on 4/30/2022 at 6:35 PM

## 2022-04-30 NOTE — ED NOTES
Dr. Nella Bhandari in to speak with patient and wife at this time.      Larisa Schaefer RN  04/30/22 3032

## 2022-04-30 NOTE — PROGRESS NOTES
1400: Pt arrives to pacu, awakens to verbal stimuli and quickly drifts back to sleep. Placed pt on 4L NC, respirations easy and unlabored. 1410: Pt awakens to verbal stimuli, denies pain or nausea at this time. 1415: Spoke with wife at this time, stated she was grabbing his cpap from the car and will be bringing it to his room. Pt tolerating NC with no difficulty at this time  1425: Pt alert and talking, continues to deny pain   1430: Provided report to [place] on . Pt meets criteria for discharge from pacu, awaiting transport  1438: Pt transported to  in stable condition.

## 2022-04-30 NOTE — OP NOTE
[address] [address]  Operative Report    PATIENT NAME: Amita [address] [place] [address] RECORD NO. [id number]  SURGEON: Supa Puente MD   Primary Care Physician: Willi Bhatti MD  Date: 4/30/2022, 1:56 PM     PROCEDURE PERFORMED: Exploratory laparotomy lysis of small bowel adhesions with reduction of incarcerated loop of small bowel with primary repair of hernia defect  PREOPERATIVE DIAGNOSIS:   Active Hospital Problems    Diagnosis Date Noted    Strangulated umbilical hernia [E53.4] 04/30/2022     Priority: Medium   Regulated incarcerated ventral hernia  POSTOPERATIVE DIAGNOSIS: Same  SURGEON:  Supa Puente MD   ANESTHESIA:  General endotracheal anesthesia and local  ANESTHESIA:  20 OF 0.5% Marcaine and 1% xylocaine in equal parts  ESTIMATED BLOOD LOSS:  10  ml  SPECIMEN: none  COMPLICATIONS:  None; patient tolerated the procedure well. DRAINS: none  DISPOSITION: Recovery Room  CONDITION: stable    Indications: See history and physical examination    Procedure: Patient was brought emergently to the operating room from the emergency department. He had exquisitely tender incarcerated incisional hernia with CT evidence of small bowel incarceration, strangulation and obstruction. He was given 2 g of Mefoxin intravenously. He was placed supine on the operating table with pneumatic sequential compression devices on the lower extremities. He was administered general anesthetic endotracheal to patient. Straight catheterization of the bladder was performed. The abdomen was clipped prepped and draped. Midline incision was made around the incarcerated hernia at the mid abdomen. There was as the hernia sac entered fluid which came out it was slightly blood-tinged. That point the bowel was spontaneously reduced. The fascia was opened. Hernia sac and some omentum was excised.   Exploration was performed there were some adhesions which were lysed up to the abdominal wall where the small bowel had been incarcerated these were taken down sharply with Metzenbaum scissors. Bowel was brought up the loop of bowel that had been incarcerated was examined. Pinked up nicely and seemed viable. There was good pulses in the mesentery and peristalsis in the bowel. This was reduced back into the abdominal cavity. Midline fascia was then closed with interrupted Ethibond suture figure-of-eight and simple. There was approximate 8 to 10 cm fascial incision which was closed the hernia was closed primarily. Irrigation of subcutaneous tissues was performed hemostasis achieved with electrocautery. Dermis was closed with interrupted 2-0 and 3-0 Vicryl suture. Skin was closed with running subcuticular 4-0 Monocryl suture. Skin glue was applied. Patient was placed in an abdominal binder. He was spontaneously breathing and transported to the recovery area.

## 2022-04-30 NOTE — ED PROVIDER NOTES
[facility] [address] [address] EMERGENCY DEPT    EMERGENCY MEDICINE     Pt Name: Jennifer Sharp  MRN: [id number]  [place] 1945  Date of evaluation: 4/30/2022  Provider: Sarah Marks PA-C    CHIEF COMPLAINT       Chief Complaint   Patient presents with    Abdominal Pain    Hernia       HISTORY OF PRESENT ILLNESS    Jennifer Sharp is a pleasant 68 y.o. male who presents to the emergency department for abdominal pain. Patient states starting last night after supper he started developing abdominal discomfort. He has history of umbilical hernia for the past 15 years and has had no issues with it. Him and his wife noticed the umbilical hernia larger than usual and had slight discoloration to it last night. He states the pain is around 6/10 dull ache. He states the pain fluctuates from a 2-6/10 dull ache. It is nonradiating. He is currently on Plavix and aspirin for chronic A. fib. He has no complaints of nausea, vomiting, diarrhea. His last bowel movement was yesterday. He last ate or drank chocolate milk around 3-4 a.m. He only had a small sip of water with his blood pressure medications this morning otherwise. No complaints of chest pain, shortness of breath. Triage notes and Nursing notes were reviewed by myself. Any discrepancies are addressed above.     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    BPH (benign prostatic hypertrophy)     CAD (coronary artery disease) 1997    Carotid artery disease ([address] 1995    right    Carotid disease, bilateral ([address]     Cerebral artery occlusion with cerebral infarction ([address]     CHF (congestive heart failure) (HCC)     Chronic kidney disease     GERD (gastroesophageal reflux disease)     GI bleed 07/2021    History of blood transfusion 2021    GI bleed    History of blood transfusion 1997    with heart surgery    Hyperlipidemia 1996    Hypertension     Lumbar disc disease 2001    MI (myocardial infarction) ([address] 1997    Nephrolithiasis 2002    NIDDM (non-insulin dependent diabetes mellitus) 1996    diagnosed 1996    RICCARDO (obstructive sleep apnea) 2005    cpap    PVC's (premature ventricular contractions)     Renal artery stenosis ([address] 2007    left    Renal insufficiency     S/P CABG (status post coronary artery bypass graft) 1997    5 vessels    Skin cancer     Squamous cell carcinoma     Stenosis of right subclavian artery ([address]     TIA (transient ischemic attack)     Wears partial dentures     upper and lower       SURGICAL HISTORY       Past Surgical History:   Procedure Laterality Date    ABLATION OF DYSRHYTHMIC FOCUS  3/13/13    CARDIAC CATHETERIZATION  2008 - 2019    stents x15   [address]  3/13/13    oblation for irreg rythm    CARDIOVERSION  3/8/2012    CAROTID ENDARTERECTOMY  1997    CHOLECYSTECTOMY  4/1999    COLONOSCOPY  6/22/09, 4/2011,7/12/17    polyp removal    COLONOSCOPY N/A 7/21/2021    COLONOSCOPY POLYPECTOMY SNARE/COLD BIOPSY performed by Little Marie MD at [facility]  2008 (2), 2009 (1)    x2    CORONARY ARTERY BYPASS GRAFT  8/1997    5 vessel    DIAGNOSTIC CARDIAC CATH LAB PROCEDURE      ENDOSCOPY, COLON, DIAGNOSTIC      EYE LID SURGERY Right 11/17/2021    EXCISION SCC RIGHT LOWER LATERAL EYELID WITH FROZEN SECTION performed by Taylor Ruiz MD at [facility] Right 2/28/2022    EXCISION SCC RIGHT ZYGOMA performed by Taylor Ruiz MD at [address] [facility]  12/20/2013    pre-cancerous mole rt foot removed    LITHOTRIPSY  4/2002    OH OFFICE/OUTPT VISIT,PROCEDURE ONLY N/A 11/8/2018    TRANSESOPHAGEAL ECHOCARDIOGRAM performed by Josselyn Cedeno MD at [facility]    PTCA  05/30/2018   [facility] SURGERY  2001, 2003    rt shoulder manipulation--frozen shoulder-01, [place] -03    SHOULDER SURGERY  2001 - 2003    manipulation - bilateral    SKIN BIOPSY      SKIN CANCER EXCISION  12/20/13     R foot    SKIN CANCER EXCISION  1/2016    squamous cell Lt  upper cheek    UPPER GASTROINTESTINAL ENDOSCOPY N/A 7/20/2021    EGD ESOPHAGOGASTRODUODENOSCOPY performed by Robert Galindo MD at [address] [place] [address]  4/2002    VASCULAR SURGERY      carotids & cabg harvests       CURRENT MEDICATIONS       Current Discharge Medication List      CONTINUE these medications which have NOT CHANGED    Details   !!  Insulin Pen Needle (NOVOFINE PEN NEEDLE) 32G X 6 MM MISC 1 each by Does not apply route 6 times daily  Qty: 600 each, Refills: 3      aspirin 81 MG EC tablet Take 81 mg by mouth daily      HUMALOG KWIKPEN 100 UNIT/ML SOPN INJECT SUBCUTANEOUSLY 7 UNITS IN THE MORNING , 7 UNITS AT LUNCH AND 12 UNITS AT SUPPER PLUS SLIDING SCALE DURING MEALS AND AT BEDTIME  Qty: 15 mL, Refills: 11    Comments: Requesting 1 year supply      metoprolol succinate (TOPROL XL) 50 MG extended release tablet TAKE 1 TABLET BY MOUTH  DAILY  Qty: 90 tablet, Refills: 3    Comments: Requesting 1 year supply  Associated Diagnoses: Coronary artery disease involving native coronary artery      ranolazine (RANEXA) 1000 MG extended release tablet TAKE 1 TABLET BY MOUTH  TWICE DAILY NEW DOSE  Qty: 180 tablet, Refills: 3    Comments: Requesting 1 year supply      tamsulosin (FLOMAX) 0.4 MG capsule TAKE 1 CAPSULE BY MOUTH  DAILY  Qty: 90 capsule, Refills: 2      bumetanide (BUMEX) 2 MG tablet Take 2 mg by mouth daily      !! NOVOFINE 32G X 6 MM MISC USE 1 NEW NEEDLE 6 TIMES  DAILY AND AS NEEDED  Qty: 540 each, Refills: 1      insulin detemir (LEVEMIR FLEXTOUCH) 100 UNIT/ML injection pen Inject 48 Units into the skin 2 times daily  Qty: 90 mL, Refills: 3    Associated Diagnoses: Type 2 diabetes mellitus with microalbuminuria, with long-term current use of insulin (HCC)      ferrous sulfate (IRON 325) 325 (65 Fe) MG tablet Take 325 mg by mouth every other day      ascorbic acid (VITAMIN C) 500 MG tablet Take 500 mg by mouth every other day      nitroGLYCERIN (NITROLINGUAL) 0.4 MG/SPRAY 0.4 mg spray USE 1 SPRAY ON OR UNDER THE TONGUE EVERY 5 MINUTES AS NEEDED FOR CHEST PAIN  Qty: 4.9 g, Refills: 6    Associated Diagnoses: CAD S/P percutaneous coronary angioplasty      dilTIAZem (CARDIZEM CD) 120 MG extended release capsule TAKE 1 CAPSULE BY MOUTH  DAILY  Qty: 90 capsule, Refills: 3      isosorbide mononitrate (IMDUR) 120 MG extended release tablet Take 1 tablet by mouth daily  Qty: 30 tablet, Refills: 3      blood glucose test strips (ACCU-CHEK GUIDE) strip USE TO CHECK 4 TIMES DAILY  Qty: 300 strip, Refills: 5      Accu-Chek FastClix Lancets MISC USE TO TEST BLOOD SUGARS 4 TIMES DAILY  Qty: 102 each, Refills: 63      atorvastatin (LIPITOR) 40 MG tablet TAKE 1 TABLET BY MOUTH AT  NIGHT  Qty: 90 tablet, Refills: 3    Comments: Requesting 1 year supply      diphenhydrAMINE-APAP, sleep, (TYLENOL PM EXTRA STRENGTH)  MG tablet Take 1 tablet by mouth every evening as needed      hydrOXYzine (ATARAX) 10 MG tablet Take 10 mg by mouth daily as needed       fluocinonide (LIDEX) 0.05 % cream Apply 1 applicator topically as needed      BiPAP Machine MISC by Does not apply route      Multiple Vitamins-Minerals (THERAPEUTIC MULTIVITAMIN-MINERALS) tablet Take 1 tablet by mouth nightly      hydrALAZINE (APRESOLINE) 25 MG tablet Take 1 tablet by mouth every 8 hours  Qty: 90 tablet, Refills: 0      clopidogrel (PLAVIX) 75 MG tablet Take 75 mg by mouth daily morning       !! - Potential duplicate medications found. Please discuss with provider. ALLERGIES     Patient has no known allergies.     FAMILY HISTORY       Family History   Problem Relation Age of Onset    Cancer Mother     High Blood Pressure Mother     Stroke Father     High Blood Pressure Father     Heart Disease Brother     Cancer Brother         lung    Cancer Brother         melanoma        SOCIAL HISTORY       Social History     Socioeconomic History    Marital status:      Spouse name: Nida Magdaleno Number of children: 2    Years of education: None    Highest education level: None   Occupational History    None   Tobacco Use    Smoking status: Never Smoker    Smokeless tobacco: Never Used   Vaping Use    Vaping Use: Never used   Substance and Sexual Activity    Alcohol use: No     Alcohol/week: 0.0 standard drinks    Drug use: No    Sexual activity: Yes     Partners: Female   Other Topics Concern    None   Social History Narrative    None     Social Determinants of Health     Financial Resource Strain: Low Risk     Difficulty of Paying Living Expenses: Not hard at all   Food Insecurity: No Food Insecurity    Worried About Running Out of Food in the Last Year: Never true    Rosa of Food in the Last Year: Never true   Transportation Needs:     Lack of Transportation (Medical): Not on file    Lack of Transportation (Non-Medical):  Not on file   Physical Activity: Inactive    Days of Exercise per Week: 0 days    Minutes of Exercise per Session: 0 min   Stress:     Feeling of Stress : Not on file   Social Connections:     Frequency of Communication with Friends and Family: Not on file    Frequency of Social Gatherings with Friends and Family: Not on file    Attends Confucianist Services: Not on file    Active Member of Clubs or Organizations: Not on file    Attends Club or Organization Meetings: Not on file    Marital Status: Not on file   Intimate Partner Violence:     Fear of Current or Ex-Partner: Not on file    Emotionally Abused: Not on file    Physically Abused: Not on file    Sexually Abused: Not on file   Housing Stability:     Unable to Pay for Housing in the Last Year: Not on file    Number of Places Lived in the Last Year: Not on file    Unstable Housing in the Last Year: Not on file       REVIEW OF SYSTEMS     Review of Systems   Constitutional: Negative for chills and fever. HENT: Negative for congestion, ear pain and sore throat. Eyes: Negative. Respiratory: Negative for cough and shortness of breath. Cardiovascular: Negative for chest pain and palpitations. Gastrointestinal: Positive for abdominal pain. Negative for abdominal distention, constipation, diarrhea, nausea and vomiting. Endocrine: Negative. Genitourinary: Negative for dysuria and frequency. Musculoskeletal: Negative for myalgias and neck pain. Skin: Negative for rash. Neurological: Negative for dizziness, light-headedness and headaches. Hematological: Negative. Psychiatric/Behavioral: Negative. All other systems reviewed and are negative. Except as noted above the remainder of the review of systems was reviewed and is. SCREENINGS        [place] Coma Scale  Eye Opening: Spontaneous  Best Verbal Response: Oriented  Best Motor Response: Obeys commands  Luis M Coma Scale Score: 15                 PHYSICAL EXAM     INITIAL VITALS:  height is 5' 9\" (1.753 m) and weight is 213 lb (96.6 kg). His temporal temperature is 97.1 °F (36.2 °C). His blood pressure is 150/66 (abnormal) and his pulse is 56. His respiration is 18 and oxygen saturation is 96%. Physical Exam  Vitals and nursing note reviewed. Exam conducted with a chaperone present. Constitutional:       General: He is not in acute distress. Appearance: Normal appearance. He is normal weight. He is not ill-appearing, toxic-appearing or diaphoretic. HENT:      Head: Normocephalic and atraumatic.       Right Ear: External ear normal.      Left Ear: External ear normal.      Nose: Nose normal.      Mouth/Throat:      Mouth: Mucous membranes are moist.   Eyes:      Extraocular Movements: Extraocular movements intact. Pupils: Pupils are equal, round, and reactive to light. Cardiovascular:      Rate and Rhythm: Normal rate and regular rhythm. Pulses: Normal pulses. Heart sounds: Normal heart sounds. No murmur heard. Pulmonary:      Effort: Pulmonary effort is normal. No respiratory distress. Breath sounds: Normal breath sounds. Abdominal:      General: Bowel sounds are normal. There is no distension. Palpations: Abdomen is soft. Tenderness: There is abdominal tenderness in the right lower quadrant and periumbilical area. There is no right CVA tenderness, left CVA tenderness, guarding or rebound. Negative signs include Salinas's sign, Rovsing's sign, McBurney's sign, psoas sign and obturator sign. Hernia: A hernia is present. Hernia is present in the umbilical area. Musculoskeletal:         General: Normal range of motion. Cervical back: Normal range of motion and neck supple. Skin:     General: Skin is warm and dry. Capillary Refill: Capillary refill takes less than 2 seconds. Neurological:      Mental Status: He is alert and oriented to person, place, and time. GCS: GCS eye subscore is 4. GCS verbal subscore is 5. GCS motor subscore is 6. Psychiatric:         Mood and Affect: Mood normal.         Behavior: Behavior normal.         Thought Content:  Thought content normal.         DIFFERENTIAL DIAGNOSIS:   Differential diagnoses are discussed    DIAGNOSTIC RESULTS     EKG:(none if blank)  All EKGs are interpreted by the Emergency Department Physician who either signs or Co-signs this chart in the absence of a cardiologist.     0 Result Notes    Component Ref Range & Units 4/30/22 1217 2/9/22 1140 11/11/21 0658 8/23/21 1751 7/19/21 1157 6/17/21 1036 4/8/21 1056   Ventricular Rate BPM 59 P  67  58  57  73  61  63    QRS Duration ms 150 P  130  142  130  146  138  128    Q-T Interval ms 512 P  456  480  476  452  486  444    QTc Calculation ([place]) ms 506 P  481  471  463  497  489  454    R Axis degrees 83 P  48  44  58  63  59  91    T Axis degrees 46 P  41  42  43  29  28  47    Resulting Agency  [address]             Narrative & Impression    Wide QRS rhythm  Right bundle branch block  Inferior infarct , age undetermined  Abnormal ECG  When compared with ECG of 09-FEB-2022 11:40,  Previous ECG has undetermined rhythm, needs review                 RADIOLOGY: (none if blank)   I directly visualized the following images and reviewed the radiologist interpretations. Interpretation per the Radiologist below, if available at the time of this note:  CT ABDOMEN PELVIS WO CONTRAST Additional Contrast? None   Final Result   Mechanical small bowel obstruction secondary to a strangulated umbilical hernia            **This report has been created using voice recognition software. It may contain minor errors which are inherent in voice recognition technology. **      Final report electronically signed by Dr. Dave Leon on 4/30/2022 12:03 PM          LABS:   Labs Reviewed   CBC WITH AUTO DIFFERENTIAL - Abnormal; Notable for the following components:       Result Value    WBC 11.3 (*)     RBC 3.43 (*)     Hemoglobin 11.3 (*)     Hematocrit 35.9 (*)     .7 (*)     MCHC 31.5 (*)     RDW-SD 51.4 (*)     Segs Absolute 9.2 (*)     Lymphocytes Absolute 0.7 (*)     All other components within normal limits   BASIC METABOLIC PANEL W/ REFLEX TO MG FOR LOW K - Abnormal; Notable for the following components:    Glucose 160 (*)     BUN 45 (*)     CREATININE 2.1 (*)     All other components within normal limits   TROPONIN - Abnormal; Notable for the following components:    Troponin T 0.011 (*)     All other components within normal limits   GLOMERULAR FILTRATION RATE, ESTIMATED - Abnormal; Notable for the following components:    Est, Glom Filt Rate 31 (*)     All other components within normal limits   HEPATIC FUNCTION PANEL   LIPASE   BRAIN NATRIURETIC PEPTIDE   ANION GAP   OSMOLALITY   URINALYSIS WITH REFLEX TO CULTURE       All other labs were within normal range or not returned as of this dictation. Please note, any cultures that may have been sent were not resulted at the time of this patient visit. EMERGENCY DEPARTMENT COURSE:   Vitals:    Vitals:    04/30/22 1415 04/30/22 1420 04/30/22 1425 04/30/22 1430   BP: (!) 150/69 (!) 157/66 (!) 147/65 (!) 150/66   Pulse: 57 58 57 56   Resp: 18 14 16 18   Temp:       TempSrc:       SpO2: 94% 95% 95% 96%   Weight:       Height:         2:27 PM EDT: The patient was seen and evaluated. PROCEDURES: (None if blank)  Procedures         ED Medications administered this visit:    Medications   sodium chloride flush 0.9 % injection 5-40 mL (has no administration in time range)   sodium chloride flush 0.9 % injection 5-40 mL (has no administration in time range)   0.9 % sodium chloride infusion ( IntraVENous Anesthesia Volume Adjustment 4/30/22 1401)   morphine (PF) injection 2 mg (has no administration in time range)   fentaNYL (SUBLIMAZE) injection 50 mcg (has no administration in time range)   hydrALAZINE (APRESOLINE) injection 10 mg (has no administration in time range)   cefOXitin (MEFOXIN) 1,000 mg in sterile water 10 mL IV syringe (2,000 mg IntraVENous New Bag 4/30/22 1312)       MDM:  Patient is 79-year-old male who came to the ED to be evaluated for abdominal pain. Appropriate testing/imaging of CBC, BMP, lipase, hepatic, troponin, BNP, EKG was done based on the patient's initial complaints, history, and physical exam.   Results of CBC, BMP, lipase, hepatic, troponin, BNP, EKG demonstrated no emergent findings. Pertinent results were none. With concerns for strangulated umbilical hernia recommend CT abdomen/abdomen/pelvis without contrast for further evaluation.   CT demonstrated mechanical small bowel obstruction secondary to strangulated umbilical hernia. General surgery was consulted Dr. Xochitl Cancino who came down and evaluated patient. General surgery requesting admission to hospital for surgical intervention. Patient remain n.p.o. and was stable until admission to hospital.     CRITICAL CARE:   None    CONSULTS:  General surgery    PROCEDURES:  None    FINAL IMPRESSION      1. Small bowel obstruction ([address]    2. Strangulated umbilical hernia          DISPOSITION/PLAN   Admission to general surgery    PATIENT REFERRED TO:  No follow-up provider specified.     DISCHARGEMEDICATIONS:  Current Discharge Medication List               (Please note that portions of this note were completed with a voice recognition program.  Efforts were made to edit the dictations but occasionallywords are mis-transcribed.)      Ottoniel Mo PA-C(electronically signed)  Physician Associate, Emergency Department       Ottoniel Mo PA-C  04/30/22 8103

## 2022-04-30 NOTE — H&P
[address] [facility] History & Physical - Llana Schilder, MD  Pt Name: Ricardo Couch  MRN: [id number]  YOB: 1945  Date of evaluation: 4/30/2022  Primary Care Physician: Pavel Machuca MD  Patient evaluated at the request of  MARIA ANTONIA Olmstead  Reason for evaluation: Incarcerated strangulated ventral hernia  IMPRESSIONS   1. Incarcerated strangulated ventral hernia  2. Insulin-dependent diabetes mellitus  3. Coronary artery disease  4. Atrial fibrillation  5. Carotid disease  6. History of coronary stents  7. Diastolic congestive heart failure  8. Chronic renal insufficiency  9. Sleep apnea  10.  has a past medical history of Adenomatous colon polyp, Angina at rest [facility]), Arthritis, Atrial fibrillation ([address] 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[address] BPH (benign prostatic hypertrophy), CAD (coronary artery disease), Carotid artery disease ([address] Carotid disease, bilateral ([address] Cerebral artery occlusion with cerebral infarction ([address] CHF (congestive heart failure) ([address] Chronic kidney disease, GERD (gastroesophageal reflux disease), GI bleed, History of blood transfusion, History of blood transfusion, Hyperlipidemia, Hypertension, Lumbar disc disease, MI (myocardial infarction) ([address] Nephrolithiasis, NIDDM (non-insulin dependent diabetes mellitus), RICCARDO (obstructive sleep apnea), PVC's (premature ventricular contractions), Renal artery stenosis ([address] Renal insufficiency, S/P CABG (status post coronary artery bypass graft), Skin cancer, Squamous cell carcinoma, Stenosis of right subclavian artery ([address] TIA (transient ischemic attack), and Wears partial dentures. PLANS   1. Patient needs emergent operative intervention for his incarcerated strangulated hernia with small bowel obstruction  2. Explained risks to patient and wife. Techniques of procedure explained as well. Hernia is not reducible. Worrisome for bowel ischemia.   Risk include but not limited to need for bowel resection perioperative cardiac, pulmonary complications and possibility of death explained. Potential need for small bowel resection. All questions answered. Agreed to proceed. 3. Admit to hospital postoperatively to telemetry bed. Hospitalist consultation to manage multiple medical issues including diabetes hypertension and CHF  SUBJECTIVE   History of Chief Complaint:    Nay Morales is a 68 y. o.male who presents with abdominal pain. She presented with abdominal pain to the emergency department. He is nauseated but had not vomited. He has a chronic longstanding ventral hernia. Due to multiple medical comorbidities he had no symptoms and apparently was not recommended for repair. He has an exquisitely painful incarcerated mass at the mid abdomen consistent with incarcerated hernia. CT scan obtained by the emergency department reveals an incarcerated loop of small bowel in the mid abdomen with resulting small bowel obstruction. Patient with history of multiple medical comorbidities including peripheral vascular disease coronary artery disease carotid occlusive disease as well as melts enteric vascular disease evident on CT. He is a diabetic. He has chronic atrial fibrillation takes Plavix and aspirin 81 mg. Last bowel movement 24 hours ago.   Past Medical History   has a past medical history of Adenomatous colon polyp, Angina at rest [facility]), Arthritis, Atrial fibrillation ([address] 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[address] BPH (benign prostatic hypertrophy), CAD (coronary artery disease), Carotid artery disease ([address] Carotid disease, bilateral ([address] Cerebral artery occlusion with cerebral infarction ([address] CHF (congestive heart failure) ([address] Chronic kidney disease, GERD (gastroesophageal reflux disease), GI bleed, History of blood transfusion, History of blood transfusion, Hyperlipidemia, Hypertension, Lumbar disc disease, MI (myocardial infarction) ([address] Nephrolithiasis, NIDDM (non-insulin dependent diabetes mellitus), RICCARDO (obstructive sleep apnea), PVC's (premature ventricular contractions), Renal artery stenosis (HCC), Renal insufficiency, S/P CABG (status post coronary artery bypass graft), Skin cancer, Squamous cell carcinoma, Stenosis of right subclavian artery ([address] TIA (transient ischemic attack), and Wears partial dentures. Past Surgical History   has a past surgical history that includes Lithotripsy (4/2002); Ureter stent placement (4/2002); Coronary angioplasty with stent (2008 (2), 2009 (1)); Carotid endarterectomy (1997); Cholecystectomy (4/1999); shoulder surgery (2001, 2003); Cardioversion (3/8/2012); Coronary artery bypass graft (8/1997); ablation of dysrhythmic focus (3/13/13); Skin cancer excision (12/20/13 ); Foot surgery (12/20/2013); Skin cancer excision (1/2016); skin biopsy; vascular surgery; Diagnostic Cardiac Cath Lab Procedure; Percutaneous Transluminal Coronary Angio (05/30/2018); pr office/outpt visit,procedure only (N/A, 11/8/2018); Upper gastrointestinal endoscopy (N/A, 7/20/2021); Endoscopy, colon, diagnostic; Eye Lid Surgery (Right, 11/17/2021); Cardiac surgery (1997); Cardiac surgery (3/13/13); Cardiac catheterization (2008 - 2019); Colonoscopy (6/22/09, 4/2011,7/12/17); Colonoscopy (N/A, 7/21/2021); shoulder surgery (2001 - 2003); and Facial Surgery (Right, 2/28/2022). Medications  Prior to Admission medications    Medication Sig Start Date End Date Taking?  Authorizing Provider   Insulin Pen Needle (NOVOFINE PEN NEEDLE) 32G X 6 MM MISC 1 each by Does not apply route 6 times daily 4/1/22   Jt Pratt MD   aspirin 81 MG EC tablet Take 81 mg by mouth daily    Historical Provider, MD   HUMALOG KWIKPEN 100 UNIT/ML SOPN INJECT SUBCUTANEOUSLY 7 UNITS IN THE MORNING , 7 UNITS AT LUNCH AND 12 UNITS AT SUPPER PLUS SLIDING SCALE DURING MEALS AND AT BEDTIME  Patient taking differently: Inject 8 units before breakfast, 8 units before lunch, and 12 units before dinner plus group 1 scale 2/7/22   Jt Pratt MD   metoprolol succinate (TOPROL XL) 50 MG extended release tablet TAKE 1 TABLET BY MOUTH  DAILY 2/4/22   Wanda Turk MD   ranolazine (RANEXA) 1000 MG extended release tablet TAKE 1 TABLET BY MOUTH  TWICE DAILY NEW DOSE 1/25/22   Wanda Turk MD   tamsulosin (FLOMAX) 0.4 MG capsule TAKE 1 CAPSULE BY MOUTH  DAILY 1/10/22   Lizeth Gaston APRN - CNP   bumetanide (BUMEX) 2 MG tablet Take 2 mg by mouth daily    Historical Provider, MD   NOVOFINE 32G X 6 MM MISC USE 1 NEW NEEDLE 6 TIMES  DAILY AND AS NEEDED 9/9/21   Storm Councilman, MD   insulin detemir (LEVEMIR FLEXTOUCH) 100 UNIT/ML injection pen Inject 48 Units into the skin 2 times daily 8/27/21   Storm Councilman, MD   ferrous sulfate (IRON 325) 325 (65 Fe) MG tablet Take 325 mg by mouth every other day    Historical Provider, MD   ascorbic acid (VITAMIN C) 500 MG tablet Take 500 mg by mouth every other day    Historical Provider, MD   nitroGLYCERIN (NITROLINGUAL) 0.4 MG/SPRAY 0.4 mg spray USE 1 SPRAY ON OR UNDER THE TONGUE EVERY 5 MINUTES AS NEEDED FOR CHEST PAIN 7/28/21   Wanda Turk MD   dilTIAZem (CARDIZEM CD) 120 MG extended release capsule TAKE 1 CAPSULE BY MOUTH  DAILY 7/28/21   Wanda Turk MD   isosorbide mononitrate (IMDUR) 120 MG extended release tablet Take 1 tablet by mouth daily 7/22/21   Diana Montague APRN - CNP   blood glucose test strips (ACCU-CHEK GUIDE) strip USE TO CHECK 4 TIMES DAILY 7/6/21   Storm Councilman, MD   Accu-Chek FastClix Lancets MISC USE TO TEST BLOOD SUGARS 4 TIMES DAILY 7/6/21   Storm Councilman, MD   atorvastatin (LIPITOR) 40 MG tablet TAKE 1 TABLET BY MOUTH AT  NIGHT 6/29/21   Wanda Turk MD   diphenhydrAMINE-APAP, sleep, (TYLENOL PM EXTRA STRENGTH)  MG tablet Take 1 tablet by mouth every evening as needed    Historical Provider, MD   hydrOXYzine (ATARAX) 10 MG tablet Take 10 mg by mouth daily as needed  3/13/21   Historical Provider, MD   fluocinonide (LIDEX) 0.05 % cream Apply 1 applicator topically as needed 20   Historical Provider, MD   BiPAP Machine MISC by Does not apply route    Historical Provider, MD   Multiple Vitamins-Minerals (THERAPEUTIC MULTIVITAMIN-MINERALS) tablet Take 1 tablet by mouth nightly    Historical Provider, MD   hydrALAZINE (APRESOLINE) 25 MG tablet Take 1 tablet by mouth every 8 hours 18   Isma Felix MD   clopidogrel (PLAVIX) 75 MG tablet Take 75 mg by mouth daily morning    Historical Provider, MD   Lancet Devices ([address]) MISC Use one with each blood check 14  Adeline Kaur MD    Scheduled Meds:   cefOXitin  1,000 mg IntraVENous Once     Continuous Infusions:  PRN Meds:. Allergies  has No Known Allergies. Family History  family history includes Cancer in his brother, brother, and mother; Heart Disease in his brother; High Blood Pressure in his father and mother; Stroke in his father. Social History   reports that he has never smoked. He has never used smokeless tobacco. He reports that he does not drink alcohol and does not use drugs. Review of Systems:  General Denies any fever or chills  HEENT Denies any diplopia, tinnitus or vertigo  Resp apnea uses CPAP  Denies any shortness of breath, cough or wheezing  Cardiac Denies any chest pain, palpitations, claudication or edema  GI Denies any melena, hematochezia, hematemesis or pyrosis   Denies any frequency, urgency, hesitancy or incontinence  Heme bruises fairly easily  Endocrine no polyuria polydipsia  Neuro Denies any focal motor or sensory deficits  OBJECTIVE   CURRENT VITALS:  height is 5' 9\" (1.753 m) and weight is 213 lb (96.6 kg). His oral temperature is 98.1 °F (36.7 °C). His blood pressure is 170/101 (abnormal) and his pulse is 63. His respiration is 18 and oxygen saturation is 98%. Body mass index is 31.45 kg/m².   Temperature Range (24h):Temp: 98.1 °F (36.7 °C) Temp  Av.1 °F (36.7 °C)  Min: 98.1 °F (36.7 °C)  Max: 98.1 °F (36.7 °C)  BP Range (02S): Systolic (24hrs), Av , Min:170 , RCV:117     Diastolic (40LLO), VMJ:878, Min:101, Max:101    Pulse Range (24h): Pulse  Av  Min: 63  Max: 63  Respiration Range (24h): Resp  Av  Min: 18  Max: 18  Current Pulse Ox (24h):  SpO2: 98 %  Pulse Ox Range (24h):  SpO2  Av %  Min: 98 %  Max: 98 %  Oxygen Amount and Delivery:    CONSTITUTIONAL: Awake alert appears uncomfortable  SKIN: Skin color, texture, turgor normal. No rashes or lesions. LYMPH: no cervical nodes, no inguinal nodes  HEENT: Head is normocephalic, atraumatic. EOMI, PERRLA. NECK: supple, symmetrical, trachea midline. CHEST/LUNGS: chest symmetric with normal A/P diameter, normal respiratory rate and rhythm, lungs clear to auscultation without wheezes, rales or rhonchi. No accessory muscle use. CARDIOVASCULAR: Normal rate   ABDOMEN: Protuberant incarcerated mass mid abdomen not reducible exquisitely tender, periumbilical   RECTAL: deferred, not clinically indicated  NEUROLOGIC: No focal deficit moves all extremities oriented x3. EXTREMITIES: no cyanosis, no clubbing and no edema. LABS     Recent Labs     22  1112   WBC 11.3*   HGB 11.3*   HCT 35.9*         K 4.5   CL 98   CO2 25   BUN 45*   CREATININE 2.1*   CALCIUM 9.9   AST 19   ALT 20   BILITOT 0.7   BILIDIR <0.2   LIPASE 32.4     RADIOLOGY     CT ABDOMEN PELVIS WO CONTRAST Additional Contrast? None   Final Result   Mechanical small bowel obstruction secondary to a strangulated umbilical hernia            **This report has been created using voice recognition software. It may contain minor errors which are inherent in voice recognition technology. **      Final report electronically signed by Dr. Ollie Padgett on 2022 12:03 PM      Imaging reviewed in detail    Electronically signed by Heidi Stuart MD on 2022 at 12:23 PM

## 2022-04-30 NOTE — ED NOTES
Abdominal pain from the umbilicus down to pelvis started last night after patient ate dinner. Pt states his umbilical hernia is protruding more as well. Pt states he was up all night from this pain intensifying intermittently. Pt has not eaten anything yet today and has not taken any home medications. Currently rates is pain a 2/10 and claims to be at a tolerable pain level. Wife at bedside reports her noticing his hernia discolored blue earlier today, but appears more red now.        Bhumi Coleman RN  04/30/22 1616

## 2022-05-01 LAB
ANION GAP SERPL CALCULATED.3IONS-SCNC: 14 MEQ/L (ref 8–16)
BASOPHILS # BLD: 0.1 %
BASOPHILS ABSOLUTE: 0 THOU/MM3 (ref 0–0.1)
BUN BLDV-MCNC: 54 MG/DL (ref 7–22)
CALCIUM SERPL-MCNC: 9.2 MG/DL (ref 8.5–10.5)
CHLORIDE BLD-SCNC: 97 MEQ/L (ref 98–111)
CO2: 22 MEQ/L (ref 23–33)
CREAT SERPL-MCNC: 2.9 MG/DL (ref 0.4–1.2)
EKG Q-T INTERVAL: 512 MS
EKG QRS DURATION: 150 MS
EKG QTC CALCULATION (BAZETT): 506 MS
EKG R AXIS: 83 DEGREES
EKG T AXIS: 46 DEGREES
EKG VENTRICULAR RATE: 59 BPM
EOSINOPHIL # BLD: 0 %
EOSINOPHILS ABSOLUTE: 0 THOU/MM3 (ref 0–0.4)
ERYTHROCYTE [DISTWIDTH] IN BLOOD BY AUTOMATED COUNT: 13.4 % (ref 11.5–14.5)
ERYTHROCYTE [DISTWIDTH] IN BLOOD BY AUTOMATED COUNT: 50.4 FL (ref 35–45)
GFR SERPL CREATININE-BSD FRML MDRD: 21 ML/MIN/1.73M2
GLUCOSE BLD-MCNC: 203 MG/DL (ref 70–108)
GLUCOSE BLD-MCNC: 213 MG/DL (ref 70–108)
GLUCOSE BLD-MCNC: 214 MG/DL (ref 70–108)
GLUCOSE BLD-MCNC: 226 MG/DL (ref 70–108)
GLUCOSE BLD-MCNC: 237 MG/DL (ref 70–108)
GLUCOSE BLD-MCNC: 240 MG/DL (ref 70–108)
GLUCOSE BLD-MCNC: 285 MG/DL (ref 70–108)
GLUCOSE BLD-MCNC: 286 MG/DL (ref 70–108)
GLUCOSE BLD-MCNC: 298 MG/DL (ref 70–108)
HCT VFR BLD CALC: 32.3 % (ref 42–52)
HEMOGLOBIN: 10.5 GM/DL (ref 14–18)
IMMATURE GRANS (ABS): 0.05 THOU/MM3 (ref 0–0.07)
IMMATURE GRANULOCYTES: 0.4 %
LYMPHOCYTES # BLD: 2.6 %
LYMPHOCYTES ABSOLUTE: 0.3 THOU/MM3 (ref 1–4.8)
MCH RBC QN AUTO: 33.4 PG (ref 26–33)
MCHC RBC AUTO-ENTMCNC: 32.5 GM/DL (ref 32.2–35.5)
MCV RBC AUTO: 102.9 FL (ref 80–94)
MONOCYTES # BLD: 6 %
MONOCYTES ABSOLUTE: 0.7 THOU/MM3 (ref 0.4–1.3)
NUCLEATED RED BLOOD CELLS: 0 /100 WBC
PLATELET # BLD: 216 THOU/MM3 (ref 130–400)
PMV BLD AUTO: 10.1 FL (ref 9.4–12.4)
POTASSIUM SERPL-SCNC: 4.9 MEQ/L (ref 3.5–5.2)
RBC # BLD: 3.14 MILL/MM3 (ref 4.7–6.1)
SEG NEUTROPHILS: 90.9 %
SEGMENTED NEUTROPHILS ABSOLUTE COUNT: 10.4 THOU/MM3 (ref 1.8–7.7)
SODIUM BLD-SCNC: 133 MEQ/L (ref 135–145)
WBC # BLD: 11.4 THOU/MM3 (ref 4.8–10.8)

## 2022-05-01 PROCEDURE — 6370000000 HC RX 637 (ALT 250 FOR IP): Performed by: SURGERY

## 2022-05-01 PROCEDURE — 93010 ELECTROCARDIOGRAM REPORT: CPT | Performed by: INTERNAL MEDICINE

## 2022-05-01 PROCEDURE — 2580000003 HC RX 258: Performed by: SURGERY

## 2022-05-01 PROCEDURE — 6370000000 HC RX 637 (ALT 250 FOR IP): Performed by: PHYSICIAN ASSISTANT

## 2022-05-01 PROCEDURE — 85025 COMPLETE CBC W/AUTO DIFF WBC: CPT

## 2022-05-01 PROCEDURE — 99024 POSTOP FOLLOW-UP VISIT: CPT | Performed by: SURGERY

## 2022-05-01 PROCEDURE — 80048 BASIC METABOLIC PNL TOTAL CA: CPT

## 2022-05-01 PROCEDURE — 99233 SBSQ HOSP IP/OBS HIGH 50: CPT | Performed by: PHYSICIAN ASSISTANT

## 2022-05-01 PROCEDURE — 6360000002 HC RX W HCPCS: Performed by: SURGERY

## 2022-05-01 PROCEDURE — 1200000003 HC TELEMETRY R&B

## 2022-05-01 PROCEDURE — 82948 REAGENT STRIP/BLOOD GLUCOSE: CPT

## 2022-05-01 PROCEDURE — 36415 COLL VENOUS BLD VENIPUNCTURE: CPT

## 2022-05-01 PROCEDURE — 2580000003 HC RX 258: Performed by: PHYSICIAN ASSISTANT

## 2022-05-01 RX ORDER — INSULIN LISPRO 100 [IU]/ML
8 INJECTION, SOLUTION INTRAVENOUS; SUBCUTANEOUS
Status: DISCONTINUED | OUTPATIENT
Start: 2022-05-01 | End: 2022-05-01 | Stop reason: CLARIF

## 2022-05-01 RX ORDER — INSULIN LISPRO 100 [IU]/ML
8 INJECTION, SOLUTION INTRAVENOUS; SUBCUTANEOUS
Status: DISCONTINUED | OUTPATIENT
Start: 2022-05-01 | End: 2022-05-04 | Stop reason: HOSPADM

## 2022-05-01 RX ORDER — SODIUM CHLORIDE 9 MG/ML
INJECTION, SOLUTION INTRAVENOUS CONTINUOUS
Status: DISCONTINUED | OUTPATIENT
Start: 2022-05-01 | End: 2022-05-04 | Stop reason: HOSPADM

## 2022-05-01 RX ORDER — HEPARIN SODIUM 5000 [USP'U]/ML
5000 INJECTION, SOLUTION INTRAVENOUS; SUBCUTANEOUS EVERY 8 HOURS SCHEDULED
Status: DISCONTINUED | OUTPATIENT
Start: 2022-05-01 | End: 2022-05-04 | Stop reason: HOSPADM

## 2022-05-01 RX ORDER — INSULIN GLARGINE 100 [IU]/ML
48 INJECTION, SOLUTION SUBCUTANEOUS 2 TIMES DAILY
Status: DISCONTINUED | OUTPATIENT
Start: 2022-05-01 | End: 2022-05-02

## 2022-05-01 RX ADMIN — INSULIN LISPRO 8 UNITS: 100 INJECTION, SOLUTION INTRAVENOUS; SUBCUTANEOUS at 17:59

## 2022-05-01 RX ADMIN — ATORVASTATIN CALCIUM 40 MG: 40 TABLET, FILM COATED ORAL at 21:45

## 2022-05-01 RX ADMIN — INSULIN LISPRO 4 UNITS: 100 INJECTION, SOLUTION INTRAVENOUS; SUBCUTANEOUS at 17:59

## 2022-05-01 RX ADMIN — INSULIN LISPRO 4 UNITS: 100 INJECTION, SOLUTION INTRAVENOUS; SUBCUTANEOUS at 10:12

## 2022-05-01 RX ADMIN — BUMETANIDE 2 MG: 1 TABLET ORAL at 08:50

## 2022-05-01 RX ADMIN — INSULIN LISPRO 6 UNITS: 100 INJECTION, SOLUTION INTRAVENOUS; SUBCUTANEOUS at 12:38

## 2022-05-01 RX ADMIN — HEPARIN SODIUM 5000 UNITS: 5000 INJECTION INTRAVENOUS; SUBCUTANEOUS at 16:01

## 2022-05-01 RX ADMIN — INSULIN GLARGINE 48 UNITS: 100 INJECTION, SOLUTION SUBCUTANEOUS at 10:00

## 2022-05-01 RX ADMIN — HEPARIN SODIUM 5000 UNITS: 5000 INJECTION INTRAVENOUS; SUBCUTANEOUS at 21:45

## 2022-05-01 RX ADMIN — TAMSULOSIN HYDROCHLORIDE 0.4 MG: 0.4 CAPSULE ORAL at 08:49

## 2022-05-01 RX ADMIN — RANOLAZINE 1000 MG: 500 TABLET, FILM COATED, EXTENDED RELEASE ORAL at 08:49

## 2022-05-01 RX ADMIN — HYDRALAZINE HYDROCHLORIDE 25 MG: 25 TABLET ORAL at 21:45

## 2022-05-01 RX ADMIN — INSULIN LISPRO 8 UNITS: 100 INJECTION, SOLUTION INTRAVENOUS; SUBCUTANEOUS at 12:44

## 2022-05-01 RX ADMIN — HYDRALAZINE HYDROCHLORIDE 25 MG: 25 TABLET ORAL at 10:32

## 2022-05-01 RX ADMIN — RANOLAZINE 1000 MG: 500 TABLET, FILM COATED, EXTENDED RELEASE ORAL at 21:45

## 2022-05-01 RX ADMIN — SODIUM CHLORIDE: 9 INJECTION, SOLUTION INTRAVENOUS at 10:13

## 2022-05-01 RX ADMIN — CEFOXITIN SODIUM 1000 MG: 1 POWDER, FOR SOLUTION INTRAVENOUS at 01:50

## 2022-05-01 RX ADMIN — DILTIAZEM HYDROCHLORIDE 120 MG: 120 CAPSULE, COATED, EXTENDED RELEASE ORAL at 08:50

## 2022-05-01 RX ADMIN — METOPROLOL SUCCINATE 50 MG: 50 TABLET, EXTENDED RELEASE ORAL at 08:50

## 2022-05-01 RX ADMIN — ASPIRIN 81 MG: 81 TABLET, COATED ORAL at 08:50

## 2022-05-01 RX ADMIN — NALOXEGOL OXALATE 25 MG: 25 TABLET, FILM COATED ORAL at 08:50

## 2022-05-01 RX ADMIN — INSULIN GLARGINE 48 UNITS: 100 INJECTION, SOLUTION SUBCUTANEOUS at 21:50

## 2022-05-01 RX ADMIN — ISOSORBIDE MONONITRATE 120 MG: 60 TABLET, EXTENDED RELEASE ORAL at 08:50

## 2022-05-01 RX ADMIN — HYDRALAZINE HYDROCHLORIDE 25 MG: 25 TABLET ORAL at 16:01

## 2022-05-01 ASSESSMENT — PAIN SCALES - GENERAL: PAINLEVEL_OUTOF10: 0

## 2022-05-01 NOTE — PROGRESS NOTES
Hospitalist Progress Note    Patient:  Denton Cabot    Unit/Bed:5-24/024-A  YOB: 1945  MRN: [id number]   Acct: [de-identified]   PCP: Adeline Kaur MD  Code Status: Full Code  Date of Admission: 4/30/2022      Assessment/Plan:    1. Incarcerated strangulated ventral hernia: s/p exploratory laparotomy, lysis of small bowel adhesions with reduction of incarcerated loop of small bowel with primary repair of hernia defect with Dr. Angelica Barrera today. Management per primary     2. MADI on CKD stage III: postop, likely dehydration. Pt follows with Dr. Nichelle Noguera. Will treat with IVF and repeat BMP in AM.  Monitor fluid status closely. If Cr does not improve, will consult Nephro. 3. IDDMII: Pt no longer NPO, will resume home insulin regimen. SSI. Accu-checks. Hypoglycemia protocol in place.      3. PAF:  Not on anticoagulation per patient. Rate controlled on Cardizem, metoprolol.      4. Hx CAD s/p CABG, PCI: No chest pain. Resume home meds.      5. Chronic HFpEF: No overt signs of decompensation. Resume home meds. Careful use of IVF postop. Strict I/Os, daily weights.      6. Essential HTN: BP stable, cont home meds. Chief Complaint:  Abdominal pain   Reason for consult:  Med management     HPI / Hospital Course: Paul[age] y.o. male who we are asked to see/evaluate by Fide Solares MD for medical management of DM, CAD, AF, CHF, CKD. The patient presented to the ED for abdominal pain and nausea. CT revealed incarcerated loop of small bowel in the midabdomen resulting in SBO. Patient underwent exploratory laparotomy, lysis of small bowel adhesions with reduction of incarcerated loop of small bowel with primary repair of hernia defect with Dr. Angelica Barrera today. Patient reports a history of diabetes, states that he takes 8 units of Humalog tidwc, 48 units of Levemir twice daily. He reports history of PCI and CABG.   Patient reports history of A. fib, states that he was taken off Eliquis approximately a year ago and is only on aspirin and Plavix. Patient seen postop and is doing well. Abdominal pain is well controlled. He denies any fever/chills, chest pain, shortness of breath, N/V/D. Patient states he has not urinated since surgery. Hospitalist will continue to follow. Subjective (past 24 hours): Patient is doing well postop. States his abdominal pain is well controlled. He is tolerating general diet, no N/V. No BM yet today. Creatinine increased overnight, changed IV fluids to 0.9 normal saline at 100 cc/h. Patient denies fever/chills, shortness of breath, chest pain, LE edema. ROS: Pertinent positives as noted in HPI. All other systems reviewed and negative. Past medical history, family history, social history and allergies reviewed again and is unchanged since admission. Medications:  Reviewed.   Infusion Medications    sodium chloride      sodium chloride      dextrose       Scheduled Medications    heparin (porcine)  5,000 Units SubCUTAneous 3 times per day    aspirin  81 mg Oral Daily    atorvastatin  40 mg Oral Nightly    [Held by provider] bumetanide  2 mg Oral Daily    dilTIAZem  120 mg Oral Daily    hydrALAZINE  25 mg Oral 3 times per day    isosorbide mononitrate  120 mg Oral Daily    metoprolol succinate  50 mg Oral Daily    ranolazine  1,000 mg Oral BID    tamsulosin  0.4 mg Oral Daily    sodium chloride flush  5-40 mL IntraVENous 2 times per day    insulin lispro  0-12 Units SubCUTAneous TID WC    insulin lispro  0-6 Units SubCUTAneous Nightly    naloxegol  25 mg Oral Daily     PRN Meds: nitroGLYCERIN, sodium chloride flush, sodium chloride, ondansetron **OR** ondansetron, potassium chloride, magnesium sulfate, traMADol **OR** traMADol, morphine **OR** morphine, acetaminophen **AND** diphenhydrAMINE, hydrALAZINE, glucagon (rDNA), dextrose, dextrose bolus (hypoglycemia) **OR** dextrose bolus (hypoglycemia), glucose    I/O:     Intake/Output Summary (Last 24 hours) at 5/1/2022 0907  Last data filed at 4/30/2022 1401  Gross per 24 hour   Intake 500 ml   Output --   Net 500 ml       Diet:  ADULT DIET; Easy to Chew; 5 carb choices (75 gm/meal)    Exam:  BP (!) 158/72   Pulse 65   Temp 98 °F (36.7 °C) (Oral)   Resp 18   Ht 5' 9\" (1.753 m)   Wt 213 lb (96.6 kg)   SpO2 92%   BMI 31.45 kg/m²   General:   Pleasant male. NAD. HEENT:  normocephalic and atraumatic. No scleral icterus. PERR. Neck: supple. No JVD. No thyromegaly. Lungs: clear to auscultation. No retractions  Cardiac: RRR without murmur. Abdomen: soft. Nontender. Bowel sounds hypoactive. Incision CDI. Extremities:  No clubbing, cyanosis, or edema x 4. Vasculature: capillary refill < 3 seconds. Palpable LE pulses bilaterally. Skin:  warm and dry. Psych:  Alert and oriented x3. Affect appropriate  Lymph:  No supraclavicular adenopathy. Neurologic:  No focal deficit. No seizures. Data: (All radiographs, tracings, PFTs, and imaging are personally viewed and interpreted unless otherwise noted)  Labs:   Recent Labs     04/30/22  1112 05/01/22  0507   WBC 11.3* 11.4*   HGB 11.3* 10.5*   HCT 35.9* 32.3*    216     Recent Labs     04/30/22  1112 05/01/22  0507    133*   K 4.5 4.9   CL 98 97*   CO2 25 22*   BUN 45* 54*   CREATININE 2.1* 2.9*   CALCIUM 9.9 9.2     Recent Labs     04/30/22  1112   AST 19   ALT 20   BILIDIR <0.2   BILITOT 0.7   ALKPHOS 96     No results for input(s): INR in the last 72 hours. No results for input(s): Jose Ramon Kiran in the last 72 hours.   Urinalysis:   Lab Results   Component Value Date    NITRU NEGATIVE 04/06/2021    WBCUA NONE SEEN 04/06/2021    BACTERIA NONE SEEN 04/06/2021    RBCUA NONE SEEN 04/06/2021    BLOODU NEGATIVE 04/06/2021    SPECGRAV 1.008 04/06/2021    GLUCOSEU neg 03/29/2021     Urine culture: No results found for: LABURIN  Micro:   Blood culture #1:   Lab Results   Component Value Date    BC No growth-preliminary  No growth   11/05/2018     Blood culture #2:No results found for: Anay Mcgarry  Organism:  Lab Results   Component Value Date    ORG Citrobacter koseri 06/01/2018       No results found for: LABGRAM  MRSA culture only:No results found for: [facility]  Respiratory culture: No results found for: CULTRESP  Aerobic and Anaerobic :  No results found for: LABAERO  No results found for: [facility]    Radiology Reports:  CT ABDOMEN PELVIS WO CONTRAST Additional Contrast? None   Final Result   Mechanical small bowel obstruction secondary to a strangulated umbilical hernia            **This report has been created using voice recognition software. It may contain minor errors which are inherent in voice recognition technology. **      Final report electronically signed by Dr. Juan Jose Issa on 4/30/2022 12:03 PM        CT ABDOMEN PELVIS WO CONTRAST Additional Contrast? None    Result Date: 4/30/2022  PROCEDURE: CT ABDOMEN PELVIS WO CONTRAST CLINICAL INFORMATION: possible incarcerated umbilical hernia . TECHNIQUE: 2-D multiplanar noncontrast CT abdomen pelvis All CT scans at this facility use dose modulation, iterative reconstruction, and/or weight-based dosing when appropriate to reduce radiation dose to as low as reasonably achievable. COMPARISON: 12/17/2018 FINDINGS: Lung bases Lung bases are clear undersurface the heart shows coronary artery calcifications Abdomen pelvis solid organs and hollow viscera evaluation limited without contrast. There are multiple dilated air and fluid-filled loops of small bowel. There is gas and stool in the colon. Colon is smaller in size than the dilated small bowel loops. This is secondary to a strangulated umbilical hernia. Transition point is at the umbilical hernia. There is no free air seen. Liver, spleen, and pancreas are within normal limits. Status post cholecystectomy.  The adrenals are normal. Decrease in size Bosniak 2F cyst left kidney there are 2 adjacent remaining cysts larger of the 2 is 4 cm at the interpolar portion. Aorta is heavily atherosclerotic mesenteric vessels are atherosclerotic. No adenopathy seen. Urinary bladder is distended. Bilateral fat-containing inguinal hernias. No suspicious bone lesions. Mechanical small bowel obstruction secondary to a strangulated umbilical hernia **This report has been created using voice recognition software. It may contain minor errors which are inherent in voice recognition technology. ** Final report electronically signed by Dr. Yolanda Castillo on 4/30/2022 12:03 PM        Tele:   [] yes             [] no      Active Hospital Problems    Diagnosis Date Noted    Strangulated umbilical hernia [S61.6] 04/30/2022     Priority: Medium       Electronically signed by Danna Fontana PA-C on 5/1/2022 at 9:07 AM

## 2022-05-01 NOTE — PROGRESS NOTES
Home insulin given per patient, however, he had the wrong kwikpen. Giving himself 48 units of humalog instead of levemir. Blood sugars monitored. Patient asymptomatic and no hypoglycemia noted. All home  Medications locked in cabinet.   Electronically signed by Bren Rodrigues RN on 5/1/2022 at 4:25 AM

## 2022-05-01 NOTE — PROGRESS NOTES
Teodora Castañeda MD    Postoperative Progress Note  Pt Name: Tati Reed  Medical Record Number: [id number]  Date of Birth 1945   Today's Date: 5/1/2022  ASSESSMENT   1. POD # 1 open repair of incarcerated strangulated midline ventral hernia, periumbilical with lysis of adhesions  2. Acute on chronic renal insufficiency  3. Insulin-dependent diabetes mellitus  4. Hypertension  5. Diastolic CHF  6.  has a past medical history of Adenomatous colon polyp, Angina at rest [facility]), Arthritis, Atrial fibrillation ([address] 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[address] BPH (benign prostatic hypertrophy), CAD (coronary artery disease), Carotid artery disease ([address] Carotid disease, bilateral ([address] Cerebral artery occlusion with cerebral infarction ([address] CHF (congestive heart failure) ([address] Chronic kidney disease, GERD (gastroesophageal reflux disease), GI bleed, History of blood transfusion, History of blood transfusion, Hyperlipidemia, Hypertension, Lumbar disc disease, MI (myocardial infarction) ([address] Nephrolithiasis, NIDDM (non-insulin dependent diabetes mellitus), RICCARDO (obstructive sleep apnea), PVC's (premature ventricular contractions), Renal artery stenosis ([address] Renal insufficiency, S/P CABG (status post coronary artery bypass graft), Skin cancer, Squamous cell carcinoma, Stenosis of right subclavian artery ([address] TIA (transient ischemic attack), and Wears partial dentures. PLANS   1. Analgesics and antiemetics as needed  2. IV hydration continue fluids with bump in creatinine  3. Soft carb controlled diet as tolerated  4. Increase activity  5. Heparin for DVT prophylaxis  6. Hospitalist following for medical issues  KIARRA Salazar is doing well. Complains of only mild incisional pain. He is tolerating oral intake is asking for more to eat. Abdomen is soft. Denies abdominal pain, cramping, nausea or emesis. Patient has voided spontaneously postoperatively.   CURRENT MEDICATIONS   Scheduled Meds:   heparin (porcine)  5,000 Units SubCUTAneous 3 times per day    aspirin  81 mg Oral Daily    atorvastatin  40 mg Oral Nightly    bumetanide  2 mg Oral Daily    dilTIAZem  120 mg Oral Daily    hydrALAZINE  25 mg Oral 3 times per day    isosorbide mononitrate  120 mg Oral Daily    metoprolol succinate  50 mg Oral Daily    ranolazine  1,000 mg Oral BID    tamsulosin  0.4 mg Oral Daily    sodium chloride flush  5-40 mL IntraVENous 2 times per day    insulin lispro  0-12 Units SubCUTAneous TID WC    insulin lispro  0-6 Units SubCUTAneous Nightly    naloxegol  25 mg Oral Daily     Continuous Infusions:   sodium chloride      sodium chloride      dextrose       PRN Meds:.nitroGLYCERIN, sodium chloride flush, sodium chloride, ondansetron **OR** ondansetron, potassium chloride, magnesium sulfate, traMADol **OR** traMADol, morphine **OR** morphine, acetaminophen **AND** diphenhydrAMINE, hydrALAZINE, glucagon (rDNA), dextrose, dextrose bolus (hypoglycemia) **OR** dextrose bolus (hypoglycemia), glucose  OBJECTIVE   CURRENT VITALS:  height is 5' 9\" (1.753 m) and weight is 213 lb (96.6 kg). His oral temperature is 98.6 °F (37 °C). His blood pressure is 138/61 and his pulse is 63. His respiration is 16 and oxygen saturation is 91%. Body mass index is 31.45 kg/m².   Temperature Range (24h):Temp: 98.6 °F (37 °C) Temp  Av.1 °F (36.7 °C)  Min: 97.1 °F (36.2 °C)  Max: 98.6 °F (37 °C)  BP Range (17B): Systolic (29JEG), RYP:492 , Min:105 , UGY:385     Diastolic (63SJG), RAD:31, Min:51, Max:103    Pulse Range (24h): Pulse  Av.4  Min: 56  Max: 66  Respiration Range (24h): Resp  Av.1  Min: 0  Max: 19  Current Pulse Ox (24h):  SpO2: 91 %  Pulse Ox Range (24h):  SpO2  Av.7 %  Min: 91 %  Max: 100 %  Oxygen Amount and Delivery: O2 Flow Rate (L/min): 3 L/min  Incentive Spirometry Tx:            GENERAL: alert, no distress  LUNGS: Clear diminished bases  HEART: Regular and normal rate  ABDOMEN: Soft  INCISION: Clean dry and intact no bleeding   EXTREMITY: no cyanosis, clubbing or edema  In: 500 [I.V.:500]  Out: -        LABS     Recent Labs     04/30/22  1112 05/01/22  0507   WBC 11.3* 11.4*   HGB 11.3* 10.5*   HCT 35.9* 32.3*    216    133*   K 4.5 4.9   CL 98 97*   CO2 25 22*   BUN 45* 54*   CREATININE 2.1* 2.9*   CALCIUM 9.9 9.2      No results for input(s): PTT, INR in the last 72 hours. Invalid input(s): PT  Recent Labs     04/30/22  1112   AST 19   ALT 20   BILITOT 0.7   BILIDIR <0.2   LIPASE 32.4     Recent Labs     04/30/22  1112   TROPONINT 0.011*         RADIOLOGY     CT ABDOMEN PELVIS WO CONTRAST Additional Contrast? None   Final Result   Mechanical small bowel obstruction secondary to a strangulated umbilical hernia            **This report has been created using voice recognition software. It may contain minor errors which are inherent in voice recognition technology. **      Final report electronically signed by Dr. Perez Garza on 4/30/2022 12:03 PM          Electronically signed by Krista Butler MD on 5/1/2022 at 6:12 AM

## 2022-05-01 NOTE — PLAN OF CARE
Problem: Pain  Goal: Verbalizes/displays adequate comfort level or baseline comfort level  Outcome: Progressing  Flowsheets (Taken 4/30/2022 2015)  Verbalizes/displays adequate comfort level or baseline comfort level: Assess pain using appropriate pain scale   Care plan reviewed with patient . Patient verbalize understanding of the plan of care and contribute to goal setting.     Electronically signed by Everette Nevarez RN on 5/1/2022 at 3:07 AM

## 2022-05-02 ENCOUNTER — APPOINTMENT (OUTPATIENT)
Dept: ULTRASOUND IMAGING | Age: 77
DRG: 336 | End: 2022-05-02
Payer: MEDICARE

## 2022-05-02 ENCOUNTER — TELEPHONE (OUTPATIENT)
Dept: INTERNAL MEDICINE CLINIC | Age: 77
End: 2022-05-02

## 2022-05-02 LAB
ANION GAP SERPL CALCULATED.3IONS-SCNC: 14 MEQ/L (ref 8–16)
BUN BLDV-MCNC: 67 MG/DL (ref 7–22)
CALCIUM SERPL-MCNC: 8.4 MG/DL (ref 8.5–10.5)
CHLORIDE BLD-SCNC: 97 MEQ/L (ref 98–111)
CHLORIDE, URINE: 30 MEQ/L
CO2: 21 MEQ/L (ref 23–33)
CREAT SERPL-MCNC: 2.9 MG/DL (ref 0.4–1.2)
GFR SERPL CREATININE-BSD FRML MDRD: 21 ML/MIN/1.73M2
GLUCOSE BLD-MCNC: 128 MG/DL (ref 70–108)
GLUCOSE BLD-MCNC: 135 MG/DL (ref 70–108)
GLUCOSE BLD-MCNC: 165 MG/DL (ref 70–108)
GLUCOSE BLD-MCNC: 189 MG/DL (ref 70–108)
GLUCOSE BLD-MCNC: 201 MG/DL (ref 70–108)
POTASSIUM SERPL-SCNC: 4.3 MEQ/L (ref 3.5–5.2)
POTASSIUM, URINE: 42.2 MEQ/L
SODIUM BLD-SCNC: 132 MEQ/L (ref 135–145)
SODIUM URINE: 42 MEQ/L

## 2022-05-02 PROCEDURE — 99024 POSTOP FOLLOW-UP VISIT: CPT | Performed by: SURGERY

## 2022-05-02 PROCEDURE — 6370000000 HC RX 637 (ALT 250 FOR IP): Performed by: SURGERY

## 2022-05-02 PROCEDURE — 51798 US URINE CAPACITY MEASURE: CPT

## 2022-05-02 PROCEDURE — 1200000003 HC TELEMETRY R&B

## 2022-05-02 PROCEDURE — 2580000003 HC RX 258: Performed by: SURGERY

## 2022-05-02 PROCEDURE — 6360000002 HC RX W HCPCS: Performed by: SURGERY

## 2022-05-02 PROCEDURE — 99223 1ST HOSP IP/OBS HIGH 75: CPT | Performed by: INTERNAL MEDICINE

## 2022-05-02 PROCEDURE — 99233 SBSQ HOSP IP/OBS HIGH 50: CPT | Performed by: FAMILY MEDICINE

## 2022-05-02 PROCEDURE — 51701 INSERT BLADDER CATHETER: CPT

## 2022-05-02 PROCEDURE — 82948 REAGENT STRIP/BLOOD GLUCOSE: CPT

## 2022-05-02 PROCEDURE — 82436 ASSAY OF URINE CHLORIDE: CPT

## 2022-05-02 PROCEDURE — 36415 COLL VENOUS BLD VENIPUNCTURE: CPT

## 2022-05-02 PROCEDURE — 80048 BASIC METABOLIC PNL TOTAL CA: CPT

## 2022-05-02 PROCEDURE — 84133 ASSAY OF URINE POTASSIUM: CPT

## 2022-05-02 PROCEDURE — 76775 US EXAM ABDO BACK WALL LIM: CPT

## 2022-05-02 PROCEDURE — 84300 ASSAY OF URINE SODIUM: CPT

## 2022-05-02 RX ORDER — DOCUSATE SODIUM 100 MG/1
100 CAPSULE, LIQUID FILLED ORAL DAILY
Status: DISCONTINUED | OUTPATIENT
Start: 2022-05-02 | End: 2022-05-04 | Stop reason: HOSPADM

## 2022-05-02 RX ADMIN — ATORVASTATIN CALCIUM 40 MG: 40 TABLET, FILM COATED ORAL at 20:49

## 2022-05-02 RX ADMIN — ONDANSETRON 4 MG: 2 INJECTION INTRAMUSCULAR; INTRAVENOUS at 22:45

## 2022-05-02 RX ADMIN — HEPARIN SODIUM 5000 UNITS: 5000 INJECTION INTRAVENOUS; SUBCUTANEOUS at 06:55

## 2022-05-02 RX ADMIN — INSULIN LISPRO 8 UNITS: 100 INJECTION, SOLUTION INTRAVENOUS; SUBCUTANEOUS at 17:39

## 2022-05-02 RX ADMIN — DOCUSATE SODIUM 100 MG: 100 CAPSULE, LIQUID FILLED ORAL at 08:30

## 2022-05-02 RX ADMIN — INSULIN LISPRO 8 UNITS: 100 INJECTION, SOLUTION INTRAVENOUS; SUBCUTANEOUS at 08:29

## 2022-05-02 RX ADMIN — DILTIAZEM HYDROCHLORIDE 120 MG: 120 CAPSULE, COATED, EXTENDED RELEASE ORAL at 08:31

## 2022-05-02 RX ADMIN — METOPROLOL SUCCINATE 50 MG: 50 TABLET, EXTENDED RELEASE ORAL at 09:27

## 2022-05-02 RX ADMIN — HYDRALAZINE HYDROCHLORIDE 25 MG: 25 TABLET ORAL at 15:09

## 2022-05-02 RX ADMIN — HEPARIN SODIUM 5000 UNITS: 5000 INJECTION INTRAVENOUS; SUBCUTANEOUS at 15:09

## 2022-05-02 RX ADMIN — HYDRALAZINE HYDROCHLORIDE 25 MG: 25 TABLET ORAL at 06:55

## 2022-05-02 RX ADMIN — HEPARIN SODIUM 5000 UNITS: 5000 INJECTION INTRAVENOUS; SUBCUTANEOUS at 20:48

## 2022-05-02 RX ADMIN — HYDRALAZINE HYDROCHLORIDE 25 MG: 25 TABLET ORAL at 21:48

## 2022-05-02 RX ADMIN — INSULIN LISPRO 2 UNITS: 100 INJECTION, SOLUTION INTRAVENOUS; SUBCUTANEOUS at 20:43

## 2022-05-02 RX ADMIN — ASPIRIN 81 MG: 81 TABLET, COATED ORAL at 08:30

## 2022-05-02 RX ADMIN — INSULIN LISPRO 2 UNITS: 100 INJECTION, SOLUTION INTRAVENOUS; SUBCUTANEOUS at 17:39

## 2022-05-02 RX ADMIN — NALOXEGOL OXALATE 25 MG: 25 TABLET, FILM COATED ORAL at 08:31

## 2022-05-02 RX ADMIN — ISOSORBIDE MONONITRATE 120 MG: 60 TABLET, EXTENDED RELEASE ORAL at 08:31

## 2022-05-02 RX ADMIN — TAMSULOSIN HYDROCHLORIDE 0.4 MG: 0.4 CAPSULE ORAL at 08:31

## 2022-05-02 RX ADMIN — RANOLAZINE 1000 MG: 500 TABLET, FILM COATED, EXTENDED RELEASE ORAL at 08:31

## 2022-05-02 RX ADMIN — SODIUM CHLORIDE, PRESERVATIVE FREE 10 ML: 5 INJECTION INTRAVENOUS at 20:47

## 2022-05-02 RX ADMIN — RANOLAZINE 1000 MG: 500 TABLET, FILM COATED, EXTENDED RELEASE ORAL at 20:49

## 2022-05-02 RX ADMIN — ONDANSETRON 4 MG: 4 TABLET, ORALLY DISINTEGRATING ORAL at 11:20

## 2022-05-02 ASSESSMENT — ENCOUNTER SYMPTOMS
EYES NEGATIVE: 1
ABDOMINAL PAIN: 1
VOMITING: 1
NAUSEA: 1
SHORTNESS OF BREATH: 0
EYE PAIN: 0
DIARRHEA: 0
BLOOD IN STOOL: 0
COUGH: 0
BACK PAIN: 0

## 2022-05-02 NOTE — CARE COORDINATION
DISCHARGE PLANNING EVALUATION:    Bambi Castellanos       Admitted: 4/30/2022/ [address] [facility] [address]. day: 2   Location: -24/024-A Reason for admit: Strangulated umbilical hernia [E00.0]  Small bowel obstruction ([address] [J14.879]   PMH:  has a past medical history of Adenomatous colon polyp, Angina at rest [facility]), Arthritis, Atrial fibrillation ([address] 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[address] BPH (benign prostatic hypertrophy), CAD (coronary artery disease), Carotid artery disease ([address] Carotid disease, bilateral ([address] Cerebral artery occlusion with cerebral infarction ([address] CHF (congestive heart failure) ([address] Chronic kidney disease, GERD (gastroesophageal reflux disease), GI bleed, History of blood transfusion, History of blood transfusion, Hyperlipidemia, Hypertension, Lumbar disc disease, MI (myocardial infarction) ([address] Nephrolithiasis, NIDDM (non-insulin dependent diabetes mellitus), RICCARDO (obstructive sleep apnea), PVC's (premature ventricular contractions), Renal artery stenosis ([address] Renal insufficiency, S/P CABG (status post coronary artery bypass graft), Skin cancer, Squamous cell carcinoma, Stenosis of right subclavian artery ([address] TIA (transient ischemic attack), and Wears partial dentures. Procedure:   4/30 CT Abd/pelvis: Mechanical small bowel obstruction secondary to a strangulated umbilical hernia  2/94 Exploratory laparotomy lysis of small bowel adhesions with reduction of incarcerated loop of small bowel with primary repair of hernia defect    Barriers to Discharge: Presented to ED with c/o abdominal pain, nausea. Hx of chronic longstanding ventral hernia. CT abd/pelvis revealed an incarcerated loop of small bowel in the mid abdomen with resulting small bowel obstruction. Taken for emergent surgery - see note above. Admitted to 5K post-op. Hospitalist consulted. Nephrology consulted today for MADI on CKD. Renal US ordered. Nauseated today. Afebrile. On room air. Ox4. IS. Abdominal binder.  IVF, asa, lipitor, cardizem, sq heparin, hydralazine, levemir bid, SSI, imdur, toprol xl, movantik, prn zofran, ranexa, flomax, Electrolyte replacement protocols. Na+ 132, BUN 45 - now 67, Creat 2.1 - now 2.9, trop 0.011, wbc 11.3 - then 11.4, hgb 11.3 - then 10.5. PCP: Kevan Sung MD  Readmission Risk Score: 21.3 ( )%    Patient Goals/Plan/Treatment Preferences: Home with wife. Denies needs, declines HH. Wife is retired nurse. 05/02/22 1132   Service Assessment   Patient Orientation Alert and Oriented   Cognition Alert   History Provided By Patient   Primary Caregiver Self   Patient's Healthcare Decision Maker is: Named in [address] [place] [address]   PCP Verified by CM Yes   Last Visit to PCP Within last 3 months   Prior Functional Level Independent in ADLs/IADLs   Current Functional Level Independent in ADLs/IADLs   Can patient return to prior living arrangement Yes   Ability to make needs known: Good   Family able to assist with home care needs: Yes   Social/Functional History   Lives With Spouse   ADL Assistance Independent   Homemaking Assistance Independent   Ambulation Assistance Independent   Transfer Assistance Independent   Active  Yes   Discharge Planning   Type of Residence House   Living Arrangements Spouse/Significant Other   Current Services Prior To Admission [address] Medications No   Type of [address] None   Patient expects to be discharged to: [address] Discharge   Services At/After Discharge None   Condition of Participation: Discharge Planning   The Plan for Transition of Care is related to the following treatment goals: Improved BUN/Creatinine; resolution of nausea, tolerates diet.    The Patient and/or Patient Representative was provided with a Choice of Provider?   (N/A - already has PCP)   Documentation for Discharge Appeal   Discharge Appealed by   (N/A - Denies need for DME or [facility] services.)

## 2022-05-02 NOTE — PROGRESS NOTES
Teodora Castañeda MD    Postoperative Progress Note  Pt Name: Nayeli Cherryigham  Medical Record Number: [id number]  Date of Birth 1945   Today's Date: 5/2/2022  ASSESSMENT   1. POD # 2 open repair of incarcerated strangulated midline ventral hernia, periumbilical with lysis of adhesions  2. Acute on chronic renal insufficiency. Urine output seems good. A.m. labs pending. 3. Insulin-dependent diabetes mellitus  4. Hypertension-stable treated  5. Diastolic CHF stable   has a past medical history of Adenomatous colon polyp, Angina at rest [facility]), Arthritis, Atrial fibrillation ([facility] 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[address] BPH (benign prostatic hypertrophy), CAD (coronary artery disease), Carotid artery disease ([address] Carotid disease, bilateral ([address] Cerebral artery occlusion with cerebral infarction ([address] CHF (congestive heart failure) ([address] Chronic kidney disease, GERD (gastroesophageal reflux disease), GI bleed, History of blood transfusion, History of blood transfusion, Hyperlipidemia, Hypertension, Lumbar disc disease, MI (myocardial infarction) ([address] Nephrolithiasis, NIDDM (non-insulin dependent diabetes mellitus), RICCARDO (obstructive sleep apnea), PVC's (premature ventricular contractions), Renal artery stenosis ([address] Renal insufficiency, S/P CABG (status post coronary artery bypass graft), Skin cancer, Squamous cell carcinoma, Stenosis of right subclavian artery ([address] TIA (transient ischemic attack), and Wears partial dentures. PLANS   1. Analgesics and antiemetics as needed  2. IV hydration continue fluids with bump in creatinine  3. Soft carb controlled diet as tolerated  4. Increase activity  5. Heparin for DVT prophylaxis  6. Hospitalist following for medical issues  7. Check BUN/creatinine today. If improved and okay with hospitalist plan discharge today. Leonorajose Digna is doing well. Incisions look good. Urine output adequate. A.m. labs pending.   Tolerating oral intake and passing flatus  CURRENT MEDICATIONS   Scheduled Meds:   docusate sodium  100 mg Oral Daily    heparin (porcine)  5,000 Units SubCUTAneous 3 times per day    insulin lispro  8 Units SubCUTAneous TID WC    insulin glargine  48 Units SubCUTAneous BID    aspirin  81 mg Oral Daily    atorvastatin  40 mg Oral Nightly    [Held by provider] bumetanide  2 mg Oral Daily    dilTIAZem  120 mg Oral Daily    hydrALAZINE  25 mg Oral 3 times per day    isosorbide mononitrate  120 mg Oral Daily    metoprolol succinate  50 mg Oral Daily    ranolazine  1,000 mg Oral BID    tamsulosin  0.4 mg Oral Daily    sodium chloride flush  5-40 mL IntraVENous 2 times per day    insulin lispro  0-12 Units SubCUTAneous TID WC    insulin lispro  0-6 Units SubCUTAneous Nightly    naloxegol  25 mg Oral Daily     Continuous Infusions:   sodium chloride 100 mL/hr at 22 1013    sodium chloride      dextrose       PRN Meds:.nitroGLYCERIN, sodium chloride flush, sodium chloride, ondansetron **OR** ondansetron, potassium chloride, magnesium sulfate, traMADol **OR** traMADol, morphine **OR** morphine, acetaminophen **AND** diphenhydrAMINE, hydrALAZINE, glucagon (rDNA), dextrose, dextrose bolus (hypoglycemia) **OR** dextrose bolus (hypoglycemia), glucose  OBJECTIVE   CURRENT VITALS:  height is 5' 9\" (1.753 m) and weight is 213 lb (96.6 kg). His oral temperature is 97.5 °F (36.4 °C). His blood pressure is 134/78 and his pulse is 91. His respiration is 20 and oxygen saturation is 94%. Body mass index is 31.45 kg/m².   Temperature Range (24h):Temp: 97.5 °F (36.4 °C) Temp  Av.9 °F (36.6 °C)  Min: 97.5 °F (36.4 °C)  Max: 98.3 °F (36.8 °C)  BP Range (15V): Systolic (38ATE), PTD:593 , Min:107 , IBL:336     Diastolic (48CPD), KPR:06, Min:65, Max:85    Pulse Range (24h): Pulse  Av.3  Min: 59  Max: 91  Respiration Range (24h): Resp  Av  Min: 18  Max: 20  Current Pulse Ox (24h):  SpO2: 94 %  Pulse Ox Range (24h):  SpO2  Avg: 94 %  Min: 92 %  Max: 96 %  Oxygen Amount and Delivery: O2 Flow Rate (L/min): 3 L/min  Incentive Spirometry Tx:            GENERAL: alert, no distress  LUNGS: Clear diminished bases  HEART: Regular and normal rate  ABDOMEN: Soft  INCISION: Clean dry and intact no bleeding   EXTREMITY: no cyanosis, clubbing or edema  In: 400 [P.O.:400]  Out: -        LABS     Recent Labs     04/30/22  1112 05/01/22  0507   WBC 11.3* 11.4*   HGB 11.3* 10.5*   HCT 35.9* 32.3*    216    133*   K 4.5 4.9   CL 98 97*   CO2 25 22*   BUN 45* 54*   CREATININE 2.1* 2.9*   CALCIUM 9.9 9.2      No results for input(s): PTT, INR in the last 72 hours. Invalid input(s): PT  Recent Labs     04/30/22  1112   AST 19   ALT 20   BILITOT 0.7   BILIDIR <0.2   LIPASE 32.4     Recent Labs     04/30/22  1112   TROPONINT 0.011*         RADIOLOGY     CT ABDOMEN PELVIS WO CONTRAST Additional Contrast? None   Final Result   Mechanical small bowel obstruction secondary to a strangulated umbilical hernia            **This report has been created using voice recognition software. It may contain minor errors which are inherent in voice recognition technology. **      Final report electronically signed by Dr. Sravan Adams on 4/30/2022 12:03 PM          Electronically signed by Lluvia Weeks MD on 5/2/2022 at 7:54 AM

## 2022-05-02 NOTE — PROCEDURES
A Bladder scan was performed at 1325. The patient's last void was at 0830 . The residual amount was measured to be 525 ML. Report of results was given to [facility].

## 2022-05-02 NOTE — PLAN OF CARE
Problem: Pain  Goal: Verbalizes/displays adequate comfort level or baseline comfort level  Outcome: Progressing  Flowsheets (Taken 4/30/2022 2015)  Verbalizes/displays adequate comfort level or baseline comfort level: Assess pain using appropriate pain scale    Problem: Chronic Conditions and Co-morbidities  Goal: Patient's chronic conditions and co-morbidity symptoms are monitored and maintained or improved  Outcome: Progressing  Flowsheets (Taken 5/2/2022 0125)  Care Plan - Patient's Chronic Conditions and Co-Morbidity Symptoms are Monitored and Maintained or Improved: Monitor and assess patient's chronic conditions and comorbid symptoms for stability, deterioration, or improvement   Care plan reviewed with patient. Patient verbalize understanding of the plan of care and contribute to goal setting.     Electronically signed by Cassidy Ovalle RN on 5/2/2022 at 1:26 AM

## 2022-05-02 NOTE — TELEPHONE ENCOUNTER
I spoke to pts wife and asked her what dose of humalog he is on and he takes 8-8-12 and then uses group 1 ss. I advised her I will call the company back with max daily dose tomorrow.

## 2022-05-02 NOTE — PROGRESS NOTES
Hospitalist Progress Note    Patient:  Ricardo Couch      Unit/Bed:-24/024-A    YOB: 1945    MRN: [id number]       Acct: [de-identified]     PCP: Pavel Machuca MD    Date of Admission: 4/30/2022    Assessment/Plan:    Anticipated Discharge in :     RUPA/Devora Mares [address] Problems    Diagnosis Date Noted    Strangulated umbilical hernia [V28.6] 04/30/2022     Priority: Medium     1. Incarcerated strangulated ventral hernia: s/p exploratory laparotomy, lysis of small bowel adhesions with reduction of incarcerated loop of small bowel with primary repair of hernia defect with Dr. Roque. Management per primary.      2. MADI on CKD stage III: postop, likely dehydration rule out urinary retention. Patient is established with Dr. Camilo Newton. No improvement with IVF overnight, BUN creeping up. Monitor fluid status closely. Obtain a Bladder scan, urine lytes. Hold Bumex for now. Consult Dr. Camilo Newton.     3. IDDMII: Pt no longer NPO, continue home insulin regimen. SSI. Accu-checks. Hypoglycemia protocol in place.      3. PAF:  Not on anticoagulation per patient.  Rate controlled on Cardizem, metoprolol.      4. Hx CAD s/p CABG, PCI: No chest pain. Resume home meds.      5. Chronic HFpEF: No overt signs of decompensation. Resume home meds. Careful use of IVF postop. Strict I/Os, daily weights.      6. Essential HTN: BP stable, cont home meds.          Chief Complaint:  Abdominal pain   Reason for consult:  Med management      HPI / Hospital Course: Anabell Galan YITS39 y. o. male who we are asked to see/evaluate by Llana Schilder, MD for medical management of DM, CAD, AF, CHF, CKD.  The patient presented to the ED for abdominal pain and nausea.  CT revealed incarcerated loop of small bowel in the midabdomen resulting in SBO.  Patient underwent exploratory laparotomy, lysis of small bowel adhesions with reduction of incarcerated loop of small bowel with primary repair of hernia defect with Dr. Inessa Sweet today.  Patient reports a history of diabetes, states that he takes 8 units of Humalog tidwc, 48 units of Levemir twice daily. Merari Ledesma reports history of PCI and CABG.  Patient reports history of A. fib, states that he was taken off Eliquis approximately a year ago and is only on aspirin and Plavix.  Patient seen postop and is doing well.  Abdominal pain is well controlled.  He denies any fever/chills, chest pain, shortness of breath, N/V/D.  Patient states he has not urinated since surgery.  Hospitalist will continue to follow.      Subjective:   Patient seen and examined, appears comfortable in bed, without any signs of cardiorespiratory distress. VS stable, afebrile, saturating well on room air. No improvement in renal function after hydration overnight. Bumex has been held since 4/30. Obtain accurate I&Os. RN instructed to do bladder scan, possible lira insertion. Family requested for Dr. Lj Walker.       Medications:  Reviewed    Infusion Medications    sodium chloride 100 mL/hr at 05/01/22 1013    sodium chloride      dextrose       Scheduled Medications    docusate sodium  100 mg Oral Daily    insulin detemir  48 Units SubCUTAneous BID    heparin (porcine)  5,000 Units SubCUTAneous 3 times per day    insulin lispro  8 Units SubCUTAneous TID WC    aspirin  81 mg Oral Daily    atorvastatin  40 mg Oral Nightly    [Held by provider] bumetanide  2 mg Oral Daily    dilTIAZem  120 mg Oral Daily    hydrALAZINE  25 mg Oral 3 times per day    isosorbide mononitrate  120 mg Oral Daily    metoprolol succinate  50 mg Oral Daily    ranolazine  1,000 mg Oral BID    tamsulosin  0.4 mg Oral Daily    sodium chloride flush  5-40 mL IntraVENous 2 times per day    insulin lispro  0-12 Units SubCUTAneous TID     insulin lispro  0-6 Units SubCUTAneous Nightly    naloxegol  25 mg Oral Daily     PRN Meds: nitroGLYCERIN, sodium chloride flush, sodium chloride, ondansetron **OR** ondansetron, potassium chloride, magnesium sulfate, traMADol **OR** traMADol, morphine **OR** morphine, acetaminophen **AND** diphenhydrAMINE, hydrALAZINE, glucagon (rDNA), dextrose, dextrose bolus (hypoglycemia) **OR** dextrose bolus (hypoglycemia), glucose      Intake/Output Summary (Last 24 hours) at 5/2/2022 0827  Last data filed at 5/1/2022 1530  Gross per 24 hour   Intake 400 ml   Output --   Net 400 ml       Diet:  ADULT DIET; Easy to Chew; 5 carb choices (75 gm/meal)    Exam:  BP (!) 168/72   Pulse 61   Temp 98.2 °F (36.8 °C) (Oral)   Resp 18   Ht 5' 9\" (1.753 m)   Wt 213 lb (96.6 kg)   SpO2 93%   BMI 31.45 kg/m²     General appearance: No apparent distress, appears stated age and cooperative. HEENT: Pupils equal, round, and reactive to light. Conjunctivae/corneas clear. Neck: Supple, with full range of motion. No jugular venous distention. Trachea midline. Respiratory:  Normal respiratory effort. Clear to auscultation, bilaterally without Rales/Wheezes/Rhonchi. Cardiovascular: Regular rate and rhythm with normal S1/S2 without murmurs, rubs or gallops. Abdomen: Soft, non-tender, non-distended with normal bowel sounds. Musculoskeletal: passive and active ROM x 4 extremities. Skin: Skin color, texture, turgor normal.  No rashes or lesions. Neurologic:  Neurovascularly intact without any focal sensory/motor deficits.  Cranial nerves: II-XII intact, grossly non-focal.  Psychiatric: Alert and oriented, thought content appropriate, normal insight  Capillary Refill: Brisk,< 3 seconds   Peripheral Pulses: +2 palpable, equal bilaterally       Labs:   Recent Labs     04/30/22  1112 05/01/22  0507   WBC 11.3* 11.4*   HGB 11.3* 10.5*   HCT 35.9* 32.3*    216     Recent Labs     04/30/22  1112 05/01/22  0507    133*   K 4.5 4.9   CL 98 97*   CO2 25 22*   BUN 45* 54*   CREATININE 2.1* 2.9*   CALCIUM 9.9 9.2     Recent Labs     04/30/22  1112   AST 19   ALT 20   BILIDIR <0.2   BILITOT 0.7   ALKPHOS 96     No results for input(s): INR in the last 72 hours. No results for input(s): Anastacia Booker in the last 72 hours. Urinalysis:      Lab Results   Component Value Date    NITRU NEGATIVE 04/06/2021    WBCUA NONE SEEN 04/06/2021    BACTERIA NONE SEEN 04/06/2021    RBCUA NONE SEEN 04/06/2021    BLOODU NEGATIVE 04/06/2021    SPECGRAV 1.008 04/06/2021    GLUCOSEU neg 03/29/2021       Radiology:  CT ABDOMEN PELVIS WO CONTRAST Additional Contrast? None   Final Result   Mechanical small bowel obstruction secondary to a strangulated umbilical hernia            **This report has been created using voice recognition software. It may contain minor errors which are inherent in voice recognition technology. **      Final report electronically signed by Dr. Brent Treviño on 4/30/2022 12:03 PM          Diet: ADULT DIET; Easy to Chew; 5 carb choices (75 gm/meal)    DVT prophylaxis: [] Lovenox                                 [] SCDs                                 [] SQ Heparin                                 [] Encourage ambulation           [] Already on Anticoagulation     Disposition:    [] Home       [] TCU       [] Rehab       [] Psych       [] SNF       [] [place]       [] Other-    Code Status: Full Code    PT/OT Eval Status:        Electronically signed by Melinda Naranjo MD on 5/2/2022 at 8:27 AM

## 2022-05-02 NOTE — CONSULTS
[facility]          [facility]        [address]        [id number]           Inpatient Initial consult note         5/2/2022 11:15 AM    Patient Name:   Pa Olivier:    [birth year]  Primary Care Physician:  Bunny Smith MD     History Obtained From:  patient     Consultation requested by : Patricia Velez MD    requested for  : Evaluation of  worsening renal function     History of presentingillness   Nayeli Vásquez is a 68 y.o.   male with Past Medical History as stated below presented with a chief complaint of Abdominal Pain and Hernia   on 4/30/2022 . Patient presented with chief complaints of abdominal pain, mainly in the umbilical region, very severe, constant, associated with nausea and vomiting. No fever or chills. He was found to have a painful incarcerated mass on his mid abdomen consistent with incarcerated hernia    And he had an emergency surgery done on the same day.   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    BPH (benign prostatic hypertrophy)     CAD (coronary artery disease) 1997    Carotid artery disease ([address] 1995    right    Carotid disease, bilateral ([address]     Cerebral artery occlusion with cerebral infarction ([address]     CHF (congestive heart failure) ([address]     Chronic kidney disease     GERD (gastroesophageal reflux disease)     GI bleed 07/2021    History of blood transfusion 2021    GI bleed    History of blood transfusion 1997    with heart surgery    Hyperlipidemia 1996    Hypertension     Lumbar disc disease 2001    MI (myocardial infarction) [facility] 1997    Nephrolithiasis 2002    NIDDM (non-insulin dependent diabetes mellitus) 1996    diagnosed 1996    RICCARDO (obstructive sleep apnea) 2005    cpap    PVC's (premature ventricular contractions)     Renal artery stenosis ([facility] 2007    left    Renal insufficiency     S/P CABG (status post coronary artery bypass graft) 1997    5 vessels    Skin cancer     Squamous cell carcinoma     Stenosis of right subclavian artery (HCC)     TIA (transient ischemic attack)     Wears partial dentures     upper and lower     Past Surgical History:   Procedure Laterality Date    ABLATION OF DYSRHYTHMIC FOCUS  3/13/13    CARDIAC CATHETERIZATION  2008 - 2019    stents x15   [address]  3/13/13    oblation for irreg rythm    CARDIOVERSION  3/8/2012    CAROTID ENDARTERECTOMY  1997    CHOLECYSTECTOMY  4/1999    COLONOSCOPY  6/22/09, 4/2011,7/12/17    polyp removal    COLONOSCOPY N/A 7/21/2021    COLONOSCOPY POLYPECTOMY SNARE/COLD BIOPSY performed by Shantanu Hook MD at [address] [place] [address]  2008 (2), 2009 (1)    x2    CORONARY ARTERY BYPASS GRAFT  8/1997    5 vessel    DIAGNOSTIC CARDIAC CATH LAB PROCEDURE      ENDOSCOPY, COLON, DIAGNOSTIC      EYE LID SURGERY Right 11/17/2021    EXCISION SCC RIGHT LOWER LATERAL EYELID WITH FROZEN SECTION performed by Washington Bahena MD at  [facility] Right 2/28/2022    EXCISION SCC RIGHT ZYGOMA performed by Washington Bahena MD at [address] [place] [address]  12/20/2013    pre-cancerous mole rt foot removed    LITHOTRIPSY  4/2002    [place] OFFICE/OUTPT VISIT,PROCEDURE ONLY N/A 11/8/2018    TRANSESOPHAGEAL ECHOCARDIOGRAM performed by Nelia Ash MD at [address] [place] [address] [facility]  05/30/2018 Alise  2001, 2003    rt shoulder manipulation--frozen shoulder-01, [place] -03   [facility] SHOULDER SURGERY  2001 - 2003    manipulation - bilateral    SKIN BIOPSY      SKIN CANCER EXCISION  12/20/13     R foot    SKIN CANCER EXCISION  1/2016    squamous cell Lt  upper cheek    UPPER GASTROINTESTINAL ENDOSCOPY N/A 7/20/2021    EGD ESOPHAGOGASTRODUODENOSCOPY performed by Marilu Roland MD at [address]  4/2002    VASCULAR SURGERY      carotids & cabg harvests    VENTRAL HERNIA REPAIR N/A 4/30/2022    exploratory laparotomy with repair of incarcerated hernia performed by Zen Rondon MD at [address] [facility] History     Socioeconomic History    Marital status:      Spouse name: Aditya Gale Number of children: 2    Years of education: Not on file    Highest education level: Not on file   Occupational History    Not on file   Tobacco Use    Smoking status: Never Smoker    Smokeless tobacco: Never Used   Vaping Use    Vaping Use: Never used   Substance and Sexual Activity    Alcohol use: No     Alcohol/week: 0.0 standard drinks    Drug use: No    Sexual activity: Yes     Partners: Female   Other Topics Concern    Not on file   Social History Narrative    Not on file     Social Determinants of Health     Financial Resource Strain: Low Risk     Difficulty of Paying Living Expenses: Not hard at all   Food Insecurity: No Food Insecurity    Worried About Running Out of Food in the Last Year: Never true    Rosa of Food in the Last Year: Never true   Transportation Needs:     Lack of Transportation (Medical): Not on file    Lack of Transportation (Non-Medical):  Not on file   Physical Activity: Inactive    Days of Exercise per Week: 0 days    Minutes of Exercise per Session: 0 min   Stress:     Feeling of Stress : Not on file   Social Connections:     Frequency of Communication with Friends and Family: Not on file    Frequency of Social Gatherings with Friends and Family: Not on file    Attends Zoroastrianism Services: Not on file    Active Member of Clubs or Organizations: Not on file    Attends Club or Organization Meetings: Not on file    Marital Status: Not on file   Intimate Partner Violence:     Fear of Current or Ex-Partner: Not on file    Emotionally Abused: Not on file    Physically Abused: Not on file    Sexually Abused: Not on file   Housing Stability:     Unable to Pay for Housing in the Last Year: Not on file    Number of [place] in the Last Year: Not on file    Unstable Housing in the Last Year: Not on file     Family History   Problem Relation Age of Onset    Cancer Mother     High Blood Pressure Mother     Stroke Father     High Blood Pressure Father     Heart Disease Brother     Cancer Brother         lung    Cancer Brother         melanoma     Medications & Allergies      Prior to Admission medications    Medication Sig Start Date End Date Taking?  Authorizing Provider   Insulin Pen Needle (NOVOFINE PEN NEEDLE) 32G X 6 MM MISC 1 each by Does not apply route 6 times daily 4/1/22   Luisa Spears MD   aspirin 81 MG EC tablet Take 81 mg by mouth daily    Historical Provider, MD   HUMALOG KWIKPEN 100 UNIT/ML SOPN INJECT SUBCUTANEOUSLY 7 UNITS IN THE MORNING , 7 UNITS AT LUNCH AND 12 UNITS AT SUPPER PLUS SLIDING SCALE DURING MEALS AND AT BEDTIME  Patient taking differently: Inject 8 units before breakfast, 8 units before lunch, and 12 units before dinner plus group 1 scale 2/7/22   Luisa Spears MD   metoprolol succinate (TOPROL XL) 50 MG extended release tablet TAKE 1 TABLET BY MOUTH  DAILY 2/4/22   Shayy Bermudez MD   ranolazine (RANEXA) 1000 MG extended release tablet TAKE 1 TABLET BY MOUTH  TWICE DAILY NEW DOSE 1/25/22   Shayy Bermudez MD   tamsulosin (FLOMAX) 0.4 MG capsule TAKE 1 CAPSULE BY MOUTH  DAILY 1/10/22   KAVON Vera CNP   bumetanide (BUMEX) 2 MG tablet Take 2 mg by mouth daily    Historical Provider, MD   NOVOFINE 32G X 6 MM MISC USE 1 NEW NEEDLE 6 TIMES  DAILY AND AS NEEDED 9/9/21   Jt Pratt MD   insulin detemir (LEVEMIR FLEXTOUCH) 100 UNIT/ML injection pen Inject 48 Units into the skin 2 times daily 8/27/21   Jt Pratt MD   ferrous sulfate (IRON 325) 325 (65 Fe) MG tablet Take 325 mg by mouth every other day    Historical Provider, MD   ascorbic acid (VITAMIN C) 500 MG tablet Take 500 mg by mouth every other day    Historical Provider, MD   nitroGLYCERIN (NITROLINGUAL) 0.4 MG/SPRAY 0.4 mg spray USE 1 SPRAY ON OR UNDER THE TONGUE EVERY 5 MINUTES AS NEEDED FOR CHEST PAIN 7/28/21   Tiana Cristina MD   dilTIAZem (CARDIZEM CD) 120 MG extended release capsule TAKE 1 CAPSULE BY MOUTH  DAILY 7/28/21   Tiana Cristina MD   isosorbide mononitrate (IMDUR) 120 MG extended release tablet Take 1 tablet by mouth daily 7/22/21   KAVON Murillo - CNP   blood glucose test strips (ACCU-CHEK GUIDE) strip USE TO CHECK 4 TIMES DAILY 7/6/21   Jt Pratt MD   Accu-Chek FastClix Lancets MISC USE TO TEST BLOOD SUGARS 4 TIMES DAILY 7/6/21   Jt Pratt MD   atorvastatin (LIPITOR) 40 MG tablet TAKE 1 TABLET BY MOUTH AT  NIGHT 6/29/21   Tiana Cristina MD   diphenhydrAMINE-APAP, sleep, (TYLENOL PM EXTRA STRENGTH)  MG tablet Take 1 tablet by mouth every evening as needed    Historical Provider, MD   hydrOXYzine (ATARAX) 10 MG tablet Take 10 mg by mouth daily as needed  3/13/21   Historical Provider, MD   fluocinonide (LIDEX) 0.05 % cream Apply 1 applicator topically as needed 12/29/20   Historical Provider, MD   BiPAP Machine MISC by Does not apply route    Historical Provider, MD   Multiple Vitamins-Minerals (THERAPEUTIC MULTIVITAMIN-MINERALS) tablet Take 1 tablet by mouth nightly    Historical Provider, MD   hydrALAZINE (APRESOLINE) 25 MG tablet Take 1 tablet by mouth every 8 hours 11/9/18   Kang Lott MD   clopidogrel (PLAVIX) 75 MG tablet Take 75 mg by mouth daily morning Historical Provider, MD   Lancet Devices (ACCU-CHEK SOFTCLIX LANCET DEV) MISC Use one with each blood check 1/14/14 4/2/18  Primo Do MD     Allergies: Patient has no known allergies. IP meds : Scheduled Meds:   docusate sodium  100 mg Oral Daily    insulin detemir  48 Units SubCUTAneous BID    heparin (porcine)  5,000 Units SubCUTAneous 3 times per day    insulin lispro  8 Units SubCUTAneous TID WC    aspirin  81 mg Oral Daily    atorvastatin  40 mg Oral Nightly    [Held by provider] bumetanide  2 mg Oral Daily    dilTIAZem  120 mg Oral Daily    hydrALAZINE  25 mg Oral 3 times per day    isosorbide mononitrate  120 mg Oral Daily    metoprolol succinate  50 mg Oral Daily    ranolazine  1,000 mg Oral BID    tamsulosin  0.4 mg Oral Daily    sodium chloride flush  5-40 mL IntraVENous 2 times per day    insulin lispro  0-12 Units SubCUTAneous TID     insulin lispro  0-6 Units SubCUTAneous Nightly    naloxegol  25 mg Oral Daily     Continuous Infusions:   sodium chloride 100 mL/hr at 05/01/22 1013    sodium chloride      dextrose       Review of Systems Physical Exam   Review of Systems   Constitutional: Positive for activity change and appetite change. Negative for chills, fatigue and fever. HENT: Negative. Eyes: Negative. Negative for pain. Respiratory: Negative for cough and shortness of breath. Cardiovascular: Negative for chest pain and leg swelling. Gastrointestinal: Positive for abdominal pain, nausea and vomiting. Negative for blood in stool and diarrhea. Genitourinary: Negative for dysuria, frequency and hematuria. Musculoskeletal: Negative for back pain and neck pain. Skin: Positive for wound. Negative for rash. Neurological: Negative for dizziness and headaches. Psychiatric/Behavioral: Negative for agitation and confusion. Physical Exam  Vitals reviewed. Constitutional:       General: He is not in acute distress. Appearance: Normal appearance.  He is not diaphoretic. HENT:      Head: Normocephalic and atraumatic. Right Ear: External ear normal.      Left Ear: External ear normal.      Nose: Nose normal.      Mouth/Throat:      Mouth: Mucous membranes are moist.   Eyes:      General: No scleral icterus. Right eye: No discharge. Left eye: No discharge. Conjunctiva/sclera: Conjunctivae normal.   Neck:      Thyroid: No thyromegaly. Vascular: No JVD. Cardiovascular:      Rate and Rhythm: Normal rate and regular rhythm. Heart sounds: Normal heart sounds. No murmur heard. Pulmonary:      Effort: Pulmonary effort is normal. No respiratory distress. Breath sounds: Normal breath sounds. No stridor. Chest:      Chest wall: No tenderness. Abdominal:      General: Bowel sounds are normal. There is distension. Palpations: Abdomen is soft. Tenderness: There is abdominal tenderness. Musculoskeletal:         General: No swelling or tenderness. Cervical back: Normal range of motion and neck supple. Right lower leg: No edema. Left lower leg: No edema. Skin:     General: Skin is warm and dry. Findings: No erythema or rash. Neurological:      General: No focal deficit present. Mental Status: He is alert and oriented to person, place, and time.    Psychiatric:         Mood and Affect: Mood normal.         Behavior: Behavior normal.           Vitals:    05/02/22 0824   BP: (!) 168/72   Pulse: 61   Resp: 18   Temp: 98.2 °F (36.8 °C)   SpO2: 93%     Labs, Radiology and Tests       Recent Labs     04/30/22  1112 05/01/22  0507   WBC 11.3* 11.4*   RBC 3.43* 3.14*   HGB 11.3* 10.5*   HCT 35.9* 32.3*   .7* 102.9*   MCH 32.9 33.4*   MCHC 31.5* 32.5    216     Recent Labs     04/30/22  1112 05/01/22  0507 05/02/22  0748    133* 132*   K 4.5 4.9 4.3   CL 98 97* 97*   CO2 25 22* 21*   BUN 45* 54* 67*   CREATININE 2.1* 2.9* 2.9*   CALCIUM 9.9 9.2 8.4*   PROT 7.8  --   --    LABALBU 4.2  --   --    BILITOT 0.7  --   --    ALKPHOS 96  --   --    AST 19  --   --    ALT 20  --   --        Radiology : CT scan of the abdomen and pelvis has been reviewed    Other : Old lab data have been reviewed and noted patient's baseline creatinine normally runs close to 2.0    Assessment    1 Renal -acute kidney injury, most likely secondary to some degree of volume depletion and he also did get some diuretic. ? Volume status looks stable currently on IV fluids and says he is making urine  ? Creatinine is stable today would anticipate improvement in the next 24-48 hours  ? Especially due to status post surgery there may be a component of obstruction we will obtain a bladder scan. ? If not much improvement in renal function tomorrow might get a renal ultrasound scan    2 Electrolytes -mild hyponatremia secondary to renal dysfunction should improve with improving renal function  3 Mild acidosis appears to be stable  4 Essential hypertension fluctuating  5 Hx of diabetes mellitus  6 Hx of coronary artery disease  7 Incarcerated umbilical hernia status post surgery  8 Meds reviewed and discussed with patient and wife in detail      **This report has been created using voice recognition software. It maycontain minor  errors which are inherent in voice recognition technology. **    Brice Ragsdale MD,M.D  [facility].

## 2022-05-02 NOTE — TELEPHONE ENCOUNTER
----- Message from Carrie West sent at 5/2/2022 12:02 PM EDT -----  Subject: Message to Provider    QUESTIONS  Information for Provider? [facility] is requesting a call back   from clinical staff regarding with medication HUMALOG KWIKPEN 100 UNIT/ML   SOPN. They are requesting addital instruction.   ---------------------------------------------------------------------------  --------------  CALL BACK INFO  What is the best way for the office to contact you? OK to leave message on   voicemail  Preferred Call Back Phone Number? [phone]  ---------------------------------------------------------------------------  --------------  SCRIPT ANSWERS  Relationship to Patient? Third Party  Third Party Type? Pharmacy? Representative Name?  Simran Lerma

## 2022-05-03 LAB
AMORPHOUS: ABNORMAL
ANION GAP SERPL CALCULATED.3IONS-SCNC: 13 MEQ/L (ref 8–16)
BACTERIA: ABNORMAL /HPF
BILIRUBIN URINE: ABNORMAL
BLOOD, URINE: ABNORMAL
BUN BLDV-MCNC: 69 MG/DL (ref 7–22)
CALCIUM SERPL-MCNC: 8 MG/DL (ref 8.5–10.5)
CASTS UA: ABNORMAL /LPF
CHARACTER, URINE: ABNORMAL
CHLORIDE BLD-SCNC: 102 MEQ/L (ref 98–111)
CO2: 21 MEQ/L (ref 23–33)
COLOR: ABNORMAL
CREAT SERPL-MCNC: 2.9 MG/DL (ref 0.4–1.2)
CRYSTALS, UA: ABNORMAL
EPITHELIAL CELLS, UA: ABNORMAL /HPF
GFR SERPL CREATININE-BSD FRML MDRD: 21 ML/MIN/1.73M2
GLUCOSE BLD-MCNC: 104 MG/DL (ref 70–108)
GLUCOSE BLD-MCNC: 116 MG/DL (ref 70–108)
GLUCOSE BLD-MCNC: 164 MG/DL (ref 70–108)
GLUCOSE BLD-MCNC: 208 MG/DL (ref 70–108)
GLUCOSE BLD-MCNC: 97 MG/DL (ref 70–108)
GLUCOSE URINE: NEGATIVE MG/DL
ICTOTEST: NEGATIVE
KETONES, URINE: NEGATIVE
LEUKOCYTE ESTERASE, URINE: ABNORMAL
MUCUS: ABNORMAL
NITRITE, URINE: NEGATIVE
PH UA: 6 (ref 5–9)
POTASSIUM SERPL-SCNC: 4.4 MEQ/L (ref 3.5–5.2)
PROTEIN UA: >= 300
RBC URINE: > 100 /HPF
RENAL EPITHELIAL, UA: ABNORMAL
SODIUM BLD-SCNC: 136 MEQ/L (ref 135–145)
SPECIFIC GRAVITY, URINE: 1.02 (ref 1–1.03)
UROBILINOGEN, URINE: 0.2 EU/DL (ref 0–1)
WBC UA: ABNORMAL /HPF

## 2022-05-03 PROCEDURE — 36415 COLL VENOUS BLD VENIPUNCTURE: CPT

## 2022-05-03 PROCEDURE — 6360000002 HC RX W HCPCS: Performed by: SURGERY

## 2022-05-03 PROCEDURE — 6370000000 HC RX 637 (ALT 250 FOR IP): Performed by: SURGERY

## 2022-05-03 PROCEDURE — 1200000003 HC TELEMETRY R&B

## 2022-05-03 PROCEDURE — 99024 POSTOP FOLLOW-UP VISIT: CPT | Performed by: SURGERY

## 2022-05-03 PROCEDURE — 99233 SBSQ HOSP IP/OBS HIGH 50: CPT | Performed by: FAMILY MEDICINE

## 2022-05-03 PROCEDURE — 99232 SBSQ HOSP IP/OBS MODERATE 35: CPT | Performed by: INTERNAL MEDICINE

## 2022-05-03 PROCEDURE — 87086 URINE CULTURE/COLONY COUNT: CPT

## 2022-05-03 PROCEDURE — 51798 US URINE CAPACITY MEASURE: CPT

## 2022-05-03 PROCEDURE — 81001 URINALYSIS AUTO W/SCOPE: CPT

## 2022-05-03 PROCEDURE — 82948 REAGENT STRIP/BLOOD GLUCOSE: CPT

## 2022-05-03 PROCEDURE — 80048 BASIC METABOLIC PNL TOTAL CA: CPT

## 2022-05-03 PROCEDURE — 51702 INSERT TEMP BLADDER CATH: CPT

## 2022-05-03 PROCEDURE — 2580000003 HC RX 258: Performed by: PHYSICIAN ASSISTANT

## 2022-05-03 RX ORDER — POLYETHYLENE GLYCOL 3350 17 G/17G
17 POWDER, FOR SOLUTION ORAL DAILY
Status: DISCONTINUED | OUTPATIENT
Start: 2022-05-03 | End: 2022-05-04 | Stop reason: HOSPADM

## 2022-05-03 RX ADMIN — SODIUM CHLORIDE: 9 INJECTION, SOLUTION INTRAVENOUS at 19:15

## 2022-05-03 RX ADMIN — DOCUSATE SODIUM 100 MG: 100 CAPSULE, LIQUID FILLED ORAL at 09:25

## 2022-05-03 RX ADMIN — POLYETHYLENE GLYCOL 3350 17 G: 17 POWDER, FOR SOLUTION ORAL at 09:25

## 2022-05-03 RX ADMIN — SODIUM CHLORIDE: 9 INJECTION, SOLUTION INTRAVENOUS at 09:13

## 2022-05-03 RX ADMIN — HEPARIN SODIUM 5000 UNITS: 5000 INJECTION INTRAVENOUS; SUBCUTANEOUS at 14:07

## 2022-05-03 RX ADMIN — ASPIRIN 81 MG: 81 TABLET, COATED ORAL at 09:30

## 2022-05-03 RX ADMIN — NALOXEGOL OXALATE 25 MG: 25 TABLET, FILM COATED ORAL at 09:26

## 2022-05-03 RX ADMIN — METOPROLOL SUCCINATE 50 MG: 50 TABLET, EXTENDED RELEASE ORAL at 11:04

## 2022-05-03 RX ADMIN — ONDANSETRON 4 MG: 2 INJECTION INTRAMUSCULAR; INTRAVENOUS at 21:40

## 2022-05-03 RX ADMIN — TAMSULOSIN HYDROCHLORIDE 0.4 MG: 0.4 CAPSULE ORAL at 09:27

## 2022-05-03 RX ADMIN — HEPARIN SODIUM 5000 UNITS: 5000 INJECTION INTRAVENOUS; SUBCUTANEOUS at 06:21

## 2022-05-03 RX ADMIN — RANOLAZINE 1000 MG: 500 TABLET, FILM COATED, EXTENDED RELEASE ORAL at 09:26

## 2022-05-03 RX ADMIN — MAGNESIUM HYDROXIDE 30 ML: 2400 SUSPENSION ORAL at 09:25

## 2022-05-03 RX ADMIN — ISOSORBIDE MONONITRATE 120 MG: 60 TABLET, EXTENDED RELEASE ORAL at 09:26

## 2022-05-03 RX ADMIN — HEPARIN SODIUM 5000 UNITS: 5000 INJECTION INTRAVENOUS; SUBCUTANEOUS at 21:25

## 2022-05-03 RX ADMIN — HYDRALAZINE HYDROCHLORIDE 25 MG: 25 TABLET ORAL at 06:21

## 2022-05-03 RX ADMIN — DILTIAZEM HYDROCHLORIDE 120 MG: 120 CAPSULE, COATED, EXTENDED RELEASE ORAL at 09:27

## 2022-05-03 RX ADMIN — RANOLAZINE 1000 MG: 500 TABLET, FILM COATED, EXTENDED RELEASE ORAL at 20:03

## 2022-05-03 RX ADMIN — HYDRALAZINE HYDROCHLORIDE 25 MG: 25 TABLET ORAL at 21:25

## 2022-05-03 RX ADMIN — ATORVASTATIN CALCIUM 40 MG: 40 TABLET, FILM COATED ORAL at 20:03

## 2022-05-03 RX ADMIN — INSULIN LISPRO 8 UNITS: 100 INJECTION, SOLUTION INTRAVENOUS; SUBCUTANEOUS at 17:26

## 2022-05-03 RX ADMIN — HYDRALAZINE HYDROCHLORIDE 25 MG: 25 TABLET ORAL at 14:07

## 2022-05-03 ASSESSMENT — PAIN SCALES - GENERAL: PAINLEVEL_OUTOF10: 0

## 2022-05-03 NOTE — PROGRESS NOTES
Patient voided 350 ml of dark, cloudy urine. Unclear if this is new onset as patient is poor historian and cant recall.

## 2022-05-03 NOTE — FLOWSHEET NOTE
05/03/22 1346   Encounter Summary   Encounter Overview/Reason  Initial Encounter   Service Provided For: Patient   Referral/Consult From: [address] Family members   Last Encounter  05/03/22   Complexity of Encounter Moderate   Begin Time 1339   End Time  1346   Total Time Calculated 7 min   Encounter    Type Initial Screen/Assessment   Assessment/Intervention/Outcome   Assessment Coping   Intervention Sustaining Presence/Ministry of presence   Outcome Comfort;Coping   Assessment: In my encounter with the 68 yr old patient, while rounding  the unit 5K. I provided spiritual care to patient through conversation, I also came to assess the patients spiritual needs present. The pt was admitted due to strangulated umbilical hernia. Interventions:  I provided prayer, emotional support and words of comfort.  provided a listening presence and encouraged pt to share their beliefs and how they support him during their hospitalization. Outcomes: The patient was encouraged and didnt share any further spiritual needs at this time. Plan:  Chaplains will follow-up at a later time for assessment of any spiritual care needs present.

## 2022-05-03 NOTE — PROGRESS NOTES
PROGRESS NOTE      Patient:  Gail Aguilera      Unit/Bed:5K-24/024-A    YOB: 1945    MRN: [id number]       Acct: [de-identified]     PCP: Michele Duggan MD    Date of Admission: 4/30/2022      Assessment/Plan:    Anticipated Discharge in : pending clinical course     Active Hospital Problems    Diagnosis Date Noted    Chronic diastolic congestive heart failure ([facility] [address] [I50.32] 12/10/2015     Priority: High    Strangulated umbilical hernia [Q13.9] 04/30/2022     Priority: Medium    MADI (acute kidney injury) ([facility] [address] [N17.9] 09/17/2018    Diabetes mellitus type 2, insulin dependent (HCC) [E11.9, Z79.4] 08/03/2017       Incarcerated strangulated ventral hernia  S/p exploratory laparotomy, lysis of small bowel adhesions with reduction of incarceration loop of small bowel with primary repair of hernia defect with Dr. Sherren Ade as primary    MADI on CKD Stage III  Nephrology consulted, appreciate the recommendations   Likely prerenal, BUN/Cr ratio >20 with some degree of volume depletion, possible urinary retention also contributing.  History of BPH and bladder cancer noted   Renal US with probable left renal cysts and 910mL residual volume in bladder with distension   Continue IVF, lira catheter placed    Urinary Retention  US demonstrating 910mL residual volume in bladder with distension  History of BPH and bladder cancer noted   Lira catheter placed  Will likely need outpatient f/u with Urology, previously following with Dr. Ryland Gomez flomax     IDDMII  Continue home kendra CARDENAS SS   Hypoglycemia protocol ordered    Paroxysmal Atrial Fibrillation   Continue Cardizem, metroprolol     Hx CAD s/p CABG, PCI  Continue home medications     Chronic HFpEF  Echo 11/11/2021 EF 50-55%  Monitor fluid status closely, continue IVF for MADI  Strict I&Os, daily weights     Essential HTN  Continue home medications, controlled       Chief Complaint: Abdominal pain    Hospital Course: Per Initial H&P:  Aurelia Stewart [id number] y. o. male who we are asked to see/evaluate by Thierry Mendoza MD for medical management of DM, CAD, AF, CHF, CKD.  The patient presented to the ED for abdominal pain and nausea.  CT revealed incarcerated loop of small bowel in the midabdomen resulting in SBO.  Patient underwent exploratory laparotomy, lysis of small bowel adhesions with reduction of incarcerated loop of small bowel with primary repair of hernia defect with Dr. Juan Jose Rahman today.  Patient reports a history of diabetes, states that he takes 8 units of Humalog tidwc, 48 units of Levemir twice daily. Laurie Norman reports history of PCI and CABG.  Patient reports history of A. fib, states that he was taken off Eliquis approximately a year ago and is only on aspirin and Plavix.  Patient seen postop and is doing well.  Abdominal pain is well controlled. Laurie Norman denies any fever/chills, chest pain, shortness of breath, N/V/D.  Patient states he has not urinated since surgery.  Hospitalist will continue to follow. \"        Subjective:  Pt seen in the AM resting in bed. VS reviewed. Only complaint is shortness of breath, is not worsening but experiencing some shortness of breath when ambulating in the halls. Pt feels he has noticed decreased UOP, last urination was darker in color and less than normal per pt. Denies any chest pain, abdominal pain, vomiting, hematuria. Has been experiencing intermittent nausea, has not had a BM yet. Surgery is okay with discharge from their standpoint once renal function recovers, nephrology is following. Renal US yesterday with >900mL residual urine in bladder, probable left renal cysts.        Medications:  Reviewed    Infusion Medications    sodium chloride 100 mL/hr at 05/03/22 0913    sodium chloride      dextrose       Scheduled Medications    polyethylene glycol  17 g Oral Daily    docusate sodium  100 mg Oral Daily    insulin detemir  48 Units SubCUTAneous BID    heparin (porcine)  5,000 Units SubCUTAneous 3 times per day    insulin lispro  8 Units SubCUTAneous TID     aspirin  81 mg Oral Daily    atorvastatin  40 mg Oral Nightly    [Held by provider] bumetanide  2 mg Oral Daily    dilTIAZem  120 mg Oral Daily    hydrALAZINE  25 mg Oral 3 times per day    isosorbide mononitrate  120 mg Oral Daily    metoprolol succinate  50 mg Oral Daily    ranolazine  1,000 mg Oral BID    tamsulosin  0.4 mg Oral Daily    sodium chloride flush  5-40 mL IntraVENous 2 times per day    insulin lispro  0-12 Units SubCUTAneous TID     insulin lispro  0-6 Units SubCUTAneous Nightly    naloxegol  25 mg Oral Daily     PRN Meds: nitroGLYCERIN, sodium chloride flush, sodium chloride, ondansetron **OR** ondansetron, potassium chloride, magnesium sulfate, traMADol **OR** traMADol, morphine **OR** morphine, acetaminophen **AND** diphenhydrAMINE, hydrALAZINE, glucagon (rDNA), dextrose, dextrose bolus (hypoglycemia) **OR** dextrose bolus (hypoglycemia), glucose      Intake/Output Summary (Last 24 hours) at 5/3/2022 1416  Last data filed at 5/3/2022 1149  Gross per 24 hour   Intake 240 ml   Output 1575 ml   Net -1335 ml       Diet:  ADULT DIET; Easy to Chew; 5 carb choices (75 gm/meal)    Exam:  BP (!) 139/54   Pulse 59   Temp 97.4 °F (36.3 °C) (Oral)   Resp 18   Ht 5' 9\" (1.753 m)   Wt 226 lb (102.5 kg)   SpO2 97%   BMI 33.37 kg/m²     Physical Exam  Constitutional:       Appearance: Normal appearance. HENT:      Head: Normocephalic and atraumatic. Right Ear: External ear normal.      Left Ear: External ear normal.      Mouth/Throat:      Mouth: Mucous membranes are moist.   Eyes:      Extraocular Movements: Extraocular movements intact. Conjunctiva/sclera: Conjunctivae normal.      Pupils: Pupils are equal, round, and reactive to light. Cardiovascular:      Rate and Rhythm: Normal rate and regular rhythm. Heart sounds: Normal heart sounds. No murmur heard. No gallop.     Pulmonary:      Effort: Pulmonary effort is normal. No respiratory distress. Breath sounds: Normal breath sounds. No wheezing or rales. Abdominal:      General: Abdomen is flat. Bowel sounds are normal. There is no distension. Palpations: Abdomen is soft. Tenderness: There is no abdominal tenderness. There is no guarding. Musculoskeletal:         General: Normal range of motion. Skin:     General: Skin is warm. Neurological:      General: No focal deficit present. Mental Status: He is alert and oriented to person, place, and time. Labs:   Recent Labs     05/01/22  0507   WBC 11.4*   HGB 10.5*   HCT 32.3*        Recent Labs     05/01/22  0507 05/02/22  0748 05/03/22  0545   * 132* 136   K 4.9 4.3 4.4   CL 97* 97* 102   CO2 22* 21* 21*   BUN 54* 67* 69*   CREATININE 2.9* 2.9* 2.9*   CALCIUM 9.2 8.4* 8.0*     No results for input(s): AST, ALT, BILIDIR, BILITOT, ALKPHOS in the last 72 hours. No results for input(s): INR in the last 72 hours. No results for input(s): [place] in the last 72 hours. Urinalysis:      Lab Results   Component Value Date    NITRU NEGATIVE 05/03/2022    WBCUA 2-4 05/03/2022    BACTERIA NONE 05/03/2022    RBCUA > 100 05/03/2022    BLOODU LARGE 05/03/2022    SPECGRAV 1.008 04/06/2021    GLUCOSEU NEGATIVE 05/03/2022       Radiology:  US RENAL LIMITED   Final Result   1. Probable left renal cysts. 2. Distended urinary bladder. Final report electronically signed by Dr. Rosa Anthony on 5/2/2022 3:15 PM      CT ABDOMEN PELVIS WO CONTRAST Additional Contrast? None   Final Result   Mechanical small bowel obstruction secondary to a strangulated umbilical hernia            **This report has been created using voice recognition software. It may contain minor errors which are inherent in voice recognition technology. **      Final report electronically signed by Dr. Perez Garza on 4/30/2022 12:03 PM          Diet: ADULT DIET; Easy to Chew; 5 carb choices (75 gm/meal)    DVT prophylaxis: [] Lovenox                                 [] SCDs                                 [x] SQ Heparin                                 [] Encourage ambulation           [] Already on Anticoagulation     Disposition:    [x] Home       [] TCU       [] Rehab       [] Psych       [] SNF       [] [place]       [] Other-    Code Status: Full Code    PT/OT Eval Status: Ordered       Electronically signed by Channing Bonilla DO on 5/3/2022 at 2:16 PM    This note was electronically signed. Parts of this note may have been dictated by use of voice recognition software and electronically transcribed. The note may contain errors not detected in proofreading. Please refer to my supervising physician's documentation if my documentation differs.

## 2022-05-03 NOTE — TELEPHONE ENCOUNTER
I called in to [facility] and advised them max daily dose is 46 units. I spoke to [place].

## 2022-05-03 NOTE — PROGRESS NOTES
Camelia Chacko 60  PHYSICAL THERAPY MISSED TREATMENT NOTE  [facility] ONC MED 5K    Date: 5/3/2022  Patient Name: Ricky Enrique        MRN: [id number]   : 1945  (68 y.o.)  Gender: male                REASON FOR MISSED TREATMENT:  Missed Treat. Pt is ambulating I'ly in hallway, PT will sign off.

## 2022-05-03 NOTE — PROCEDURES
Bladder scan completed at 2025 and results told to [facility]. Scan showed 269 mL in the patients bladder. Last void was unknown.  Harry Melendrez CET

## 2022-05-03 NOTE — PROGRESS NOTES
Teodora Castañeda MD    Postoperative Progress Note  Pt Name: Priyanka Pedersen  Medical Record Number: [id number]  Date of Birth 1945   Today's Date: 5/3/2022  ASSESSMENT   1. POD # 3 open repair of incarcerated strangulated midline ventral hernia, periumbilical with lysis of adhesions  2. Acute on chronic renal insufficiency. Creatinine stable slight rise in BUN. 3. Insulin-dependent diabetes mellitus  4. Hypertension-stable treated  5. Diastolic CHF stable  6. Urinary retention. Post void residuals consistently over 300. Nephrology following and managing. Fluid management per nephrology. has a past medical history of Adenomatous colon polyp, [facility] at [facility]), Arthritis, Atrial fibrillation ([address] 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[address] BPH (benign prostatic hypertrophy), CAD (coronary artery disease), Carotid artery disease ([address] Carotid disease, bilateral ([address] Cerebral artery occlusion with cerebral infarction ([address] CHF (congestive heart failure) ([address] Chronic kidney disease, GERD (gastroesophageal reflux disease), GI bleed, History of blood transfusion, History of blood transfusion, Hyperlipidemia, Hypertension, Lumbar disc disease, MI (myocardial infarction) ([address] Nephrolithiasis, NIDDM (non-insulin dependent diabetes mellitus), RICCARDO (obstructive sleep apnea), PVC's (premature ventricular contractions), Renal artery stenosis ([address] Renal insufficiency, S/P CABG (status post coronary artery bypass graft), Skin cancer, Squamous cell carcinoma, Stenosis of right subclavian artery ([address] TIA (transient ischemic attack), and Wears partial dentures. PLANS   1. Analgesics and antiemetics as needed  2. IV hydration continue fluids with bump in creatinine. Nephrology consulted and managing. Renal ultrasound noted. Some urinary retention. 3. Soft carb controlled diet as tolerated  4. Increase activity as tolerated. States that he does not walk much secondary to chronic back pain. He is agreeable to physical therapy. 5. Heparin for DVT prophylaxis  6. Hospitalist following for medical issues  7. Patient tolerating diet. Medical issues prevent discharge at this time acute on chronic renal insufficiency. 8. Add bowel regimen with MiraLAX and stool softeners  SUBJECTIVE   Марина Salazar is doing well. Incisions look good. Urine output adequate. A.m. labs pending. Tolerating oral intake and passing flatus  CURRENT MEDICATIONS   Scheduled Meds:   docusate sodium  100 mg Oral Daily    insulin detemir  48 Units SubCUTAneous BID    heparin (porcine)  5,000 Units SubCUTAneous 3 times per day    insulin lispro  8 Units SubCUTAneous TID     aspirin  81 mg Oral Daily    atorvastatin  40 mg Oral Nightly    [Held by provider] bumetanide  2 mg Oral Daily    dilTIAZem  120 mg Oral Daily    hydrALAZINE  25 mg Oral 3 times per day    isosorbide mononitrate  120 mg Oral Daily    metoprolol succinate  50 mg Oral Daily    ranolazine  1,000 mg Oral BID    tamsulosin  0.4 mg Oral Daily    sodium chloride flush  5-40 mL IntraVENous 2 times per day    insulin lispro  0-12 Units SubCUTAneous TID     insulin lispro  0-6 Units SubCUTAneous Nightly    naloxegol  25 mg Oral Daily     Continuous Infusions:   sodium chloride 100 mL/hr at 05/01/22 1013    sodium chloride      dextrose       PRN Meds:.nitroGLYCERIN, sodium chloride flush, sodium chloride, ondansetron **OR** ondansetron, potassium chloride, magnesium sulfate, traMADol **OR** traMADol, morphine **OR** morphine, acetaminophen **AND** diphenhydrAMINE, hydrALAZINE, glucagon (rDNA), dextrose, dextrose bolus (hypoglycemia) **OR** dextrose bolus (hypoglycemia), glucose  OBJECTIVE   CURRENT VITALS:  height is 5' 9\" (1.753 m) and weight is 213 lb (96.6 kg). His oral temperature is 98.3 °F (36.8 °C). His blood pressure is 147/59 (abnormal) and his pulse is 54. His respiration is 18 and oxygen saturation is 98%.   Body mass index is 31.45 kg/m². Temperature Range (24h):Temp: 98.3 °F (36.8 °C) Temp  Av.9 °F (36.6 °C)  Min: 97.5 °F (36.4 °C)  Max: 98.3 °F (36.8 °C)  BP Range (34M): Systolic (86MGU), HLF:447 , Min:137 , XYX:027     Diastolic (89KUW), APR:69, Min:59, Max:66    Pulse Range (24h): Pulse  Av.5  Min: 52  Max: 54  Respiration Range (24h): Resp  Av  Min: 16  Max: 20  Current Pulse Ox (24h):  SpO2: 98 %  Pulse Ox Range (24h):  SpO2  Av %  Min: 92 %  Max: 98 %  Oxygen Amount and Delivery: O2 Flow Rate (L/min): 3 L/min  Incentive Spirometry Tx:            GENERAL: alert, no distress  LUNGS: Clear diminished bases  HEART: Pulse regular heart rate mid 50s  ABDOMEN: Soft sounds present. INCISION: Clean dry and intact no bleeding   EXTREMITY: no cyanosis, clubbing or edema  In: 200 [P.O.:200]  Out: 1100 [Urine:1100]     Date 22 - 22   Shift [id number] 2218-1819 24 Hour Total   INTAKE   Shift Total(mL/kg)       OUTPUT   Urine(mL/kg/hr) 350(0.5)   350   Shift Total(mL/kg) 350(3.6)   350(3.6)   Weight (kg) 96.6 96.6 96.6 96.6     LABS     Recent Labs     22  1112 22  1112 22  0507 22  0748 22  0545   WBC 11.3*  --  11.4*  --   --    HGB 11.3*  --  10.5*  --   --    HCT 35.9*  --  32.3*  --   --      --  216  --   --       < > 133* 132* 136   K 4.5  --  4.9 4.3 4.4   CL 98   < > 97* 97* 102   CO2 25   < > 22* 21* 21*   BUN 45*   < > 54* 67* 69*   CREATININE 2.1*   < > 2.9* 2.9* 2.9*   CALCIUM 9.9   < > 9.2 8.4* 8.0*    < > = values in this interval not displayed. No results for input(s): PTT, INR in the last 72 hours. Invalid input(s): PT  Recent Labs     22  1112   AST 19   ALT 20   BILITOT 0.7   BILIDIR <0.2   LIPASE 32.4     Recent Labs     22  1112   TROPONINT 0.011*         RADIOLOGY     US RENAL LIMITED   Final Result   1. Probable left renal cysts. 2. Distended urinary bladder.       Final report electronically signed by Dr. Nehemiah Romeo Thesergio Laming on 5/2/2022 3:15 PM      CT ABDOMEN PELVIS WO CONTRAST Additional Contrast? None   Final Result   Mechanical small bowel obstruction secondary to a strangulated umbilical hernia            **This report has been created using voice recognition software. It may contain minor errors which are inherent in voice recognition technology. **      Final report electronically signed by Dr. Hortencia Marin on 4/30/2022 12:03 PM          Electronically signed by Joseph Romeo MD on 5/3/2022 at 8:53 AM

## 2022-05-03 NOTE — CARE COORDINATION
5/3/22, 9:05 AM EDT    DISCHARGE ON GOING EVALUATION    Madalyn Maria day: 3  Location: -24/024-A Reason for admit: Strangulated umbilical hernia [H95.5]  Small bowel obstruction ([address] [Q51.441]   Procedure:   4/30 CT Abd/pelvis: Mechanical small bowel obstruction secondary to a strangulated umbilical hernia  5/07 Exploratory laparotomy lysis of small bowel adhesions with reduction of incarcerated loop of small bowel with primary repair of hernia defect  5/2 US Renal   Impression:        1. Probable left renal cysts. 2. Distended urinary bladder. Barriers to Discharge: Creatinine 2.9, Nephrology following. IV fluids, Lipitor, diabetes management, Hydralazine IV prn, Movantik, Ranexa, pain and nausea control. PCP: Nataly Mcmanus MD  Readmission Risk Score: 22.1 ( )%  Patient Goals/Plan/Treatment Preferences: Plan is return home with spouse, denies needs.   Will follow

## 2022-05-03 NOTE — PROGRESS NOTES
Patient has been straight cathed twice during admission. Patient urinated 300ml, but post void bladder scan showed 920ml.  Perfect serve message sent to Dr Hernandez Needle and call was returned to go ahead and place lira per

## 2022-05-04 ENCOUNTER — TELEPHONE (OUTPATIENT)
Dept: UROLOGY | Age: 77
End: 2022-05-04

## 2022-05-04 VITALS
TEMPERATURE: 97.9 F | HEIGHT: 69 IN | HEART RATE: 56 BPM | OXYGEN SATURATION: 98 % | SYSTOLIC BLOOD PRESSURE: 111 MMHG | BODY MASS INDEX: 33.47 KG/M2 | DIASTOLIC BLOOD PRESSURE: 43 MMHG | RESPIRATION RATE: 18 BRPM | WEIGHT: 226 LBS

## 2022-05-04 LAB
ANION GAP SERPL CALCULATED.3IONS-SCNC: 11 MEQ/L (ref 8–16)
BASOPHILS # BLD: 0.3 %
BASOPHILS ABSOLUTE: 0 THOU/MM3 (ref 0–0.1)
BUN BLDV-MCNC: 61 MG/DL (ref 7–22)
CALCIUM SERPL-MCNC: 8.1 MG/DL (ref 8.5–10.5)
CHLORIDE BLD-SCNC: 100 MEQ/L (ref 98–111)
CO2: 21 MEQ/L (ref 23–33)
CREAT SERPL-MCNC: 2.3 MG/DL (ref 0.4–1.2)
EOSINOPHIL # BLD: 1.8 %
EOSINOPHILS ABSOLUTE: 0.2 THOU/MM3 (ref 0–0.4)
ERYTHROCYTE [DISTWIDTH] IN BLOOD BY AUTOMATED COUNT: 13.5 % (ref 11.5–14.5)
ERYTHROCYTE [DISTWIDTH] IN BLOOD BY AUTOMATED COUNT: 51.3 FL (ref 35–45)
GFR SERPL CREATININE-BSD FRML MDRD: 28 ML/MIN/1.73M2
GLUCOSE BLD-MCNC: 180 MG/DL (ref 70–108)
GLUCOSE BLD-MCNC: 72 MG/DL (ref 70–108)
GLUCOSE BLD-MCNC: 75 MG/DL (ref 70–108)
GLUCOSE BLD-MCNC: 94 MG/DL (ref 70–108)
HCT VFR BLD CALC: 31.4 % (ref 42–52)
HEMOGLOBIN: 10.1 GM/DL (ref 14–18)
IMMATURE GRANS (ABS): 0.1 THOU/MM3 (ref 0–0.07)
IMMATURE GRANULOCYTES: 1 %
LYMPHOCYTES # BLD: 7 %
LYMPHOCYTES ABSOLUTE: 0.7 THOU/MM3 (ref 1–4.8)
MCH RBC QN AUTO: 33.4 PG (ref 26–33)
MCHC RBC AUTO-ENTMCNC: 32.2 GM/DL (ref 32.2–35.5)
MCV RBC AUTO: 104 FL (ref 80–94)
MONOCYTES # BLD: 11.4 %
MONOCYTES ABSOLUTE: 1.1 THOU/MM3 (ref 0.4–1.3)
NUCLEATED RED BLOOD CELLS: 0 /100 WBC
PLATELET # BLD: 187 THOU/MM3 (ref 130–400)
PMV BLD AUTO: 9.8 FL (ref 9.4–12.4)
POTASSIUM SERPL-SCNC: 4.1 MEQ/L (ref 3.5–5.2)
RBC # BLD: 3.02 MILL/MM3 (ref 4.7–6.1)
SEG NEUTROPHILS: 78.5 %
SEGMENTED NEUTROPHILS ABSOLUTE COUNT: 7.9 THOU/MM3 (ref 1.8–7.7)
SODIUM BLD-SCNC: 132 MEQ/L (ref 135–145)
WBC # BLD: 10 THOU/MM3 (ref 4.8–10.8)

## 2022-05-04 PROCEDURE — 99024 POSTOP FOLLOW-UP VISIT: CPT | Performed by: SURGERY

## 2022-05-04 PROCEDURE — 99223 1ST HOSP IP/OBS HIGH 75: CPT | Performed by: UROLOGY

## 2022-05-04 PROCEDURE — 6370000000 HC RX 637 (ALT 250 FOR IP): Performed by: SURGERY

## 2022-05-04 PROCEDURE — 99232 SBSQ HOSP IP/OBS MODERATE 35: CPT | Performed by: INTERNAL MEDICINE

## 2022-05-04 PROCEDURE — 99233 SBSQ HOSP IP/OBS HIGH 50: CPT | Performed by: INTERNAL MEDICINE

## 2022-05-04 PROCEDURE — 94760 N-INVAS EAR/PLS OXIMETRY 1: CPT

## 2022-05-04 PROCEDURE — 85025 COMPLETE CBC W/AUTO DIFF WBC: CPT

## 2022-05-04 PROCEDURE — 82948 REAGENT STRIP/BLOOD GLUCOSE: CPT

## 2022-05-04 PROCEDURE — 6360000002 HC RX W HCPCS: Performed by: SURGERY

## 2022-05-04 PROCEDURE — 36415 COLL VENOUS BLD VENIPUNCTURE: CPT

## 2022-05-04 PROCEDURE — 80048 BASIC METABOLIC PNL TOTAL CA: CPT

## 2022-05-04 RX ORDER — DOCUSATE SODIUM 100 MG/1
100 CAPSULE, LIQUID FILLED ORAL DAILY
COMMUNITY
Start: 2022-05-05 | End: 2022-08-08

## 2022-05-04 RX ORDER — TAMSULOSIN HYDROCHLORIDE 0.4 MG/1
0.8 CAPSULE ORAL DAILY
Status: DISCONTINUED | OUTPATIENT
Start: 2022-05-05 | End: 2022-05-04 | Stop reason: HOSPADM

## 2022-05-04 RX ORDER — ISOSORBIDE MONONITRATE 120 MG/1
120 TABLET, EXTENDED RELEASE ORAL DAILY
Qty: 90 TABLET | Refills: 3 | OUTPATIENT
Start: 2022-05-04

## 2022-05-04 RX ORDER — TAMSULOSIN HYDROCHLORIDE 0.4 MG/1
0.4 CAPSULE ORAL 2 TIMES DAILY
Qty: 90 CAPSULE | Refills: 2 | Status: SHIPPED | OUTPATIENT
Start: 2022-05-04 | End: 2022-07-18

## 2022-05-04 RX ORDER — POLYETHYLENE GLYCOL 3350 17 G/17G
17 POWDER, FOR SOLUTION ORAL DAILY
Qty: 527 G | Refills: 1 | Status: SHIPPED | OUTPATIENT
Start: 2022-05-05 | End: 2022-06-04

## 2022-05-04 RX ORDER — TRAMADOL HYDROCHLORIDE 50 MG/1
50 TABLET ORAL EVERY 6 HOURS PRN
Qty: 28 TABLET | Refills: 0 | Status: SHIPPED | OUTPATIENT
Start: 2022-05-04 | End: 2022-05-11

## 2022-05-04 RX ADMIN — TAMSULOSIN HYDROCHLORIDE 0.4 MG: 0.4 CAPSULE ORAL at 09:05

## 2022-05-04 RX ADMIN — HEPARIN SODIUM 5000 UNITS: 5000 INJECTION INTRAVENOUS; SUBCUTANEOUS at 06:26

## 2022-05-04 RX ADMIN — METOPROLOL SUCCINATE 50 MG: 50 TABLET, EXTENDED RELEASE ORAL at 11:03

## 2022-05-04 RX ADMIN — DILTIAZEM HYDROCHLORIDE 120 MG: 120 CAPSULE, COATED, EXTENDED RELEASE ORAL at 09:06

## 2022-05-04 RX ADMIN — ASPIRIN 81 MG: 81 TABLET, COATED ORAL at 09:06

## 2022-05-04 RX ADMIN — INSULIN LISPRO 8 UNITS: 100 INJECTION, SOLUTION INTRAVENOUS; SUBCUTANEOUS at 12:57

## 2022-05-04 RX ADMIN — HYDRALAZINE HYDROCHLORIDE 25 MG: 25 TABLET ORAL at 09:06

## 2022-05-04 RX ADMIN — ISOSORBIDE MONONITRATE 120 MG: 60 TABLET, EXTENDED RELEASE ORAL at 09:06

## 2022-05-04 RX ADMIN — POLYETHYLENE GLYCOL 3350 17 G: 17 POWDER, FOR SOLUTION ORAL at 09:06

## 2022-05-04 RX ADMIN — RANOLAZINE 1000 MG: 500 TABLET, FILM COATED, EXTENDED RELEASE ORAL at 09:05

## 2022-05-04 RX ADMIN — NALOXEGOL OXALATE 25 MG: 25 TABLET, FILM COATED ORAL at 09:06

## 2022-05-04 RX ADMIN — DOCUSATE SODIUM 100 MG: 100 CAPSULE, LIQUID FILLED ORAL at 09:06

## 2022-05-04 ASSESSMENT — PAIN SCALES - GENERAL: PAINLEVEL_OUTOF10: 0

## 2022-05-04 NOTE — PROGRESS NOTES
PROGRESS NOTE      Patient:  Michele Rao      Unit/Bed:5K-24/024-A    YOB: 1945    MRN: [id number]       Acct: [de-identified]     PCP: Nataly Mcmanus MD    Date of Admission: 4/30/2022      Assessment/Plan:    Anticipated Discharge in : Likely today    Active Hospital Problems    Diagnosis Date Noted    Chronic diastolic congestive heart failure ([facility] [address] [I50.32] 12/10/2015     Priority: High    Strangulated umbilical hernia [I65.4] 04/30/2022     Priority: Medium    MADI (acute kidney injury) ([facility] [address] [N17.9] 09/17/2018    Diabetes mellitus type 2, insulin dependent (HCC) [E11.9, Z79.4] 08/03/2017       Incarcerated strangulated ventral hernia  S/p exploratory laparotomy, lysis of small bowel adhesions with reduction of incarceration loop of small bowel with primary repair of hernia defect with Dr. Beka Jesus as primary    MADI on CKD Stage III, improving  Nephrology consulted, appreciate the recommendations   Likely prerenal, BUN/Cr ratio >20 with some degree of volume depletion, possible urinary retention also contributing. History of BPH and bladder cancer noted   Renal US with probable left renal cysts and 910mL residual volume in bladder with distension   Continue IVF, lira catheter placed  Cr improved to 2.3, nephrology okay with discharge but would like pt to be seen by Urology prior to discharge.  BMP 1 week following discharge     Urinary Retention  US demonstrating 910mL residual volume in bladder with distension  History of BPH and bladder cancer noted   Lira catheter placed  Continue flomax   Urology consulted per Nephrology request prior to discharge, appreciate the recommendations     IDDMII  Continue home kendra CARDENAS SS   Hypoglycemia protocol ordered    Paroxysmal Atrial Fibrillation   Continue Cardizem, metroprolol     Hx CAD s/p CABG, PCI  Continue home medications     Chronic HFpEF  Echo 11/11/2021 EF 50-55%  Monitor fluid status closely, continue IVF for MADI  Strict I&Os, daily weights     Essential HTN  Continue home medications, controlled       Chief Complaint: Abdominal pain    Hospital Course: Per Initial H&P:  Hector Duran y. o. male who we are asked to see/evaluate by Milo Farnsworth MD for medical management of DM, CAD, AF, CHF, CKD.  The patient presented to the ED for abdominal pain and nausea.  CT revealed incarcerated loop of small bowel in the midabdomen resulting in SBO.  Patient underwent exploratory laparotomy, lysis of small bowel adhesions with reduction of incarcerated loop of small bowel with primary repair of hernia defect with Dr. Keven Singer today.  Patient reports a history of diabetes, states that he takes 8 units of Humalog tidwc, 48 units of Levemir twice daily. [facility] reports history of PCI and CABG.  Patient reports history of A. fib, states that he was taken off Eliquis approximately a year ago and is only on aspirin and Plavix.  Patient seen postop and is doing well.  Abdominal pain is well controlled. [facility] denies any fever/chills, chest pain, shortness of breath, N/V/D.  Patient states he has not urinated since surgery.  Hospitalist will continue to follow. \"        Subjective:  Pt seen in AM. VS reviewed. Has been passing flatus but no BMs since procedure. Still experiencing some shortness of breath with ambulation. Cr improving, nephrology okay with discharge today but would like urology to see patient prior to discharge. Denies any chest pain, abdominal pain, vomiting, syncope. Has experienced occasional intermittent nausea, resolved with Zofran.        Medications:  Reviewed    Infusion Medications    sodium chloride 100 mL/hr at 05/03/22 1915    sodium chloride      dextrose       Scheduled Medications    polyethylene glycol  17 g Oral Daily    docusate sodium  100 mg Oral Daily    insulin detemir  48 Units SubCUTAneous BID    heparin (porcine)  5,000 Units SubCUTAneous 3 times per day    insulin lispro  8 Units SubCUTAneous TID WC    aspirin 81 mg Oral Daily    atorvastatin  40 mg Oral Nightly    [Held by provider] bumetanide  2 mg Oral Daily    dilTIAZem  120 mg Oral Daily    hydrALAZINE  25 mg Oral 3 times per day    isosorbide mononitrate  120 mg Oral Daily    metoprolol succinate  50 mg Oral Daily    ranolazine  1,000 mg Oral BID    tamsulosin  0.4 mg Oral Daily    sodium chloride flush  5-40 mL IntraVENous 2 times per day    insulin lispro  0-12 Units SubCUTAneous TID WC    insulin lispro  0-6 Units SubCUTAneous Nightly    naloxegol  25 mg Oral Daily     PRN Meds: nitroGLYCERIN, sodium chloride flush, sodium chloride, ondansetron **OR** ondansetron, potassium chloride, magnesium sulfate, traMADol **OR** traMADol, morphine **OR** morphine, acetaminophen **AND** diphenhydrAMINE, hydrALAZINE, glucagon (rDNA), dextrose, dextrose bolus (hypoglycemia) **OR** dextrose bolus (hypoglycemia), glucose      Intake/Output Summary (Last 24 hours) at 5/4/2022 0922  Last data filed at 5/4/2022 0529  Gross per 24 hour   Intake 540 ml   Output 2225 ml   Net -1685 ml       Diet:  ADULT DIET; Easy to Chew; 5 carb choices (75 gm/meal)    Exam:  BP (!) 137/55   Pulse 59   Temp 97.8 °F (36.6 °C) (Oral)   Resp 18   Ht 5' 9\" (1.753 m)   Wt 226 lb (102.5 kg)   SpO2 96%   BMI 33.37 kg/m²     Physical Exam  Constitutional:       Appearance: Normal appearance. HENT:      Head: Normocephalic and atraumatic. Right Ear: External ear normal.      Left Ear: External ear normal.      Mouth/Throat:      Mouth: Mucous membranes are moist.   Eyes:      Extraocular Movements: Extraocular movements intact. Conjunctiva/sclera: Conjunctivae normal.      Pupils: Pupils are equal, round, and reactive to light. Cardiovascular:      Rate and Rhythm: Normal rate and regular rhythm. Heart sounds: Normal heart sounds. No murmur heard. No gallop. Pulmonary:      Effort: Pulmonary effort is normal. No respiratory distress.       Breath sounds: Normal breath sounds. No wheezing or rales. Abdominal:      General: Abdomen is flat. Bowel sounds are normal. There is no distension. Palpations: Abdomen is soft. Tenderness: There is no abdominal tenderness. There is no guarding. Musculoskeletal:         General: Normal range of motion. Skin:     General: Skin is warm. Neurological:      General: No focal deficit present. Mental Status: He is alert and oriented to person, place, and time. Labs:   No results for input(s): WBC, HGB, HCT, PLT in the last 72 hours. Recent Labs     05/02/22  0748 05/03/22  0545 05/04/22  0537   * 136 132*   K 4.3 4.4 4.1   CL 97* 102 100   CO2 21* 21* 21*   BUN 67* 69* 61*   CREATININE 2.9* 2.9* 2.3*   CALCIUM 8.4* 8.0* 8.1*     No results for input(s): AST, ALT, BILIDIR, BILITOT, ALKPHOS in the last 72 hours. No results for input(s): INR in the last 72 hours. No results for input(s): Darlin Loiusa in the last 72 hours. Urinalysis:      Lab Results   Component Value Date    NITRU NEGATIVE 05/03/2022    WBCUA 2-4 05/03/2022    BACTERIA NONE 05/03/2022    RBCUA > 100 05/03/2022    BLOODU LARGE 05/03/2022    SPECGRAV 1.008 04/06/2021    GLUCOSEU NEGATIVE 05/03/2022       Radiology:  US RENAL LIMITED   Final Result   1. Probable left renal cysts. 2. Distended urinary bladder. Final report electronically signed by Dr. Ann Pascual on 5/2/2022 3:15 PM      CT ABDOMEN PELVIS WO CONTRAST Additional Contrast? None   Final Result   Mechanical small bowel obstruction secondary to a strangulated umbilical hernia            **This report has been created using voice recognition software. It may contain minor errors which are inherent in voice recognition technology. **      Final report electronically signed by Dr. Andria Motta on 4/30/2022 12:03 PM          Diet: ADULT DIET; Easy to Chew; 5 carb choices (75 gm/meal)    DVT prophylaxis: [] Lovenox                                 [] SCDs [x] SQ Heparin                                 [] Encourage ambulation           [] Already on Anticoagulation     Disposition:    [x] Home       [] TCU       [] Rehab       [] Psych       [] SNF       [] [place]       [] Other-    Code Status: Full Code    PT/OT Eval Status: Ordered       Electronically signed by Pamela Marte DO on 5/4/2022 at 9:22 AM    This note was electronically signed. Parts of this note may have been dictated by use of voice recognition software and electronically transcribed. The note may contain errors not detected in proofreading. Please refer to my supervising physician's documentation if my documentation differs.

## 2022-05-04 NOTE — DISCHARGE SUMMARY
Discharge Summary     Patient Identification:  Tati Reed  : 89  MRN: [id number]   Account: [de-identified]     Admit date: 2022  Discharge date: 2022  Attending provider: Radha Rashid MD        Primary care provider: Primo Do MD     Discharge Diagnoses:   Principal Problem:    Strangulated umbilical hernia  Active Problems:    Chronic diastolic congestive heart failure ([facility] [address]    Small bowel obstruction ([facility] [address]    Diabetes mellitus type 2, insulin dependent ([facility] [address]    MADI (acute kidney injury) ([facility] [address]  Resolved Problems:    * No resolved hospital problems. *  Urinary retention     Hospital Course:   Tati Reed is a 68 y.o. male admitted to [address] [place] [address] on 2022 for evaluation of abdominal pain. He had an incarcerated hernia at the mid abdomen. CT imaging was obtained by the emergency department revealed incarcerated small bowel with obstruction. He was taken urgently to the operating suite where he had exploratory laparotomy lysis of adhesions. Bowel was viable and did not require resection. Fascia was closed primarily. He has a history of chronic renal insufficiency. Postop his BUN and creatinine did bump up. He was hydrated without much improvement initially. His diuretic was stopped. His nephrologist was consulted as well. From a surgical standpoint he was able to tolerate oral intake and pain was well controlled. Hospitalist service was consulted as well and ultimately urology for some urinary retention. BUN and creatinine are decreasing he is making urine and is stable for discharge at this time. He will follow-up with surgery, nephrology and urology post discharge with a BMP recommended in 1 week. Discharge Medications:     Medication List        START taking these medications      docusate sodium 100 MG capsule  Commonly known as: COLACE  Take 1 capsule by mouth daily  Start taking on:  May 5, 2022     polyethylene glycol 17 g packet  Commonly known as: GLYCOLAX  Take 17 g by mouth daily  Start taking on: May 5, 2022     traMADol 50 MG tablet  Commonly known as: ULTRAM  Take 1 tablet by mouth every 6 hours as needed for Pain for up to 7 days. CHANGE how you take these medications      HumaLOG KwikPen 100 UNIT/ML Sopn  Generic drug: insulin lispro (1 Unit Dial)  INJECT SUBCUTANEOUSLY 7 UNITS IN THE MORNING , 7 UNITS AT LUNCH AND 12 UNITS AT SUPPER PLUS SLIDING SCALE DURING MEALS AND AT BEDTIME  What changed: See the new instructions.      tamsulosin 0.4 MG capsule  Commonly known as: FLOMAX  Take 1 capsule by mouth 2 times daily  What changed: when to take this            CONTINUE taking these medications      Accu-Chek FastClix Lancets Misc  USE TO TEST BLOOD SUGARS 4 TIMES DAILY     Accu-Chek Guide strip  Generic drug: blood glucose test strips  USE TO CHECK 4 TIMES DAILY     ascorbic acid 500 MG tablet  Commonly known as: VITAMIN C     aspirin 81 MG EC tablet     atorvastatin 40 MG tablet  Commonly known as: LIPITOR  TAKE 1 TABLET BY MOUTH AT  NIGHT     BiPAP Machine Misc     Bumex 2 MG tablet  Generic drug: bumetanide     clopidogrel 75 MG tablet  Commonly known as: PLAVIX     dilTIAZem 120 MG extended release capsule  Commonly known as: CARDIZEM CD  TAKE 1 CAPSULE BY MOUTH  DAILY     ferrous sulfate 325 (65 Fe) MG tablet  Commonly known as: IRON 325     fluocinonide 0.05 % cream  Commonly known as: LIDEX     hydrALAZINE 25 MG tablet  Commonly known as: APRESOLINE  Take 1 tablet by mouth every 8 hours     hydrOXYzine 10 MG tablet  Commonly known as: ATARAX     isosorbide mononitrate 120 MG extended release tablet  Commonly known as: IMDUR  Take 1 tablet by mouth daily     Levemir FlexTouch 100 UNIT/ML injection pen  Generic drug: insulin detemir  Inject 48 Units into the skin 2 times daily     metoprolol succinate 50 MG extended release tablet  Commonly known as: TOPROL XL  TAKE 1 TABLET BY MOUTH  DAILY     nitroGLYCERIN 0.4 MG/SPRAY 0.4 mg spray  Commonly known as: NITROLINGUAL  USE 1 SPRAY ON OR UNDER THE TONGUE EVERY 5 MINUTES AS NEEDED FOR CHEST PAIN     * NovoFine 32G X 6 MM Misc  Generic drug: Insulin Pen Needle  USE 1 NEW NEEDLE 6 TIMES  DAILY AND AS NEEDED     * Novofine Pen Needle 32G X 6 MM Misc  Generic drug: Insulin Pen Needle  1 each by Does not apply route 6 times daily     ranolazine 1000 MG extended release tablet  Commonly known as: RANEXA  TAKE 1 TABLET BY MOUTH  TWICE DAILY NEW DOSE     therapeutic multivitamin-minerals tablet     Tylenol PM Extra Strength  MG tablet  Generic drug: diphenhydrAMINE-APAP (sleep)           * This list has 2 medication(s) that are the same as other medications prescribed for you. Read the directions carefully, and ask your doctor or other care provider to review them with you.                    Where to Get Your Medications        These medications were sent to [facility] , [address], [facility] [address]      Phone: [phone]   polyethylene glycol 17 g packet  traMADol 50 MG tablet       These medications were sent to [address] [facility], [address], [facility] [address]  [address] [place] [address], [address] [facility] [address]      Phone: [phone]   tamsulosin 0.4 MG capsule       You can get these medications from any pharmacy    You don't need a prescription for these medications  docusate sodium 100 MG capsule         Patient Instructions:    Discharge lab work: BMP in 1 week  Activity: No lifting over 20 pounds  Diet: ADULT DIET; Easy to Chew; 5 carb choices (75 gm/meal)    Code Status: Full Code    Follow-up visits:   Dr. Ankita Persaud LT May 16 11:15 AM  Urology as instructed in 1 to 2 weeks  Nephrology as instructed    Procedures: Exploratory laparotomy lysis of adhesions repair of abdominal wall hernia with primary closure  Bean catheter placement  Consults:   Nephrology, urology, hospitalist    Examination:  Vitals:  Vitals:    05/04/22 0830 05/04/22 1045 05/04/22 1258 05/04/22 1415   BP: (!) 137/55 (!) 151/60  (!) 111/43   Pulse: 59 58  56   Resp: 18 18 18 18   Temp: 97.8 °F (36.6 °C)   97.9 °F (36.6 °C)   TempSrc: Oral   Oral   SpO2: 96%  96% 98%   Weight:       Height:         Weight: Weight: 226 lb (102.5 kg)     24 hour intake/output:    Intake/Output Summary (Last 24 hours) at 5/4/2022 1430  Last data filed at 5/4/2022 0529  Gross per 24 hour   Intake 300 ml   Output 1000 ml   Net -700 ml       Significant Diagnostics:   Radiology: CT ABDOMEN PELVIS WO CONTRAST Additional Contrast? None    Result Date: 4/30/2022  PROCEDURE: CT ABDOMEN PELVIS WO CONTRAST CLINICAL INFORMATION: possible incarcerated umbilical hernia . TECHNIQUE: 2-D multiplanar noncontrast CT abdomen pelvis All CT scans at this facility use dose modulation, iterative reconstruction, and/or weight-based dosing when appropriate to reduce radiation dose to as low as reasonably achievable. COMPARISON: 12/17/2018 FINDINGS: Lung bases Lung bases are clear undersurface the heart shows coronary artery calcifications Abdomen pelvis solid organs and hollow viscera evaluation limited without contrast. There are multiple dilated air and fluid-filled loops of small bowel. There is gas and stool in the colon. Colon is smaller in size than the dilated small bowel loops. This is secondary to a strangulated umbilical hernia. Transition point is at the umbilical hernia. There is no free air seen. Liver, spleen, and pancreas are within normal limits. Status post cholecystectomy. The adrenals are normal. Decrease in size Bosniak 2F cyst left kidney there are 2 adjacent remaining cysts larger of the 2 is 4 cm at the interpolar portion. Aorta is heavily atherosclerotic mesenteric vessels are atherosclerotic. No adenopathy seen. Urinary bladder is distended. Bilateral fat-containing inguinal hernias. No suspicious bone lesions. Mechanical small bowel obstruction secondary to a strangulated umbilical hernia **This report has been created using voice recognition software. It may contain minor errors which are inherent in voice recognition technology. ** Final report electronically signed by Dr. Magdy Singer on 4/30/2022 12:03 PM      Labs:   Recent Results (from the past 72 hour(s))   POCT Glucose    Collection Time: 05/01/22  4:50 PM   Result Value Ref Range    POC Glucose 240 (H) 70 - 108 mg/dl   POCT Glucose    Collection Time: 05/01/22  7:53 PM   Result Value Ref Range    POC Glucose 203 (H) 70 - 108 mg/dl   Basic Metabolic Panel    Collection Time: 05/02/22  7:48 AM   Result Value Ref Range    Sodium 132 (L) 135 - 145 meq/L    Potassium 4.3 3.5 - 5.2 meq/L    Chloride 97 (L) 98 - 111 meq/L    CO2 21 (L) 23 - 33 meq/L    Glucose 128 (H) 70 - 108 mg/dL    BUN 67 (H) 7 - 22 mg/dL    CREATININE 2.9 (H) 0.4 - 1.2 mg/dL    Calcium 8.4 (L) 8.5 - 10.5 mg/dL   Anion Gap    Collection Time: 05/02/22  7:48 AM   Result Value Ref Range    Anion Gap 14.0 8.0 - 16.0 meq/L   Glomerular Filtration Rate, Estimated    Collection Time: 05/02/22  7:48 AM   Result Value Ref Range    Est, Glom Filt Rate 21 (A) ml/min/1.73m2   POCT Glucose    Collection Time: 05/02/22  7:59 AM   Result Value Ref Range    POC Glucose 135 (H) 70 - 108 mg/dl   POCT Glucose    Collection Time: 05/02/22 11:55 AM   Result Value Ref Range    POC Glucose 165 (H) 70 - 108 mg/dl   Electrolytes urine random    Collection Time: 05/02/22  2:08 PM   Result Value Ref Range    Sodium, Ur 42 meq/l    Potassium, Urine 42.2 meq/l    Chloride, Urine 30.0 meq/l   POCT Glucose    Collection Time: 05/02/22  4:54 PM   Result Value Ref Range    POC Glucose 189 (H) 70 - 108 mg/dl   POCT Glucose    Collection Time: 05/02/22  8:20 PM   Result Value Ref Range    POC Glucose 201 (H) 70 - 108 mg/dl   Basic Metabolic Panel    Collection Time: 05/03/22  5:45 AM   Result Value Ref Range    Sodium 136 135 - 145 meq/L    Potassium 4.4 3.5 - 5.2 meq/L    Chloride 102 98 - 111 meq/L    CO2 21 (L) 23 - 33 meq/L    Glucose 104 70 - 108 mg/dL    BUN 69 (H) 7 - 22 mg/dL    CREATININE 2.9 (H) 0.4 - 1.2 mg/dL    Calcium 8.0 (L) 8.5 - 10.5 mg/dL   Anion Gap    Collection Time: 05/03/22  5:45 AM   Result Value Ref Range    Anion Gap 13.0 8.0 - 16.0 meq/L   Glomerular Filtration Rate, Estimated    Collection Time: 05/03/22  5:45 AM   Result Value Ref Range    Est, Glom Filt Rate 21 (A) ml/min/1.73m2   POCT Glucose    Collection Time: 05/03/22  8:05 AM   Result Value Ref Range    POC Glucose 97 70 - 108 mg/dl   POCT Glucose    Collection Time: 05/03/22 11:23 AM   Result Value Ref Range    POC Glucose 116 (H) 70 - 108 mg/dl   Urine with Reflexed Micro    Collection Time: 05/03/22 11:50 AM   Result Value Ref Range    Glucose, Ur NEGATIVE NEGATIVE mg/dl    Bilirubin Urine SMALL (A) NEGATIVE    Ketones, Urine NEGATIVE NEGATIVE    Specific Gravity, Urine 1.020 1.002 - 1.030    Blood, Urine LARGE (A) NEGATIVE    pH, UA 6.0 5.0 - 9.0    Protein, UA >= 300 (A) NEGATIVE    Urobilinogen, Urine 0.2 0.0 - 1.0 eu/dl    Nitrite, Urine NEGATIVE NEGATIVE    Leukocyte Esterase, Urine TRACE (A) NEGATIVE    Color, UA EUFEMIA (A) STRAW-YELLOW    Character, Urine CLOUDY (A) CLEAR-SL CLOUD    RBC, UA > 100 0-2/hpf /hpf    WBC, UA 2-4 0-4/hpf /hpf    Epithelial Cells, UA 3-5 3-5/hpf /hpf    Amorphous, UA URATES NONE SEEN    Mucus, UA NONE SEEN NONE SEEN/THREA    Bacteria, UA NONE FEW/NONE SEEN /hpf    Casts UA NONE SEEN NONE SEEN /lpf    Crystals, UA NONE SEEN NONE SEEN    Renal Epithelial, UA NONE NONE SEEN   Bile Acids, Total    Collection Time: 05/03/22 11:50 AM   Result Value Ref Range    Ictotest NEGATIVE NEGATIVE   Culture, Urine    Collection Time: 05/03/22 11:50 AM    Specimen: Urine, clean catch   Result Value Ref Range    Urine Culture, Routine No growth-preliminary     POCT Glucose    Collection Time: 05/03/22  4:35 PM   Result Value Ref Range    POC Glucose 164 (H) 70 - 108 mg/dl   POCT Glucose    Collection Time: 05/03/22  7:27 PM   Result Value Ref Range    POC Glucose 208 (H) 70 - 108 mg/dl   Basic Metabolic Panel    Collection Time: 05/04/22  5:37 AM   Result Value Ref Range    Sodium 132 (L) 135 - 145 meq/L    Potassium 4.1 3.5 - 5.2 meq/L    Chloride 100 98 - 111 meq/L    CO2 21 (L) 23 - 33 meq/L    Glucose 75 70 - 108 mg/dL    BUN 61 (H) 7 - 22 mg/dL    CREATININE 2.3 (H) 0.4 - 1.2 mg/dL    Calcium 8.1 (L) 8.5 - 10.5 mg/dL   Anion Gap    Collection Time: 05/04/22  5:37 AM   Result Value Ref Range    Anion Gap 11.0 8.0 - 16.0 meq/L   Glomerular Filtration Rate, Estimated    Collection Time: 05/04/22  5:37 AM   Result Value Ref Range    Est, Glom Filt Rate 28 (A) ml/min/1.73m2   POCT Glucose    Collection Time: 05/04/22  7:59 AM   Result Value Ref Range    POC Glucose 72 70 - 108 mg/dl   POCT Glucose    Collection Time: 05/04/22  8:46 AM   Result Value Ref Range    POC Glucose 94 70 - 108 mg/dl   CBC with Auto Differential    Collection Time: 05/04/22 10:23 AM   Result Value Ref Range    WBC 10.0 4.8 - 10.8 thou/mm3    RBC 3.02 (L) 4.70 - 6.10 mill/mm3    Hemoglobin 10.1 (L) 14.0 - 18.0 gm/dl    Hematocrit 31.4 (L) 42.0 - 52.0 %    .0 (H) 80.0 - 94.0 fL    MCH 33.4 (H) 26.0 - 33.0 pg    MCHC 32.2 32.2 - 35.5 gm/dl    RDW-CV 13.5 11.5 - 14.5 %    RDW-SD 51.3 (H) 35.0 - 45.0 fL    Platelets 038 899 - 141 thou/mm3    MPV 9.8 9.4 - 12.4 fL    Seg Neutrophils 78.5 %    Lymphocytes 7.0 %    Monocytes 11.4 %    Eosinophils 1.8 %    Basophils 0.3 %    Immature Granulocytes 1.0 %    Segs Absolute 7.9 (H) 1.8 - 7.7 thou/mm3    Lymphocytes Absolute 0.7 (L) 1.0 - 4.8 thou/mm3    Monocytes Absolute 1.1 0.4 - 1.3 thou/mm3    Eosinophils Absolute 0.2 0.0 - 0.4 thou/mm3    Basophils Absolute 0.0 0.0 - 0.1 thou/mm3    Immature Grans (Abs) 0.10 (H) 0.00 - 0.07 thou/mm3    nRBC 0 /100 wbc   POCT Glucose    Collection Time: 05/04/22 11:48 AM   Result Value Ref Range    POC Glucose 180 (H) 70 - 108 mg/dl       Discharge condition: good  Disposition: Home      Electronically signed by Mei Pardo MD on 5/4/22 at 1:29 PM EDT

## 2022-05-04 NOTE — PROGRESS NOTES
All discharge instructions given to patient and wife with no further questions at this time. Patient educated on use of CHG soap and states he will use at home. Lira supplies were sent home with patient and he was also educated to continue lira care at home. Patient discharged off unit via wheelchair. Chart contents placed in yellow bin.

## 2022-05-04 NOTE — CONSULTS
[address] [facility]  [address] [facility] [address]  Dept: [phone]  Loc: [phone]  Visit Date: 4/30/2022    Urology Consult Note    Reason for Consult:  History of BPH, urinary retention  Requesting Physician:  Ed Bath    History Obtained From:  patient    Chief Complaint: abd pain, hernia    HISTORY OF PRESENT ILLNESS:                The patient is a 68 y.o. male with significant past medical history of see below who presented with abd pain. Hx of umbilical hernia for 15 yrs. He noticed hernia was larger than normal with some slight discoloration. Currently is POD # 3 open repair of incarcerated strangulated midline ventral hernia, periumbilical with lysis of adhesions  Urology consulted for hx of BPH, urinary retention  Bladder scan 920ml    Last OV 12/17 seen for elevated PSA, hx of retention, PVR of 185ml.  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    BPH (benign prostatic hypertrophy)     CAD (coronary artery disease) 1997    Carotid artery disease ([address] 1995    right    Carotid disease, bilateral ([address]     Cerebral artery occlusion with cerebral infarction ([address]     CHF (congestive heart failure) ([address]     Chronic kidney disease     GERD (gastroesophageal reflux disease)     GI bleed 07/2021    History of blood transfusion 2021    GI bleed    History of blood transfusion 1997    with heart surgery    Hyperlipidemia 1996    Hypertension     Lumbar disc disease 2001    MI (myocardial infarction) ([address] 1997    Nephrolithiasis 2002    NIDDM (non-insulin dependent diabetes mellitus) 1996    diagnosed 1996    RICCARDO (obstructive sleep apnea) 2005    cpap    PVC's (premature ventricular contractions)     Renal artery stenosis ([address] 2007    left    Renal insufficiency     S/P CABG (status post coronary artery bypass graft) 1997    5 vessels    Skin cancer     Squamous cell carcinoma     Stenosis of right subclavian artery ([address]     TIA (transient ischemic attack)     Wears partial dentures     upper and lower     Past Surgical History:        Procedure Laterality Date    ABLATION OF DYSRHYTHMIC FOCUS  3/13/13    CARDIAC CATHETERIZATION  2008 - 2019    stents x15   [address]  3/13/13    oblation for irreg rythm    CARDIOVERSION  3/8/2012    CAROTID ENDARTERECTOMY  1997    CHOLECYSTECTOMY  4/1999    COLONOSCOPY  6/22/09, 4/2011,7/12/17    polyp removal    COLONOSCOPY N/A 7/21/2021    COLONOSCOPY POLYPECTOMY SNARE/COLD BIOPSY performed by Ciarra Page MD at [address] [facility]  2008 (2), 2009 (1)    x2    CORONARY ARTERY BYPASS GRAFT  8/1997    5 vessel    DIAGNOSTIC CARDIAC CATH LAB PROCEDURE      ENDOSCOPY, COLON, DIAGNOSTIC      EYE LID SURGERY Right 11/17/2021    EXCISION SCC RIGHT LOWER LATERAL EYELID WITH FROZEN SECTION performed by Justin Santos MD at [facility] Right 2/28/2022    EXCISION SCC RIGHT ZYGOMA performed by Justin Santos MD at [address] [facility]  12/20/2013    pre-cancerous mole rt foot removed    LITHOTRIPSY  4/2002    UT OFFICE/OUTPT VISIT,PROCEDURE ONLY N/A 11/8/2018    TRANSESOPHAGEAL ECHOCARDIOGRAM performed by Lali Curiel MD at [facility]    PTCA  05/30/2018   [facility] SURGERY  2001, 2003    rt shoulder manipulation--frozen shoulder-01, Lt -03    SHOULDER SURGERY  2001 - 2003    manipulation - bilateral    SKIN BIOPSY      SKIN CANCER EXCISION  12/20/13     R foot    SKIN CANCER EXCISION  1/2016    squamous cell Lt  upper cheek    UPPER GASTROINTESTINAL ENDOSCOPY N/A 7/20/2021    EGD ESOPHAGOGASTRODUODENOSCOPY performed by Otto Briggs MD at [address]  4/2002    VASCULAR SURGERY      carotids & cabg harvests    VENTRAL HERNIA REPAIR N/A 4/30/2022    exploratory laparotomy with repair of incarcerated hernia performed by Helder Teixeira MD at [address]:  Patient has no known allergies. Social History:  Social History     Socioeconomic History    Marital status:      Spouse name: Peg Fill Number of children: 2    Years of education: Not on file    Highest education level: Not on file   Occupational History    Not on file   Tobacco Use    Smoking status: Never Smoker    Smokeless tobacco: Never Used   Vaping Use    Vaping Use: Never used   Substance and Sexual Activity    Alcohol use: No     Alcohol/week: 0.0 standard drinks    Drug use: No    Sexual activity: Yes     Partners: Female   Other Topics Concern    Not on file   Social History Narrative    Not on file     Social Determinants of Health     Financial Resource Strain: Low Risk     Difficulty of Paying Living Expenses: Not hard at all   Food Insecurity: No Food Insecurity    Worried About Running Out of Food in the Last Year: Never true    Rosa of Food in the Last Year: Never true   Transportation Needs:     Lack of Transportation (Medical): Not on file    Lack of Transportation (Non-Medical):  Not on file   Physical Activity: Inactive    Days of Exercise per Week: 0 days    Minutes of Exercise per Session: 0 min   Stress:     Feeling of Stress : Not on file   Social Connections:     Frequency of Communication with Friends and Family: Not on file    Frequency of Social Gatherings with Friends and Family: Not on file    Attends [employer]ism Services: Not on file    Active Member of Clubs or Organizations: Not on file    Attends Club or Organization Meetings: Not on file    Marital Status: Not on file   Intimate Partner Violence:     Fear of Current or Ex-Partner: Not on file    Emotionally Abused: Not on file    Physically Abused: Not on file    Sexually Abused: Not on file   Housing Stability:     Unable to Pay for Housing in the Last Year: Not on file    Number of Places Lived in the Last Year: Not on file    Unstable Housing in the Last Year: Not on file     Family History:       Problem Relation Age of Onset    Cancer Mother     High Blood Pressure Mother     Stroke Father     High Blood Pressure Father     Heart Disease Brother     Cancer Brother         lung    Cancer Brother         melanoma       ROS:  Constitutional: Negative for chills, fatigue, fever, or weight loss. Eyes: Denies reported visual changes. ENT: Denies headache, difficulty swallowing, earache, and nosebleeds. Cardiovascular: Negative for chest pain, palpitations, tachycardia or edema. Respiratory: Denies cough or SOB. GI:The patient denies abdominal or flank pain, anorexia, nausea or vomiting. : see HPI. Musculoskeletal: Patient denies low back pain or painful or reduced range of ROM. Neurological: The patient denies any symptoms of neurological impairment or TIA. Psychiatric: Denies anxiety or depression. Skin: Denies rash or lesions. All remaining ROS negative. PHYSICAL EXAM:  VITALS:  BP (!) 126/51   Pulse 54   Temp 98.1 °F (36.7 °C) (Oral)   Resp 18   Ht 5' 9\" (1.753 m)   Wt 226 lb (102.5 kg)   SpO2 96%   BMI 33.37 kg/m² . Nursing note and vitals reviewed. Constitutional: Alert and oriented times x3, no acute distress, and cooperative to examination with appropriate mood and affect. HEENT:   Head:         Normocephalic and atraumatic. Mouth/Throat:          Mucous membranes are normal.   Eyes:         EOM are normal. No scleral icterus. Nose:    The external appearance of the nose is normal  Ears: The ears appear normal to external inspection.    Cardiovascular:       Normal rate, regular rhythm. Pulmonary/Chest:  Normal respiratory rate and rhthym. No use of accessory muscles. Lungs clear bilaterally. Abdominal:          Soft. No tenderness. Active bowel sounds. Healing abd wound          Genitalia:    Normal circumcised penis  Urethral meatus is normal in size and location  Scrotal contents normal to inspection and palpation   Normal testes palpated bilaterally  Bean with dark red in bag, lighter yellow in tubing  Musculoskeletal:    Normal range of motion. He exhibits no edema or tenderness of lower extremities. Extremities:    No cyanosis, clubbing, or edema present. Neurological:    Alert and oriented.      DATA:  CBC:   Lab Results   Component Value Date    WBC 11.4 05/01/2022    RBC 3.14 05/01/2022    RBC 3.80 03/24/2012    HGB 10.5 05/01/2022    HCT 32.3 05/01/2022    .9 05/01/2022    MCH 33.4 05/01/2022    MCHC 32.5 05/01/2022    RDW 13.7 06/07/2018     05/01/2022    MPV 10.1 05/01/2022     BMP:    Lab Results   Component Value Date     05/04/2022    K 4.1 05/04/2022    K 4.5 04/30/2022     05/04/2022    CO2 21 05/04/2022    BUN 61 05/04/2022    CREATININE 2.3 05/04/2022    CALCIUM 8.1 05/04/2022    GFRAA >60 09/11/2016    LABGLOM 28 05/04/2022    GLUCOSE 75 05/04/2022    GLUCOSE 169 03/24/2012     BUN/Creatinine:    Lab Results   Component Value Date    BUN 61 05/04/2022    CREATININE 2.3 05/04/2022     Magnesium:    Lab Results   Component Value Date    MG 2.4 04/07/2021     Phosphorus:  No results found for: PHOS  PT/INR:    Lab Results   Component Value Date    PROTIME 17.2 09/10/2016    INR 1.10 11/11/2021     U/A:    Lab Results   Component Value Date    COLORU EUFEMIA 05/03/2022    PHUR 6.0 05/03/2022    LABCAST NONE SEEN 04/06/2021    LABCAST NONE SEEN 04/06/2021    WBCUA 2-4 05/03/2022    RBCUA > 100 05/03/2022    MUCUS NONE SEEN 05/03/2022    YEAST NONE SEEN 04/06/2021    BACTERIA NONE 05/03/2022    CLARITYU Clear 03/29/2021    SPECGRAV 1.008 04/06/2021    LEUKOCYTESUR TRACE 05/03/2022    UROBILINOGEN 0.2 05/03/2022    BILIRUBINUR SMALL 05/03/2022    BLOODU LARGE 05/03/2022    GLUCOSEU NEGATIVE 05/03/2022    AMORPHOUS URATES 05/03/2022       Imaging: The patient has had a Renal Ultrasound which I have independently reviewed along with its accompanying report. The study demonstrates   Narrative   PROCEDURE: US RENAL LIMITED       CLINICAL INFORMATION: Acute kidney injury on chronic kidney disease       TECHNIQUE: Ultrasound of the kidneys and urinary bladder was normal. Grayscale and color images were obtained.       COMPARISON: Renal ultrasound 4/6/2021       FINDINGS: The right kidney measures 13.3 x 6.4 x 5.7 cm and the left kidney measures 11.6 x 6.2 x 6.2 cm. Renal cortical thickness is normal bilaterally. An avascular anechoic structure at the left mid kidney measures 4.6 cm. An avascular anechoic    structure at the lower left kidney measures 4.0 cm. There is no hydronephrosis or renal calculi on either side.       The distended urinary bladder has a volume of 910 mL but is otherwise unremarkable. The patient did not void at the conclusion of the study.           Impression   1. Probable left renal cysts. 2. Distended urinary bladder.          IMPRESSION/Plan:    Discharge with lira catheter  Increase Flomax 2X daily  Void trial in office in 1-2 wk with cystoscopy    Urinary retention - bladder scan 920ml, MIRTHA showed distented urinary bladder  Elevated PSA - will need recheck as outpatient  MADI - CRT 2.3, nephrology on board  Ux neg for infection    Kalani Johnson for discharge from Urology standpoint  Urology signing off, please call with questions or concerns    Thank you for including us in the care of KAVON Abdullahi - KARLOS, KAVON  05/04/22 8:47 AM  Urology

## 2022-05-04 NOTE — PLAN OF CARE
Patient medically stable and cleared for discharge. Discharge with lira catheter in place, increased flomax to 0.8mg. Void trial in urology office in 1-2 weeks with cystoscopy. Nephrology okay with discharge, repeat BMP outpatient in 1 week.

## 2022-05-04 NOTE — PROGRESS NOTES
Teodora Castañeda MD    Postoperative Progress Note  Pt Name: Ricky Enrique  Medical Record Number: [id number]  Date of Birth 1945   Today's Date: 5/4/2022  ASSESSMENT   1. POD # 4 open repair of incarcerated strangulated midline ventral hernia, periumbilical with lysis of adhesions  2. Acute on chronic renal insufficiency. Creatinine stable slight rise in BUN. 3. Insulin-dependent diabetes mellitus  4. Hypertension-stable treated  5. Diastolic CHF stable  6. Urinary retention. Post void residuals consistently over 300. Bean placed  Nephrology following and managing. Fluid management per nephrology. has a past medical history of Adenomatous colon polyp, Angina at rest [facility]), Arthritis, Atrial fibrillation ([address] 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[address] BPH (benign prostatic hypertrophy), CAD (coronary artery disease), Carotid artery disease ([address] Carotid disease, bilateral ([address] Cerebral artery occlusion with cerebral infarction ([address] CHF (congestive heart failure) ([address] Chronic kidney disease, GERD (gastroesophageal reflux disease), GI bleed, History of blood transfusion, History of blood transfusion, Hyperlipidemia, Hypertension, Lumbar disc disease, MI (myocardial infarction) ([address] Nephrolithiasis, NIDDM (non-insulin dependent diabetes mellitus), RICCARDO (obstructive sleep apnea), PVC's (premature ventricular contractions), Renal artery stenosis ([address] Renal insufficiency, S/P CABG (status post coronary artery bypass graft), Skin cancer, Squamous cell carcinoma, Stenosis of right subclavian artery ([address] TIA (transient ischemic attack), and Wears partial dentures. PLANS   1. Analgesics and antiemetics as needed  2. IV hydration continue per nephrology. BUN/Cr improvedSoft carb controlled diet as tolerated  3. Increase activity as tolerated. States that he does not walk much secondary to chronic back pain. He is agreeable to physical therapy.   4. Heparin for DVT prophylaxis  5. Hospitalist following for medical issues  6. Patient tolerating diet. Medical issues prevent discharge at this time acute on chronic renal insufficiency. 7. Add bowel regimen with MiraLAX and stool softeners  8. Urology consult. Home with lira. increase flomax. Outpt follow up 1-2 weeks  9. Will discharge when OK with nephrology. Fallon Bryant is doing well. Incisions look good. Urine output adequate. Remains dark. Lira placed    Tolerating oral intake and passing flatus. Denies nausea. CURRENT MEDICATIONS   Scheduled Meds:   polyethylene glycol  17 g Oral Daily    docusate sodium  100 mg Oral Daily    insulin detemir  48 Units SubCUTAneous BID    heparin (porcine)  5,000 Units SubCUTAneous 3 times per day    insulin lispro  8 Units SubCUTAneous TID WC    aspirin  81 mg Oral Daily    atorvastatin  40 mg Oral Nightly    [Held by provider] bumetanide  2 mg Oral Daily    dilTIAZem  120 mg Oral Daily    hydrALAZINE  25 mg Oral 3 times per day    isosorbide mononitrate  120 mg Oral Daily    metoprolol succinate  50 mg Oral Daily    ranolazine  1,000 mg Oral BID    tamsulosin  0.4 mg Oral Daily    sodium chloride flush  5-40 mL IntraVENous 2 times per day    insulin lispro  0-12 Units SubCUTAneous TID     insulin lispro  0-6 Units SubCUTAneous Nightly    naloxegol  25 mg Oral Daily     Continuous Infusions:   sodium chloride 100 mL/hr at 05/03/22 1915    sodium chloride      dextrose       PRN Meds:.nitroGLYCERIN, sodium chloride flush, sodium chloride, ondansetron **OR** ondansetron, potassium chloride, magnesium sulfate, traMADol **OR** traMADol, morphine **OR** morphine, acetaminophen **AND** diphenhydrAMINE, hydrALAZINE, glucagon (rDNA), dextrose, dextrose bolus (hypoglycemia) **OR** dextrose bolus (hypoglycemia), glucose  OBJECTIVE   CURRENT VITALS:  height is 5' 9\" (1.753 m) and weight is 226 lb (102.5 kg). His oral temperature is 97.8 °F (36.6 °C).  His blood pressure is 137/55 (abnormal) and his pulse is 59. His respiration is 18 and oxygen saturation is 96%. Body mass index is 33.37 kg/m². Temperature Range (24h):Temp: 97.8 °F (36.6 °C) Temp  Av.8 °F (36.6 °C)  Min: 97.4 °F (36.3 °C)  Max: 98.1 °F (36.7 °C)  BP Range (57N): Systolic (18OEQ), MUF:927 , Min:126 , YOANA:403     Diastolic (31YQC), WXN:44, Min:51, Max:66    Pulse Range (24h): Pulse  Av.4  Min: 54  Max: 68  Respiration Range (24h): Resp  Av  Min: 18  Max: 18  Current Pulse Ox (24h):  SpO2: 96 %  Pulse Ox Range (24h):  SpO2  Av %  Min: 95 %  Max: 97 %  Oxygen Amount and Delivery: O2 Flow Rate (L/min): 3 L/min  Incentive Spirometry Tx:            GENERAL: alert, no distress  LUNGS: Clear diminished bases  HEART: Pulse regular heart rate mid 50s  ABDOMEN: Sof, bowelt sounds present. INCISION: Clean dry and intact no bleeding   EXTREMITY: no cyanosis, clubbing or edema  In: 540 [P.O.:540]  Out: 2225 [Urine:2225]     Date 22 0000 - 22 2359   Shift 5836-8688 6316-9971 3200-3343 24 Hour Total   INTAKE   Shift Total(mL/kg)       OUTPUT   Urine(mL/kg/hr) 1000(1.2)   1000   Shift Total(mL/kg) 1000(9.8)   1000(9.8)   Weight (kg) 102.5 102.5 102.5 102.5     LABS     Recent Labs     22  0748 22  0545 22  0537   * 136 132*   K 4.3 4.4 4.1   CL 97* 102 100   CO2 21* 21* 21*   BUN 67* 69* 61*   CREATININE 2.9* 2.9* 2.3*   CALCIUM 8.4* 8.0* 8.1*      No results for input(s): PTT, INR in the last 72 hours. Invalid input(s): PT  No results for input(s): AST, ALT, BILITOT, BILIDIR, AMYLASE, LIPASE, LDH, LACTA in the last 72 hours. No results for input(s): TROPONINT in the last 72 hours. RADIOLOGY     US RENAL LIMITED   Final Result   1. Probable left renal cysts. 2. Distended urinary bladder.       Final report electronically signed by Dr. Kacy Mcclelland on 2022 3:15 PM      CT ABDOMEN PELVIS WO CONTRAST Additional Contrast? None   Final Result   Mechanical small bowel obstruction secondary to a strangulated umbilical hernia            **This report has been created using voice recognition software. It may contain minor errors which are inherent in voice recognition technology. **      Final report electronically signed by Dr. Belle Rockwell on 4/30/2022 12:03 PM          Electronically signed by Thierry Mendoza MD on 5/4/2022 at 9:28 AM

## 2022-05-04 NOTE — CARE COORDINATION
Discharge orders on chart. Met with Belkis Finley and his wife Myesha Drake present at bedside. Both are in agreement for discharge today. Myesha Drake is a retired RN. She denies a need for services as does Pj. Belkis Finley verbalized understanding and gave permission for possible discharge within 4 hours of receiving IMM.  5/4/22, 1:54 PM EDT    Patient goals/plan/ treatment preferences discussed by  and . Patient goals/plan/ treatment preferences reviewed with patient/ family. Patient/ family verbalize understanding of discharge plan and are in agreement with goal/plan/treatment preferences. Understanding was demonstrated using the teach back method. AVS provided by RN at time of discharge, which includes all necessary medical information pertaining to the patients current course of illness, treatment, post-discharge goals of care, and treatment preferences. Services At/After Discharge: None       IMM Letter  IMM Letter given to Patient/Family/Significant other/Guardian/POA/by[de-identified] Copy delivered to patient by mgr. Shaw  IMM Letter date given[de-identified] 05/04/22  IMM Letter time given[de-identified] [phone]

## 2022-05-04 NOTE — PROGRESS NOTES
[facility]         Renal Inpatient Follow-Up note         5/4/2022 9:23 AM    Pt Name:   Karen Barnhart  Birthdate:   [birth year]  Attending:   Griffin Mann MD    Chief Complaint : Karen Barnhart is a 68 y.o. male being followed by nephrology for MADI/CKD    Interval History :   Patient seen and examined by me. No distress  Feels okay denies any complaints no chest pain or shortness of breath  Now has a lira cath placed and having some hematuria     Scheduled Medications :    polyethylene glycol  17 g Oral Daily    docusate sodium  100 mg Oral Daily    insulin detemir  48 Units SubCUTAneous BID    heparin (porcine)  5,000 Units SubCUTAneous 3 times per day    insulin lispro  8 Units SubCUTAneous TID WC    aspirin  81 mg Oral Daily    atorvastatin  40 mg Oral Nightly    [Held by provider] bumetanide  2 mg Oral Daily    dilTIAZem  120 mg Oral Daily    hydrALAZINE  25 mg Oral 3 times per day    isosorbide mononitrate  120 mg Oral Daily    metoprolol succinate  50 mg Oral Daily    ranolazine  1,000 mg Oral BID    tamsulosin  0.4 mg Oral Daily    sodium chloride flush  5-40 mL IntraVENous 2 times per day    insulin lispro  0-12 Units SubCUTAneous TID WC    insulin lispro  0-6 Units SubCUTAneous Nightly    naloxegol  25 mg Oral Daily      sodium chloride 100 mL/hr at 05/03/22 1915    sodium chloride      dextrose         Vitals :  BP (!) 137/55   Pulse 59   Temp 97.8 °F (36.6 °C) (Oral)   Resp 18   Ht 5' 9\" (1.753 m)   Wt 226 lb (102.5 kg)   SpO2 96%   BMI 33.37 kg/m²     24HR INTAKE/OUTPUT:      Intake/Output Summary (Last 24 hours) at 5/4/2022 0923  Last data filed at 5/4/2022 0529  Gross per 24 hour   Intake 540 ml   Output 2225 ml   Net -1685 ml     Last 3 weights  Wt Readings from Last 3 Encounters:   05/03/22 226 lb (102.5 kg)   04/20/22 221 lb 6 oz (100.4 kg)   03/28/22 218 lb 6.4 oz (99.1 kg)           Physical Exam :  General Appearance:  Well developed.  No distress  Mouth/Throat:  Oral mucosa moist  Neck:  Supple, no JVD  Lungs:  Breath sounds: clear  Heart[de-identified]  S1,S2 heard  Abdomen:  Soft, non - tender  Musculoskeletal:  Edema -no edema         Last 3 CBC   No results for input(s): WBC, RBC, HGB, HCT, PLT in the last 72 hours. Last 3 CMP  Recent Labs     05/02/22  0748 05/03/22  0545 05/04/22  0537   * 136 132*   K 4.3 4.4 4.1   CL 97* 102 100   CO2 21* 21* 21*   BUN 67* 69* 61*   CREATININE 2.9* 2.9* 2.3*   CALCIUM 8.4* 8.0* 8.1*             ASSESSMENT / Plan   1. Renal -acute kidney injury, most likely secondary to some degree of volume depletion/diuretic but primarily due to some urinary obstruction  ? Now with a lira and mild hematuria. ? Consult urology  ? Decrease IVF to 50 ml/hr. BMP in AM     2. Electrolytes -mild hyponatremia   3. Mild acidosis appears to be stable  4. Essential hypertension fluctuating  5. Hx of diabetes mellitus  6. Hx of coronary artery disease  7. Incarcerated umbilical hernia status post surgery  8. Urinary retention now with a lira . 9. Meds reviewed and discussed with patient ,wife and nursing staff      Dr. Ortiz Hood MD, M,D.  [facility].

## 2022-05-05 ENCOUNTER — CARE COORDINATION (OUTPATIENT)
Dept: CASE MANAGEMENT | Age: 77
End: 2022-05-05

## 2022-05-05 DIAGNOSIS — K42.0 STRANGULATED UMBILICAL HERNIA: Primary | ICD-10-CM

## 2022-05-05 LAB — URINE CULTURE, ROUTINE: NORMAL

## 2022-05-05 PROCEDURE — 1111F DSCHRG MED/CURRENT MED MERGE: CPT | Performed by: PHYSICAL MEDICINE & REHABILITATION

## 2022-05-05 NOTE — CARE COORDINATION
[facility] Transitions Initial Follow Up Call    Call within 2 business days of discharge: Yes    Patient: Basilio Golden Patient : 1945   MRN: [id number]  Reason for Admission: Strangulated umbilical hernia s/p open repair of incarcerated strangulated midline ventral hernia, periumbilical with lysis of adhesions    Discharge Date: 22 RARS: Readmission Risk Score: 23.9 ( )      Last Discharge [address]       Complaint Diagnosis Description Type Department Provider    22 Abdominal Pain; Hernia Small bowel obstruction ([facility] [address] . .. ED to Hosp-Admission (Discharged) (ADMITTED) Ada Stout MD           Spoke with Rosanna Hickey, said he is doing ok. Denies abd pain, incision is without s/s of infection. Denies fever, increased SOB/IRVING. His bowels have not moved yet but passing gas, taking bowel meds. Monitors his weight everyday and aware of CHF wt gain parameters. Appetite and fluid intake is good. Wanted his wife to review meds since she is a retired RN but she had an appt and asked if I could call back this afternoon. Called Cecilio Camacho back and reviewed medications, no questions. His Bean cath is draining clear yellow urine today, last night it was brownish color with small blood clots. Has had a lot of output (850 last night, 900 and about 800 so far today). Said he got IV fluids in the hospital and was a little puffy but is getting better. Will have a cysto in a couple weeks. Asked if he could get a strap to keep cath from pulling at night. Called [facility], do not have, suggested calling [facility], they have a strap but would require an order from provider. Left message with urology to call wife if can provide that or call me back if not. Called Leidy back with strap info. Suggested is she doesn't hear from urology today, to call tomorrow and get order if needed. Voiced understanding. Denies other needs. No other questions or concerns at this time.   Will continue to follow. Non-face-to-face services provided:  Scheduled appointment with PCP-  Scheduled appointment with Specialist-  Obtained and reviewed discharge summary and/or continuity of care documents    Care Transitions 24 Hour Call    Schedule Follow Up Appointment with PCP: Completed  Do you have a copy of your discharge instructions?: Yes  Do you have all of your prescriptions and are they filled?: Yes  Have you been contacted by a [facility] Pharmacist?: No  Have you scheduled your follow up appointment?: Yes  How are you going to get to your appointment?: Car - family or friend to transport  Patient DME: Commode, Shower chair, Wheelchair, Walker, Straight cane  Do you have support at home?: Partner/Spouse/SO  Do you feel like you have everything you need to keep you well at home?: Yes  Are you an active caregiver in your home?: No  Care Transitions Interventions     Transitions of Care Initial Call        Challenges to be reviewed by the provider   Additional needs identified to be addressed with provider: No  none             Method of communication with provider : none      Advance Care Planning:   Does patient have an Advance Directive: reviewed and current. Was this a readmission? No  Patient stated reason for admission: abd pain  Patients top risk factors for readmission: lack of knowledge about disease  medical condition-s/p hernia repair, CHF, DM, Afib, CKD, CAD  polypharmacy    Care Transition Nurse (CTN) contacted the patient by telephone to perform post hospital discharge assessment. Verified name and  with patient as identifiers. Provided introduction to self, and explanation of the CTN role. CTN reviewed discharge instructions, medical action plan and red flags with family who verbalized understanding. Family given an opportunity to ask questions and does not have any further questions or concerns at this time. Were discharge instructions available to patient? Yes.  Reviewed appropriate site of care based on symptoms and resources available to patient including: PCP  Specialist. The patient agrees to contact the PCP office for questions related to their healthcare. Medication reconciliation was performed with family, who verbalizes understanding of administration of home medications. Advised obtaining a 90-day supply of all daily and as-needed medications. Covid Risk Education     Educated patient about risk for severe COVID-19 due to risk factors according to CDC guidelines. CTN reviewed discharge instructions, medical action plan and red flag symptoms with the family who verbalized understanding. Discussed COVID vaccination status: Yes. Education provided on COVID-19 vaccination as appropriate. Discussed exposure protocols and quarantine with CDC Guidelines. Patient was given an opportunity to verbalize any questions and concerns and agrees to contact CTN or health care provider for questions related to their healthcare. Reviewed and educated family on any new and changed medications related to discharge diagnosis. Was patient discharged with a pulse oximeter? No     CTN provided contact information. Plan for follow-up call in 5-7 days based on severity of symptoms and risk factors. Plan for next call: symptom management-abd pain  self management-LUCERO Bean??       Follow Up  Future Appointments   Date Time Provider Department Center   5/12/2022  3:20 PM Rick Amezquita MD [facility]   5/18/2022  9:45 AM MD [facility]   5/19/2022 10:15 AM MD Nichelle Sahu [facility] [place]   7/21/2022 10:20 AM [facility] - [place]   8/16/2022 10:00 AM Estevan Gerardo MD [facility] - [place]   9/27/2022 11:00 AM WESTLEY [facility]ic [facility] - [place]   10/10/2022  9:15 AM Cyril Tamayo RN

## 2022-05-06 ENCOUNTER — TELEPHONE (OUTPATIENT)
Dept: SURGERY | Age: 77
End: 2022-05-06

## 2022-05-06 NOTE — TELEPHONE ENCOUNTER
Karely Humphreys is calling to ask what Elma Cihlders can take for constipation. He has been taking Colace and Miralax since discharge on 5/4/2022. Instructed Karely Humphreys to get Fleets enema or Doculax suppositories over the counter. Voiced understanding.

## 2022-05-11 ENCOUNTER — NURSE ONLY (OUTPATIENT)
Dept: LAB | Age: 77
End: 2022-05-11

## 2022-05-11 DIAGNOSIS — D63.1 ANEMIA OF CHRONIC RENAL FAILURE, STAGE 3B (HCC): ICD-10-CM

## 2022-05-11 DIAGNOSIS — I50.32 CHRONIC DIASTOLIC CONGESTIVE HEART FAILURE (HCC): ICD-10-CM

## 2022-05-11 DIAGNOSIS — N17.9 ACUTE KIDNEY INJURY SUPERIMPOSED ON CKD (HCC): ICD-10-CM

## 2022-05-11 DIAGNOSIS — N18.9 ACUTE KIDNEY INJURY SUPERIMPOSED ON CKD (HCC): ICD-10-CM

## 2022-05-11 DIAGNOSIS — I10 ESSENTIAL HYPERTENSION: ICD-10-CM

## 2022-05-11 DIAGNOSIS — N18.32 ANEMIA OF CHRONIC RENAL FAILURE, STAGE 3B (HCC): ICD-10-CM

## 2022-05-11 LAB
ANION GAP SERPL CALCULATED.3IONS-SCNC: 11 MEQ/L (ref 8–16)
BUN BLDV-MCNC: 40 MG/DL (ref 7–22)
CALCIUM SERPL-MCNC: 9 MG/DL (ref 8.5–10.5)
CHLORIDE BLD-SCNC: 100 MEQ/L (ref 98–111)
CO2: 26 MEQ/L (ref 23–33)
CREAT SERPL-MCNC: 2 MG/DL (ref 0.4–1.2)
GFR SERPL CREATININE-BSD FRML MDRD: 33 ML/MIN/1.73M2
GLUCOSE BLD-MCNC: 145 MG/DL (ref 70–108)
HCT VFR BLD CALC: 28.1 % (ref 42–52)
HEMOGLOBIN: 8.8 GM/DL (ref 14–18)
POTASSIUM SERPL-SCNC: 5 MEQ/L (ref 3.5–5.2)
SODIUM BLD-SCNC: 137 MEQ/L (ref 135–145)

## 2022-05-12 ENCOUNTER — OFFICE VISIT (OUTPATIENT)
Dept: FAMILY MEDICINE CLINIC | Age: 77
End: 2022-05-12

## 2022-05-12 ENCOUNTER — TELEPHONE (OUTPATIENT)
Dept: INTERNAL MEDICINE CLINIC | Age: 77
End: 2022-05-12

## 2022-05-12 VITALS
WEIGHT: 218.38 LBS | RESPIRATION RATE: 20 BRPM | SYSTOLIC BLOOD PRESSURE: 146 MMHG | BODY MASS INDEX: 32.35 KG/M2 | DIASTOLIC BLOOD PRESSURE: 60 MMHG | HEART RATE: 68 BPM | HEIGHT: 69 IN

## 2022-05-12 DIAGNOSIS — R80.9 TYPE 2 DIABETES MELLITUS WITH MICROALBUMINURIA, WITH LONG-TERM CURRENT USE OF INSULIN (HCC): ICD-10-CM

## 2022-05-12 DIAGNOSIS — E11.29 TYPE 2 DIABETES MELLITUS WITH MICROALBUMINURIA, WITH LONG-TERM CURRENT USE OF INSULIN (HCC): ICD-10-CM

## 2022-05-12 DIAGNOSIS — Z09 HOSPITAL DISCHARGE FOLLOW-UP: Primary | ICD-10-CM

## 2022-05-12 DIAGNOSIS — Z79.4 TYPE 2 DIABETES MELLITUS WITH MICROALBUMINURIA, WITH LONG-TERM CURRENT USE OF INSULIN (HCC): ICD-10-CM

## 2022-05-12 DIAGNOSIS — Z87.19 H/O UMBILICAL HERNIA REPAIR: ICD-10-CM

## 2022-05-12 DIAGNOSIS — Z98.890 H/O UMBILICAL HERNIA REPAIR: ICD-10-CM

## 2022-05-12 PROBLEM — M51.37 DEGENERATION OF LUMBOSACRAL INTERVERTEBRAL DISC: Status: ACTIVE | Noted: 2022-05-12

## 2022-05-12 PROBLEM — M51.26 DISPLACEMENT OF LUMBAR INTERVERTEBRAL DISC WITHOUT MYELOPATHY: Status: ACTIVE | Noted: 2022-05-12

## 2022-05-12 LAB
CHP ED QC CHECK: ABNORMAL
GLUCOSE BLD-MCNC: 183 MG/DL
HBA1C MFR BLD: 6.7 %

## 2022-05-12 PROCEDURE — 99495 TRANSJ CARE MGMT MOD F2F 14D: CPT | Performed by: FAMILY MEDICINE

## 2022-05-12 PROCEDURE — 83036 HEMOGLOBIN GLYCOSYLATED A1C: CPT | Performed by: FAMILY MEDICINE

## 2022-05-12 PROCEDURE — 82962 GLUCOSE BLOOD TEST: CPT | Performed by: FAMILY MEDICINE

## 2022-05-12 NOTE — PROGRESS NOTES
Post-Discharge Transitional Care Follow Up      Jun Mustafa   YOB: 1945    Date of Office Visit:  5/12/2022  Date of Hospital Admission: 4/30/22  Date of Hospital Discharge: 5/4/22  Readmission Risk Score (high >=14%. Medium >=10%):Readmission Risk Score: 23.9 ( )      Care management risk score Rising risk (score 2-5) and Complex Care (Scores >=6): 12     Non face to face  following discharge, date last encounter closed (first attempt may have been earlier): 5/5/2022  3:15 PM     Call initiated 2 business days of discharge: Yes     Hospital discharge follow-up  -     IL DISCHARGE MEDS RECONCILED W/ CURRENT OUTPATIENT MED LIST  Type 2 diabetes mellitus with microalbuminuria, with long-term current use of insulin (HCC)  -     POCT glycosylated hemoglobin (Hb A1C)  -     POCT Glucose  H/O umbilical hernia repair      Medical Decision Making: high complexity  No follow-ups on file. Subjective:   HPI    Inpatient course: Discharge summary reviewed- see chart. Interval history/Current status: he's been fatigued. We reviewed the labs done yesterday. He doesn't want to take iron due to constipation. We talked about how some miralax several times a day could prevent the iron induced constipation. Patient Active Problem List   Diagnosis    CKD (chronic kidney disease) stage 3, GFR 30-59 ml/min ([facility])    Paroxysmal atrial fibrillation (HCC)    S/P CABG (coronary artery bypass graft)    Hyperlipidemia    Lumbar radiculopathy    Renal artery stenosis (HCC)    Obstructive sleep apnea on CPAP    Chronic diastolic congestive heart failure ([facility] [address]    Coronary atherosclerosis    Diabetes mellitus type 2, insulin dependent ([facility] [address]    Status post angioplasty with stent    MADI (acute kidney injury) ([facility] [address]    SOB (shortness of breath)    Wheezing    Asthma    Essential hypertension    Obesity (BMI 30-39. 9)    Elevated PSA    Hypertrophy of prostate with urinary obstruction    Adenomatous colon polyp    Angina, class III (HCC)    Hx of atrial fibrillation without current medication    Bilateral carotid artery stenosis    Hx of nonmelanoma skin cancer    Acute kidney injury superimposed on CKD (HCC)    Anemia of chronic renal failure    Anemia in stage 3 chronic kidney disease (HCC)    Melena    Chronic kidney disease, stage 4 (severe) (HCC)    Strangulated umbilical hernia    Small bowel obstruction (HCC)    Displacement of lumbar intervertebral disc without myelopathy    Degeneration of lumbosacral intervertebral disc       Medications listed as ordered at the time of discharge from hospital     Medication List          Accurate as of May 12, 2022  5:45 PM. If you have any questions, ask your nurse or doctor. CHANGE how you take these medications    HumaLOG KwikPen 100 UNIT/ML Sopn  Generic drug: insulin lispro (1 Unit Dial)  INJECT SUBCUTANEOUSLY 7 UNITS IN THE MORNING , 7 UNITS AT LUNCH AND 12 UNITS AT SUPPER PLUS SLIDING SCALE DURING MEALS AND AT BEDTIME  What changed: See the new instructions.         CONTINUE taking these medications    Accu-Chek FastClix Lancets Misc  USE TO TEST BLOOD SUGARS 4 TIMES DAILY     Accu-Chek Guide strip  Generic drug: blood glucose test strips  USE TO CHECK 4 TIMES DAILY     ascorbic acid 500 MG tablet  Commonly known as: VITAMIN C     aspirin 81 MG EC tablet     atorvastatin 40 MG tablet  Commonly known as: LIPITOR  TAKE 1 TABLET BY MOUTH AT  NIGHT     BiPAP Machine Misc     Bumex 2 MG tablet  Generic drug: bumetanide     clopidogrel 75 MG tablet  Commonly known as: PLAVIX     dilTIAZem 120 MG extended release capsule  Commonly known as: CARDIZEM CD  TAKE 1 CAPSULE BY MOUTH  DAILY     docusate sodium 100 MG capsule  Commonly known as: COLACE  Take 1 capsule by mouth daily     ferrous sulfate 325 (65 Fe) MG tablet  Commonly known as: IRON 325     fluocinonide 0.05 % cream  Commonly known as: LIDEX     hydrALAZINE 25 MG tablet  Commonly known as: APRESOLINE  Take 1 tablet by mouth every 8 hours     hydrOXYzine 10 MG tablet  Commonly known as: ATARAX     isosorbide mononitrate 120 MG extended release tablet  Commonly known as: IMDUR  Take 1 tablet by mouth daily     Levemir FlexTouch 100 UNIT/ML injection pen  Generic drug: insulin detemir  Inject 48 Units into the skin 2 times daily     metoprolol succinate 50 MG extended release tablet  Commonly known as: TOPROL XL  TAKE 1 TABLET BY MOUTH  DAILY     nitroGLYCERIN 0.4 MG/SPRAY 0.4 mg spray  Commonly known as: NITROLINGUAL  USE 1 SPRAY ON OR UNDER THE TONGUE EVERY 5 MINUTES AS NEEDED FOR CHEST PAIN     * NovoFine 32G X 6 MM Misc  Generic drug: Insulin Pen Needle  USE 1 NEW NEEDLE 6 TIMES  DAILY AND AS NEEDED     * Novofine Pen Needle 32G X 6 MM Misc  Generic drug: Insulin Pen Needle  1 each by Does not apply route 6 times daily     polyethylene glycol 17 g packet  Commonly known as: GLYCOLAX  Take 17 g by mouth daily     ranolazine 1000 MG extended release tablet  Commonly known as: RANEXA  TAKE 1 TABLET BY MOUTH  TWICE DAILY NEW DOSE     tamsulosin 0.4 MG capsule  Commonly known as: FLOMAX  Take 1 capsule by mouth 2 times daily     therapeutic multivitamin-minerals tablet     Tylenol PM Extra Strength  MG tablet  Generic drug: diphenhydrAMINE-APAP (sleep)         * This list has 2 medication(s) that are the same as other medications prescribed for you. Read the directions carefully, and ask your doctor or other care provider to review them with you.                  Medications marked \"taking\" at this time  Outpatient Medications Marked as Taking for the 5/12/22 encounter (Office Visit) with Luisa Spears MD   Medication Sig Dispense Refill    tamsulosin (FLOMAX) 0.4 MG capsule Take 1 capsule by mouth 2 times daily 90 capsule 2    docusate sodium (COLACE) 100 MG capsule Take 1 capsule by mouth daily      polyethylene glycol (GLYCOLAX) 17 g packet Take 17 g by mouth daily 527 g 1    Insulin Pen Needle (NOVOFINE PEN NEEDLE) 32G X 6 MM MISC 1 each by Does not apply route 6 times daily 600 each 3    aspirin 81 MG EC tablet Take 81 mg by mouth daily      HUMALOG KWIKPEN 100 UNIT/ML SOPN INJECT SUBCUTANEOUSLY 7 UNITS IN THE MORNING , 7 UNITS AT LUNCH AND 12 UNITS AT SUPPER PLUS SLIDING SCALE DURING MEALS AND AT BEDTIME (Patient taking differently: Inject 8 units before breakfast, 8 units before lunch, and 12 units before dinner plus group 1 scale) 15 mL 11    metoprolol succinate (TOPROL XL) 50 MG extended release tablet TAKE 1 TABLET BY MOUTH  DAILY 90 tablet 3    ranolazine (RANEXA) 1000 MG extended release tablet TAKE 1 TABLET BY MOUTH  TWICE DAILY NEW DOSE 180 tablet 3    bumetanide (BUMEX) 2 MG tablet Take 2 mg by mouth daily      NOVOFINE 32G X 6 MM MISC USE 1 NEW NEEDLE 6 TIMES  DAILY AND AS NEEDED 540 each 1    insulin detemir (LEVEMIR FLEXTOUCH) 100 UNIT/ML injection pen Inject 48 Units into the skin 2 times daily 90 mL 3    ferrous sulfate (IRON 325) 325 (65 Fe) MG tablet Take 325 mg by mouth every other day      ascorbic acid (VITAMIN C) 500 MG tablet Take 500 mg by mouth every other day      nitroGLYCERIN (NITROLINGUAL) 0.4 MG/SPRAY 0.4 mg spray USE 1 SPRAY ON OR UNDER THE TONGUE EVERY 5 MINUTES AS NEEDED FOR CHEST PAIN 4.9 g 6    dilTIAZem (CARDIZEM CD) 120 MG extended release capsule TAKE 1 CAPSULE BY MOUTH  DAILY 90 capsule 3    isosorbide mononitrate (IMDUR) 120 MG extended release tablet Take 1 tablet by mouth daily 30 tablet 3    blood glucose test strips (ACCU-CHEK GUIDE) strip USE TO CHECK 4 TIMES DAILY 300 strip 5    Accu-Chek FastClix Lancets MISC USE TO TEST BLOOD SUGARS 4 TIMES DAILY 102 each 63    atorvastatin (LIPITOR) 40 MG tablet TAKE 1 TABLET BY MOUTH AT  NIGHT 90 tablet 3    diphenhydrAMINE-APAP, sleep, (TYLENOL PM EXTRA STRENGTH)  MG tablet Take 1 tablet by mouth every evening as needed      hydrOXYzine (ATARAX) 10 MG tablet Take 10 mg by mouth daily as needed       fluocinonide (LIDEX) 0.05 % cream Apply 1 applicator topically as needed      BiPAP Machine MISC by Does not apply route      Multiple Vitamins-Minerals (THERAPEUTIC MULTIVITAMIN-MINERALS) tablet Take 1 tablet by mouth nightly      hydrALAZINE (APRESOLINE) 25 MG tablet Take 1 tablet by mouth every 8 hours 90 tablet 0    clopidogrel (PLAVIX) 75 MG tablet Take 75 mg by mouth daily morning          Medications patient taking as of now reconciled against medications ordered at time of hospital discharge: Yes    Review of Systems  There is the fatigue along with CP and SOB over his baseline since home. He is having trouble sleeping at night and sleeps during the day    Objective:    BP (!) 146/60 (Site: Right Upper Arm, Position: Sitting, Cuff Size: Medium Adult)   Pulse 68   Resp 20   Ht 5' 9\" (1.753 m)   Wt 218 lb 6 oz (99.1 kg)   BMI 32.25 kg/m²      Physical Exam  Well developed well nourished white male who is awake alert and cooperative  Skin atrophic  Membranes moist conjunctiva still pink  Head normocephalic  Neck without mass  Chest symmetrical expansion  Heart S1S2 without murmur  Lungs CTA  Abd soft, non tender, normoactive BS and no mass, the umbilical incision looks well  Ext without edema  Neuro weak  Psy pleasant      An electronic signature was used to authenticate this note.   --Alex Turk MD

## 2022-05-12 NOTE — TELEPHONE ENCOUNTER
Call from Domingo Thorne. She reports Isatu Connor has been in the hospital for hernia repair and is back home now. He will begin Trulicity as ordered and Basaglar in lieu of Levemir. Reviewed taking Trulicity-administration and timing  Basaglar can be dosed the same as levemir 48 units BID. Instructed to watch for low blood sugars (less than 85/90) and to cut the insulin dose by 2 units on both doses if any low blood sugars are noted. [facility] if having to reduce the insulin doses. Domingo Thorne voiced understanding via teach back.

## 2022-05-13 ENCOUNTER — CARE COORDINATION (OUTPATIENT)
Dept: CASE MANAGEMENT | Age: 77
End: 2022-05-13

## 2022-05-17 ENCOUNTER — TELEPHONE (OUTPATIENT)
Dept: NEPHROLOGY | Age: 77
End: 2022-05-17

## 2022-05-17 DIAGNOSIS — D63.1 ANEMIA IN STAGE 3 CHRONIC KIDNEY DISEASE, UNSPECIFIED WHETHER STAGE 3A OR 3B CKD (HCC): Primary | ICD-10-CM

## 2022-05-17 DIAGNOSIS — N18.30 ANEMIA IN STAGE 3 CHRONIC KIDNEY DISEASE, UNSPECIFIED WHETHER STAGE 3A OR 3B CKD (HCC): Primary | ICD-10-CM

## 2022-05-18 ENCOUNTER — PROCEDURE VISIT (OUTPATIENT)
Dept: UROLOGY | Age: 77
End: 2022-05-18
Payer: MEDICARE

## 2022-05-18 VITALS
WEIGHT: 218 LBS | BODY MASS INDEX: 32.29 KG/M2 | SYSTOLIC BLOOD PRESSURE: 154 MMHG | DIASTOLIC BLOOD PRESSURE: 62 MMHG | HEIGHT: 69 IN

## 2022-05-18 DIAGNOSIS — R97.20 ELEVATED PSA: ICD-10-CM

## 2022-05-18 DIAGNOSIS — R39.15 URINARY URGENCY: Primary | ICD-10-CM

## 2022-05-18 PROCEDURE — 81003 URINALYSIS AUTO W/O SCOPE: CPT | Performed by: UROLOGY

## 2022-05-18 PROCEDURE — 52000 CYSTOURETHROSCOPY: CPT | Performed by: UROLOGY

## 2022-05-18 NOTE — PROGRESS NOTES
Pt wife stated he is on cipro for an ear infection- started last Friday. Removed 18f catheter prior to cysto.

## 2022-05-18 NOTE — PROGRESS NOTES
Dr. Chloé Block MD  Urologic Surgery        [address] [place] [address]. [place]  05/18/22    Patient:  Basilio Golden  MRN: [id number]  YOB: 1945    Surgeon: Dr. Chloé Block MD  Assistant: None    Pre-op Diagnosis: Retention of urine  Post-op Diagnosis: Same    Procedure:   Cystoscopy. Anesthesia: Local  Complications: None  OR Blood Loss: Minimal  Fluids: Cystalloids  Specimens: None    Indication:  68year old male w retention on flomax BID. Here for cysto today. Narrative of the Procedure:    After informed consent was obtained in the preoperative area, the patient was taken back to the operating room and remained on the hospital [facility]. The patient was prepped and draped in a sterile manner. A time out occurred in which two patient identifiers were used. The flexible scope was carefully placed into the bladder. Findings:  Urethra: Normal  Prostate: Patent. Very mild BPH. Completely patent! Bladder Neck: Normal  Papillary lesions: none  Trabeculations: Mild  Cellules/Diverticula: None  Bladder Stones: None  Ureteral Orifices: Normal    OVERALL IMPRESSION: Widely patent prostate. Follow-Up: PVR today  See back in 6 weeks with PVR.      Chloé Block MD  Electronically signed on 5/18/2022 at 10:17 AM

## 2022-05-19 ENCOUNTER — OFFICE VISIT (OUTPATIENT)
Dept: SURGERY | Age: 77
End: 2022-05-19

## 2022-05-19 VITALS
SYSTOLIC BLOOD PRESSURE: 136 MMHG | WEIGHT: 223 LBS | TEMPERATURE: 97.1 F | HEART RATE: 65 BPM | DIASTOLIC BLOOD PRESSURE: 60 MMHG | BODY MASS INDEX: 33.03 KG/M2 | RESPIRATION RATE: 18 BRPM | OXYGEN SATURATION: 97 % | HEIGHT: 69 IN

## 2022-05-19 DIAGNOSIS — I50.22 CHRONIC SYSTOLIC (CONGESTIVE) HEART FAILURE (HCC): ICD-10-CM

## 2022-05-19 DIAGNOSIS — K42.0 STRANGULATED UMBILICAL HERNIA: Primary | ICD-10-CM

## 2022-05-19 DIAGNOSIS — Z09 POSTOP CHECK: ICD-10-CM

## 2022-05-19 PROCEDURE — 99024 POSTOP FOLLOW-UP VISIT: CPT | Performed by: SURGERY

## 2022-05-19 RX ORDER — CIPROFLOXACIN 500 MG/1
TABLET, FILM COATED ORAL
COMMUNITY
Start: 2022-05-13 | End: 2022-07-06

## 2022-05-19 NOTE — PROGRESS NOTES
Helder Teixeira MD   General Surgery  Postprocedure Evaluation in Office  Pt Name: Maureen Gurrola  Date of Birth 1945   Today's Date: 5/19/2022  Medical Record Number: [id number]  Primary Care Provider: Tara Batista MD  Chief Complaint   Patient presents with   [facility] Post-Op Check     s/p Exploratory laparotomy lysis of small bowel adhesions with reduction of incarcerated loop of small bowel with primary repair of hernia defect 4-30-22. ASSESSMENT      1. Strangulated umbilical hernia    2. Chronic systolic (congestive) heart failure    3. Postop check    4. Postop urinary retention     PLAN       1. Patient stable postop. Hernia repair intact. 2.  Patient instructed to wear abdominal binder when up. No lifting over 25 pounds for at least 4 weeks. 3.  Follow-up with me in 6 months. Call in interval with any questions or concerns. 4.  Patient has follow-ups with urology cardiology neurology, neurology and internal medicine    . Bean catheter out patient is voiding. He will have a cystoscopy in the future. Jose Blair is seen today for post-op follow-up. Status post hospitalization for an incarcerated umbilical hernia with strangulation. He did not require bowel resection he had primary repair. From a surgical standpoint he is doing well. Some constipation but he is moving his bowels appetite is good. Denies any nausea or vomiting. Surgical site looks good. Hernia repair is intact.     Medications    Current Outpatient Medications:     ciprofloxacin (CIPRO) 500 MG tablet, TAKE 1 TABLET BY MOUTH TWICE A DAY, Disp: , Rfl:     tamsulosin (FLOMAX) 0.4 MG capsule, Take 1 capsule by mouth 2 times daily, Disp: 90 capsule, Rfl: 2    docusate sodium (COLACE) 100 MG capsule, Take 1 capsule by mouth daily, Disp: , Rfl:     polyethylene glycol (GLYCOLAX) 17 g packet, Take 17 g by mouth daily, Disp: 527 g, Rfl: 1    Insulin Pen Needle (NOVOFINE PEN NEEDLE) 32G X 6 MM MISC, 1 each by Does not apply route 6 times daily, Disp: 600 each, Rfl: 3    aspirin 81 MG EC tablet, Take 81 mg by mouth daily, Disp: , Rfl:     HUMALOG KWIKPEN 100 UNIT/ML SOPN, INJECT SUBCUTANEOUSLY 7 UNITS IN THE MORNING , 7 UNITS AT LUNCH AND 12 UNITS AT SUPPER PLUS SLIDING SCALE DURING MEALS AND AT BEDTIME (Patient taking differently: Inject 8 units before breakfast, 8 units before lunch, and 12 units before dinner plus group 1 scale), Disp: 15 mL, Rfl: 11    metoprolol succinate (TOPROL XL) 50 MG extended release tablet, TAKE 1 TABLET BY MOUTH  DAILY, Disp: 90 tablet, Rfl: 3    ranolazine (RANEXA) 1000 MG extended release tablet, TAKE 1 TABLET BY MOUTH  TWICE DAILY NEW DOSE, Disp: 180 tablet, Rfl: 3    bumetanide (BUMEX) 2 MG tablet, Take 2 mg by mouth daily, Disp: , Rfl:     NOVOFINE 32G X 6 MM MISC, USE 1 NEW NEEDLE 6 TIMES  DAILY AND AS NEEDED, Disp: 540 each, Rfl: 1    insulin detemir (LEVEMIR FLEXTOUCH) 100 UNIT/ML injection pen, Inject 48 Units into the skin 2 times daily, Disp: 90 mL, Rfl: 3    ferrous sulfate (IRON 325) 325 (65 Fe) MG tablet, Take 325 mg by mouth every other day, Disp: , Rfl:     ascorbic acid (VITAMIN C) 500 MG tablet, Take 500 mg by mouth every other day, Disp: , Rfl:     nitroGLYCERIN (NITROLINGUAL) 0.4 MG/SPRAY 0.4 mg spray, USE 1 SPRAY ON OR UNDER THE TONGUE EVERY 5 MINUTES AS NEEDED FOR CHEST PAIN, Disp: 4.9 g, Rfl: 6    dilTIAZem (CARDIZEM CD) 120 MG extended release capsule, TAKE 1 CAPSULE BY MOUTH  DAILY, Disp: 90 capsule, Rfl: 3    isosorbide mononitrate (IMDUR) 120 MG extended release tablet, Take 1 tablet by mouth daily, Disp: 30 tablet, Rfl: 3    blood glucose test strips (ACCU-CHEK GUIDE) strip, USE TO CHECK 4 TIMES DAILY, Disp: 300 strip, Rfl: 5    Accu-Chek FastClix Lancets MISC, USE TO TEST BLOOD SUGARS 4 TIMES DAILY, Disp: 102 each, Rfl: 63    atorvastatin (LIPITOR) 40 MG tablet, TAKE 1 TABLET BY MOUTH AT  NIGHT, Disp: 90 tablet, Rfl: 3    diphenhydrAMINE-APAP, sleep, (TYLENOL PM EXTRA STRENGTH)  MG tablet, Take 1 tablet by mouth every evening as needed, Disp: , Rfl:     hydrOXYzine (ATARAX) 10 MG tablet, Take 10 mg by mouth daily as needed , Disp: , Rfl:     fluocinonide (LIDEX) 0.05 % cream, Apply 1 applicator topically as needed, Disp: , Rfl:     BiPAP Machine MISC, by Does not apply route, Disp: , Rfl:     Multiple Vitamins-Minerals (THERAPEUTIC MULTIVITAMIN-MINERALS) tablet, Take 1 tablet by mouth nightly, Disp: , Rfl:     hydrALAZINE (APRESOLINE) 25 MG tablet, Take 1 tablet by mouth every 8 hours, Disp: 90 tablet, Rfl: 0    clopidogrel (PLAVIX) 75 MG tablet, Take 75 mg by mouth daily morning, Disp: , Rfl:     Allergies  No Known Allergies    Review of Systems  History obtained from the patient. Constitutional: Denies any fever, chills, fatigue. Wound: Denies any rash, skin color changes or wound problems. Resp: Denies any cough, chronic shortness of breath. CV: Denies any chest pain, orthopnea or syncope. GI: Positive for  minimal ncisional discomfort only. Denies any nausea, vomiting, blood in the stool, constipation or diarrhea. : Denies any hematuria, hesitancy or dysuria. OBJECTIVE     VITALS: /60   Pulse 65   Temp 97.1 °F (36.2 °C) (Temporal)   Resp 18   Ht 5' 9\" (1.753 m)   Wt 223 lb (101.2 kg)   SpO2 97%   BMI 32.93 kg/m²     CONSTITUTIONAL: Alert and oriented times   SKIN: Warm  INCISION: Intact no drainage or erythema. LUNGS: Lungs Clear  CARDIOVASCULAR: Normal Rate  ABDOMEN: Soft incision intact no erythema or drainage.    NEUROLOGIC: No sensory or motor nerve irritation

## 2022-05-20 ENCOUNTER — CARE COORDINATION (OUTPATIENT)
Dept: CASE MANAGEMENT | Age: 77
End: 2022-05-20

## 2022-05-20 ENCOUNTER — HOSPITAL ENCOUNTER (OUTPATIENT)
Dept: NURSING | Age: 77
Discharge: HOME | End: 2022-05-20
Payer: MEDICARE

## 2022-05-20 VITALS
RESPIRATION RATE: 23 BRPM | OXYGEN SATURATION: 99 % | HEART RATE: 63 BPM | TEMPERATURE: 99 F | DIASTOLIC BLOOD PRESSURE: 63 MMHG | SYSTOLIC BLOOD PRESSURE: 136 MMHG

## 2022-05-20 PROBLEM — I50.22 CHRONIC SYSTOLIC (CONGESTIVE) HEART FAILURE (HCC): Status: ACTIVE | Noted: 2022-05-20

## 2022-05-20 PROCEDURE — 6360000002 HC RX W HCPCS: Performed by: INTERNAL MEDICINE

## 2022-05-20 PROCEDURE — 96372 THER/PROPH/DIAG INJ SC/IM: CPT

## 2022-05-20 RX ADMIN — DARBEPOETIN ALFA 100 MCG: 100 INJECTION, SOLUTION INTRAVENOUS; SUBCUTANEOUS at 09:50

## 2022-05-20 NOTE — CARE COORDINATION
[facility] Transitions Follow Up Call    2022    Patient: Michele Rao  Patient : 1945   MRN: [id number]  Reason for Admission: Strangulated umbilical hernia s/p open repair of incarcerated strangulated midline ventral hernia, periumbilical with lysis of adhesions  Discharge Date: 22 RARS: Readmission Risk Score: 23.9 ( )         Spoke with Isatu Connor and wife Domingo Thorne. Isatu Connor said he is fine. Denies abd pain, incision is healing, bowels are moving. His Bean cath was removed 2 days ago and he is voiding without difficulty. BS and wt are stable. Has not started Trulicity or Basaglar insulin yet but will soon. Denies needs. No other questions or concerns at this time. Will continue to follow. Care Transitions Subsequent and Final Call    Subsequent and Final Calls  Do you have any ongoing symptoms?: No  Have your medications changed?: No  Do you have any questions related to your medications?: No  Do you currently have any active services?: No  Do you have any needs or concerns that I can assist you with?: No  Identified Barriers: Lack of Education  Care Transitions Interventions  Other Interventions:       Care Transitions Follow Up Call    Needs to be reviewed by the provider   Additional needs identified to be addressed with provider: No  none             Method of communication with provider : none      Care Transition Nurse contacted the patient by telephone to follow up after admission on 22. Verified name and  with patient as identifiers. Addressed changes since last contact: none  Discussed follow-up appointments. Advance Care Planning:   Does patient have an Advance Directive: reviewed and current. CTN reviewed discharge instructions, medical action plan and red flags with patient and discussed any barriers to care and/or understanding of plan of care after discharge.  Discussed appropriate site of care based on symptoms and resources available to patient including: PCP  Specialist. The patient agrees to contact the PCP office for questions related to their healthcare. Patients top risk factors for readmission: lack of knowledge about disease  medical condition-hernia repair, CHF, DM, Afib, CKD, CAD  polypharmacy  Interventions to address risk factors: Scheduled appointment with Joshua Espinoza 7/21 see other appts below      [id number] [address] Bishop follow up appointment(s): na    CTN provided contact information for future needs. Plan for follow-up call in 5-7 days based on severity of symptoms and risk factors.   Plan for next call: symptom management-hernia incision, wt, BS?  self management-B&B normal?, final call        Follow Up  Future Appointments   Date Time Provider [facility]   6/29/2022 10:00 AM Jerome Perscott MD [address] [place] [address]   7/21/2022 10:20 AM Charline Hameed MD [facility] [place]   8/16/2022 10:00 AM [facility] [place]   9/27/2022 11:00 AM Fabian Zacarias RN [facility] Physic [facility] [place]   10/10/2022  9:15 AM [facility]IM[facility]   11/21/2022  1:00 PM Mei Pardo, [address] [place] [address] Se[place], RN

## 2022-05-20 NOTE — PROGRESS NOTES
0930:  ARRIVES AMBULATORY FOR ARANESP INJECTION. PT FAMILIAR WITH PROCESS. PT IRVING.  9873:  INJECTION TOLERATED.   PT GIVEN DISCHARGE INFORMATION AND TAKEN TO DISCHARGE DOOR BY WC.    __M__ Safety:       (Environmental)   [place] to environment   Ensure ID band is correct and in place/ allergy band as needed   Assess for fall risk   Initiate fall precautions as applicable (fall band, side rails, etc.)   Call light within reach   Bed in low position/ wheels locked    _M___ Pain:        Assess pain level and characteristics   Administer analgesics as ordered   Assess effectiveness of pain management and report to MD as needed    M___ Knowledge Deficit:   Assess baseline knowledge   Provide teaching at level of understanding   Provide teaching via preferred learning method   Evaluate teaching effectiveness    _M___ Hemodynamic/Respiratory Status:       (Pre and Post Procedure Monitoring)   Assess/Monitor vital signs and LOC   Assess Baseline SpO2 prior to any sedation   Obtain weight/height   Assess vital signs/ LOC until patient meets discharge criteria   Monitor procedure site and notify MD of any issues

## 2022-05-27 ENCOUNTER — CARE COORDINATION (OUTPATIENT)
Dept: CASE MANAGEMENT | Age: 77
End: 2022-05-27

## 2022-05-27 NOTE — CARE COORDINATION
[facility] Transitions Follow Up Call-Final    2022    Patient: Priyanka Pedersen  Patient : 1945   MRN: [id number]  Reason for Admission: Strangulated umbilical hernia s/p open repair of incarcerated strangulated midline ventral hernia, periumbilical with lysis of adhesions  Discharge Date: 22 RARS: Readmission Risk Score: 23.9 ( )         Spoke with Euel Paget, said he is doing fine. Denies abd pain, bowels are moving, voiding without difficulty.  this morning. Wt is stable, no edema. Denies any needs. Final call. No other questions or concerns at this time. Care Transitions Subsequent and Final Call    Subsequent and Final Calls  Do you have any ongoing symptoms?: No  Have your medications changed?: No  Do you have any questions related to your medications?: No  Do you currently have any active services?: No  Do you have any needs or concerns that I can assist you with?: No  Identified Barriers: Lack of Education  Care Transitions Interventions  No Identified Needs  Other Interventions:       Care Transitions Follow Up Call    Needs to be reviewed by the provider   Additional needs identified to be addressed with provider: No  none             Method of communication with provider : none      Care Transition Nurse contacted the patient by telephone to follow up after admission on 22. Verified name and  with patient as identifiers. Addressed changes since last contact: none  Discussed follow-up appointments. Advance Care Planning:   Does patient have an Advance Directive: reviewed and current. CTN reviewed discharge instructions, medical action plan and red flags with patient and discussed any barriers to care and/or understanding of plan of care after discharge. Discussed appropriate site of care based on symptoms and resources available to patient including: PCP  Specialist. The patient agrees to contact the PCP office for questions related to their healthcare. Patients top risk factors for readmission: lack of knowledge about disease  medical condition-hernia repair, CHF, DM, Afib, CKD, CAD  polypharmacy  Interventions to address risk factors: Scheduled appointment with Joshua  7/21 8/16 9/27 10/10 11/21      [facility] follow up appointment(s): na    CTN provided contact information for future needs. No further follow-up call indicated based on severity of symptoms and risk factors.       Follow Up  Future Appointments   Date Time Provider Idris Crookisti   6/29/2022 10:00 AM Carey Bull MD [address] [place] [address]   7/21/2022 10:20 AM Belle Church MD [facility] [place] [facility]   8/16/2022 10:00 AM [facility]   9/27/2022 11:00 AM Toshia Wallace RN [facility]ic [facility]   10/10/2022  9:15 AM [facility]   11/21/2022  1:00 PM Sabiha Terrazas, [address] Pee Basilio RN

## 2022-06-03 ENCOUNTER — NURSE ONLY (OUTPATIENT)
Dept: LAB | Age: 77
End: 2022-06-03

## 2022-06-03 DIAGNOSIS — I10 ESSENTIAL HYPERTENSION: ICD-10-CM

## 2022-06-03 DIAGNOSIS — I50.32 CHRONIC DIASTOLIC CONGESTIVE HEART FAILURE (HCC): ICD-10-CM

## 2022-06-03 DIAGNOSIS — N18.32 ANEMIA OF CHRONIC RENAL FAILURE, STAGE 3B (HCC): ICD-10-CM

## 2022-06-03 DIAGNOSIS — D63.1 ANEMIA OF CHRONIC RENAL FAILURE, STAGE 3B (HCC): ICD-10-CM

## 2022-06-03 LAB
HCT VFR BLD CALC: 33.4 % (ref 42–52)
HEMOGLOBIN: 10.2 GM/DL (ref 14–18)

## 2022-06-06 RX ORDER — DILTIAZEM HYDROCHLORIDE 120 MG/1
CAPSULE, COATED, EXTENDED RELEASE ORAL
Qty: 90 CAPSULE | Refills: 3 | Status: SHIPPED | OUTPATIENT
Start: 2022-06-06

## 2022-07-01 ENCOUNTER — NURSE ONLY (OUTPATIENT)
Dept: LAB | Age: 77
End: 2022-07-01

## 2022-07-01 DIAGNOSIS — N18.32 ANEMIA OF CHRONIC RENAL FAILURE, STAGE 3B (HCC): ICD-10-CM

## 2022-07-01 DIAGNOSIS — I10 ESSENTIAL HYPERTENSION: ICD-10-CM

## 2022-07-01 DIAGNOSIS — D63.1 ANEMIA OF CHRONIC RENAL FAILURE, STAGE 3B (HCC): ICD-10-CM

## 2022-07-01 DIAGNOSIS — I50.32 CHRONIC DIASTOLIC CONGESTIVE HEART FAILURE (HCC): ICD-10-CM

## 2022-07-01 LAB
ANION GAP SERPL CALCULATED.3IONS-SCNC: 8 MEQ/L (ref 8–16)
BUN BLDV-MCNC: 34 MG/DL (ref 7–22)
CALCIUM SERPL-MCNC: 9.4 MG/DL (ref 8.5–10.5)
CHLORIDE BLD-SCNC: 105 MEQ/L (ref 98–111)
CO2: 24 MEQ/L (ref 23–33)
CREAT SERPL-MCNC: 1.9 MG/DL (ref 0.4–1.2)
ERYTHROCYTE [DISTWIDTH] IN BLOOD BY AUTOMATED COUNT: 13.5 % (ref 11.5–14.5)
ERYTHROCYTE [DISTWIDTH] IN BLOOD BY AUTOMATED COUNT: 51.2 FL (ref 35–45)
GFR SERPL CREATININE-BSD FRML MDRD: 35 ML/MIN/1.73M2
GLUCOSE BLD-MCNC: 74 MG/DL (ref 70–108)
HCT VFR BLD CALC: 31.1 % (ref 42–52)
HEMOGLOBIN: 9.9 GM/DL (ref 14–18)
MCH RBC QN AUTO: 32.5 PG (ref 26–33)
MCHC RBC AUTO-ENTMCNC: 31.8 GM/DL (ref 32.2–35.5)
MCV RBC AUTO: 102 FL (ref 80–94)
PLATELET # BLD: 186 THOU/MM3 (ref 130–400)
PMV BLD AUTO: 9.9 FL (ref 9.4–12.4)
POTASSIUM SERPL-SCNC: 4.4 MEQ/L (ref 3.5–5.2)
RBC # BLD: 3.05 MILL/MM3 (ref 4.7–6.1)
SODIUM BLD-SCNC: 137 MEQ/L (ref 135–145)
WBC # BLD: 6.9 THOU/MM3 (ref 4.8–10.8)

## 2022-07-05 ENCOUNTER — TELEPHONE (OUTPATIENT)
Dept: NEPHROLOGY | Age: 77
End: 2022-07-05

## 2022-07-05 DIAGNOSIS — I25.10 CAD S/P PERCUTANEOUS CORONARY ANGIOPLASTY: ICD-10-CM

## 2022-07-05 DIAGNOSIS — Z98.61 CAD S/P PERCUTANEOUS CORONARY ANGIOPLASTY: ICD-10-CM

## 2022-07-05 NOTE — TELEPHONE ENCOUNTER
----- Message from Arun Willard MD sent at 7/5/2022  4:25 PM EDT -----  Schedule another dose of aranesp may be to be given in 1 week - 10 days

## 2022-07-06 ENCOUNTER — OFFICE VISIT (OUTPATIENT)
Dept: UROLOGY | Age: 77
End: 2022-07-06
Payer: MEDICARE

## 2022-07-06 VITALS
WEIGHT: 216 LBS | DIASTOLIC BLOOD PRESSURE: 62 MMHG | BODY MASS INDEX: 31.99 KG/M2 | SYSTOLIC BLOOD PRESSURE: 138 MMHG | HEIGHT: 69 IN

## 2022-07-06 DIAGNOSIS — N40.1 HYPERTROPHY OF PROSTATE WITH URINARY OBSTRUCTION: ICD-10-CM

## 2022-07-06 DIAGNOSIS — N13.8 HYPERTROPHY OF PROSTATE WITH URINARY OBSTRUCTION: ICD-10-CM

## 2022-07-06 DIAGNOSIS — R97.20 ELEVATED PSA: ICD-10-CM

## 2022-07-06 DIAGNOSIS — R33.9 URINARY RETENTION: Primary | ICD-10-CM

## 2022-07-06 LAB
BILIRUBIN URINE: NEGATIVE
BLOOD URINE, POC: NEGATIVE
CHARACTER, URINE: CLEAR
COLOR, URINE: YELLOW
GLUCOSE URINE: NEGATIVE MG/DL
KETONES, URINE: NEGATIVE
LEUKOCYTE CLUMPS, URINE: NEGATIVE
NITRITE, URINE: NEGATIVE
PH, URINE: 5.5 (ref 5–9)
POST VOID RESIDUAL (PVR): 274 ML
PROTEIN, URINE: ABNORMAL MG/DL
SPECIFIC GRAVITY, URINE: 1.01 (ref 1–1.03)
UROBILINOGEN, URINE: 0.2 EU/DL (ref 0–1)

## 2022-07-06 PROCEDURE — 1036F TOBACCO NON-USER: CPT | Performed by: UROLOGY

## 2022-07-06 PROCEDURE — G8427 DOCREV CUR MEDS BY ELIG CLIN: HCPCS | Performed by: UROLOGY

## 2022-07-06 PROCEDURE — 1123F ACP DISCUSS/DSCN MKR DOCD: CPT | Performed by: UROLOGY

## 2022-07-06 PROCEDURE — G8417 CALC BMI ABV UP PARAM F/U: HCPCS | Performed by: UROLOGY

## 2022-07-06 PROCEDURE — 81003 URINALYSIS AUTO W/O SCOPE: CPT | Performed by: UROLOGY

## 2022-07-06 PROCEDURE — 51798 US URINE CAPACITY MEASURE: CPT | Performed by: UROLOGY

## 2022-07-06 PROCEDURE — 99214 OFFICE O/P EST MOD 30 MIN: CPT | Performed by: UROLOGY

## 2022-07-06 RX ORDER — DULAGLUTIDE 0.75 MG/.5ML
0.75 INJECTION, SOLUTION SUBCUTANEOUS WEEKLY
COMMUNITY

## 2022-07-06 RX ORDER — NITROGLYCERIN 400 UG/1
SPRAY ORAL
Qty: 4.9 G | Refills: 6 | Status: SHIPPED | OUTPATIENT
Start: 2022-07-06

## 2022-07-06 NOTE — PROGRESS NOTES
Dr. Evon Moreno MD  [facility])  Urology Clinic      Patient:  Pankaj Ritter  YOB: 1945  Date: 7/6/2022    HISTORY OF PRESENT ILLNESS:   The patient is a 68 y.o. male who presents today for follow-up for the following problem(s): elevated PSA, history of retention, and BPH with PVR of 274. Overall the problem(s) : are worsening. Associated Symptoms: No dysuria, gross hematuria. Pain Severity: 0/10    Summary of old records:   Retention requiring lira in 2018. Imaging/Labs reviewed during today's visit:  I have independently reviewed and verified the following films during today's visit. Bladder ultrasound and PVR.      Last several PSA's:  Lab Results   Component Value Date    PSA 2.88 (H) 04/19/2021    PSA 1.42 (H) 12/16/2019    PSA 2.76 (H) 11/23/2018       Last total testosterone:  No results found for: TESTOSTERONE    Urinalysis today:  Results for POC orders placed in visit on 07/06/22   POCT Urinalysis No Micro (Auto)   Result Value Ref Range    Glucose, Ur Negative NEGATIVE mg/dl    Bilirubin Urine Negative     Ketones, Urine Negative NEGATIVE    Specific Gravity, Urine 1.015 1.002 - 1.030    Blood, UA POC Negative NEGATIVE    pH, Urine 5.50 5.0 - 9.0    Protein, Urine Trace (A) NEGATIVE mg/dl    Urobilinogen, Urine 0.20 0.0 - 1.0 eu/dl    Nitrite, Urine Negative NEGATIVE    Leukocyte Clumps, Urine Negative NEGATIVE    Color, Urine Yellow YELLOW-STRAW    Character, Urine Clear CLR-SL.CLOUD   poct post void residual   Result Value Ref Range    post void residual 274 ml       Last BUN and creatinine:  Lab Results   Component Value Date    BUN 34 (H) 07/01/2022     Lab Results   Component Value Date    CREATININE 1.9 (H) 07/01/2022       Imaging Reviewed during this Office Visit:   (results were independently reviewed by physician and radiology report verified)    PAST MEDICAL, FAMILY AND SOCIAL HISTORY UPDATE:  Past Medical History:   Diagnosis Date    Adenomatous colon polyp 2009/2010, 6/'14,7/'17, 9/20, 7/21    Angina at rest [facility])     Arthritis     back    Atrial fibrillation ([address] 12/11 and 11/12    cardioversion 12/11    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    BPH (benign prostatic hypertrophy)     CAD (coronary artery disease) 1997    Carotid artery disease ([address] 1995    right    Carotid disease, bilateral ([address]     Cerebral artery occlusion with cerebral infarction ([address]     CHF (congestive heart failure) (HCC)     Chronic kidney disease     GERD (gastroesophageal reflux disease)     GI bleed 07/2021    History of blood transfusion 2021    GI bleed    History of blood transfusion 1997    with heart surgery    Hyperlipidemia 1996    Hypertension     Lumbar disc disease 2001    MI (myocardial infarction) ([address] 1997    Nephrolithiasis 2002    NIDDM (non-insulin dependent diabetes mellitus) 1996    diagnosed 1996    RICCARDO (obstructive sleep apnea) 2005    cpap    PVC's (premature ventricular contractions)     Renal artery stenosis ([address] 2007    left    Renal insufficiency     S/P CABG (status post coronary artery bypass graft) 1997    5 vessels    Skin cancer     Squamous cell carcinoma     Stenosis of right subclavian artery ([address]     TIA (transient ischemic attack)     Wears partial dentures     upper and lower     Past Surgical History:   Procedure Laterality Date    ABLATION OF DYSRHYTHMIC FOCUS  3/13/13    CARDIAC CATHETERIZATION  2008 - 2019    stents x15   [address]  3/13/13    oblation for irreg rythm    CARDIOVERSION  3/8/2012    CAROTID ENDARTERECTOMY  1997    CHOLECYSTECTOMY  4/1999    COLONOSCOPY  6/22/09, 4/2011,7/12/17    polyp removal    COLONOSCOPY N/A 7/21/2021    COLONOSCOPY POLYPECTOMY SNARE/COLD BIOPSY performed by Marti Reilly MD at [address] [facility]  2008 (2), 2009 (1)    x2    CORONARY ARTERY BYPASS GRAFT  8/1997 5 vessel    DIAGNOSTIC CARDIAC CATH LAB PROCEDURE      ENDOSCOPY, COLON, DIAGNOSTIC      EYE LID SURGERY Right 11/17/2021    EXCISION SCC RIGHT LOWER LATERAL EYELID WITH FROZEN SECTION performed by Iesha Shepherd MD at [address] Right 2/28/2022    EXCISION SCC RIGHT ZYGOMA performed by Iesha Shepherd MD at [address] [facility]  12/20/2013    pre-cancerous mole rt foot removed    LITHOTRIPSY  4/2002    DC OFFICE/OUTPT VISIT,PROCEDURE ONLY N/A 11/8/2018    TRANSESOPHAGEAL ECHOCARDIOGRAM performed by Taniya Velez MD at [facility] End[facility]    PTCA  05/30/2018   [facility] SURGERY  2001, 2003    rt shoulder manipulation--frozen shoulder-01, Lt -03    SHOULDER SURGERY  2001 - 2003    manipulation - bilateral    SKIN BIOPSY      SKIN CANCER EXCISION  12/20/13     R foot    SKIN CANCER EXCISION  1/2016    squamous cell Lt  upper cheek    UPPER GASTROINTESTINAL ENDOSCOPY N/A 7/20/2021    EGD ESOPHAGOGASTRODUODENOSCOPY performed by Davey Lemon MD at [address]  4/2002    VASCULAR SURGERY      carotids & cabg harvests    VENTRAL HERNIA REPAIR N/A 4/30/2022    exploratory laparotomy with repair of incarcerated hernia performed by Marina Mendoza MD at [address] [place] [address] History   Problem Relation Age of Onset    Cancer Mother     High Blood Pressure Mother     Stroke Father     High Blood Pressure Father     Heart Disease Brother     Cancer Brother         lung    Cancer Brother         melanoma     Outpatient Medications Marked as Taking for the 7/6/22 encounter (Office Visit) with Lan Phoenix, MD   Medication Sig Dispense Refill    nitroGLYCERIN (NITROLINGUAL) 0.4 MG/SPRAY 0.4 mg spray USE 1 SPRAY ON OR UNDER THE TONGUE EVERY 5 MINUTES AS NEEDED FOR CHEST PAIN 4.9 g 6    Dulaglutide (TRULICITY) 1.68 QX/6.3QA SOPN Inject 0.75 mg into the skin once a week      Insulin Glargine (BASAGLAR KWIKPEN SC) Inject into the skin      diphenhydrAMINE-APAP, sleep, (TYLENOL PM EXTRA STRENGTH PO) Take by mouth      dilTIAZem (CARDIZEM CD) 120 MG extended release capsule TAKE 1 CAPSULE BY MOUTH  DAILY 90 capsule 3    tamsulosin (FLOMAX) 0.4 MG capsule Take 1 capsule by mouth 2 times daily 90 capsule 2    Insulin Pen Needle (NOVOFINE PEN NEEDLE) 32G X 6 MM MISC 1 each by Does not apply route 6 times daily 600 each 3    aspirin 81 MG EC tablet Take 81 mg by mouth daily      HUMALOG KWIKPEN 100 UNIT/ML SOPN INJECT SUBCUTANEOUSLY 7 UNITS IN THE MORNING , 7 UNITS AT LUNCH AND 12 UNITS AT SUPPER PLUS SLIDING SCALE DURING MEALS AND AT BEDTIME (Patient taking differently: Inject 8 units before breakfast, 8 units before lunch, and 12 units before dinner plus group 1 scale) 15 mL 11    metoprolol succinate (TOPROL XL) 50 MG extended release tablet TAKE 1 TABLET BY MOUTH  DAILY 90 tablet 3    ranolazine (RANEXA) 1000 MG extended release tablet TAKE 1 TABLET BY MOUTH  TWICE DAILY NEW DOSE 180 tablet 3    bumetanide (BUMEX) 2 MG tablet Take 2 mg by mouth daily      NOVOFINE 32G X 6 MM MISC USE 1 NEW NEEDLE 6 TIMES  DAILY AND AS NEEDED 540 each 1    ascorbic acid (VITAMIN C) 500 MG tablet Take 500 mg by mouth every other day      isosorbide mononitrate (IMDUR) 120 MG extended release tablet Take 1 tablet by mouth daily 30 tablet 3    blood glucose test strips (ACCU-CHEK GUIDE) strip USE TO CHECK 4 TIMES DAILY 300 strip 5    Accu-Chek FastClix Lancets MISC USE TO TEST BLOOD SUGARS 4 TIMES DAILY 102 each 63    atorvastatin (LIPITOR) 40 MG tablet TAKE 1 TABLET BY MOUTH AT  NIGHT 90 tablet 3    diphenhydrAMINE-APAP, sleep, (TYLENOL PM EXTRA STRENGTH)  MG tablet Take 1 tablet by mouth every evening as needed      hydrOXYzine (ATARAX) 10 MG tablet Take 10 mg by mouth daily as needed       fluocinonide (LIDEX) 0.05 % cream Apply 1 applicator topically as needed      BiPAP Machine MISC by Does not apply route      Multiple Vitamins-Minerals (THERAPEUTIC MULTIVITAMIN-MINERALS) tablet Take 1 tablet by mouth nightly      hydrALAZINE (APRESOLINE) 25 MG tablet Take 1 tablet by mouth every 8 hours 90 tablet 0    clopidogrel (PLAVIX) 75 MG tablet Take 75 mg by mouth daily morning         Patient has no known allergies. Social History     Tobacco Use   Smoking Status Never Smoker   Smokeless Tobacco Never Used       Social History     Substance and Sexual Activity   Alcohol Use No    Alcohol/week: 0.0 standard drinks       REVIEW OF SYSTEMS:  Constitutional: negative  Eyes: negative  Respiratory: negative  Cardiovascular: negative  Gastrointestinal: negative  Musculoskeletal: negative  Genitourinary: negative except for what is in HPI  Skin: negative   Neurological: negative  Hematological/Lymphatic: negative  Psychological: negative    Physical Exam:      Vitals:    07/06/22 1122   BP: 138/62     Patient is a 68 y.o. male in no acute distress and alert and oriented to person, place and time. NAD, A/o  Non labored respiration  Normal peripheral pulses  Skin- warm and dry  Psych- normal mood and affect      Assessment and Plan      1. Urinary retention    2. Hypertrophy of prostate with urinary obstruction    3. Elevated PSA           Plan:      No follow-ups on file. Continue Flomax BID  Discussed all options including flomax, CIC, TURP/PVP, and interstim. PVR worsening. Will follow closely and see back in 4 months with PVR. Cysto showed patent prostate.               Dr. Sesar Calzada MD

## 2022-07-12 ENCOUNTER — HOSPITAL ENCOUNTER (OUTPATIENT)
Dept: NURSING | Age: 77
Discharge: HOME | End: 2022-07-12

## 2022-07-14 RX ORDER — BLOOD SUGAR DIAGNOSTIC
STRIP MISCELLANEOUS
Qty: 100 STRIP | Refills: 17 | Status: SHIPPED | OUTPATIENT
Start: 2022-07-14 | End: 2022-08-16 | Stop reason: SDUPTHER

## 2022-07-14 NOTE — TELEPHONE ENCOUNTER
Date of last visit:  5/12/2022  Date of next visit:  Visit date not found    Requested Prescriptions     Pending Prescriptions Disp Refills    ACCU-CHEK GUIDE strip [Pharmacy Med Name: ACCU-CHEK GUIDE TEST STRIP] 100 strip 17     Sig: USE TO CHECK 4 TIMES DAILY

## 2022-07-18 RX ORDER — TAMSULOSIN HYDROCHLORIDE 0.4 MG/1
CAPSULE ORAL
Qty: 180 CAPSULE | Refills: 3 | Status: SHIPPED | OUTPATIENT
Start: 2022-07-18

## 2022-07-18 RX ORDER — LANCETS
EACH MISCELLANEOUS
Qty: 400 EACH | Refills: 11 | Status: SHIPPED | OUTPATIENT
Start: 2022-07-18 | End: 2022-08-16

## 2022-07-18 NOTE — TELEPHONE ENCOUNTER
Pt's wife called requesting following:     Accu Chek Fastclix Lancet Drum, checks blood sugar 4 times daily     [facility]

## 2022-07-18 NOTE — TELEPHONE ENCOUNTER
Pankaj Ritter called requesting a refill on the following medications:  Requested Prescriptions     Pending Prescriptions Disp Refills    tamsulosin (FLOMAX) 0.4 MG capsule [Pharmacy Med Name: Tamsulosin HCl 0.4 MG Oral Capsule] 180 capsule 3     Sig: TAKE 1 [address] verified:  .pv      Date of last visit: 07/06/2022  Date of next visit (if applicable): 66/25/6331

## 2022-07-18 NOTE — TELEPHONE ENCOUNTER
Date of last visit:  5/12/2022  Date of next visit:  Visit date not found    Requested Prescriptions     Pending Prescriptions Disp Refills    Accu-Chek FastClix Lancets MISC 102 each 63     Sig: USE TO TEST BLOOD SUGARS 4 TIMES DAILY

## 2022-07-19 ENCOUNTER — HOSPITAL ENCOUNTER (OUTPATIENT)
Dept: NURSING | Age: 77
Discharge: HOME | End: 2022-07-19
Payer: MEDICARE

## 2022-07-19 ENCOUNTER — NURSE ONLY (OUTPATIENT)
Dept: LAB | Age: 77
End: 2022-07-19

## 2022-07-19 VITALS
OXYGEN SATURATION: 98 % | HEART RATE: 59 BPM | TEMPERATURE: 97.7 F | DIASTOLIC BLOOD PRESSURE: 67 MMHG | SYSTOLIC BLOOD PRESSURE: 152 MMHG | RESPIRATION RATE: 18 BRPM

## 2022-07-19 DIAGNOSIS — I10 ESSENTIAL HYPERTENSION: ICD-10-CM

## 2022-07-19 DIAGNOSIS — I50.32 CHRONIC DIASTOLIC CONGESTIVE HEART FAILURE (HCC): ICD-10-CM

## 2022-07-19 DIAGNOSIS — D63.1 ANEMIA OF CHRONIC RENAL FAILURE, STAGE 3B (HCC): ICD-10-CM

## 2022-07-19 DIAGNOSIS — N18.32 ANEMIA OF CHRONIC RENAL FAILURE, STAGE 3B (HCC): ICD-10-CM

## 2022-07-19 LAB
ANION GAP SERPL CALCULATED.3IONS-SCNC: 12 MEQ/L (ref 8–16)
BUN BLDV-MCNC: 35 MG/DL (ref 7–22)
CALCIUM SERPL-MCNC: 9.2 MG/DL (ref 8.5–10.5)
CHLORIDE BLD-SCNC: 101 MEQ/L (ref 98–111)
CO2: 24 MEQ/L (ref 23–33)
CREAT SERPL-MCNC: 1.7 MG/DL (ref 0.4–1.2)
ERYTHROCYTE [DISTWIDTH] IN BLOOD BY AUTOMATED COUNT: 13.5 % (ref 11.5–14.5)
ERYTHROCYTE [DISTWIDTH] IN BLOOD BY AUTOMATED COUNT: 49.8 FL (ref 35–45)
GFR SERPL CREATININE-BSD FRML MDRD: 39 ML/MIN/1.73M2
GLUCOSE BLD-MCNC: 137 MG/DL (ref 70–108)
HCT VFR BLD CALC: 33.5 % (ref 42–52)
HEMOGLOBIN: 10.9 GM/DL (ref 14–18)
MCH RBC QN AUTO: 32.9 PG (ref 26–33)
MCHC RBC AUTO-ENTMCNC: 32.5 GM/DL (ref 32.2–35.5)
MCV RBC AUTO: 101.2 FL (ref 80–94)
PLATELET # BLD: 197 THOU/MM3 (ref 130–400)
PMV BLD AUTO: 9.8 FL (ref 9.4–12.4)
POTASSIUM SERPL-SCNC: 4.4 MEQ/L (ref 3.5–5.2)
RBC # BLD: 3.31 MILL/MM3 (ref 4.7–6.1)
SODIUM BLD-SCNC: 137 MEQ/L (ref 135–145)
WBC # BLD: 5.6 THOU/MM3 (ref 4.8–10.8)

## 2022-07-19 PROCEDURE — 6360000002 HC RX W HCPCS: Performed by: INTERNAL MEDICINE

## 2022-07-19 PROCEDURE — 96372 THER/PROPH/DIAG INJ SC/IM: CPT

## 2022-07-19 RX ADMIN — DARBEPOETIN ALFA 100 MCG: 100 INJECTION, SOLUTION INTRAVENOUS; SUBCUTANEOUS at 10:10

## 2022-07-19 NOTE — DISCHARGE INSTRUCTIONS
ACTIVITY:  Continue usual care with your doctor. Call your doctor immediately if any severe problems or go to the nearest emergency room. I have been treated and hereby acknowledge receiving this instruction sheet.

## 2022-07-19 NOTE — PROGRESS NOTES
1005 pt arrives ambulatory for aranesp injection. Injection explained and questions answered. PT RIGHTS AND RESPONSIBILITIES OFFERED TO PT.   1010 injection given. Pt tolerated it well with no complaints. 1015 pt discharged ambulatory with instructions with no complaints.              __m__ Safety:       (Environmental)  [place] to environment  Ensure ID band is correct and in place/ allergy band as needed  Assess for fall risk  Initiate fall precautions as applicable (fall band, side rails, etc.)  Call light within reach  Bed in low position/ wheels locked    __m__ Pain:       Assess pain level and characteristics  Administer analgesics as ordered  Assess effectiveness of pain management and report to MD as needed    _m___ Knowledge Deficit:  Assess baseline knowledge  Provide teaching at level of understanding  Provide teaching via preferred learning method  Evaluate teaching effectiveness    __m__ Hemodynamic/Respiratory Status:       (Pre and Post Procedure Monitoring)  Assess/Monitor vital signs and LOC  Assess Baseline SpO2 prior to any sedation  Obtain weight/height  Assess vital signs/ LOC until patient meets discharge criteria  Monitor procedure site and notify MD of any issues

## 2022-07-21 ENCOUNTER — OFFICE VISIT (OUTPATIENT)
Dept: NEPHROLOGY | Age: 77
End: 2022-07-21
Payer: MEDICARE

## 2022-07-21 VITALS
TEMPERATURE: 98.2 F | WEIGHT: 205 LBS | HEART RATE: 60 BPM | BODY MASS INDEX: 30.36 KG/M2 | OXYGEN SATURATION: 100 % | DIASTOLIC BLOOD PRESSURE: 53 MMHG | HEIGHT: 69 IN | SYSTOLIC BLOOD PRESSURE: 147 MMHG

## 2022-07-21 DIAGNOSIS — I10 ESSENTIAL HYPERTENSION: ICD-10-CM

## 2022-07-21 DIAGNOSIS — D63.1 ANEMIA IN STAGE 3 CHRONIC KIDNEY DISEASE, UNSPECIFIED WHETHER STAGE 3A OR 3B CKD (HCC): Primary | ICD-10-CM

## 2022-07-21 DIAGNOSIS — N18.30 ANEMIA IN STAGE 3 CHRONIC KIDNEY DISEASE, UNSPECIFIED WHETHER STAGE 3A OR 3B CKD (HCC): Primary | ICD-10-CM

## 2022-07-21 DIAGNOSIS — N18.32 STAGE 3B CHRONIC KIDNEY DISEASE (HCC): ICD-10-CM

## 2022-07-21 PROCEDURE — 1036F TOBACCO NON-USER: CPT | Performed by: INTERNAL MEDICINE

## 2022-07-21 PROCEDURE — 99213 OFFICE O/P EST LOW 20 MIN: CPT | Performed by: INTERNAL MEDICINE

## 2022-07-21 PROCEDURE — 1123F ACP DISCUSS/DSCN MKR DOCD: CPT | Performed by: INTERNAL MEDICINE

## 2022-07-21 PROCEDURE — G8417 CALC BMI ABV UP PARAM F/U: HCPCS | Performed by: INTERNAL MEDICINE

## 2022-07-21 PROCEDURE — G8427 DOCREV CUR MEDS BY ELIG CLIN: HCPCS | Performed by: INTERNAL MEDICINE

## 2022-07-21 RX ORDER — INSULIN GLARGINE 100 [IU]/ML
33 INJECTION, SOLUTION SUBCUTANEOUS 2 TIMES DAILY
COMMUNITY

## 2022-07-21 NOTE — PROGRESS NOTES
[facility]        Outpatient Follow-Up note         7/21/2022 10:59 AM    Patient Name:   Ghanshyam Jones  Birthdate:    [birth year]  Primary Care Physician:  Rocio Bradford MD     Chief Complaint / Reason for follow-up : Follow Up of CKD     Interval History :  Patient seen and examined by me. Feels ok. No chest pain. also SOB. Feels weak and tired . Sleepy.       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    BPH (benign prostatic hypertrophy)     CAD (coronary artery disease) 1997    Carotid artery disease ([address] 1995    right    Carotid disease, bilateral ([address]     Cerebral artery occlusion with cerebral infarction ([address]     CHF (congestive heart failure) ([address]     Chronic kidney disease     GERD (gastroesophageal reflux disease)     GI bleed 07/2021    History of blood transfusion 2021    GI bleed    History of blood transfusion 1997    with heart surgery    Hyperlipidemia 1996    Hypertension     Lumbar disc disease 2001    MI (myocardial infarction) ([address] 1997    Nephrolithiasis 2002    NIDDM (non-insulin dependent diabetes mellitus) 1996    diagnosed 1996    RICCARDO (obstructive sleep apnea) 2005    cpap    PVC's (premature ventricular contractions)     Renal artery stenosis ([address] 2007    left    Renal insufficiency     S/P CABG (status post coronary artery bypass graft) 1997    5 vessels    Skin cancer     Squamous cell carcinoma     Stenosis of right subclavian artery (HCC)     TIA (transient ischemic attack)     Wears partial dentures     upper and lower     Past Surgical History:   Procedure Laterality Date    ABLATION OF DYSRHYTHMIC FOCUS  3/13/13    CARDIAC CATHETERIZATION  2008 - 2019    stents x15    CARDIAC SURGERY [facility]  3/13/13    oblation for irreg rythm    CARDIOVERSION  3/8/2012    CAROTID ENDARTERECTOMY  1997    CHOLECYSTECTOMY  4/1999    COLONOSCOPY  6/22/09, 4/2011,7/12/17    polyp removal    COLONOSCOPY N/A 7/21/2021    COLONOSCOPY POLYPECTOMY SNARE/COLD BIOPSY performed by Naty Snyder MD at [address] [place] [address]  2008 (2), 2009 (1)    x2    CORONARY ARTERY BYPASS GRAFT  8/1997    5 vessel    DIAGNOSTIC CARDIAC CATH LAB PROCEDURE      ENDOSCOPY, COLON, DIAGNOSTIC      EYE LID SURGERY Right 11/17/2021    EXCISION SCC RIGHT LOWER LATERAL EYELID WITH FROZEN SECTION performed by Jazlyn Hernandes MD at [address] Right 2/28/2022    EXCISION SCC RIGHT ZYGOMA performed by Jazlyn Hernandes MD at [address]  12/20/2013    pre-cancerous mole rt foot removed    LITHOTRIPSY  4/2002    [place] OFFICE/OUTPT VISIT,PROCEDURE ONLY N/A 11/8/2018    TRANSESOPHAGEAL ECHOCARDIOGRAM performed by [address], MD at [facility]    PTCA  05/30/2018    SHOULDER SURGERY  2001, 2003    rt shoulder manipulation--frozen shoulder-01, Lt -03    SHOULDER SURGERY  2001 - 2003    manipulation - bilateral    SKIN BIOPSY      SKIN CANCER EXCISION  12/20/13     R foot    SKIN CANCER EXCISION  1/2016    squamous cell Lt  upper cheek    UPPER GASTROINTESTINAL ENDOSCOPY N/A 7/20/2021    EGD ESOPHAGOGASTRODUODENOSCOPY performed by Naty Snyder MD at [address] [place] [address]   4/2002    VASCULAR SURGERY      carotids & cabg harvests    VENTRAL HERNIA REPAIR N/A 4/30/2022    exploratory laparotomy with repair of incarcerated hernia performed by Parish العلي MD at [address]        Medications :     Outpatient Medications Marked as Taking for the 7/21/22 encounter (Office Visit) with Tanner Haddad MD   Medication Sig Dispense Refill    insulin glargine (BASAGLAR KWIKPEN) 100 UNIT/ML injection pen Inject 33 Units into the skin 2 times daily      tamsulosin (FLOMAX) 0.4 MG capsule TAKE 1 CAPSULE BY MOUTH  TWICE DAILY 180 capsule 3    Accu-Chek FastClix Lancets MISC USE TO TEST BLOOD SUGARS 4 TIMES DAILY 400 each 11    ACCU-CHEK GUIDE strip USE TO CHECK 4 TIMES DAILY 100 strip 17    nitroGLYCERIN (NITROLINGUAL) 0.4 MG/SPRAY 0.4 mg spray USE 1 SPRAY ON OR UNDER THE TONGUE EVERY 5 MINUTES AS NEEDED FOR CHEST PAIN 4.9 g 6    Dulaglutide (TRULICITY) 3.88 EX/2.7NA SOPN Inject 0.75 mg into the skin once a week      diphenhydrAMINE-APAP, sleep, (TYLENOL PM EXTRA STRENGTH PO) Take by mouth      dilTIAZem (CARDIZEM CD) 120 MG extended release capsule TAKE 1 CAPSULE BY MOUTH  DAILY 90 capsule 3    docusate sodium (COLACE) 100 MG capsule Take 1 capsule by mouth daily      Insulin Pen Needle (NOVOFINE PEN NEEDLE) 32G X 6 MM MISC 1 each by Does not apply route 6 times daily 600 each 3    aspirin 81 MG EC tablet Take 81 mg by mouth daily      metoprolol succinate (TOPROL XL) 50 MG extended release tablet TAKE 1 TABLET BY MOUTH  DAILY 90 tablet 3    ranolazine (RANEXA) 1000 MG extended release tablet TAKE 1 TABLET BY MOUTH  TWICE DAILY NEW DOSE 180 tablet 3    bumetanide (BUMEX) 2 MG tablet Take 2 mg by mouth daily      NOVOFINE 32G X 6 MM MISC USE 1 NEW NEEDLE 6 TIMES  DAILY AND AS NEEDED 540 each 1    ascorbic acid (VITAMIN C) 500 MG tablet Take 500 mg by mouth every other day      isosorbide mononitrate (IMDUR) 120 MG extended release tablet Take 1 tablet by mouth daily 30 tablet 3    atorvastatin (LIPITOR) 40 MG tablet TAKE 1 TABLET BY MOUTH AT  NIGHT 90 tablet 3    diphenhydrAMINE-APAP, sleep, (TYLENOL PM EXTRA STRENGTH)  MG tablet Take 1 tablet by mouth every evening as needed      hydrOXYzine (ATARAX) 10 MG tablet Take 10 mg by mouth daily as needed       fluocinonide (LIDEX) 0.05 % cream Apply 1 applicator topically as needed      BiPAP Machine MISC by Does not apply route      Multiple Vitamins-Minerals (THERAPEUTIC MULTIVITAMIN-MINERALS) tablet Take 1 tablet by mouth nightly      hydrALAZINE (APRESOLINE) 25 MG tablet Take 1 tablet by mouth every 8 hours 90 tablet 0    clopidogrel (PLAVIX) 75 MG tablet Take 75 mg by mouth daily morning         Vitals     BP (!) 147/53 (Site: Left Upper Arm, Position: Sitting, Cuff Size: Small Adult)   Pulse 60   Temp 98.2 °F (36.8 °C)   Ht 5' 9\" (1.753 m)   Wt 205 lb (93 kg)   SpO2 100%   BMI 30.27 kg/m²  Wt Readings from Last 3 Encounters:   07/21/22 205 lb (93 kg)   07/06/22 216 lb (98 kg)   05/19/22 223 lb (101.2 kg)        Physical Exam   General -- no distress  Lungs -- clear  Heart -- S1, S2 heard, JVD - no  Abdomen - soft, non-tender  Extremities -- no edema  CNS - awake and alert    Labs, Radiology and Tests    Labs -    9/18 3/19 4/20 1/21 5/21 9/21 1/22 7/22    Potassium 5.0 4.1 4.8 4.9 4.0 4.0 4.7 4.4    BUN 34 26 34 45 27 47 49 35    Creatinine 1.5 1.5 (1.7) 1.8 1.7 1.8 2.2 2.0 1.7    eGFR 46 46 37 39 37 29 33 39                UPCR    60        [facility]                          Renal Ultrasound Scan -- 6/2018  Rt kidney 13.1 cm and left kidney 11.50  Left kidney cysts 5.7 cm. Assessment    Renal - chronic kidney disease stage III most likely secondary to the use of diuretics.    -Overall renal function appears to be stable. -Volume status okay continue current dose of diuretics  Electrolytes - WNL  Coronary artery disease with CABG 20 years ago and 15 stents so far 9 of them have been placed in 2018  Diabetes mellitus, stable   Elevated PSA . BPH seen urology . Acute on chronic diastolic congestive heart failure plan as mentioned above  Anemia - iron deficiency . Hb is ok. Monthly H and H  meds reviewed and D/W patient and wife  FU in 6 months. Tests and orders placed this Encounter     Orders Placed This Encounter   Procedures    Basic Metabolic Panel    Wendy Buckner M.D  [facility].

## 2022-08-08 ENCOUNTER — OFFICE VISIT (OUTPATIENT)
Dept: CARDIOLOGY CLINIC | Age: 77
End: 2022-08-08
Payer: MEDICARE

## 2022-08-08 VITALS
BODY MASS INDEX: 29.71 KG/M2 | SYSTOLIC BLOOD PRESSURE: 146 MMHG | HEART RATE: 67 BPM | DIASTOLIC BLOOD PRESSURE: 48 MMHG | HEIGHT: 69 IN | WEIGHT: 200.6 LBS

## 2022-08-08 DIAGNOSIS — I48.0 PAROXYSMAL ATRIAL FIBRILLATION (HCC): Primary | ICD-10-CM

## 2022-08-08 DIAGNOSIS — R06.02 SOB (SHORTNESS OF BREATH) ON EXERTION: ICD-10-CM

## 2022-08-08 DIAGNOSIS — R07.9 CHEST PAIN, UNSPECIFIED TYPE: ICD-10-CM

## 2022-08-08 PROCEDURE — 1123F ACP DISCUSS/DSCN MKR DOCD: CPT | Performed by: INTERNAL MEDICINE

## 2022-08-08 PROCEDURE — 99213 OFFICE O/P EST LOW 20 MIN: CPT | Performed by: INTERNAL MEDICINE

## 2022-08-08 PROCEDURE — G8417 CALC BMI ABV UP PARAM F/U: HCPCS | Performed by: INTERNAL MEDICINE

## 2022-08-08 PROCEDURE — 93000 ELECTROCARDIOGRAM COMPLETE: CPT | Performed by: INTERNAL MEDICINE

## 2022-08-08 PROCEDURE — G8427 DOCREV CUR MEDS BY ELIG CLIN: HCPCS | Performed by: INTERNAL MEDICINE

## 2022-08-08 PROCEDURE — 1036F TOBACCO NON-USER: CPT | Performed by: INTERNAL MEDICINE

## 2022-08-08 RX ORDER — HYDRALAZINE HYDROCHLORIDE 25 MG/1
25 TABLET, FILM COATED ORAL EVERY 8 HOURS SCHEDULED
Qty: 90 TABLET | Refills: 3 | Status: SHIPPED | OUTPATIENT
Start: 2022-08-08 | End: 2022-08-17 | Stop reason: SDUPTHER

## 2022-08-08 RX ORDER — ISOSORBIDE MONONITRATE 120 MG/1
120 TABLET, EXTENDED RELEASE ORAL DAILY
Qty: 90 TABLET | Refills: 3 | Status: SHIPPED | OUTPATIENT
Start: 2022-08-08 | End: 2022-08-17 | Stop reason: SDUPTHER

## 2022-08-08 RX ORDER — CLOPIDOGREL BISULFATE 75 MG/1
75 TABLET ORAL DAILY
Qty: 90 TABLET | Refills: 3 | Status: SHIPPED | OUTPATIENT
Start: 2022-08-08 | End: 2022-08-17 | Stop reason: SDUPTHER

## 2022-08-08 RX ORDER — BUMETANIDE 2 MG/1
2 TABLET ORAL DAILY
Qty: 90 TABLET | Refills: 3 | Status: SHIPPED | OUTPATIENT
Start: 2022-08-08

## 2022-08-08 ASSESSMENT — ENCOUNTER SYMPTOMS
APNEA: 0
VOMITING: 0
ANAL BLEEDING: 0
ABDOMINAL PAIN: 0
NAUSEA: 0
BLOOD IN STOOL: 0
CHEST TIGHTNESS: 0
ABDOMINAL DISTENTION: 0
TROUBLE SWALLOWING: 0
CHOKING: 0
COLOR CHANGE: 0
STRIDOR: 0
COUGH: 0
WHEEZING: 0
VOICE CHANGE: 0
SHORTNESS OF BREATH: 1

## 2022-08-08 NOTE — PROGRESS NOTES
[address]  Dept: [address]  Loc: [phone]     8/8/2022       Tadeo Carlson is here today for   Chief Complaint   Patient presents with    6 Month Follow-Up           Referring Physician:  No ref. provider found     Patient Active Problem List   Diagnosis    CKD (chronic kidney disease) stage 3, GFR 30-59 ml/min (HCC)    Paroxysmal atrial fibrillation (HCC)    S/P CABG (coronary artery bypass graft)    Hyperlipidemia    Lumbar radiculopathy    Renal artery stenosis (HCC)    Obstructive sleep apnea on CPAP    Chronic diastolic congestive heart failure ([address]    Coronary atherosclerosis    Diabetes mellitus type 2, insulin dependent ([facility]    Status post angioplasty with stent    MADI (acute kidney injury) ([facility]    SOB (shortness of breath)    Wheezing    Asthma    Essential hypertension    Obesity (BMI 30-39. 9)    Elevated PSA    Hypertrophy of prostate with urinary obstruction    Adenomatous colon polyp    Angina, class III (HCC)    Hx of atrial fibrillation without current medication    Bilateral carotid artery stenosis    Hx of nonmelanoma skin cancer    Acute kidney injury superimposed on CKD (HCC)    Anemia of chronic renal failure    Anemia in stage 3 chronic kidney disease (HCC)    Melena    Chronic kidney disease, stage 4 (severe) (HCC)    Strangulated umbilical hernia    Small bowel obstruction (HCC)    Displacement of lumbar intervertebral disc without myelopathy    Degeneration of lumbosacral intervertebral disc    Chronic systolic (congestive) heart failure       Review of Systems   Constitutional:  Negative for activity change, appetite change, fatigue, fever and unexpected weight change. HENT:  Negative for congestion, trouble swallowing and voice change. Eyes:  Negative for visual disturbance. Respiratory:  Positive for shortness of breath.  Negative for apnea, cough, choking, chest tightness, wheezing and stridor. Cardiovascular:  Positive for chest pain. Negative for palpitations and leg swelling. Gastrointestinal:  Negative for abdominal distention, abdominal pain, anal bleeding, blood in stool, nausea and vomiting. Endocrine: Negative for cold intolerance and heat intolerance. Genitourinary:  Negative for hematuria. Musculoskeletal:  Negative for arthralgias, gait problem, joint swelling and myalgias. Skin:  Negative for color change and rash. Allergic/Immunologic: Negative for environmental allergies and food allergies. Neurological:  Negative for dizziness, tremors, syncope, facial asymmetry, weakness, light-headedness, numbness and headaches. Hematological:  Does not bruise/bleed easily. Psychiatric/Behavioral:  Negative for agitation, behavioral problems and sleep disturbance.        Past Medical History:   Diagnosis Date    Adenomatous colon polyp 2009/2010, 6/'14,7/'17, 9/20, 7/21    Angina at rest [facility])     Arthritis     back    Atrial fibrillation ([address] 12/11 and 11/12    cardioversion 12/11    Bladder cancer [facility]) 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    BPH (benign prostatic hypertrophy)     CAD (coronary artery disease) 1997    Carotid artery disease ([address] 1995    right    Carotid disease, bilateral ([address]     Cerebral artery occlusion with cerebral infarction ([address]     CHF (congestive heart failure) ([address]     Chronic kidney disease     COVID-19 07/09/2022    GERD (gastroesophageal reflux disease)     GI bleed 07/2021    History of blood transfusion 2021    GI bleed    History of blood transfusion 1997    with heart surgery    Hyperlipidemia 1996    Hypertension     Lumbar disc disease 2001    MI (myocardial infarction) ([address] 1997    Nephrolithiasis 2002    NIDDM (non-insulin dependent diabetes mellitus) 1996    diagnosed 1996    RICCARDO (obstructive sleep apnea) 2005    cpap    PVC's (premature ventricular contractions)     Renal artery stenosis ([address] 2007    left    Renal insufficiency     S/P CABG (status post coronary artery bypass graft) 1997    5 vessels    Skin cancer     Squamous cell carcinoma     Stenosis of right subclavian artery (HCC)     TIA (transient ischemic attack)     Wears partial dentures     upper and lower       No Known Allergies    Current Outpatient Medications   Medication Sig Dispense Refill    clopidogrel (PLAVIX) 75 MG tablet Take 1 tablet by mouth in the morning. morning. 90 tablet 3    hydrALAZINE (APRESOLINE) 25 MG tablet Take 1 tablet by mouth in the morning and 1 tablet at noon and 1 tablet before bedtime. 90 tablet 3    isosorbide mononitrate (IMDUR) 120 MG extended release tablet Take 1 tablet by mouth in the morning. 90 tablet 3    bumetanide (BUMEX) 2 MG tablet Take 1 tablet by mouth in the morning.  90 tablet 3    insulin glargine (BASAGLAR KWIKPEN) 100 UNIT/ML injection pen Inject 33 Units into the skin 2 times daily      tamsulosin (FLOMAX) 0.4 MG capsule TAKE 1 CAPSULE BY MOUTH  TWICE DAILY 180 capsule 3    Accu-Chek FastClix Lancets MISC USE TO TEST BLOOD SUGARS 4 TIMES DAILY 400 each 11    ACCU-CHEK GUIDE strip USE TO CHECK 4 TIMES DAILY 100 strip 17    nitroGLYCERIN (NITROLINGUAL) 0.4 MG/SPRAY 0.4 mg spray USE 1 SPRAY ON OR UNDER THE TONGUE EVERY 5 MINUTES AS NEEDED FOR CHEST PAIN 4.9 g 6    Dulaglutide (TRULICITY) 7.25 MEREDITH/8.7OF SOPN Inject 0.75 mg into the skin once a week      dilTIAZem (CARDIZEM CD) 120 MG extended release capsule TAKE 1 CAPSULE BY MOUTH  DAILY 90 capsule 3    Insulin Pen Needle (NOVOFINE PEN NEEDLE) 32G X 6 MM MISC 1 each by Does not apply route 6 times daily 600 each 3    aspirin 81 MG EC tablet Take 81 mg by mouth daily      metoprolol succinate (TOPROL XL) 50 MG extended release tablet TAKE 1 TABLET BY MOUTH  DAILY 90 tablet 3    ranolazine (RANEXA) 1000 MG extended release tablet TAKE 1 TABLET BY MOUTH  TWICE DAILY NEW DOSE 180 tablet 3    NOVOFINE 32G X 6 MM MISC USE 1 NEW NEEDLE 6 TIMES  DAILY AND AS NEEDED 540 each 1    atorvastatin (LIPITOR) 40 MG tablet TAKE 1 TABLET BY MOUTH AT  NIGHT 90 tablet 3    diphenhydrAMINE-APAP, sleep, (TYLENOL PM EXTRA STRENGTH)  MG tablet Take 1 tablet by mouth every evening as needed      fluocinonide (LIDEX) 0.05 % cream Apply 1 applicator topically as needed      BiPAP Machine MISC by Does not apply route      Multiple Vitamins-Minerals (THERAPEUTIC MULTIVITAMIN-MINERALS) tablet Take 1 tablet by mouth nightly       No current facility-administered medications for this visit. Social History     Socioeconomic History    Marital status:      Spouse name: Mandeep Mora    Number of children: 2    Years of education: None    Highest education level: None   Tobacco Use    Smoking status: Never    Smokeless tobacco: Never   Vaping Use    Vaping Use: Never used   Substance and Sexual Activity    Alcohol use: No     Alcohol/week: 0.0 standard drinks    Drug use: No    Sexual activity: Yes     Partners: Female     Social Determinants of Health     Financial Resource Strain: Low Risk     Difficulty of Paying Living Expenses: Not hard at all   Food Insecurity: No Food Insecurity    Worried About Running Out of Food in the Last Year: Never true    Ran Out of Food in the Last Year: Never true   Physical Activity: Inactive    Days of Exercise per Week: 0 days    Minutes of Exercise per Session: 0 min       Family History   Problem Relation Age of Onset    Cancer Mother     High Blood Pressure Mother     Stroke Father     High Blood Pressure Father     Heart Disease Brother     Cancer Brother         lung    Cancer Brother         melanoma       Blood pressure (!) 146/48, pulse 67, height 5' 9\" (1.753 m), weight 200 lb 9.6 oz (91 kg).     Physical Exam:    General Appearance: alert and oriented to person, place and time, in no acute distress  Cardiovascular: normal rate, regular rhythm, normal S1 and S2, no murmurs, rubs, clicks, or gallops, distal pulses intact, no carotid bruits, no JVD  Pulmonary/Chest: clear to auscultation bilaterally- no wheezes, rales or rhonchi, normal air movement, no respiratory distress  Abdomen: soft, non-tender, non-distended, normal bowel sounds, no masses   Extremities: no cyanosis, clubbing or edema, pulse   Skin: warm and dry, no rash or erythema  Head: normocephalic and atraumatic  Eyes: pupils equal, round, and reactive to light  Neck: supple and non-tender without mass, no thyromegaly   Musculoskeletal: normal range of motion, no joint swelling, deformity or tenderness  Neurological: alert, oriented, normal speech, no focal findings or movement disorder noted    Lab Data:    Cardiac Enzymes:  No results for input(s): CKTOTAL, CKMB, CKMBINDEX, TROPONINI in the last 72 hours.     CBC:   Lab Results   Component Value Date/Time    WBC 6.7 08/03/2022 09:52 AM    RBC 3.86 08/03/2022 09:52 AM    RBC 3.80 03/24/2012 09:25 AM    HGB 12.7 08/03/2022 09:52 AM    HCT 39.6 08/03/2022 09:52 AM     08/03/2022 09:52 AM       CMP:    Lab Results   Component Value Date/Time     08/03/2022 09:52 AM    K 4.9 08/03/2022 09:52 AM    K 4.5 04/30/2022 11:12 AM     08/03/2022 09:52 AM    CO2 25 08/03/2022 09:52 AM    BUN 26 08/03/2022 09:52 AM    CREATININE 1.7 08/03/2022 09:52 AM    GFRAA >60 09/11/2016 06:32 AM    AGRATIO 1.2 09/10/2016 02:42 PM    LABGLOM 39 08/03/2022 09:52 AM    GLUCOSE 135 08/03/2022 09:52 AM    GLUCOSE 169 03/24/2012 09:25 AM    CALCIUM 9.2 08/03/2022 09:52 AM       Hepatic Function Panel:    Lab Results   Component Value Date/Time    ALKPHOS 96 04/30/2022 11:12 AM    ALT 20 04/30/2022 11:12 AM    AST 19 04/30/2022 11:12 AM    PROT 7.8 04/30/2022 11:12 AM    BILITOT 0.7 04/30/2022 11:12 AM    BILITOT NEGATIVE 06/18/2018 12:00 AM    BILIDIR <0.2 04/30/2022 11:12 AM    LABALBU 4.2 04/30/2022 11:12 AM    LABALBU 4.5 03/24/2012 09:25 AM       Magnesium:    Lab Results   Component Value Date/Time    MG 2.4 04/07/2021 06:47 AM       PT/INR:    Lab Results   Component Value Date/Time    PROTIME 17.2 09/10/2016 02:42 PM    INR 1.10 11/11/2021 07:34 AM       HgBA1c:    Lab Results   Component Value Date/Time    LABA1C 6.7 05/12/2022 03:33 PM       FLP:    Lab Results   Component Value Date/Time    TRIG 167 01/24/2020 10:30 AM    HDL 31 01/24/2020 10:30 AM    LDLCALC 74 01/24/2020 10:30 AM       TSH:    Lab Results   Component Value Date/Time    TSH 2.460 08/26/2021 03:25 AM        Diagnosis Orders   1. Paroxysmal atrial fibrillation (HCC)  27827 - [place] ELECTROCARDIOGRAM, COMPLETE    CBC    Basic Metabolic Panel    Lipid Panel    Hepatic Function Panel      2. Chest pain, unspecified type  94167 - [place] ELECTROCARDIOGRAM, COMPLETE    CBC    Basic Metabolic Panel    Lipid Panel    Hepatic Function Panel      3. SOB (shortness of breath) on exertion  86712 - [place] ELECTROCARDIOGRAM, COMPLETE    CBC    Basic Metabolic Panel    Lipid Panel    Hepatic Function Panel           Assessment/Plan    Staci Cano is a 68years old gentleman with history of extensive coronary artery disease primary cardial infarction with prior CABG and multiple interventions. He is here for a follow-up. He is in sinus rhythm with occasional PVCs his hemoglobin has been under control his symptoms has been under control his shortness of breath is not as severe finally he cannot settle down and we will continue with the conservative medical treatment.   Patient trying to lose weight he lost about 12 pounds he was encouraged to do so his vital signs are under better and is stable the patient will continue the current medication and the patient will be seen in a year he was advised about diet exercise the lab work and the plan of treatment the EKG findings were all discussed with him and he scheduled to have the follow-up appointment in a year he was strongly advised about risk factor modification avoid unnecessary elective surgeries and continue with the current medications medication were reconciled and ordered lab work is ordered. Lab work was also reviewed with the patient and his family think    Orders Placed This Encounter   Procedures    CBC     Standing Status:   Future     Standing Expiration Date:   4/5/9719    Basic Metabolic Panel     Standing Status:   Future     Standing Expiration Date:   8/8/2023    Lipid Panel     Standing Status:   Future     Standing Expiration Date:   8/8/2023     Order Specific Question:   Is Patient Fasting?/# of Hours     Answer:   12 hours    Hepatic Function Panel     Standing Status:   Future     Standing Expiration Date:   8/8/2023    96455 - [place] ELECTROCARDIOGRAM, COMPLETE       Return in about 1 year (around 8/8/2023) for cad.      Bindu Sheridan MD

## 2022-08-16 ENCOUNTER — OFFICE VISIT (OUTPATIENT)
Dept: INTERNAL MEDICINE CLINIC | Age: 77
End: 2022-08-16
Payer: MEDICARE

## 2022-08-16 ENCOUNTER — TELEPHONE (OUTPATIENT)
Dept: FAMILY MEDICINE CLINIC | Age: 77
End: 2022-08-16

## 2022-08-16 VITALS
DIASTOLIC BLOOD PRESSURE: 50 MMHG | HEART RATE: 70 BPM | WEIGHT: 205.8 LBS | SYSTOLIC BLOOD PRESSURE: 120 MMHG | TEMPERATURE: 97.3 F | HEIGHT: 69 IN | BODY MASS INDEX: 30.48 KG/M2

## 2022-08-16 DIAGNOSIS — R80.9 TYPE 2 DIABETES MELLITUS WITH MICROALBUMINURIA, WITH LONG-TERM CURRENT USE OF INSULIN (HCC): Primary | ICD-10-CM

## 2022-08-16 DIAGNOSIS — E11.29 TYPE 2 DIABETES MELLITUS WITH MICROALBUMINURIA, WITH LONG-TERM CURRENT USE OF INSULIN (HCC): Primary | ICD-10-CM

## 2022-08-16 DIAGNOSIS — Z95.818 PRESENCE OF WATCHMAN LEFT ATRIAL APPENDAGE CLOSURE DEVICE: ICD-10-CM

## 2022-08-16 DIAGNOSIS — E11.51 TYPE 2 DIABETES MELLITUS WITH DIABETIC PERIPHERAL ANGIOPATHY WITHOUT GANGRENE, WITH LONG-TERM CURRENT USE OF INSULIN (HCC): Primary | ICD-10-CM

## 2022-08-16 DIAGNOSIS — Z79.4 TYPE 2 DIABETES MELLITUS WITH DIABETIC PERIPHERAL ANGIOPATHY WITHOUT GANGRENE, WITH LONG-TERM CURRENT USE OF INSULIN (HCC): Primary | ICD-10-CM

## 2022-08-16 DIAGNOSIS — N18.4 CHRONIC KIDNEY DISEASE, STAGE 4 (SEVERE) (HCC): ICD-10-CM

## 2022-08-16 DIAGNOSIS — I20.8 CHRONIC STABLE ANGINA (HCC): ICD-10-CM

## 2022-08-16 DIAGNOSIS — E78.2 MIXED HYPERLIPIDEMIA: ICD-10-CM

## 2022-08-16 DIAGNOSIS — Z79.4 TYPE 2 DIABETES MELLITUS WITH MICROALBUMINURIA, WITH LONG-TERM CURRENT USE OF INSULIN (HCC): Primary | ICD-10-CM

## 2022-08-16 PROCEDURE — G8427 DOCREV CUR MEDS BY ELIG CLIN: HCPCS | Performed by: INTERNAL MEDICINE

## 2022-08-16 PROCEDURE — 1123F ACP DISCUSS/DSCN MKR DOCD: CPT | Performed by: INTERNAL MEDICINE

## 2022-08-16 PROCEDURE — G8417 CALC BMI ABV UP PARAM F/U: HCPCS | Performed by: INTERNAL MEDICINE

## 2022-08-16 PROCEDURE — 99213 OFFICE O/P EST LOW 20 MIN: CPT | Performed by: INTERNAL MEDICINE

## 2022-08-16 PROCEDURE — 3044F HG A1C LEVEL LT 7.0%: CPT | Performed by: INTERNAL MEDICINE

## 2022-08-16 PROCEDURE — 1036F TOBACCO NON-USER: CPT | Performed by: INTERNAL MEDICINE

## 2022-08-16 RX ORDER — BLOOD-GLUCOSE METER
1 EACH MISCELLANEOUS DAILY
Qty: 1 KIT | Refills: 1 | Status: SHIPPED | OUTPATIENT
Start: 2022-08-16

## 2022-08-16 RX ORDER — RANOLAZINE 1000 MG/1
1000 TABLET, EXTENDED RELEASE ORAL 2 TIMES DAILY
Qty: 28 TABLET | Refills: 1 | Status: SHIPPED | OUTPATIENT
Start: 2022-08-16 | End: 2022-10-20

## 2022-08-16 RX ORDER — BLOOD SUGAR DIAGNOSTIC
STRIP MISCELLANEOUS
Qty: 100 STRIP | Refills: 17 | Status: SHIPPED | OUTPATIENT
Start: 2022-08-16 | End: 2022-08-16

## 2022-08-16 RX ORDER — RANOLAZINE 1000 MG/1
TABLET, EXTENDED RELEASE ORAL
Qty: 180 TABLET | Refills: 3 | Status: SHIPPED | OUTPATIENT
Start: 2022-08-16

## 2022-08-16 RX ORDER — LANCETS
EACH MISCELLANEOUS
Qty: 200 EACH | Refills: 11 | Status: SHIPPED | OUTPATIENT
Start: 2022-08-16

## 2022-08-16 NOTE — TELEPHONE ENCOUNTER
Anabella Lew called stating that Medicare does not have the information as to what is covered. She was given a number for the physicians office to call which is [phone].

## 2022-08-16 NOTE — PROGRESS NOTES
Lelia Jauregui (:  1945) is a 68 y.o. male,Established patient, here for evaluation of the following chief complaint(s):  Coronary Artery Disease, Atrial Fibrillation (parox), Chronic Kidney Disease, and Diabetes (Pcp follows)         ASSESSMENT/PLAN:  1. Type 2 diabetes mellitus with diabetic peripheral angiopathy without gangrene, with long-term current use of insulin ([address] last A1C 6.7 in 5.  2. Chronic stable angina ([address]- denies any recent; continue meds  3. Chronic kidney disease, stage 4 (severe) ([address]- discussed adding SGLT2 inhibitor; research cost  4. Mixed hyperlipidemia- controlled; continue meds  5. Presence of Watchman left atrial appendage closure device- now off anticoagulation      Return in about 6 months (around 2023). Subjective   SUBJECTIVE/OBJECTIVE:  HPIpt with cad, multiple stents, afib, dm, obesity, ckd seen in f/u. Voices no cardiac sxs. Main issue is back; takes tylenol and topical    Has ckd, sees Dr Norma Irene    Review of Systems   Twelve point ROS completed and found to be negative except as noted above. Objective   Physical Exam   BP (!) 120/50 (Site: Left Upper Arm)   Pulse 70   Temp 97.3 °F (36.3 °C)   Ht 5' 9.02\" (1.753 m)   Wt 205 lb 12.8 oz (93.4 kg)   BMI 30.38 kg/m²     General appearance - overweight and chronically ill appearing    Mental Status - alert, oriented to person, place, and time          Neck - supple, no significant adenopathy        Chest - wheezing noted bilat     Heart - normal rate and regular rhythm, systolic murmur 2/6 at lower left sternal border     Abdomen - protuberant         Neurological - alert, oriented, normal speech, no focal findings or movement disorder noted     Musculoskeletal -   msk [id number]}     Extremities - no pedal edema noted     Skin - normal coloration and turgor, no rashes, no suspicious skin lesions noted            An electronic signature was used to authenticate this note.     --Virginia Sanches, MD

## 2022-08-16 NOTE — TELEPHONE ENCOUNTER
Tiesha Baltazar, [address] [place] [address] called said that the Accu-Chek test strips are no longer covered by pt's insurance. She said it is not saying what would be covered. I told her I would put a message back but we normally have the pt check with the insurance for what is covered.

## 2022-08-16 NOTE — TELEPHONE ENCOUNTER
Date of last visit:  5/12/2022  Date of next visit:  Visit date not found    Requested Prescriptions     Pending Prescriptions Disp Refills    blood glucose test strips (ACCU-CHEK GUIDE) strip 100 strip 17     Sig: Use to check blood sugar 4 times daily dx:11.9

## 2022-08-16 NOTE — TELEPHONE ENCOUNTER
Medicare covers OneTouch Ultra kit. I have pended everything for pt to get a new meter, strips and lancets for MD to sign.      Date of last visit:  2022  Date of next visit:  Visit date not found    Requested Prescriptions     Pending Prescriptions Disp Refills    Blood Glucose Monitoring Suppl (ONE TOUCH ULTRA 2) w/Device KIT 1 kit 0     Si kit by Does not apply route daily DX:11.9    blood glucose test strips (ASCENSIA AUTODISC VI;ONE TOUCH ULTRA TEST VI) strip 100 each 3     Si each by In Vitro route 4 times daily DX:11.9    ONE TOUCH ULTRASOFT LANCETS MISC 100 each 11     Sig: Use to test Blood Sugar 4 times Daily, Dx: E11.9

## 2022-08-16 NOTE — TELEPHONE ENCOUNTER
Called pt and informed him he will need to check with his insurance to see what is covered and we will send in a new rx.  He agreed and stated he would call us back

## 2022-08-17 RX ORDER — ATORVASTATIN CALCIUM 40 MG/1
TABLET, FILM COATED ORAL
Qty: 90 TABLET | Refills: 3 | Status: SHIPPED | OUTPATIENT
Start: 2022-08-17

## 2022-08-30 ENCOUNTER — NURSE ONLY (OUTPATIENT)
Dept: LAB | Age: 77
End: 2022-08-30

## 2022-08-30 DIAGNOSIS — N18.30 ANEMIA IN STAGE 3 CHRONIC KIDNEY DISEASE, UNSPECIFIED WHETHER STAGE 3A OR 3B CKD (HCC): ICD-10-CM

## 2022-08-30 DIAGNOSIS — I10 ESSENTIAL HYPERTENSION: ICD-10-CM

## 2022-08-30 DIAGNOSIS — D63.1 ANEMIA IN STAGE 3 CHRONIC KIDNEY DISEASE, UNSPECIFIED WHETHER STAGE 3A OR 3B CKD (HCC): ICD-10-CM

## 2022-08-30 DIAGNOSIS — N18.32 STAGE 3B CHRONIC KIDNEY DISEASE (HCC): ICD-10-CM

## 2022-08-30 LAB
ANION GAP SERPL CALCULATED.3IONS-SCNC: 12 MEQ/L (ref 8–16)
BUN BLDV-MCNC: 20 MG/DL (ref 7–22)
CALCIUM SERPL-MCNC: 9.5 MG/DL (ref 8.5–10.5)
CHLORIDE BLD-SCNC: 104 MEQ/L (ref 98–111)
CO2: 25 MEQ/L (ref 23–33)
CREAT SERPL-MCNC: 1.4 MG/DL (ref 0.4–1.2)
ERYTHROCYTE [DISTWIDTH] IN BLOOD BY AUTOMATED COUNT: 13.6 % (ref 11.5–14.5)
ERYTHROCYTE [DISTWIDTH] IN BLOOD BY AUTOMATED COUNT: 49.6 FL (ref 35–45)
GFR SERPL CREATININE-BSD FRML MDRD: 49 ML/MIN/1.73M2
GLUCOSE BLD-MCNC: 120 MG/DL (ref 70–108)
HCT VFR BLD CALC: 36.7 % (ref 42–52)
HEMOGLOBIN: 12.3 GM/DL (ref 14–18)
MCH RBC QN AUTO: 33.2 PG (ref 26–33)
MCHC RBC AUTO-ENTMCNC: 33.5 GM/DL (ref 32.2–35.5)
MCV RBC AUTO: 99.2 FL (ref 80–94)
PLATELET # BLD: 211 THOU/MM3 (ref 130–400)
PMV BLD AUTO: 9.9 FL (ref 9.4–12.4)
POTASSIUM SERPL-SCNC: 4.2 MEQ/L (ref 3.5–5.2)
RBC # BLD: 3.7 MILL/MM3 (ref 4.7–6.1)
SODIUM BLD-SCNC: 141 MEQ/L (ref 135–145)
WBC # BLD: 7.4 THOU/MM3 (ref 4.8–10.8)

## 2022-09-29 ENCOUNTER — NURSE ONLY (OUTPATIENT)
Dept: LAB | Age: 77
End: 2022-09-29

## 2022-09-29 DIAGNOSIS — I48.0 PAROXYSMAL ATRIAL FIBRILLATION (HCC): ICD-10-CM

## 2022-09-29 DIAGNOSIS — R06.02 SOB (SHORTNESS OF BREATH) ON EXERTION: ICD-10-CM

## 2022-09-29 DIAGNOSIS — R07.9 CHEST PAIN, UNSPECIFIED TYPE: ICD-10-CM

## 2022-09-29 LAB
ALBUMIN SERPL-MCNC: 4.1 G/DL (ref 3.5–5.1)
ALP BLD-CCNC: 79 U/L (ref 38–126)
ALT SERPL-CCNC: 15 U/L (ref 11–66)
ANION GAP SERPL CALCULATED.3IONS-SCNC: 10 MEQ/L (ref 8–16)
AST SERPL-CCNC: 17 U/L (ref 5–40)
BILIRUB SERPL-MCNC: 0.5 MG/DL (ref 0.3–1.2)
BILIRUBIN DIRECT: < 0.2 MG/DL (ref 0–0.3)
BUN BLDV-MCNC: 33 MG/DL (ref 7–22)
CALCIUM SERPL-MCNC: 9.4 MG/DL (ref 8.5–10.5)
CHLORIDE BLD-SCNC: 101 MEQ/L (ref 98–111)
CHOLESTEROL, TOTAL: 129 MG/DL (ref 100–199)
CO2: 25 MEQ/L (ref 23–33)
CREAT SERPL-MCNC: 1.8 MG/DL (ref 0.4–1.2)
ERYTHROCYTE [DISTWIDTH] IN BLOOD BY AUTOMATED COUNT: 13.6 % (ref 11.5–14.5)
ERYTHROCYTE [DISTWIDTH] IN BLOOD BY AUTOMATED COUNT: 51.7 FL (ref 35–45)
GFR SERPL CREATININE-BSD FRML MDRD: 37 ML/MIN/1.73M2
GLUCOSE BLD-MCNC: 152 MG/DL (ref 70–108)
HCT VFR BLD CALC: 33.5 % (ref 42–52)
HDLC SERPL-MCNC: 29 MG/DL
HEMOGLOBIN: 11 GM/DL (ref 14–18)
LDL CHOLESTEROL CALCULATED: 65 MG/DL
MCH RBC QN AUTO: 33.6 PG (ref 26–33)
MCHC RBC AUTO-ENTMCNC: 32.8 GM/DL (ref 32.2–35.5)
MCV RBC AUTO: 102.4 FL (ref 80–94)
PLATELET # BLD: 220 THOU/MM3 (ref 130–400)
PMV BLD AUTO: 9.9 FL (ref 9.4–12.4)
POTASSIUM SERPL-SCNC: 4.5 MEQ/L (ref 3.5–5.2)
RBC # BLD: 3.27 MILL/MM3 (ref 4.7–6.1)
SODIUM BLD-SCNC: 136 MEQ/L (ref 135–145)
TOTAL PROTEIN: 6.7 G/DL (ref 6.1–8)
TRIGL SERPL-MCNC: 176 MG/DL (ref 0–199)
WBC # BLD: 7.8 THOU/MM3 (ref 4.8–10.8)

## 2022-10-11 DIAGNOSIS — I25.10 CORONARY ARTERY DISEASE INVOLVING NATIVE CORONARY ARTERY: ICD-10-CM

## 2022-10-12 RX ORDER — CLOPIDOGREL BISULFATE 75 MG/1
TABLET ORAL
Qty: 90 TABLET | Refills: 3 | OUTPATIENT
Start: 2022-10-12

## 2022-10-12 RX ORDER — ISOSORBIDE MONONITRATE 120 MG/1
120 TABLET, EXTENDED RELEASE ORAL DAILY
Qty: 90 TABLET | Refills: 3 | OUTPATIENT
Start: 2022-10-12

## 2022-10-12 RX ORDER — METOPROLOL SUCCINATE 50 MG/1
50 TABLET, EXTENDED RELEASE ORAL DAILY
Qty: 90 TABLET | Refills: 3 | OUTPATIENT
Start: 2022-10-12

## 2022-10-12 RX ORDER — HYDRALAZINE HYDROCHLORIDE 25 MG/1
25 TABLET, FILM COATED ORAL EVERY 8 HOURS SCHEDULED
Qty: 270 TABLET | Refills: 3 | OUTPATIENT
Start: 2022-10-12

## 2022-10-18 ENCOUNTER — OFFICE VISIT (OUTPATIENT)
Dept: PULMONOLOGY | Age: 77
End: 2022-10-18
Payer: MEDICARE

## 2022-10-18 VITALS
SYSTOLIC BLOOD PRESSURE: 128 MMHG | OXYGEN SATURATION: 98 % | HEIGHT: 69 IN | BODY MASS INDEX: 31.55 KG/M2 | WEIGHT: 213 LBS | TEMPERATURE: 98 F | DIASTOLIC BLOOD PRESSURE: 76 MMHG | HEART RATE: 69 BPM

## 2022-10-18 DIAGNOSIS — G47.10 HYPERSOMNIA: ICD-10-CM

## 2022-10-18 DIAGNOSIS — G47.33 OSA TREATED WITH BIPAP: Primary | ICD-10-CM

## 2022-10-18 DIAGNOSIS — I51.89 DIASTOLIC DYSFUNCTION: ICD-10-CM

## 2022-10-18 DIAGNOSIS — E66.9 OBESITY (BMI 30-39.9): ICD-10-CM

## 2022-10-18 PROCEDURE — 99214 OFFICE O/P EST MOD 30 MIN: CPT | Performed by: PHYSICIAN ASSISTANT

## 2022-10-18 PROCEDURE — G8417 CALC BMI ABV UP PARAM F/U: HCPCS | Performed by: PHYSICIAN ASSISTANT

## 2022-10-18 PROCEDURE — G8484 FLU IMMUNIZE NO ADMIN: HCPCS | Performed by: PHYSICIAN ASSISTANT

## 2022-10-18 PROCEDURE — 1123F ACP DISCUSS/DSCN MKR DOCD: CPT | Performed by: PHYSICIAN ASSISTANT

## 2022-10-18 PROCEDURE — 1036F TOBACCO NON-USER: CPT | Performed by: PHYSICIAN ASSISTANT

## 2022-10-18 PROCEDURE — G8427 DOCREV CUR MEDS BY ELIG CLIN: HCPCS | Performed by: PHYSICIAN ASSISTANT

## 2022-10-18 ASSESSMENT — ENCOUNTER SYMPTOMS
EYES NEGATIVE: 1
NAUSEA: 0
SHORTNESS OF BREATH: 1
ALLERGIC/IMMUNOLOGIC NEGATIVE: 1
WHEEZING: 0
COUGH: 0
BACK PAIN: 0
CHEST TIGHTNESS: 0
DIARRHEA: 0
STRIDOR: 0

## 2022-10-18 NOTE — PROGRESS NOTES
[facility]      Reginald Rom         [id number]  10/18/2022   Chief Complaint   Patient presents with    Follow-up     7 month RICCARDO follow up, mask refit         Pt of Dr. Tasha Lopez     PAP Download:   Original or initial AHI: 8.3     Date of initial study: 02/07/2018      Compliant  97%     Noncompliant 3 %     PAP Type Spont Bipap   Level  18/15 cmH2O    Avg Hrs/Day 8 hours 17 minutes   AHI: 7.8   Recorded compliance dates , 09/17/2022  to 10/16/2022   Machine/Mfg:   [x] ResMed    [] Respironics/Dreamstation   Interface:   [] Nasal    [] Nasal pillows   [x] FFM      Provider:      [x] [facility]     [][employer]     [] [employer]    [] Tay Siddiqui    [] [place]               [] [employer]      [] [employer]    [] [address]      [] Other    Neck Size: 17.5  Mallampati Mallampati 4  ESS:  10  SAQLI: 81    Here is a scan of the most recent download:                Presentation:   Bryan Miller presents for sleep medicine follow up for obstructive sleep apnea  Since the last visit, Bryan Miller is doing ok with PAP. He is sleeping well. He does take naps during the day. Mask leaks. AHI mildly elevated. He is having SOB with activity. Follows with cardiology. Equipment issues: The pressure is  acceptable, the mask is acceptable     Sleep issues:  Do you feel better? Yes  More rested? Yes   Better concentration? yes    Progress History:   Since last visit any new medical issues? Yes hernia repair  New ER or hospital visits? Yes   Any new or changes in medicines? No  Any new sleep medicines? No    Review of Systems -   Review of Systems   Constitutional:  Negative for activity change, appetite change, chills and fever. HENT:  Negative for congestion and postnasal drip. Eyes: Negative. Respiratory:  Positive for shortness of breath. Negative for cough, chest tightness, wheezing and stridor. Cardiovascular:  Negative for chest pain and leg swelling.    Gastrointestinal:  Negative for diarrhea and nausea. Endocrine: Negative. Genitourinary: Negative. Musculoskeletal: Negative. Negative for arthralgias and back pain. Skin: Negative. Allergic/Immunologic: Negative. Neurological: Negative. Negative for dizziness and light-headedness. Psychiatric/Behavioral: Negative. All other systems reviewed and are negative. Physical Exam:    BMI:  Body mass index is 31.45 kg/m². Wt Readings from Last 3 Encounters:   10/18/22 213 lb (96.6 kg)   08/16/22 205 lb 12.8 oz (93.4 kg)   08/08/22 200 lb 9.6 oz (91 kg)     Weight gained 13 lbs over 2 months  Vitals: /76   Pulse 69   Temp 98 °F (36.7 °C)   Ht 5' 9\" (1.753 m)   Wt 213 lb (96.6 kg)   SpO2 98%   BMI 31.45 kg/m²       Physical Exam  Constitutional:       Appearance: Normal appearance. He is normal weight. HENT:      Head: Normocephalic and atraumatic. Right Ear: External ear normal.      Left Ear: External ear normal.      Nose: Nose normal.   Eyes:      Extraocular Movements: Extraocular movements intact. Conjunctiva/sclera: Conjunctivae normal.      Pupils: Pupils are equal, round, and reactive to light. Pulmonary:      Effort: Pulmonary effort is normal.   Musculoskeletal:      Cervical back: Normal range of motion and neck supple. Neurological:      General: No focal deficit present. Mental Status: He is alert and oriented to person, place, and time. Psychiatric:         Attention and Perception: Attention and perception normal.         Mood and Affect: Mood and affect normal.         Speech: Speech normal.         Behavior: Behavior normal. Behavior is cooperative. Thought Content: Thought content normal.         Cognition and Memory: Cognition normal.         Judgment: Judgment normal.         ASSESSMENT/DIAGNOSIS     Diagnosis Orders   1. RICCARDO treated with BiPAP        2. Obesity (BMI 30-39.9)        3.  Diastolic dysfunction                 Plan   Do you need any equipment today? No  - hypersomnia likely related to inactivity, back pain and cardiac issues. - declines pulm re-eval for SOB  - AHI mildly elevated secondary to mask leak  - Download reviewed and discussed with patient  - He  was advised to continue current positive airway pressure therapy with above described pressure. - He  advised to keep good compliance with current recommended pressure to get optimal results and clinical improvement  - Recommend 7-9 hours of sleep with PAP  - He was advised to call [employer] regarding supplies if needed.   -He call my office for earlier appointment if needed for worsening of sleep symptoms.   - He was instructed on weight loss  - Geeta Gordon was educated about my impression and plan. Patient verbalizesunderstanding.   We will see Darvin Chacon back in: 1 year with download    Information added by my medical assistant/LPN was reviewed today       Machelle Prieto, [address] for pulmonary and Sleep Medicine  10/18/2022

## 2022-10-20 ENCOUNTER — TELEPHONE (OUTPATIENT)
Dept: INTERNAL MEDICINE CLINIC | Age: 77
End: 2022-10-20

## 2022-10-20 ENCOUNTER — OFFICE VISIT (OUTPATIENT)
Dept: INTERNAL MEDICINE CLINIC | Age: 77
End: 2022-10-20
Payer: MEDICARE

## 2022-10-20 VITALS
SYSTOLIC BLOOD PRESSURE: 128 MMHG | BODY MASS INDEX: 31.34 KG/M2 | HEART RATE: 64 BPM | TEMPERATURE: 98 F | HEIGHT: 69 IN | WEIGHT: 211.6 LBS | DIASTOLIC BLOOD PRESSURE: 54 MMHG

## 2022-10-20 DIAGNOSIS — Z79.4 TYPE 2 DIABETES MELLITUS WITH DIABETIC PERIPHERAL ANGIOPATHY WITHOUT GANGRENE, WITH LONG-TERM CURRENT USE OF INSULIN (HCC): Primary | ICD-10-CM

## 2022-10-20 DIAGNOSIS — E11.51 TYPE 2 DIABETES MELLITUS WITH DIABETIC PERIPHERAL ANGIOPATHY WITHOUT GANGRENE, WITH LONG-TERM CURRENT USE OF INSULIN (HCC): Primary | ICD-10-CM

## 2022-10-20 LAB — HBA1C MFR BLD: 5.7 % (ref 4.3–5.7)

## 2022-10-20 PROCEDURE — G0108 DIAB MANAGE TRN  PER INDIV: HCPCS | Performed by: REGISTERED NURSE

## 2022-10-20 PROCEDURE — 83036 HEMOGLOBIN GLYCOSYLATED A1C: CPT | Performed by: INTERNAL MEDICINE

## 2022-10-20 RX ORDER — HYDROXYZINE HYDROCHLORIDE 10 MG/1
10 TABLET, FILM COATED ORAL NIGHTLY
COMMUNITY
Start: 2022-09-30

## 2022-10-20 NOTE — TELEPHONE ENCOUNTER
Pj's weight and BS's are slipping up. He consistently has blood sugars over 180 at bedtime. He then take bedtime Humalog and we cannot r/o lows during the night. A1C today 5.7%  He would benefit from dinner Humalog to prevent bedtime highs. He used to wear the Bettye CGM but it didn't stick well and Aneesh Reed found it inaccurate. He is receptive to trying the [facility] for safety and better glucose evaluation. He would also benefit from increasing Trulicity to 1.9MK for BG control and weight loss. Dr. Tim Forrester,      Please authorize the [facility] at [address] [place] [address] to teach/ assist Aneesh Reed to     - take Humalog 4 units with evening meal to stop evening            Hyperglycemia and      -Increase his Trulicity 4.4TN weekly      If you agree, please note and return. Thank you. We will need to update [employer] application if you authorize the increased dose of Trulicity.

## 2022-10-20 NOTE — TELEPHONE ENCOUNTER
Pj instructed to take Humalog 4 units with evening as discussed to prevent elevated bedtime readings. Increase in Trulicity to 4.6GT weekly authorized per Dr. Gisella Joseph. Shayan Hoang is getting his Trulicity from [employer]. Initial [employer] application assisted by Corrine Martin 4/22/22. [facility], please advise on our next step or if I need to refer to someone else. Thank you.

## 2022-10-20 NOTE — PATIENT INSTRUCTIONS
We will ask Dr. Junette Cranker about your Trulicity dose  Try taking Humalog 4 units with supper             --goal is to get bedtime blood sugar under 180                       AND to not take Humalog at bedtime  We will check on coverage on the Dexcom continuous monitor  Exercise after supper            --ride your [place] bike 10-15 minutes             And/ or  stretch bands sitting in front of the TV (arms and legs)  Limit snacking after supper --only 1 serving of carbohydrate                          And if still hungry, consider low carb snack or protein

## 2022-10-20 NOTE — PROGRESS NOTES
[facility]  [address] [address] [address], [facility] [address]  [phone] (phone)  [phone] (fax)    Patient ID: Kathya Simms [id number]  Referring Provider: Dr. Fortunato Dominguez     Patient's name and  were verified. Subjective:    He presents for His follow-up diabetic visit. He has type 2 diabetes mellitus. Home regimen includes: Insulin and GLP-1 Agonist He is noncompliant some of the time. Assessment:     Lab Results   Component Value Date/Time    LABA1C 5.7 10/20/2022 11:23 AM    LABA1C 6.7 2022 03:33 PM    BUN 33 2022 01:05 PM    CREATININE 1.8 2022 01:05 PM     Vitals:    10/20/22 1218 10/20/22 1219   BP: (!) 132/50 (!) 128/54   Site: Left Upper Arm Left Upper Arm   Position: Sitting Sitting   Pulse: 64    Temp: 98 °F (36.7 °C)    Weight: 211 lb 9.6 oz (96 kg)    Height: 5' 9\" (1.753 m)      Wt Readings from Last 3 Encounters:   10/20/22 211 lb 9.6 oz (96 kg)   10/18/22 213 lb (96.6 kg)   22 205 lb 12.8 oz (93.4 kg)     Ht Readings from Last 3 Encounters:   10/20/22 5' 9\" (1.753 m)   10/18/22 5' 9\" (1.753 m)   22 5' 9.02\" (1.753 m)       Diabetes Pharmacotherapy:  Basaglar 33 units in the morning and evening    Glucose Trends:   Glucose at 1.5 hrs PPD today resulted at 164mg/dl  Current monitoring regimen: Fingerstick blood tests - 4 times daily  Home blood sugar trends:    -Fasting AM:    -Before lunch: 102-251   -Before dinner: 127-228   -Bedtime: 188-281  Any episodes of hypoglycemia?  yes -less than 1 per week   -Treats with sally milk or oj and toast/pb/ pear jam    Lifestyle Factors:   Previous visit with dietician: yes - 20  Current diet: B: cereal/  toast            L: sausage/ sauerkraut                       D: fried green tomato/ fish/ mac cheese                       Snacks: nuts; apple or pear; cheezits; popcorn                       Beverages:  Current exercise: ADL's low activity levels    Health Maintenance:  Eye exam current (within one year): yes Date: 11/2021, DR. Miranda  Any history of foot problems? no  Foot exam up to date: yes Date: 10/20/22, Pedal pulses:   peripheral pulses symmetrical; weak   Results of monofilament test: 10/10, but diminished in right big toe and left toes               Skin noted to be warm, pale and hair is absent. Immunizations up to date:    Pneumonia: yes   Influenza: yes  The ASCVD Risk score (Mitch DK, et al., 2019) failed to calculate for the following reasons: The valid total cholesterol range is 130 to 320 mg/dL   Last panel - LDL:   Lab Results   Component Value Date    LDLCALC 65 09/29/2022     Taking ASA:  Yes   Taking statin: Yes - atorvastatin  Last microalbumin/creatinine ratio: No results found for: MCACR, MALBCR, MALBCRRATEXT   Taking ACE/ARB: No - poor renal function  Depression screening completed. 4/20/22    Focus:   Diabetes Education. Last A1C: 5.7% 10/20/22. A1C may be skewed low-?anemia? renal impairment. He could also being having unidentified lows through the night from bolusing at bedtime for high BS. Nahum Silverio is getting very little movement and snacking has increased. Weight is up 6 pounds from 2 months ago. Nahum Silverio has limited interest in exercise and limiting carbs. At this time, it would be beneficial to cover dinner with Humalog to avoid needing a correction at bedtime. He is receptive to this. He would also benefit from increase dose of Trulicity for glucose control and weight/dietary management. Will review with Dr. Génesis Mendes.      Curriculum Area Focus: Diabetes pathophysiology, Glucose checks/goals, Physical activity goals, Hypoglycemia management, Pattern management, and Medication adherence  DSME PLAN:     We will ask Dr. Anna Gil about your Trulicity dose  Try taking Humalog 4 units with supper             --goal is to get bedtime blood sugar under 180                       AND to not take Humalog at bedtime  We will check on coverage on the Dexcom continuous monitor  Exercise after supper            --ride your stationery bike 10-15 minutes             And/ or  stretch bands sitting in front of the TV (arms and legs)  Limit snacking after supper --only 1 serving of carbohydrate                          And if still hungry, consider low carb snack or protein     Goals Addressed                   This Visit's Progress     Exercise 3x per week (30 min per time)   Not on track     limit afternoon snack to single carb serving/ limited calories   Not on track           Meter download, medications, PMH and nursing assessment reviewed. Nabeel Shaw states He is willing to participate in this plan of care and verbalized understanding of all instructions provided. Teach back used to verify comprehension. Follow-up: 2 months    Total time involved in direct patient education: 60 minutes.

## 2022-10-21 ENCOUNTER — TELEPHONE (OUTPATIENT)
Dept: FAMILY MEDICINE CLINIC | Age: 77
End: 2022-10-21

## 2022-10-21 ENCOUNTER — CARE COORDINATION (OUTPATIENT)
Dept: CARE COORDINATION | Age: 77
End: 2022-10-21

## 2022-10-21 NOTE — TELEPHONE ENCOUNTER
Humalog   no insulin  140-199 1units  200-249 2units  250-299 3units  300-349 4units  350-399 5 units  400-greater 6units

## 2022-10-21 NOTE — CARE COORDINATION
I was able to fax the provider the new applications for assistance into the PAP programs for 2023. Once I get these back I will be able to fax into the company.           [facility]   Medication Assistance  [facility], and [employer]    (B) [phone]  (U) [phone]

## 2022-10-21 NOTE — TELEPHONE ENCOUNTER
I faxed  the refill form to update the strength and directions on both of the medications through [facility] PAP program.      Thanks.

## 2022-10-21 NOTE — TELEPHONE ENCOUNTER
----- Message from Heaven Chi MD sent at 10/21/2022  3:00 PM EDT -----  [facility] ([facility] is trying to get Patient Assistance for the Humalog. Is he still on it? Can he tell us how he uses it and how many pens will he use in 3 months.

## 2022-11-01 ENCOUNTER — CARE COORDINATION (OUTPATIENT)
Dept: CARE COORDINATION | Age: 77
End: 2022-11-01

## 2022-11-01 NOTE — CARE COORDINATION
I was able to fax Sulma Hollingsworth the patients new application for assistance in 2023.         [facility]   Medication Assistance  [facility], and [employer]    (W) [phone]  (A) [phone]

## 2022-11-08 ENCOUNTER — TELEPHONE (OUTPATIENT)
Dept: INTERNAL MEDICINE CLINIC | Age: 77
End: 2022-11-08

## 2022-11-08 NOTE — TELEPHONE ENCOUNTER
Called patient and left message asking if he was able to receive his Trulicity 1.5. Asked that he return the call to let us know.

## 2022-11-09 ENCOUNTER — CARE COORDINATION (OUTPATIENT)
Dept: CARE COORDINATION | Age: 77
End: 2022-11-09

## 2022-11-09 NOTE — TELEPHONE ENCOUNTER
Patient returned call and stated that he has not yet received his Trulicity 4.6WF. I let him know that the paperwork was sent to [address] [place] [address] on 11/1/2022 and that he should be receiving it soon. He stated he would let us know when he gets it. I also let him know that he should also be receiving his DexCom in the mail and when he does, contact us and we will set up a time to get him started on it. Patient verbalized understanding.

## 2022-11-09 NOTE — CARE COORDINATION
I spoke with Sree's wife regarding him needing Trulicity. She stated that his dose has increased, I will get the paperwork to update the strength sent to  office to update.         [facility]   Medication Assistance  [address] [place] [address], and [employer]    W) [phone] (L) [phone]

## 2022-11-10 NOTE — CARE COORDINATION
Patient was re-enrolled into the PAP program through [facility] for 2023.

## 2022-11-11 ENCOUNTER — APPOINTMENT (OUTPATIENT)
Dept: CT IMAGING | Age: 77
End: 2022-11-11
Payer: MEDICARE

## 2022-11-11 ENCOUNTER — HOSPITAL ENCOUNTER (OUTPATIENT)
Age: 77
Setting detail: OBSERVATION
Discharge: HOME HEALTH | End: 2022-11-12
Attending: EMERGENCY MEDICINE | Admitting: SURGERY
Payer: MEDICARE

## 2022-11-11 ENCOUNTER — APPOINTMENT (OUTPATIENT)
Dept: GENERAL RADIOLOGY | Age: 77
End: 2022-11-11
Payer: MEDICARE
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
## 2022-11-11 DIAGNOSIS — S01.81XA FOREHEAD LACERATION, INITIAL ENCOUNTER: ICD-10-CM

## 2022-11-11 DIAGNOSIS — S52.091D OTHER CLOSED FRACTURE OF PROXIMAL END OF RIGHT ULNA WITH ROUTINE HEALING, SUBSEQUENT ENCOUNTER: ICD-10-CM

## 2022-11-11 PROBLEM — S32.009D: Status: ACTIVE | Noted: 2022-01-01
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
## 2022-11-11 PROBLEM — S52.001D: Status: ACTIVE | Noted: 2022-01-01

## 2022-11-11 LAB
ALBUMIN SERPL-MCNC: 3.8 G/DL (ref 3.5–5.1)
ALP BLD-CCNC: 85 U/L (ref 38–126)
ALT SERPL-CCNC: 17 U/L (ref 11–66)
ANION GAP SERPL CALCULATED.3IONS-SCNC: 11 MEQ/L (ref 8–16)
AST SERPL-CCNC: 20 U/L (ref 5–40)
BASOPHILS # BLD: 0.4 %
BASOPHILS ABSOLUTE: 0 THOU/MM3 (ref 0–0.1)
BILIRUB SERPL-MCNC: 0.4 MG/DL (ref 0.3–1.2)
BUN BLDV-MCNC: 32 MG/DL (ref 7–22)
CALCIUM SERPL-MCNC: 9 MG/DL (ref 8.5–10.5)
CHLORIDE BLD-SCNC: 101 MEQ/L (ref 98–111)
CO2: 25 MEQ/L (ref 23–33)
CREAT SERPL-MCNC: 2.5 MG/DL (ref 0.4–1.2)
EKG ATRIAL RATE: 416 BPM
EKG Q-T INTERVAL: 500 MS
EKG QRS DURATION: 154 MS
EKG QTC CALCULATION (BAZETT): 511 MS
EKG R AXIS: 36 DEGREES
EKG T AXIS: 46 DEGREES
EKG VENTRICULAR RATE: 63 BPM
EOSINOPHIL # BLD: 1.4 %
EOSINOPHILS ABSOLUTE: 0.1 THOU/MM3 (ref 0–0.4)
ERYTHROCYTE [DISTWIDTH] IN BLOOD BY AUTOMATED COUNT: 13 % (ref 11.5–14.5)
ERYTHROCYTE [DISTWIDTH] IN BLOOD BY AUTOMATED COUNT: 48.9 FL (ref 35–45)
GFR SERPL CREATININE-BSD FRML MDRD: 26 ML/MIN/1.73M2
GLUCOSE BLD-MCNC: 127 MG/DL (ref 70–108)
HCT VFR BLD CALC: 30.8 % (ref 42–52)
HEMOGLOBIN: 10.1 GM/DL (ref 14–18)
IMMATURE GRANS (ABS): 0.05 THOU/MM3 (ref 0–0.07)
IMMATURE GRANULOCYTES: 0.6 %
INR BLD: 1.07 (ref 0.85–1.13)
LYMPHOCYTES # BLD: 12.1 %
LYMPHOCYTES ABSOLUTE: 1 THOU/MM3 (ref 1–4.8)
MCH RBC QN AUTO: 34 PG (ref 26–33)
MCHC RBC AUTO-ENTMCNC: 32.8 GM/DL (ref 32.2–35.5)
MCV RBC AUTO: 103.7 FL (ref 80–94)
MONOCYTES # BLD: 12.1 %
MONOCYTES ABSOLUTE: 1 THOU/MM3 (ref 0.4–1.3)
NUCLEATED RED BLOOD CELLS: 0 /100 WBC
OSMOLALITY CALCULATION: 282.3 MOSMOL/KG (ref 275–300)
PLATELET # BLD: 209 THOU/MM3 (ref 130–400)
PMV BLD AUTO: 9.6 FL (ref 9.4–12.4)
POTASSIUM SERPL-SCNC: 4.5 MEQ/L (ref 3.5–5.2)
RBC # BLD: 2.97 MILL/MM3 (ref 4.7–6.1)
SEG NEUTROPHILS: 73.4 %
SEGMENTED NEUTROPHILS ABSOLUTE COUNT: 5.8 THOU/MM3 (ref 1.8–7.7)
SODIUM BLD-SCNC: 137 MEQ/L (ref 135–145)
TOTAL PROTEIN: 6.9 G/DL (ref 6.1–8)
TROPONIN T: 0.01 NG/ML
WBC # BLD: 7.9 THOU/MM3 (ref 4.8–10.8)

## 2022-11-11 PROCEDURE — 71045 X-RAY EXAM CHEST 1 VIEW: CPT

## 2022-11-11 PROCEDURE — 71250 CT THORAX DX C-: CPT

## 2022-11-11 PROCEDURE — APPNB30 APP NON BILLABLE TIME 0-30 MINS: Performed by: PHYSICIAN ASSISTANT

## 2022-11-11 PROCEDURE — 90715 TDAP VACCINE 7 YRS/> IM: CPT | Performed by: EMERGENCY MEDICINE

## 2022-11-11 PROCEDURE — 93005 ELECTROCARDIOGRAM TRACING: CPT | Performed by: EMERGENCY MEDICINE

## 2022-11-11 PROCEDURE — 2580000003 HC RX 258: Performed by: EMERGENCY MEDICINE

## 2022-11-11 PROCEDURE — 29105 APPLICATION LONG ARM SPLINT: CPT

## 2022-11-11 PROCEDURE — G0378 HOSPITAL OBSERVATION PER HR: HCPCS

## 2022-11-11 PROCEDURE — 85025 COMPLETE CBC W/AUTO DIFF WBC: CPT

## 2022-11-11 PROCEDURE — 12013 RPR F/E/E/N/L/M 2.6-5.0 CM: CPT

## 2022-11-11 PROCEDURE — 99219 PR INITIAL OBSERVATION CARE/DAY 50 MINUTES: CPT | Performed by: SURGERY

## 2022-11-11 PROCEDURE — 90471 IMMUNIZATION ADMIN: CPT | Performed by: EMERGENCY MEDICINE

## 2022-11-11 PROCEDURE — 93010 ELECTROCARDIOGRAM REPORT: CPT | Performed by: NUCLEAR MEDICINE

## 2022-11-11 PROCEDURE — 73080 X-RAY EXAM OF ELBOW: CPT

## 2022-11-11 PROCEDURE — 84484 ASSAY OF TROPONIN QUANT: CPT

## 2022-11-11 PROCEDURE — 2500000003 HC RX 250 WO HCPCS: Performed by: EMERGENCY MEDICINE

## 2022-11-11 PROCEDURE — 70450 CT HEAD/BRAIN W/O DYE: CPT

## 2022-11-11 PROCEDURE — 72125 CT NECK SPINE W/O DYE: CPT

## 2022-11-11 PROCEDURE — 80053 COMPREHEN METABOLIC PANEL: CPT

## 2022-11-11 PROCEDURE — 85610 PROTHROMBIN TIME: CPT

## 2022-11-11 PROCEDURE — 99285 EMERGENCY DEPT VISIT HI MDM: CPT

## 2022-11-11 PROCEDURE — 6360000002 HC RX W HCPCS: Performed by: EMERGENCY MEDICINE

## 2022-11-11 PROCEDURE — 6370000000 HC RX 637 (ALT 250 FOR IP): Performed by: EMERGENCY MEDICINE

## 2022-11-11 PROCEDURE — 36415 COLL VENOUS BLD VENIPUNCTURE: CPT

## 2022-11-11 RX ORDER — 0.9 % SODIUM CHLORIDE 0.9 %
1000 INTRAVENOUS SOLUTION INTRAVENOUS ONCE
Status: COMPLETED | OUTPATIENT
Start: 2022-11-11 | End: 2022-11-11

## 2022-11-11 RX ORDER — SODIUM PHOSPHATE, DIBASIC AND SODIUM PHOSPHATE, MONOBASIC 7; 19 G/133ML; G/133ML
1 ENEMA RECTAL DAILY PRN
Status: DISCONTINUED | OUTPATIENT
Start: 2022-11-11 | End: 2022-11-12 | Stop reason: HOSPADM

## 2022-11-11 RX ORDER — ONDANSETRON 4 MG/1
4 TABLET, ORALLY DISINTEGRATING ORAL EVERY 8 HOURS PRN
Status: DISCONTINUED | OUTPATIENT
Start: 2022-11-11 | End: 2022-11-12 | Stop reason: HOSPADM

## 2022-11-11 RX ORDER — SODIUM CHLORIDE 0.9 % (FLUSH) 0.9 %
5-40 SYRINGE (ML) INJECTION PRN
Status: DISCONTINUED | OUTPATIENT
Start: 2022-11-11 | End: 2022-11-12 | Stop reason: HOSPADM

## 2022-11-11 RX ORDER — HYDROCODONE BITARTRATE AND ACETAMINOPHEN 5; 325 MG/1; MG/1
2 TABLET ORAL EVERY 4 HOURS PRN
Status: DISCONTINUED | OUTPATIENT
Start: 2022-11-11 | End: 2022-11-12 | Stop reason: HOSPADM

## 2022-11-11 RX ORDER — ACETAMINOPHEN 325 MG/1
650 TABLET ORAL EVERY 4 HOURS PRN
Status: DISCONTINUED | OUTPATIENT
Start: 2022-11-11 | End: 2022-11-12 | Stop reason: HOSPADM

## 2022-11-11 RX ORDER — BISACODYL 10 MG
10 SUPPOSITORY, RECTAL RECTAL DAILY
Status: DISCONTINUED | OUTPATIENT
Start: 2022-11-12 | End: 2022-11-12 | Stop reason: HOSPADM

## 2022-11-11 RX ORDER — LIDOCAINE HYDROCHLORIDE 10 MG/ML
20 INJECTION, SOLUTION EPIDURAL; INFILTRATION; INTRACAUDAL; PERINEURAL ONCE
Status: COMPLETED | OUTPATIENT
Start: 2022-11-11 | End: 2022-11-11

## 2022-11-11 RX ORDER — SODIUM CHLORIDE 0.9 % (FLUSH) 0.9 %
5-40 SYRINGE (ML) INJECTION EVERY 12 HOURS SCHEDULED
Status: DISCONTINUED | OUTPATIENT
Start: 2022-11-12 | End: 2022-11-12 | Stop reason: HOSPADM

## 2022-11-11 RX ORDER — FAMOTIDINE 20 MG/1
20 TABLET, FILM COATED ORAL DAILY
Status: DISCONTINUED | OUTPATIENT
Start: 2022-11-12 | End: 2022-11-12 | Stop reason: HOSPADM

## 2022-11-11 RX ORDER — SODIUM CHLORIDE 9 MG/ML
INJECTION, SOLUTION INTRAVENOUS PRN
Status: DISCONTINUED | OUTPATIENT
Start: 2022-11-11 | End: 2022-11-12 | Stop reason: HOSPADM

## 2022-11-11 RX ORDER — HYDROCODONE BITARTRATE AND ACETAMINOPHEN 5; 325 MG/1; MG/1
1 TABLET ORAL EVERY 4 HOURS PRN
Status: DISCONTINUED | OUTPATIENT
Start: 2022-11-11 | End: 2022-11-12 | Stop reason: HOSPADM

## 2022-11-11 RX ORDER — POLYETHYLENE GLYCOL 3350 17 G/17G
17 POWDER, FOR SOLUTION ORAL DAILY
Status: DISCONTINUED | OUTPATIENT
Start: 2022-11-12 | End: 2022-11-12 | Stop reason: HOSPADM

## 2022-11-11 RX ORDER — ONDANSETRON 2 MG/ML
4 INJECTION INTRAMUSCULAR; INTRAVENOUS EVERY 6 HOURS PRN
Status: DISCONTINUED | OUTPATIENT
Start: 2022-11-11 | End: 2022-11-12 | Stop reason: HOSPADM

## 2022-11-11 RX ADMIN — SODIUM CHLORIDE 1000 ML: 9 INJECTION, SOLUTION INTRAVENOUS at 19:05

## 2022-11-11 RX ADMIN — Medication 3 ML: at 18:04

## 2022-11-11 RX ADMIN — TETANUS TOXOID, REDUCED DIPHTHERIA TOXOID AND ACELLULAR PERTUSSIS VACCINE, ADSORBED 0.5 ML: 5; 2.5; 8; 8; 2.5 SUSPENSION INTRAMUSCULAR at 23:17

## 2022-11-11 RX ADMIN — LIDOCAINE HYDROCHLORIDE 20 ML: 10 INJECTION, SOLUTION EPIDURAL; INFILTRATION; INTRACAUDAL; PERINEURAL at 19:18

## 2022-11-11 ASSESSMENT — ENCOUNTER SYMPTOMS
NAUSEA: 0
COUGH: 0
ABDOMINAL DISTENTION: 0
CONSTIPATION: 0
BACK PAIN: 0
SHORTNESS OF BREATH: 0
ABDOMINAL PAIN: 0
DIARRHEA: 0
VOMITING: 0
SINUS PAIN: 0
PHOTOPHOBIA: 0
SORE THROAT: 0
SINUS PRESSURE: 0
ROS SKIN COMMENTS: LACERATION TO FOREHEAD
EYE PAIN: 0

## 2022-11-11 ASSESSMENT — PAIN - FUNCTIONAL ASSESSMENT
PAIN_FUNCTIONAL_ASSESSMENT: NONE - DENIES PAIN

## 2022-11-11 NOTE — ED NOTES
Patient returns to ED and head dressing changed per this RN and PA.      Maci Kirk RN  11/11/22 [id number]

## 2022-11-12 ENCOUNTER — APPOINTMENT (OUTPATIENT)
Dept: CT IMAGING | Age: 77
End: 2022-11-12
Payer: MEDICARE

## 2022-11-12 VITALS
TEMPERATURE: 98.6 F | WEIGHT: 214 LBS | RESPIRATION RATE: 16 BRPM | DIASTOLIC BLOOD PRESSURE: 77 MMHG | HEART RATE: 72 BPM | HEIGHT: 69 IN | BODY MASS INDEX: 31.7 KG/M2 | SYSTOLIC BLOOD PRESSURE: 175 MMHG | OXYGEN SATURATION: 95 %

## 2022-11-12 LAB
ALBUMIN SERPL-MCNC: 3.7 G/DL (ref 3.5–5.1)
ALP BLD-CCNC: 82 U/L (ref 38–126)
ALT SERPL-CCNC: 16 U/L (ref 11–66)
ANION GAP SERPL CALCULATED.3IONS-SCNC: 11 MEQ/L (ref 8–16)
AST SERPL-CCNC: 24 U/L (ref 5–40)
BASOPHILS # BLD: 0.3 %
BASOPHILS ABSOLUTE: 0 THOU/MM3 (ref 0–0.1)
BILIRUB SERPL-MCNC: 0.4 MG/DL (ref 0.3–1.2)
BUN BLDV-MCNC: 29 MG/DL (ref 7–22)
CALCIUM SERPL-MCNC: 8.7 MG/DL (ref 8.5–10.5)
CHLORIDE BLD-SCNC: 104 MEQ/L (ref 98–111)
CO2: 23 MEQ/L (ref 23–33)
CREAT SERPL-MCNC: 1.9 MG/DL (ref 0.4–1.2)
EKG ATRIAL RATE: 80 BPM
EKG P AXIS: 80 DEGREES
EKG P-R INTERVAL: 268 MS
EKG Q-T INTERVAL: 440 MS
EKG QRS DURATION: 150 MS
EKG QTC CALCULATION (BAZETT): 507 MS
EKG R AXIS: 170 DEGREES
EKG T AXIS: 52 DEGREES
EKG VENTRICULAR RATE: 80 BPM
EOSINOPHIL # BLD: 0.6 %
EOSINOPHILS ABSOLUTE: 0.1 THOU/MM3 (ref 0–0.4)
ERYTHROCYTE [DISTWIDTH] IN BLOOD BY AUTOMATED COUNT: 12.7 % (ref 11.5–14.5)
ERYTHROCYTE [DISTWIDTH] IN BLOOD BY AUTOMATED COUNT: 48.3 FL (ref 35–45)
GFR SERPL CREATININE-BSD FRML MDRD: 36 ML/MIN/1.73M2
GLUCOSE BLD-MCNC: 168 MG/DL (ref 70–108)
GLUCOSE BLD-MCNC: 170 MG/DL (ref 70–108)
GLUCOSE BLD-MCNC: 195 MG/DL (ref 70–108)
HCT VFR BLD CALC: 28.6 % (ref 42–52)
HEMOGLOBIN: 9.2 GM/DL (ref 14–18)
IMMATURE GRANS (ABS): 0.04 THOU/MM3 (ref 0–0.07)
IMMATURE GRANULOCYTES: 0.4 %
LYMPHOCYTES # BLD: 7.1 %
LYMPHOCYTES ABSOLUTE: 0.7 THOU/MM3 (ref 1–4.8)
MCH RBC QN AUTO: 33.2 PG (ref 26–33)
MCHC RBC AUTO-ENTMCNC: 32.2 GM/DL (ref 32.2–35.5)
MCV RBC AUTO: 103.2 FL (ref 80–94)
MONOCYTES # BLD: 9.2 %
MONOCYTES ABSOLUTE: 1 THOU/MM3 (ref 0.4–1.3)
NUCLEATED RED BLOOD CELLS: 0 /100 WBC
PLATELET # BLD: 214 THOU/MM3 (ref 130–400)
PMV BLD AUTO: 9.6 FL (ref 9.4–12.4)
POTASSIUM REFLEX MAGNESIUM: 4.4 MEQ/L (ref 3.5–5.2)
RBC # BLD: 2.77 MILL/MM3 (ref 4.7–6.1)
SEG NEUTROPHILS: 82.4 %
SEGMENTED NEUTROPHILS ABSOLUTE COUNT: 8.6 THOU/MM3 (ref 1.8–7.7)
SODIUM BLD-SCNC: 138 MEQ/L (ref 135–145)
TOTAL PROTEIN: 6 G/DL (ref 6.1–8)
TROPONIN T: < 0.01 NG/ML
TROPONIN T: < 0.01 NG/ML
WBC # BLD: 10.4 THOU/MM3 (ref 4.8–10.8)

## 2022-11-12 PROCEDURE — 36415 COLL VENOUS BLD VENIPUNCTURE: CPT

## 2022-11-12 PROCEDURE — 82948 REAGENT STRIP/BLOOD GLUCOSE: CPT

## 2022-11-12 PROCEDURE — 93005 ELECTROCARDIOGRAM TRACING: CPT

## 2022-11-12 PROCEDURE — G0378 HOSPITAL OBSERVATION PER HR: HCPCS

## 2022-11-12 PROCEDURE — 85025 COMPLETE CBC W/AUTO DIFF WBC: CPT

## 2022-11-12 PROCEDURE — 97165 OT EVAL LOW COMPLEX 30 MIN: CPT

## 2022-11-12 PROCEDURE — 93010 ELECTROCARDIOGRAM REPORT: CPT | Performed by: NUCLEAR MEDICINE

## 2022-11-12 PROCEDURE — 97110 THERAPEUTIC EXERCISES: CPT

## 2022-11-12 PROCEDURE — 2580000003 HC RX 258: Performed by: PHYSICIAN ASSISTANT

## 2022-11-12 PROCEDURE — 6370000000 HC RX 637 (ALT 250 FOR IP): Performed by: PHYSICIAN ASSISTANT

## 2022-11-12 PROCEDURE — 80053 COMPREHEN METABOLIC PANEL: CPT

## 2022-11-12 PROCEDURE — 99224 PR SBSQ OBSERVATION CARE/DAY 15 MINUTES: CPT | Performed by: SURGERY

## 2022-11-12 PROCEDURE — 72131 CT LUMBAR SPINE W/O DYE: CPT

## 2022-11-12 PROCEDURE — 84484 ASSAY OF TROPONIN QUANT: CPT

## 2022-11-12 PROCEDURE — APPNB30 APP NON BILLABLE TIME 0-30 MINS: Performed by: PHYSICIAN ASSISTANT

## 2022-11-12 RX ORDER — TAMSULOSIN HYDROCHLORIDE 0.4 MG/1
0.4 CAPSULE ORAL DAILY
Status: DISCONTINUED | OUTPATIENT
Start: 2022-11-12 | End: 2022-11-12 | Stop reason: HOSPADM

## 2022-11-12 RX ORDER — ASPIRIN 81 MG/1
81 TABLET ORAL DAILY
Status: DISCONTINUED | OUTPATIENT
Start: 2022-11-12 | End: 2022-11-12 | Stop reason: HOSPADM

## 2022-11-12 RX ORDER — DILTIAZEM HYDROCHLORIDE 120 MG/1
120 CAPSULE, COATED, EXTENDED RELEASE ORAL DAILY
Status: DISCONTINUED | OUTPATIENT
Start: 2022-11-12 | End: 2022-11-12 | Stop reason: HOSPADM

## 2022-11-12 RX ORDER — DEXTROSE MONOHYDRATE 100 MG/ML
INJECTION, SOLUTION INTRAVENOUS CONTINUOUS PRN
Status: DISCONTINUED | OUTPATIENT
Start: 2022-11-12 | End: 2022-11-12 | Stop reason: HOSPADM

## 2022-11-12 RX ORDER — ISOSORBIDE MONONITRATE 60 MG/1
120 TABLET, EXTENDED RELEASE ORAL DAILY
Status: DISCONTINUED | OUTPATIENT
Start: 2022-11-12 | End: 2022-11-12 | Stop reason: HOSPADM

## 2022-11-12 RX ORDER — INSULIN GLARGINE 100 [IU]/ML
33 INJECTION, SOLUTION SUBCUTANEOUS 2 TIMES DAILY
Status: DISCONTINUED | OUTPATIENT
Start: 2022-11-12 | End: 2022-11-12 | Stop reason: HOSPADM

## 2022-11-12 RX ORDER — HYDRALAZINE HYDROCHLORIDE 25 MG/1
25 TABLET, FILM COATED ORAL EVERY 8 HOURS SCHEDULED
Status: DISCONTINUED | OUTPATIENT
Start: 2022-11-12 | End: 2022-11-12 | Stop reason: HOSPADM

## 2022-11-12 RX ORDER — BUMETANIDE 1 MG/1
2 TABLET ORAL DAILY
Status: DISCONTINUED | OUTPATIENT
Start: 2022-11-12 | End: 2022-11-12 | Stop reason: HOSPADM

## 2022-11-12 RX ORDER — RANOLAZINE 500 MG/1
1000 TABLET, EXTENDED RELEASE ORAL 2 TIMES DAILY
Status: DISCONTINUED | OUTPATIENT
Start: 2022-11-12 | End: 2022-11-12 | Stop reason: HOSPADM

## 2022-11-12 RX ORDER — METOPROLOL SUCCINATE 50 MG/1
50 TABLET, EXTENDED RELEASE ORAL DAILY
Status: DISCONTINUED | OUTPATIENT
Start: 2022-11-12 | End: 2022-11-12 | Stop reason: HOSPADM

## 2022-11-12 RX ADMIN — ISOSORBIDE MONONITRATE 120 MG: 60 TABLET, EXTENDED RELEASE ORAL at 13:06

## 2022-11-12 RX ADMIN — METOPROLOL SUCCINATE 50 MG: 50 TABLET, EXTENDED RELEASE ORAL at 13:06

## 2022-11-12 RX ADMIN — SODIUM CHLORIDE, PRESERVATIVE FREE 10 ML: 5 INJECTION INTRAVENOUS at 09:01

## 2022-11-12 RX ADMIN — FAMOTIDINE 20 MG: 20 TABLET ORAL at 09:01

## 2022-11-12 RX ADMIN — DILTIAZEM HYDROCHLORIDE 120 MG: 120 CAPSULE, EXTENDED RELEASE ORAL at 13:06

## 2022-11-12 RX ADMIN — HYDRALAZINE HYDROCHLORIDE 25 MG: 25 TABLET, FILM COATED ORAL at 13:06

## 2022-11-12 RX ADMIN — ACETAMINOPHEN 650 MG: 325 TABLET ORAL at 09:06

## 2022-11-12 ASSESSMENT — PAIN SCALES - GENERAL: PAINLEVEL_OUTOF10: 3

## 2022-11-12 NOTE — ED NOTES
ED to inpatient nurses report    Chief Complaint   Patient presents with    Head Injury      Present to ED from home  LOC: alert and orientated to name, place, date  Vital signs   Vitals:    11/11/22 1944 11/11/22 2036 11/11/22 2146 11/11/22 2301   BP: (!) 147/56 (!) 165/59 (!) 166/60 (!) 166/60   Pulse: 66 69 71 76   Resp: 15 16 16 16   Temp:       SpO2: 98% 98% 97% 98%      Oxygen Baseline room air    Current needs required none Bipap/Cpap No  LDAs:   Peripheral IV 11/11/22 Left Forearm (Active)     Mobility: Requires assistance * 1  Pending ED orders: none  Present condition: stable    C-SSRS Risk of Suicide: No Risk  Swallow Screening    Preferred Language: [place]     Electronically signed by Sami Hankins RN on 11/11/2022 at 11:24 PM      Sami Hankins RN  11/11/22 [id number]

## 2022-11-12 NOTE — CONSULTS
Inpatient Consultation    Abigail Benoit ([birth year])  11/12/2022    Reason for Consult:  L2 spinous process fx  Requesting Physician: Shaheed Rocha PA-C    CHIEF COMPLAINT:  lumbar pain s/p fall    History Obtained From:  patient    HISTORY OF PRESENT ILLNESS:                The patient is a 68 y.o. male who presents with above chief complaint. Pt c/o lumbar pain after losing his balance and falling off of a 2-step ladder. CT revealed spinous process fx of L2, nondisplaced. Pt states he has a h/o lumbar pain treated with BEHZAD and has seen Dr Arleen Andersen at [facility] in the past for this. He is unable to determine whether or not his pain has increased since the fall. He denies radicular pain, numbness, LE weakness, bowel/bladder dysfunction, or saddle anesthesia.      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    BPH (benign prostatic hypertrophy)     CAD (coronary artery disease) 1997    Carotid artery disease ([address] 1995    right    Carotid disease, bilateral ([address]     Cerebral artery occlusion with cerebral infarction ([address]     CHF (congestive heart failure) ([address]     Chronic kidney disease     COVID-19 07/09/2022    GERD (gastroesophageal reflux disease)     GI bleed 07/2021    History of blood transfusion 2021    GI bleed    History of blood transfusion 1997    with heart surgery    Hyperlipidemia 1996    Hypertension     Lumbar disc disease 2001    MI (myocardial infarction) ([address] 1997    Nephrolithiasis 2002    NIDDM (non-insulin dependent diabetes mellitus) 1996    diagnosed 1996    RICCARDO (obstructive sleep apnea) 2005    cpap    PVC's (premature ventricular contractions)     Renal artery stenosis ([address] 2007    left    Renal insufficiency     S/P CABG (status post coronary artery bypass graft) 1997    5 vessels    Skin cancer     Squamous cell carcinoma     Stenosis of right subclavian artery (HCC)     TIA (transient ischemic attack)     Wears partial dentures     upper and lower     Past Surgical History:        Procedure Laterality Date    ABLATION OF DYSRHYTHMIC FOCUS  3/13/13    CARDIAC CATHETERIZATION  2008 - 2019    stents [address]  3/13/13    oblation for irreg rythm    CARDIOVERSION  3/8/2012    CAROTID ENDARTERECTOMY  1997    CHOLECYSTECTOMY  4/1999    COLONOSCOPY  6/22/09, 4/2011,7/12/17    polyp removal    COLONOSCOPY N/A 7/21/2021    COLONOSCOPY POLYPECTOMY SNARE/COLD BIOPSY performed by Harrison Banks MD at [address] [place] [address]  2008 (2), 2009 (1)    x2    CORONARY ARTERY BYPASS GRAFT  8/1997    5 vessel    DIAGNOSTIC CARDIAC CATH LAB PROCEDURE      ENDOSCOPY, COLON, DIAGNOSTIC      EYE LID SURGERY Right 11/17/2021    EXCISION SCC RIGHT LOWER LATERAL EYELID WITH FROZEN SECTION performed by Alexa Yang MD at [address] Right 2/28/2022    EXCISION SCC RIGHT ZYGOMA performed by Alexa Yang MD at [address]  12/20/2013    pre-cancerous mole rt foot removed    LITHOTRIPSY  4/2002    MT OFFICE/OUTPT VISIT,PROCEDURE ONLY N/A 11/8/2018    TRANSESOPHAGEAL ECHOCARDIOGRAM performed by Amira Sheikh MD at [facility]    PTCA  05/30/2018    SHOULDER SURGERY  2001, 2003    rt shoulder manipulation--frozen shoulder-01, Lt -03    SHOULDER SURGERY  2001 - 2003    manipulation - bilateral    SKIN BIOPSY      SKIN CANCER EXCISION  12/20/13     R foot    SKIN CANCER EXCISION  1/2016    squamous cell Lt  upper cheek    UPPER GASTROINTESTINAL ENDOSCOPY N/A 7/20/2021    EGD ESOPHAGOGASTRODUODENOSCOPY performed by Harrison Banks MD at [address] [place] [address] Dr  4/2002    VASCULAR SURGERY      carotids & cabg harvests VENTRAL HERNIA REPAIR N/A 4/30/2022    exploratory laparotomy with repair of incarcerated hernia performed by Cornelius Bell MD at [place] [facility]     Current Medications:   Current Facility-Administered Medications: sodium chloride flush 0.9 % injection 5-40 mL, 5-40 mL, IntraVENous, 2 times per day  sodium chloride flush 0.9 % injection 5-40 mL, 5-40 mL, IntraVENous, PRN  0.9 % sodium chloride infusion, , IntraVENous, PRN  ondansetron (ZOFRAN-ODT) disintegrating tablet 4 mg, 4 mg, Oral, Q8H PRN **OR** ondansetron (ZOFRAN) injection 4 mg, 4 mg, IntraVENous, Q6H PRN  polyethylene glycol (GLYCOLAX) packet 17 g, 17 g, Oral, Daily  bisacodyl (DULCOLAX) suppository 10 mg, 10 mg, Rectal, Daily  sodium phosphate (FLEET) rectal enema 1 enema, 1 enema, Rectal, Daily PRN  famotidine (PEPCID) tablet 20 mg, 20 mg, Oral, Daily  acetaminophen (TYLENOL) tablet 650 mg, 650 mg, Oral, Q4H PRN  HYDROcodone-acetaminophen (NORCO) 5-325 MG per tablet 1 tablet, 1 tablet, Oral, Q4H PRN **OR** HYDROcodone-acetaminophen (NORCO) 5-325 MG per tablet 2 tablet, 2 tablet, Oral, Q4H PRN  Allergies:  Patient has no known allergies. Social History:   TOBACCO:   reports that he has never smoked. He has never used smokeless tobacco.  ETOH:   reports no history of alcohol use. DRUGS:   reports no history of drug use.   Family History:       Problem Relation Age of Onset    Cancer Mother     High Blood Pressure Mother     Stroke Father     High Blood Pressure Father     Heart Disease Brother     Cancer Brother         lung    Cancer Brother         melanoma       REVIEW OF SYSTEMS:    CONSTITUTIONAL:  negative for  fevers, chills  RESPIRATORY:  negative for cough and shortness of breath  CARDIOVASCULAR:  negative for chest pain and palpitations  GASTROINTESTINAL:  negative for nausea, vomiting, bowel incontinence  GENITOURINARY:  negative for frequency and urinary incontinence  MUSCULOSKELETAL:  positive for back pain    NEUROLOGICAL:  negative for headaches, syncope  BEHAVIOR/PSYCH:  negative for depressed mood, anxiety    PHYSICAL EXAM:      CONSTITUTIONAL:  awake, alert, cooperative, no apparent distress, and appears stated age  HEAD: atraumatic, normocephalic  LUNGS:  No respiratory distress. CTA bilaterally per H&P  CARDIOVASCULAR:  RRR, no murmur per H&P  ABDOMEN:  Soft, non-distended, non-tender  SPINE: lumbar spine nontender to palpation. Paraspinous muscles nontender to palpation. ROM is okay. MUSCULOSKELETAL:  There is no redness, warmth, or swelling of the joints. Full range of motion noted. Motor strength is 5 out of 5 all extremities bilaterally. Tone is normal.  NEUROLOGIC:  Awake, alert, oriented to name, place and time.  Sensory intact    DATA:    CBC:   Lab Results   Component Value Date/Time    WBC 10.4 11/12/2022 03:43 AM    RBC 2.77 11/12/2022 03:43 AM    RBC 3.80 03/24/2012 09:25 AM    HGB 9.2 11/12/2022 03:43 AM    HCT 28.6 11/12/2022 03:43 AM    .2 11/12/2022 03:43 AM    MCH 33.2 11/12/2022 03:43 AM    MCHC 32.2 11/12/2022 03:43 AM    RDW 13.7 06/07/2018 10:27 AM     11/12/2022 03:43 AM    MPV 9.6 11/12/2022 03:43 AM     WBC:    Lab Results   Component Value Date/Time    WBC 10.4 11/12/2022 03:43 AM     Hemoglobin/Hematocrit:    Lab Results   Component Value Date/Time    HGB 9.2 11/12/2022 03:43 AM    HCT 28.6 11/12/2022 03:43 AM     CMP:    Lab Results   Component Value Date/Time     11/12/2022 03:43 AM    K 4.4 11/12/2022 03:43 AM     11/12/2022 03:43 AM    CO2 23 11/12/2022 03:43 AM    BUN 29 11/12/2022 03:43 AM    CREATININE 1.9 11/12/2022 03:43 AM    GFRAA >60 09/11/2016 06:32 AM    AGRATIO 1.2 09/10/2016 02:42 PM    LABGLOM 36 11/12/2022 01:24 AM    GLUCOSE 195 11/12/2022 03:43 AM    GLUCOSE 169 03/24/2012 09:25 AM    PROT 6.0 11/12/2022 03:43 AM    LABALBU 3.7 11/12/2022 03:43 AM    LABALBU 4.5 03/24/2012 09:25 AM    CALCIUM 8.7 11/12/2022 03:43 AM    BILITOT 0.4 11/12/2022 03:43 AM    BILITOT NEGATIVE 06/18/2018 12:00 AM    ALKPHOS 82 11/12/2022 03:43 AM    AST 24 11/12/2022 03:43 AM    ALT 16 11/12/2022 03:43 AM         Radiology:   CT cervical   Degenerative changes within the cervical spine. No fractures. CT chest  Nondisplaced fracture of the left transverse process of L2.    CT lumbar   1. Nondisplaced fracture of the left L2 transverse process. 2. No acute compression fractures. No subluxation. Multilevel degenerative changes. IMPRESSION/RECOMMENDATIONS:    Assessment: Lumbar pain s/p fall with nondisplaced L2 spinous process fx shown on CT. No neurologic deficits or radicular pain. H/o lumbar pain and follows with Dr Francia Villegas. Plan:   Case discussed with and imaging reviewed by Dr Sara Landaverde. 1) Allow fracture to heal on its own. It's possible this may not be acute due to absence of increased lumbar pain than usual for the patient. No bracing or surgical intervention necessary. Pain control per primary. With h/o lumbar pain and BEHZAD, recommend f/u with Dr Francia Villegas if pain worsens or he starts having radicular symptoms. Sign off. 2) Resume regular diet. Activity as tolerated.      Marston Aase, PA-C

## 2022-11-12 NOTE — PROGRESS NOTES
[address]  INPATIENT OCCUPATIONAL THERAPY  [facility] 7K  EVALUATION    Time:    Time In: 4955  Time Out: 1112  Timed Code Treatment Minutes: 13 Minutes  Minutes: 28          Date: 2022  Patient Name: Delisa Grove,   Gender: male      MRN: [id number]  : 1945  (68 y.o.)  Referring Practitioner: Smith Jorge PA-C  Diagnosis: fall  Additional Pertinent Hx: Per ER note on 2022:  68 y.o. male who presents with above chief complaint. Pt c/o lumbar pain after losing his balance and falling off of a 2-step ladder. CT revealed spinous process fx of L2, nondisplaced. Pt states he has a h/o lumbar pain treated with BEHZAD and has seen Dr Artie Roger at [facility] in the past for this. He is unable to determine whether or not his pain has increased since the fall. (Treating this conservatively) Pt also found to have proximal ulna fx (minimally displaced)-currently in long arm splint.     Restrictions/Precautions:  Restrictions/Precautions: Fall Risk  Right Upper Extremity Weight Bearing: Non Weight Bearing  Position Activity Restriction  Spinal Precautions: No Bending, No Lifting, No Twisting  Spinal Precautions: possible L2 fx    Subjective  Chart Reviewed: Yes, Orders, Progress Notes, History and Physical  Patient assessed for rehabilitation services?: Yes  Family / Caregiver Present: Yes (wife)    Subjective: cooperative, talkative    Pain: 0/10: no c/o pain during session    Vitals: Vitals not assessed per clinical judgement, see nursing flowsheet    Social/Functional History:  Lives With: Spouse  Type of Home: House  Home Layout: Two level, Performs ADL's on one level  Home Access: Stairs to enter with rails (2)   Bathroom Shower/Tub: Walk-in shower  Bathroom Toilet: Handicap height  Bathroom Equipment: Shower chair, Grab bars around toilet       ADL Assistance: Independent  Homemaking Assistance: Independent  Ambulation Assistance: Independent  Transfer Assistance: Independent Additional Comments: Pt was fully indep PLOF. VISION:WFL    HEARING:   [facility]    COGNITION: Impulsive    RANGE OF MOTION:  LUE AROM WFL; R shoulder WFL, elbow to fingers immobilized with long arm cast    STRENGTH:  BUE NT d/t back precautions & RUE d/t NWB    SENSATION:   WFL    **Educated Pt on elevating RUE & positioning with t/fs to ensure maintaining NWB status    ADL:   Lower Extremity Dressing: Minimal Assistance. Able to cross BLE up to adjust slipper socks . BALANCE:  Standing Balance: Contact Guard Assistance. -SBA    BED MOBILITY:  Not Tested    TRANSFERS:  Sit to Stand:  Contact Guard Assistance. Vcs for positioning of RUE to precaution  Stand to Sit: Contact Guard Assistance. FUNCTIONAL MOBILITY:  Assistive Device: None  Assist Level:  Stand By Assistance. Distance:  30ft  CGA-SBA; mildly unsteady without LOB     Activity Tolerance:  Patient tolerance of  treatment: fair. Assessment:  Assessment: Pt demo decreased ADL & functional mobility status over PLOF s/p fall with L2 & proximal ulna fx. Continued OT recommended to educate on adaptive strategies & faciliate improved balance & safety awareness for increased indep & safety for returning home with wife. Performance deficits / Impairments: Decreased functional mobility , Decreased ADL status, Decreased endurance, Decreased balance, Decreased safe awareness  Prognosis: Good  Decision Making: Low Complexity    Treatment Initiated: Treatment and education initiated within context of evaluation. Evaluation time included review of current medical information, gathering information related to past medical, social and functional history, completion of standardized testing, formal and informal observation of tasks, assessment of data and development of plan of care and goals.   Treatment time included skilled education and facilitation of tasks to increase safety and independence with ADL's for improved functional independence and quality of life. Discharge Recommendations:  24 hour supervision or assist    Patient Education:     Patient Education  Education Given To: Patient  Education Provided: Role of Therapy, Plan of Care, Transfer Training, ADL Adaptive Strategies, Precautions  Education Method: Demonstration, Verbal  Barriers to Learning: None  Education Outcome: Continued education needed    Equipment Recommendations:  Equipment Needed: No    Plan:  Times Per Week: 6x  Current Treatment Recommendations: Functional mobility training, Endurance training, Safety education & training, Self-Care / ADL. See long-term goal time frame for expected duration of plan of care. If no long-term goals established, a short length of stay is anticipated. Goals:  Patient goals : go home  Short Term Goals  Time Frame for Short Term Goals: until discharge  Short Term Goal 1: Pt will complete various sit-stand t/fs including toilet with S & 0 vcs for technique & maintaining NWB RUE status. Short Term Goal 2: Pt will complete LE dressing with mod A & min vcs for safe technique  Short Term Goal 3: Pt will complete 3-4 min standing ADL task with S for increased ease of sinkside grooming  Short Term Goal 4: Pt will complete mobility to/from bathroom with S & 0 vcs for safety  Long Term Goals  Time Frame for Long Term Goals : No LTG set d/t short ELOS         Following session, patient left in safe position with all fall risk precautions in place.

## 2022-11-12 NOTE — PROGRESS NOTES
Per student, she states that she witnessed wife give pt \"medication\". Upon asking wife and pt, wife verifies she gave pt Levimer 40 units around 1045 this am. Denies giving him any pills. Wife and pt educated regarding not giving him any medications without permission for the doctor. Wife voices he missed his dose last night and so far this morning and she didn't want his sugar to get out of control. Asked wife at this time to allow this nurse to lock all medications up. Wife did agree. Showed wife that we were only locking them up in the room of 7k12. Wife is ok with this.

## 2022-11-12 NOTE — ED PROVIDER NOTES
[address] [facility] [address] [address] EMERGENCY DEPT  [address] [facility] [address]  Phone: [phone]      CHIEF COMPLAINT       Chief Complaint   Patient presents with    Head Injury       Nurses Notes reviewed and I agree except as noted in the HPI. HISTORY OF PRESENT ILLNESS    Leni Caceres is a 68 y.o. male. Patient reports falling off a stepladder and hitting his forehead. He has a laceration that is bleeding in the upper right forehead. Has some bruising to the face. Also had bruised his right elbow area although seems to be moving it well. He is reported to be on anticoagulants. After he arrived in the department he was complaining of some chest pain which seemed to resolve. Patient does have an extensive cardiac history with bypass and prior congestive heart failure. Also has had some low-level renal insufficiency in the past.  He was later complaining of right-sided chest pain but that was a bit different than what he had earlier. REVIEW OF SYSTEMS         No fever no coughing no abdominal pain no lower extremity symptoms    Remainder of review of systems is otherwise reviewed as negative.       PAST MEDICAL HISTORY    has a past medical history of Adenomatous colon polyp, Angina at [facility]), Arthritis, Atrial fibrillation ([address] 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[address] BPH (benign prostatic hypertrophy), CAD (coronary artery disease), Carotid artery disease ([address] Carotid disease, bilateral ([address] Cerebral artery occlusion with cerebral infarction ([address] CHF (congestive heart failure) ([address] Chronic kidney disease, COVID-19, GERD (gastroesophageal reflux disease), GI bleed, History of blood transfusion, History of blood transfusion, Hyperlipidemia, Hypertension, Lumbar disc disease, MI (myocardial infarction) ([address] Nephrolithiasis, NIDDM (non-insulin dependent diabetes mellitus), RICCARDO (obstructive sleep apnea), PVC's (premature ventricular contractions), Renal artery stenosis ([address] Renal insufficiency, S/P CABG (status post coronary artery bypass graft), Skin cancer, Squamous cell carcinoma, Stenosis of right subclavian artery ([address] TIA (transient ischemic attack), and Wears partial dentures. SURGICAL HISTORY      has a past surgical history that includes Lithotripsy (4/2002); Ureter stent placement (4/2002); Coronary angioplasty with stent (2008 (2), 2009 (1)); Carotid endarterectomy (1997); Cholecystectomy (4/1999); shoulder surgery (2001, 2003); Cardioversion (3/8/2012); Coronary artery bypass graft (8/1997); ablation of dysrhythmic focus (3/13/13); Skin cancer excision (12/20/13 ); Foot surgery (12/20/2013); Skin cancer excision (1/2016); skin biopsy; vascular surgery; Diagnostic Cardiac Cath Lab Procedure; Percutaneous Transluminal Coronary Angio (05/30/2018); pr office/outpt visit,procedure only (N/A, 11/8/2018); Upper gastrointestinal endoscopy (N/A, 7/20/2021); Endoscopy, colon, diagnostic; Eye Lid Surgery (Right, 11/17/2021); Cardiac surgery (1997); Cardiac surgery (3/13/13); Cardiac catheterization (2008 - 2019); Colonoscopy (6/22/09, 4/2011,7/12/17); Colonoscopy (N/A, 7/21/2021); shoulder surgery (2001 - 2003); Facial Surgery (Right, 2/28/2022); and ventral hernia repair (N/A, 4/30/2022). CURRENT MEDICATIONS       Previous Medications    ASPIRIN 81 MG EC TABLET    Take 81 mg by mouth daily    ATORVASTATIN (LIPITOR) 40 MG TABLET    TAKE 1 TABLET BY MOUTH AT  NIGHT    BIPAP MACHINE MISC    by Does not apply route    BLOOD GLUCOSE MONITORING SUPPL (ONE TOUCH ULTRA 2) W/DEVICE KIT    1 kit by Does not apply route daily DX:11.9    BLOOD GLUCOSE TEST STRIPS (ASCENSIA AUTODISC VI;ONE TOUCH ULTRA TEST VI) STRIP    1 each by In Vitro route 4 times daily DX:11.9    BUMETANIDE (BUMEX) 2 MG TABLET    Take 1 tablet by mouth in the morning.     CLOPIDOGREL (PLAVIX) 75 MG TABLET    Take 1 tablet by mouth daily morning    DILTIAZEM (CARDIZEM CD) 120 MG EXTENDED RELEASE CAPSULE    TAKE 1 CAPSULE BY MOUTH  DAILY DIPHENHYDRAMINE-APAP, SLEEP, (TYLENOL PM EXTRA STRENGTH)  MG TABLET    Take 1 tablet by mouth every evening as needed    DULAGLUTIDE (TRULICITY) 7.47 EV/8.4GW SOPN    Inject 0.75 mg into the skin once a week    FLUOCINONIDE (LIDEX) 0.05 % CREAM    Apply 1 applicator topically as needed    HYDRALAZINE (APRESOLINE) 25 MG TABLET    Take 1 tablet by mouth every 8 hours    HYDROXYZINE HCL (ATARAX) 10 MG TABLET    Take 10 mg by mouth at bedtime    INSULIN GLARGINE (BASAGLAR KWIKPEN) 100 UNIT/ML INJECTION PEN    Inject 33 Units into the skin 2 times daily    INSULIN PEN NEEDLE (NOVOFINE PEN NEEDLE) 32G X 6 MM MISC    1 each by Does not apply route 6 times daily    ISOSORBIDE MONONITRATE (IMDUR) 120 MG EXTENDED RELEASE TABLET    Take 1 tablet by mouth daily    METOPROLOL SUCCINATE (TOPROL XL) 50 MG EXTENDED RELEASE TABLET    Take 1 tablet by mouth daily    MULTIPLE VITAMINS-MINERALS (THERAPEUTIC MULTIVITAMIN-MINERALS) TABLET    Take 1 tablet by mouth nightly    NITROGLYCERIN (NITROLINGUAL) 0.4 MG/SPRAY 0.4 MG SPRAY    USE 1 SPRAY ON OR UNDER THE TONGUE EVERY 5 MINUTES AS NEEDED FOR CHEST PAIN    NOVOFINE 32G X 6 MM MISC    USE 1 NEW NEEDLE 6 TIMES  DAILY AND AS NEEDED    ONE TOUCH ULTRASOFT LANCETS MISC    Use to test Blood Sugar 4 times Daily, Dx: E11.9    RANOLAZINE (RANEXA) 1000 MG EXTENDED RELEASE TABLET    TAKE 1 TABLET BY MOUTH  TWICE DAILY NEW DOSE    TAMSULOSIN (FLOMAX) 0.4 MG CAPSULE    TAKE 1 CAPSULE BY MOUTH  TWICE DAILY       ALLERGIES     has No Known Allergies. FAMILY HISTORY     He indicated that his mother is . He indicated that his father is . He indicated that his sister is . He indicated that only one of his three brothers is alive. family history includes Cancer in his brother, brother, and mother; Heart Disease in his brother; High Blood Pressure in his father and mother; Stroke in his father. SOCIAL HISTORY      reports that he has never smoked.  He has never used smokeless tobacco. He reports that he does not drink alcohol and does not use drugs. PHYSICAL EXAM     INITIAL VITALS:  temperature is 97.8 °F (36.6 °C). His blood pressure is 165/59 (abnormal) and his pulse is 69. His respiration is 16 and oxygen saturation is 98%. Constitutional:  non-toxic   Eyes:  Pupils are equal and reactive, extraocular muscles intact   HENT: 3 cm laceration right upper forehead. No epistaxis  external ears normal, nose normal,  oropharynx moist, no pharyngeal exudates. Neck-c-collar in place  Respiratory:  No wheezing, rhonchi or rales  Cardiovascular: regular,  GI:  Non tender, no rigidity, rebound or guarding  :  No costovertebral angle tenderness   Musculoskeletal:  2/4 distal pulses, no pitting edema  Back- no midline tenderness  Integument: warm and dry  Neurologic:  Alert & oriented x 3, cranial nerves II through XII are grossly intact. Equal strength in the upper and lower extremities bilaterally. Psychiatric:  Speech and behavior appropriate        DIAGNOSTIC RESULTS     EKG: All EKG's are interpreted by the Emergency Department Physician who either signs or Co-signs this chart in the absence of a cardiologist.  EKG interpreted by me showing sinus rhythm at a rate of 63, QRS of 154, QTc of 511, axis of 36. RADIOLOGY: non-plain film images(s) such as CT, Ultrasound and MRI are read by the radiologist.  Chest x-ray showing cardiomegaly with pulmonary vascular congestion  CT of the head and cervical spine interpreted by the radiologist with no acute traumatic injury reported.   X-rays of the right elbow interpreted by the radiologist showed minimally displaced fracture of the proximal ulna      LABS:   Labs Reviewed   CBC WITH AUTO DIFFERENTIAL - Abnormal; Notable for the following components:       Result Value    RBC 2.97 (*)     Hemoglobin 10.1 (*)     Hematocrit 30.8 (*)     .7 (*)     MCH 34.0 (*)     RDW-SD 48.9 (*)     All other components within normal limits   COMPREHENSIVE METABOLIC PANEL - Abnormal; Notable for the following components:    Glucose 127 (*)     Creatinine 2.5 (*)     BUN 32 (*)     All other components within normal limits   TROPONIN - Abnormal; Notable for the following components:    Troponin T 0.011 (*)     All other components within normal limits   GLOMERULAR FILTRATION RATE, ESTIMATED - Abnormal; Notable for the following components:    Est, Glom Filt Rate 26 (*)     All other components within normal limits   PROTIME-INR   ANION GAP   OSMOLALITY       EMERGENCY DEPARTMENT COURSE:   Vitals:    Vitals:    11/11/22 1921 11/11/22 1942 11/11/22 1944 11/11/22 2036   BP: 131/70  (!) 147/56 (!) 165/59   Pulse: 64 67 66 69   Resp: 12  15 16   Temp:       SpO2: 97%  98% 98%     Trauma has seen the patient. I discussed the elbow fracture with orthopedics they are requesting a posterior splint and if the patient were to be discharged they can follow-up with him on Monday. However I am concerned about some other issues. He has a creatinine of 2.5. He also does have some evidence of CHF on the imaging so he is going to be somewhat of a treatment dilemma with regard to fluid status. So he will probably need to be admitted to medicine which I was planning to do but just the end he was complaining of some right-sided chest pain possible rib pain so we are adding on a CT of the chest.      The CT of the chest has not shown any evidence of rib fractures but does show a fracture of L2 left transverse process so it does appear that he has 2 traumatic injuries although the main concern at this point is the renal failure and evidence of CHF. I have once again spoken to the trauma PA who is arranging for admission to the trauma service. CRITICAL CARE:   none    CONSULTS:  Trauma, orthopedics    PROCEDURES:  Laceration was repaired by me under sterile conditions. Wound was cleaned. Had been initially prepped with let gel.   I have injected plain local lidocaine as well.  7 simple interrupted sutures were used with good approximation.   Lac Repair    Date/Time: 11/11/2022 10:17 PM  Performed by: Selwyn Craft DO  Authorized by: Selwyn Craft DO     Consent:     Consent obtained:  Verbal    Consent given by:  Patient  Laceration details:     Location:  Face    Length (cm):  3  Pre-procedure details:     Preparation:  Patient was prepped and draped in usual sterile fashion  Treatment:     Visualized foreign bodies/material removed: no    Skin repair:     Repair method:  Sutures    Suture size:  5-0    Suture material:  Nylon    Suture technique:  Simple interrupted    Number of sutures:  7  Approximation:     Approximation:  Close  Repair type:     Repair type:  Simple         FINAL IMPRESSION    Right elbow fracture, left transverse process L2 fracture, acute on chronic renal failure, CHF    DISPOSITION/PLAN   admit    DISCHARGE MEDICATIONS:  New Prescriptions    No medications on file       (Please note that portions of this note were completed with a voice recognition program.  Efforts were made to edit the dictations but occasionally words are mis-transcribed.)    DO Selwyn Erazo DO  11/11/22 6 [address]  11/11/22 021

## 2022-11-12 NOTE — PROGRESS NOTES
Pt given discharge instructions with permission to allow wife to remain in room during teaching. No rx given. Follow up advised. All personal belongings sent with pt. To car via wheelchair.

## 2022-11-12 NOTE — CONSULTS
Internal Medicine Consult  KAVON Garcia - CNP, CNP  11/12/2022  1:23 PM          Patient:  Sheyla Pascual  YOB: 1945    MRN: [id number]     Acct: [de-identified]  [id number]/[id number]    Primary Care Physician: Benito Marr MD    HISTORY OF PRESENT ILLNESS:     The patient is a 68 y.o. male whom I have been requested to see by Dr. Lili Calixto for evaluation of acute on chronic kidney failure, CHF management. Per H&P \" Patient notes that he was fixing something on the ceiling of his patio when he lost his balance falling from the top of a 2 step ladder and hitting his head and his right elbow. He denies LOC or feelings of dizziness prior to falling. Patients ABC's are intact and GCS is 15. Bedside FAST is performed and is negative. He has a bleeding laceration across his forehead. C-collor and pressure dressing applied. CT of the head and neck, CT chest, CXR and Xray of the right elbow was performed and shows non-displaced spinous process fracture of L2 and minimally displaced fracture if the enthesophyte of the proximal ulna. Patient's forehead laceration was closed in the ED and tetanus was updated. Patient was found to have acutely elevated troponin at 0.011, acute on chronic kidney failure with a creatinine of 2.5. Patient creatinine baseline is 1. 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    BPH (benign prostatic hypertrophy)     CAD (coronary artery disease) 1997    Carotid artery disease ([address] 1995    right    Carotid disease, bilateral ([address]     Cerebral artery occlusion with cerebral infarction ([address]     CHF (congestive heart failure) ([address]     Chronic kidney disease     COVID-19 07/09/2022    GERD (gastroesophageal reflux disease) GI bleed 07/2021    History of blood transfusion 2021    GI bleed    History of blood transfusion 1997    with heart surgery    Hyperlipidemia 1996    Hypertension     Lumbar disc disease 2001    MI (myocardial infarction) ([facility] 1997    Nephrolithiasis 2002    NIDDM (non-insulin dependent diabetes mellitus) 1996    diagnosed 1996    RICCARDO (obstructive sleep apnea) 2005    cpap    PVC's (premature ventricular contractions)     Renal artery stenosis ([address] 2007    left    Renal insufficiency     S/P CABG (status post coronary artery bypass graft) 1997    5 vessels    Skin cancer     Squamous cell carcinoma     Stenosis of right subclavian artery (HCC)     TIA (transient ischemic attack)     Wears partial dentures     upper and lower       Past Surgical History:        Procedure Laterality Date    ABLATION OF DYSRHYTHMIC FOCUS  3/13/13    CARDIAC CATHETERIZATION  2008 - 2019    stents [address]  3/13/13    oblation for irreg rythm    CARDIOVERSION  3/8/2012    CAROTID ENDARTERECTOMY  1997    CHOLECYSTECTOMY  4/1999    COLONOSCOPY  6/22/09, 4/2011,7/12/17    polyp removal    COLONOSCOPY N/A 7/21/2021    COLONOSCOPY POLYPECTOMY SNARE/COLD BIOPSY performed by Jeancarlos Elliott MD at [address] [place] [address]  2008 (2), 2009 (1)    x2    CORONARY ARTERY BYPASS GRAFT  8/1997    5 vessel    DIAGNOSTIC CARDIAC CATH LAB PROCEDURE      ENDOSCOPY, COLON, DIAGNOSTIC      EYE LID SURGERY Right 11/17/2021    EXCISION SCC RIGHT LOWER LATERAL EYELID WITH FROZEN SECTION performed by Liz Cárdenas MD at [address] [facility] Right 2/28/2022    EXCISION SCC RIGHT ZYGOMA performed by Liz Cárdenas MD at [address]  12/20/2013    pre-cancerous mole rt foot removed    LITHOTRIPSY  4/2002    AR OFFICE/OUTPT VISIT,PROCEDURE ONLY N/A 11/8/2018    TRANSESOPHAGEAL ECHOCARDIOGRAM performed by Israel Chowdhury MD at [facility]    PTCA  05/30/2018    SHOULDER SURGERY  2001, 2003    rt shoulder manipulation--frozen shoulder-01, [place] -03    SHOULDER SURGERY  2001 - 2003    manipulation - bilateral    SKIN BIOPSY      SKIN CANCER EXCISION  12/20/13     R foot    SKIN CANCER EXCISION  1/2016    squamous cell Lt  upper cheek    UPPER GASTROINTESTINAL ENDOSCOPY N/A 7/20/2021    EGD ESOPHAGOGASTRODUODENOSCOPY performed by Brock Nichols MD at [address] [place] [address]   4/2002    VASCULAR SURGERY      carotids & cabg harvests    VENTRAL HERNIA REPAIR N/A 4/30/2022    exploratory laparotomy with repair of incarcerated hernia performed by Luis Manuel Medeiros MD at [facility]       Medications: Scheduled Meds:   aspirin  81 mg Oral Daily    bumetanide  2 mg Oral Daily    dilTIAZem  120 mg Oral Daily    hydrALAZINE  25 mg Oral 3 times per day    insulin glargine  33 Units SubCUTAneous BID    isosorbide mononitrate  120 mg Oral Daily    metoprolol succinate  50 mg Oral Daily    ranolazine  1,000 mg Oral BID    tamsulosin  0.4 mg Oral Daily    sodium chloride flush  5-40 mL IntraVENous 2 times per day    polyethylene glycol  17 g Oral Daily    bisacodyl  10 mg Rectal Daily    famotidine  20 mg Oral Daily     Continuous Infusions:   dextrose      sodium chloride       PRN Meds:.glucose, dextrose bolus **OR** dextrose bolus, glucagon (rDNA), dextrose, sodium chloride flush, sodium chloride, ondansetron **OR** ondansetron, sodium phosphate, acetaminophen, HYDROcodone 5 mg - acetaminophen **OR** HYDROcodone 5 mg - acetaminophen    Allergies:  Patient has no known allergies. Social History:    reports that he has never smoked. He has never used smokeless tobacco. He reports that he does not drink alcohol and does not use drugs.     Occupation: Lives at home with wife    Family History:       Problem Relation Age of Onset    Cancer Mother     High Blood Pressure Mother     Stroke Father     High Blood Pressure Father     Heart Disease Brother     Cancer Brother         lung    Cancer Brother         melanoma       Review of Systems:    General ROS: negative  Psychological ROS: negative  Hematological and Lymphatic ROS: negative  Endocrine ROS: negative  Vision:negative  ENT ROS: Denies  Respiratory ROS: no cough, shortness of breath, or wheezing  Cardiovascular ROS: no chest pain or dyspnea on exertion  Gastrointestinal ROS: no abdominal pain, change in bowel habits, or black or bloody stools  Genito-Urinary ROS: no dysuria, trouble voiding, or hematuria  Musculoskeletal ROS: No new pain from injury  Neurological ROS: no TIA or stroke symptoms  Dermatological ROS: negative  Weight:no change in weight  Appetite:ok    Physical Exam:    Vitals:Patient Vitals for the past 24 hrs:   BP Temp Temp src Pulse Resp SpO2 Height Weight   11/12/22 1223 (!) 175/77 98.6 °F (37 °C) Oral 72 16 95 % -- --   11/12/22 0930 -- 98.5 °F (36.9 °C) Oral -- -- -- -- --   11/12/22 0745 (!) 159/71 -- -- 79 16 95 % -- --   11/12/22 0341 (!) 144/58 98.2 °F (36.8 °C) Oral 78 16 98 % -- --   11/12/22 0006 -- -- -- -- -- -- 5' 9\" (1.753 m) 214 lb (97.1 kg)   11/11/22 2331 (!) 168/72 98 °F (36.7 °C) Oral 76 18 97 % -- --   11/11/22 2301 (!) 166/60 -- -- 76 16 98 % -- --   11/11/22 2146 (!) 166/60 -- -- 71 16 97 % -- --   11/11/22 2036 (!) 165/59 -- -- 69 16 98 % -- --   11/11/22 1944 (!) 147/56 -- -- 66 15 98 % -- --   11/11/22 1942 -- -- -- 67 -- -- -- --   11/11/22 1921 131/70 -- -- 64 12 97 % -- --   11/11/22 1828 (!) 144/59 -- -- 64 22 97 % -- --   11/11/22 1752 (!) 132/50 -- -- 97 18 98 % -- --   11/11/22 1748 (!) 120/47 97.8 °F (36.6 °C) -- 62 18 99 % -- --     Weight: Weight: 214 lb (97.1 kg)     24 hour intake/output:  Intake/Output Summary (Last 24 hours) at 11/12/2022 1323  Last data filed at 11/12/2022 0523  Gross per 24 hour   Intake 300 ml   Output --   Net 300 ml       General appearance - alert, in no acute distress.    Eyes - pupils equal and reactive, extraocular eye movements intact  Ears - bilateral TM's and external ear canals normal  Nose - normal and patent, no erythema, discharge or polyps  Mouth - mucous membranes moist, pharynx normal without lesions  Neck - supple, no significant adenopathy  Chest - clear to auscultation, normal effort on room air, no coughing or wheezing  Heart - normal rate, regular rhythm, normal S1, S2, no murmurs, rubs, clicks or gallops  Abdomen - soft, nontender, nondistended, no masses or organomegaly  Neurological - alert, oriented, normal speech, no focal findings or movement disorder noted  Musculoskeletal - Splint to right arm, no numbness or tingling   Extremities - peripheral pulses normal, no pedal edema, no clubbing or cyanosis  Skin - normal coloration Sutures intact on right side frontal scalp. Review of Labs and Diagnostic Testing:    CBC:   Recent Labs     11/12/22 0343   WBC 10.4   HGB 9.2*   HCT 28.6*   .2*        BMP:   Recent Labs     11/12/22 0343      K 4.4      CO2 23   BUN 29*   CREATININE 1.9*   CALCIUM 8.7   GLUCOSE 195*     PT/INR:   Recent Labs     11/11/22  1750   INR 1.07     APTT: No results for input(s): APTT in the last 72 hours. Lipids:   Recent Labs     11/12/22 0343   ALKPHOS 82   ALT 16   AST 24   BILITOT 0.4   LABALBU 3.7     Troponin:   Recent Labs     11/12/22 0343   TROPONINT < 0.010        Imaging:  XR ELBOW RIGHT (MIN 3 VIEWS)    Result Date: 11/11/2022  4 views of the right elbow. COMPARISON: None FINDINGS: No elbow joint effusion. Anterior humeral and radiocapitellar lines are maintained. Visualized portions of the distal humerus and the proximal radius appear intact Minimally displaced fracture of the enthesophyte of the proximal ulna. 1. Minimally displaced fracture of the enthesophyte of the proximal ulna. This document has been electronically signed by:  Willis Lopez MD on 11/11/2022 07:23 PM    CT HEAD WO CONTRAST    Result Date: 11/11/2022  Noncontrast CT of the head Technique: Noncontrast CT of the head from the vertex through the skull base. Comparison: CT - CT HEAD WO CONTR - 04/08/2015 02:34 AM EDT Findings: Mild to moderate nonspecific periventricular and deep white matter disease. This most often can be ascribed to chronic small vessel ischemic change. No intracranial mass, hemorrhage or hydrocephalus. No definite acute ischemic event by CT. No skull fractures. Normal aeration of the nasal sinuses. Right frontal scalp soft tissue swelling. Impression: Mild to moderate nonspecific periventricular and deep white matter disease. This document has been electronically signed by: Tripp Singer MD on 11/11/2022 07:10 PM All CTs at this facility use dose modulation techniques and iterative reconstructions, and/or weight-based dosing when appropriate to reduce radiation to a low as reasonably achievable. CT CHEST WO CONTRAST    Result Date: 11/11/2022  CT chest without contrast Comparison: Chest radiograph of the same day Findings: No mediastinal mass or lymphadenopathy. Cardiomegaly. Severe calcified coronary artery disease status post CABG. Mild calcification of the aortic valve. Normal-sized thoracic aorta with a moderate amount of calcified atherosclerotic disease. Accessory azygos fissure. Pulmonary nodules measure up to 3 mm. Minimal smooth interlobular septal thickening. No pneumothorax or pleural effusion. Nondisplaced fracture of the left transverse process of L2. No acute pathology in the imaged portion of the upper abdomen. Status post cholecystectomy. Severe calcified atherosclerotic disease in the upper abdomen. 4.3 cm left renal lesion measuring higher than simple fluid     Impression: Nondisplaced fracture of the left transverse process of L2. Indeterminate left renal lesion. Further characterization with nonemergent ultrasound is recommended. Suspect minimal cardiogenic pulmonary edema.  Pulmonary nodules measuring up to 3 mm, likely infectious/inflammatory. No follow-up is required in low risk individuals. High risk individuals have the option of a 1 year follow-up chest CT. This document has been electronically signed by: Sue Prasad. Tomeka Gtz MD on 11/11/2022 09:58 PM All CTs at this facility use dose modulation techniques and iterative reconstructions, and/or weight-based dosing when appropriate to reduce radiation to a low as reasonably achievable. CT CERVICAL SPINE WO CONTRAST    Result Date: 11/11/2022  Noncontrast CT of the cervical spine Technique: Axial images from the skull base through T2 with sagittal and coronal reconstruction images. Comparison: None Osseous Structures: The visualized vertebral bodies are intact without evidence of fractures or lytic lesions. The vertebral bodies are of normal height, and the alignment of the cervical spine is normal. Spinal Cord and Canal: The cervical spinal cord, as visualized, is within normal limits. Intervertebral Disk Levels: C2-C3: Normal disc height without evidence for disk protrusions or bulges. Normal facets. C3-C4: Posterior disc osteophyte complex producing moderate central canal and neuroforaminal narrowing. C4-C5: Broad-based disc bulge producing mild central canal and neuroforaminal narrowing. C5-C6: Broad-based disc bulge producing moderate to advanced central canal and neuroforaminal narrowing. C6-C7: Broad-based disc bulge producing moderate to advanced central canal and neuroforaminal narrowing. C7-T1: Normal disc height without evidence for disk protrusions or bulges. Normal facets. Incidental Findings: No significant incidental findings are seen. Degenerative changes within the cervical spine. No fractures.  This document has been electronically signed by: Lesvia Laura MD on 11/11/2022 07:09 PM All CTs at this facility use dose modulation techniques and iterative reconstructions, and/or weight-based dosing when appropriate to reduce radiation to a low as reasonably achievable. CT LUMBAR SPINE WO CONTRAST    Result Date: 11/12/2022  PROCEDURE: CT LUMBAR SPINE WO CONTRAST CLINICAL INFORMATION: 79-year-old male involved in a trauma. COMPARISON: Correlation is made with MRI of the lumbar spine 01/05/2012 and CT of the chest 11/11/2022. TECHNIQUE: 3 mm noncontrast axial images were obtained of the lumbar spine. Sagittal and coronal reconstructions were obtained. Angled images were reconstructed through the L3-4, L4-5 and L5-S1 disc levels. All CT scans at this facility use dose modulation, iterative reconstruction, and/or weight-based dosing when appropriate to reduce radiation dose to as low as reasonably achievable. FINDINGS: There is dextroconvex curvature of the lumbar spine. There is a nondisplaced fracture involving the left transverse process of L2. The lumbar vertebral body heights and alignment are preserved. There is no acute compression fracture. There is no subluxation. There is diffuse disc space narrowing which is most severe at L4-5 and L5-S1. There is facet arthropathy throughout the lumbar  spine. There is anterior spurring. The sacroiliac joints are within normal limits. There are atherosclerotic changes in the visualized portions of the aorta. There is no aneurysmal dilatation. There is no ascites. 1. Nondisplaced fracture of the left L2 transverse process. 2. No acute compression fractures. No subluxation. Multilevel degenerative changes. **This report has been created using voice recognition software. It may contain minor errors which are inherent in voice recognition technology. ** Final report electronically signed by Dr Timbo Valencia on 11/12/2022 2:58 AM    XR CHEST PORTABLE    Result Date: 11/11/2022  Single view of the chest. COMPARISON: Radiograph of the chest dated 8/23/2021 FINDINGS: Status post sternotomy. Cardiomegaly. Centrally predominant pulmonary vascular congestion. Low lung volumes. No consolidation.  No definite pleural effusion. No pneumothorax. No acute fracture. Mild spondylosis. 1. No pneumothorax. 2. Cardiomegaly with mild pulmonary vascular congestion. This document has been electronically signed by: Lila Tompkins MD on 11/11/2022 06:41 PM      EKG:  Sinus rhythm with 1st degree A-V block  Right bundle branch block  Left posterior fascicular block   Bifascicular block   Inferior infarct (cited on or before 23-AUG-2021)  Abnormal ECG  When compared with ECG of 11-NOV-2022 17:52,     Left posterior fascicular block is now Present  Confirmed by Janelle Palma ([id number]) on 11/12/2022 2:22:46 PM    Assessment:  Fall  Closed fx of proximal end of right ulna with routine healing  Closed fx of spinous process of lumbar vertebra with routine healing  Forehead laceration  CHF  CKD stage 3      Plan:  Surgery following- pt stable for discharge  MADI- Pt creatine/ BUN trending down to his baseline  CHF- pt in no distress, no SOB, lung sounds clear  Agree with discharge from our standpoint- f/u with PCP in 1 week. Electronically signed by KAVON Sanchez CNP on 11/12/2022 at 1:23 PM    Assessment and plan of care discussed with supervising physician, Dr Riri Ash. Addendum/attestation by Ankit Jackson MD:  I have seen and examined the patient independently. Face to face evaluation and examination was performed. The above evaluation and note has been reviewed. Laboratory and radiological data were reviewed. I Have discussed with Lucie Olea CNP about this patient in detail. The above assessment and plan has been reviewed. Please see my modifications mentioned below.       My modifications:  Electronically signed by Radha Dowell MD on 11/12/2022 at 01:58 PM            Copy: Primary Care Physician: Trini Love MD

## 2022-11-12 NOTE — ED NOTES
ED nurse-to-nurse bedside report    Chief Complaint   Patient presents with    Head Injury      LOC: alert and orientated to name, place, date  Vital signs   Vitals:    11/11/22 1828 11/11/22 1921 11/11/22 1942 11/11/22 1944   BP: (!) 144/59 131/70  (!) 147/56   Pulse: 64 64 67 66   Resp: 22 12  15   Temp:       SpO2: 97% 97%  98%      Pain:    Pain Interventions: N/A  Pain Goal: 4  Oxygen: No    Current needs required Room Air   Telemetry: Yes  LDAs:   Peripheral IV 11/11/22 Left Forearm (Active)     Continuous Infusions:   Mobility: Independent  [place] Fall Risk Score: Fall Risk 4/20/2022 3/29/2021 3/1/2021 12/20/2019 6/17/2019 6/4/2018 5/10/2017   2 or more falls in past year?  yes no no no no no no   Fall with injury in past year? no no no no no no no     Fall Interventions: Hourly Rounding, Side Rails x 2  Report given to: Kelby Rod RN  11/11/22 [id number]

## 2022-11-12 NOTE — PROGRESS NOTES
Pt is a 77y. o. male in ED-2. Nayely Monroe was being tended to by personnel and unavailable, however, his wife was present. Nayely Monroe is calm, peaceful and alert.  provided active listening, brief conversation with his wife and gratitude expressed for stopping by, from her.      11/11/22 2228   Encounter Summary   Encounter Overview/Reason  Crisis   Service Provided For: Patient and family together   Referral/Consult From: Nursing Supervisor/Manager   Support System Spouse   Last Encounter  11/11/22   Complexity of Encounter Low   Begin Time 1757   End Time  1805   Total Time Calculated 8 min   Crisis   Type Trauma   Assessment/Intervention/Outcome   Assessment Calm;Coping;Peaceful;Impaired resilience   Intervention Sustaining Presence/Ministry of presence   Outcome Did not respond

## 2022-11-12 NOTE — ED NOTES
Pt transferred to [address] [place] [address] room 012. This [facility] to bringing Pt up.       Artie Isidro  11/11/22 9205

## 2022-11-12 NOTE — PROGRESS NOTES
Due to down time, radiologist Dr. Lucio Constantino called verbal results of patient's CT Lumbar spine. Findings include fracture to  L2 transverse process otherwise no acute findings.     Electronically signed by Brenda Javier PA-C on 11/12/2022 at 6:36 AM

## 2022-11-12 NOTE — H&P
Trauma History and Physical     Patient:  Reshma Dallas date: 11/11/2022   YOB: 1945 Date of Evaluation: 11/11/2022  MRN: [id number]  Acct: [de-identified]    Injury Date:11/11/2022  Injury time:17:30  PCP: Riri Hoskins MD   Referring physician: Dr. Karen Garcia DO    Time of Trauma Surgeon Notification: 17:42 November 11, 2022  Time of ROBIN Arrival: 17:45  Time of Trauma Surgeon Arrival:  18:15 November 11, 2022    Assessment:    Principal Problem:    Fall at home, initial encounter  Active Problems:    Closed fracture of proximal end of right ulna with routine healing    Closed fracture of spinous process of lumbar vertebra with routine healing    Forehead laceration, initial encounter  Resolved Problems:    * No resolved hospital problems.  *   Plan:    Patient admitted under Trauma Services with telemetry    Fall from two-step ladder   - fall precautions    Fracture of transverse process of L2   -Consult orthospine   -Bedrest until evaluated by spine    Forehead laceration   - closed in the ER   - Tdap   - Ice to wound, pressure dressing, pain control   - hold blood thinners tonight, laceration continues to ooze    Minimally displaced fracture of the enthesophyte of the proximal Ulna   - Consult Orthopedic surgery   - Recommend posterior long arm splint    Elevated Troponin   - 0.011   - repeat troponin in am    Consults Medicine, orthopedic surgery, orthopedic spine    Pain Management   -tylenol    Prophylaxis: SCD's, Incentive Spirometry, Colace, Pepcid, Zofran    General Diet, NPO at 2am    IVF Management  Regular Neurovascular Checks  Repeat Labs Tomorrow AM  PT/OT/SLP Eval and Treat once cleared by ortho spine  Bedrest    Planned Discharge pending clinical progress      Activation: []Level I ([place] Alert) [x]Level II (Injury Call) []Level III (Trauma Consult)  Mode of Arrival: family  Referring Facility: NA  Loss of Consciousness [x]No []Yes[]Unknown  Duration(min)  Mechanism of Injury:  []Motor Vehicle crash   []Single Vehicle [] []Passenger []Scene Fatality []Front Seat  []Restrained   []Air Bag Deployed   []Ejected []Rollover []Pedestrian []Trapped   Type of vehicle:   Protective Devices:   []Motorcycle  Wearing Helmet []Yes []No  []Bicycle  Wearing Helmet []Yes []No  [x]Fall   Distance -from 2 stepladder   []Assault    Abuse Reported []Yes []No  []Gunshot  []Stabbing  []Work Related  []Burn: []Flame []Scald []Electrical []Chemical []Contact []Inhalation []House Fire  []Other:   Patient Active Problem List   Diagnosis    CKD (chronic kidney disease) stage 3, GFR 30-59 ml/min ([facility])    Paroxysmal atrial fibrillation ([facility])    S/P CABG (coronary artery bypass graft)    Hyperlipidemia    Lumbar radiculopathy    Renal artery stenosis ([facility])    Obstructive sleep apnea on CPAP    Chronic diastolic congestive heart failure ([facility]    Coronary atherosclerosis    Diabetes mellitus type 2, insulin dependent ([facility]    Status post angioplasty with stent    MADI (acute kidney injury) ([facility]    SOB (shortness of breath)    Wheezing    Asthma    Essential hypertension    Obesity (BMI 30-39. 9)    Elevated PSA    Hypertrophy of prostate with urinary obstruction    Adenomatous colon polyp    Angina, class III ([facility])    Hx of atrial fibrillation without current medication    Bilateral carotid artery stenosis    Hx of nonmelanoma skin cancer    Acute kidney injury superimposed on CKD ([facility])    Anemia of chronic renal failure    Anemia in stage 3 chronic kidney disease (HCC)    Melena    Chronic kidney disease, stage 4 (severe) ([facility])    Strangulated umbilical hernia    Small bowel obstruction ([facility])    Displacement of lumbar intervertebral disc without myelopathy    Degeneration of lumbosacral intervertebral disc    Chronic systolic (congestive) heart failure    Presence of Watchman left atrial appendage closure device    Fall at home, initial encounter    Closed fracture of proximal end of right ulna with routine healing    Closed fracture of spinous process of lumbar vertebra with routine healing    Forehead laceration, initial encounter     Subjective   Chief Complaint: \"My head hurts\"    History of Present Illness: Patient is a 49-year-old male presented as a level 2 trauma after falling off of a 2 step ladder. Patient has past medical history significant for atrial fibrillation and coronary artery disease, MI, status post CABG in 1997 on Plavix and aspirin, CHF, chronic kidney disease stage III, hypertension, and hyperlipidemia. Patient notes that he was fixing something on the ceiling of his patio when he lost his balance falling from the top of a 2 step ladder and hitting his head and his right elbow. He denies LOC or feelings of dizziness prior to falling. Patients ABC's are intact and GCS is 15. Bedside FAST is performed and is negative. He has a bleeding laceration across his forehead. C-collor and pressure dressing applied. CT of the head and neck, CT chest, CXR and Xray of the right elbow was performed and shows non-displaced spinous process fracture of L2 and minimally displaced fracture if the enthesophyte of the proximal ulna. Patient's forehead laceration was closed in the ED and tetanus was updated. Patient was found to have acutely elevated troponin at 0.011, acute on chronic kidney failure with a creatinine of 2.5. Patient creatinine baseline is 1.4. We will plan to admit the patient with consults to orthospine, orthopedic surgery and medicine. In coordination with Dr. Ralph Estrada MD.    Review of Systems:   Review of Systems   Constitutional:  Negative for activity change and fever. HENT:  Negative for congestion, ear pain, sinus pressure, sinus pain and sore throat. Eyes:  Negative for photophobia and pain. Respiratory:  Negative for cough and shortness of breath. Cardiovascular:  Negative for chest pain and palpitations.    Gastrointestinal:  Negative for abdominal distention, abdominal pain, constipation, diarrhea, nausea and vomiting. Genitourinary:  Negative for difficulty urinating, dysuria, frequency and hematuria. Musculoskeletal:  Positive for joint swelling. Negative for back pain, neck pain and neck stiffness. Skin:  Positive for wound. Laceration to forehead   Neurological:  Negative for dizziness, syncope, weakness, light-headedness, numbness and headaches. Psychiatric/Behavioral:  Negative for agitation and confusion. The patient is not nervous/anxious. Patient has no known allergies.   Past Surgical History:   Procedure Laterality Date    ABLATION OF DYSRHYTHMIC FOCUS  3/13/13    CARDIAC CATHETERIZATION  2008 - 2019    stents [address] SURGERY  3/13/13    oblation for irreg rythm    CARDIOVERSION  3/8/2012    CAROTID ENDARTERECTOMY  1997    CHOLECYSTECTOMY  4/1999    COLONOSCOPY  6/22/09, 4/2011,7/12/17    polyp removal    COLONOSCOPY N/A 7/21/2021    COLONOSCOPY POLYPECTOMY SNARE/COLD BIOPSY performed by Lidia Talley MD at [address] [place] [address]  2008 (2), 2009 (1)    x2    CORONARY ARTERY BYPASS GRAFT  8/1997    5 vessel    DIAGNOSTIC CARDIAC CATH LAB PROCEDURE      ENDOSCOPY, COLON, DIAGNOSTIC      EYE LID SURGERY Right 11/17/2021    EXCISION SCC RIGHT LOWER LATERAL EYELID WITH FROZEN SECTION performed by Claude Meek MD at [facility] Right 2/28/2022    EXCISION SCC RIGHT ZYGOMA performed [facility]  12/20/2013    pre-cancerous mole rt foot removed    LITHOTRIPSY  4/2002    CA OFFICE/OUTPT VISIT,PROCEDURE ONLY N/A 11/8/2018    TRANSESOPHAGEAL ECHOCARDIOGRAM performed by aMrk Handy MD at [facility]    PTCA  05/30/2018    SHOULDER SURGERY  2001, 2003    rt shoulder manipulation--frozen shoulder-01, Lt -03    SHOULDER SURGERY  2001 - 2003    manipulation - bilateral    SKIN BIOPSY      SKIN CANCER EXCISION  12/20/13     R foot    SKIN CANCER EXCISION  1/2016    squamous cell Lt  upper cheek    UPPER GASTROINTESTINAL ENDOSCOPY N/A 7/20/2021    EGD ESOPHAGOGASTRODUODENOSCOPY performed by Jeancarlos Elliott MD at [address] [place] [address]   4/2002    VASCULAR SURGERY      carotids & cabg harvests    VENTRAL HERNIA REPAIR N/A 4/30/2022    exploratory laparotomy with repair of incarcerated hernia performed by [facility]     Past Medical History:   Diagnosis Date    Adenomatous colon polyp 2009/2010, 6/'14,7/'17, 9/20, 7/21    Angina at [facility])     Arthritis     back    Atrial fibrillation ([address] 12/11 and 11/12    cardioversion 12/11    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    BPH (benign prostatic hypertrophy)     CAD (coronary artery disease) 1997    Carotid artery disease ([address] 1995    right    Carotid disease, bilateral ([address]     Cerebral artery occlusion with cerebral infarction ([address]     CHF (congestive heart failure) ([address]     Chronic kidney disease     COVID-19 07/09/2022    GERD (gastroesophageal reflux disease)     GI bleed 07/2021    History of blood transfusion 2021    GI bleed    History of blood transfusion 1997    with heart surgery    Hyperlipidemia 1996    Hypertension     Lumbar disc disease 2001    MI (myocardial infarction) ([address] 1997    Nephrolithiasis 2002    NIDDM (non-insulin dependent diabetes mellitus) 1996    diagnosed 1996    RICCARDO (obstructive sleep apnea) 2005    cpap    PVC's (premature ventricular contractions)     Renal artery stenosis ([address] 2007    left    Renal insufficiency     S/P CABG (status post coronary artery bypass graft) 1997    5 vessels    Skin cancer     Squamous cell carcinoma     Stenosis of right subclavian artery (HCC)     TIA (transient ischemic attack)     Wears partial dentures     upper and lower     Past Surgical History:   Procedure Laterality Date    ABLATION OF DYSRHYTHMIC FOCUS  3/13/13    CARDIAC CATHETERIZATION  2008 - 2019    stents [address]  3/13/13    oblation for irreg rythm    CARDIOVERSION  3/8/2012    CAROTID ENDARTERECTOMY  1997    CHOLECYSTECTOMY  4/1999    COLONOSCOPY  6/22/09, 4/2011,7/12/17    polyp removal    COLONOSCOPY N/A 7/21/2021    COLONOSCOPY POLYPECTOMY SNARE/COLD BIOPSY performed by Kimberlee Walls MD at [address]  2008 (2), 2009 (1)    x2    CORONARY ARTERY BYPASS GRAFT  8/1997    5 vessel    DIAGNOSTIC CARDIAC CATH LAB PROCEDURE      ENDOSCOPY, COLON, DIAGNOSTIC      EYE LID SURGERY Right 11/17/2021    EXCISION SCC RIGHT LOWER LATERAL EYELID WITH FROZEN SECTION performed by Monster Tsang MD at [address] Right 2/28/2022    EXCISION SCC RIGHT ZYGOMA [facility]  12/20/2013    pre-cancerous mole rt foot removed    LITHOTRIPSY  4/2002    NC OFFICE/OUTPT VISIT,PROCEDURE ONLY N/A 11/8/2018    TRANSESOPHAGEAL ECHOCARDIOGRAM performed by Janae Beth MD at [address] [facility]    PTCA  05/30/2018    SHOULDER SURGERY  2001, 2003    rt shoulder manipulation--frozen shoulder-01, Lt -03    SHOULDER SURGERY  2001 - 2003    manipulation - bilateral    SKIN BIOPSY      SKIN CANCER EXCISION  12/20/13     R foot    SKIN CANCER EXCISION  1/2016    squamous cell Lt  upper cheek    UPPER GASTROINTESTINAL ENDOSCOPY N/A 7/20/2021    EGD ESOPHAGOGASTRODUODENOSCOPY performed by Kimberlee Walls MD at [address] [place] [address] Dr  4/2002    VASCULAR SURGERY      carotids & cabg harvests    VENTRAL HERNIA REPAIR N/A 4/30/2022    exploratory laparotomy with repair of incarcerated hernia performed by Chichi Armstrong MD at [address] [place] [address] History     Socioeconomic History    Marital status:      Spouse name: Valentina Ford    Number of children: 2    Years of education: None    Highest education level: None   Tobacco Use    Smoking status: Never    Smokeless tobacco: Never   Vaping Use    Vaping Use: Never used   Substance and Sexual Activity    Alcohol use: No     Alcohol/week: 0.0 standard drinks    Drug use: No    Sexual activity: Yes     Partners: Female     Social Determinants of Health     Financial Resource Strain: Low Risk     Difficulty of Paying Living Expenses: Not hard at all   Food Insecurity: No Food Insecurity    Worried About Running Out of Food in the Last Year: Never true    Ran Out of Food in the Last Year: Never true   Physical Activity: Inactive    Days of Exercise per Week: 0 days    Minutes of Exercise per Session: 0 min     Family History   Problem Relation Age of Onset    Cancer Mother     High Blood Pressure Mother     Stroke Father     High Blood Pressure Father     Heart Disease Brother     Cancer Brother         lung    Cancer Brother         melanoma       Home medications:    Current Discharge Medication List        CONTINUE these medications which have NOT CHANGED    Details   hydrOXYzine HCl (ATARAX) 10 MG tablet Take 10 mg by mouth at bedtime      clopidogrel (PLAVIX) 75 MG tablet Take 1 tablet by mouth daily morning  Qty: 90 tablet, Refills: 0      hydrALAZINE (APRESOLINE) 25 MG tablet Take 1 tablet by mouth every 8 hours  Qty: 270 tablet, Refills: 0      metoprolol succinate (TOPROL XL) 50 MG extended release tablet Take 1 tablet by mouth daily  Qty: 90 tablet, Refills: 0    Comments: Requesting 1 year supply  Associated Diagnoses: Coronary artery disease involving native coronary artery      isosorbide mononitrate (IMDUR) 120 MG extended release tablet Take 1 tablet by mouth daily  Qty: 90 tablet, Refills: 0      atorvastatin (LIPITOR) 40 MG tablet TAKE 1 TABLET BY MOUTH AT  NIGHT  Qty: 90 tablet, Refills: 3      ranolazine (RANEXA) 1000 MG extended release tablet TAKE 1 TABLET BY MOUTH  TWICE DAILY NEW DOSE  Qty: 180 tablet, Refills: 3    Comments: Requesting 1 year supply      Blood Glucose Monitoring Suppl (ONE TOUCH ULTRA 2) w/Device KIT 1 kit by Does not apply route daily DX:11.9  Qty: 1 kit, Refills: 1    Associated Diagnoses: Type 2 diabetes mellitus with microalbuminuria, with long-term current use of insulin ([facility])      blood glucose test strips (ASCENSIA AUTODISC VI;ONE TOUCH ULTRA TEST VI) strip 1 each by In Vitro route 4 times daily DX:11.9  Qty: 200 each, Refills: 3    Associated Diagnoses: Type 2 diabetes mellitus with microalbuminuria, with long-term current use of insulin ([facility])      ONE TOUCH ULTRASOFT LANCETS MISC Use to test Blood Sugar 4 times Daily, Dx: E11.9  Qty: 200 each, Refills: 11    Associated Diagnoses: Type 2 diabetes mellitus with microalbuminuria, with long-term current use of insulin ([facility])      bumetanide (BUMEX) 2 MG tablet Take 1 tablet by mouth in the morning. Qty: 90 tablet, Refills: 3      insulin glargine (BASAGLAR KWIKPEN) 100 UNIT/ML injection pen Inject 33 Units into the skin 2 times daily      tamsulosin (FLOMAX) 0.4 MG capsule TAKE 1 CAPSULE BY MOUTH  TWICE DAILY  Qty: 180 capsule, Refills: 3    Comments: Requesting 1 year supply      nitroGLYCERIN (NITROLINGUAL) 0.4 MG/SPRAY 0.4 mg spray USE 1 SPRAY ON OR UNDER THE TONGUE EVERY 5 MINUTES AS NEEDED FOR CHEST PAIN  Qty: 4.9 g, Refills: 6    Associated Diagnoses: CAD S/P percutaneous coronary angioplasty      Dulaglutide (TRULICITY) 4.46 AS/5.2TZ SOPN Inject 0.75 mg into the skin once a week      dilTIAZem (CARDIZEM CD) 120 MG extended release capsule TAKE 1 CAPSULE BY MOUTH  DAILY  Qty: 90 capsule, Refills: 3    Comments: Requesting 1 year supply      !!  Insulin Pen Needle (NOVOFINE PEN NEEDLE) 32G X 6 MM MISC 1 each by Does not apply route 6 times daily  Qty: 600 each, Refills: 3      aspirin 81 MG EC tablet Take 81 mg by mouth daily      !! NOVOFINE 32G X 6 MM MISC USE 1 NEW NEEDLE 6 TIMES  DAILY AND AS NEEDED  Qty: 540 each, Refills: 1      diphenhydrAMINE-APAP, sleep, (TYLENOL PM EXTRA STRENGTH)  MG tablet Take 1 tablet by mouth every evening as needed      fluocinonide (LIDEX) 0.05 % cream Apply 1 applicator topically as needed      BiPAP Machine MISC by Does not apply route      Multiple Vitamins-Minerals (THERAPEUTIC MULTIVITAMIN-MINERALS) tablet Take 1 tablet by mouth nightly       !! - Potential duplicate medications found. Please discuss with provider.           Hospital medications:  Scheduled Meds:  Continuous Infusions:  PRN Meds:  Objective   ED TRIAGE VITALS  BP: (!) 168/72, Temp: 98 °F (36.7 °C), Heart Rate: 76, Resp: 18, SpO2: 97 %  [place] Coma Scale  Eye Opening: Spontaneous  Best Verbal Response: Oriented  Best Motor Response: Obeys commands  Luis M Coma Scale Score: 15  Results for orders placed or performed during the hospital encounter of 11/11/22   CBC with Auto Differential   Result Value Ref Range    WBC 7.9 4.8 - 10.8 thou/mm3    RBC 2.97 (L) 4.70 - 6.10 mill/mm3    Hemoglobin 10.1 (L) 14.0 - 18.0 gm/dl    Hematocrit 30.8 (L) 42.0 - 52.0 %    .7 (H) 80.0 - 94.0 fL    MCH 34.0 (H) 26.0 - 33.0 pg    MCHC 32.8 32.2 - 35.5 gm/dl    RDW-CV 13.0 11.5 - 14.5 %    RDW-SD 48.9 (H) 35.0 - 45.0 fL    Platelets 835 962 - 444 thou/mm3    MPV 9.6 9.4 - 12.4 fL    Seg Neutrophils 73.4 %    Lymphocytes 12.1 %    Monocytes 12.1 %    Eosinophils 1.4 %    Basophils 0.4 %    Immature Granulocytes 0.6 %    Segs Absolute 5.8 1.8 - 7.7 thou/mm3    Lymphocytes Absolute 1.0 1.0 - 4.8 thou/mm3    Monocytes Absolute 1.0 0.4 - 1.3 thou/mm3    Eosinophils Absolute 0.1 0.0 - 0.4 thou/mm3    Basophils Absolute 0.0 0.0 - 0.1 thou/mm3    Immature Grans (Abs) 0.05 0.00 - 0.07 thou/mm3    nRBC 0 /100 wbc   CMP   Result Value Ref Range    Glucose 127 (H) 70 - 108 mg/dL    Creatinine 2.5 (H) 0.4 - 1.2 mg/dL    BUN 32 (H) 7 - 22 mg/dL    Sodium 137 135 - 145 meq/L    Potassium 4.5 3.5 - 5.2 meq/L    Chloride 101 98 - 111 meq/L    CO2 25 23 - 33 meq/L    Calcium 9.0 8.5 - 10.5 mg/dL    AST 20 5 - 40 U/L    Alkaline Phosphatase 85 38 - 126 U/L    Total Protein 6.9 6.1 - 8.0 g/dL    Albumin 3.8 3.5 - 5.1 g/dL    Total Bilirubin 0.4 0.3 - 1.2 mg/dL    ALT 17 11 - 66 U/L   Troponin   Result Value Ref Range    Troponin T 0.011 (A) ng/ml   Protime-INR   Result Value Ref Range    INR 1.07 0.85 - 1.13   Glomerular Filtration Rate, Estimated   Result Value Ref Range    Est, Glom Filt Rate 26 (A) >60 ml/min/1.73m2   Anion Gap   Result Value Ref Range    Anion Gap 11.0 8.0 - 16.0 meq/L   Osmolality   Result Value Ref Range    Osmolality Calc 282.3 275.0 - 300.0 mOsmol/kg   EKG Chest Pain   Result Value Ref Range    Ventricular Rate 63 BPM    Atrial Rate 416 BPM    QRS Duration 154 ms    Q-T Interval 500 ms    QTc Calculation (Bazett) 511 ms    R Axis 36 degrees    T Axis 46 degrees       Physical Exam:  Patient Vitals for the past 24 hrs:   BP Temp Temp src Pulse Resp SpO2   11/11/22 2331 (!) 168/72 98 °F (36.7 °C) Oral 76 18 97 %   11/11/22 2301 (!) 166/60 -- -- 76 16 98 %   11/11/22 2146 (!) 166/60 -- -- 71 16 97 %   11/11/22 2036 (!) 165/59 -- -- 69 16 98 %   11/11/22 1944 (!) 147/56 -- -- 66 15 98 %   11/11/22 1942 -- -- -- 67 -- --   11/11/22 1921 131/70 -- -- 64 12 97 %   11/11/22 1828 (!) 144/59 -- -- 64 22 97 %   11/11/22 1752 (!) 132/50 -- -- 97 18 98 %   11/11/22 1748 (!) 120/47 97.8 °F (36.6 °C) -- 62 18 99 %     Primary Assessment:  Airway: Patent, trachea midline  Breathing: Breath sounds present and equal bilaterally, spontaneous, and unlabored  Circulation: Hemodynamically stable, 2+ cental and peripheral pulses. Disability: JOSEPH x 4, following commands. GCS =15    Secondary Assessment:  General: Alert, NAD. Head: laceration to the forehead with active bleeding, mid face stable, Tympanic membranes intact, Nares patent bilaterally, no epistaxis. Mouth clear of foreign bodies, no lacerations or abrasions. Eyes: PERRLA, EOMI, Nontraumatic  Neurologic: A & O x3. Following commands.  CN 2-12 intact  Neck: Immobilized in cervical collar, trachea midline. Cervical spines NTTP midline, without step-offs, crepitus or deformity. Back:TL spines are NTTP midline, without step-offs, crepitus or deformity. No abrasions, contusions, or ecchymosis noted. Lungs: Clear to auscultation bilaterally. Chest Wall: Chest rise symmetrical.  Chest wall without tenderness to palpation. No crepitus, deformities, lacerations, or abrasions. Heart: RRR. Normal S1/S2. No obvious M/G/R. Abdomen:  Soft, NTTP. No guarding. Non-peritoneal.  Pelvis:  NTTP, stable to compression. Femoral pulses 2+. GI/: No blood at the urinary meatus. No gross hematuria. Extremities: Bruising to right elbow. PMS intact. Radial /DP/PT pulses 2+ bilaterally. Skin: Skin warm and dry. Normal for ethnicity. Radiology:     CT CHEST WO CONTRAST   Final Result   Impression:   Nondisplaced fracture of the left transverse process of L2. Indeterminate left renal lesion. Further characterization with nonemergent    ultrasound is recommended. Suspect minimal cardiogenic pulmonary edema. Pulmonary nodules measuring up to 3 mm, likely infectious/inflammatory. No    follow-up is required in low risk individuals. High risk individuals have    the option of a 1 year follow-up chest CT. This document has been electronically signed by: Erick Abreu MD    on 11/11/2022 09:58 PM      All CTs at this facility use dose modulation techniques and iterative    reconstructions, and/or weight-based dosing   when appropriate to reduce radiation to a low as reasonably achievable. XR ELBOW RIGHT (MIN 3 VIEWS)   Final Result   1. Minimally displaced fracture of the enthesophyte of the proximal ulna. This document has been electronically signed by: Brandon Jim MD on    11/11/2022 07:23 PM      CT HEAD WO CONTRAST   Final Result   Impression:   Mild to moderate nonspecific periventricular and deep white matter disease.       This document has been electronically signed by: Jerome Rodríguez MD on    11/11/2022 07:10 PM      All CTs at this facility use dose modulation techniques and iterative    reconstructions, and/or weight-based dosing   when appropriate to reduce radiation to a low as reasonably achievable. CT CERVICAL SPINE WO CONTRAST   Final Result   Degenerative changes within the cervical spine. No fractures. This document has been electronically signed by: Jerome Rodríguez MD on    11/11/2022 07:09 PM      All CTs at this facility use dose modulation techniques and iterative    reconstructions, and/or weight-based dosing   when appropriate to reduce radiation to a low as reasonably achievable. XR CHEST PORTABLE   Final Result   1. No pneumothorax. 2. Cardiomegaly with mild pulmonary vascular congestion. This document has been electronically signed by: Luis Coffey MD on    11/11/2022 06:41 PM      CT LUMBAR RECONSTRUCTION WO POST PROCESS    (Results Pending)     Fast Exam: Yes, negative    Electronically signed by Amor Paris PA-C on 11/11/2022 at 11:49 PM      Patient seen and examined independently by me 11/11/22     I personally supervised the PA/NP in the evaluation, management and development of the treatment plan for Samantha Peralta  on the same date of service as above. I personally interviewed Samantha Peralta   and  discussed his review of symptoms as able due to the patient's condition, as well as performed an individual physical exam on the same   date of service as above. In addition I discussed the patient's condition and treatment options with the patient, if able, and/or designated family if available. I have also reviewed and agree with the past medical,  family and social history updates as well as care plans unless otherwise noted below. All questions were answered.       I examined independently and reviewed relevant data myself and may have done so in the context of team stephane. A full chart review was performed by me. I attest that this medical record entry accurately reflects signatures and notations that I made in my capacity as an M. D. when I treated and diagnosed Demetra Morrell on the date of service above     I was responsible for all medical decision making involving this encounter. I identified and/or confirmed all problems associated with this patient encounter by my own direct physical examination of this patient and review of all radiology studies and labwork  that were ordered and available. Active Hospital Problems    Diagnosis     Fall at home, initial encounter [W19. Jose Simon, N55.743]      Priority: Medium    Closed fracture of proximal end of right ulna with routine healing [S52.001D]      Priority: Medium    Closed fracture of spinous process of lumbar vertebra with routine healing [S32.009D]      Priority: Medium    Forehead laceration, initial encounter [S01.81XA]      Priority: Medium        I  discussed the management of all of the identified problems with the APN or PA. I formulated the treatment plan for all identified problems and discussed those with the APN or PA . This management plan was then carried out and the patient's orders for care by the APN or PA. Total time personally spent on this patient encounter was 70 minutes which includes :  Preparing to see the patient( reviewing tests and chart)  Obtaining and reviewing separately obtained history  Performing a medically appropriate examination and evaluation  Ordering medications, tests, or procedures  Counseling and educating the patient/family/caregiver  Care coordination  Referring and communicating with other healthcare professionals  Documenting clinical information in the EHR  Independent interpretation of results and communicating the results to patient and care team  This includes a direct physical exam as well as all the other encounter activities described above. Time may be discontiguous. Time does not include procedures. Please see our orders that were directed and approved by me if there are any new ones for the updated patient care plan. Above discussed and I agree with documentation and orders placed by Baldemar EM    See any additional comments if needed below for any other updated orders and plans. -- Admit patient. Pain control. Orthopedic spine consultation. Forehead laceration closed in the emergency department. Continue wound care. Hold blood thinners overnight. Orthopedic surgery following for proximal ulna fracture. Sling/splint for now. Repeat troponin levels. Consult medicine. SCDs for DVT prophylaxis. Pulmonary toileting. A.m. labs. N.p.o. at midnight. PT/OT when appropriate. Speech therapy for cognition evaluation. Bedrest until okay with orthopedic spine.     Electronically signed by Andrew Ovalle MD on 11/12/22 at 7:12 AM EST

## 2022-11-12 NOTE — ED NOTES
Patient to room 2 from triage following a fall at home just prior to arrival.  Patient states that he was standing 1-2 steps up on a small step ladder to pick some pears when he lost his footing and fell. Patient states that he fell forward, striking his head and right elbow on the ground. Patient with a small abrasion to his right elbow and a laceration to his right forehead that is actively bleeding. Patient is holding pressure with a washcloth, however the wound bleeds as soon as pressure is removed. Patient denies loss of consciousness, states that he is on Plavix and aspirin. Wife at bedside. Patient is paged as a Level II trauma.       Wei Olivas RN  11/12/22 3508

## 2022-11-12 NOTE — PLAN OF CARE
Problem: Discharge Planning  Goal: Discharge to home or other facility with appropriate resources  Outcome: Progressing     Problem: Safety - Adult  Goal: Free from fall injury  Outcome: Progressing     Problem: ABCDS Injury Assessment  Goal: Absence of physical injury  Outcome: Progressing   Care plan reviewed with patient. Patient verbalizes understanding of the plan of care and contribute to goal setting.

## 2022-11-12 NOTE — PROGRESS NOTES
Pt admitted to  7K12 from ED. Complaints: Fall at home. IV site free of s/s of infection or infiltration. Vital signs obtained. Assessment and data collection initiated. Two nurse skin assessment performed by Vanita Flores and Lise ([place]) RN. Oriented to room. Explained patients right to have family, representative or physician notified of their admission. Patient has Declined for physician to be notified. Patient has Declined for family/representative to be notified. The patient is interested in [facility]. \[facility]\"" meds to beds program?:  Yes    Policies and procedures for 7 explained. All questions answered with no further questions at this time. Fall prevention and safety brochure discussed with patient. Bed alarm on. Call light in reach.

## 2022-11-12 NOTE — ED NOTES
Dr. Bloom Course at bedside performing Laceration repair. Patient tolerating well.      Luzmaria Thompson RN  11/11/22 1921 Extraction Method: cotton-tipped applicators and gentle pressure Render Number Of Lesions Treated: yes Render Post-Care Instructions In Note?: no Prep Text (Optional): Prior to removal the treatment areas were prepped in the usual fashion. Post-Care Instructions: I reviewed with the patient in detail post-care instructions. Patient is to keep the treatment areas dry overnight, and then apply bacitracin twice daily until healed. Patient may apply hydrogen peroxide soaks to remove any crusting. Acne Type: Comedonal Lesions Detail Level: Detailed Consent was obtained and risks were reviewed including but not limited to scarring, infection, bleeding, scabbing, incomplete removal, and allergy to anesthesia.

## 2022-11-12 NOTE — CONSULTS
Orthopedic Consult    Requesting Physician: Geeta Collins MD    CHIEF COMPLAINT: Fall; fracture transverse process of L2; minimally displaced fracture of enthesophyte of proximal ulna    HISTORY OF PRESENT ILLNESS:      The patient is a 68 y.o. male with a past medical history significant for atrial fibrillation, CAD, CHF, CKD, GERD, HTN, HLD, and previous TIA who presented to ED after experiencing fall off a two-step ladder. Patient was noted to have forehead laceration. Imaging showed fracture of transverse process of L2 as well as minimally displaced fracture of enthesophyte of proximal ulna. Patient  has minimal to no pain in elbow. Denies any additional injuries at this time. Long-arm splint in place. He currently lives at home with his wife. Does not use any assistive amatory devices at baseline. Does not use any assistive device with ambulation at baseline. Denies chest pain, shortness of breath, nausea, vomiting, headache, vision changes, lightheadedness, or numbness and tingling in his hands and fingers.     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    BPH (benign prostatic hypertrophy)     CAD (coronary artery disease) 1997    Carotid artery disease ([address] 1995    right    Carotid disease, bilateral ([address]     Cerebral artery occlusion with cerebral infarction ([address]     CHF (congestive heart failure) ([address]     Chronic kidney disease     COVID-19 07/09/2022    GERD (gastroesophageal reflux disease)     GI bleed 07/2021    History of blood transfusion 2021    GI bleed    History of blood transfusion 1997    with heart surgery    Hyperlipidemia 1996    Hypertension     Lumbar disc disease 2001    MI (myocardial infarction) ([address] 1997 Nephrolithiasis 2002    NIDDM (non-insulin dependent diabetes mellitus) 1996    diagnosed 1996    RICCARDO (obstructive sleep apnea) 2005    cpap    PVC's (premature ventricular contractions)     Renal artery stenosis ([address] 2007    left    Renal insufficiency     S/P CABG (status post coronary artery bypass graft) 1997    5 vessels    Skin cancer     Squamous cell carcinoma     Stenosis of right subclavian artery (HCC)     TIA (transient ischemic attack)     Wears partial dentures     upper and lower       Past Surgical History:    Past Surgical History:   Procedure Laterality Date    ABLATION OF DYSRHYTHMIC FOCUS  3/13/13    CARDIAC CATHETERIZATION  2008 - 2019    stents [address]  3/13/13    oblation for irreg rythm    CARDIOVERSION  3/8/2012    CAROTID ENDARTERECTOMY  1997    CHOLECYSTECTOMY  4/1999    COLONOSCOPY  6/22/09, 4/2011,7/12/17    polyp removal    COLONOSCOPY N/A 7/21/2021    COLONOSCOPY POLYPECTOMY SNARE/COLD BIOPSY performed by Anam Elliott MD at [address] [place]  2008 (2), 2009 (1)    x2    CORONARY ARTERY BYPASS GRAFT  8/1997    5 vessel    DIAGNOSTIC CARDIAC CATH LAB PROCEDURE      ENDOSCOPY, COLON, DIAGNOSTIC      EYE LID SURGERY Right 11/17/2021    EXCISION SCC RIGHT LOWER LATERAL EYELID WITH FROZEN SECTION performed by Polo Barba MD at [address] Right 2/28/2022    EXCISION SCC RIGHT ZYGOMA performed by Polo Barba MD at [address]  12/20/2013    pre-cancerous mole rt foot removed    LITHOTRIPSY  4/2002    FL OFFICE/OUTPT VISIT,PROCEDURE ONLY N/A 11/8/2018    TRANSESOPHAGEAL ECHOCARDIOGRAM performed by Niharika Tipton MD at [address] [facility]    PTCA  05/30/2018    SHOULDER SURGERY  2001, 2003    rt shoulder manipulation--frozen shoulder-01, Lt -03    SHOULDER SURGERY  2001 - 2003    manipulation - bilateral    SKIN BIOPSY      SKIN CANCER EXCISION  12/20/13     R foot    SKIN CANCER EXCISION  1/2016    squamous cell Lt  upper cheek    UPPER GASTROINTESTINAL ENDOSCOPY N/A 7/20/2021    EGD ESOPHAGOGASTRODUODENOSCOPY performed by Melida Norton MD at [address] [place] [address]   4/2002    VASCULAR SURGERY      carotids & cabg harvests    VENTRAL HERNIA REPAIR N/A 4/30/2022    exploratory laparotomy with repair of incarcerated hernia performed by Cornelius Bell MD at [facility]       Medications Prior to Admission:   Current Facility-Administered Medications   Medication Dose Route Frequency Provider Last Rate Last Admin    aspirin EC tablet 81 mg  81 mg Oral Daily MARIA ANTONIA Little        bumetanide (BUMEX) tablet 2 mg  2 mg Oral Daily MARIA ANTONIA Little        dilTIAZem (CARDIZEM CD) extended release capsule 120 mg  120 mg Oral Daily MARIA ANTONIA Little   120 mg at 11/12/22 1306    hydrALAZINE (APRESOLINE) tablet 25 mg  25 mg Oral 3 times per day Lamar GoveaMARIA ANTONIA Seymour   25 mg at 11/12/22 1306    insulin glargine (LANTUS) injection vial 33 Units  33 Units SubCUTAneous BID MARIA ANTONIA Little        isosorbide mononitrate (IMDUR) extended release tablet 120 mg  120 mg Oral Daily MARIA ANTONIA Little   120 mg at 11/12/22 1306    metoprolol succinate (TOPROL XL) extended release tablet 50 mg  50 mg Oral Daily MARIA ANTONIA Little   50 mg at 11/12/22 1306    ranolazine (RANEXA) extended release tablet 1,000 mg  1,000 mg Oral BID MARIA ANTONIA Little        tamsulosin (FLOMAX) capsule 0.4 mg  0.4 mg Oral Daily MARIA ANTONIA Little        glucose chewable tablet 16 g  4 tablet Oral PRN MARIA ANTONIA Little        dextrose bolus 10% 125 mL  125 mL IntraVENous PRN MARIA ANTONIA Little        Or    dextrose bolus 10% 250 mL  250 mL IntraVENous PRN MARIA ANTONIA Little        glucagon (rDNA) injection 1 mg  1 mg SubCUTAneous PRN MARIA ANTONIA Little        dextrose 10 % infusion   IntraVENous Continuous PRN MARIA ANTONIA Little        sodium chloride flush 0.9 % injection 5-40 mL  5-40 mL IntraVENous 2 times per day Anand Cristy, PA-C   10 mL at 11/12/22 0901    sodium chloride flush 0.9 % injection 5-40 mL  5-40 mL IntraVENous PRN Anand Cristy, PA-C        0.9 % sodium chloride infusion   IntraVENous PRN Anand Cristy, PA-C        ondansetron (ZOFRAN-ODT) disintegrating tablet 4 mg  4 mg Oral Q8H PRN Anand Cristy, PA-C        Or    ondansetron (ZOFRAN) injection 4 mg  4 mg IntraVENous Q6H PRN Anand Cristy, PA-C        polyethylene glycol (GLYCOLAX) packet 17 g  17 g Oral Daily Lulu Jacome, PA-C        bisacodyl (DULCOLAX) suppository 10 mg  10 mg Rectal Daily Anand Cristy, PA-C        sodium phosphate (FLEET) rectal enema 1 enema  1 enema Rectal Daily PRN Anand Cristy, PA-C        famotidine (PEPCID) tablet 20 mg  20 mg Oral Daily Anand Cristy, PA-C   20 mg at 11/12/22 0901    acetaminophen (TYLENOL) tablet 650 mg  650 mg Oral Q4H PRN Anand Cristy, PA-C   650 mg at 11/12/22 8051    HYDROcodone-acetaminophen (NORCO) 5-325 MG per tablet 1 tablet  1 tablet Oral Q4H PRN Anand Cristy, PA-C        Or    HYDROcodone-acetaminophen (NORCO) 5-325 MG per tablet 2 tablet  2 tablet Oral Q4H PRN Anand Cristy, PA-C             Allergies:  Patient has no known allergies. Social History:   Negative for tobacco use, alcohol abuse and illicit drug abuse    Family History:  Family History   Problem Relation Age of Onset    Cancer Mother     High Blood Pressure Mother     Stroke Father     High Blood Pressure Father     Heart Disease Brother     Cancer Brother         lung    Cancer Brother         melanoma         REVIEW OF SYSTEMS:  Negative other than noted in HPI. PHYSICAL EXAM:  Vitals:    11/12/22 1223   BP: (!) 175/77   Pulse: 72   Resp: 16   Temp: 98.6 °F (37 °C)   SpO2: 95%        General examination:  Patient is a pleasant and well-appearing 80-year-old male resting comfortably in bedside chair. Wife is at bedside. He is alert and oriented x3 and in no acute distress.  Mildly confused, repeating self often. Answering questions appropriately. Right upper extremity:  Skin intact no erythema, ecchymosis, edema, or effusion. Abrasions to posterior elbow. No tenderness palpation to clavicle, periscapular, shoulder, proximal humerus, elbow, proximal forearm, wrist, distal radius and ulna, hand, or fingers bilaterally. Motor intact AIN, PIN, median, radial, ulnar nerve distribution. Sensation intact light touch axillary, median, radial, ulnar nerve distribution. 2+ radial pulse. Fingers warm and well-perfused. Left upper extremity:  Skin intact no erythema, ecchymosis, edema, or effusion. No tenderness palpation to clavicle, periscapular, shoulder, proximal humerus, elbow, proximal forearm, wrist, distal radius and ulna, hand, or fingers bilaterally. Motor intact AIN, PIN, median, radial, ulnar nerve distribution. Sensation intact light touch axillary, median, radial, ulnar nerve distribution. 2+ radial pulse. Fingers warm and well-perfused. DATA:  CBC:   Lab Results   Component Value Date/Time    WBC 10.4 11/12/2022 03:43 AM    RBC 2.77 11/12/2022 03:43 AM    RBC 3.80 03/24/2012 09:25 AM    HGB 9.2 11/12/2022 03:43 AM    HCT 28.6 11/12/2022 03:43 AM     11/12/2022 03:43 AM     BMP:   Lab Results   Component Value Date/Time     11/12/2022 03:43 AM    K 4.4 11/12/2022 03:43 AM     11/12/2022 03:43 AM    CO2 23 11/12/2022 03:43 AM    BUN 29 11/12/2022 03:43 AM    CREATININE 1.9 11/12/2022 03:43 AM    CALCIUM 8.7 11/12/2022 03:43 AM    GLUCOSE 195 11/12/2022 03:43 AM    GLUCOSE 169 03/24/2012 09:25 AM     PT/INR:   Lab Results   Component Value Date/Time    PROTIME 17.2 09/10/2016 02:42 PM    INR 1.07 11/11/2022 05:50 PM         Radiology: See electronic record to view reports. Reports reviewed.     ASSESSMENT:Principal Problem:    Fall  Active Problems:    Closed fracture of proximal end of right ulna with routine healing    Closed fracture of spinous process of lumbar vertebra with routine healing    Forehead laceration, initial encounter  Resolved Problems:    * No resolved hospital problems. *       PLAN:  Discussed patient with Dr. Audrie Aschoff. Right elbow x-rays do demonstrate fracture of possible olecranon enthesophyte/concern for possible olecranon avulsion fracture. Patient was placed in a long-arm splint to be kept clean, dry, and intact until seen in office. Patient does have minimal pain elbow at this time. P.o. pain control per primary. Ice as needed for comfort. Patient to follow-up in office on Monday for repeat evaluation of injury. Electronically signed by MARIA ANTONIA Rosenbaum on 11/12/2022 at 2:35 PM    Patient care discussed with Marilyn Silva PA-C. Agree with assessment and plan. Stable for discharge. Outpatient follow-up next week.     Peri Sandifer, MD  Orthopaedic Surgeon  [facility]  11/13/2022  4:40 PM

## 2022-11-12 NOTE — PROGRESS NOTES
Call made to [address] [place] [address] for referral. Information given to tessa. Information given for nurse to call back. [phone]- spoke with brian from [facility]. They will accept pt for home speech.

## 2022-11-12 NOTE — DISCHARGE INSTR - ACTIVITY
Up and moving as tolerated. Use cane or walker if needed for stability.      Do not use right arm for any activity until cleared by

## 2022-11-12 NOTE — DISCHARGE SUMMARY
Discharge Summary   Trauma Services    Patient Identification:  Grazyna Isidro  :   MRN: [id number]   Account: [de-identified]     Admit date: 2022  Discharge date: 22  Attending provider: Gerrianne Aase, MD        Primary care provider: Isela Escalera MD     Discharge Diagnoses:   Principal Problem:    Fall  Active Problems:    Closed fracture of proximal end of right ulna with routine healing    Closed fracture of spinous process of lumbar vertebra with routine healing    Forehead laceration, initial encounter  Resolved Problems:    * No resolved hospital problems. *       Hospital Course:   Grazyna Isidro is a 68 y.o. male admitted to [facility] on 2022 for L2 TP fracture, for laceration, minimally displaced enthesophyte fracture proximal ulna, mildly elevated troponin following a fall from 2 step ladder with known loss of consciousness. Report stated patient was fixing something on the ceiling of his patio lost his balance and fell off of a stepladder 2 steps up, striking his head and right elbow. Admitted under trauma services to [address] [place] [address]. Inpatient management included as follows:  PLAN  Patient admitted under Trauma Services with telemetry     Fall from two-step ladder              - fall precautions     Fracture of transverse process of L2              -Consult orthospine              -Bedrest until evaluated by spine              -: Spine recommending no bracing or surgical intervention, follow-up with Dr. Ana M Finney if pain worsens or radiculopathy occurs.      Forehead laceration              - closed in the ER              - Tdap              - Ice to wound, pressure dressing, pain control              - hold blood thinners tonight, laceration continues to ooze              -: Sutures to be removed in 1 week () by PCP or follow-up in trauma clinic for removal     Minimally displaced fracture of the enthesophyte of the proximal Ulna              - Consult Orthopedic surgery              - Recommend posterior long arm splint              -11/12: Follow-up with orthopedic surgery in walk-in clinic on 11/14     Elevated Troponin              - 0.011              - repeat troponin in am              -11/12: Repeat this a.m. < 0.010     Acute kidney injury              -Creatinine 2.5 on arrival, up from baseline of approximately 1.4-1.8              -11/12: Creatinine improved to approximately baseline of 1.9     Consults Medicine, orthopedic surgery, orthopedic spine     Pain Management              -tylenol     Prophylaxis: SCD's, Incentive Spirometry, Colace, Pepcid, Zofran     General Diet  IVF Management  Regular Neurovascular Checks  Repeat Labs Tomorrow AM  PT/OT/SLP Eval and Treat once cleared by ortho spine  Bedrest     Planned Discharge pending clinical progress              -11/12: Patient with as needed orthopedic spine follow-up, PCP follow-up in 1 week to include suture removal and possible ultrasound for indeterminate left renal lesion and possible follow-up CT for 3 mm pulmonary nodules, outpatient orthopedic surgery walk-in clinic for right ulnar enthesophyte eval, as needed trauma clinic follow-up if unable to see PCP in 1 week for suture removal.  Home health SLP and PT to see at home as did not evaluate as inpatient. Patient discharged with instructions as above. Patient agreeable to discharge location and verbalized understanding of discharge instructions, prescription precautions, and symptoms indicating return for treatment. Patient given opportunity to ask questions, all inquiries answered. Care and discharge disposition in coordination with consulting providers and trauma surgeon Dr. Anna Soto.     Discharge Medications:     Medication List        CONTINUE taking these medications      aspirin 81 MG EC tablet     atorvastatin 40 MG tablet  Commonly known as: LIPITOR  TAKE 1 TABLET BY MOUTH AT  NIGHT     Jenny Holman 100 UNIT/ML injection pen  Generic drug: insulin glargine     BiPAP Machine Misc     blood glucose test strips strip  Commonly known as: ASCENSIA AUTODISC VI;ONE TOUCH ULTRA TEST VI  1 each by In Vitro route 4 times daily DX:11.9     bumetanide 2 MG tablet  Commonly known as: BUMEX  Take 1 tablet by mouth in the morning.      clopidogrel 75 MG tablet  Commonly known as: PLAVIX  Take 1 tablet by mouth daily morning     dilTIAZem 120 MG extended release capsule  Commonly known as: CARDIZEM CD  TAKE 1 CAPSULE BY MOUTH  DAILY     diphenhydrAMINE-APAP (sleep)  MG tablet  Commonly known as: TYLENOL PM EXTRA STRENGTH     fluocinonide 0.05 % cream  Commonly known as: LIDEX     hydrALAZINE 25 MG tablet  Commonly known as: APRESOLINE  Take 1 tablet by mouth every 8 hours     hydrOXYzine HCl 10 MG tablet  Commonly known as: ATARAX     isosorbide mononitrate 120 MG extended release tablet  Commonly known as: IMDUR  Take 1 tablet by mouth daily     metoprolol succinate 50 MG extended release tablet  Commonly known as: TOPROL XL  Take 1 tablet by mouth daily     nitroGLYCERIN 0.4 MG/SPRAY 0.4 mg spray  Commonly known as: NITROLINGUAL  USE 1 SPRAY ON OR UNDER THE TONGUE EVERY 5 MINUTES AS NEEDED FOR CHEST PAIN     * NovoFine 32G X 6 MM Misc  Generic drug: Insulin Pen Needle  USE 1 NEW NEEDLE 6 TIMES  DAILY AND AS NEEDED     * Novofine Pen Needle 32G X 6 MM Misc  Generic drug: Insulin Pen Needle  1 each by Does not apply route 6 times daily     ONE TOUCH ULTRA 2 w/Device Kit  1 kit by Does not apply route daily DX:11.9     ONE TOUCH ULTRASOFT LANCETS Misc  Use to test Blood Sugar 4 times Daily, Dx: E11.9     ranolazine 1000 MG extended release tablet  Commonly known as: RANEXA  TAKE 1 TABLET BY MOUTH  TWICE DAILY NEW DOSE     tamsulosin 0.4 MG capsule  Commonly known as: FLOMAX  TAKE 1 CAPSULE BY MOUTH  TWICE DAILY     therapeutic multivitamin-minerals tablet     Trulicity 8.24 SN/8.9NC Sopn  Generic drug: Dulaglutide           * This list has 2 medication(s) that are the same as other medications prescribed for you. Read the directions carefully, and ask your doctor or other care provider to review them with you. Patient Instructions:    Discharge lab work: None  Activity: As per therapies  Diet: ADULT DIET; Regular    Code Status: Full Code    Follow-up visits:   Tania Marin MD  [address] [facility] [address]  [phone]    Call in 1 week(s)  Unable to make an appointment at this time. , Office is closed. Call next business day. Follow-up for suture removal, left renal lesion, and pulmonary nodules    Shantelle Kaba MD  [address]  [phone]    Call  As needed if pain is worsening    [address]  Go on 11/14/2022  go to their walk in clinic to be seen. MARIA ANTONIA Aguilera  [address] [facility] [address] [phone]    Schedule an appointment as soon as possible for a visit  As needed for suture removal by 11/18 and care coordination    [address] [place] [address] [address] [address]  [phone]           Examination:  Vitals:  Vitals:    11/12/22 0341 11/12/22 0745 11/12/22 0930 11/12/22 1223   BP: (!) 144/58 (!) 159/71  (!) 175/77   Pulse: 78 79  72   Resp: 16 16  16   Temp: 98.2 °F (36.8 °C)  98.5 °F (36.9 °C) 98.6 °F (37 °C)   TempSrc: Oral  Oral Oral   SpO2: 98% 95%  95%   Weight:       Height:         Weight: Weight: 214 lb (97.1 kg)     24 hour intake/output:  Intake/Output Summary (Last 24 hours) at 11/12/2022 1356  Last data filed at 11/12/2022 0523  Gross per 24 hour   Intake 300 ml   Output --   Net 300 ml       GENERAL: Presents sitting upright in bed unassisted, Awake and alert; In no acute distress and well nourished. Abrasion with sutures intact over right frontal scalp  SKIN: Appropriate for ethnicity, warm and dry.    ENT: No apparent trauma, discharge, or hematoma bilaterally. PERRL at 3mm. Nares patient, membranes moist  CARDIO: No visible chest wall deformity. Strong/regular S1/S2. 2+ radial and DP pulses bilaterally. Capillary refill <2 sec. No extremity edema noted. PULMONARY:  Trachea midline, no increased respiratory effort, or paradoxical chest movement. Lung sounds are clear to ascultation in all fields without adventitious sounds. ABDOMEN: Abdomen is soft, non distended. Bowel sounds present in all four quadrants. NTTP in all quadrants, absent of peritoneal signs. NEURO: GCS 15. Follows commands. PMS intact, moves limbs freely. No focal neurological deficits  MSK: Extremities intact and present. Long-arm splint to right arm. No new bruising, swelling, deformity, discoloration or bleeding. NTTP over major muscle groups.  strength 5/5 and equal bilaterally. WOUND: Abrasion/laceration to right frontal scalp with sutures intact, no active bleeding or drainage. Significant Diagnostics:   Radiology: XR ELBOW RIGHT (MIN 3 VIEWS)    Result Date: 11/11/2022  4 views of the right elbow. COMPARISON: None FINDINGS: No elbow joint effusion. Anterior humeral and radiocapitellar lines are maintained. Visualized portions of the distal humerus and the proximal radius appear intact Minimally displaced fracture of the enthesophyte of the proximal ulna. 1. Minimally displaced fracture of the enthesophyte of the proximal ulna. This document has been electronically signed by: Erin Sparrow MD on 11/11/2022 07:23 PM    CT HEAD WO CONTRAST    Result Date: 11/11/2022  Noncontrast CT of the head Technique: Noncontrast CT of the head from the vertex through the skull base. Comparison: CT - CT HEAD WO CONTR - 04/08/2015 02:34 AM EDT Findings: Mild to moderate nonspecific periventricular and deep white matter disease. This most often can be ascribed to chronic small vessel ischemic change. No intracranial mass, hemorrhage or hydrocephalus.  No definite acute ischemic event by CT. No skull fractures. Normal aeration of the nasal sinuses. Right frontal scalp soft tissue swelling. Impression: Mild to moderate nonspecific periventricular and deep white matter disease. This document has been electronically signed by: Chucky Jesus MD on 11/11/2022 07:10 PM All CTs at this facility use dose modulation techniques and iterative reconstructions, and/or weight-based dosing when appropriate to reduce radiation to a low as reasonably achievable. CT CHEST WO CONTRAST    Result Date: 11/11/2022  CT chest without contrast Comparison: Chest radiograph of the same day Findings: No mediastinal mass or lymphadenopathy. Cardiomegaly. Severe calcified coronary artery disease status post CABG. Mild calcification of the aortic valve. Normal-sized thoracic aorta with a moderate amount of calcified atherosclerotic disease. Accessory azygos fissure. Pulmonary nodules measure up to 3 mm. Minimal smooth interlobular septal thickening. No pneumothorax or pleural effusion. Nondisplaced fracture of the left transverse process of L2. No acute pathology in the imaged portion of the upper abdomen. Status post cholecystectomy. Severe calcified atherosclerotic disease in the upper abdomen. 4.3 cm left renal lesion measuring higher than simple fluid     Impression: Nondisplaced fracture of the left transverse process of L2. Indeterminate left renal lesion. Further characterization with nonemergent ultrasound is recommended. Suspect minimal cardiogenic pulmonary edema. Pulmonary nodules measuring up to 3 mm, likely infectious/inflammatory. No follow-up is required in low risk individuals. High risk individuals have the option of a 1 year follow-up chest CT. This document has been electronically signed by: Pérez Arana MD on 11/11/2022 09:58 PM All CTs at this facility use dose modulation techniques and iterative reconstructions, and/or weight-based dosing when appropriate to reduce radiation to a low as reasonably achievable. CT CERVICAL SPINE WO CONTRAST    Result Date: 11/11/2022  Noncontrast CT of the cervical spine Technique: Axial images from the skull base through T2 with sagittal and coronal reconstruction images. Comparison: None Osseous Structures: The visualized vertebral bodies are intact without evidence of fractures or lytic lesions. The vertebral bodies are of normal height, and the alignment of the cervical spine is normal. Spinal Cord and Canal: The cervical spinal cord, as visualized, is within normal limits. Intervertebral Disk Levels: C2-C3: Normal disc height without evidence for disk protrusions or bulges. Normal facets. C3-C4: Posterior disc osteophyte complex producing moderate central canal and neuroforaminal narrowing. C4-C5: Broad-based disc bulge producing mild central canal and neuroforaminal narrowing. C5-C6: Broad-based disc bulge producing moderate to advanced central canal and neuroforaminal narrowing. C6-C7: Broad-based disc bulge producing moderate to advanced central canal and neuroforaminal narrowing. C7-T1: Normal disc height without evidence for disk protrusions or bulges. Normal facets. Incidental Findings: No significant incidental findings are seen. Degenerative changes within the cervical spine. No fractures. This document has been electronically signed by: Mel Perez MD on 11/11/2022 07:09 PM All CTs at this facility use dose modulation techniques and iterative reconstructions, and/or weight-based dosing when appropriate to reduce radiation to a low as reasonably achievable. CT LUMBAR SPINE WO CONTRAST    Result Date: 11/12/2022  PROCEDURE: CT LUMBAR SPINE WO CONTRAST CLINICAL INFORMATION: 59-year-old male involved in a trauma. COMPARISON: Correlation is made with MRI of the lumbar spine 01/05/2012 and CT of the chest 11/11/2022. TECHNIQUE: 3 mm noncontrast axial images were obtained of the lumbar spine. Sagittal and coronal reconstructions were obtained. Angled images were reconstructed through the L3-4, L4-5 and L5-S1 disc levels. All CT scans at this facility use dose modulation, iterative reconstruction, and/or weight-based dosing when appropriate to reduce radiation dose to as low as reasonably achievable. FINDINGS: There is dextroconvex curvature of the lumbar spine. There is a nondisplaced fracture involving the left transverse process of L2. The lumbar vertebral body heights and alignment are preserved. There is no acute compression fracture. There is no subluxation. There is diffuse disc space narrowing which is most severe at L4-5 and L5-S1. There is facet arthropathy throughout the lumbar  spine. There is anterior spurring. The sacroiliac joints are within normal limits. There are atherosclerotic changes in the visualized portions of the aorta. There is no aneurysmal dilatation. There is no ascites. 1. Nondisplaced fracture of the left L2 transverse process. 2. No acute compression fractures. No subluxation. Multilevel degenerative changes. **This report has been created using voice recognition software. It may contain minor errors which are inherent in voice recognition technology. ** Final report electronically signed by Dr Nupur Lancaster on 11/12/2022 2:58 AM    XR CHEST PORTABLE    Result Date: 11/11/2022  Single view of the chest. COMPARISON: Radiograph of the chest dated 8/23/2021 FINDINGS: Status post sternotomy. Cardiomegaly. Centrally predominant pulmonary vascular congestion. Low lung volumes. No consolidation. No definite pleural effusion. No pneumothorax. No acute fracture. Mild spondylosis. 1. No pneumothorax. 2. Cardiomegaly with mild pulmonary vascular congestion. This document has been electronically signed by:  Joe Soto MD on 11/11/2022 06:41 PM      Labs:   Recent Results (from the past 72 hour(s))   CBC with Auto Differential    Collection Time: 11/11/22  5:50 PM   Result Value Ref Range    WBC 7.9 4.8 - 10.8 thou/mm3    RBC Rate 63 BPM    Atrial Rate 416 BPM    QRS Duration 154 ms    Q-T Interval 500 ms    QTc Calculation (Bazett) 511 ms    R Axis 36 degrees    T Axis 46 degrees   Troponin    Collection Time: 11/11/22 10:44 PM   Result Value Ref Range    Troponin T < 0.010 ng/ml   Glomerular Filtration Rate, Estimated    Collection Time: 11/12/22  1:24 AM   Result Value Ref Range    Est, Glom Filt Rate 36 (A) >60 ml/min/1.73m2   Comprehensive Metabolic Panel w/ Reflex to MG    Collection Time: 11/12/22  3:43 AM   Result Value Ref Range    Glucose 195 (H) 70 - 108 mg/dL    Creatinine 1.9 (H) 0.4 - 1.2 mg/dL    BUN 29 (H) 7 - 22 mg/dL    Sodium 138 135 - 145 meq/L    Potassium reflex Magnesium 4.4 3.5 - 5.2 meq/L    Chloride 104 98 - 111 meq/L    CO2 23 23 - 33 meq/L    Calcium 8.7 8.5 - 10.5 mg/dL    AST 24 5 - 40 U/L    Alkaline Phosphatase 82 38 - 126 U/L    Total Protein 6.0 (L) 6.1 - 8.0 g/dL    Albumin 3.7 3.5 - 5.1 g/dL    Total Bilirubin 0.4 0.3 - 1.2 mg/dL    ALT 16 11 - 66 U/L   CBC with Auto Differential    Collection Time: 11/12/22  3:43 AM   Result Value Ref Range    WBC 10.4 4.8 - 10.8 thou/mm3    RBC 2.77 (L) 4.70 - 6.10 mill/mm3    Hemoglobin 9.2 (L) 14.0 - 18.0 gm/dl    Hematocrit 28.6 (L) 42.0 - 52.0 %    .2 (H) 80.0 - 94.0 fL    MCH 33.2 (H) 26.0 - 33.0 pg    MCHC 32.2 32.2 - 35.5 gm/dl    RDW-CV 12.7 11.5 - 14.5 %    RDW-SD 48.3 (H) 35.0 - 45.0 fL    Platelets 443 003 - 198 thou/mm3    MPV 9.6 9.4 - 12.4 fL    Seg Neutrophils 82.4 %    Lymphocytes 7.1 %    Monocytes 9.2 %    Eosinophils 0.6 %    Basophils 0.3 %    Immature Granulocytes 0.4 %    Segs Absolute 8.6 (H) 1.8 - 7.7 thou/mm3    Lymphocytes Absolute 0.7 (L) 1.0 - 4.8 thou/mm3    Monocytes Absolute 1.0 0.4 - 1.3 thou/mm3    Eosinophils Absolute 0.1 0.0 - 0.4 thou/mm3    Basophils Absolute 0.0 0.0 - 0.1 thou/mm3    Immature Grans (Abs) 0.04 0.00 - 0.07 thou/mm3    nRBC 0 /100 wbc   Troponin    Collection Time: 11/12/22  3:43 AM   Result Value Ref Range Troponin T < 0.010 ng/ml   Anion Gap    Collection Time: 11/12/22  3:43 AM   Result Value Ref Range    Anion Gap 11.0 8.0 - 16.0 meq/L   POCT Glucose    Collection Time: 11/12/22  6:46 AM   Result Value Ref Range    POC Glucose 168 (H) 70 - 108 mg/dl   EKG 12 Lead    Collection Time: 11/12/22  7:07 AM   Result Value Ref Range    Ventricular Rate 80 BPM    Atrial Rate 80 BPM    P-R Interval 268 ms    QRS Duration 150 ms    Q-T Interval 440 ms    QTc Calculation (Bazett) 507 ms    P Axis 80 degrees    R Axis 170 degrees    T Axis 52 degrees   POCT Glucose    Collection Time: 11/12/22 10:33 AM   Result Value Ref Range    POC Glucose 170 (H) 70 - 108 mg/dl       Discharge condition: Stable  Disposition: Home    Electronically signed by MARIA ANTONIA Pinto on 11/12/2022 at 1:56 PM

## 2022-11-12 NOTE — PROGRESS NOTES
Carolyne Pompa  Daily Progress Note  Pt Name: Isa Aschoff  Medical Record Number: [id number]  Date of Birth 1945   Today's Date: 11/12/2022    HD: # 0    CC:\" My chest wall hurts when I move\"    ASSESSMENT  1. Active Hospital Problems    Diagnosis Date Noted    Fall [W19. XXXA] 11/11/2022     Priority: Medium    Closed fracture of proximal end of right ulna with routine healing [S52.001D] 11/11/2022     Priority: Medium    Closed fracture of spinous process of lumbar vertebra with routine healing [S32.009D] 11/11/2022     Priority: Medium    Forehead laceration, initial encounter Mark Chacon 11/11/2022     Priority: Medium         PLAN  Patient admitted under Trauma Services with telemetry     Fall from two-step ladder              - fall precautions     Fracture of transverse process of L2              -Consult orthospine              -Bedrest until evaluated by spine   -11/12: Spine recommending no bracing or surgical intervention, follow-up with Dr. Julio César Camp if pain worsens or radiculopathy occurs. Forehead laceration              - closed in the ER              - Tdap              - Ice to wound, pressure dressing, pain control              - hold blood thinners tonight, laceration continues to ooze   -11/12: Sutures to be removed in 1 week (11/18) by PCP or follow-up in trauma clinic for removal     Minimally displaced fracture of the enthesophyte of the proximal Ulna              - Consult Orthopedic surgery              - Recommend posterior long arm splint   -11/12:  Follow-up with orthopedic surgery in walk-in clinic on 11/14     Elevated Troponin              - 0.011              - repeat troponin in am   -11/12: Repeat this a.m. < 0.010    Acute kidney injury   -Creatinine 2.5 on arrival, up from baseline of approximately 1.4-1.8   -11/12: Creatinine improved to approximately baseline of 1.9     Consults Medicine, orthopedic surgery, orthopedic spine     Pain Management              -tylenol     Prophylaxis: SCD's, Incentive Spirometry, Colace, Pepcid, Zofran     General Diet  IVF Management  Regular Neurovascular Checks  Repeat Labs Tomorrow AM  PT/OT/SLP Eval and Treat once cleared by ortho spine  Bedrest     Planned Discharge pending clinical progress   -11/12: Patient with as needed orthopedic spine follow-up, PCP follow-up in 1 week to include suture removal and possible ultrasound for indeterminate left renal lesion and possible follow-up CT for 3 mm pulmonary nodules, outpatient orthopedic surgery walk-in clinic for right ulnar enthesophyte eval, as needed trauma clinic follow-up if unable to see PCP in 1 week for suture removal.  Home health SLP and PT to see at home as did not evaluate as inpatient. SUBJECTIVE  He is a 68 y.o. male who continues to present at [address] [place] [address] on [address] [place] [address] following a fall from 2 step ladder. Patient reports right head pain, right arm pain, also reports some right-sided chest wall pain. States back pain seems to be no worse than baseline. Patient denies chest pain, shortness of breath, cough, dizziness, lightheadedness, numbness, paraesthesias, weakness, chills, fevers, abdominal pain, nausea, vomiting,neck pain, or new back pain. Nursing staff states patient stable overnight, tolerating ambulation in hallways. Plan to discharge today with appropriate follow-ups as above. Hemoglobin stable, WBC stable, no significant electrolyte abnormality, CT studies reviewed, vital signs stable on exam. Care discussed and in coordination with trauma surgeon Dr. Micah Mcgee.       Wt Readings from Last 3 Encounters:   11/12/22 214 lb (97.1 kg)   10/20/22 211 lb 9.6 oz (96 kg)   10/18/22 213 lb (96.6 kg)     Temp Readings from Last 3 Encounters:   11/12/22 98.6 °F (37 °C) (Oral)   10/20/22 98 °F (36.7 °C)   10/18/22 98 °F (36.7 °C)     BP Readings from Last 3 Encounters:   11/12/22 (!) 175/77   10/20/22 (!) 128/54   10/18/22 128/76     Pulse Readings from Last 3 Encounters:   11/12/22 72   10/20/22 64   10/18/22 69       24 HR INTAKE/OUTPUT :   Intake/Output Summary (Last 24 hours) at 11/12/2022 1308  Last data filed at 11/12/2022 0523  Gross per 24 hour   Intake 300 ml   Output --   Net 300 ml     ADULT DIET; Regular    OBJECTIVE  CURRENT VITALS BP (!) 175/77   Pulse 72   Temp 98.6 °F (37 °C) (Oral)   Resp 16   Ht 5' 9\" (1.753 m)   Wt 214 lb (97.1 kg)   SpO2 95%   BMI 31.60 kg/m²   GENERAL: Presents sitting upright in bed unassisted, Awake and alert; In no acute distress and well nourished. Abrasion with sutures intact over right frontal scalp  SKIN: Appropriate for ethnicity, warm and dry. ENT: No apparent trauma, discharge, or hematoma bilaterally. PERRL at 3mm. Nares patient, membranes moist  CARDIO: No visible chest wall deformity. Strong/regular S1/S2. 2+ radial and DP pulses bilaterally. Capillary refill <2 sec. No extremity edema noted. PULMONARY:  Trachea midline, no increased respiratory effort, or paradoxical chest movement. Lung sounds are clear to ascultation in all fields without adventitious sounds. ABDOMEN: Abdomen is soft, non distended. Bowel sounds present in all four quadrants. NTTP in all quadrants, absent of peritoneal signs. NEURO: GCS 15. Follows commands. PMS intact, moves limbs freely. No focal neurological deficits  MSK: Extremities intact and present. Long-arm splint to right arm. No new bruising, swelling, deformity, discoloration or bleeding. NTTP over major muscle groups.  strength 5/5 and equal bilaterally. WOUND: Abrasion/laceration to right frontal scalp with sutures intact, no active bleeding or drainage.     LABS  CBC :   Recent Labs     11/11/22  1750 11/12/22  0343   WBC 7.9 10.4   HGB 10.1* 9.2*   HCT 30.8* 28.6*   .7* 103.2*    214     BMP:   Recent Labs     11/11/22  1750 11/12/22  0343    138   K 4.5 4.4    104   CO2 25 23   BUN 32* 29*   CREATININE 2.5* 1.9* COAGS:   Recent Labs     11/11/22  1750 11/12/22  0343   PROT 6.9 6.0*   INR 1.07  --      Pancreas/HFP:  No results for input(s): LIPASE, AMYLASE in the last 72 hours. Recent Labs     11/11/22  1750 11/12/22  0343   AST 20 24   ALT 17 16   BILITOT 0.4 0.4   ALKPHOS 85 82     CT LUMBAR SPINE WO CONTRAST   Final Result   1. Nondisplaced fracture of the left L2 transverse process. 2. No acute compression fractures. No subluxation. Multilevel degenerative changes. **This report has been created using voice recognition software. It may contain minor errors which are inherent in voice recognition technology. **      Final report electronically signed by Dr Timbo Valencia on 11/12/2022 2:58 AM      CT CHEST WO CONTRAST   Final Result   Impression:   Nondisplaced fracture of the left transverse process of L2. Indeterminate left renal lesion. Further characterization with nonemergent    ultrasound is recommended. Suspect minimal cardiogenic pulmonary edema. Pulmonary nodules measuring up to 3 mm, likely infectious/inflammatory. No    follow-up is required in low risk individuals. High risk individuals have    the option of a 1 year follow-up chest CT. This document has been electronically signed by: Sue Prasad. Tomeka Gtz MD    on 11/11/2022 09:58 PM      All CTs at this facility use dose modulation techniques and iterative    reconstructions, and/or weight-based dosing   when appropriate to reduce radiation to a low as reasonably achievable. XR ELBOW RIGHT (MIN 3 VIEWS)   Final Result   1. Minimally displaced fracture of the enthesophyte of the proximal ulna. This document has been electronically signed by: Chichi Edge MD on    11/11/2022 07:23 PM      CT HEAD WO CONTRAST   Final Result   Impression:   Mild to moderate nonspecific periventricular and deep white matter disease.       This document has been electronically signed by: Lesvia Laura MD on    11/11/2022 07:10 PM      All CTs at this facility use dose modulation techniques and iterative    reconstructions, and/or weight-based dosing   when appropriate to reduce radiation to a low as reasonably achievable. CT CERVICAL SPINE WO CONTRAST   Final Result   Degenerative changes within the cervical spine. No fractures. This document has been electronically signed by: Liliane Ledesma MD on    11/11/2022 07:09 PM      All CTs at this facility use dose modulation techniques and iterative    reconstructions, and/or weight-based dosing   when appropriate to reduce radiation to a low as reasonably achievable. XR CHEST PORTABLE   Final Result   1. No pneumothorax. 2. Cardiomegaly with mild pulmonary vascular congestion. This document has been electronically signed by: Aleksey Moreno MD on    11/11/2022 06:41 PM           Total time spent in care of patient: 30 minutes collectively between subjective/objective examination, chart review, documentation, clinical reasoning and discussion with attending regarding plan/interval changes. Electronically signed by Eboni Bhakta PA-C on 11/12/2022 at 1:08 PM         Patient seen and examined independently by me 11/12/2022     I personally supervised the PA/NP in the evaluation, management and development of the treatment plan for Reginald Chapman  on the same date of service as above. I personally interviewed Reginald Chapman   and  discussed his review of symptoms as able due to the patient's condition, as well as performed an individual physical exam on the same   date of service as above. In addition I discussed the patient's condition and treatment options with the patient, if able, and/or designated family if available. I have also reviewed and agree with the past medical,  family and social history updates as well as care plans unless otherwise noted below. All questions were answered.       I examined independently and reviewed relevant data myself and may have done so in the context of team rounds. A full chart review was performed by me. I attest that this medical record entry accurately reflects signatures and notations that I made in my capacity as an M. D. when I treated and diagnosed Elizabeth Mustafa on the date of service above     I was responsible for all medical decision making involving this encounter. I identified and/or confirmed all problems associated with this patient encounter by my own direct physical examination of this patient and review of all radiology studies and labwork  that were ordered and available. Active Hospital Problems    Diagnosis     Fall [W19. XXXA]      Priority: Medium    Closed fracture of proximal end of right ulna with routine healing [S52.001D]      Priority: Medium    Closed fracture of spinous process of lumbar vertebra with routine healing [S32.009D]      Priority: Medium    Forehead laceration, initial encounter [S01.81XA]      Priority: Medium        I  discussed the management of all of the identified problems with the APN or PA. I formulated the treatment plan for all identified problems and discussed those with the APN or PA . This management plan was then carried out and the patient's orders for care by the APN or PA. Total time personally spent on this patient encounter was 25 minutes which includes :  Preparing to see the patient( reviewing tests and chart)  Obtaining and reviewing separately obtained history  Performing a medically appropriate examination and evaluation  Ordering medications, tests, or procedures  Counseling and educating the patient/family/caregiver  Care coordination  Referring and communicating with other healthcare professionals  Documenting clinical information in the EHR  Independent interpretation of results and communicating the results to patient and care team  This includes a direct physical exam as well as all the other encounter activities described above.    Time may be

## 2022-11-14 ENCOUNTER — TELEPHONE (OUTPATIENT)
Dept: NEPHROLOGY | Age: 77
End: 2022-11-14

## 2022-11-14 ENCOUNTER — OFFICE VISIT (OUTPATIENT)
Dept: SURGERY | Age: 77
End: 2022-11-14
Payer: MEDICARE

## 2022-11-14 VITALS
HEIGHT: 69 IN | BODY MASS INDEX: 32.42 KG/M2 | OXYGEN SATURATION: 98 % | TEMPERATURE: 98.6 F | SYSTOLIC BLOOD PRESSURE: 124 MMHG | HEART RATE: 68 BPM | WEIGHT: 218.9 LBS | DIASTOLIC BLOOD PRESSURE: 50 MMHG

## 2022-11-14 DIAGNOSIS — D63.1 ANEMIA IN STAGE 3 CHRONIC KIDNEY DISEASE, UNSPECIFIED WHETHER STAGE 3A OR 3B CKD (HCC): Primary | ICD-10-CM

## 2022-11-14 DIAGNOSIS — I50.22 CHRONIC SYSTOLIC (CONGESTIVE) HEART FAILURE (HCC): ICD-10-CM

## 2022-11-14 DIAGNOSIS — G47.33 OBSTRUCTIVE SLEEP APNEA ON CPAP: ICD-10-CM

## 2022-11-14 DIAGNOSIS — Z99.89 OBSTRUCTIVE SLEEP APNEA ON CPAP: ICD-10-CM

## 2022-11-14 DIAGNOSIS — S32.009D: ICD-10-CM

## 2022-11-14 DIAGNOSIS — K42.0 STRANGULATED UMBILICAL HERNIA: Primary | ICD-10-CM

## 2022-11-14 DIAGNOSIS — N18.30 STAGE 3 CHRONIC KIDNEY DISEASE, UNSPECIFIED WHETHER STAGE 3A OR 3B CKD (HCC): ICD-10-CM

## 2022-11-14 DIAGNOSIS — N18.30 ANEMIA IN STAGE 3 CHRONIC KIDNEY DISEASE, UNSPECIFIED WHETHER STAGE 3A OR 3B CKD (HCC): Primary | ICD-10-CM

## 2022-11-14 DIAGNOSIS — S01.81XA FOREHEAD LACERATION, INITIAL ENCOUNTER: ICD-10-CM

## 2022-11-14 DIAGNOSIS — S52.001D CLOSED FRACTURE OF PROXIMAL END OF RIGHT ULNA WITH ROUTINE HEALING, UNSPECIFIED FRACTURE MORPHOLOGY, SUBSEQUENT ENCOUNTER: ICD-10-CM
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
## 2022-11-14 PROCEDURE — G8417 CALC BMI ABV UP PARAM F/U: HCPCS | Performed by: SURGERY

## 2022-11-14 PROCEDURE — G8484 FLU IMMUNIZE NO ADMIN: HCPCS | Performed by: SURGERY

## 2022-11-14 PROCEDURE — 3078F DIAST BP <80 MM HG: CPT | Performed by: SURGERY

## 2022-11-14 PROCEDURE — G8427 DOCREV CUR MEDS BY ELIG CLIN: HCPCS | Performed by: SURGERY

## 2022-11-14 PROCEDURE — 1036F TOBACCO NON-USER: CPT | Performed by: SURGERY

## 2022-11-14 PROCEDURE — 1123F ACP DISCUSS/DSCN MKR DOCD: CPT | Performed by: SURGERY

## 2022-11-14 PROCEDURE — 3074F SYST BP LT 130 MM HG: CPT | Performed by: SURGERY

## 2022-11-14 PROCEDURE — 99213 OFFICE O/P EST LOW 20 MIN: CPT | Performed by: SURGERY

## 2022-11-14 NOTE — PROGRESS NOTES
Miguel Ángel Sawyer MD   General Surgery  Follow up Patient Evaluation in Office  Pt Name: Rinku Martinez  Date of Birth 1945   Today's Date: 11/14/2022  Medical Record Number: [id number]  Referring Provider: No ref. provider found  Primary Care Provider: Juanito Tian MD  Chief Complaint:  Chief Complaint   Patient presents with    6 Month Follow-Up     s/p Exploratory laparotomy lysis of small bowel adhesions with reduction of  incarcerated loop of small bowel with primary repair of hernia defect 4-30-22. Last office visit 5/19/22          ASSESSMENT      1. Strangulated umbilical hernia    2. Chronic systolic (congestive) heart failure    3. Obstructive sleep apnea on CPAP    4. Fall, subsequent encounter    5. Closed fracture of proximal end of right ulna with routine healing, unspecified fracture morphology, subsequent encounter    6. Closed fracture of spinous process of lumbar vertebra with routine healing    7. Forehead laceration, initial encounter    8. Rib fractures     PLANS      Hernia repair is intact. Denies abdominal pain or obstructive symptoms. Recent fall for which rec was hospitalized. Sutures in the scalp are not ready to be removed. Scheduled appointment in trauma clinic Friday for suture removal right scalp sutures. 3.  Follow-up general surgery clinic as needed. Tandy Nageotte is a 68y.o. year old male who is presenting today in the office for follow-up evaluation to check status of hernia repair. On April 30, 2022 Andrea Maldonado was admitted with small bowel obstruction he underwent exploratory laparotomy lysis of small bowel adhesions with reduction of incarcerated loop of small bowel and primary repair of a hernia defect. He is seen for follow-up. He has no obstructive symptoms. Hernia repair is intact in the mid abdomen. Midline incision is well-healed. He did have a recent fall and was admitted to the hospital to Dr. Terell De La Rosa of service.   He was on a 2 step ladder was reaching upwards and fell. Sustaining injuries as documented above. He had a splint on his arm which was removed earlier today by the orthopedic service. He is to be seen in trauma clinic Friday to have sutures removed. He still has ecchymoses around his eyes all bruising appears chronic. He denies any headache or dizziness. He has no complaints of any abdominal pain.     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    BPH (benign prostatic hypertrophy)     CAD (coronary artery disease) 1997    Carotid artery disease ([address] 1995    right    Carotid disease, bilateral ([address]     Cerebral artery occlusion with cerebral infarction ([address]     CHF (congestive heart failure) ([address]     Chronic kidney disease     COVID-19 07/09/2022    GERD (gastroesophageal reflux disease)     GI bleed 07/2021    History of blood transfusion 2021    GI bleed    History of blood transfusion 1997    with heart surgery    Hyperlipidemia 1996    Hypertension     Lumbar disc disease 2001    MI (myocardial infarction) ([address] 1997    Nephrolithiasis 2002    NIDDM (non-insulin dependent diabetes mellitus) 1996    diagnosed 1996    RICCARDO (obstructive sleep apnea) 2005    cpap    PVC's (premature ventricular contractions)     Renal artery stenosis ([address] 2007    left    Renal insufficiency     S/P CABG (status post coronary artery bypass graft) 1997    5 vessels    Skin cancer     Squamous cell carcinoma     Stenosis of right subclavian artery (HCC)     TIA (transient ischemic attack)     Wears partial dentures     upper and lower       Past Surgical History  Past Surgical History:   Procedure Laterality Date    ABLATION OF DYSRHYTHMIC FOCUS  3/13/13    CARDIAC CATHETERIZATION  2008 - 2019 stents [address]  3/13/13    oblation for irreg rythm    CARDIOVERSION  3/8/2012    CAROTID ENDARTERECTOMY      CHOLECYSTECTOMY  1999    COLONOSCOPY  09, 2011,17    polyp removal    COLONOSCOPY N/A 2021    COLONOSCOPY POLYPECTOMY SNARE/COLD BIOPSY performed by Reymundo Apgar, MD at [address]   (2),  (1)    x2    CORONARY ARTERY BYPASS GRAFT  1997    5 vessel    DIAGNOSTIC CARDIAC CATH LAB PROCEDURE      ENDOSCOPY, COLON, DIAGNOSTIC      EYE LID SURGERY Right 2021    EXCISION SCC RIGHT LOWER LATERAL EYELID WITH FROZEN SECTION performed by Vanna Lara MD at [address] Right 2022    EXCISION SCC RIGHT ZYGOMA performed by Vanna Lara MD at [place] [address]  2013    pre-cancerous mole rt foot removed    LITHOTRIPSY  2002    IA OFFICE/OUTPT VISIT,PROCEDURE ONLY N/A 2018    TRANSESOPHAGEAL ECHOCARDIOGRAM performed by [address], MD at [facility]    PTCA  2018    SHOULDER SURGERY  ,     rt shoulder manipulation--frozen shoulder-01, Lt -03    SHOULDER SURGERY   -     manipulation - bilateral    SKIN BIOPSY      SKIN CANCER EXCISION  13     R foot    SKIN CANCER EXCISION  2016    squamous cell Lt  upper cheek    UPPER GASTROINTESTINAL ENDOSCOPY N/A 2021    EGD ESOPHAGOGASTRODUODENOSCOPY performed by Reymundo Apgar, MD at [address] [place] [address]   2002    VASCULAR SURGERY      carotids & cabg harvests    VENTRAL HERNIA REPAIR N/A 2022    exploratory laparotomy with repair of incarcerated hernia performed by Anil Flood MD at [facility]       Medications  Current Outpatient Medications   Medication Sig Dispense Refill    hydrOXYzine HCl (ATARAX) 10 MG tablet Take 10 mg by mouth at bedtime      clopidogrel (PLAVIX) 75 MG tablet Take 1 tablet by mouth daily morning 90 tablet 0    hydrALAZINE (APRESOLINE) 25 MG tablet Take 1 tablet by mouth every 8 hours 270 tablet 0    metoprolol succinate (TOPROL XL) 50 MG extended release tablet Take 1 tablet by mouth daily 90 tablet 0    isosorbide mononitrate (IMDUR) 120 MG extended release tablet Take 1 tablet by mouth daily 90 tablet 0    atorvastatin (LIPITOR) 40 MG tablet TAKE 1 TABLET BY MOUTH AT  NIGHT 90 tablet 3    ranolazine (RANEXA) 1000 MG extended release tablet TAKE 1 TABLET BY MOUTH  TWICE DAILY NEW DOSE 180 tablet 3    Blood Glucose Monitoring Suppl (ONE TOUCH ULTRA 2) w/Device KIT 1 kit by Does not apply route daily DX:11.9 1 kit 1    blood glucose test strips (ASCENSIA AUTODISC VI;ONE TOUCH ULTRA TEST VI) strip 1 each by In Vitro route 4 times daily DX:11.9 200 each 3    ONE TOUCH ULTRASOFT LANCETS MISC Use to test Blood Sugar 4 times Daily, Dx: E11.9 200 each 11    bumetanide (BUMEX) 2 MG tablet Take 1 tablet by mouth in the morning.  90 tablet 3    insulin glargine (BASAGLAR KWIKPEN) 100 UNIT/ML injection pen Inject 33 Units into the skin 2 times daily      tamsulosin (FLOMAX) 0.4 MG capsule TAKE 1 CAPSULE BY MOUTH  TWICE DAILY 180 capsule 3    nitroGLYCERIN (NITROLINGUAL) 0.4 MG/SPRAY 0.4 mg spray USE 1 SPRAY ON OR UNDER THE TONGUE EVERY 5 MINUTES AS NEEDED FOR CHEST PAIN 4.9 g 6    Dulaglutide (TRULICITY) 9.90 MJ/1.2QP SOPN Inject 0.75 mg into the skin once a week      dilTIAZem (CARDIZEM CD) 120 MG extended release capsule TAKE 1 CAPSULE BY MOUTH  DAILY 90 capsule 3    Insulin Pen Needle (NOVOFINE PEN NEEDLE) 32G X 6 MM MISC 1 each by Does not apply route 6 times daily 600 each 3    aspirin 81 MG EC tablet Take 81 mg by mouth daily      NOVOFINE 32G X 6 MM MISC USE 1 NEW NEEDLE 6 TIMES  DAILY AND AS NEEDED 540 each 1    diphenhydrAMINE-APAP, sleep, (TYLENOL PM EXTRA STRENGTH)  MG tablet Take 1 tablet by mouth every evening as needed      fluocinonide (LIDEX) 0.05 % cream Apply 1 applicator topically Eyes:  Negative for visual disturbance. Respiratory:  Positive for shortness of breath. Negative for cough and wheezing. Cardiovascular:  Negative for chest pain and palpitations. Gastrointestinal:  Negative for abdominal pain, blood in stool, nausea and vomiting. Endocrine: Negative for cold intolerance, heat intolerance and polydipsia. Genitourinary:  Negative for dysuria, flank pain and hematuria. Musculoskeletal:  Positive for back pain. Negative for gait problem, joint swelling and myalgias. Skin:  Positive for wound. Negative for color change and rash. Allergic/Immunologic: Negative for immunocompromised state. Neurological:  Negative for dizziness, tremors, seizures and speech difficulty. Hematological:  Does not bruise/bleed easily. Psychiatric/Behavioral:  Negative for behavioral problems, confusion and suicidal ideas. OBJECTIVE     BP (!) 124/50 (Site: Left Upper Arm, Position: Sitting, Cuff Size: Medium Adult)   Pulse 68   Temp 98.6 °F (37 °C)   Ht 5' 9\" (1.753 m)   Wt 218 lb 14.4 oz (99.3 kg)   SpO2 98%   BMI 32.33 kg/m²      Physical Exam  Vitals reviewed. Constitutional:       Appearance: He is well-developed. He is obese. He is not ill-appearing, toxic-appearing or diaphoretic. HENT:      Head: Normocephalic and atraumatic. Nose: No congestion or rhinorrhea. Eyes:      General: No scleral icterus. Extraocular Movements: Extraocular movements intact. Pupils: Pupils are equal, round, and reactive to light. Comments: Old bruising around eyes, sutures right forehead intact fair amount of dried blood caked around. Neck:      Thyroid: No thyromegaly. Vascular: No JVD. Trachea: No tracheal deviation. Cardiovascular:      Rate and Rhythm: Normal rate and regular rhythm. Pulmonary:      Effort: Pulmonary effort is normal. No respiratory distress. Breath sounds: Normal breath sounds. No wheezing.       Comments: Chest wall tender  Abdominal:      General: Bowel sounds are normal. There is no distension. Palpations: Abdomen is soft. There is no mass. Tenderness: There is no abdominal tenderness. There is no guarding or rebound. Hernia: No hernia is present. Musculoskeletal:         General: Tenderness and signs of injury present. Cervical back: Neck supple. No rigidity. Lymphadenopathy:      Cervical: No cervical adenopathy. Skin:     General: Skin is warm and dry. Coloration: Skin is not jaundiced or pale. Findings: Bruising present. No rash. Neurological:      General: No focal deficit present. Mental Status: He is alert and oriented to person, place, and time. Cranial Nerves: No cranial nerve deficit.    Psychiatric:         Mood and Affect: Mood normal.         Behavior: Behavior normal.       Lab Results   Component Value Date    WBC 10.4 11/12/2022    HGB 9.2 (L) 11/12/2022    HCT 28.6 (L) 11/12/2022     11/12/2022    CHOL 129 09/29/2022    TRIG 176 09/29/2022    HDL 29 09/29/2022    ALT 16 11/12/2022    AST 24 11/12/2022     11/12/2022    K 4.4 11/12/2022     11/12/2022    CREATININE 1.9 (H) 11/12/2022    BUN 29 (H) 11/12/2022    CO2 23 11/12/2022    TSH 2.460 08/26/2021    PSA 2.88 (H) 04/19/2021    INR 1.07 11/11/2022    LABA1C 5.7 10/20/2022    LABMICR 10.97 07/28/2021

## 2022-11-14 NOTE — CARE COORDINATION
11/14/22, 8:47 AM EST    Patient goals/plan/ treatment preferences discussed by  and . Patient goals/plan/ treatment preferences reviewed with patient/ family. Patient/ family verbalize understanding of discharge plan and are in agreement with goal/plan/treatment preferences. Understanding was demonstrated using the teach back method. AVS provided by RN at time of discharge, which includes all necessary medical information pertaining to the patients current course of illness, treatment, post-discharge goals of care, and treatment preferences. Admitted 11/11/22 and discharged home on 11/12. to follow up with Dr Wendie raines.    Services At/After Discharge: None

## 2022-11-15 RX ORDER — CLOPIDOGREL BISULFATE 75 MG/1
TABLET ORAL
Qty: 90 TABLET | Refills: 3 | OUTPATIENT
Start: 2022-11-15

## 2022-11-15 ASSESSMENT — ENCOUNTER SYMPTOMS
VOICE CHANGE: 0
BLOOD IN STOOL: 0
WHEEZING: 0
NAUSEA: 0
SHORTNESS OF BREATH: 1
BACK PAIN: 1
VOMITING: 0
COUGH: 0
ABDOMINAL PAIN: 0
SORE THROAT: 0
COLOR CHANGE: 0
TROUBLE SWALLOWING: 0

## 2022-11-16 ENCOUNTER — OFFICE VISIT (OUTPATIENT)
Dept: UROLOGY | Age: 77
End: 2022-11-16
Payer: MEDICARE

## 2022-11-16 VITALS — RESPIRATION RATE: 18 BRPM | WEIGHT: 218 LBS | BODY MASS INDEX: 32.29 KG/M2 | HEIGHT: 69 IN

## 2022-11-16 DIAGNOSIS — R33.9 URINARY RETENTION: Primary | ICD-10-CM

## 2022-11-16 LAB — POST VOID RESIDUAL (PVR): 199 ML

## 2022-11-16 PROCEDURE — 99214 OFFICE O/P EST MOD 30 MIN: CPT | Performed by: UROLOGY

## 2022-11-16 PROCEDURE — G8427 DOCREV CUR MEDS BY ELIG CLIN: HCPCS | Performed by: UROLOGY

## 2022-11-16 PROCEDURE — 1123F ACP DISCUSS/DSCN MKR DOCD: CPT | Performed by: UROLOGY

## 2022-11-16 PROCEDURE — G8417 CALC BMI ABV UP PARAM F/U: HCPCS | Performed by: UROLOGY

## 2022-11-16 PROCEDURE — 51798 US URINE CAPACITY MEASURE: CPT | Performed by: UROLOGY

## 2022-11-16 PROCEDURE — 1036F TOBACCO NON-USER: CPT | Performed by: UROLOGY

## 2022-11-16 PROCEDURE — G8484 FLU IMMUNIZE NO ADMIN: HCPCS | Performed by: UROLOGY

## 2022-11-16 RX ORDER — FINASTERIDE 5 MG/1
5 TABLET, FILM COATED ORAL DAILY
Qty: 90 TABLET | Refills: 3 | Status: SHIPPED | OUTPATIENT
Start: 2022-11-16 | End: 2022-11-16

## 2022-11-16 RX ORDER — FINASTERIDE 5 MG/1
5 TABLET, FILM COATED ORAL DAILY
Qty: 90 TABLET | Refills: 3 | Status: SHIPPED | OUTPATIENT
Start: 2022-11-16

## 2022-11-16 NOTE — PROGRESS NOTES
Dr. Maira Armas MD  [facility])  Urology Clinic      Patient:  Ana Mckay  YOB: 1945  Date: 11/16/2022    HISTORY OF PRESENT ILLNESS:   The patient is a 68 y.o. male who presents today for follow-up for the following problem(s): elevated PSA, history of retention, and BPH with PVR of 274. Overall the problem(s) : are worsening. Associated Symptoms: No dysuria, gross hematuria. Pain Severity: 0/10    Summary of old records:   Retention requiring lira in 2018. Imaging/Labs reviewed during today's visit:  I have independently reviewed and verified the following films during today's visit. Bladder ultrasound and PVR.      Last several PSA's:  Lab Results   Component Value Date    PSA 2.88 (H) 04/19/2021    PSA 1.42 (H) 12/16/2019    PSA 2.76 (H) 11/23/2018       Last total testosterone:  No results found for: TESTOSTERONE    Urinalysis today:  Results for POC orders placed in visit on 11/16/22   poct post void residual   Result Value Ref Range    post void residual 199 ml       Last BUN and creatinine:  Lab Results   Component Value Date    BUN 29 (H) 11/12/2022     Lab Results   Component Value Date    CREATININE 1.9 (H) 11/12/2022       Imaging Reviewed during this Office Visit:   (results were independently reviewed by physician and radiology report verified)    PAST MEDICAL, FAMILY AND SOCIAL HISTORY UPDATE:  Past Medical History:   Diagnosis Date    Adenomatous colon polyp 2009/2010, 6/'14,7/'17, 9/20, 7/21    Angina at rest [facility])     Arthritis     back    Atrial fibrillation ([address] 12/11 and 11/12    cardioversion 12/11    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    BPH (benign prostatic hypertrophy)     CAD (coronary artery disease) 1997    Carotid artery disease ([address] 1995    right    Carotid disease, bilateral ([address]     Cerebral artery occlusion with cerebral infarction ([address]     CHF (congestive heart failure) ([address]     Chronic kidney disease COVID-19 07/09/2022    GERD (gastroesophageal reflux disease)     GI bleed 07/2021    History of blood transfusion 2021    GI bleed    History of blood transfusion 1997    with heart surgery    Hyperlipidemia 1996    Hypertension     Lumbar disc disease 2001    MI (myocardial infarction) [facility] 1997    Nephrolithiasis 2002    NIDDM (non-insulin dependent diabetes mellitus) 1996    diagnosed 1996    RICCARDO (obstructive sleep apnea) 2005    cpap    PVC's (premature ventricular contractions)     Renal artery stenosis ([facility] 2007    left    Renal insufficiency     S/P CABG (status post coronary artery bypass graft) 1997    5 vessels    Skin cancer     Squamous cell carcinoma     Stenosis of right subclavian artery (HCC)     TIA (transient ischemic attack)     Wears partial dentures     upper and lower     Past Surgical History:   Procedure Laterality Date    ABLATION OF DYSRHYTHMIC FOCUS  3/13/13    CARDIAC CATHETERIZATION  2008 - 2019    stents [address]  3/13/13    oblation for irreg rythm    CARDIOVERSION  3/8/2012    CAROTID ENDARTERECTOMY  1997    CHOLECYSTECTOMY  4/1999    COLONOSCOPY  6/22/09, 4/2011,7/12/17    polyp removal    COLONOSCOPY N/A 7/21/2021    COLONOSCOPY POLYPECTOMY SNARE/COLD BIOPSY performed by Melida Navarrete MD at [address] [place] [address]  2008 (2), 2009 (1)    x2    CORONARY ARTERY BYPASS GRAFT  8/1997    5 vessel    DIAGNOSTIC CARDIAC CATH LAB PROCEDURE      ENDOSCOPY, COLON, DIAGNOSTIC      EYE LID SURGERY Right 11/17/2021    EXCISION SCC RIGHT LOWER LATERAL EYELID WITH FROZEN SECTION performed by Joseluis Perry MD at [facility] Right 2/28/2022    EXCISION SCC RIGHT ZYGOMA performed by Joseluis Perry MD at [address]  12/20/2013    pre-cancerous mole rt foot removed    LITHOTRIPSY  4/2002    AR OFFICE/OUTPT VISIT,PROCEDURE ONLY N/A 11/8/2018    TRANSESOPHAGEAL ECHOCARDIOGRAM performed by Ananda Barnett MD at [facility] Endoscopy    PTCA  05/30/2018    SHOULDER SURGERY  2001, 2003    rt shoulder manipulation--frozen shoulder-01, Tarun Sanchez -03    SHOULDER SURGERY  2001 - 2003    manipulation - bilateral    SKIN BIOPSY      SKIN CANCER EXCISION  12/20/13     R foot    SKIN CANCER EXCISION  1/2016    squamous cell Lt  upper cheek    UPPER GASTROINTESTINAL ENDOSCOPY N/A 7/20/2021    EGD ESOPHAGOGASTRODUODENOSCOPY performed by Maggie Frankel MD at [address] [place] [address]  4/2002    VASCULAR SURGERY      carotids & cabg harvests    VENTRAL HERNIA REPAIR N/A 4/30/2022    exploratory laparotomy with repair of incarcerated hernia performed by Marcos Terrazas MD at [facility] [place] History   Problem Relation Age of Onset    Cancer Mother     High Blood Pressure Mother     Stroke Father     High Blood Pressure Father     Heart Disease Brother     Cancer Brother         lung    Cancer Brother         melanoma     Outpatient Medications Marked as Taking for the 11/16/22 encounter (Office Visit) with Thelma Berman MD   Medication Sig Dispense Refill    hydrOXYzine HCl (ATARAX) 10 MG tablet Take 10 mg by mouth at bedtime      clopidogrel (PLAVIX) 75 MG tablet Take 1 tablet by mouth daily morning 90 tablet 0    hydrALAZINE (APRESOLINE) 25 MG tablet Take 1 tablet by mouth every 8 hours 270 tablet 0    metoprolol succinate (TOPROL XL) 50 MG extended release tablet Take 1 tablet by mouth daily 90 tablet 0    isosorbide mononitrate (IMDUR) 120 MG extended release tablet Take 1 tablet by mouth daily 90 tablet 0    atorvastatin (LIPITOR) 40 MG tablet TAKE 1 TABLET BY MOUTH AT  NIGHT 90 tablet 3    ranolazine (RANEXA) 1000 MG extended release tablet TAKE 1 TABLET BY MOUTH  TWICE DAILY NEW DOSE 180 tablet 3    Blood Glucose Monitoring Suppl (ONE TOUCH ULTRA 2) w/Device KIT 1 kit by Does not apply route daily DX:11.9 1 kit 1    blood glucose test strips ([address] ULTRA TEST VI) strip 1 each by In Vitro route 4 times daily DX:11.9 200 each 3    ONE TOUCH ULTRASOFT LANCETS MISC Use to test Blood Sugar 4 times Daily, Dx: E11.9 200 each 11    bumetanide (BUMEX) 2 MG tablet Take 1 tablet by mouth in the morning. 90 tablet 3    insulin glargine (BASAGLAR KWIKPEN) 100 UNIT/ML injection pen Inject 33 Units into the skin 2 times daily      tamsulosin (FLOMAX) 0.4 MG capsule TAKE 1 CAPSULE BY MOUTH  TWICE DAILY 180 capsule 3    nitroGLYCERIN (NITROLINGUAL) 0.4 MG/SPRAY 0.4 mg spray USE 1 SPRAY ON OR UNDER THE TONGUE EVERY 5 MINUTES AS NEEDED FOR CHEST PAIN 4.9 g 6    Dulaglutide (TRULICITY) 2.05 CM/8.7LT SOPN Inject 0.75 mg into the skin once a week      dilTIAZem (CARDIZEM CD) 120 MG extended release capsule TAKE 1 CAPSULE BY MOUTH  DAILY 90 capsule 3    Insulin Pen Needle (NOVOFINE PEN NEEDLE) 32G X 6 MM MISC 1 each by Does not apply route 6 times daily 600 each 3    aspirin 81 MG EC tablet Take 81 mg by mouth daily      NOVOFINE 32G X 6 MM MISC USE 1 NEW NEEDLE 6 TIMES  DAILY AND AS NEEDED 540 each 1    diphenhydrAMINE-APAP, sleep, (TYLENOL PM EXTRA STRENGTH)  MG tablet Take 1 tablet by mouth every evening as needed      fluocinonide (LIDEX) 0.05 % cream Apply 1 applicator topically as needed      BiPAP Machine MISC by Does not apply route      Multiple Vitamins-Minerals (THERAPEUTIC MULTIVITAMIN-MINERALS) tablet Take 1 tablet by mouth nightly         Patient has no known allergies.   Social History     Tobacco Use   Smoking Status Never   Smokeless Tobacco Never       Social History     Substance and Sexual Activity   Alcohol Use No    Alcohol/week: 0.0 standard drinks       REVIEW OF SYSTEMS:  Constitutional: negative  Eyes: negative  Respiratory: negative  Cardiovascular: negative  Gastrointestinal: negative  Musculoskeletal: negative  Genitourinary: negative except for what is in HPI  Skin: negative   Neurological: negative  Hematological/Lymphatic: negative  Psychological: negative    Physical Exam:      Vitals:    11/16/22 1324   Resp: 18     Patient is a 68 y.o. male in no acute distress and alert and oriented to person, place and time. NAD, A/o  Non labored respiration  Normal peripheral pulses  Skin- warm and dry  Psych- normal mood and affect      Assessment and Plan      1. Urinary retention           Plan:      No follow-ups on file. Continue Flomax BID  Add Finasteride 5 mg po qd for BPH symptoms. Discussed all options including flomax, CIC, TURP/PVP, and interstim. PVR worsening. Will follow closely and see back in 4 months with PVR. Cysto showed patent prostate.               Dr. Christos Rodríguez MD

## 2022-11-18 ENCOUNTER — HOSPITAL ENCOUNTER (OUTPATIENT)
Dept: NURSING | Age: 77
Discharge: HOME | End: 2022-11-18
Payer: MEDICARE

## 2022-11-18 ENCOUNTER — HOSPITAL ENCOUNTER (OUTPATIENT)
Age: 77
Discharge: HOME | End: 2022-11-18
Payer: MEDICARE

## 2022-11-18 ENCOUNTER — OFFICE VISIT (OUTPATIENT)
Dept: SURGERY | Age: 77
End: 2022-11-18
Payer: MEDICARE

## 2022-11-18 VITALS
TEMPERATURE: 97.6 F | RESPIRATION RATE: 18 BRPM | DIASTOLIC BLOOD PRESSURE: 62 MMHG | HEART RATE: 72 BPM | HEIGHT: 69 IN | BODY MASS INDEX: 31.19 KG/M2 | WEIGHT: 210.6 LBS | SYSTOLIC BLOOD PRESSURE: 120 MMHG | OXYGEN SATURATION: 98 %

## 2022-11-18 VITALS
TEMPERATURE: 97.8 F | DIASTOLIC BLOOD PRESSURE: 66 MMHG | SYSTOLIC BLOOD PRESSURE: 154 MMHG | HEART RATE: 65 BPM | OXYGEN SATURATION: 97 % | RESPIRATION RATE: 20 BRPM

## 2022-11-18 DIAGNOSIS — N18.30 ANEMIA OF CHRONIC RENAL FAILURE, STAGE 3 (MODERATE), UNSPECIFIED WHETHER STAGE 3A OR 3B CKD (HCC): ICD-10-CM

## 2022-11-18 DIAGNOSIS — N18.30 ANEMIA OF CHRONIC RENAL FAILURE, STAGE 3 (MODERATE), UNSPECIFIED WHETHER STAGE 3A OR 3B CKD (HCC): Primary | ICD-10-CM

## 2022-11-18 DIAGNOSIS — D63.1 ANEMIA OF CHRONIC RENAL FAILURE, STAGE 3 (MODERATE), UNSPECIFIED WHETHER STAGE 3A OR 3B CKD (HCC): Primary | ICD-10-CM

## 2022-11-18 DIAGNOSIS — D63.1 ANEMIA OF CHRONIC RENAL FAILURE, STAGE 3 (MODERATE), UNSPECIFIED WHETHER STAGE 3A OR 3B CKD (HCC): ICD-10-CM
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
## 2022-11-18 LAB
BASOPHILS # BLD: 0.3 %
BASOPHILS ABSOLUTE: 0 THOU/MM3 (ref 0–0.1)
EOSINOPHIL # BLD: 1.2 %
EOSINOPHILS ABSOLUTE: 0.1 THOU/MM3 (ref 0–0.4)
ERYTHROCYTE [DISTWIDTH] IN BLOOD BY AUTOMATED COUNT: 12.6 % (ref 11.5–14.5)
ERYTHROCYTE [DISTWIDTH] IN BLOOD BY AUTOMATED COUNT: 47.1 FL (ref 35–45)
HCT VFR BLD CALC: 28.7 % (ref 42–52)
HEMOGLOBIN: 9.5 GM/DL (ref 14–18)
IMMATURE GRANS (ABS): 0.07 THOU/MM3 (ref 0–0.07)
IMMATURE GRANULOCYTES: 0.7 %
LYMPHOCYTES # BLD: 9.2 %
LYMPHOCYTES ABSOLUTE: 0.9 THOU/MM3 (ref 1–4.8)
MCH RBC QN AUTO: 34.1 PG (ref 26–33)
MCHC RBC AUTO-ENTMCNC: 33.1 GM/DL (ref 32.2–35.5)
MCV RBC AUTO: 102.9 FL (ref 80–94)
MONOCYTES # BLD: 9.5 %
MONOCYTES ABSOLUTE: 0.9 THOU/MM3 (ref 0.4–1.3)
NUCLEATED RED BLOOD CELLS: 0 /100 WBC
PLATELET # BLD: 287 THOU/MM3 (ref 130–400)
PMV BLD AUTO: 9.4 FL (ref 9.4–12.4)
RBC # BLD: 2.79 MILL/MM3 (ref 4.7–6.1)
SEG NEUTROPHILS: 79.1 %
SEGMENTED NEUTROPHILS ABSOLUTE COUNT: 7.5 THOU/MM3 (ref 1.8–7.7)
WBC # BLD: 9.5 THOU/MM3 (ref 4.8–10.8)

## 2022-11-18 PROCEDURE — G8484 FLU IMMUNIZE NO ADMIN: HCPCS | Performed by: PHYSICIAN ASSISTANT

## 2022-11-18 PROCEDURE — G8417 CALC BMI ABV UP PARAM F/U: HCPCS | Performed by: PHYSICIAN ASSISTANT

## 2022-11-18 PROCEDURE — 3078F DIAST BP <80 MM HG: CPT | Performed by: PHYSICIAN ASSISTANT

## 2022-11-18 PROCEDURE — 99212 OFFICE O/P EST SF 10 MIN: CPT | Performed by: PHYSICIAN ASSISTANT

## 2022-11-18 PROCEDURE — 6360000002 HC RX W HCPCS: Performed by: INTERNAL MEDICINE

## 2022-11-18 PROCEDURE — 3074F SYST BP LT 130 MM HG: CPT | Performed by: PHYSICIAN ASSISTANT

## 2022-11-18 PROCEDURE — 36415 COLL VENOUS BLD VENIPUNCTURE: CPT

## 2022-11-18 PROCEDURE — 96372 THER/PROPH/DIAG INJ SC/IM: CPT

## 2022-11-18 PROCEDURE — 85025 COMPLETE CBC W/AUTO DIFF WBC: CPT

## 2022-11-18 PROCEDURE — 1036F TOBACCO NON-USER: CPT | Performed by: PHYSICIAN ASSISTANT

## 2022-11-18 PROCEDURE — 1123F ACP DISCUSS/DSCN MKR DOCD: CPT | Performed by: PHYSICIAN ASSISTANT

## 2022-11-18 PROCEDURE — G8427 DOCREV CUR MEDS BY ELIG CLIN: HCPCS | Performed by: PHYSICIAN ASSISTANT

## 2022-11-18 RX ORDER — LIDOCAINE 50 MG/G
1 PATCH TOPICAL DAILY
Qty: 14 PATCH | Refills: 0 | Status: SHIPPED | OUTPATIENT
Start: 2022-11-18 | End: 2022-12-02

## 2022-11-18 RX ORDER — CLOPIDOGREL BISULFATE 75 MG/1
75 TABLET ORAL DAILY
Qty: 90 TABLET | Refills: 3 | Status: SHIPPED | OUTPATIENT
Start: 2022-11-18

## 2022-11-18 RX ADMIN — DARBEPOETIN ALFA 100 MCG: 100 INJECTION, SOLUTION INTRAVENOUS; SUBCUTANEOUS at 12:30

## 2022-11-18 NOTE — PROGRESS NOTES
Trauma Clinic  [place], [place]    Encounter Date: 11/18/2022  Patient:  Miller Bernabe   Age: 68 y.o. YOB: 1945   MRN: [id number]      Injury Date: 11/11/2022    PCP: Lydia Thomas MD     Subjective   Chief Complaint:   Chief Complaint   Patient presents with    Follow-Up from [facility] on 11/12/2022-Trauma by Fall, Closed fracture of proximal end of right ulna with routine healing,  Closed fracture of spinous process of lumbar vertebra with routine healing Forehead laceration 11/11/22 - Suture removal right scalp        History Obtained From: the patient  Reason for Admission: Active Problems:    * No active hospital problems. *  Resolved Problems:    * No resolved hospital problems. *    History of Present Illness:  He is a 68 y.o. male who presents to trauma clinic following fall from 2 step ladder sustaining L2 transverse process fracture and a minimally displaced CSF fight fracture of the proximal ulna, elevated troponin and MADI which resolved with rest and rehydration, forehead laceration as well. On presentation patient complains of only right chest wall discomfort, chronic back pain unchanged, states he followed up with [facility] with no further recommendations for follow-up or treatment. Patient does feel persistently fatigued, states he has a long history of anemia and receives erythropoietin from nephrology on a monthly basis, receiving dose today. Agreeable to CBC to compare to last weekend as he had a 1 g hemoglobin drop. Overall he feels improved, encouraged to use Lidoderm patches, heat, and Tylenol for right chest wall discomfort, anticipate to improve with time. He denies shortness of breath, headache, lightheadedness, cough, fever, cold chills, numbness, paresthesias, or neck pain.       Review of Systems:   General: negative headache, dizziness, lightheadedness, fever, and chills  Cardiopulmonary: negative chest pain, shortness of breath, and cough  Gastrointestinal: negative abdominal pain, nausea, vomiting, diarrhea, blood in stool  Neurological: negative numbness, paraesthesias, and weakness  Musculoskeletal: Right chest wall tenderness, chronic back pain. Negative point tenderness, neck pain    History   Patient has no known allergies.   Past Surgical History:   Procedure Laterality Date    ABLATION OF DYSRHYTHMIC FOCUS  3/13/13    CARDIAC CATHETERIZATION  2008 - 2019    [address]ents [address] SURGERY  3/13/13    oblation for irreg rythm    CARDIOVERSION  3/8/2012    CAROTID ENDARTERECTOMY  1997    CHOLECYSTECTOMY  4/1999    COLONOSCOPY  6/22/09, 4/2011,7/12/17    polyp removal    COLONOSCOPY N/A 7/21/2021    COLONOSCOPY POLYPECTOMY SNARE/COLD BIOPSY performed by Swetha Feldman MD at [address] [place] [address]  2008 (2), 2009 (1)    x2    CORONARY ARTERY BYPASS GRAFT  8/1997    5 vessel    DIAGNOSTIC CARDIAC CATH LAB PROCEDURE      ENDOSCOPY, COLON, DIAGNOSTIC      EYE LID SURGERY Right 11/17/2021    EXCISION SCC RIGHT LOWER LATERAL EYELID WITH FROZEN SECTION performed by Perlita Mclaughlin MD at [facility] Right 2/28/2022    EXCISION SCC RIGHT ZYGOMA [facility]  12/20/2013    pre-cancerous mole rt foot removed    LITHOTRIPSY  4/2002    [place] OFFICE/OUTPT VISIT,PROCEDURE ONLY N/A 11/8/2018    TRANSESOPHAGEAL ECHOCARDIOGRAM performed by Lorrie Hobson MD at [facility] Endoscopy    PTCA  05/30/2018    SHOULDER SURGERY  2001, 2003    rt shoulder manipulation--frozen shoulder-01, Lt -03    SHOULDER SURGERY  2001 - 2003    manipulation - bilateral    SKIN BIOPSY      SKIN CANCER EXCISION  12/20/13     R foot    SKIN CANCER EXCISION  1/2016    squamous cell Lt  upper cheek    UPPER GASTROINTESTINAL ENDOSCOPY N/A 7/20/2021    EGD ESOPHAGOGASTRODUODENOSCOPY performed by Swetha Feldman MD at [facility] Endoscopy URETER STENT PLACEMENT  4/2002    VASCULAR SURGERY      carotids & cabg harvests    VENTRAL HERNIA REPAIR N/A 4/30/2022    exploratory laparotomy with repair of incarcerated hernia performed by Tariq Hunter MD at 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    BPH (benign prostatic hypertrophy)     CAD (coronary artery disease) 1997    Carotid artery disease ([address] 1995    right    Carotid disease, bilateral ([address]     Cerebral artery occlusion with cerebral infarction ([address]     CHF (congestive heart failure) ([address]     Chronic kidney disease     COVID-19 07/09/2022    GERD (gastroesophageal reflux disease)     GI bleed 07/2021    History of blood transfusion 2021    GI bleed    History of blood transfusion 1997    with heart surgery    Hyperlipidemia 1996    Hypertension     Lumbar disc disease 2001    MI (myocardial infarction) ([address] 1997    Nephrolithiasis 2002    NIDDM (non-insulin dependent diabetes mellitus) 1996    diagnosed 1996    RICCARDO (obstructive sleep apnea) 2005    cpap    PVC's (premature ventricular contractions)     Renal artery stenosis ([address] 2007    left    Renal insufficiency     S/P CABG (status post coronary artery bypass graft) 1997    5 vessels    Skin cancer     Squamous cell carcinoma     Stenosis of right subclavian artery ([address]     TIA (transient ischemic attack)     Wears partial dentures     upper and lower     Past Surgical History:   Procedure Laterality Date    ABLATION OF DYSRHYTHMIC FOCUS  3/13/13    CARDIAC CATHETERIZATION  2008 - 2019    [address]ents [address]  3/13/13    oblation for irreg rythm    CARDIOVERSION  3/8/2012    CAROTID ENDARTERECTOMY  1997    CHOLECYSTECTOMY  4/1999    COLONOSCOPY 6/22/09, 4/2011,7/12/17    polyp removal    COLONOSCOPY N/A 7/21/2021    COLONOSCOPY POLYPECTOMY SNARE/COLD BIOPSY performed by Melida Norton MD at [address] [place] [address]  2008 (2), 2009 (1)    x2    CORONARY ARTERY BYPASS GRAFT  8/1997    5 vessel    DIAGNOSTIC CARDIAC CATH LAB PROCEDURE      ENDOSCOPY, COLON, DIAGNOSTIC      EYE LID SURGERY Right 11/17/2021    EXCISION SCC RIGHT LOWER LATERAL EYELID WITH FROZEN SECTION performed by James Wills MD at [facility] Right 2/28/2022    EXCISION SCC RIGHT [facility]GOMA [facility]  12/20/2013    pre-cancerous mole rt foot removed    LITHOTRIPSY  4/2002    [place] OFFICE/OUTPT VISIT,PROCEDURE ONLY N/A 11/8/2018    TRANSESOPHAGEAL ECHOCARDIOGRAM performed by Yonathan Quiros MD at [facility] Endoscopy    PTCA  05/30/2018    SHOULDER SURGERY  2001, 2003    rt shoulder manipulation--frozen shoulder-01, Lt -03    SHOULDER SURGERY  2001 - 2003    manipulation - bilateral    SKIN BIOPSY      SKIN CANCER EXCISION  12/20/13     R foot    SKIN CANCER EXCISION  1/2016    squamous cell Lt  upper cheek    UPPER GASTROINTESTINAL ENDOSCOPY N/A 7/20/2021    EGD ESOPHAGOGASTRODUODENOSCOPY performed by Melida Norton MD at [address] [place] [address]  4/2002    VASCULAR SURGERY      carotids & cabg harvests    VENTRAL HERNIA REPAIR N/A 4/30/2022    exploratory laparotomy with repair of incarcerated hernia performed by Cornelius Bell MD at [address] [place] [address] History    Marital status:      Spouse name: Kathryn Farias    Number of children: 2    Years of education: None    Highest education level: None   Tobacco Use    Smoking status: Never    Smokeless tobacco: Never   Vaping Use    Vaping Use: Never used   Substance and Sexual Activity    Alcohol use: No     Alcohol/week: 0.0 standard drinks    Drug use: No    Sexual activity: Yes     Partners: Female     Social Determinants of Health     Financial Resource Strain: Low Risk     Difficulty of Paying Living Expenses: Not hard at all   Food Insecurity: No Food Insecurity    Worried About Running Out of Food in the Last Year: Never true    Ran Out of Food in the Last Year: Never true   Physical Activity: Inactive    Days of Exercise per Week: 0 days    Minutes of Exercise per Session: 0 min     Family History   Problem Relation Age of Onset    Cancer Mother     High Blood Pressure Mother     Stroke Father     High Blood Pressure Father     Heart Disease Brother     Cancer Brother         lung    Cancer Brother         melanoma       Home medications:    Current Outpatient Medications   Medication Sig Dispense Refill    lidocaine (LIDODERM) 5 % Place 1 patch onto the skin daily for 14 days 12 hours on, 12 hours off. 14 patch 0    finasteride (PROSCAR) 5 MG tablet Take 1 tablet by mouth daily 90 tablet 3    hydrOXYzine HCl (ATARAX) 10 MG tablet Take 10 mg by mouth at bedtime      hydrALAZINE (APRESOLINE) 25 MG tablet Take 1 tablet by mouth every 8 hours 270 tablet 0    metoprolol succinate (TOPROL XL) 50 MG extended release tablet Take 1 tablet by mouth daily 90 tablet 0    isosorbide mononitrate (IMDUR) 120 MG extended release tablet Take 1 tablet by mouth daily 90 tablet 0    atorvastatin (LIPITOR) 40 MG tablet TAKE 1 TABLET BY MOUTH AT  NIGHT 90 tablet 3    ranolazine (RANEXA) 1000 MG extended release tablet TAKE 1 TABLET BY MOUTH  TWICE DAILY NEW DOSE 180 tablet 3    Blood Glucose Monitoring Suppl (ONE TOUCH ULTRA 2) w/Device KIT 1 kit by Does not apply route daily DX:11.9 1 kit 1    blood glucose test strips (ASCENSIA AUTODISC VI;ONE TOUCH ULTRA TEST VI) strip 1 each by In Vitro route 4 times daily DX:11.9 200 each 3    ONE TOUCH ULTRASOFT LANCETS MISC Use to test Blood Sugar 4 times Daily, Dx: E11.9 200 each 11    bumetanide (BUMEX) 2 MG tablet Take 1 tablet by mouth in the morning.  80 tablet 3    insulin glargine (BASAGLAR KWIKPEN) 100 UNIT/ML injection pen Inject 33 Units into the skin 2 times daily      tamsulosin (FLOMAX) 0.4 MG capsule TAKE 1 CAPSULE BY MOUTH  TWICE DAILY 180 capsule 3    nitroGLYCERIN (NITROLINGUAL) 0.4 MG/SPRAY 0.4 mg spray USE 1 SPRAY ON OR UNDER THE TONGUE EVERY 5 MINUTES AS NEEDED FOR CHEST PAIN 4.9 g 6    Dulaglutide (TRULICITY) 2.74 BV/7.5LC SOPN Inject 0.75 mg into the skin once a week      dilTIAZem (CARDIZEM CD) 120 MG extended release capsule TAKE 1 CAPSULE BY MOUTH  DAILY 90 capsule 3    Insulin Pen Needle (NOVOFINE PEN NEEDLE) 32G X 6 MM MISC 1 each by Does not apply route 6 times daily 600 each 3    aspirin 81 MG EC tablet Take 81 mg by mouth daily      NOVOFINE 32G X 6 MM MISC USE 1 NEW NEEDLE 6 TIMES  DAILY AND AS NEEDED 540 each 1    diphenhydrAMINE-APAP, sleep, (TYLENOL PM EXTRA STRENGTH)  MG tablet Take 1 tablet by mouth every evening as needed      fluocinonide (LIDEX) 0.05 % cream Apply 1 applicator topically as needed      BiPAP Machine MISC by Does not apply route      Multiple Vitamins-Minerals (THERAPEUTIC MULTIVITAMIN-MINERALS) tablet Take 1 tablet by mouth nightly      clopidogrel (PLAVIX) 75 MG tablet Take 1 tablet by mouth daily morning 90 tablet 3     No current facility-administered medications for this visit. Objective   Vitals:   Temp: 97.6 °F (36.4 °C) I Temp  Av.1 °F (36.7 °C)  Min: 97.6 °F (36.4 °C)  Max: 98.6 °F (37 °C) I Heart Rate: 72 I Pulse  Av.2  Min: 62  Max: 97 I BP: 368/14 I Systolic (38NOW), GBS:701 , Min:120 , VNW:570   ; Diastolic (34GZR), ASQ:36, Min:62, Max:66   I Resp: 18 I Resp  Av  Min: 12  Max: 22 I SpO2: 98 % I SpO2  Av.3 %  Min: 95 %  Max: 99 % I   I Height: 5' 9\" (175.3 cm) I   I Facility age limit for growth percentiles is 20 years. I      Physical Exam:  GENERAL: Presents sitting upright in chair unassisted, Awake and alert; In no acute distress and well nourished.   SKIN: Appropriate for ethnicity, warm and dry. EYES: No apparent trauma, discharge, or hematoma bilaterally. PERRL at 3mm  CARDIO: No visible chest wall deformity. Tenderness to palpation of right chest wall, small amount of ecchymosis to right anterior chest with underlying tenderness. No heaves or lifts. Strong/regular S1/S2 rate and rhythm. No rubs murmurs, or gallops. 2+ radial and dorsalis pedal pulses. Capillary refill <2 sec. No extremity edema noted. PULMONARY:  Trachea midline, no audible wheezing, increased respiratory effort, accessory muscle use, or asymmetrical chest wall movement. Lung sounds are clear to ascultation in superior and inferior fields. No wheezing, crackles, or rhonchi. ABDOMEN: Abdomen is non distended. Bowel sounds present in all four quadrants. Soft and non tender to palpation in all quadrants. NEURO: Follows commands. PMS intact, moves limbs freely. No focal neurological deficits  MSK: Extremities intact and present. No midline tenderness to palpation of cervical, thoracic, lumbar spine. No new bruising, swelling, deformity, discoloration or bleeding. No new tenderness to muscular or bony structure palpation.  strength 5/5 and equal bilaterally. WOUND: 7 sutures noted to approximate right forehead laceration, wound edges well healed with some scabbing. No drainage or bleeding. Sutures removed on exam, well-tolerated by patient, small amount of bleeding with removal of most superior suture, bleeding stopped with pressure. Radiology:   Radiology reports reviewed: yes -reviewed from inpatient stay    Labs:   Laboratory Studies reviewed: yes -CBC from today compared to prior, noted improving hemoglobin. Assessment:       Diagnosis Orders   1. Anemia of chronic renal failure, stage 3 (moderate), unspecified whether stage 3a or 3b CKD (HCC)  CBC with Auto Differential      2. Fall, subsequent encounter  CBC with Auto Differential          Plan:    1.   Patient to go to lab today for CBC draw  2. Continue Tylenol, Lidoderm patches, and heat pads to right chest wall for chest pain  3. As needed follow-up with trauma clinic if symptoms do not improve or get worse  4. Return to office, or ED if worsening symptoms, shortness of breath, fever, bleeding that persists for 15 minutes despite pressure, or severe pain occur.        Electronically signed by Liz Velasquez PA-C on 11/18/2022 at 1:06 PM

## 2022-11-18 NOTE — PROGRESS NOTES
1220 Patient to OPN for Aranesp injection. Patient verbalizes understanding of injection. PT RIGHTS AND RESPONSIBILITIES OFFERED TO PT.     1230 Injection given to patient. Tolerated well. AVS reviewed with patient. Verbalizes understanding. Patient left ambulatory to discharge lobby.      _M___ Safety:       (Environmental)  [place] to environment  Ensure ID band is correct and in place/ allergy band as needed  Assess for fall risk  Initiate fall precautions as applicable (fall band, side rails, etc.)  Call light within reach  Bed in low position/ wheels locked    _M___ Pain:       Assess pain level and characteristics  Administer analgesics as ordered  Assess effectiveness of pain management and report to MD as needed    _M___ Knowledge Deficit:  Assess baseline knowledge  Provide teaching at level of understanding  Provide teaching via preferred learning method  Evaluate teaching effectiveness    _M___ Hemodynamic/Respiratory Status:       (Pre and Post Procedure Monitoring)  Assess/Monitor vital signs and LOC  Assess Baseline SpO2 prior to any sedation  Obtain weight/height  Assess vital signs/ LOC until patient meets discharge criteria  Monitor procedure site and notify MD of any issues

## 2022-11-23 ENCOUNTER — OFFICE VISIT (OUTPATIENT)
Dept: FAMILY MEDICINE CLINIC | Age: 77
End: 2022-11-23

## 2022-11-23 VITALS
HEART RATE: 60 BPM | HEIGHT: 69 IN | WEIGHT: 210.5 LBS | RESPIRATION RATE: 18 BRPM | BODY MASS INDEX: 31.18 KG/M2 | DIASTOLIC BLOOD PRESSURE: 58 MMHG | SYSTOLIC BLOOD PRESSURE: 112 MMHG

## 2022-11-23 DIAGNOSIS — Z09 HOSPITAL DISCHARGE FOLLOW-UP: ICD-10-CM

## 2022-11-23 DIAGNOSIS — S20.211A CONTUSION OF RIGHT CHEST WALL, INITIAL ENCOUNTER: ICD-10-CM

## 2022-11-23 DIAGNOSIS — S01.81XA LACERATION OF FOREHEAD, INITIAL ENCOUNTER: Primary | ICD-10-CM

## 2022-11-23 PROBLEM — D12.6 ADENOMATOUS COLON POLYP: Status: ACTIVE | Noted: 2020-09-30

## 2022-11-23 PROBLEM — I48.0 PAROXYSMAL ATRIAL FIBRILLATION (HCC): Status: ACTIVE | Noted: 2021-11-11

## 2022-11-23 PROBLEM — J45.909 ASTHMA: Status: ACTIVE | Noted: 2018-11-09

## 2022-11-23 PROBLEM — M54.16 LUMBAR RADICULOPATHY: Status: ACTIVE | Noted: 2022-05-12

## 2022-11-23 PROCEDURE — 99495 TRANSJ CARE MGMT MOD F2F 14D: CPT | Performed by: FAMILY MEDICINE

## 2022-11-23 NOTE — PROGRESS NOTES
Post-Discharge Transitional Care Follow Up      Radha Villegas   YOB: 1945    Date of Office Visit:  11/23/2022  Date of Hospital Admission: 11/11/22  Date of Hospital Discharge: 11/12/22  Readmission Risk Score (high >=14%. Medium >=10%):Readmission Risk Score: 23.9 ( )      Care management risk score Rising risk (score 2-5) and Complex Care (Scores >=6): No Risk Score On File     Non face to face  following discharge, date last encounter closed (first attempt may have been earlier): *No documented post hospital discharge outreach found in the last 14 days     Call initiated 2 business days of discharge: *No response recorded in the last 14 days     Laceration of forehead, initial encounter  Contusion of right chest wall, initial encounter  Hospital discharge follow-up  -     ND DISCHARGE MEDS RECONCILED W/ CURRENT OUTPATIENT MED LIST    Medical Decision Making: moderate complexity  No follow-ups on file. Subjective:   HPI    Inpatient course: Discharge summary reviewed- see chart. Interval history/Current status: the stitches are out an there is some rib soreness on the right. He saw the surgeon for follow up of the hernia surgery    Patient Active Problem List   Diagnosis    CKD (chronic kidney disease) stage 3, GFR 30-59 ml/min (HCC)    Paroxysmal atrial fibrillation (HCC)    S/P CABG (coronary artery bypass graft)    Hyperlipidemia    Lumbar radiculopathy    Renal artery stenosis (HCC)    Obstructive sleep apnea on CPAP    Chronic diastolic congestive heart failure ([address]    Coronary atherosclerosis    Diabetes mellitus type 2, insulin dependent ([address]    Status post angioplasty with stent    MADI (acute kidney injury) ([address]    SOB (shortness of breath)    Wheezing    Asthma    Essential hypertension    Obesity (BMI 30-39. 9)    Elevated PSA    Hypertrophy of prostate with urinary obstruction    Adenomatous colon polyp    Angina, class III (HCC)    Hx of atrial fibrillation without current medication    Bilateral carotid artery stenosis    Hx of nonmelanoma skin cancer    Acute kidney injury superimposed on CKD (HCC)    Anemia of chronic renal failure    Anemia in stage 3 chronic kidney disease (HCC)    Melena    Chronic kidney disease, stage 4 (severe) (HCC)    Strangulated umbilical hernia    Small bowel obstruction (HCC)    Displacement of lumbar intervertebral disc without myelopathy    Degeneration of lumbosacral intervertebral disc    Chronic systolic (congestive) heart failure    Presence of Watchman left atrial appendage closure device    Fall    Closed fracture of proximal end of right ulna with routine healing    Closed fracture of spinous process of lumbar vertebra with routine healing    Forehead laceration, initial encounter       Medications listed as ordered at the time of discharge from hospital     Medication List            Accurate as of November 23, 2022  2:07 PM. If you have any questions, ask your nurse or doctor. CONTINUE taking these medications      aspirin 81 MG EC tablet     atorvastatin 40 MG tablet  Commonly known as: LIPITOR  TAKE 1 TABLET BY MOUTH AT  NIGHT     Basaglar KwikPen 100 UNIT/ML injection pen  Generic drug: insulin glargine     BiPAP Machine Misc     blood glucose test strips strip  Commonly known as: ASCENSIA AUTODISC VI;ONE TOUCH ULTRA TEST VI  1 each by In Vitro route 4 times daily DX:11.9     bumetanide 2 MG tablet  Commonly known as: BUMEX  Take 1 tablet by mouth in the morning.      clopidogrel 75 MG tablet  Commonly known as: PLAVIX  Take 1 tablet by mouth daily morning     dilTIAZem 120 MG extended release capsule  Commonly known as: CARDIZEM CD  TAKE 1 CAPSULE BY MOUTH  DAILY     diphenhydrAMINE-APAP (sleep)  MG tablet  Commonly known as: TYLENOL PM EXTRA STRENGTH     finasteride 5 MG tablet  Commonly known as: PROSCAR  Take 1 tablet by mouth daily     fluocinonide 0.05 % cream  Commonly known as: LIDEX     hydrALAZINE 25 MG tablet  Commonly known as: APRESOLINE  Take 1 tablet by mouth every 8 hours     hydrOXYzine HCl 10 MG tablet  Commonly known as: ATARAX     isosorbide mononitrate 120 MG extended release tablet  Commonly known as: IMDUR  Take 1 tablet by mouth daily     lidocaine 5 %  Commonly known as: LIDODERM  Place 1 patch onto the skin daily for 14 days 12 hours on, 12 hours off.     metoprolol succinate 50 MG extended release tablet  Commonly known as: TOPROL XL  Take 1 tablet by mouth daily     nitroGLYCERIN 0.4 MG/SPRAY 0.4 mg spray  Commonly known as: NITROLINGUAL  USE 1 SPRAY ON OR UNDER THE TONGUE EVERY 5 MINUTES AS NEEDED FOR CHEST PAIN     * NovoFine 32G X 6 MM Misc  Generic drug: Insulin Pen Needle  USE 1 NEW NEEDLE 6 TIMES  DAILY AND AS NEEDED     * Novofine Pen Needle 32G X 6 MM Misc  Generic drug: Insulin Pen Needle  1 each by Does not apply route 6 times daily     ONE TOUCH ULTRA 2 w/Device Kit  1 kit by Does not apply route daily DX:11.9     ONE TOUCH ULTRASOFT LANCETS Misc  Use to test Blood Sugar 4 times Daily, Dx: E11.9     ranolazine 1000 MG extended release tablet  Commonly known as: RANEXA  TAKE 1 TABLET BY MOUTH  TWICE DAILY NEW DOSE     tamsulosin 0.4 MG capsule  Commonly known as: FLOMAX  TAKE 1 CAPSULE BY MOUTH  TWICE DAILY     therapeutic multivitamin-minerals tablet     Trulicity 5.10 BV/1.3AX Sopn  Generic drug: Dulaglutide           * This list has 2 medication(s) that are the same as other medications prescribed for you. Read the directions carefully, and ask your doctor or other care provider to review them with you.                    Medications marked \"taking\" at this time  No outpatient medications have been marked as taking for the 11/23/22 encounter (Office Visit) with Nyasia Cheung MD.        Medications patient taking as of now reconciled against medications ordered at time of hospital discharge: Yes    Review of Systems  There is the rib pain as above and no increase in the usual SOB  Objective:    BP (!) 112/58 (Site: Right Upper Arm, Position: Sitting, Cuff Size: Medium Adult)   Pulse 60   Resp 18   Ht 5' 9\" (1.753 m)   Wt 210 lb 8 oz (95.5 kg)   BMI 31.09 kg/m²   Physical Exam  Well developed well nourished white male who is awake alert and cooperative  Skin atrophic  Membranes moist  Head normocephalic  Neck without mass  Chest symmetrical expansion  Heart S1S2 without murmur  Lungs CTA  Abd soft, non tender, normoactive BS and no mass  Ext without edema  Neuro weak  Psy pleasant    An electronic signature was used to authenticate this note.   --Tania Marin MD

## 2022-11-26 NOTE — PROGRESS NOTES
BP Readings from Last 2 Encounters:   04/20/22 (!) 118/58   04/14/22 (!) 168/67     Hemoglobin A1C (%)   Date Value   02/07/2022 6.3     Microscopic Examination (no units)   Date Value   09/10/2016 Not Indicated     Microalbumin, Random Urine (mg/dL)   Date Value   07/28/2021 10.97     LDL Calculated (mg/dL)   Date Value   01/24/2020 74              Tobacco use:  Patient  reports that he has never smoked. He has never used smokeless tobacco.    If Smoker - Cessation materials given? No    Is Daily aspirin on medication list?:  Yes    Diabetic retinal exam done? No   If yes, document in Health Maintenance. Monofilament placed on counter? No    Shoes and socks removed? No    BP taken with correct size cuff? Yes   Repeated if > 140/90 No     Is patient taking any medications for diabetes    Yes   If yes, see medication list    Is the patient reporting any side effects of diabetic medications? No    Microalbumin performed if applicable? No      Is patient taking any over the counter medications    No   If yes, see medication list      Patient Self-Management Goal for Chronic Condition  Goal: I will take all medications as prescribed by my doctor, and I will call the office if I am having any medication problems. Barriers to success: none  Plan for overcoming my barriers: N/A     Confidence: 10/10  Date goal set: 4/20/22  Date goal attained:     Have you seen any other physician or provider since your last visit no    Have you had any other diagnostic tests since your last visit? no    Have you changed or stopped any medications since your last visit including any over-the-counter medicines, vitamins, or herbal medicines? no     Are you taking all your prescribed medications?  Yes    If NO, why? FAMILY HISTORY:  No pertinent family history in first degree relatives

## 2022-11-30 ENCOUNTER — NURSE ONLY (OUTPATIENT)
Dept: INTERNAL MEDICINE CLINIC | Age: 77
End: 2022-11-30
Payer: MEDICARE

## 2022-11-30 DIAGNOSIS — Z79.4 DIABETES MELLITUS TYPE 2, INSULIN DEPENDENT (HCC): Primary | ICD-10-CM

## 2022-11-30 DIAGNOSIS — E11.9 DIABETES MELLITUS TYPE 2, INSULIN DEPENDENT (HCC): Primary | ICD-10-CM

## 2022-11-30 PROCEDURE — 95249 CONT GLUC MNTR PT PROV EQP: CPT | Performed by: INTERNAL MEDICINE

## 2022-12-02 ENCOUNTER — NURSE ONLY (OUTPATIENT)
Dept: LAB | Age: 77
End: 2022-12-02

## 2022-12-02 DIAGNOSIS — N18.30 STAGE 3 CHRONIC KIDNEY DISEASE, UNSPECIFIED WHETHER STAGE 3A OR 3B CKD (HCC): ICD-10-CM

## 2022-12-02 DIAGNOSIS — D63.1 ANEMIA IN STAGE 3 CHRONIC KIDNEY DISEASE, UNSPECIFIED WHETHER STAGE 3A OR 3B CKD (HCC): ICD-10-CM

## 2022-12-02 DIAGNOSIS — N18.30 ANEMIA IN STAGE 3 CHRONIC KIDNEY DISEASE, UNSPECIFIED WHETHER STAGE 3A OR 3B CKD (HCC): ICD-10-CM

## 2022-12-02 LAB
HCT VFR BLD CALC: 32.3 % (ref 42–52)
HEMOGLOBIN: 10.3 GM/DL (ref 14–18)

## 2022-12-05 ENCOUNTER — TELEPHONE (OUTPATIENT)
Dept: NEPHROLOGY | Age: 77
End: 2022-12-05

## 2022-12-05 DIAGNOSIS — D63.1 ANEMIA IN STAGE 3 CHRONIC KIDNEY DISEASE, UNSPECIFIED WHETHER STAGE 3A OR 3B CKD (HCC): Primary | ICD-10-CM

## 2022-12-05 DIAGNOSIS — N18.30 ANEMIA IN STAGE 3 CHRONIC KIDNEY DISEASE, UNSPECIFIED WHETHER STAGE 3A OR 3B CKD (HCC): Primary | ICD-10-CM
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
## 2023-01-01 ENCOUNTER — APPOINTMENT (OUTPATIENT)
Dept: GENERAL RADIOLOGY | Age: 78
DRG: 297 | End: 2023-01-01
Payer: MEDICARE

## 2023-01-01 ENCOUNTER — APPOINTMENT (OUTPATIENT)
Dept: MRI IMAGING | Age: 78
DRG: 297 | End: 2023-01-01
Payer: MEDICARE

## 2023-01-01 ENCOUNTER — APPOINTMENT (OUTPATIENT)
Dept: CT IMAGING | Age: 78
DRG: 297 | End: 2023-01-01
Payer: MEDICARE

## 2023-01-01 ENCOUNTER — HOSPITAL ENCOUNTER (INPATIENT)
Age: 78
LOS: 4 days | DRG: 297 | End: 2023-10-12
Attending: EMERGENCY MEDICINE | Admitting: INTERNAL MEDICINE
Payer: MEDICARE

## 2023-01-01 VITALS
SYSTOLIC BLOOD PRESSURE: 150 MMHG | BODY MASS INDEX: 33.37 KG/M2 | DIASTOLIC BLOOD PRESSURE: 52 MMHG | TEMPERATURE: 98.1 F | OXYGEN SATURATION: 98 % | WEIGHT: 225.31 LBS | HEIGHT: 69 IN

## 2023-01-01 DIAGNOSIS — I46.9 CARDIAC ARREST (HCC): Primary | ICD-10-CM

## 2023-01-01 LAB
ACINETOBACTER CALCOACETICUS-BAUMANNII BY PCR: NOT DETECTED
ALBUMIN SERPL BCG-MCNC: 3.2 G/DL (ref 3.5–5.1)
ALP SERPL-CCNC: 76 U/L (ref 38–126)
ALT SERPL W/O P-5'-P-CCNC: 44 U/L (ref 11–66)
ANION GAP SERPL CALC-SCNC: 13 MEQ/L (ref 8–16)
ANION GAP SERPL CALC-SCNC: 14 MEQ/L (ref 8–16)
ANION GAP SERPL CALC-SCNC: 14 MEQ/L (ref 8–16)
ANION GAP SERPL CALC-SCNC: 16 MEQ/L (ref 8–16)
ANION GAP SERPL CALC-SCNC: 17 MEQ/L (ref 8–16)
AST SERPL-CCNC: 25 U/L (ref 5–40)
BACTERIA SPEC AEROBE CULT: NORMAL
BACTERIA SPEC RESP CULT: ABNORMAL
BACTERIA SPEC RESP CULT: ABNORMAL
BACTERIA UR CULT: ABNORMAL
BACTERIA URNS QL MICRO: ABNORMAL /HPF
BASE EXCESS BLDA CALC-SCNC: -4.1 MMOL/L (ref -2–3)
BASE EXCESS BLDA CALC-SCNC: -9.3 MMOL/L (ref -2–3)
BASOPHILS ABSOLUTE: 0.1 THOU/MM3 (ref 0–0.1)
BASOPHILS NFR BLD AUTO: 0.4 %
BILIRUB SERPL-MCNC: 0.7 MG/DL (ref 0.3–1.2)
BILIRUB UR QL STRIP.AUTO: ABNORMAL
BLACTX-M ISLT/SPM QL: ABNORMAL
BLAIMP ISLT/SPM QL: ABNORMAL
BLAKPC ISLT/SPM QL: ABNORMAL
BLAOXA-48-LIKE ISLT/SPM QL: ABNORMAL
BLAVIM ISLT/SPM QL: ABNORMAL
BUN SERPL-MCNC: 52 MG/DL (ref 7–22)
BUN SERPL-MCNC: 56 MG/DL (ref 7–22)
BUN SERPL-MCNC: 60 MG/DL (ref 7–22)
BUN SERPL-MCNC: 62 MG/DL (ref 7–22)
BUN SERPL-MCNC: 63 MG/DL (ref 7–22)
BUN SERPL-MCNC: 63 MG/DL (ref 7–22)
BUN SERPL-MCNC: 67 MG/DL (ref 7–22)
BURR CELLS BLD QL SMEAR: ABNORMAL
C PNEUM DNA LOWER RESP QL NAA+NON-PROBE: NOT DETECTED
CA-I BLD ISE-SCNC: 1.32 MMOL/L (ref 1.12–1.32)
CA-I BLD ISE-SCNC: 1.64 MMOL/L (ref 1.12–1.32)
CALCIUM SERPL-MCNC: 11.1 MG/DL (ref 8.5–10.5)
CALCIUM SERPL-MCNC: 9.1 MG/DL (ref 8.5–10.5)
CALCIUM SERPL-MCNC: 9.2 MG/DL (ref 8.5–10.5)
CALCIUM SERPL-MCNC: 9.2 MG/DL (ref 8.5–10.5)
CALCIUM SERPL-MCNC: 9.3 MG/DL (ref 8.5–10.5)
CALCIUM SERPL-MCNC: 9.6 MG/DL (ref 8.5–10.5)
CALCIUM SERPL-MCNC: 9.6 MG/DL (ref 8.5–10.5)
CASTS #/AREA URNS LPF: ABNORMAL /LPF
CASTS 2: ABNORMAL /LPF
CHARACTER UR: ABNORMAL
CHLORIDE BLD-SCNC: 106 MEQ/L (ref 98–109)
CHLORIDE SERPL-SCNC: 100 MEQ/L (ref 98–111)
CHLORIDE SERPL-SCNC: 102 MEQ/L (ref 98–111)
CHLORIDE SERPL-SCNC: 102 MEQ/L (ref 98–111)
CHLORIDE SERPL-SCNC: 103 MEQ/L (ref 98–111)
CHLORIDE SERPL-SCNC: 104 MEQ/L (ref 98–111)
CHLORIDE SERPL-SCNC: 105 MEQ/L (ref 98–111)
CHLORIDE SERPL-SCNC: 105 MEQ/L (ref 98–111)
CK SERPL-CCNC: 77 U/L (ref 55–170)
CO2 SERPL-SCNC: 19 MEQ/L (ref 23–33)
CO2 SERPL-SCNC: 20 MEQ/L (ref 23–33)
CO2 SERPL-SCNC: 20 MEQ/L (ref 23–33)
CO2 SERPL-SCNC: 21 MEQ/L (ref 23–33)
CO2 SERPL-SCNC: 22 MEQ/L (ref 23–33)
COLLECTED BY:: ABNORMAL
COLLECTED BY:: ABNORMAL
COLOR: ABNORMAL
CREAT SERPL-MCNC: 2 MG/DL (ref 0.4–1.2)
CREAT SERPL-MCNC: 2.1 MG/DL (ref 0.4–1.2)
CREAT SERPL-MCNC: 2.2 MG/DL (ref 0.4–1.2)
CREAT SERPL-MCNC: 2.4 MG/DL (ref 0.4–1.2)
CREAT SERPL-MCNC: 2.4 MG/DL (ref 0.4–1.2)
CREAT SERPL-MCNC: 2.5 MG/DL (ref 0.4–1.2)
CREAT SERPL-MCNC: 2.5 MG/DL (ref 0.4–1.2)
CRYSTALS URNS MICRO: ABNORMAL
D DIMER PPP IA.FEU-MCNC: 4287 NG/ML FEU (ref 0–500)
DEPRECATED RDW RBC AUTO: 56.9 FL (ref 35–45)
DEPRECATED RDW RBC AUTO: 58.3 FL (ref 35–45)
DEPRECATED RDW RBC AUTO: 60.6 FL (ref 35–45)
DEVICE: ABNORMAL
DEVICE: ABNORMAL
EKG Q-T INTERVAL: 412 MS
EKG QRS DURATION: 146 MS
EKG QTC CALCULATION (BAZETT): 481 MS
EKG R AXIS: -109 DEGREES
EKG T AXIS: 76 DEGREES
EKG VENTRICULAR RATE: 82 BPM
ENTEROBACTER CLOACAE COMPLEX BY PCR: NOT DETECTED
EOSINOPHIL NFR BLD AUTO: 0 %
EOSINOPHILS ABSOLUTE: 0 THOU/MM3 (ref 0–0.4)
EPITHELIAL CELLS, UA: ABNORMAL /HPF
ERYTHROCYTE [DISTWIDTH] IN BLOOD BY AUTOMATED COUNT: 15.2 % (ref 11.5–14.5)
ERYTHROCYTE [DISTWIDTH] IN BLOOD BY AUTOMATED COUNT: 15.3 % (ref 11.5–14.5)
ERYTHROCYTE [DISTWIDTH] IN BLOOD BY AUTOMATED COUNT: 15.6 % (ref 11.5–14.5)
ESCHERICHIA COLI BY PCR: NOT DETECTED
ETHANOL SERPL-MCNC: < 0.01 %
FIO2 ON VENT O2 ANALYZER: 100 %
FIO2 ON VENT O2 ANALYZER: 30 %
FLUAV RNA LOWER RESP QL NAA+NON-PROBE: NOT DETECTED
FLUBV RNA LOWER RESP QL NAA+NON-PROBE: NOT DETECTED
GFR SERPL CREATININE-BSD FRML MDRD: 26 ML/MIN/1.73M2
GFR SERPL CREATININE-BSD FRML MDRD: 26 ML/MIN/1.73M2
GFR SERPL CREATININE-BSD FRML MDRD: 27 ML/MIN/1.73M2
GFR SERPL CREATININE-BSD FRML MDRD: 27 ML/MIN/1.73M2
GFR SERPL CREATININE-BSD FRML MDRD: 30 ML/MIN/1.73M2
GFR SERPL CREATININE-BSD FRML MDRD: 32 ML/MIN/1.73M2
GFR SERPL CREATININE-BSD FRML MDRD: 33 ML/MIN/1.73M2
GLUCOSE BLD STRIP.AUTO-MCNC: 188 MG/DL (ref 70–108)
GLUCOSE BLD STRIP.AUTO-MCNC: 198 MG/DL (ref 70–108)
GLUCOSE BLD STRIP.AUTO-MCNC: 202 MG/DL (ref 70–108)
GLUCOSE BLD STRIP.AUTO-MCNC: 210 MG/DL (ref 70–108)
GLUCOSE BLD STRIP.AUTO-MCNC: 221 MG/DL (ref 70–108)
GLUCOSE BLD STRIP.AUTO-MCNC: 222 MG/DL (ref 70–108)
GLUCOSE BLD STRIP.AUTO-MCNC: 225 MG/DL (ref 70–108)
GLUCOSE BLD STRIP.AUTO-MCNC: 225 MG/DL (ref 70–108)
GLUCOSE BLD STRIP.AUTO-MCNC: 238 MG/DL (ref 70–108)
GLUCOSE BLD STRIP.AUTO-MCNC: 243 MG/DL (ref 70–108)
GLUCOSE BLD STRIP.AUTO-MCNC: 250 MG/DL (ref 70–108)
GLUCOSE BLD STRIP.AUTO-MCNC: 252 MG/DL (ref 70–108)
GLUCOSE BLD STRIP.AUTO-MCNC: 252 MG/DL (ref 70–108)
GLUCOSE BLD STRIP.AUTO-MCNC: 305 MG/DL (ref 70–108)
GLUCOSE BLD-MCNC: 318 MG/DL (ref 70–108)
GLUCOSE BLD-MCNC: 409 MG/DL (ref 70–108)
GLUCOSE SERPL-MCNC: 201 MG/DL (ref 70–108)
GLUCOSE SERPL-MCNC: 227 MG/DL (ref 70–108)
GLUCOSE SERPL-MCNC: 233 MG/DL (ref 70–108)
GLUCOSE SERPL-MCNC: 244 MG/DL (ref 70–108)
GLUCOSE SERPL-MCNC: 303 MG/DL (ref 70–108)
GLUCOSE SERPL-MCNC: 397 MG/DL (ref 70–108)
GLUCOSE SERPL-MCNC: 413 MG/DL (ref 70–108)
GLUCOSE UR QL STRIP.AUTO: 500 MG/DL
GRAM STN SPEC: ABNORMAL
HADV DNA LOWER RESP QL NAA+NON-PROBE: NOT DETECTED
HAEMOPHILUS INFLUENZAE BY PCR: NOT DETECTED
HCO3 BLDA-SCNC: 21 MMOL/L (ref 23–28)
HCO3 BLDA-SCNC: 23 MMOL/L (ref 23–28)
HCOV RNA LOWER RESP QL NAA+NON-PROBE: NOT DETECTED
HCT VFR BLD AUTO: 30.7 % (ref 42–52)
HCT VFR BLD AUTO: 30.8 % (ref 42–52)
HCT VFR BLD AUTO: 34.5 % (ref 42–52)
HGB BLD-MCNC: 10 GM/DL (ref 14–18)
HGB BLD-MCNC: 10.8 GM/DL (ref 14–18)
HGB BLD-MCNC: 9.8 GM/DL (ref 14–18)
HGB UR QL STRIP.AUTO: ABNORMAL
HMPV RNA LOWER RESP QL NAA+NON-PROBE: NOT DETECTED
HPIV RNA LOWER RESP QL NAA+NON-PROBE: NOT DETECTED
ICTOTEST: NEGATIVE
IMM GRANULOCYTES # BLD AUTO: 1.22 THOU/MM3 (ref 0–0.07)
IMM GRANULOCYTES NFR BLD AUTO: 6.1 %
INR PPP: 1.12 (ref 0.85–1.13)
KETONES UR QL STRIP.AUTO: NEGATIVE
KLEBSIELLA AEROGENES BY PCR: NOT DETECTED
KLEBSIELLA OXYTOCA BY PCR: NOT DETECTED
KLEBSIELLA PNEUMONIAE GROUP BY PCR: NOT DETECTED
L PNEUMO DNA LOWER RESP QL NAA+NON-PROBE: NOT DETECTED
LACTATE BLD-SCNC: 7.7 MMOL/L (ref 0.5–1.9)
LACTATE SERPL-SCNC: 2 MMOL/L (ref 0.5–2)
LACTATE SERPL-SCNC: 4.2 MMOL/L (ref 0.5–2)
LACTIC ACID, SEPSIS: 6.4 MMOL/L (ref 0.5–1.9)
LIPASE SERPL-CCNC: 101 U/L (ref 5.6–51.3)
LYMPHOCYTES ABSOLUTE: 1.1 THOU/MM3 (ref 1–4.8)
LYMPHOCYTES NFR BLD AUTO: 5.7 %
M PNEUMO DNA LOWER RESP QL NAA+NON-PROBE: NOT DETECTED
MAGNESIUM SERPL-MCNC: 2.4 MG/DL (ref 1.6–2.4)
MAGNESIUM SERPL-MCNC: 2.5 MG/DL (ref 1.6–2.4)
MAGNESIUM SERPL-MCNC: 2.6 MG/DL (ref 1.6–2.4)
MCH RBC QN AUTO: 33.2 PG (ref 26–33)
MCH RBC QN AUTO: 34.1 PG (ref 26–33)
MCH RBC QN AUTO: 34.4 PG (ref 26–33)
MCHC RBC AUTO-ENTMCNC: 31.3 GM/DL (ref 32.2–35.5)
MCHC RBC AUTO-ENTMCNC: 31.9 GM/DL (ref 32.2–35.5)
MCHC RBC AUTO-ENTMCNC: 32.5 GM/DL (ref 32.2–35.5)
MCV RBC AUTO: 104.1 FL (ref 80–94)
MCV RBC AUTO: 105.1 FL (ref 80–94)
MCV RBC AUTO: 109.9 FL (ref 80–94)
MISCELLANEOUS 2: ABNORMAL
MONOCYTES ABSOLUTE: 1 THOU/MM3 (ref 0.4–1.3)
MONOCYTES NFR BLD AUTO: 5.2 %
MORAXELLA CATARRHALIS BY PCR: NOT DETECTED
MRSA DNA SPEC QL NAA+PROBE: NEGATIVE
NEUTROPHILS NFR BLD AUTO: 82.6 %
NITRITE UR QL STRIP: NEGATIVE
NRBC BLD AUTO-RTO: 0 /100 WBC
NT-PROBNP SERPL IA-MCNC: 6381 PG/ML (ref 0–449)
ORGANISM: ABNORMAL
ORGANISM: ABNORMAL
OSMOLALITY SERPL CALC.SUM OF ELEC: 302.6 MOSMOL/KG (ref 275–300)
PATHOLOGIST REVIEW: ABNORMAL
PCO2 TEMP ADJ BLDMV: 52 MMHG (ref 41–51)
PCO2 TEMP ADJ BLDMV: 65 MMHG (ref 41–51)
PH BLDMV: 7.11 [PH] (ref 7.31–7.41)
PH BLDMV: 7.26 [PH] (ref 7.31–7.41)
PH UR STRIP.AUTO: 5 [PH] (ref 5–9)
PHOSPHATE SERPL-MCNC: 5.6 MG/DL (ref 2.4–4.7)
PIP: 26 CMH2O
PLATELET # BLD AUTO: 218 THOU/MM3 (ref 130–400)
PLATELET # BLD AUTO: 225 THOU/MM3 (ref 130–400)
PLATELET # BLD AUTO: 263 THOU/MM3 (ref 130–400)
PMV BLD AUTO: 10 FL (ref 9.4–12.4)
PMV BLD AUTO: 10.1 FL (ref 9.4–12.4)
PMV BLD AUTO: 10.3 FL (ref 9.4–12.4)
PO2 BLDMV: 19 MMHG (ref 25–40)
PO2 BLDMV: 51 MMHG (ref 25–40)
POC CREATININE WHOLE BLOOD: 1.9 MG/DL (ref 0.5–1.2)
POTASSIUM BLD-SCNC: 4.3 MEQ/L (ref 3.5–4.9)
POTASSIUM SERPL-SCNC: 4 MEQ/L (ref 3.5–5.2)
POTASSIUM SERPL-SCNC: 4.3 MEQ/L (ref 3.5–5.2)
POTASSIUM SERPL-SCNC: 4.8 MEQ/L (ref 3.5–5.2)
POTASSIUM SERPL-SCNC: 4.9 MEQ/L (ref 3.5–5.2)
POTASSIUM SERPL-SCNC: 5.3 MEQ/L (ref 3.5–5.2)
POTASSIUM SERPL-SCNC: 5.4 MEQ/L (ref 3.5–5.2)
POTASSIUM SERPL-SCNC: 6.1 MEQ/L (ref 3.5–5.2)
PROCALCITONIN SERPL IA-MCNC: 0.09 NG/ML (ref 0.01–0.09)
PROT SERPL-MCNC: 5.6 G/DL (ref 6.1–8)
PROT UR STRIP.AUTO-MCNC: >= 300 MG/DL
PROTEUS SPECIES BY PCR: NOT DETECTED
PSEUDOMONAS AERUGINOSA BY PCR: NOT DETECTED
RBC # BLD AUTO: 2.93 MILL/MM3 (ref 4.7–6.1)
RBC # BLD AUTO: 2.95 MILL/MM3 (ref 4.7–6.1)
RBC # BLD AUTO: 3.14 MILL/MM3 (ref 4.7–6.1)
RBC URINE: > 100 /HPF
REASON FOR REJECTION: NORMAL
REJECTED TEST: NORMAL
RENAL EPI CELLS #/AREA URNS HPF: ABNORMAL /[HPF]
RESISTANT GENE MECA/C & MREJ BY PCR: NOT DETECTED
RESISTANT GENE NDM BY PCR: ABNORMAL
RSV RNA LOWER RESP QL NAA+NON-PROBE: NOT DETECTED
RV+EV RNA LOWER RESP QL NAA+NON-PROBE: NOT DETECTED
SAO2 % BLDMV: 23 %
SAO2 % BLDMV: 71 %
SCAN OF BLOOD SMEAR: NORMAL
SCAN OF BLOOD SMEAR: NORMAL
SEGMENTED NEUTROPHILS ABSOLUTE COUNT: 16.4 THOU/MM3 (ref 1.8–7.7)
SERRATIA MARCESCENS BY PCR: NOT DETECTED
SET PEEP: 10 MMHG
SET PEEP: 8 MMHG
SET PRESS SUPP: 18 CMH2O
SET RESPIRATORY RATE: 20 BPM
SITE: ABNORMAL
SODIUM BLD-SCNC: 136 MEQ/L (ref 138–146)
SODIUM SERPL-SCNC: 135 MEQ/L (ref 135–145)
SODIUM SERPL-SCNC: 136 MEQ/L (ref 135–145)
SODIUM SERPL-SCNC: 137 MEQ/L (ref 135–145)
SODIUM SERPL-SCNC: 139 MEQ/L (ref 135–145)
SODIUM SERPL-SCNC: 139 MEQ/L (ref 135–145)
SODIUM SERPL-SCNC: 140 MEQ/L (ref 135–145)
SODIUM SERPL-SCNC: 141 MEQ/L (ref 135–145)
SOURCE: ABNORMAL
SP GR UR REFRACT.AUTO: 1.02 (ref 1–1.03)
SPECIMEN ACCEPTABILITY: ABNORMAL
STAPH AUREUS BY PCR: DETECTED
STREP AGALACTIAE BY PCR: NOT DETECTED
STREP PNEUMONIAE BY PCR: NOT DETECTED
STREP PYOGENES BY PCR: NOT DETECTED
TROPONIN, HIGH SENSITIVITY: 1004 NG/L (ref 0–12)
TROPONIN, HIGH SENSITIVITY: 867 NG/L (ref 0–12)
UROBILINOGEN, URINE: 1 EU/DL (ref 0–1)
VENTILATION MODE VENT: ABNORMAL
WBC # BLD AUTO: 19.9 THOU/MM3 (ref 4.8–10.8)
WBC # BLD AUTO: 31.1 THOU/MM3 (ref 4.8–10.8)
WBC # BLD AUTO: 33.7 THOU/MM3 (ref 4.8–10.8)
WBC #/AREA URNS HPF: > 200 /HPF
WBC #/AREA URNS HPF: ABNORMAL /[HPF]
YEAST LIKE FUNGI URNS QL MICRO: ABNORMAL

## 2023-01-01 PROCEDURE — 6360000002 HC RX W HCPCS

## 2023-01-01 PROCEDURE — 6360000002 HC RX W HCPCS: Performed by: EMERGENCY MEDICINE

## 2023-01-01 PROCEDURE — 82803 BLOOD GASES ANY COMBINATION: CPT

## 2023-01-01 PROCEDURE — 94761 N-INVAS EAR/PLS OXIMETRY MLT: CPT

## 2023-01-01 PROCEDURE — 83880 ASSAY OF NATRIURETIC PEPTIDE: CPT

## 2023-01-01 PROCEDURE — 94002 VENT MGMT INPAT INIT DAY: CPT

## 2023-01-01 PROCEDURE — 87205 SMEAR GRAM STAIN: CPT

## 2023-01-01 PROCEDURE — 2000000000 HC ICU R&B

## 2023-01-01 PROCEDURE — 2580000003 HC RX 258

## 2023-01-01 PROCEDURE — 87631 RESP VIRUS 3-5 TARGETS: CPT

## 2023-01-01 PROCEDURE — 87641 MR-STAPH DNA AMP PROBE: CPT

## 2023-01-01 PROCEDURE — 94003 VENT MGMT INPAT SUBQ DAY: CPT

## 2023-01-01 PROCEDURE — 83605 ASSAY OF LACTIC ACID: CPT

## 2023-01-01 PROCEDURE — 5A12012 PERFORMANCE OF CARDIAC OUTPUT, SINGLE, MANUAL: ICD-10-PCS | Performed by: INTERNAL MEDICINE

## 2023-01-01 PROCEDURE — 5A1945Z RESPIRATORY VENTILATION, 24-96 CONSECUTIVE HOURS: ICD-10-PCS | Performed by: INTERNAL MEDICINE

## 2023-01-01 PROCEDURE — 82330 ASSAY OF CALCIUM: CPT

## 2023-01-01 PROCEDURE — 2500000003 HC RX 250 WO HCPCS: Performed by: EMERGENCY MEDICINE

## 2023-01-01 PROCEDURE — 87186 SC STD MICRODIL/AGAR DIL: CPT

## 2023-01-01 PROCEDURE — 6370000000 HC RX 637 (ALT 250 FOR IP)

## 2023-01-01 PROCEDURE — 87798 DETECT AGENT NOS DNA AMP: CPT

## 2023-01-01 PROCEDURE — 2580000003 HC RX 258: Performed by: EMERGENCY MEDICINE

## 2023-01-01 PROCEDURE — 84100 ASSAY OF PHOSPHORUS: CPT

## 2023-01-01 PROCEDURE — 83735 ASSAY OF MAGNESIUM: CPT

## 2023-01-01 PROCEDURE — 36415 COLL VENOUS BLD VENIPUNCTURE: CPT

## 2023-01-01 PROCEDURE — 83690 ASSAY OF LIPASE: CPT

## 2023-01-01 PROCEDURE — 82948 REAGENT STRIP/BLOOD GLUCOSE: CPT

## 2023-01-01 PROCEDURE — 82947 ASSAY GLUCOSE BLOOD QUANT: CPT

## 2023-01-01 PROCEDURE — 80048 BASIC METABOLIC PNL TOTAL CA: CPT

## 2023-01-01 PROCEDURE — 84484 ASSAY OF TROPONIN QUANT: CPT

## 2023-01-01 PROCEDURE — 84295 ASSAY OF SERUM SODIUM: CPT

## 2023-01-01 PROCEDURE — 82565 ASSAY OF CREATININE: CPT

## 2023-01-01 PROCEDURE — 92950 HEART/LUNG RESUSCITATION CPR: CPT

## 2023-01-01 PROCEDURE — 89220 SPUTUM SPECIMEN COLLECTION: CPT

## 2023-01-01 PROCEDURE — 87077 CULTURE AEROBIC IDENTIFY: CPT

## 2023-01-01 PROCEDURE — 99285 EMERGENCY DEPT VISIT HI MDM: CPT

## 2023-01-01 PROCEDURE — 99291 CRITICAL CARE FIRST HOUR: CPT | Performed by: INTERNAL MEDICINE

## 2023-01-01 PROCEDURE — 81001 URINALYSIS AUTO W/SCOPE: CPT

## 2023-01-01 PROCEDURE — 6370000000 HC RX 637 (ALT 250 FOR IP): Performed by: STUDENT IN AN ORGANIZED HEALTH CARE EDUCATION/TRAINING PROGRAM

## 2023-01-01 PROCEDURE — 96375 TX/PRO/DX INJ NEW DRUG ADDON: CPT

## 2023-01-01 PROCEDURE — 36592 COLLECT BLOOD FROM PICC: CPT

## 2023-01-01 PROCEDURE — 85027 COMPLETE CBC AUTOMATED: CPT

## 2023-01-01 PROCEDURE — 6370000000 HC RX 637 (ALT 250 FOR IP): Performed by: EMERGENCY MEDICINE

## 2023-01-01 PROCEDURE — 70551 MRI BRAIN STEM W/O DYE: CPT

## 2023-01-01 PROCEDURE — 36556 INSERT NON-TUNNEL CV CATH: CPT

## 2023-01-01 PROCEDURE — 02HV33Z INSERTION OF INFUSION DEVICE INTO SUPERIOR VENA CAVA, PERCUTANEOUS APPROACH: ICD-10-PCS | Performed by: INTERNAL MEDICINE

## 2023-01-01 PROCEDURE — 87486 CHLMYD PNEUM DNA AMP PROBE: CPT

## 2023-01-01 PROCEDURE — 96365 THER/PROPH/DIAG IV INF INIT: CPT

## 2023-01-01 PROCEDURE — 87070 CULTURE OTHR SPECIMN AEROBIC: CPT

## 2023-01-01 PROCEDURE — 0BH17EZ INSERTION OF ENDOTRACHEAL AIRWAY INTO TRACHEA, VIA NATURAL OR ARTIFICIAL OPENING: ICD-10-PCS | Performed by: INTERNAL MEDICINE

## 2023-01-01 PROCEDURE — 87040 BLOOD CULTURE FOR BACTERIA: CPT

## 2023-01-01 PROCEDURE — 93005 ELECTROCARDIOGRAM TRACING: CPT | Performed by: EMERGENCY MEDICINE

## 2023-01-01 PROCEDURE — 71045 X-RAY EXAM CHEST 1 VIEW: CPT

## 2023-01-01 PROCEDURE — 70450 CT HEAD/BRAIN W/O DYE: CPT

## 2023-01-01 PROCEDURE — 80053 COMPREHEN METABOLIC PANEL: CPT

## 2023-01-01 PROCEDURE — 84145 PROCALCITONIN (PCT): CPT

## 2023-01-01 PROCEDURE — 85610 PROTHROMBIN TIME: CPT

## 2023-01-01 PROCEDURE — 84132 ASSAY OF SERUM POTASSIUM: CPT

## 2023-01-01 PROCEDURE — 6360000002 HC RX W HCPCS: Performed by: NURSE PRACTITIONER

## 2023-01-01 PROCEDURE — 95718 EEG PHYS/QHP 2-12 HR W/VEEG: CPT | Performed by: PSYCHIATRY & NEUROLOGY

## 2023-01-01 PROCEDURE — 2720000010 HC SURG SUPPLY STERILE

## 2023-01-01 PROCEDURE — 99238 HOSP IP/OBS DSCHRG MGMT 30/<: CPT | Performed by: INTERNAL MEDICINE

## 2023-01-01 PROCEDURE — 93010 ELECTROCARDIOGRAM REPORT: CPT | Performed by: INTERNAL MEDICINE

## 2023-01-01 PROCEDURE — 85025 COMPLETE CBC W/AUTO DIFF WBC: CPT

## 2023-01-01 PROCEDURE — 82077 ASSAY SPEC XCP UR&BREATH IA: CPT

## 2023-01-01 PROCEDURE — 87541 LEGION PNEUMO DNA AMP PROB: CPT

## 2023-01-01 PROCEDURE — 2500000003 HC RX 250 WO HCPCS

## 2023-01-01 PROCEDURE — 87147 CULTURE TYPE IMMUNOLOGIC: CPT

## 2023-01-01 PROCEDURE — 82435 ASSAY OF BLOOD CHLORIDE: CPT

## 2023-01-01 PROCEDURE — 87150 DNA/RNA AMPLIFIED PROBE: CPT

## 2023-01-01 PROCEDURE — 2700000000 HC OXYGEN THERAPY PER DAY

## 2023-01-01 PROCEDURE — 82550 ASSAY OF CK (CPK): CPT

## 2023-01-01 PROCEDURE — 87581 M.PNEUMON DNA AMP PROBE: CPT

## 2023-01-01 PROCEDURE — 95705 EEG W/O VID 2-12 HR UNMNTR: CPT

## 2023-01-01 PROCEDURE — 85379 FIBRIN DEGRADATION QUANT: CPT

## 2023-01-01 PROCEDURE — 95717 EEG PHYS/QHP 2-12 HR W/O VID: CPT | Performed by: PSYCHIATRY & NEUROLOGY

## 2023-01-01 PROCEDURE — 87086 URINE CULTURE/COLONY COUNT: CPT

## 2023-01-01 PROCEDURE — 94640 AIRWAY INHALATION TREATMENT: CPT

## 2023-01-01 RX ORDER — DEXTROSE MONOHYDRATE 100 MG/ML
INJECTION, SOLUTION INTRAVENOUS CONTINUOUS PRN
Status: DISCONTINUED | OUTPATIENT
Start: 2023-01-01 | End: 2023-01-01

## 2023-01-01 RX ORDER — LORAZEPAM 2 MG/ML
2 INJECTION INTRAMUSCULAR ONCE
Status: COMPLETED | OUTPATIENT
Start: 2023-01-01 | End: 2023-01-01

## 2023-01-01 RX ORDER — INSULIN LISPRO 100 [IU]/ML
0-8 INJECTION, SOLUTION INTRAVENOUS; SUBCUTANEOUS EVERY 4 HOURS
Status: DISCONTINUED | OUTPATIENT
Start: 2023-01-01 | End: 2023-01-01

## 2023-01-01 RX ORDER — AMIODARONE HYDROCHLORIDE 50 MG/ML
INJECTION, SOLUTION INTRAVENOUS DAILY PRN
Status: COMPLETED | OUTPATIENT
Start: 2023-01-01 | End: 2023-01-01

## 2023-01-01 RX ORDER — CALCIUM CHLORIDE 100 MG/ML
INJECTION INTRAVENOUS; INTRAVENTRICULAR DAILY PRN
Status: COMPLETED | OUTPATIENT
Start: 2023-01-01 | End: 2023-01-01

## 2023-01-01 RX ORDER — MINERAL OIL AND WHITE PETROLATUM 150; 830 MG/G; MG/G
OINTMENT OPHTHALMIC PRN
Status: DISCONTINUED | OUTPATIENT
Start: 2023-01-01 | End: 2023-01-01 | Stop reason: CLARIF

## 2023-01-01 RX ORDER — NOREPINEPHRINE BITARTRATE 0.06 MG/ML
INJECTION, SOLUTION INTRAVENOUS
Status: DISPENSED
Start: 2023-01-01 | End: 2023-01-01

## 2023-01-01 RX ORDER — LORAZEPAM 2 MG/ML
INJECTION INTRAMUSCULAR
Status: COMPLETED
Start: 2023-01-01 | End: 2023-01-01

## 2023-01-01 RX ORDER — INSULIN LISPRO 100 [IU]/ML
0-4 INJECTION, SOLUTION INTRAVENOUS; SUBCUTANEOUS NIGHTLY
Status: DISCONTINUED | OUTPATIENT
Start: 2023-01-01 | End: 2023-01-01

## 2023-01-01 RX ORDER — MORPHINE SULFATE 2 MG/ML
2 INJECTION, SOLUTION INTRAMUSCULAR; INTRAVENOUS
Status: DISCONTINUED | OUTPATIENT
Start: 2023-01-01 | End: 2023-01-01 | Stop reason: HOSPADM

## 2023-01-01 RX ORDER — SODIUM CHLORIDE, SODIUM LACTATE, POTASSIUM CHLORIDE, CALCIUM CHLORIDE 600; 310; 30; 20 MG/100ML; MG/100ML; MG/100ML; MG/100ML
INJECTION, SOLUTION INTRAVENOUS CONTINUOUS
Status: DISCONTINUED | OUTPATIENT
Start: 2023-01-01 | End: 2023-01-01

## 2023-01-01 RX ORDER — NOREPINEPHRINE BITARTRATE 0.06 MG/ML
1-100 INJECTION, SOLUTION INTRAVENOUS CONTINUOUS
Status: DISCONTINUED | OUTPATIENT
Start: 2023-01-01 | End: 2023-01-01

## 2023-01-01 RX ORDER — ALBUTEROL SULFATE 2.5 MG/3ML
10 SOLUTION RESPIRATORY (INHALATION) ONCE
Status: COMPLETED | OUTPATIENT
Start: 2023-01-01 | End: 2023-01-01

## 2023-01-01 RX ORDER — MORPHINE SULFATE 2 MG/ML
INJECTION, SOLUTION INTRAMUSCULAR; INTRAVENOUS
Status: COMPLETED
Start: 2023-01-01 | End: 2023-01-01

## 2023-01-01 RX ORDER — PROPOFOL 10 MG/ML
5-50 INJECTION, EMULSION INTRAVENOUS CONTINUOUS
Status: DISCONTINUED | OUTPATIENT
Start: 2023-01-01 | End: 2023-01-01

## 2023-01-01 RX ORDER — NOREPINEPHRINE BITARTRATE 0.06 MG/ML
INJECTION, SOLUTION INTRAVENOUS CONTINUOUS PRN
Status: COMPLETED | OUTPATIENT
Start: 2023-01-01 | End: 2023-01-01

## 2023-01-01 RX ORDER — MORPHINE SULFATE 2 MG/ML
2 INJECTION, SOLUTION INTRAMUSCULAR; INTRAVENOUS ONCE
Status: COMPLETED | OUTPATIENT
Start: 2023-01-01 | End: 2023-01-01

## 2023-01-01 RX ORDER — LORAZEPAM 2 MG/ML
1 INJECTION INTRAMUSCULAR
Status: DISCONTINUED | OUTPATIENT
Start: 2023-01-01 | End: 2023-01-01 | Stop reason: HOSPADM

## 2023-01-01 RX ORDER — PROPOFOL 10 MG/ML
INJECTION, EMULSION INTRAVENOUS
Status: DISPENSED
Start: 2023-01-01 | End: 2023-01-01

## 2023-01-01 RX ORDER — MAGNESIUM SULFATE HEPTAHYDRATE 500 MG/ML
INJECTION, SOLUTION INTRAMUSCULAR; INTRAVENOUS DAILY PRN
Status: COMPLETED | OUTPATIENT
Start: 2023-01-01 | End: 2023-01-01

## 2023-01-01 RX ORDER — HYDRALAZINE HYDROCHLORIDE 20 MG/ML
10 INJECTION INTRAMUSCULAR; INTRAVENOUS EVERY 6 HOURS PRN
Status: DISCONTINUED | OUTPATIENT
Start: 2023-01-01 | End: 2023-01-01

## 2023-01-01 RX ORDER — MORPHINE SULFATE 2 MG/ML
2 INJECTION, SOLUTION INTRAMUSCULAR; INTRAVENOUS
Status: DISCONTINUED | OUTPATIENT
Start: 2023-01-01 | End: 2023-01-01

## 2023-01-01 RX ORDER — GLYCOPYRROLATE 0.2 MG/ML
0.2 INJECTION INTRAMUSCULAR; INTRAVENOUS EVERY 4 HOURS PRN
Status: DISCONTINUED | OUTPATIENT
Start: 2023-01-01 | End: 2023-01-01 | Stop reason: HOSPADM

## 2023-01-01 RX ORDER — INSULIN LISPRO 100 [IU]/ML
0-8 INJECTION, SOLUTION INTRAVENOUS; SUBCUTANEOUS
Status: DISCONTINUED | OUTPATIENT
Start: 2023-01-01 | End: 2023-01-01

## 2023-01-01 RX ORDER — CALCIUM GLUCONATE 20 MG/ML
2000 INJECTION, SOLUTION INTRAVENOUS ONCE
Status: COMPLETED | OUTPATIENT
Start: 2023-01-01 | End: 2023-01-01

## 2023-01-01 RX ORDER — IBUPROFEN 600 MG/1
1 TABLET ORAL PRN
Status: DISCONTINUED | OUTPATIENT
Start: 2023-01-01 | End: 2023-01-01

## 2023-01-01 RX ORDER — LABETALOL HYDROCHLORIDE 5 MG/ML
10 INJECTION INTRAVENOUS ONCE
Status: COMPLETED | OUTPATIENT
Start: 2023-01-01 | End: 2023-01-01

## 2023-01-01 RX ORDER — CHLORHEXIDINE GLUCONATE ORAL RINSE 1.2 MG/ML
15 SOLUTION DENTAL 2 TIMES DAILY
Status: DISCONTINUED | OUTPATIENT
Start: 2023-01-01 | End: 2023-01-01

## 2023-01-01 RX ORDER — MINERAL OIL AND WHITE PETROLATUM 150; 830 MG/G; MG/G
OINTMENT OPHTHALMIC PRN
Status: DISCONTINUED | OUTPATIENT
Start: 2023-01-01 | End: 2023-01-01 | Stop reason: HOSPADM

## 2023-01-01 RX ORDER — MORPHINE SULFATE 4 MG/ML
4 INJECTION, SOLUTION INTRAMUSCULAR; INTRAVENOUS
Status: DISCONTINUED | OUTPATIENT
Start: 2023-01-01 | End: 2023-01-01 | Stop reason: HOSPADM

## 2023-01-01 RX ORDER — FUROSEMIDE 10 MG/ML
20 INJECTION INTRAMUSCULAR; INTRAVENOUS ONCE
Status: COMPLETED | OUTPATIENT
Start: 2023-01-01 | End: 2023-01-01

## 2023-01-01 RX ORDER — EPINEPHRINE IN SOD CHLOR,ISO 1 MG/10 ML
SYRINGE (ML) INTRAVENOUS DAILY PRN
Status: COMPLETED | OUTPATIENT
Start: 2023-01-01 | End: 2023-01-01

## 2023-01-01 RX ORDER — HYDRALAZINE HYDROCHLORIDE 20 MG/ML
10 INJECTION INTRAMUSCULAR; INTRAVENOUS EVERY 6 HOURS PRN
Status: DISCONTINUED | OUTPATIENT
Start: 2023-01-01 | End: 2023-01-01 | Stop reason: HOSPADM

## 2023-01-01 RX ADMIN — LEVETIRACETAM 1500 MG: 100 INJECTION, SOLUTION INTRAVENOUS at 01:52

## 2023-01-01 RX ADMIN — SODIUM ZIRCONIUM CYCLOSILICATE 10 G: 10 POWDER, FOR SUSPENSION ORAL at 05:18

## 2023-01-01 RX ADMIN — SODIUM CHLORIDE, POTASSIUM CHLORIDE, SODIUM LACTATE AND CALCIUM CHLORIDE: 600; 310; 30; 20 INJECTION, SOLUTION INTRAVENOUS at 00:24

## 2023-01-01 RX ADMIN — FUROSEMIDE 20 MG: 10 INJECTION, SOLUTION INTRAMUSCULAR; INTRAVENOUS at 18:07

## 2023-01-01 RX ADMIN — LORAZEPAM 2 MG: 2 INJECTION INTRAMUSCULAR; INTRAVENOUS at 02:13

## 2023-01-01 RX ADMIN — PIPERACILLIN AND TAZOBACTAM 4500 MG: 4; .5 INJECTION, POWDER, LYOPHILIZED, FOR SOLUTION INTRAVENOUS at 04:58

## 2023-01-01 RX ADMIN — CHLORHEXIDINE GLUCONATE 15 ML: 1.2 RINSE ORAL at 08:39

## 2023-01-01 RX ADMIN — INSULIN LISPRO 4 UNITS: 100 INJECTION, SOLUTION INTRAVENOUS; SUBCUTANEOUS at 09:19

## 2023-01-01 RX ADMIN — SODIUM CHLORIDE, POTASSIUM CHLORIDE, SODIUM LACTATE AND CALCIUM CHLORIDE: 600; 310; 30; 20 INJECTION, SOLUTION INTRAVENOUS at 17:35

## 2023-01-01 RX ADMIN — MORPHINE SULFATE 2 MG/HR: 10 INJECTION INTRAVENOUS at 17:17

## 2023-01-01 RX ADMIN — LORAZEPAM 1 MG: 2 INJECTION INTRAMUSCULAR; INTRAVENOUS at 12:54

## 2023-01-01 RX ADMIN — INSULIN LISPRO 2 UNITS: 100 INJECTION, SOLUTION INTRAVENOUS; SUBCUTANEOUS at 17:05

## 2023-01-01 RX ADMIN — LORAZEPAM 2 MG: 2 INJECTION INTRAMUSCULAR at 02:01

## 2023-01-01 RX ADMIN — Medication 1 MG: at 21:32

## 2023-01-01 RX ADMIN — MINERAL OIL, WHITE PETROLATUM: .03; .94 OINTMENT OPHTHALMIC at 01:16

## 2023-01-01 RX ADMIN — PROPOFOL 30 MCG/KG/MIN: 10 INJECTION, EMULSION INTRAVENOUS at 13:15

## 2023-01-01 RX ADMIN — INSULIN LISPRO 2 UNITS: 100 INJECTION, SOLUTION INTRAVENOUS; SUBCUTANEOUS at 12:32

## 2023-01-01 RX ADMIN — LEVETIRACETAM 500 MG: 100 INJECTION, SOLUTION INTRAVENOUS at 13:35

## 2023-01-01 RX ADMIN — LORAZEPAM 1 MG: 2 INJECTION INTRAMUSCULAR; INTRAVENOUS at 00:23

## 2023-01-01 RX ADMIN — LORAZEPAM 2 MG: 2 INJECTION INTRAMUSCULAR at 02:13

## 2023-01-01 RX ADMIN — CHLORHEXIDINE GLUCONATE 15 ML: 1.2 RINSE ORAL at 20:40

## 2023-01-01 RX ADMIN — AMIODARONE HYDROCHLORIDE 0.5 MG/MIN: 1.8 INJECTION, SOLUTION INTRAVENOUS at 04:09

## 2023-01-01 RX ADMIN — MAGNESIUM SULFATE 2000 MG: 500 INJECTION, SOLUTION INTRAMUSCULAR; INTRAVENOUS at 21:37

## 2023-01-01 RX ADMIN — PROPOFOL 20 MG: 10 INJECTION, EMULSION INTRAVENOUS at 02:25

## 2023-01-01 RX ADMIN — SODIUM CHLORIDE, POTASSIUM CHLORIDE, SODIUM LACTATE AND CALCIUM CHLORIDE: 600; 310; 30; 20 INJECTION, SOLUTION INTRAVENOUS at 01:55

## 2023-01-01 RX ADMIN — AMIODARONE HYDROCHLORIDE 0.5 MG/MIN: 1.8 INJECTION, SOLUTION INTRAVENOUS at 02:40

## 2023-01-01 RX ADMIN — LORAZEPAM 2 MG: 2 INJECTION INTRAMUSCULAR; INTRAVENOUS at 02:01

## 2023-01-01 RX ADMIN — HYDRALAZINE HYDROCHLORIDE 10 MG: 20 INJECTION, SOLUTION INTRAMUSCULAR; INTRAVENOUS at 19:36

## 2023-01-01 RX ADMIN — AMIODARONE HYDROCHLORIDE 0.5 MG/MIN: 1.8 INJECTION, SOLUTION INTRAVENOUS at 02:34

## 2023-01-01 RX ADMIN — HYDRALAZINE HYDROCHLORIDE 10 MG: 20 INJECTION, SOLUTION INTRAMUSCULAR; INTRAVENOUS at 06:07

## 2023-01-01 RX ADMIN — CALCIUM CHLORIDE 1000 MG: 100 INJECTION, SOLUTION INTRAVENOUS at 21:32

## 2023-01-01 RX ADMIN — CALCIUM GLUCONATE 2000 MG: 20 INJECTION, SOLUTION INTRAVENOUS at 00:24

## 2023-01-01 RX ADMIN — INSULIN LISPRO 2 UNITS: 100 INJECTION, SOLUTION INTRAVENOUS; SUBCUTANEOUS at 01:15

## 2023-01-01 RX ADMIN — LEVETIRACETAM 500 MG: 100 INJECTION, SOLUTION INTRAVENOUS at 01:12

## 2023-01-01 RX ADMIN — GLYCOPYRROLATE 0.2 MG: 0.2 INJECTION INTRAMUSCULAR; INTRAVENOUS at 00:23

## 2023-01-01 RX ADMIN — MORPHINE SULFATE 2 MG: 2 INJECTION, SOLUTION INTRAMUSCULAR; INTRAVENOUS at 06:36

## 2023-01-01 RX ADMIN — AMIODARONE HYDROCHLORIDE 1 MG/MIN: 1.8 INJECTION, SOLUTION INTRAVENOUS at 21:53

## 2023-01-01 RX ADMIN — INSULIN LISPRO 2 UNITS: 100 INJECTION, SOLUTION INTRAVENOUS; SUBCUTANEOUS at 01:53

## 2023-01-01 RX ADMIN — INSULIN LISPRO 4 UNITS: 100 INJECTION, SOLUTION INTRAVENOUS; SUBCUTANEOUS at 05:52

## 2023-01-01 RX ADMIN — INSULIN LISPRO 2 UNITS: 100 INJECTION, SOLUTION INTRAVENOUS; SUBCUTANEOUS at 05:14

## 2023-01-01 RX ADMIN — CEFTRIAXONE SODIUM 1000 MG: 1 INJECTION, POWDER, FOR SOLUTION INTRAMUSCULAR; INTRAVENOUS at 02:38

## 2023-01-01 RX ADMIN — INSULIN LISPRO 2 UNITS: 100 INJECTION, SOLUTION INTRAVENOUS; SUBCUTANEOUS at 17:22

## 2023-01-01 RX ADMIN — CEFTRIAXONE SODIUM 1000 MG: 1 INJECTION, POWDER, FOR SOLUTION INTRAMUSCULAR; INTRAVENOUS at 02:56

## 2023-01-01 RX ADMIN — AMIODARONE HYDROCHLORIDE 0.5 MG/MIN: 1.8 INJECTION, SOLUTION INTRAVENOUS at 14:53

## 2023-01-01 RX ADMIN — ALBUTEROL SULFATE 10 MG: 2.5 SOLUTION RESPIRATORY (INHALATION) at 00:32

## 2023-01-01 RX ADMIN — MINERAL OIL, WHITE PETROLATUM: .03; .94 OINTMENT OPHTHALMIC at 00:21

## 2023-01-01 RX ADMIN — INSULIN HUMAN 8 UNITS: 100 INJECTION, SOLUTION PARENTERAL at 00:25

## 2023-01-01 RX ADMIN — INSULIN LISPRO 2 UNITS: 100 INJECTION, SOLUTION INTRAVENOUS; SUBCUTANEOUS at 13:27

## 2023-01-01 RX ADMIN — CHLORHEXIDINE GLUCONATE 15 ML: 1.2 RINSE ORAL at 19:59

## 2023-01-01 RX ADMIN — INSULIN LISPRO 2 UNITS: 100 INJECTION, SOLUTION INTRAVENOUS; SUBCUTANEOUS at 08:39

## 2023-01-01 RX ADMIN — LEVETIRACETAM 500 MG: 100 INJECTION, SOLUTION INTRAVENOUS at 01:38

## 2023-01-01 RX ADMIN — SODIUM BICARBONATE 50 MEQ: 84 INJECTION, SOLUTION INTRAVENOUS at 21:31

## 2023-01-01 RX ADMIN — CHLORHEXIDINE GLUCONATE 15 ML: 1.2 RINSE ORAL at 09:20

## 2023-01-01 RX ADMIN — SODIUM CHLORIDE, POTASSIUM CHLORIDE, SODIUM LACTATE AND CALCIUM CHLORIDE: 600; 310; 30; 20 INJECTION, SOLUTION INTRAVENOUS at 04:27

## 2023-01-01 RX ADMIN — VANCOMYCIN HYDROCHLORIDE 1000 MG: 1 INJECTION, POWDER, LYOPHILIZED, FOR SOLUTION INTRAVENOUS at 00:51

## 2023-01-01 RX ADMIN — SODIUM CHLORIDE, POTASSIUM CHLORIDE, SODIUM LACTATE AND CALCIUM CHLORIDE: 600; 310; 30; 20 INJECTION, SOLUTION INTRAVENOUS at 09:27

## 2023-01-01 RX ADMIN — LEVETIRACETAM 500 MG: 100 INJECTION, SOLUTION INTRAVENOUS at 13:16

## 2023-01-01 RX ADMIN — INSULIN LISPRO 4 UNITS: 100 INJECTION, SOLUTION INTRAVENOUS; SUBCUTANEOUS at 02:12

## 2023-01-01 RX ADMIN — PROPOFOL 30 MCG/KG/MIN: 10 INJECTION, EMULSION INTRAVENOUS at 08:50

## 2023-01-01 RX ADMIN — Medication 5 MCG/MIN: at 21:38

## 2023-01-01 RX ADMIN — LABETALOL HYDROCHLORIDE 10 MG: 5 INJECTION INTRAVENOUS at 05:12

## 2023-01-01 RX ADMIN — AMIODARONE HYDROCHLORIDE 150 MG: 50 INJECTION, SOLUTION INTRAVENOUS at 21:42

## 2023-01-01 RX ADMIN — SODIUM CHLORIDE, POTASSIUM CHLORIDE, SODIUM LACTATE AND CALCIUM CHLORIDE: 600; 310; 30; 20 INJECTION, SOLUTION INTRAVENOUS at 20:14

## 2023-01-01 RX ADMIN — INSULIN LISPRO 4 UNITS: 100 INJECTION, SOLUTION INTRAVENOUS; SUBCUTANEOUS at 20:04

## 2023-01-01 RX ADMIN — MORPHINE SULFATE 2 MG: 2 INJECTION, SOLUTION INTRAMUSCULAR; INTRAVENOUS at 22:07

## 2023-01-01 RX ADMIN — SODIUM CHLORIDE, POTASSIUM CHLORIDE, SODIUM LACTATE AND CALCIUM CHLORIDE: 600; 310; 30; 20 INJECTION, SOLUTION INTRAVENOUS at 09:24

## 2023-01-01 RX ADMIN — AMIODARONE HYDROCHLORIDE 0.5 MG/MIN: 1.8 INJECTION, SOLUTION INTRAVENOUS at 14:44

## 2023-01-01 RX ADMIN — HYDRALAZINE HYDROCHLORIDE 10 MG: 20 INJECTION, SOLUTION INTRAMUSCULAR; INTRAVENOUS at 12:36

## 2023-01-01 ASSESSMENT — PULMONARY FUNCTION TESTS
PIF_VALUE: 27
PIF_VALUE: 28
PIF_VALUE: 22
PIF_VALUE: 25
PIF_VALUE: 32
PIF_VALUE: 27
PIF_VALUE: 21
PIF_VALUE: 30
PIF_VALUE: 23
PIF_VALUE: 29
PIF_VALUE: 25
PIF_VALUE: 28
PIF_VALUE: 31
PIF_VALUE: 27
PIF_VALUE: 34
PIF_VALUE: 29
PIF_VALUE: 28
PIF_VALUE: 25
PIF_VALUE: 27
PIF_VALUE: 30
PIF_VALUE: 28

## 2023-01-03 ENCOUNTER — NURSE ONLY (OUTPATIENT)
Dept: LAB | Age: 78
End: 2023-01-03

## 2023-01-03 DIAGNOSIS — N18.30 ANEMIA IN STAGE 3 CHRONIC KIDNEY DISEASE, UNSPECIFIED WHETHER STAGE 3A OR 3B CKD (HCC): ICD-10-CM

## 2023-01-03 DIAGNOSIS — D63.1 ANEMIA IN STAGE 3 CHRONIC KIDNEY DISEASE, UNSPECIFIED WHETHER STAGE 3A OR 3B CKD (HCC): ICD-10-CM

## 2023-01-03 LAB
HCT VFR BLD CALC: 34.8 % (ref 42–52)
HEMOGLOBIN: 11.4 GM/DL (ref 14–18)

## 2023-01-11 DIAGNOSIS — I25.10 CORONARY ARTERY DISEASE INVOLVING NATIVE CORONARY ARTERY: ICD-10-CM

## 2023-01-12 RX ORDER — METOPROLOL SUCCINATE 50 MG/1
50 TABLET, EXTENDED RELEASE ORAL DAILY
Qty: 90 TABLET | Refills: 0 | Status: SHIPPED | OUTPATIENT
Start: 2023-01-12

## 2023-01-13 ENCOUNTER — NURSE ONLY (OUTPATIENT)
Dept: LAB | Age: 78
End: 2023-01-13

## 2023-01-13 LAB
ANION GAP SERPL CALCULATED.3IONS-SCNC: 8 MEQ/L (ref 8–16)
BASOPHILS # BLD: 0.6 %
BASOPHILS ABSOLUTE: 0.1 THOU/MM3 (ref 0–0.1)
BUN BLDV-MCNC: 32 MG/DL (ref 7–22)
CALCIUM SERPL-MCNC: 9.1 MG/DL (ref 8.5–10.5)
CHLORIDE BLD-SCNC: 100 MEQ/L (ref 98–111)
CO2: 29 MEQ/L (ref 23–33)
CREAT SERPL-MCNC: 1.8 MG/DL (ref 0.4–1.2)
EOSINOPHIL # BLD: 1.3 %
EOSINOPHILS ABSOLUTE: 0.1 THOU/MM3 (ref 0–0.4)
ERYTHROCYTE [DISTWIDTH] IN BLOOD BY AUTOMATED COUNT: 13.2 % (ref 11.5–14.5)
ERYTHROCYTE [DISTWIDTH] IN BLOOD BY AUTOMATED COUNT: 49.5 FL (ref 35–45)
GFR SERPL CREATININE-BSD FRML MDRD: 38 ML/MIN/1.73M2
GLUCOSE BLD-MCNC: 151 MG/DL (ref 70–108)
HCT VFR BLD CALC: 33.7 % (ref 42–52)
HEMOGLOBIN: 10.9 GM/DL (ref 14–18)
IMMATURE GRANS (ABS): 0.03 THOU/MM3 (ref 0–0.07)
IMMATURE GRANULOCYTES: 0.3 %
LYMPHOCYTES # BLD: 11.1 %
LYMPHOCYTES ABSOLUTE: 1 THOU/MM3 (ref 1–4.8)
MCH RBC QN AUTO: 32.8 PG (ref 26–33)
MCHC RBC AUTO-ENTMCNC: 32.3 GM/DL (ref 32.2–35.5)
MCV RBC AUTO: 101.5 FL (ref 80–94)
MONOCYTES # BLD: 9 %
MONOCYTES ABSOLUTE: 0.8 THOU/MM3 (ref 0.4–1.3)
NUCLEATED RED BLOOD CELLS: 0 /100 WBC
PLATELET # BLD: 215 THOU/MM3 (ref 130–400)
PMV BLD AUTO: 9.7 FL (ref 9.4–12.4)
POTASSIUM SERPL-SCNC: 5.3 MEQ/L (ref 3.5–5.2)
RBC # BLD: 3.32 MILL/MM3 (ref 4.7–6.1)
SEG NEUTROPHILS: 77.7 %
SEGMENTED NEUTROPHILS ABSOLUTE COUNT: 6.8 THOU/MM3 (ref 1.8–7.7)
SODIUM BLD-SCNC: 137 MEQ/L (ref 135–145)
WBC # BLD: 8.7 THOU/MM3 (ref 4.8–10.8)

## 2023-01-16 ENCOUNTER — PHARMACY VISIT (OUTPATIENT)
Dept: INTERNAL MEDICINE CLINIC | Age: 78
End: 2023-01-16
Payer: MEDICARE

## 2023-01-16 DIAGNOSIS — E11.9 DIABETES MELLITUS TYPE 2, INSULIN DEPENDENT (HCC): Primary | ICD-10-CM

## 2023-01-16 DIAGNOSIS — Z79.4 DIABETES MELLITUS TYPE 2, INSULIN DEPENDENT (HCC): Primary | ICD-10-CM

## 2023-01-16 PROCEDURE — G0108 DIAB MANAGE TRN  PER INDIV: HCPCS | Performed by: INTERNAL MEDICINE

## 2023-01-16 NOTE — PROGRESS NOTES
[facility]  [address] [facility], [address]  [phone] (phone)  [phone] (fax)    Patient ID: Robinson Carroll [id number]  Referring Provider: Dr. Liane Caputo     Patient's name and  were verified. Subjective:    He presents for His follow-up diabetic visit. He has type 2 diabetes mellitus. Home regimen includes: Insulin and GLP-1 Agonist He is compliant most of the time. Assessment:     Lab Results   Component Value Date/Time    LABA1C 5.7 10/20/2022 11:23 AM    LABA1C 6.7 2022 03:33 PM    BUN 32 2023 01:57 PM    CREATININE 1.8 2023 01:57 PM     Vitals:    23 0736 23 0738   BP: (!) 180/77 (!) 150/62   Pulse: 63    Temp: 97.7 °F (36.5 °C)    Weight: 212 lb (96.2 kg)      Wt Readings from Last 3 Encounters:   23 212 lb (96.2 kg)   22 210 lb 8 oz (95.5 kg)   22 210 lb 9.6 oz (95.5 kg)     Ht Readings from Last 3 Encounters:   22 5' 9\" (1.753 m)   22 5' 9\" (1.753 m)   22 5' 9\" (1.753 m)       Diabetes Pharmacotherapy:  Trulicity 0.3HA weekly  Basaglar 33 units BID   -Sometimes 20-44 units  Humalog - group 1 scale      Glucose Trends:   Current monitoring regimen: Dexcom CGM - checks 4+ times daily  Home blood sugar trends:    -V. High: 1%, High: 23%, Target: 74%, Low: <1%, V. Low: 1%   -Fasting AM: 120s (100s-200)  Any episodes of hypoglycemia? yes - couple times per month   -Overnight if they occur     -Treats with OJ or piece of toast w/ PB OR chocolate milk    Lifestyle Factors:   Previous visit with dietician: yes - 2020  Current diet: B: bowl of cereal OR peanut butter toast + OJ            L: soup +/- crackers w/ PB                       D: leftover Chinese OR spaghetti                        Snacks: periodic celery, apples OR grapes, (HS or overnight): 1 bag potato chips OR peanuts                       Beverages: Crystal Light, water, dt.  Soda if out to eat  Current exercise:  ADLs , lower back pain    Health Maintenance:  Eye exam current (within one year): yes Date: 10/2022, DR. Miranda  Any history of foot problems? no  Foot exam up to date: yes Date: [facility]/20/22, [facility]   Immunizations up to date:    Pneumonia: no   Influenza: yes  The ASCVD Risk score (Mitch CASTRO, et al., 2019) failed to calculate for the following reasons: The valid total cholesterol range is 130 to 320 mg/dL   Last panel - LDL:   Lab Results   Component Value Date    LDLCALC 65 09/29/2022   Taking ASA:  Yes   Taking statin: Yes - atorvastatin  Last microalbumin/creatinine ratio:87 (7/2021)  Taking ACE/ARB: No - follows with nephrology  Depression screening completed 4/2022    Focus:   Diabetes Education. Last A1C: 5.7% (10/20/2022). Dexcom CGM is working well for [employer]. CGM TIR is at goal at 74%. [employer] & his wife have questions about [employer] - assisted with application for [facility] [employer] today (does not need Humalog). Reviewed diet, which contains a moderate amount of CHO. Encouraged increased physical activity today. Curriculum Area Focus: Glucose checks/goals, Carbohydrate counting/meal planning, Physical activity goals, and Diabetes Technology  DSME PLAN:     Try to have more protein with breakfast - consider Ensure Max Protein OR Premier Protein    2. Aim for 10 minutes of exercise every day   -Try to use the stretch bands! 3. Drop off your [employer] Re-enrollment to the office    4. Call in your Dexcom password later today     Goals Addressed                   This Visit's Progress     Exercise 3x per week (30 min per time)   Not on track     HEMOGLOBIN A1C < 7.0        limit afternoon snack to single carb serving/ limited calories   On track             Meter download, medications, PMH and nursing assessment reviewed. Kobi Abhijeet states He is willing to participate in this plan of care and verbalized understanding of all instructions provided. Teach back used to verify comprehension.       Follow-up: 1month , 3 month [facility] RN    Total time involved in direct patient education: 30 minutes.      For Pharmacy Admin Tracking Only    Program: Medical Group  CPA in place:  No  Recommendation Provided To: Patient/Caregiver: 1 via In person  Intervention Detail: Patient Access Assistance/Sample Provided  Intervention Accepted By: Patient/Caregiver: 1  Gap Closed?: No   Time Spent (min): [address] [place] [address] Avenue, PharmD, [facility]  Internal Medicine Clinical Pharmacist  [phone]

## 2023-01-16 NOTE — PATIENT INSTRUCTIONS
Try to have more protein with breakfast - consider Ensure Max Protein OR Premier Protein    2. Aim for 10 minutes of exercise every day   -Try to use the stretch bands! 3. Drop off your [employer] Re-enrollment to the office    4.  Call in your Dexcom password later today

## 2023-01-19 ENCOUNTER — OFFICE VISIT (OUTPATIENT)
Dept: NEPHROLOGY | Age: 78
End: 2023-01-19

## 2023-01-19 VITALS
DIASTOLIC BLOOD PRESSURE: 62 MMHG | HEART RATE: 63 BPM | BODY MASS INDEX: 31.31 KG/M2 | WEIGHT: 212 LBS | SYSTOLIC BLOOD PRESSURE: 150 MMHG | TEMPERATURE: 97.7 F

## 2023-01-19 VITALS
OXYGEN SATURATION: 99 % | DIASTOLIC BLOOD PRESSURE: 50 MMHG | SYSTOLIC BLOOD PRESSURE: 130 MMHG | HEART RATE: 67 BPM | BODY MASS INDEX: 31.16 KG/M2 | WEIGHT: 211 LBS

## 2023-01-19 DIAGNOSIS — N18.32 ANEMIA OF CHRONIC RENAL FAILURE, STAGE 3B (HCC): Primary | ICD-10-CM

## 2023-01-19 DIAGNOSIS — D63.1 ANEMIA OF CHRONIC RENAL FAILURE, STAGE 3B (HCC): Primary | ICD-10-CM

## 2023-01-19 DIAGNOSIS — I50.32 CHRONIC DIASTOLIC CONGESTIVE HEART FAILURE (HCC): ICD-10-CM

## 2023-01-19 DIAGNOSIS — I10 ESSENTIAL HYPERTENSION: ICD-10-CM

## 2023-01-19 NOTE — PROGRESS NOTES
[facility]        Outpatient Follow-Up note         1/19/2023 10:37 AM    Patient Name:   Basilio Golden  Birthdate:    [birth year]  Primary Care Physician:  Lazarus Casanova MD     Chief Complaint / Reason for follow-up : Follow Up of CKD     Interval History :  Patient seen and examined by me. Feels ok. No chest pain. also SOB. Feels weak and tired .    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    BPH (benign prostatic hypertrophy)     CAD (coronary artery disease) 1997    Carotid artery disease ([address] 1995    right    Carotid disease, bilateral ([address]     Cerebral artery occlusion with cerebral infarction ([address]     CHF (congestive heart failure) ([address]     Chronic kidney disease     COVID-19 07/09/2022    GERD (gastroesophageal reflux disease)     GI bleed 07/2021    History of blood transfusion 2021    GI bleed    History of blood transfusion 1997    with heart surgery    Hyperlipidemia 1996    Hypertension     Lumbar disc disease 2001    MI (myocardial infarction) ([address] 1997    Nephrolithiasis 2002    NIDDM (non-insulin dependent diabetes mellitus) 1996    diagnosed 1996    RICCARDO (obstructive sleep apnea) 2005    cpap    PVC's (premature ventricular contractions)     Renal artery stenosis ([address] 2007    left    Renal insufficiency     S/P CABG (status post coronary artery bypass graft) 1997    5 vessels    Skin cancer     Squamous cell carcinoma     Stenosis of right subclavian artery (HCC)     TIA (transient ischemic attack)     Wears partial dentures     upper and lower     Past Surgical History:   Procedure Laterality Date    ABLATION OF DYSRHYTHMIC FOCUS  3/13/13    CARDIAC CATHETERIZATION  2008 - 2019    stents [address]  3/13/13    oblation for irreg rythm    CARDIOVERSION  3/8/2012    CAROTID ENDARTERECTOMY  1997    CHOLECYSTECTOMY  4/1999    COLONOSCOPY  6/22/09, 4/2011,7/12/17    polyp removal    COLONOSCOPY N/A 7/21/2021    COLONOSCOPY POLYPECTOMY SNARE/COLD BIOPSY performed by Paco Juares MD at [address]  2008 (2), 2009 (1)    x2    CORONARY ARTERY BYPASS GRAFT  8/1997    5 vessel    DIAGNOSTIC CARDIAC CATH LAB PROCEDURE      ENDOSCOPY, COLON, DIAGNOSTIC      EYE LID SURGERY Right 11/17/2021    EXCISION SCC RIGHT LOWER LATERAL EYELID WITH FROZEN SECTION performed by Marquis Levin MD at [address] Right 2/28/2022    [facility]  12/20[facility]2013    pre-cancerous mole rt foot removed    LITHOTRIPSY  4/2002    TX OFFICE/OUTPT VISIT,PROCEDURE ONLY N/A 11/8/2018    TRANSESOPHAGEAL ECHOCARDIOGRAM performed by Stacey Baker MD at [facility]    PTCA  05/30/2018    SHOULDER SURGERY  2001, 2003    rt shoulder manipulation--frozen shoulder-01, Lt -03    SHOULDER SURGERY  2001 - 2003    manipulation - bilateral    SKIN BIOPSY      SKIN CANCER EXCISION  12/20/13     R foot    SKIN CANCER EXCISION  1/2016    squamous cell Lt  upper cheek    UPPER GASTROINTESTINAL ENDOSCOPY N/A 7/20/2021    EGD ESOPHAGOGASTRODUODENOSCOPY performed by Paco Juares MD at [address] [place] [address]   4/2002    VASCULAR SURGERY      carotids & cabg harvests    VENTRAL HERNIA REPAIR N/A 4/30/2022    exploratory laparotomy with repair of incarcerated hernia performed by Vitor Marcos MD at [place] ,[place]        Medications :     Outpatient Medications Marked as Taking for the 1/19/23 encounter (Office Visit) with Charma Kawasaki, MD   Medication Sig Dispense Refill    metoprolol succinate (TOPROL XL) 50 MG extended release tablet Take 1 tablet by mouth daily 90 tablet 0    clopidogrel (PLAVIX) 75 MG tablet Take 1 tablet by mouth daily morning 90 tablet 3    finasteride (PROSCAR) 5 MG tablet Take 1 tablet by mouth daily 90 tablet 3    hydrOXYzine HCl (ATARAX) 10 MG tablet Take 10 mg by mouth at bedtime      isosorbide mononitrate (IMDUR) 120 MG extended release tablet Take 1 tablet by mouth daily 90 tablet 0    atorvastatin (LIPITOR) 40 MG tablet TAKE 1 TABLET BY MOUTH AT  NIGHT 90 tablet 3    ranolazine (RANEXA) 1000 MG extended release tablet TAKE 1 TABLET BY MOUTH  TWICE DAILY NEW DOSE 180 tablet 3    Blood Glucose Monitoring Suppl (ONE TOUCH ULTRA 2) w/Device KIT 1 kit by Does not apply route daily DX:11.9 1 kit 1    blood glucose test strips (ASCENSIA AUTODISC VI;ONE TOUCH ULTRA TEST VI) strip 1 each by In Vitro route 4 times daily DX:11.9 200 each 3    ONE TOUCH ULTRASOFT LANCETS MISC Use to test Blood Sugar 4 times Daily, Dx: E11.9 200 each 11    bumetanide (BUMEX) 2 MG tablet Take 1 tablet by mouth in the morning.  90 tablet 3    insulin glargine (BASAGLAR KWIKPEN) 100 UNIT/ML injection pen Inject 33 Units into the skin 2 times daily      tamsulosin (FLOMAX) 0.4 MG capsule TAKE 1 CAPSULE BY MOUTH  TWICE DAILY 180 capsule 3    nitroGLYCERIN (NITROLINGUAL) 0.4 MG/SPRAY 0.4 mg spray USE 1 SPRAY ON OR UNDER THE TONGUE EVERY 5 MINUTES AS NEEDED FOR CHEST PAIN 4.9 g 6    dulaglutide (TRULICITY) 1.5 PC/3.5XT SC injection Inject 1.5 mg into the skin once a week      dilTIAZem (CARDIZEM CD) 120 MG extended release capsule TAKE 1 CAPSULE BY MOUTH  DAILY 90 capsule 3    Insulin Pen Needle (NOVOFINE PEN NEEDLE) 32G X 6 MM MISC 1 each by Does not apply route 6 times daily 600 each 3    aspirin 81 MG EC tablet Take 81 mg by mouth daily      NOVOFINE 32G X 6 MM MISC USE 1 NEW NEEDLE 6 TIMES  DAILY AND AS NEEDED 540 each 1    diphenhydrAMINE-APAP, sleep, (TYLENOL PM EXTRA STRENGTH)  MG tablet Take 1 tablet by mouth every evening as needed      fluocinonide (LIDEX) 0.05 % cream Apply 1 applicator topically as needed      BiPAP Machine MISC by Does not apply route      Multiple Vitamins-Minerals (THERAPEUTIC MULTIVITAMIN-MINERALS) tablet Take 1 tablet by mouth nightly         Vitals     BP (!) 130/50 (Site: Left Upper Arm, Position: Sitting, Cuff Size: Large Adult)   Pulse 67   Wt 211 lb (95.7 kg)   SpO2 99%   BMI 31.16 kg/m²  Wt Readings from Last 3 Encounters:   01/19/23 211 lb (95.7 kg)   01/16/23 212 lb (96.2 kg)   11/23/22 210 lb 8 oz (95.5 kg)        Physical Exam   General -- no distress  Lungs -- clear  Heart -- S1, S2 heard, JVD - no  Abdomen - soft, non-tender  Extremities -- no edema  CNS - awake and alert    Labs, Radiology and Tests    Labs -    9/18 3/19 4/20 5/21 1/22 7/22 1/23    Potassium 5.0 4.1 4.8 4.0 4.7 4.4 5.3    BUN 34 26 34 27 49 35 32    Creatinine 1.5 1.5 (1.7) 1.8 1.8 2.0 1.7 1.8    eGFR 46 46 37 37 33 39 38               CR           [facility]                        Renal Ultrasound Scan -- 6/2018  Rt kidney 13.1 cm and left kidney 11.50  Left kidney cysts 5.7 cm. Assessment    Renal - chronic kidney disease stage III most likely secondary to the use of diuretics.    -Overall renal function appears to be stable. -Volume status okay continue current dose of diuretics  Electrolytes -hyperkalemia, drinking a lot of orange juice and really low potassium diet  Coronary artery disease with CABG 20 years ago and 15 stents so far 9 of them have been placed in 2018  Diabetes mellitus, stable   Elevated PSA . BPH seen urology . Acute on chronic diastolic congestive heart failure plan as mentioned above  Anemia - iron deficiency . Hb is ok. Monthly H and H  meds reviewed and D/W patient and wife  FU in 6 months.      Tests and orders placed this Encounter     Orders Placed This Encounter   Procedures    Comprehensive Metabolic Panel    Saurabh Barron M.D  [facility].

## 2023-02-10 ENCOUNTER — NURSE ONLY (OUTPATIENT)
Dept: LAB | Age: 78
End: 2023-02-10

## 2023-02-10 DIAGNOSIS — D63.1 ANEMIA IN STAGE 3 CHRONIC KIDNEY DISEASE, UNSPECIFIED WHETHER STAGE 3A OR 3B CKD (HCC): ICD-10-CM

## 2023-02-10 DIAGNOSIS — N18.30 STAGE 3 CHRONIC KIDNEY DISEASE, UNSPECIFIED WHETHER STAGE 3A OR 3B CKD (HCC): ICD-10-CM

## 2023-02-10 DIAGNOSIS — N18.30 ANEMIA IN STAGE 3 CHRONIC KIDNEY DISEASE, UNSPECIFIED WHETHER STAGE 3A OR 3B CKD (HCC): ICD-10-CM

## 2023-02-10 LAB
HCT VFR BLD AUTO: 35.5 % (ref 42–52)
HGB BLD-MCNC: 11.5 GM/DL (ref 14–18)

## 2023-02-14 NOTE — CARE COORDINATION
Addended by: BLANCHE CARDOZA on: 2/14/2023 05:32 PM     Modules accepted: Orders     Simon 45 Transitions Follow Up Call    2022    Patient: Michele Rao  Patient : 1945   MRN: [id number]  Reason for Admission: Strangulated umbilical hernia s/p open repair of incarcerated strangulated midline ventral hernia, periumbilical with lysis of adhesions  Discharge Date: 22 RARS: Readmission Risk Score: 23.9 ( )         Spoke with Isatu Connor and wife Opal Jones. Isatu Query said he is doing ok. Denies abd pain, said his bowels are moving now, using Dulcolax suppositories as directed by Dr John Marie. He hasn't gained any weight, edema is better. His Bean cath continues, draining clear yellow urine. She was able to get a strap to anchor the Bean. Aware of upcoming appts next week. Hopeful that he won't need the Bean any longer. He had a mole removed from his ear and started Cipro for that. Denies needs. No other questions or concerns at this time. Will continue to follow. Care Transitions Subsequent and Final Call    Subsequent and Final Calls  Do you have any ongoing symptoms?: No  Have your medications changed?: Yes  Patient Reports: Unice Nahum started for mole removal on ear  Do you have any questions related to your medications?: No  Do you currently have any active services?: No  Do you have any needs or concerns that I can assist you with?: No  Identified Barriers: Lack of Education  Care Transitions Interventions  Other Interventions:       Care Transitions Follow Up Call    Needs to be reviewed by the provider   Additional needs identified to be addressed with provider: No  none             Method of communication with provider : none      Care Transition Nurse (CTN) contacted the patient by telephone to follow up after admission on 22. Verified name and  with patient as identifiers. Addressed changes since last contact: medications-Cipro  Discussed follow-up appointments. If no appointment was previously scheduled, appointment scheduling offered: Yes.    Is follow up appointment scheduled within 7 days of discharge? No.    Advance Care Planning:   Does patient have an Advance Directive: reviewed and current. CTN reviewed discharge instructions, medical action plan and red flags with patient and discussed any barriers to care and/or understanding of plan of care after discharge. Discussed appropriate site of care based on symptoms and resources available to patient including: PCP  Specialist. The patient agrees to contact the PCP office for questions related to their healthcare. Patients top risk factors for readmission: lack of knowledge about disease  medical condition-s/p hernia repair, CHF, DM, Afib, CKD, CAD  polypharmacy  Interventions to address risk factors: Scheduled appointment with Specialist-5/18 5/19      Non-[facility] follow up appointment(s): na    CTN provided contact information for future needs. Plan for follow-up call in 7-10 days based on severity of symptoms and risk factors. Plan for next call: symptom management-abd pain, incision healing?  self management-Bean? ?         Follow Up  Future Appointments   Date Time Provider Idris Crookisti   5/18/2022  9:45 AM Warren Guevara MD [facility] 4724   5/19/2022 10:15 AM Pamela Gallardo MD [facility] Surg [facility] [place]   7/21/2022 10:20 AM Sujit Griffin MD [facility] [place]   8/16/2022 10:00 AM Chelsie Waters MD [facility] [place]   9/27/2022 11:00 AM Michele Barrera RN [facility] [place]   10/10/2022  9:15 AM Cleophas Lanes, [address], RN

## 2023-02-20 ENCOUNTER — OFFICE VISIT (OUTPATIENT)
Dept: INTERNAL MEDICINE CLINIC | Age: 78
End: 2023-02-20
Payer: MEDICARE

## 2023-02-20 VITALS
HEART RATE: 68 BPM | WEIGHT: 213.8 LBS | OXYGEN SATURATION: 98 % | HEIGHT: 69 IN | DIASTOLIC BLOOD PRESSURE: 40 MMHG | BODY MASS INDEX: 31.67 KG/M2 | SYSTOLIC BLOOD PRESSURE: 120 MMHG | TEMPERATURE: 97.9 F

## 2023-02-20 DIAGNOSIS — N18.4 CHRONIC KIDNEY DISEASE, STAGE 4 (SEVERE) (HCC): ICD-10-CM

## 2023-02-20 DIAGNOSIS — I48.0 PAROXYSMAL ATRIAL FIBRILLATION (HCC): ICD-10-CM

## 2023-02-20 DIAGNOSIS — I20.8 CHRONIC STABLE ANGINA (HCC): Primary | ICD-10-CM

## 2023-02-20 DIAGNOSIS — Z79.4 TYPE 2 DIABETES MELLITUS WITH STAGE 4 CHRONIC KIDNEY DISEASE, WITH LONG-TERM CURRENT USE OF INSULIN (HCC): ICD-10-CM

## 2023-02-20 DIAGNOSIS — N18.4 TYPE 2 DIABETES MELLITUS WITH STAGE 4 CHRONIC KIDNEY DISEASE, WITH LONG-TERM CURRENT USE OF INSULIN (HCC): ICD-10-CM

## 2023-02-20 DIAGNOSIS — E11.22 TYPE 2 DIABETES MELLITUS WITH STAGE 4 CHRONIC KIDNEY DISEASE, WITH LONG-TERM CURRENT USE OF INSULIN (HCC): ICD-10-CM

## 2023-02-20 PROBLEM — E11.51 TYPE 2 DIABETES MELLITUS WITH DIABETIC PERIPHERAL ANGIOPATHY WITHOUT GANGRENE, WITH LONG-TERM CURRENT USE OF INSULIN (HCC): Status: ACTIVE | Noted: 2023-02-20

## 2023-02-20 PROCEDURE — 1036F TOBACCO NON-USER: CPT | Performed by: INTERNAL MEDICINE

## 2023-02-20 PROCEDURE — G8484 FLU IMMUNIZE NO ADMIN: HCPCS | Performed by: INTERNAL MEDICINE

## 2023-02-20 PROCEDURE — 3078F DIAST BP <80 MM HG: CPT | Performed by: INTERNAL MEDICINE

## 2023-02-20 PROCEDURE — 99213 OFFICE O/P EST LOW 20 MIN: CPT | Performed by: INTERNAL MEDICINE

## 2023-02-20 PROCEDURE — 3074F SYST BP LT 130 MM HG: CPT | Performed by: INTERNAL MEDICINE

## 2023-02-20 PROCEDURE — G8427 DOCREV CUR MEDS BY ELIG CLIN: HCPCS | Performed by: INTERNAL MEDICINE

## 2023-02-20 PROCEDURE — G8417 CALC BMI ABV UP PARAM F/U: HCPCS | Performed by: INTERNAL MEDICINE

## 2023-02-20 PROCEDURE — 1123F ACP DISCUSS/DSCN MKR DOCD: CPT | Performed by: INTERNAL MEDICINE

## 2023-02-20 RX ORDER — HYDRALAZINE HYDROCHLORIDE 25 MG/1
25 TABLET, FILM COATED ORAL 3 TIMES DAILY
COMMUNITY

## 2023-02-20 SDOH — ECONOMIC STABILITY: HOUSING INSECURITY
IN THE LAST 12 MONTHS, WAS THERE A TIME WHEN YOU DID NOT HAVE A STEADY PLACE TO SLEEP OR SLEPT IN A SHELTER (INCLUDING NOW)?: NO

## 2023-02-20 SDOH — ECONOMIC STABILITY: FOOD INSECURITY: WITHIN THE PAST 12 MONTHS, YOU WORRIED THAT YOUR FOOD WOULD RUN OUT BEFORE YOU GOT MONEY TO BUY MORE.: NEVER TRUE

## 2023-02-20 SDOH — ECONOMIC STABILITY: FOOD INSECURITY: WITHIN THE PAST 12 MONTHS, THE FOOD YOU BOUGHT JUST DIDN'T LAST AND YOU DIDN'T HAVE MONEY TO GET MORE.: NEVER TRUE

## 2023-02-20 SDOH — ECONOMIC STABILITY: INCOME INSECURITY: HOW HARD IS IT FOR YOU TO PAY FOR THE VERY BASICS LIKE FOOD, HOUSING, MEDICAL CARE, AND HEATING?: NOT HARD AT ALL

## 2023-02-20 ASSESSMENT — PATIENT HEALTH QUESTIONNAIRE - PHQ9
SUM OF ALL RESPONSES TO PHQ QUESTIONS 1-9: 1
1. LITTLE INTEREST OR PLEASURE IN DOING THINGS: 0
2. FEELING DOWN, DEPRESSED OR HOPELESS: 1
SUM OF ALL RESPONSES TO PHQ9 QUESTIONS 1 & 2: 1

## 2023-02-20 NOTE — PROGRESS NOTES
Rojelio  (:  1945) is a 68 y.o. male,Established patient, here for evaluation of the following chief complaint(s):  Chest Pain (Chronic stable angina), Hyperlipidemia, Diabetes (PCP follows as well as diabetes clinic), and Other (Sleeps almariia)         ASSESSMENT/PLAN:  1. Type 2 diabetes mellitus with diabetic peripheral angiopathy without gangrene, with long-term current use of insulin ([address] last A1C 6.7 in .  follows with diabetic clinic. Now on Trulicity  2. Chronic stable angina ([address]- denies any recent; continue meds  3. Chronic kidney disease, stage 4 (severe) ([address]- follows with Dr Naldo Hernandez. stable  4. Mixed hyperlipidemia- controlled; continue meds  5. Presence of Watchman left atrial appendage closure device- now off anticoagulation  6. Paroxysmal afib- prior ablation; no sxs. 7. Leg weakness- encouraged regular walking. Reviewed bmp, A1C      Return in about 6 months (around 2023). Subjective   SUBJECTIVE/OBJECTIVE:  HPIpt with cad, multiple stents, afib, dm, obesity, ckd seen in f/u. Voices no cardiac sxs. Main issue is back; takes tylenol and topical    Has ckd, sees Dr Naldo Hernandez    He sleeps a lot; encouraged activity. Does not volunteer anymore; not able to walk far. No falls. Does not use cane etc.    Review of Systems   Twelve point ROS completed and found to be negative except as noted above.         Objective   Physical Exam   BP (!) 120/40 (Site: Left Upper Arm)   Pulse 68   Temp 97.9 °F (36.6 °C)   Ht 5' 9.02\" (1.753 m)   Wt 213 lb 12.8 oz (97 kg)   SpO2 98% Comment: atg rest on room air  BMI 31.56 kg/m²     General appearance - overweight and chronically ill appearing    Mental Status - alert, oriented to person, place, and time          Neck - supple, no significant adenopathy        Chest - clear    Heart - normal rate and regular rhythm,  no murmurs today    Abdomen - protuberant         Neurological - alert, oriented, normal speech, no focal findings or movement disorder noted     Extremities - no pedal edema noted. Pulses difficult to obtain    Skin - normal coloration and turgor, no rashes, no suspicious skin lesions noted            An electronic signature was used to authenticate this note.     --Darryn Sharp MD

## 2023-03-08 DIAGNOSIS — I25.10 CORONARY ARTERY DISEASE INVOLVING NATIVE CORONARY ARTERY: ICD-10-CM

## 2023-03-09 RX ORDER — ISOSORBIDE MONONITRATE 120 MG/1
120 TABLET, EXTENDED RELEASE ORAL DAILY
Qty: 90 TABLET | Refills: 1 | Status: SHIPPED | OUTPATIENT
Start: 2023-03-09

## 2023-03-09 RX ORDER — METOPROLOL SUCCINATE 50 MG/1
50 TABLET, EXTENDED RELEASE ORAL DAILY
Qty: 90 TABLET | Refills: 1 | Status: SHIPPED | OUTPATIENT
Start: 2023-03-09

## 2023-03-10 ENCOUNTER — NURSE ONLY (OUTPATIENT)
Dept: LAB | Age: 78
End: 2023-03-10

## 2023-03-10 DIAGNOSIS — D63.1 ANEMIA IN STAGE 3 CHRONIC KIDNEY DISEASE, UNSPECIFIED WHETHER STAGE 3A OR 3B CKD (HCC): ICD-10-CM

## 2023-03-10 DIAGNOSIS — N18.30 STAGE 3 CHRONIC KIDNEY DISEASE, UNSPECIFIED WHETHER STAGE 3A OR 3B CKD (HCC): ICD-10-CM

## 2023-03-10 DIAGNOSIS — N18.30 ANEMIA IN STAGE 3 CHRONIC KIDNEY DISEASE, UNSPECIFIED WHETHER STAGE 3A OR 3B CKD (HCC): ICD-10-CM

## 2023-03-10 DIAGNOSIS — D63.1 ANEMIA OF CHRONIC RENAL FAILURE, STAGE 3B (HCC): ICD-10-CM

## 2023-03-10 DIAGNOSIS — I50.32 CHRONIC DIASTOLIC CONGESTIVE HEART FAILURE (HCC): ICD-10-CM

## 2023-03-10 DIAGNOSIS — I10 ESSENTIAL HYPERTENSION: ICD-10-CM

## 2023-03-10 DIAGNOSIS — N18.32 ANEMIA OF CHRONIC RENAL FAILURE, STAGE 3B (HCC): ICD-10-CM

## 2023-03-10 LAB
ALBUMIN SERPL BCG-MCNC: 4.1 G/DL (ref 3.5–5.1)
ALP SERPL-CCNC: 81 U/L (ref 38–126)
ALT SERPL W/O P-5'-P-CCNC: 15 U/L (ref 11–66)
ANION GAP SERPL CALC-SCNC: 11 MEQ/L (ref 8–16)
AST SERPL-CCNC: 16 U/L (ref 5–40)
BILIRUB SERPL-MCNC: 0.5 MG/DL (ref 0.3–1.2)
BUN SERPL-MCNC: 35 MG/DL (ref 7–22)
CALCIUM SERPL-MCNC: 9.2 MG/DL (ref 8.5–10.5)
CHLORIDE SERPL-SCNC: 103 MEQ/L (ref 98–111)
CO2 SERPL-SCNC: 23 MEQ/L (ref 23–33)
CREAT SERPL-MCNC: 1.8 MG/DL (ref 0.4–1.2)
DEPRECATED RDW RBC AUTO: 53.2 FL (ref 35–45)
ERYTHROCYTE [DISTWIDTH] IN BLOOD BY AUTOMATED COUNT: 14 % (ref 11.5–14.5)
GFR SERPL CREATININE-BSD FRML MDRD: 38 ML/MIN/1.73M2
GLUCOSE SERPL-MCNC: 96 MG/DL (ref 70–108)
HCT VFR BLD AUTO: 32.6 % (ref 42–52)
HCT VFR BLD AUTO: 32.9 % (ref 42–52)
HGB BLD-MCNC: 10.4 GM/DL (ref 14–18)
HGB BLD-MCNC: 10.5 GM/DL (ref 14–18)
MCH RBC QN AUTO: 33 PG (ref 26–33)
MCHC RBC AUTO-ENTMCNC: 31.9 GM/DL (ref 32.2–35.5)
MCV RBC AUTO: 103.5 FL (ref 80–94)
PLATELET # BLD AUTO: 209 THOU/MM3 (ref 130–400)
PMV BLD AUTO: 10.2 FL (ref 9.4–12.4)
POTASSIUM SERPL-SCNC: 4.8 MEQ/L (ref 3.5–5.2)
PROT SERPL-MCNC: 6.7 G/DL (ref 6.1–8)
RBC # BLD AUTO: 3.18 MILL/MM3 (ref 4.7–6.1)
SODIUM SERPL-SCNC: 137 MEQ/L (ref 135–145)
WBC # BLD AUTO: 7.6 THOU/MM3 (ref 4.8–10.8)

## 2023-04-07 ENCOUNTER — NURSE ONLY (OUTPATIENT)
Dept: LAB | Age: 78
End: 2023-04-07

## 2023-04-07 DIAGNOSIS — N18.30 STAGE 3 CHRONIC KIDNEY DISEASE, UNSPECIFIED WHETHER STAGE 3A OR 3B CKD (HCC): ICD-10-CM

## 2023-04-07 DIAGNOSIS — D63.1 ANEMIA IN STAGE 3 CHRONIC KIDNEY DISEASE, UNSPECIFIED WHETHER STAGE 3A OR 3B CKD (HCC): ICD-10-CM

## 2023-04-07 DIAGNOSIS — N18.30 ANEMIA IN STAGE 3 CHRONIC KIDNEY DISEASE, UNSPECIFIED WHETHER STAGE 3A OR 3B CKD (HCC): ICD-10-CM

## 2023-04-07 LAB
HCT VFR BLD AUTO: 31.6 % (ref 42–52)
HGB BLD-MCNC: 9.8 GM/DL (ref 14–18)

## 2023-04-10 NOTE — PROGRESS NOTES
Patient is in phase 2 passing gas, taking fluids.  discussed findings, plan of care, discharge instructions with pt report called to floor. negative

## 2023-04-12 ENCOUNTER — HOSPITAL ENCOUNTER (OUTPATIENT)
Dept: NURSING | Age: 78
Discharge: HOME | End: 2023-04-12
Payer: MEDICARE

## 2023-04-12 VITALS
OXYGEN SATURATION: 98 % | DIASTOLIC BLOOD PRESSURE: 58 MMHG | TEMPERATURE: 97.8 F | SYSTOLIC BLOOD PRESSURE: 120 MMHG | RESPIRATION RATE: 20 BRPM | HEART RATE: 63 BPM

## 2023-04-12 PROCEDURE — 96372 THER/PROPH/DIAG INJ SC/IM: CPT

## 2023-04-12 PROCEDURE — 6360000002 HC RX W HCPCS: Performed by: INTERNAL MEDICINE

## 2023-04-12 RX ADMIN — DARBEPOETIN ALFA 100 MCG: 100 INJECTION, SOLUTION INTRAVENOUS; SUBCUTANEOUS at 09:33

## 2023-04-12 NOTE — PROGRESS NOTES
1820: Patient arrived ambulatory for aranesp injection. Patient rights and responsibilities offered to patient. BP within parameters for injection. Injection administered. Patient tolerated well. AVS reviewed with patient, voiced understanding. Patient discharged ambulatory.                    _m___ Safety:       (Environmental)  [place] to environment  Ensure ID band is correct and in place/ allergy band as needed  Assess for fall risk  Initiate fall precautions as applicable (fall band, side rails, etc.)  Call light within reach  Bed in low position/ wheels locked    _m___ Pain:       Assess pain level and characteristics  Administer analgesics as ordered  Assess effectiveness of pain management and report to MD as needed    _m___ Knowledge Deficit:  Assess baseline knowledge  Provide teaching at level of understanding  Provide teaching via preferred learning method  Evaluate teaching effectiveness    _m___ Hemodynamic/Respiratory Status:       (Pre and Post Procedure Monitoring)  Assess/Monitor vital signs and LOC  Assess Baseline SpO2 prior to any sedation  Obtain weight/height  Assess vital signs/ LOC until patient meets discharge criteria  Monitor procedure site and notify MD of any issues

## 2023-04-17 ENCOUNTER — OFFICE VISIT (OUTPATIENT)
Dept: INTERNAL MEDICINE CLINIC | Age: 78
End: 2023-04-17
Payer: MEDICARE

## 2023-04-17 ENCOUNTER — TELEPHONE (OUTPATIENT)
Dept: INTERNAL MEDICINE CLINIC | Age: 78
End: 2023-04-17

## 2023-04-17 VITALS
HEART RATE: 62 BPM | WEIGHT: 216.6 LBS | HEIGHT: 69 IN | BODY MASS INDEX: 32.08 KG/M2 | DIASTOLIC BLOOD PRESSURE: 62 MMHG | TEMPERATURE: 98 F | SYSTOLIC BLOOD PRESSURE: 118 MMHG

## 2023-04-17 DIAGNOSIS — N18.4 TYPE 2 DIABETES MELLITUS WITH STAGE 4 CHRONIC KIDNEY DISEASE, WITH LONG-TERM CURRENT USE OF INSULIN (HCC): Primary | ICD-10-CM

## 2023-04-17 DIAGNOSIS — Z79.4 TYPE 2 DIABETES MELLITUS WITH STAGE 4 CHRONIC KIDNEY DISEASE, WITH LONG-TERM CURRENT USE OF INSULIN (HCC): Primary | ICD-10-CM

## 2023-04-17 DIAGNOSIS — E11.22 TYPE 2 DIABETES MELLITUS WITH STAGE 4 CHRONIC KIDNEY DISEASE, WITH LONG-TERM CURRENT USE OF INSULIN (HCC): Primary | ICD-10-CM

## 2023-04-17 LAB — HBA1C MFR BLD: 5.6 % (ref 4.3–5.7)

## 2023-04-17 PROCEDURE — 83036 HEMOGLOBIN GLYCOSYLATED A1C: CPT | Performed by: REGISTERED NURSE

## 2023-04-17 PROCEDURE — G0108 DIAB MANAGE TRN  PER INDIV: HCPCS | Performed by: REGISTERED NURSE

## 2023-04-17 NOTE — PATIENT INSTRUCTIONS
Jenny 33 units in the morning and 28 units bedtime      -do not change this dose from day to day  Exercise with stretch bands sitting in the chair -when you first sit               Down                5 minutes-exercise your arms                5 minutes- knees                5  minutes with each foot (gas pedals)  We will ask about increasing the Trulicity to 3mg  Try to protein with breakfast --throw some nuts in your oatmeal

## 2023-04-17 NOTE — TELEPHONE ENCOUNTER
Diabetes Pharmacotherapy:  Basaglar 33 units morning and night           *sometimes takes 40 units if BG is high (less than 1 per week  Humalog scale only  Trulicity 4.4UC weekly     Glucose Trends:   Glucose at 2.75 hrs PPD today resulted at 112mg/dl  Current monitoring regimen: Dexcom CGM - checks 4+ times daily  Home blood sugar trends:               -V. High: 0%, High: 14%, Target: 85%, Low: 1%, V. Low: 1%              -Average Glucose: 143mg/dL; GMI: 6.7%              -increased episodes of lows overnight; variable glucose results during the day  Any episodes of hypoglycemia? yes - 3-4 times per week; in the middle of the night    Maryam Souza is having nocturnal lows. He is also stating his weight is creeping back up again. This may be, in part, to treating and eating to prevent low blood sugars. He could also benefit from an increased dose of Truliicity to facilitate further weight loss efforts. He is tolerating Trulicity well and is agreeable to an increase dosing. Also, he needs less insulin to prevent lows. Dr. Meli Calzada,     Please authorize the Diabetes Clinic at [address] [place] [address] to teach/ assist Maryam Souza to increase his Trulicity to 3mg weekly. Also please authorize decreasing Basal to 33 units in AM and 28 units bedtime and, if needed to decrease basal to 28 units BID if Trulicity dose is increased. If you agree, please note and return. Thank you. Separate order slip to [employer] will be faxed to your office for signature for Trulicity 3mg if you agree to this increase.

## 2023-04-17 NOTE — PROGRESS NOTES
[facility]  [facility]. [address] [facility], [address]  [phone] (phone)  [phone] (fax)    Patient ID: Missy Ibarra 1945  Referring Provider: Marietta Bishop     Patient's name and  were verified. Subjective:    He presents for a follow-up diabetic visit. He has type 2 diabetes mellitus. Home regimen includes: Insulin and GLP-1 Agonist He is compliant some of the time. Assessment:     Lab Results   Component Value Date/Time    LABA1C 5.6 2023 11:05 AM    LABA1C 6.7 2022 03:33 PM    BUN 35 03/10/2023 12:56 PM    CREATININE 1.8 03/10/2023 12:56 PM     Vitals:    23 1201   BP: 118/62   Site: Right Upper Arm   Position: Sitting   Pulse: 62   Temp: 98 °F (36.7 °C)   Weight: 216 lb 9.6 oz (98.2 kg)   Height: 5' 9\" (1.753 m)     Wt Readings from Last 3 Encounters:   23 216 lb 9.6 oz (98.2 kg)   23 213 lb 12.8 oz (97 kg)   23 211 lb (95.7 kg)     Ht Readings from Last 3 Encounters:   23 5' 9\" (1.753 m)   23 5' 9.02\" (1.753 m)   22 5' 9\" (1.753 m)       Diabetes Pharmacotherapy:  Basaglar 33 units morning and night           *sometimes takes 40 units if BG is high (less than 1 per week  Humalog scale only  Trulicity 2.0LM weekly    Glucose Trends:   Glucose at 2.75 hrs PPD today resulted at 112mg/dl  Current monitoring regimen: Dexcom CGM - checks 4+ times daily  Home blood sugar trends:    -V. High: 0%, High: 14%, Target: 85%, Low: 1%, V. Low: 1%   -Average Glucose: 143mg/dL; GMI: 6.7%   -increased episodes of lows overnight; variable glucose results during the day  Any episodes of hypoglycemia?  yes - 3-4 times per week; in the middle of the night   -Treats with gummy candy or orange juice                               Or sally milk/ pb/ toast    Lifestyle Factors:   Previous visit with dietician: yes -   Current diet: B: oatmeal apple/cinn pkt  27 gm                                  Frosted flakes/corn flakes 1 cup            L:

## 2023-04-18 NOTE — TELEPHONE ENCOUNTER
Pj instructed to Basal to 33 units in AM and 28 units bedtime as discussed with Dr. Amadeo Geronimo. Conrad voiced understanding via teach back. Script faxed to Dr. Patrick Solano office for Trulicity 3mg weekly for [employer].

## 2023-04-20 NOTE — TELEPHONE ENCOUNTER
Script for [employer] for Trulicity 3mg received from PCP; faxed on to [employer].

## 2023-04-26 ENCOUNTER — OFFICE VISIT (OUTPATIENT)
Dept: FAMILY MEDICINE CLINIC | Age: 78
End: 2023-04-26

## 2023-04-26 ENCOUNTER — TELEPHONE (OUTPATIENT)
Dept: FAMILY MEDICINE CLINIC | Age: 78
End: 2023-04-26

## 2023-04-26 VITALS
HEIGHT: 69 IN | BODY MASS INDEX: 31.78 KG/M2 | DIASTOLIC BLOOD PRESSURE: 70 MMHG | SYSTOLIC BLOOD PRESSURE: 134 MMHG | WEIGHT: 214.6 LBS | HEART RATE: 74 BPM | RESPIRATION RATE: 16 BRPM

## 2023-04-26 DIAGNOSIS — E11.29 TYPE 2 DIABETES MELLITUS WITH MICROALBUMINURIA, WITH LONG-TERM CURRENT USE OF INSULIN (HCC): Primary | ICD-10-CM

## 2023-04-26 DIAGNOSIS — R80.9 TYPE 2 DIABETES MELLITUS WITH MICROALBUMINURIA, WITH LONG-TERM CURRENT USE OF INSULIN (HCC): Primary | ICD-10-CM

## 2023-04-26 DIAGNOSIS — Z79.4 TYPE 2 DIABETES MELLITUS WITH MICROALBUMINURIA, WITH LONG-TERM CURRENT USE OF INSULIN (HCC): Primary | ICD-10-CM

## 2023-04-26 DIAGNOSIS — C67.9 MALIGNANT NEOPLASM OF URINARY BLADDER, UNSPECIFIED SITE (HCC): ICD-10-CM

## 2023-04-26 PROBLEM — E11.51 TYPE 2 DIABETES MELLITUS WITH DIABETIC PERIPHERAL ANGIOPATHY WITHOUT GANGRENE, WITH LONG-TERM CURRENT USE OF INSULIN (HCC): Status: RESOLVED | Noted: 2023-02-20 | Resolved: 2023-04-26

## 2023-04-26 PROCEDURE — 1036F TOBACCO NON-USER: CPT | Performed by: FAMILY MEDICINE

## 2023-04-26 PROCEDURE — G8427 DOCREV CUR MEDS BY ELIG CLIN: HCPCS | Performed by: FAMILY MEDICINE

## 2023-04-26 PROCEDURE — 99213 OFFICE O/P EST LOW 20 MIN: CPT | Performed by: FAMILY MEDICINE

## 2023-04-26 PROCEDURE — G8417 CALC BMI ABV UP PARAM F/U: HCPCS | Performed by: FAMILY MEDICINE

## 2023-04-26 PROCEDURE — 1123F ACP DISCUSS/DSCN MKR DOCD: CPT | Performed by: FAMILY MEDICINE

## 2023-04-26 RX ORDER — PROCHLORPERAZINE 25 MG/1
SUPPOSITORY RECTAL
COMMUNITY

## 2023-04-26 ASSESSMENT — ENCOUNTER SYMPTOMS
SHORTNESS OF BREATH: 0
SINUS PRESSURE: 0
CONSTIPATION: 0

## 2023-04-26 NOTE — PROGRESS NOTES
BP Readings from Last 2 Encounters:   04/26/23 134/70   04/17/23 118/62     Hemoglobin A1C (%)   Date Value   04/17/2023 5.6   05/12/2022 6.7     Microscopic Examination (no units)   Date Value   09/10/2016 Not Indicated     Microalbumin, Random Urine (mg/dL)   Date Value   07/28/2021 10.97     LDL Calculated (mg/dL)   Date Value   09/29/2022 65              Tobacco use:  Patient  reports that he has never smoked. He has never used smokeless tobacco.    If Smoker - Cessation materials given? No    Is Daily aspirin on medication list?:  Yes    Diabetic retinal exam done? No   If yes, document in Health Maintenance. Monofilament placed on counter? No    Shoes and socks removed? No    BP taken with correct size cuff? Yes   Repeated if > 140/90 No     Is patient taking any medications for diabetes    Yes   If yes, see medication list    Is the patient reporting any side effects of diabetic medications? No    Microalbumin performed if applicable? No      Is patient taking any over the counter medications    Yes   If yes, see medication list      Patient Self-Management Goal for Chronic Condition  Goal: I will take all medications as prescribed by my doctor, and I will call the office if I am having any medication problems. Barriers to success: none  Plan for overcoming my barriers: N/A     Confidence: 10/10  Date goal set: 4/26/23  Date goal attained:     Have you seen any other physician or provider since your last visit no    Have you had any other diagnostic tests since your last visit? no    Have you changed or stopped any medications since your last visit including any over-the-counter medicines, vitamins, or herbal medicines? no     Are you taking all your prescribed medications?  Yes    If NO, why?

## 2023-04-26 NOTE — PROGRESS NOTES
Mark Buenrostro (:  1945) is a 66 y.o. male,Established patient, here for evaluation of the following chief complaint(s):  Diabetes and Hypertension         ASSESSMENT/PLAN:   Diagnosis Orders   1. Type 2 diabetes mellitus with microalbuminuria, with long-term current use of insulin ([facility]        2. Malignant neoplasm of urinary bladder, unspecified site [facility])          See me in 6 months         Subjective   SUBJECTIVE/OBJECTIVE:  HPI  We discussed how he does not think he had a true bladder cancer and that we can look further into that  He continues with the DM clinic and the nephrologist  The BP is nice  Review of Systems   Constitutional:  Positive for fatigue. HENT:  Negative for sinus pressure. Eyes:  Negative for visual disturbance. Respiratory:  Negative for shortness of breath. Cardiovascular:  Negative for chest pain. Gastrointestinal:  Negative for constipation. Genitourinary: Negative. Musculoskeletal:  Positive for arthralgias and myalgias. Skin:  Negative for rash. Neurological:  Positive for weakness. Negative for headaches. The patient's medications, allergies, past medical problems, surgical, social, and family histories were reviewed and updated as needed. Objective   Physical Exam  Constitutional:       Appearance: Normal appearance. He is well-developed. HENT:      Head: Normocephalic and atraumatic. Eyes:      General: No scleral icterus. Conjunctiva/sclera: Conjunctivae normal.   Neck:      Trachea: No tracheal deviation. Cardiovascular:      Rate and Rhythm: Normal rate and regular rhythm. Heart sounds: Normal heart sounds. Pulmonary:      Effort: Pulmonary effort is normal.      Breath sounds: Normal breath sounds. Skin:     General: Skin is warm and dry. Neurological:      General: No focal deficit present. Mental Status: He is alert. Psychiatric:         Behavior: Behavior normal.    Blood pressure 134/70, pulse 74, resp.  rate

## 2023-04-28 ENCOUNTER — TELEPHONE (OUTPATIENT)
Dept: FAMILY MEDICINE CLINIC | Age: 78
End: 2023-04-28

## 2023-04-28 NOTE — TELEPHONE ENCOUNTER
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

## 2023-05-05 ENCOUNTER — NURSE ONLY (OUTPATIENT)
Dept: LAB | Age: 78
End: 2023-05-05

## 2023-05-05 DIAGNOSIS — D63.1 ANEMIA IN STAGE 3 CHRONIC KIDNEY DISEASE, UNSPECIFIED WHETHER STAGE 3A OR 3B CKD (HCC): ICD-10-CM

## 2023-05-05 DIAGNOSIS — N18.30 STAGE 3 CHRONIC KIDNEY DISEASE, UNSPECIFIED WHETHER STAGE 3A OR 3B CKD (HCC): ICD-10-CM

## 2023-05-05 DIAGNOSIS — N18.30 ANEMIA IN STAGE 3 CHRONIC KIDNEY DISEASE, UNSPECIFIED WHETHER STAGE 3A OR 3B CKD (HCC): ICD-10-CM

## 2023-05-05 LAB
HCT VFR BLD AUTO: 36.7 % (ref 42–52)
HGB BLD-MCNC: 11.5 GM/DL (ref 14–18)

## 2023-05-08 RX ORDER — DILTIAZEM HYDROCHLORIDE 120 MG/1
CAPSULE, COATED, EXTENDED RELEASE ORAL
Qty: 90 CAPSULE | Refills: 3 | Status: SHIPPED | OUTPATIENT
Start: 2023-05-08

## 2023-05-17 ENCOUNTER — OFFICE VISIT (OUTPATIENT)
Dept: UROLOGY | Age: 78
End: 2023-05-17
Payer: MEDICARE

## 2023-05-17 VITALS
WEIGHT: 214 LBS | DIASTOLIC BLOOD PRESSURE: 58 MMHG | SYSTOLIC BLOOD PRESSURE: 120 MMHG | HEIGHT: 69 IN | BODY MASS INDEX: 31.7 KG/M2

## 2023-05-17 DIAGNOSIS — R39.15 URINARY URGENCY: ICD-10-CM

## 2023-05-17 DIAGNOSIS — R33.9 URINARY RETENTION: Primary | ICD-10-CM

## 2023-05-17 LAB — POST VOID RESIDUAL (PVR): 295 ML

## 2023-05-17 PROCEDURE — 1036F TOBACCO NON-USER: CPT | Performed by: UROLOGY

## 2023-05-17 PROCEDURE — 3078F DIAST BP <80 MM HG: CPT | Performed by: UROLOGY

## 2023-05-17 PROCEDURE — G8427 DOCREV CUR MEDS BY ELIG CLIN: HCPCS | Performed by: UROLOGY

## 2023-05-17 PROCEDURE — 51798 US URINE CAPACITY MEASURE: CPT | Performed by: UROLOGY

## 2023-05-17 PROCEDURE — 99214 OFFICE O/P EST MOD 30 MIN: CPT | Performed by: UROLOGY

## 2023-05-17 PROCEDURE — 3074F SYST BP LT 130 MM HG: CPT | Performed by: UROLOGY

## 2023-05-17 PROCEDURE — 1123F ACP DISCUSS/DSCN MKR DOCD: CPT | Performed by: UROLOGY

## 2023-05-17 PROCEDURE — G8417 CALC BMI ABV UP PARAM F/U: HCPCS | Performed by: UROLOGY

## 2023-05-17 NOTE — PROGRESS NOTES
Dr. Maira Armas MD  [facility])  Urology Clinic      Patient:  Ana Mckay  YOB: 1945  Date: 5/17/2023    HISTORY OF PRESENT ILLNESS:   The patient is a 66 y.o. male who presents today for follow-up for the following problem(s): elevated PSA, history of retention, and BPH with PVR of 274. Had diabetic cystopathy. Overall the problem(s) : are worsening. Associated Symptoms: No dysuria, gross hematuria. Pain Severity: 0/10    Summary of old records:   Retention requiring lira in 2018. Cysto showed patent prostate in 2022. Imaging/Labs reviewed during today's visit:  I have independently reviewed and verified the following films during today's visit. Bladder ultrasound and PVR.      Last several PSA's:  Lab Results   Component Value Date    PSA 2.88 (H) 04/19/2021    PSA 1.42 (H) 12/16/2019    PSA 2.76 (H) 11/23/2018       Last total testosterone:  No results found for: TESTOSTERONE    Urinalysis today:  Results for POC orders placed in visit on 05/17/23   poct post void residual   Result Value Ref Range    post void residual 295 ml       Last BUN and creatinine:  Lab Results   Component Value Date    BUN 35 (H) 03/10/2023     Lab Results   Component Value Date    CREATININE 1.8 (H) 03/10/2023       Imaging Reviewed during this Office Visit:   (results were independently reviewed by physician and radiology report verified)    PAST MEDICAL, FAMILY AND SOCIAL HISTORY UPDATE:  Past Medical History:   Diagnosis Date    Adenomatous colon polyp 2009/2010, 6/'14,7/'17, 9/20, 7/21    Angina at rest [facility])     Arthritis     back    Atrial fibrillation ([address] 12/11 and 11/12    cardioversion 12/11    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    BPH (benign prostatic hypertrophy)     CAD (coronary artery disease) 1997    Carotid artery disease ([address] 1995    right    Carotid disease, bilateral ([address]     Cerebral artery occlusion with cerebral infarction [facility])     CHF

## 2023-05-24 NOTE — TELEPHONE ENCOUNTER
----- Message from Kayla Machuca MD sent at 9/11/2018  9:09 PM EDT -----  pts potassium is higher creat is slightly worse.   Stop potassium supplements and bumex until my appt and repeat BMP prior to my appt Taty

## 2023-05-25 ENCOUNTER — CLINICAL DOCUMENTATION (OUTPATIENT)
Dept: INTERNAL MEDICINE CLINIC | Age: 78
End: 2023-05-25

## 2023-05-25 NOTE — PROGRESS NOTES
Analy Eduardo presents for initiation of Dexcom personal CGM. Referring provider: Dr. Nikole Romeo CGM instructions completed. Dexcom CGM initiated Back of upper Left Arm (NEEDS TO BE BACK OF UPPER ARM). 30 minute warm up period initiated. Urgent low warning set at 80  Urgent high warning set at 250  System is waterproof-do not need to do anything for showers. You should use the \"overpatch\" that comes supplied in the box with the Dexcom sensor to help keep sensor in place for 10 days. Warm up period: 30 minute until your sensor will start recording blood sugar results  Watch the arrows --this arrow indicates if your blood sugars are trending up, trending down or    staying stable. If the [place] is red= indicates a sugar in low range                                                Leanor Kand = indicates a sugar in range                                                Orange =indicates a sugar above goal range  Change sensor every 10 days  Contact Dexcom [phone] for assistance.   Monday- Friday 5:30am-8pm PST 97.8

## 2023-05-25 NOTE — CARE COORDINATION
Simon 45 Transitions Initial Follow Up Call    Outreach made within 2 business days of discharge: Yes    Patient: Tressa Mcguire Patient : 1945   MRN: [id number]  Reason for Admission: Acute CHF  Discharge Date: 21       Spoke with: Ronda Pollack    Discharge department/facility: [facility] Interactive Patient Contact:  Was patient able to fill all prescriptions: Yes  Was patient instructed to bring all medications to the follow-up visit: Yes  Is patient taking all medications as directed in the discharge summary? Yes  Does patient understand their discharge instructions: Yes  Does patient have questions or concerns that need addressed prior to 7-14 day follow up office visit: no    Scheduled appointment with PCP within 7-14 days    Follow Up  Future Appointments   Date Time Provider Idris Ramirez   2021  2:30 PM Josh Blank MD N [facility] [address]   2021  [address] [place] [address], KIMMY REIS Pulm Med [address]   2021  9:20 AM [facility] [place]   2021  9:00 AM Zeus Caballero RN [facility] Physic [facility] [place]   10/4/2021  8:30 AM [facility]   2022  8:00 AM [facility] [employer] [facility] [employer]   I spoke with Ronda Pollack as [address] [facility] was sleeping. She said that he did not sleep well so he went back to sleep. Dr Aristides Del Cid is holding his Bumex until the labwork is done today and then he will decide if it is time to resume it. There is a balance between his diuresis and kidney function so they are watching that closely. She said he is weak and slightly wobbly since discharge. He has a walker and I told her to tell him I said to use it until he is a bit stronger. Ronda Pollack said she would relay that message. She declined a follow up appointment as she has follow ups with both cardiology and renal. I told her I would call again next week. She thanked me for calling.   Lu Clemons RN Cephalexin Pregnancy And Lactation Text: This medication is Pregnancy Category B and considered safe during pregnancy.  It is also excreted in breast milk but can be used safely for shorter doses.

## 2023-06-02 ENCOUNTER — NURSE ONLY (OUTPATIENT)
Dept: LAB | Age: 78
End: 2023-06-02

## 2023-06-02 DIAGNOSIS — N18.30 ANEMIA IN STAGE 3 CHRONIC KIDNEY DISEASE, UNSPECIFIED WHETHER STAGE 3A OR 3B CKD (HCC): ICD-10-CM

## 2023-06-02 DIAGNOSIS — N18.30 STAGE 3 CHRONIC KIDNEY DISEASE, UNSPECIFIED WHETHER STAGE 3A OR 3B CKD (HCC): ICD-10-CM

## 2023-06-02 DIAGNOSIS — D63.1 ANEMIA IN STAGE 3 CHRONIC KIDNEY DISEASE, UNSPECIFIED WHETHER STAGE 3A OR 3B CKD (HCC): ICD-10-CM

## 2023-06-02 LAB
HCT VFR BLD AUTO: 32.2 % (ref 42–52)
HGB BLD-MCNC: 10.1 GM/DL (ref 14–18)

## 2023-06-02 RX ORDER — TAMSULOSIN HYDROCHLORIDE 0.4 MG/1
CAPSULE ORAL
Qty: 180 CAPSULE | Refills: 3 | Status: SHIPPED | OUTPATIENT
Start: 2023-06-02

## 2023-06-02 NOTE — TELEPHONE ENCOUNTER
Anam Payan called requesting a refill on the following medications:  Requested Prescriptions     Pending Prescriptions Disp Refills    tamsulosin (FLOMAX) 0.4 MG capsule [Pharmacy Med Name: Tamsulosin HCl 0.4 MG Oral Capsule] 180 capsule 3     Sig: TAKE 1 [id number] verified:  .pv      Date of last visit: 05/17/2023  Date of next visit (if applicable): 50/02/6934

## 2023-06-05 NOTE — TELEPHONE ENCOUNTER
Darya Rangel called requesting a refill on the following medications:  Requested Prescriptions     Pending Prescriptions Disp Refills    tamsulosin (FLOMAX) 0.4 MG capsule [Pharmacy Med Name: Tamsulosin HCl 0.4 MG Oral Capsule] 180 capsule 3     Sig: TAKE 1 [id number] verified:  .pv      Date of last visit: 05/17/2023  Date of next visit (if applicable): 08/15/9403

## 2023-06-07 RX ORDER — TAMSULOSIN HYDROCHLORIDE 0.4 MG/1
CAPSULE ORAL
Qty: 180 CAPSULE | Refills: 3 | Status: SHIPPED | OUTPATIENT
Start: 2023-06-07

## 2023-06-30 ENCOUNTER — NURSE ONLY (OUTPATIENT)
Dept: LAB | Age: 78
End: 2023-06-30

## 2023-06-30 LAB
HCT VFR BLD AUTO: 33.3 % (ref 42–52)
HGB BLD-MCNC: 10.5 GM/DL (ref 14–18)

## 2023-07-24 ENCOUNTER — OFFICE VISIT (OUTPATIENT)
Dept: INTERNAL MEDICINE CLINIC | Age: 78
End: 2023-07-24
Payer: MEDICARE

## 2023-07-24 VITALS
TEMPERATURE: 97.9 F | SYSTOLIC BLOOD PRESSURE: 114 MMHG | BODY MASS INDEX: 30.98 KG/M2 | HEART RATE: 67 BPM | WEIGHT: 209.8 LBS | DIASTOLIC BLOOD PRESSURE: 56 MMHG

## 2023-07-24 DIAGNOSIS — N18.4 TYPE 2 DIABETES MELLITUS WITH STAGE 4 CHRONIC KIDNEY DISEASE, WITH LONG-TERM CURRENT USE OF INSULIN (HCC): Primary | ICD-10-CM

## 2023-07-24 DIAGNOSIS — Z79.4 TYPE 2 DIABETES MELLITUS WITH STAGE 4 CHRONIC KIDNEY DISEASE, WITH LONG-TERM CURRENT USE OF INSULIN (HCC): Primary | ICD-10-CM

## 2023-07-24 DIAGNOSIS — E11.22 TYPE 2 DIABETES MELLITUS WITH STAGE 4 CHRONIC KIDNEY DISEASE, WITH LONG-TERM CURRENT USE OF INSULIN (HCC): Primary | ICD-10-CM

## 2023-07-24 LAB — HBA1C MFR BLD: 6.5 % (ref 4.3–5.7)

## 2023-07-24 PROCEDURE — G0108 DIAB MANAGE TRN  PER INDIV: HCPCS

## 2023-07-24 PROCEDURE — 83037 HB GLYCOSYLATED A1C HOME DEV: CPT

## 2023-07-24 PROCEDURE — NBSRV NON-BILLABLE SERVICE

## 2023-07-24 NOTE — PATIENT INSTRUCTIONS
Try to add some protein to your breakfast   -hard boiled egg, cheese stick, Greek yogurt    2. Aim to get 10 minutes of seated exercises every day    3. You can schedule with Dr. Libra Kim for a toe nail trim    Pharmacist will talk to Dr. Keerthi uG about decreasing your insulin dose.     Refills for [email or web address] [email or web address] through [address] [place] [address] Avenue: [phone]

## 2023-07-25 ENCOUNTER — OFFICE VISIT (OUTPATIENT)
Dept: NEPHROLOGY | Age: 78
End: 2023-07-25
Payer: MEDICARE

## 2023-07-25 VITALS
DIASTOLIC BLOOD PRESSURE: 62 MMHG | OXYGEN SATURATION: 99 % | SYSTOLIC BLOOD PRESSURE: 128 MMHG | HEART RATE: 62 BPM | BODY MASS INDEX: 31.45 KG/M2 | WEIGHT: 213 LBS

## 2023-07-25 DIAGNOSIS — N18.32 ANEMIA OF CHRONIC RENAL FAILURE, STAGE 3B (HCC): ICD-10-CM

## 2023-07-25 DIAGNOSIS — I10 ESSENTIAL HYPERTENSION: Primary | ICD-10-CM

## 2023-07-25 DIAGNOSIS — N18.32 STAGE 3B CHRONIC KIDNEY DISEASE (HCC): ICD-10-CM

## 2023-07-25 DIAGNOSIS — D63.1 ANEMIA OF CHRONIC RENAL FAILURE, STAGE 3B (HCC): ICD-10-CM

## 2023-07-25 PROCEDURE — 99213 OFFICE O/P EST LOW 20 MIN: CPT | Performed by: INTERNAL MEDICINE

## 2023-07-25 PROCEDURE — 3074F SYST BP LT 130 MM HG: CPT | Performed by: INTERNAL MEDICINE

## 2023-07-25 PROCEDURE — 1036F TOBACCO NON-USER: CPT | Performed by: INTERNAL MEDICINE

## 2023-07-25 PROCEDURE — 3078F DIAST BP <80 MM HG: CPT | Performed by: INTERNAL MEDICINE

## 2023-07-25 PROCEDURE — G8417 CALC BMI ABV UP PARAM F/U: HCPCS | Performed by: INTERNAL MEDICINE

## 2023-07-25 PROCEDURE — 1123F ACP DISCUSS/DSCN MKR DOCD: CPT | Performed by: INTERNAL MEDICINE

## 2023-07-25 PROCEDURE — G8427 DOCREV CUR MEDS BY ELIG CLIN: HCPCS | Performed by: INTERNAL MEDICINE

## 2023-07-25 RX ORDER — INSULIN GLARGINE 100 [IU]/ML
INJECTION, SOLUTION SUBCUTANEOUS
Qty: 5 ADJUSTABLE DOSE PRE-FILLED PEN SYRINGE | Refills: 5
Start: 2023-07-25

## 2023-07-25 NOTE — TELEPHONE ENCOUNTER
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 Diabetes Pharmacotherapy:  Basaglar- patient decreased to 33 units in the AM and 20 units in the night  Trulicity 3 mg weekly    Glucose Trends: - See download in media tab  Glucose at 2 hrs PPD today resulted at 143 mg/dl  Current monitoring regimen: Dexcom CGM - checks 4+ times daily  Home blood sugar trends:    -V. High: <1%, High: 7%, Target: 92%, Low: <1%, V. Low: <1%   -Average Glucose: 137 mg/dL; GMI: 6.6%;  %time CGM active 93%  Any episodes of hypoglycemia? yes - 4-5 nights weekly with lows/borderline lows   -Treats with candy    Hemoglobin A1C   Date Value Ref Range Status   07/24/2023 6.5 (H) 4.3 - 5.7 % Final     Comment:     Performed at [facility] under CLIA #  [id number]     ------------------------------------------------------------------------------------------------------------    Recommendations:   -Consider further decreasing Pj's Basaglar to 33 units QAM and 15 units QHS    Please message back and/or review pended order in response to the above recommendation.      Thank you,     Sienna Solis, PharmD, BCPS, [facility]  Internal Medicine Clinical Pharmacist  [phone]

## 2023-08-01 NOTE — TELEPHONE ENCOUNTER
Testicular Pain, Unclear Cause  You have had pain in one or both testicles. Based on your exam today, the exact cause of your pain is not certain. But your condition does not appear to be dangerous. Testicles are very sensitive. Even a small injury can cause quite a bit of pain. Other possible causes of testicular pain include kidney stones, cysts, mumps, inflammatory conditions, chronic conditions, hernia, infection, and a twisted testicle.  Certain tests may be done to rule out an underlying problem causing the pain. Nothing conclusive was found today. Most likely, the pain will go away on its own. If it doesn’t, you may need more tests.    Home care  Medicine may be prescribed to help relieve pain and swelling. This may be an over-the-counter pain reliever or prescription pain medication. Take all medicine as directed.  The following are general care guidelines:  · To relieve pain and swelling, apply an ice pack wrapped in a thin towel for 10 minutes at a time. Continue this on and off for 1 to 2 days.  · When lying down, place a small rolled towel under your scrotum. When moving around, wear a jockstrap (athletic supporter) or supportive underwear. These will help support and protect your testicles.  · If it hurts to walk, walk as little as possible until you feel better.  · Avoid strenuous activity until you feel better.  · Do not have sex until you feel better.  · If you have severe pain in the testicle, seek care right away. Delay may lead to permanent loss of the testicle’s function.  Follow-up care  Follow up with your healthcare provider, or as advised.  When to seek medical advice  Call your healthcare provider right away if any of these occur:  · Fever of 100.4°F (38°C) or higher  · Worsening of the pain or severe pain  · Swelling of the testicle or scrotum  · A lump in the scrotum  · Warm and red scrotum (signs of infection)  · Nausea and vomiting  · Pain or swelling in abdomen  · Trouble  Spoke with wife, Wilfred Tracey, to provide insulin adjustments.      For Pharmacy Admin Tracking Only    Program: Medical Group  CPA in place:  No  Recommendation Provided To: Provider: 1 via Note to Provider  Intervention Detail: Dose Adjustment: 1, reason: Therapy Optimization  Intervention Accepted By: Provider: 1  Gap Closed?: No   Time Spent (min): [address], PharmD, BCPS, [facility]  Internal Medicine Clinical Pharmacist  [phone] urinating  · Numbness or weakness in the leg  · Shrinking of the testicle  · Blood in your urine  Date Last Reviewed: 10/1/2016  © 5048-8964 [employer]. [address] [place] [address], [place] [address]. All rights reserved. This information is not intended as a substitute for professional medical care. Always follow your healthcare professional's instructions.          Hydrocele (Type Not Specified)  The testicles first form in the abdomen of the male fetus. Just before birth, the testicles move down into the scrotum. As this happens, fluid from the abdomen may pass into the scrotum and become sealed off there in a small sac. This is called a non-communicating hydrocele.  In some infants the passage between the abdomen and the scrotum remains open. In this case, the bulge may increase and decrease in size as the fluid passes back and forth from the abdomen to the scrotum. This is called a communicating hydrocele.  The danger of this second kind of hydrocele is that it can lead to a hernia. A hernia looks like a painless bulge in the groin or scrotum. An infant hernia can cause gangrene and rupture of the intestine. This is a life-threatening emergency and requires immediate surgery. So you should watch for this condition.  Most hydroceles shrink and disappear by the age of 1 or 2 years and require no treatment. If the hydrocele does not go away by 2 years of age, it may need to be corrected with surgery.  Home care  A small hydrocele won't interfere in any way with normal activity. You don't need to take any special precautions.  Follow-up care  Follow up with your child's healthcare provider, or as advised. This is to be sure the hydrocele is shrinking and that no hernia appears.  When to seek medical advice  Call your child's healthcare provider right away if any of these occur:  · A new bulge in the groin that appears just above the thigh crease or in the scrotum  · Changes in size of the hydrocele. This  can mean that it gets smaller, then larger, then smaller. This can also mean that it larger and stays larger.  · Pain, redness or tenderness in the hydrocele  · Pain in the testicle that happens with nausea, vomiting, or both  Date Last Reviewed: 10/1/2016  © 4079-0917 [employer], [employer]. [address] [place] [address], [place] [address]. All rights reserved. This information is not intended as a substitute for professional medical care. Always follow your healthcare professional's instructions.

## 2023-08-08 RX ORDER — ATORVASTATIN CALCIUM 40 MG/1
TABLET, FILM COATED ORAL
Qty: 90 TABLET | Refills: 3 | Status: SHIPPED | OUTPATIENT
Start: 2023-08-08

## 2023-08-09 ENCOUNTER — OFFICE VISIT (OUTPATIENT)
Dept: CARDIOLOGY CLINIC | Age: 78
End: 2023-08-09

## 2023-08-09 VITALS
DIASTOLIC BLOOD PRESSURE: 55 MMHG | WEIGHT: 200 LBS | RESPIRATION RATE: 20 BRPM | HEART RATE: 65 BPM | SYSTOLIC BLOOD PRESSURE: 152 MMHG | HEIGHT: 69 IN | BODY MASS INDEX: 29.62 KG/M2

## 2023-08-09 DIAGNOSIS — E78.5 HYPERLIPIDEMIA, UNSPECIFIED HYPERLIPIDEMIA TYPE: ICD-10-CM

## 2023-08-09 DIAGNOSIS — I25.10 CAD S/P PERCUTANEOUS CORONARY ANGIOPLASTY: ICD-10-CM

## 2023-08-09 DIAGNOSIS — I48.0 PAROXYSMAL ATRIAL FIBRILLATION (HCC): ICD-10-CM

## 2023-08-09 DIAGNOSIS — I25.10 CORONARY ARTERY DISEASE INVOLVING NATIVE CORONARY ARTERY OF NATIVE HEART WITHOUT ANGINA PECTORIS: ICD-10-CM

## 2023-08-09 DIAGNOSIS — Z98.61 CAD S/P PERCUTANEOUS CORONARY ANGIOPLASTY: ICD-10-CM

## 2023-08-09 DIAGNOSIS — I25.10 ATHEROSCLEROSIS OF NATIVE CORONARY ARTERY OF NATIVE HEART, UNSPECIFIED WHETHER ANGINA PRESENT: ICD-10-CM

## 2023-08-09 DIAGNOSIS — I50.32 CHRONIC DIASTOLIC CONGESTIVE HEART FAILURE (HCC): ICD-10-CM

## 2023-08-09 RX ORDER — METOPROLOL SUCCINATE 50 MG/1
50 TABLET, EXTENDED RELEASE ORAL DAILY
Qty: 90 TABLET | Refills: 3 | Status: SHIPPED | OUTPATIENT
Start: 2023-08-09

## 2023-08-09 RX ORDER — ISOSORBIDE MONONITRATE 120 MG/1
120 TABLET, EXTENDED RELEASE ORAL DAILY
Qty: 90 TABLET | Refills: 3 | Status: SHIPPED | OUTPATIENT
Start: 2023-08-09

## 2023-08-09 RX ORDER — RANOLAZINE 1000 MG/1
TABLET, EXTENDED RELEASE ORAL
Qty: 180 TABLET | Refills: 3 | Status: SHIPPED | OUTPATIENT
Start: 2023-08-09

## 2023-08-09 RX ORDER — BUMETANIDE 2 MG/1
2 TABLET ORAL DAILY
Qty: 90 TABLET | Refills: 3 | Status: SHIPPED | OUTPATIENT
Start: 2023-08-09

## 2023-08-09 RX ORDER — CLOPIDOGREL BISULFATE 75 MG/1
75 TABLET ORAL DAILY
Qty: 90 TABLET | Refills: 3 | Status: SHIPPED | OUTPATIENT
Start: 2023-08-09

## 2023-08-09 RX ORDER — HYDRALAZINE HYDROCHLORIDE 25 MG/1
25 TABLET, FILM COATED ORAL 3 TIMES DAILY
Qty: 270 TABLET | Refills: 3 | Status: SHIPPED | OUTPATIENT
Start: 2023-08-09

## 2023-08-09 ASSESSMENT — ENCOUNTER SYMPTOMS
BLOOD IN STOOL: 0
VOMITING: 0
ABDOMINAL PAIN: 0
CHOKING: 0
ANAL BLEEDING: 0
ABDOMINAL DISTENTION: 0
NAUSEA: 0
COUGH: 0
APNEA: 0
BACK PAIN: 1
CHEST TIGHTNESS: 0
COLOR CHANGE: 0
VOICE CHANGE: 0
SHORTNESS OF BREATH: 1
STRIDOR: 0
WHEEZING: 0
TROUBLE SWALLOWING: 0

## 2023-08-09 NOTE — PROGRESS NOTES
03:43 AM     08/02/2023 11:02 AM    CO2 25 08/02/2023 11:02 AM    BUN 39 08/02/2023 11:02 AM    CREATININE 1.8 08/02/2023 11:02 AM    GFRAA >60 09/11/2016 06:32 AM    AGRATIO 1.2 09/10/2016 02:42 PM    LABGLOM 38 08/02/2023 11:02 AM    GLUCOSE 112 08/02/2023 11:02 AM    GLUCOSE 169 03/24/2012 09:25 AM    CALCIUM 9.9 08/02/2023 11:02 AM       Hepatic Function Panel:    Lab Results   Component Value Date/Time    ALKPHOS 72 08/02/2023 11:02 AM    ALT 16 08/02/2023 11:02 AM    AST 22 08/02/2023 11:02 AM    PROT 7.7 08/02/2023 11:02 AM    BILITOT 0.6 08/02/2023 11:02 AM    BILITOT NEGATIVE 06/18/2018 12:00 AM    BILIDIR <0.2 08/02/2023 11:02 AM    LABALBU 4.5 08/02/2023 11:02 AM    LABALBU 4.5 03/24/2012 09:25 AM       Magnesium:    Lab Results   Component Value Date/Time    MG 2.4 04/07/2021 06:47 AM       PT/INR:    Lab Results   Component Value Date/Time    PROTIME 17.2 09/10/2016 02:42 PM    INR 1.07 11/11/2022 05:50 PM       HgBA1c:    Lab Results   Component Value Date/Time    LABA1C 6.5 07/24/2023 11:10 AM    LABA1C 6.7 05/12/2022 03:33 PM       FLP:    Lab Results   Component Value Date/Time    TRIG 178 08/02/2023 11:02 AM    HDL 35 08/02/2023 11:02 AM    LDLCALC 70 08/02/2023 11:02 AM       TSH:    Lab Results   Component Value Date/Time    TSH 2.460 08/26/2021 03:25 AM        Diagnosis Orders   1. Dyslipidemia (high LDL; low HDL)  CBC    Basic Metabolic Panel    Lipid Panel    Hepatic Function Panel      2. Paroxysmal atrial fibrillation (HCC)  CBC    Basic Metabolic Panel    Lipid Panel    Hepatic Function Panel      3. Chronic diastolic congestive heart failure (HCC)  41828 - ME ELECTROCARDIOGRAM, COMPLETE    CBC    Basic Metabolic Panel    Lipid Panel    Hepatic Function Panel      4. Coronary artery disease involving native coronary artery  metoprolol succinate (TOPROL XL) 50 MG extended release tablet    CBC    Basic Metabolic Panel    Lipid Panel    Hepatic Function Panel      5.  CAD S/P

## 2023-09-01 ENCOUNTER — NURSE ONLY (OUTPATIENT)
Dept: LAB | Age: 78
End: 2023-09-01

## 2023-09-01 DIAGNOSIS — D63.1 ANEMIA OF CHRONIC RENAL FAILURE, STAGE 3B (HCC): ICD-10-CM

## 2023-09-01 DIAGNOSIS — N18.32 ANEMIA OF CHRONIC RENAL FAILURE, STAGE 3B (HCC): ICD-10-CM

## 2023-09-01 DIAGNOSIS — N18.32 STAGE 3B CHRONIC KIDNEY DISEASE (HCC): ICD-10-CM

## 2023-09-01 DIAGNOSIS — I10 ESSENTIAL HYPERTENSION: ICD-10-CM

## 2023-09-01 LAB
ALBUMIN SERPL BCG-MCNC: 4.2 G/DL (ref 3.5–5.1)
ALP SERPL-CCNC: 67 U/L (ref 38–126)
ALT SERPL W/O P-5'-P-CCNC: 18 U/L (ref 11–66)
ANION GAP SERPL CALC-SCNC: 12 MEQ/L (ref 8–16)
AST SERPL-CCNC: 20 U/L (ref 5–40)
BILIRUB SERPL-MCNC: 0.5 MG/DL (ref 0.3–1.2)
BUN SERPL-MCNC: 37 MG/DL (ref 7–22)
CALCIUM SERPL-MCNC: 9.5 MG/DL (ref 8.5–10.5)
CHLORIDE SERPL-SCNC: 100 MEQ/L (ref 98–111)
CO2 SERPL-SCNC: 25 MEQ/L (ref 23–33)
CREAT SERPL-MCNC: 1.9 MG/DL (ref 0.4–1.2)
GFR SERPL CREATININE-BSD FRML MDRD: 36 ML/MIN/1.73M2
GLUCOSE SERPL-MCNC: 170 MG/DL (ref 70–108)
POTASSIUM SERPL-SCNC: 4.3 MEQ/L (ref 3.5–5.2)
PROT SERPL-MCNC: 6.9 G/DL (ref 6.1–8)
SODIUM SERPL-SCNC: 137 MEQ/L (ref 135–145)

## 2023-09-05 ENCOUNTER — OFFICE VISIT (OUTPATIENT)
Dept: INTERNAL MEDICINE CLINIC | Age: 78
End: 2023-09-05
Payer: MEDICARE

## 2023-09-05 VITALS
BODY MASS INDEX: 30.93 KG/M2 | WEIGHT: 208.8 LBS | SYSTOLIC BLOOD PRESSURE: 122 MMHG | DIASTOLIC BLOOD PRESSURE: 64 MMHG | HEIGHT: 69 IN | OXYGEN SATURATION: 97 % | HEART RATE: 74 BPM

## 2023-09-05 DIAGNOSIS — D63.1 ANEMIA IN STAGE 3 CHRONIC KIDNEY DISEASE, UNSPECIFIED WHETHER STAGE 3A OR 3B CKD (HCC): ICD-10-CM

## 2023-09-05 DIAGNOSIS — Z79.4 TYPE 2 DIABETES MELLITUS WITH STAGE 4 CHRONIC KIDNEY DISEASE, WITH LONG-TERM CURRENT USE OF INSULIN (HCC): ICD-10-CM

## 2023-09-05 DIAGNOSIS — E11.22 TYPE 2 DIABETES MELLITUS WITH STAGE 4 CHRONIC KIDNEY DISEASE, WITH LONG-TERM CURRENT USE OF INSULIN (HCC): ICD-10-CM

## 2023-09-05 DIAGNOSIS — N18.4 CHRONIC KIDNEY DISEASE, STAGE 4 (SEVERE) (HCC): ICD-10-CM

## 2023-09-05 DIAGNOSIS — N18.30 ANEMIA IN STAGE 3 CHRONIC KIDNEY DISEASE, UNSPECIFIED WHETHER STAGE 3A OR 3B CKD (HCC): ICD-10-CM

## 2023-09-05 DIAGNOSIS — N18.4 TYPE 2 DIABETES MELLITUS WITH STAGE 4 CHRONIC KIDNEY DISEASE, WITH LONG-TERM CURRENT USE OF INSULIN (HCC): ICD-10-CM

## 2023-09-05 DIAGNOSIS — R26.81 GAIT INSTABILITY: ICD-10-CM

## 2023-09-05 DIAGNOSIS — Z95.818 PRESENCE OF WATCHMAN LEFT ATRIAL APPENDAGE CLOSURE DEVICE: ICD-10-CM

## 2023-09-05 DIAGNOSIS — I50.32 CHRONIC DIASTOLIC CONGESTIVE HEART FAILURE (HCC): Primary | ICD-10-CM

## 2023-09-05 PROCEDURE — G8417 CALC BMI ABV UP PARAM F/U: HCPCS | Performed by: INTERNAL MEDICINE

## 2023-09-05 PROCEDURE — 3044F HG A1C LEVEL LT 7.0%: CPT | Performed by: INTERNAL MEDICINE

## 2023-09-05 PROCEDURE — G8427 DOCREV CUR MEDS BY ELIG CLIN: HCPCS | Performed by: INTERNAL MEDICINE

## 2023-09-05 PROCEDURE — 3078F DIAST BP <80 MM HG: CPT | Performed by: INTERNAL MEDICINE

## 2023-09-05 PROCEDURE — 1123F ACP DISCUSS/DSCN MKR DOCD: CPT | Performed by: INTERNAL MEDICINE

## 2023-09-05 PROCEDURE — 1036F TOBACCO NON-USER: CPT | Performed by: INTERNAL MEDICINE

## 2023-09-05 PROCEDURE — 99214 OFFICE O/P EST MOD 30 MIN: CPT | Performed by: INTERNAL MEDICINE

## 2023-09-05 PROCEDURE — 3074F SYST BP LT 130 MM HG: CPT | Performed by: INTERNAL MEDICINE

## 2023-09-05 NOTE — PROGRESS NOTES
movement disorder noted     Musculoskeletal -   msk [id number]     Extremities - no pedal edema noted     Skin - normal coloration and turgor, no rashes, no suspicious skin lesions noted            An electronic signature was used to authenticate this note.     --Geeta Clemons MD

## 2023-09-19 ENCOUNTER — HOSPITAL ENCOUNTER (OUTPATIENT)
Dept: NON INVASIVE DIAGNOSTICS | Age: 78
Discharge: HOME | End: 2023-09-19
Attending: INTERNAL MEDICINE
Payer: MEDICARE

## 2023-09-19 PROCEDURE — 93306 TTE W/DOPPLER COMPLETE: CPT

## 2023-09-20 ENCOUNTER — TELEPHONE (OUTPATIENT)
Dept: INTERNAL MEDICINE CLINIC | Age: 78
End: 2023-09-20

## 2023-09-20 NOTE — TELEPHONE ENCOUNTER
----- Message from Jared Ferreira MD sent at 9/20/2023  7:14 AM EDT -----  Tell him looks good; normal cardiac strength, no valve issues

## 2023-09-20 NOTE — TELEPHONE ENCOUNTER
I left a message on Leidy's number that echo looks good. Normal Cardiac strength and no valve issues and to call back if any questions.

## 2023-09-25 ENCOUNTER — APPOINTMENT (OUTPATIENT)
Dept: GENERAL RADIOLOGY | Age: 78
DRG: 202 | End: 2023-09-25
Payer: MEDICARE

## 2023-09-25 ENCOUNTER — TELEPHONE (OUTPATIENT)
Dept: UROLOGY | Age: 78
End: 2023-09-25

## 2023-09-25 ENCOUNTER — HOSPITAL ENCOUNTER (INPATIENT)
Age: 78
LOS: 7 days | Discharge: HOME | DRG: 202 | End: 2023-10-06
Admitting: PHYSICIAN ASSISTANT
Payer: MEDICARE

## 2023-09-25 DIAGNOSIS — J81.0 ACUTE PULMONARY EDEMA (HCC): ICD-10-CM

## 2023-09-25 DIAGNOSIS — N18.4 CHRONIC KIDNEY DISEASE, STAGE 4 (SEVERE) (HCC): ICD-10-CM

## 2023-09-25 DIAGNOSIS — N18.9 ACUTE RENAL FAILURE SUPERIMPOSED ON CHRONIC KIDNEY DISEASE, UNSPECIFIED CKD STAGE, UNSPECIFIED ACUTE RENAL FAILURE TYPE: Primary | ICD-10-CM

## 2023-09-25 DIAGNOSIS — N17.9 ACUTE RENAL FAILURE SUPERIMPOSED ON CHRONIC KIDNEY DISEASE, UNSPECIFIED CKD STAGE, UNSPECIFIED ACUTE RENAL FAILURE TYPE: Primary | ICD-10-CM

## 2023-09-25 DIAGNOSIS — R93.89 ABNORMAL CT OF THE CHEST: ICD-10-CM

## 2023-09-25 DIAGNOSIS — J45.21 MILD INTERMITTENT ASTHMA WITH EXACERBATION: ICD-10-CM

## 2023-09-25 DIAGNOSIS — I50.9 ACUTE ON CHRONIC CONGESTIVE HEART FAILURE, UNSPECIFIED HEART FAILURE TYPE (HCC): ICD-10-CM

## 2023-09-25 DIAGNOSIS — D64.9 ACUTE ON CHRONIC ANEMIA: ICD-10-CM

## 2023-09-25 LAB
ALBUMIN SERPL BCG-MCNC: 3.5 G/DL (ref 3.5–5.1)
ALP SERPL-CCNC: 71 U/L (ref 38–126)
ALT SERPL W/O P-5'-P-CCNC: 25 U/L (ref 11–66)
ANION GAP SERPL CALC-SCNC: 14 MEQ/L (ref 8–16)
AST SERPL-CCNC: 60 U/L (ref 5–40)
BACTERIA: ABNORMAL
BASOPHILS ABSOLUTE: 0 THOU/MM3 (ref 0–0.1)
BASOPHILS NFR BLD AUTO: 0.3 %
BILIRUB SERPL-MCNC: 1.1 MG/DL (ref 0.3–1.2)
BILIRUB UR QL STRIP: NEGATIVE
BUN SERPL-MCNC: 51 MG/DL (ref 7–22)
CALCIUM SERPL-MCNC: 9.1 MG/DL (ref 8.5–10.5)
CASTS #/AREA URNS LPF: ABNORMAL /LPF
CASTS #/AREA URNS LPF: ABNORMAL /LPF
CHARACTER UR: CLEAR
CHARCOAL URNS QL MICRO: ABNORMAL
CHLORIDE SERPL-SCNC: 96 MEQ/L (ref 98–111)
CK SERPL-CCNC: 398 U/L (ref 55–170)
CO2 SERPL-SCNC: 22 MEQ/L (ref 23–33)
COLOR UR: ABNORMAL
CREAT SERPL-MCNC: 2.2 MG/DL (ref 0.4–1.2)
CRYSTALS URNS QL MICRO: ABNORMAL
D DIMER PPP IA.FEU-MCNC: 608 NG/ML FEU (ref 0–500)
DEPRECATED RDW RBC AUTO: 51.5 FL (ref 35–45)
EOSINOPHIL NFR BLD AUTO: 0.4 %
EOSINOPHILS ABSOLUTE: 0 THOU/MM3 (ref 0–0.4)
EPITHELIAL CELLS, UA: ABNORMAL /HPF
ERYTHROCYTE [DISTWIDTH] IN BLOOD BY AUTOMATED COUNT: 13.3 % (ref 11.5–14.5)
GFR SERPL CREATININE-BSD FRML MDRD: 30 ML/MIN/1.73M2
GLUCOSE SERPL-MCNC: 136 MG/DL (ref 70–108)
GLUCOSE UR QL STRIP.AUTO: NEGATIVE MG/DL
HCT VFR BLD AUTO: 28.9 % (ref 42–52)
HGB BLD-MCNC: 9.4 GM/DL (ref 14–18)
HGB UR QL STRIP.AUTO: NEGATIVE
IMM GRANULOCYTES # BLD AUTO: 0.04 THOU/MM3 (ref 0–0.07)
IMM GRANULOCYTES NFR BLD AUTO: 0.4 %
KETONES UR QL STRIP.AUTO: NEGATIVE
LEUKOCYTE ESTERASE UR QL STRIP.AUTO: NEGATIVE
LYMPHOCYTES ABSOLUTE: 0.5 THOU/MM3 (ref 1–4.8)
LYMPHOCYTES NFR BLD AUTO: 5.6 %
MCH RBC QN AUTO: 33.6 PG (ref 26–33)
MCHC RBC AUTO-ENTMCNC: 32.5 GM/DL (ref 32.2–35.5)
MCV RBC AUTO: 103.2 FL (ref 80–94)
MONOCYTES ABSOLUTE: 1.1 THOU/MM3 (ref 0.4–1.3)
MONOCYTES NFR BLD AUTO: 11.9 %
NEUTROPHILS NFR BLD AUTO: 81.4 %
NITRITE UR QL STRIP.AUTO: NEGATIVE
NRBC BLD AUTO-RTO: 0 /100 WBC
NT-PROBNP SERPL IA-MCNC: 5224 PG/ML (ref 0–449)
PH UR STRIP.AUTO: 5.5 [PH] (ref 5–9)
PLATELET # BLD AUTO: 185 THOU/MM3 (ref 130–400)
PMV BLD AUTO: 10.1 FL (ref 9.4–12.4)
POTASSIUM SERPL-SCNC: 4.7 MEQ/L (ref 3.5–5.2)
PROCALCITONIN SERPL IA-MCNC: 0.2 NG/ML (ref 0.01–0.09)
PROT SERPL-MCNC: 6.4 G/DL (ref 6.1–8)
PROT UR STRIP.AUTO-MCNC: 30 MG/DL
RBC # BLD AUTO: 2.8 MILL/MM3 (ref 4.7–6.1)
RBC #/AREA URNS HPF: ABNORMAL /HPF
RENAL EPI CELLS #/AREA URNS HPF: ABNORMAL /[HPF]
SEGMENTED NEUTROPHILS ABSOLUTE COUNT: 7.3 THOU/MM3 (ref 1.8–7.7)
SODIUM SERPL-SCNC: 132 MEQ/L (ref 135–145)
SPECIFIC GRAVITY UA: 1.01 (ref 1–1.03)
TROPONIN, HIGH SENSITIVITY: 1171 NG/L (ref 0–12)
TROPONIN, HIGH SENSITIVITY: 1171 NG/L (ref 0–12)
TSH SERPL DL<=0.005 MIU/L-ACNC: 1.89 UIU/ML (ref 0.4–4.2)
UROBILINOGEN, URINE: 1 EU/DL (ref 0–1)
WBC # BLD AUTO: 9 THOU/MM3 (ref 4.8–10.8)
WBC #/AREA URNS HPF: ABNORMAL /HPF
YEAST LIKE FUNGI URNS QL MICRO: ABNORMAL

## 2023-09-25 PROCEDURE — 6360000002 HC RX W HCPCS

## 2023-09-25 PROCEDURE — 84484 ASSAY OF TROPONIN QUANT: CPT

## 2023-09-25 PROCEDURE — 99285 EMERGENCY DEPT VISIT HI MDM: CPT

## 2023-09-25 PROCEDURE — 84145 PROCALCITONIN (PCT): CPT

## 2023-09-25 PROCEDURE — 93010 ELECTROCARDIOGRAM REPORT: CPT | Performed by: INTERNAL MEDICINE

## 2023-09-25 PROCEDURE — 82550 ASSAY OF CK (CPK): CPT

## 2023-09-25 PROCEDURE — 80053 COMPREHEN METABOLIC PANEL: CPT

## 2023-09-25 PROCEDURE — G0378 HOSPITAL OBSERVATION PER HR: HCPCS

## 2023-09-25 PROCEDURE — 6370000000 HC RX 637 (ALT 250 FOR IP)

## 2023-09-25 PROCEDURE — 87086 URINE CULTURE/COLONY COUNT: CPT

## 2023-09-25 PROCEDURE — 51798 US URINE CAPACITY MEASURE: CPT

## 2023-09-25 PROCEDURE — 36415 COLL VENOUS BLD VENIPUNCTURE: CPT

## 2023-09-25 PROCEDURE — 94640 AIRWAY INHALATION TREATMENT: CPT

## 2023-09-25 PROCEDURE — 96361 HYDRATE IV INFUSION ADD-ON: CPT

## 2023-09-25 PROCEDURE — 96360 HYDRATION IV INFUSION INIT: CPT

## 2023-09-25 PROCEDURE — 83880 ASSAY OF NATRIURETIC PEPTIDE: CPT

## 2023-09-25 PROCEDURE — 96372 THER/PROPH/DIAG INJ SC/IM: CPT

## 2023-09-25 PROCEDURE — 99223 1ST HOSP IP/OBS HIGH 75: CPT

## 2023-09-25 PROCEDURE — 94760 N-INVAS EAR/PLS OXIMETRY 1: CPT

## 2023-09-25 PROCEDURE — 2580000003 HC RX 258

## 2023-09-25 PROCEDURE — 71046 X-RAY EXAM CHEST 2 VIEWS: CPT

## 2023-09-25 PROCEDURE — 85025 COMPLETE CBC W/AUTO DIFF WBC: CPT

## 2023-09-25 PROCEDURE — 85379 FIBRIN DEGRADATION QUANT: CPT

## 2023-09-25 PROCEDURE — 2700000000 HC OXYGEN THERAPY PER DAY

## 2023-09-25 PROCEDURE — 93005 ELECTROCARDIOGRAM TRACING: CPT

## 2023-09-25 PROCEDURE — 81001 URINALYSIS AUTO W/SCOPE: CPT

## 2023-09-25 PROCEDURE — 84443 ASSAY THYROID STIM HORMONE: CPT

## 2023-09-25 RX ORDER — SODIUM CHLORIDE 0.9 % (FLUSH) 0.9 %
5-40 SYRINGE (ML) INJECTION PRN
Status: DISCONTINUED | OUTPATIENT
Start: 2023-09-25 | End: 2023-10-06 | Stop reason: HOSPADM

## 2023-09-25 RX ORDER — ATORVASTATIN CALCIUM 40 MG/1
40 TABLET, FILM COATED ORAL NIGHTLY
Status: DISCONTINUED | OUTPATIENT
Start: 2023-09-25 | End: 2023-10-06 | Stop reason: HOSPADM

## 2023-09-25 RX ORDER — INSULIN LISPRO 100 [IU]/ML
0-4 INJECTION, SOLUTION INTRAVENOUS; SUBCUTANEOUS NIGHTLY
Status: DISCONTINUED | OUTPATIENT
Start: 2023-09-25 | End: 2023-09-30

## 2023-09-25 RX ORDER — INSULIN LISPRO 100 [IU]/ML
0-4 INJECTION, SOLUTION INTRAVENOUS; SUBCUTANEOUS
Status: DISCONTINUED | OUTPATIENT
Start: 2023-09-25 | End: 2023-09-30

## 2023-09-25 RX ORDER — TAMSULOSIN HYDROCHLORIDE 0.4 MG/1
0.4 CAPSULE ORAL 2 TIMES DAILY
Status: DISCONTINUED | OUTPATIENT
Start: 2023-09-25 | End: 2023-10-06 | Stop reason: HOSPADM

## 2023-09-25 RX ORDER — ALBUTEROL SULFATE 2.5 MG/3ML
2.5 SOLUTION RESPIRATORY (INHALATION) EVERY 4 HOURS PRN
Status: DISCONTINUED | OUTPATIENT
Start: 2023-09-25 | End: 2023-10-06 | Stop reason: HOSPADM

## 2023-09-25 RX ORDER — SODIUM CHLORIDE 0.9 % (FLUSH) 0.9 %
5-40 SYRINGE (ML) INJECTION EVERY 12 HOURS SCHEDULED
Status: DISCONTINUED | OUTPATIENT
Start: 2023-09-25 | End: 2023-10-06 | Stop reason: HOSPADM

## 2023-09-25 RX ORDER — CLOPIDOGREL BISULFATE 75 MG/1
75 TABLET ORAL DAILY
Status: DISCONTINUED | OUTPATIENT
Start: 2023-09-25 | End: 2023-10-06 | Stop reason: HOSPADM

## 2023-09-25 RX ORDER — POTASSIUM CHLORIDE 20 MEQ/1
40 TABLET, EXTENDED RELEASE ORAL PRN
Status: DISCONTINUED | OUTPATIENT
Start: 2023-09-25 | End: 2023-10-06 | Stop reason: HOSPADM

## 2023-09-25 RX ORDER — POTASSIUM CHLORIDE 7.45 MG/ML
10 INJECTION INTRAVENOUS PRN
Status: DISCONTINUED | OUTPATIENT
Start: 2023-09-25 | End: 2023-10-06 | Stop reason: HOSPADM

## 2023-09-25 RX ORDER — METOPROLOL SUCCINATE 50 MG/1
50 TABLET, EXTENDED RELEASE ORAL DAILY
Status: DISCONTINUED | OUTPATIENT
Start: 2023-09-25 | End: 2023-10-06 | Stop reason: HOSPADM

## 2023-09-25 RX ORDER — HYDROXYZINE HYDROCHLORIDE 10 MG/1
10 TABLET, FILM COATED ORAL NIGHTLY
Status: DISCONTINUED | OUTPATIENT
Start: 2023-09-25 | End: 2023-10-01

## 2023-09-25 RX ORDER — SODIUM CHLORIDE 9 MG/ML
INJECTION, SOLUTION INTRAVENOUS PRN
Status: DISCONTINUED | OUTPATIENT
Start: 2023-09-25 | End: 2023-10-06 | Stop reason: HOSPADM

## 2023-09-25 RX ORDER — ACETAMINOPHEN 500 MG
500 TABLET ORAL EVERY 8 HOURS PRN
COMMUNITY

## 2023-09-25 RX ORDER — MAGNESIUM SULFATE IN WATER 40 MG/ML
2000 INJECTION, SOLUTION INTRAVENOUS PRN
Status: DISCONTINUED | OUTPATIENT
Start: 2023-09-25 | End: 2023-10-06 | Stop reason: HOSPADM

## 2023-09-25 RX ORDER — BUMETANIDE 1 MG/1
2 TABLET ORAL DAILY
Status: DISCONTINUED | OUTPATIENT
Start: 2023-09-25 | End: 2023-10-06

## 2023-09-25 RX ORDER — ACETAMINOPHEN 325 MG/1
650 TABLET ORAL EVERY 6 HOURS PRN
Status: DISCONTINUED | OUTPATIENT
Start: 2023-09-25 | End: 2023-10-06 | Stop reason: HOSPADM

## 2023-09-25 RX ORDER — ASPIRIN 81 MG/1
81 TABLET ORAL DAILY
Status: DISCONTINUED | OUTPATIENT
Start: 2023-09-25 | End: 2023-10-06 | Stop reason: HOSPADM

## 2023-09-25 RX ORDER — INSULIN GLARGINE 100 [IU]/ML
33 INJECTION, SOLUTION SUBCUTANEOUS EVERY MORNING
Status: DISCONTINUED | OUTPATIENT
Start: 2023-09-26 | End: 2023-10-06 | Stop reason: HOSPADM

## 2023-09-25 RX ORDER — IBUPROFEN 600 MG/1
1 TABLET ORAL PRN
Status: DISCONTINUED | OUTPATIENT
Start: 2023-09-25 | End: 2023-10-06 | Stop reason: HOSPADM

## 2023-09-25 RX ORDER — INSULIN GLARGINE 100 [IU]/ML
15 INJECTION, SOLUTION SUBCUTANEOUS NIGHTLY
Status: DISCONTINUED | OUTPATIENT
Start: 2023-09-25 | End: 2023-09-30

## 2023-09-25 RX ORDER — POLYETHYLENE GLYCOL 3350 17 G/17G
17 POWDER, FOR SOLUTION ORAL DAILY PRN
Status: DISCONTINUED | OUTPATIENT
Start: 2023-09-25 | End: 2023-10-06 | Stop reason: HOSPADM

## 2023-09-25 RX ORDER — IPRATROPIUM BROMIDE AND ALBUTEROL SULFATE 2.5; .5 MG/3ML; MG/3ML
1 SOLUTION RESPIRATORY (INHALATION)
Status: DISCONTINUED | OUTPATIENT
Start: 2023-09-25 | End: 2023-09-25

## 2023-09-25 RX ORDER — FUROSEMIDE 10 MG/ML
20 INJECTION INTRAMUSCULAR; INTRAVENOUS ONCE
Status: DISCONTINUED | OUTPATIENT
Start: 2023-09-25 | End: 2023-09-25

## 2023-09-25 RX ORDER — 0.9 % SODIUM CHLORIDE 0.9 %
500 INTRAVENOUS SOLUTION INTRAVENOUS ONCE
Status: COMPLETED | OUTPATIENT
Start: 2023-09-25 | End: 2023-09-25

## 2023-09-25 RX ORDER — ENOXAPARIN SODIUM 100 MG/ML
40 INJECTION SUBCUTANEOUS EVERY 24 HOURS
Status: DISCONTINUED | OUTPATIENT
Start: 2023-09-25 | End: 2023-09-26

## 2023-09-25 RX ORDER — FINASTERIDE 5 MG/1
5 TABLET, FILM COATED ORAL DAILY
Status: DISCONTINUED | OUTPATIENT
Start: 2023-09-25 | End: 2023-10-06 | Stop reason: HOSPADM

## 2023-09-25 RX ORDER — DEXTROSE MONOHYDRATE 100 MG/ML
INJECTION, SOLUTION INTRAVENOUS CONTINUOUS PRN
Status: DISCONTINUED | OUTPATIENT
Start: 2023-09-25 | End: 2023-10-06 | Stop reason: HOSPADM

## 2023-09-25 RX ORDER — ONDANSETRON 4 MG/1
4 TABLET, ORALLY DISINTEGRATING ORAL EVERY 8 HOURS PRN
Status: DISCONTINUED | OUTPATIENT
Start: 2023-09-25 | End: 2023-10-06 | Stop reason: HOSPADM

## 2023-09-25 RX ORDER — ACETAMINOPHEN 650 MG/1
650 SUPPOSITORY RECTAL EVERY 6 HOURS PRN
Status: DISCONTINUED | OUTPATIENT
Start: 2023-09-25 | End: 2023-10-06 | Stop reason: HOSPADM

## 2023-09-25 RX ORDER — ONDANSETRON 2 MG/ML
4 INJECTION INTRAMUSCULAR; INTRAVENOUS EVERY 6 HOURS PRN
Status: DISCONTINUED | OUTPATIENT
Start: 2023-09-25 | End: 2023-10-06 | Stop reason: HOSPADM

## 2023-09-25 RX ORDER — RANOLAZINE 500 MG/1
1000 TABLET, EXTENDED RELEASE ORAL 2 TIMES DAILY
Status: DISCONTINUED | OUTPATIENT
Start: 2023-09-25 | End: 2023-10-06 | Stop reason: HOSPADM

## 2023-09-25 RX ORDER — ISOSORBIDE MONONITRATE 60 MG/1
120 TABLET, EXTENDED RELEASE ORAL DAILY
Status: DISCONTINUED | OUTPATIENT
Start: 2023-09-25 | End: 2023-10-06 | Stop reason: HOSPADM

## 2023-09-25 RX ORDER — HYDRALAZINE HYDROCHLORIDE 25 MG/1
25 TABLET, FILM COATED ORAL 3 TIMES DAILY
Status: DISCONTINUED | OUTPATIENT
Start: 2023-09-25 | End: 2023-09-28

## 2023-09-25 RX ORDER — DILTIAZEM HYDROCHLORIDE 120 MG/1
120 CAPSULE, COATED, EXTENDED RELEASE ORAL DAILY
Status: DISCONTINUED | OUTPATIENT
Start: 2023-09-25 | End: 2023-10-06 | Stop reason: HOSPADM

## 2023-09-25 RX ORDER — IPRATROPIUM BROMIDE AND ALBUTEROL SULFATE 2.5; .5 MG/3ML; MG/3ML
1 SOLUTION RESPIRATORY (INHALATION) 2 TIMES DAILY
Status: DISCONTINUED | OUTPATIENT
Start: 2023-09-26 | End: 2023-09-26

## 2023-09-25 RX ADMIN — TAMSULOSIN HYDROCHLORIDE 0.4 MG: 0.4 CAPSULE ORAL at 21:52

## 2023-09-25 RX ADMIN — SODIUM CHLORIDE, PRESERVATIVE FREE 10 ML: 5 INJECTION INTRAVENOUS at 21:52

## 2023-09-25 RX ADMIN — RANOLAZINE 1000 MG: 500 TABLET, EXTENDED RELEASE ORAL at 21:52

## 2023-09-25 RX ADMIN — ATORVASTATIN CALCIUM 40 MG: 40 TABLET, FILM COATED ORAL at 21:53

## 2023-09-25 RX ADMIN — SODIUM CHLORIDE, PRESERVATIVE FREE 10 ML: 5 INJECTION INTRAVENOUS at 21:56

## 2023-09-25 RX ADMIN — INSULIN GLARGINE 15 UNITS: 100 INJECTION, SOLUTION SUBCUTANEOUS at 21:52

## 2023-09-25 RX ADMIN — IPRATROPIUM BROMIDE AND ALBUTEROL SULFATE 1 DOSE: .5; 3 SOLUTION RESPIRATORY (INHALATION) at 21:15

## 2023-09-25 RX ADMIN — ENOXAPARIN SODIUM 40 MG: 100 INJECTION SUBCUTANEOUS at 21:51

## 2023-09-25 RX ADMIN — HYDROXYZINE HYDROCHLORIDE 10 MG: 10 TABLET ORAL at 21:53

## 2023-09-25 RX ADMIN — HYDRALAZINE HYDROCHLORIDE 25 MG: 25 TABLET, FILM COATED ORAL at 21:53

## 2023-09-25 RX ADMIN — SODIUM CHLORIDE 500 ML: 9 INJECTION, SOLUTION INTRAVENOUS at 11:39

## 2023-09-25 ASSESSMENT — PAIN - FUNCTIONAL ASSESSMENT
PAIN_FUNCTIONAL_ASSESSMENT: NONE - DENIES PAIN

## 2023-09-25 ASSESSMENT — PAIN SCALES - GENERAL: PAINLEVEL_OUTOF10: 0

## 2023-09-25 NOTE — TELEPHONE ENCOUNTER
Patient ran a low grade fever \"98.9\". He is very fatigue, does not feel well, urgency, dribbling with incontinence, poor appetite and does not have a very good input. Symptoms started 3-4 days ago. Advised to be evaluated in the ER. Grayson Simmonds voiced understanding.

## 2023-09-25 NOTE — ED NOTES
Pt transported to [facility] by cart in stable condition. Called [facility] and informed Lizet Pizano that the patient was on their way to the unit.       Shabbir Miranda, SIVAKUMAR  18/98/77 [id number]

## 2023-09-25 NOTE — ED TRIAGE NOTES
Pt presents to the ED through triage with c/c malaise. Pt wife states that since Thursday, pt has been more tired, low grade fever, and incontinent of urine (abnormal). Pt called Dr. Yasmeen Fuentes office this morning and was instructed to come to ED to be evaluated for UT. Pt is A&O x4. Denies pain.

## 2023-09-25 NOTE — PROCEDURES
Bladder scan was completed by J leopold at 1730.  The residual amount of 332 mlw as resulted to [facility]

## 2023-09-25 NOTE — H&P
in right lower lung base. No increased work of breathing. No retractions  Cardiac: RRR without murmur. Abdomen: soft. Nontender. Bowel sounds positive. Extremities:  No clubbing, cyanosis, or edema x 4. Vasculature: capillary refill < 3 seconds. Palpable LE pulses bilaterally. Skin:  warm and dry. No rashes or lesions. Psych:  Alert and oriented x3. Affect appropriate  Neurologic:  No focal deficit. No seizures. Data: (All radiographs, tracings, PFTs, and imaging are personally viewed and interpreted unless otherwise noted)  Labs:   Recent Labs     09/25/23  1120   WBC 9.0   HGB 9.4*   HCT 28.9*        Recent Labs     09/25/23  1120   *   K 4.7   CL 96*   CO2 22*   BUN 51*   CREATININE 2.2*   CALCIUM 9.1     Recent Labs     09/25/23  1120   AST 60*   ALT 25   BILITOT 1.1   ALKPHOS 71     No results for input(s): \"INR\" in the last 72 hours. No results for input(s): \"CKTOTAL\", \"TROPONINI\" in the last 72 hours. Urinalysis:   Lab Results   Component Value Date/Time    NITRU Negative 07/06/2022 11:30 AM    WBCUA 2-4 05/03/2022 11:50 AM    BACTERIA NONE 05/03/2022 11:50 AM    RBCUA > 100 05/03/2022 11:50 AM    BLOODU Negative 07/06/2022 11:30 AM    BLOODU LARGE 05/03/2022 11:50 AM    SPECGRAV 1.008 04/06/2021 03:55 PM    GLUCOSEU Negative 07/06/2022 11:30 AM       EKG:  NSR with 1st degree AV block with RBBB    Radiology:  XR CHEST (2 VW)   Final Result   1. Increased interstitial pulmonary markings are seen bilaterally with increased groundglass opacification of both lungs. Findings relate to development of pulmonary edema. Multilobar pneumonia is not excluded. **This report has been created using voice recognition software. It may contain minor errors which are inherent in voice recognition technology. **      Final report electronically signed by Dr Kelsi Cade on 9/25/2023 12:05 PM        XR CHEST (2 VW)    Result Date: 9/25/2023  PROCEDURE: XR CHEST (2 VW) CLINICAL INFORMATION: Fatigue/history of CHF. Evaluate for consolidation/fluid overload. Thank you Sanjeev Ledesma [phone] COMPARISON: Chest radiograph 11/11/2022 TECHNIQUE: PA and lateral views of the chest performed. FINDINGS: Increased interstitial pulmonary markings are seen bilaterally with increased groundglass opacification of both lungs. Median sternotomy has been performed. Cardiac silhouette is mildly enlarged. No pleural effusion. No pneumothorax. No acute bony abnormality. DISH of the thoracic spine. 1. Increased interstitial pulmonary markings are seen bilaterally with increased groundglass opacification of both lungs. Findings relate to development of pulmonary edema. Multilobar pneumonia is not excluded. **This report has been created using voice recognition software. It may contain minor errors which are inherent in voice recognition technology. ** Final report electronically signed by Dr Jacy Pruitt on 9/25/2023 12:05 PM        Tele:   [x] yes             [] no      Thank you Johnna Lee MD for the opportunity to be involved in this patient's care.     Electronically signed by Constantino Zaman PA-C on 9/25/2023 at 2:27 PM

## 2023-09-25 NOTE — ED PROVIDER NOTES
[facility] RENAL TELEMET[facility]      EMERGENCY MEDICINE     Pt Name: Nuvia Combs  MRN: [id number]  [address] 1945  Date of evaluation: 9/25/2023  Provider: KAVON Wick - KARLOS    CHIEF COMPLAINT       Chief Complaint   Patient presents with    [address]   Nuvia Combs is a pleasant 66 y.o. male who presents to the emergency department from home by personal transportation accompanied by his wife. Patient states his wife is the reason he came in. Wife states \"he hasn't been himself\" and that he is \"sleepy\" since Thursday. She suspects a UTI. Wife states on Saturday night, the patient got up to the bathroom and dribbled urine on the way, and his urine stream moves to the side. Patient denies burning or pain with urination, or change in frequency. He states he has had a UTI years ago, but states experiencing different symptoms. Wife reports a home temperature of 98.9. Patient also reports new nausea and loss of appetite starting two days ago. He denies vomiting. He was unable to eat breakfast this morning due to nausea. He reports chest pain relieved by nitro two days ago, and describes it as normal for him, and denies current chest pain. He also reports SOB, which he usually does not have at rest. Patient has a history of diabetes, diastolic heart failure, BPH. He had an echo done last Monday, wife states it was \"normal\". Denies current chest pain/nausea.       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    BPH (benign prostatic hypertrophy)     CAD (coronary artery disease) 1997    Carotid artery disease ([address]) 1995    right    Carotid disease, bilateral ([address]) sodium chloride infusion (has no administration in time range)   enoxaparin (LOVENOX) injection 40 mg (has no administration in time range)   ondansetron (ZOFRAN-ODT) disintegrating tablet 4 mg (has no administration in time range)     Or   ondansetron (ZOFRAN) injection 4 mg (has no administration in time range)   polyethylene glycol (GLYCOLAX) packet 17 g (has no administration in time range)   acetaminophen (TYLENOL) tablet 650 mg (has no administration in time range)     Or   acetaminophen (TYLENOL) suppository 650 mg (has no administration in time range)   potassium chloride (KLOR-CON M) extended release tablet 40 mEq (has no administration in time range)     Or   potassium bicarb-citric acid (EFFER-K) effervescent tablet 40 mEq (has no administration in time range)     Or   potassium chloride 10 mEq/100 mL IVPB (Peripheral Line) (has no administration in time range)   magnesium sulfate 2000 mg in 50 mL IVPB premix (has no administration in time range)   insulin glargine (LANTUS) injection vial 15 Units (has no administration in time range)   insulin glargine (LANTUS) injection vial 33 Units (has no administration in time range)   ipratropium 0.5 mg-albuterol 2.5 mg (DUONEB) nebulizer solution 1 Dose (has no administration in time range)   sodium chloride 0.9 % bolus 500 mL (0 mLs IntraVENous Stopped 9/25/23 1600)     PROCEDURES: (None if blank)  Procedures:   CRITICAL CARE: (None if blank)  DISCHARGE PRESCRIPTIONS: (None if blank)  Current Discharge Medication List        FINAL IMPRESSION      1. Acute renal failure superimposed on chronic kidney disease, unspecified CKD stage, unspecified acute renal failure type ([address])    2. Acute on chronic congestive heart failure, unspecified heart failure type ([address])    3. Acute on chronic anemia        DISPOSITION/PLAN   DISPOSITION Admitted 09/25/2023 02:27:32 PM    OUTPATIENT FOLLOW UP THE PATIENT:  No follow-up provider specified.     Jessica Owens, KAVON - CNP

## 2023-09-26 ENCOUNTER — APPOINTMENT (OUTPATIENT)
Dept: ULTRASOUND IMAGING | Age: 78
DRG: 202 | End: 2023-09-26
Payer: MEDICARE

## 2023-09-26 LAB
ANION GAP SERPL CALC-SCNC: 11 MEQ/L (ref 8–16)
BASOPHILS ABSOLUTE: 0 THOU/MM3 (ref 0–0.1)
BASOPHILS NFR BLD AUTO: 0.2 %
BUN SERPL-MCNC: 59 MG/DL (ref 7–22)
CALCIUM SERPL-MCNC: 8.6 MG/DL (ref 8.5–10.5)
CHLORIDE SERPL-SCNC: 98 MEQ/L (ref 98–111)
CO2 SERPL-SCNC: 24 MEQ/L (ref 23–33)
CREAT SERPL-MCNC: 2.7 MG/DL (ref 0.4–1.2)
CREAT UR-MCNC: 67.5 MG/DL
DEPRECATED RDW RBC AUTO: 51.5 FL (ref 35–45)
EOSINOPHIL NFR BLD AUTO: 0.3 %
EOSINOPHILS ABSOLUTE: 0 THOU/MM3 (ref 0–0.4)
ERYTHROCYTE [DISTWIDTH] IN BLOOD BY AUTOMATED COUNT: 13.4 % (ref 11.5–14.5)
GFR SERPL CREATININE-BSD FRML MDRD: 23 ML/MIN/1.73M2
GLUCOSE SERPL-MCNC: 136 MG/DL (ref 70–108)
HCT VFR BLD AUTO: 29 % (ref 42–52)
HGB BLD-MCNC: 9.3 GM/DL (ref 14–18)
IMM GRANULOCYTES # BLD AUTO: 0.06 THOU/MM3 (ref 0–0.07)
IMM GRANULOCYTES NFR BLD AUTO: 0.7 %
LYMPHOCYTES ABSOLUTE: 0.5 THOU/MM3 (ref 1–4.8)
LYMPHOCYTES NFR BLD AUTO: 5.1 %
MCH RBC QN AUTO: 33.2 PG (ref 26–33)
MCHC RBC AUTO-ENTMCNC: 32.1 GM/DL (ref 32.2–35.5)
MCV RBC AUTO: 103.6 FL (ref 80–94)
MONOCYTES ABSOLUTE: 1.1 THOU/MM3 (ref 0.4–1.3)
MONOCYTES NFR BLD AUTO: 12.1 %
NEUTROPHILS NFR BLD AUTO: 81.6 %
NRBC BLD AUTO-RTO: 0 /100 WBC
PLATELET # BLD AUTO: 182 THOU/MM3 (ref 130–400)
PMV BLD AUTO: 9.9 FL (ref 9.4–12.4)
POTASSIUM SERPL-SCNC: 4.6 MEQ/L (ref 3.5–5.2)
RBC # BLD AUTO: 2.8 MILL/MM3 (ref 4.7–6.1)
SEGMENTED NEUTROPHILS ABSOLUTE COUNT: 7.4 THOU/MM3 (ref 1.8–7.7)
SODIUM SERPL-SCNC: 133 MEQ/L (ref 135–145)
SODIUM UR-SCNC: 24 MEQ/L
WBC # BLD AUTO: 9.1 THOU/MM3 (ref 4.8–10.8)

## 2023-09-26 PROCEDURE — 94761 N-INVAS EAR/PLS OXIMETRY MLT: CPT

## 2023-09-26 PROCEDURE — 6370000000 HC RX 637 (ALT 250 FOR IP)

## 2023-09-26 PROCEDURE — 94640 AIRWAY INHALATION TREATMENT: CPT

## 2023-09-26 PROCEDURE — 82570 ASSAY OF URINE CREATININE: CPT

## 2023-09-26 PROCEDURE — 96372 THER/PROPH/DIAG INJ SC/IM: CPT

## 2023-09-26 PROCEDURE — 2700000000 HC OXYGEN THERAPY PER DAY

## 2023-09-26 PROCEDURE — 84300 ASSAY OF URINE SODIUM: CPT

## 2023-09-26 PROCEDURE — 99232 SBSQ HOSP IP/OBS MODERATE 35: CPT | Performed by: PHYSICIAN ASSISTANT

## 2023-09-26 PROCEDURE — 36415 COLL VENOUS BLD VENIPUNCTURE: CPT

## 2023-09-26 PROCEDURE — 99222 1ST HOSP IP/OBS MODERATE 55: CPT | Performed by: INTERNAL MEDICINE

## 2023-09-26 PROCEDURE — G0378 HOSPITAL OBSERVATION PER HR: HCPCS

## 2023-09-26 PROCEDURE — 2500000003 HC RX 250 WO HCPCS: Performed by: INTERNAL MEDICINE

## 2023-09-26 PROCEDURE — 6360000002 HC RX W HCPCS

## 2023-09-26 PROCEDURE — 96374 THER/PROPH/DIAG INJ IV PUSH: CPT

## 2023-09-26 PROCEDURE — 6370000000 HC RX 637 (ALT 250 FOR IP): Performed by: PHYSICIAN ASSISTANT

## 2023-09-26 PROCEDURE — 2580000003 HC RX 258

## 2023-09-26 PROCEDURE — 80048 BASIC METABOLIC PNL TOTAL CA: CPT

## 2023-09-26 PROCEDURE — 97166 OT EVAL MOD COMPLEX 45 MIN: CPT

## 2023-09-26 PROCEDURE — 85025 COMPLETE CBC W/AUTO DIFF WBC: CPT

## 2023-09-26 PROCEDURE — 97530 THERAPEUTIC ACTIVITIES: CPT

## 2023-09-26 PROCEDURE — 76770 US EXAM ABDO BACK WALL COMP: CPT

## 2023-09-26 RX ORDER — ENOXAPARIN SODIUM 100 MG/ML
30 INJECTION SUBCUTANEOUS EVERY 24 HOURS
Status: DISCONTINUED | OUTPATIENT
Start: 2023-09-26 | End: 2023-10-06 | Stop reason: HOSPADM

## 2023-09-26 RX ORDER — IPRATROPIUM BROMIDE AND ALBUTEROL SULFATE 2.5; .5 MG/3ML; MG/3ML
1 SOLUTION RESPIRATORY (INHALATION)
Status: DISCONTINUED | OUTPATIENT
Start: 2023-09-26 | End: 2023-09-28

## 2023-09-26 RX ORDER — BUMETANIDE 0.25 MG/ML
1 INJECTION INTRAMUSCULAR; INTRAVENOUS ONCE
Status: COMPLETED | OUTPATIENT
Start: 2023-09-26 | End: 2023-09-26

## 2023-09-26 RX ADMIN — HYDROXYZINE HYDROCHLORIDE 10 MG: 10 TABLET ORAL at 20:50

## 2023-09-26 RX ADMIN — SODIUM CHLORIDE, PRESERVATIVE FREE 10 ML: 5 INJECTION INTRAVENOUS at 20:51

## 2023-09-26 RX ADMIN — ASPIRIN 81 MG: 81 TABLET, COATED ORAL at 09:24

## 2023-09-26 RX ADMIN — IPRATROPIUM BROMIDE AND ALBUTEROL SULFATE 1 DOSE: .5; 3 SOLUTION RESPIRATORY (INHALATION) at 17:25

## 2023-09-26 RX ADMIN — RANOLAZINE 1000 MG: 500 TABLET, EXTENDED RELEASE ORAL at 09:22

## 2023-09-26 RX ADMIN — INSULIN GLARGINE 15 UNITS: 100 INJECTION, SOLUTION SUBCUTANEOUS at 20:51

## 2023-09-26 RX ADMIN — SODIUM CHLORIDE, PRESERVATIVE FREE 10 ML: 5 INJECTION INTRAVENOUS at 14:59

## 2023-09-26 RX ADMIN — RANOLAZINE 1000 MG: 500 TABLET, EXTENDED RELEASE ORAL at 20:51

## 2023-09-26 RX ADMIN — HYDRALAZINE HYDROCHLORIDE 25 MG: 25 TABLET, FILM COATED ORAL at 09:25

## 2023-09-26 RX ADMIN — ENOXAPARIN SODIUM 30 MG: 100 INJECTION SUBCUTANEOUS at 20:51

## 2023-09-26 RX ADMIN — ATORVASTATIN CALCIUM 40 MG: 40 TABLET, FILM COATED ORAL at 20:50

## 2023-09-26 RX ADMIN — CLOPIDOGREL BISULFATE 75 MG: 75 TABLET ORAL at 09:26

## 2023-09-26 RX ADMIN — INSULIN GLARGINE 33 UNITS: 100 INJECTION, SOLUTION SUBCUTANEOUS at 09:27

## 2023-09-26 RX ADMIN — POLYETHYLENE GLYCOL 3350 17 G: 17 POWDER, FOR SOLUTION ORAL at 12:37

## 2023-09-26 RX ADMIN — TAMSULOSIN HYDROCHLORIDE 0.4 MG: 0.4 CAPSULE ORAL at 20:50

## 2023-09-26 RX ADMIN — HYDRALAZINE HYDROCHLORIDE 25 MG: 25 TABLET, FILM COATED ORAL at 14:59

## 2023-09-26 RX ADMIN — BUMETANIDE 1 MG: 0.25 INJECTION INTRAMUSCULAR; INTRAVENOUS at 17:32

## 2023-09-26 RX ADMIN — IPRATROPIUM BROMIDE AND ALBUTEROL SULFATE 1 DOSE: .5; 3 SOLUTION RESPIRATORY (INHALATION) at 08:03

## 2023-09-26 RX ADMIN — HYDRALAZINE HYDROCHLORIDE 25 MG: 25 TABLET, FILM COATED ORAL at 20:50

## 2023-09-26 RX ADMIN — FINASTERIDE 5 MG: 5 TABLET, FILM COATED ORAL at 09:27

## 2023-09-26 RX ADMIN — DILTIAZEM HYDROCHLORIDE 120 MG: 120 CAPSULE, COATED, EXTENDED RELEASE ORAL at 09:24

## 2023-09-26 RX ADMIN — METOPROLOL SUCCINATE 50 MG: 50 TABLET, EXTENDED RELEASE ORAL at 09:25

## 2023-09-26 RX ADMIN — TAMSULOSIN HYDROCHLORIDE 0.4 MG: 0.4 CAPSULE ORAL at 09:26

## 2023-09-26 RX ADMIN — ISOSORBIDE MONONITRATE 120 MG: 60 TABLET, EXTENDED RELEASE ORAL at 09:26

## 2023-09-26 ASSESSMENT — PAIN SCALES - GENERAL
PAINLEVEL_OUTOF10: 0

## 2023-09-26 NOTE — PROGRESS NOTES
[facility] OCCUPATIONAL THERAPY  [facility] RENAL TELEMETRY [facility]  EVALUATION    Time:   Time In: 3634  Time Out: 8556  Timed Code Treatment Minutes: 23 Minutes  Minutes: 29          Date: 2023  Patient Name: Macho Bonner,   Gender: male      MRN: [id number]  : 1945  (66 y.o.)  Referring Practitioner: Jessica Galindo PA-C  Diagnosis: Acute Kidney Injury  Additional Pertinent Hx: Per H&P, \"Wilfrid Carr is a 66 y.o. male with PMHx of CAD, anemia, DM,  who presented to [facility] with chief complaint of fatigue, poor intake and weakness. Patient reports he has been feeling poorly for the last 3-4 days with excessive fatigue and weakness. Reports some nausea and states he has not been eating or drinking well. Patient's wife reports she thinks he may have a UTI. He is having difficulty controlling his flow and had an episode of incontinence 2 days ago. Reports chronic shortness of breath. States he has been weak for a while, but seems its worse than normal. Does admit to not taking his diuretic yesterday, but did take it today. Endorses an episode of chest pain several days ago, but it resolved quickly. Denies lightheadedness/dizziness, chest pain, worsening shortness of breath, abdominal pain, vomiting, and leg swelling. \"    Restrictions/Precautions:  Restrictions/Precautions: General Precautions, Fall Risk  Position Activity Restriction  Other position/activity restrictions: 1 Liter O2    Subjective  Chart Reviewed: Yes, Orders, Progress Notes, History and Physical  Patient assessed for rehabilitation services?: Yes    Subjective: RN okayed OT session. Upon arrival patient was sitting up in recliner. Pt was agreeable to OT session. Spouse present throughout session. Pain: Pt denies pain. Vitals: Oxygen: Pt is on 1 Liter O2 throughout session.  O2 sats ranged from 94-96%    Social/Functional History:  Lives With: Spouse  Type of Home: House  Home Layout: Two level, Performs ADL's on Until discharge  Short Term Goal 1: Pt will complete BUE strengthening HEP x 10 reps with min vcs for technique to increase endurance with all self cares. Short Term Goal 2: Pt will complete dynamic standing task x 5 minutes while reaching outside of base of support to increase indep and endurance with grooming. Short Term Goal 3: Pt will safely navigate [facility] distances with SBA while O2 stats remain above 90% to increase endurance with all self cares. Short Term Goal 4: Pt will complete BADL routine including shower with SBA while demonstraing EC techniques to increase endurance in home environment. Additional Goals?: No    AM-PAC Inpatient Daily Activity Raw Score: 20  AM-PAC Inpatient ADL T-Scale Score : 42.03    Following session, patient left in safe position with all fall risk precautions in place.

## 2023-09-26 NOTE — PROGRESS NOTES
Patent has a Dexcom device and blood sugar is 192. Call light is within reach.  Will continue to monitor

## 2023-09-26 NOTE — PLAN OF CARE
Problem: Discharge Planning  Goal: Discharge to home or other facility with appropriate resources  Outcome: Progressing  Flowsheets (Taken 9/26/2023 0730)  Discharge to home or other facility with appropriate resources: Identify barriers to discharge with patient and caregiver     Problem: Safety - Adult  Goal: Free from fall injury  Outcome: Progressing  Flowsheets (Taken 9/26/2023 0730)  Free From Fall Injury: Instruct family/caregiver on patient safety     Problem: ABCDS Injury Assessment  Goal: Absence of physical injury  Outcome: Progressing     Problem: Chronic Conditions and Co-morbidities  Goal: Patient's chronic conditions and co-morbidity symptoms are monitored and maintained or improved  Outcome: Progressing  Flowsheets (Taken 9/26/2023 0730)  Care Plan - Patient's Chronic Conditions and Co-Morbidity Symptoms are Monitored and Maintained or Improved: Monitor and assess patient's chronic conditions and comorbid symptoms for stability, deterioration, or improvement

## 2023-09-26 NOTE — PLAN OF CARE
Problem: Respiratory - Adult  Goal: Clear lung sounds  Outcome: Progressing    Patient lung sounds are considered normal for their current lung condition. No signs of distress noted. Current treatment regimen appropriate       Patient mutually agreed on goals.

## 2023-09-26 NOTE — CONSULTS
[facility]    [address]  [address] [place] [address] [address]  9/26/2023 5:57 PM    Pt Name:    Charles Basurto  MRN:     [id number]   [id number]  YOB: 1945  Admit Date:    9/25/2023 10:45 AM  Primary Care Physician:  Husam Glass MD    CSN Number:   [id number]    Reason for Consult:  MADI, CKD  Requesting provider:  Hospitalist    History:   The patient is a 66 y.o. pleasant white male with history of atrial fibrillation, CKD, CAD, history of coronary artery bypass graft, anemia in CKD who was admitted with complaints of generalized weakness, fatigue, shortness of breath associated with some nausea. Symptoms have been present for last 3 to 4 days. No alleviating or aggravating factors. Wife reports he has been feeling weak. Has not been eating or drinking that well. Currently on nasal cannula. Serum creatinine typically runs around 2.0 but now up to 2.7. Nephrology consulted. Patient seen and examined. Discussed with patient.   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    BPH (benign prostatic hypertrophy)     CAD (coronary artery disease) 1997    Carotid artery disease ([address]) 1995    right    Carotid disease, bilateral ([address])     Cerebral artery occlusion with cerebral infarction ([address])     CHF (congestive heart failure) ([address])     Chronic kidney disease     COVID-19 07/09/2022    GERD (gastroesophageal reflux disease)     GI bleed 07/2021    History of blood transfusion 2021    GI bleed    History of blood transfusion 1997    with heart surgery    Hyperlipidemia 1996    Hypertension     Lumbar disc disease 2001    MI (myocardial infarction) ([address]) 1997 59*   CREATININE 2.2* 2.7*   GLUCOSE 136* 136*   CALCIUM 9.1 8.6     Hepatic:   Recent Labs     09/25/23  1120   LABALBU 3.5   AST 60*   ALT 25   BILITOT 1.1   ALKPHOS 71         Additional Labs: UA no blood, trace protein  Diagnostics: Chest x-ray shows increased markings, groundglass opacities, possibly pulmonary edema    Old tests reviewed. Echo: EF 50 to 55%  Labs: Baseline creatinine typically runs around 2.0      Impression and Plan:  MADI on CKD 3B. Baseline creatinine typically runs around 2.0. Creatinine is 2.7 today. Cannot rule out some progression of chronic kidney disease. Has underlying risk factors including hypertension and diabetes. Now presenting with shortness of breath. Chest x-ray shows some pulmonary vascular congestion. proBNP is elevated. Patient is visibly tachypneic. We will go ahead and give him IV Bumex x1 dose to optimize his respiratory status. Monitor serum creatinine. May need to accept some degree of renal dysfunction to optimize his respiratory status. Discussed with patient and wife in detail  Shortness of breath. Chest x-ray shows possibility of pulmonary edema. We will go ahead and give IV Bumex  IDDM  Anemia in CKD. Check iron studies, add LIOR if iron stores sufficient  Hx CAD and CABG  Hx hypertension  Mild hyponatremia. Improved  Mild metabolic acidosis. Also improved      D/W patient and  wife    Thank you for the consult. Please feel free to call me if you have any questions. Nereida Rocha MD  [facility]    This report has been created using voice recognition software. It may contain minor errors which are inherent in voice recognition technology.

## 2023-09-26 NOTE — PROGRESS NOTES
Pharmacist Review and Automatic Dose Adjustment of Prophylactic Enoxaparin      The reviewing pharmacist has made an adjustment to the ordered enoxaparin dose or converted to UFH per the approved [address] [facility] protocol and table as identified below. Jaylin Galarza is a 66 y.o. male. Recent Labs     09/25/23  1120 09/26/23  0538   CREATININE 2.2* 2.7*       Estimated Creatinine Clearance: 26 mL/min (A) (based on SCr of 2.7 mg/dL (H)).     Height:   Ht Readings from Last 1 Encounters:   09/25/23 5' 9\" (1.753 m)     Weight:  Wt Readings from Last 1 Encounters:   09/25/23 209 lb (94.8 kg)               Plan: Based upon the patient's weight and renal function     Ordered: Enoxaparin 40 mg SUBQ daily     Changed/converted to     New Order: Enoxaparin  30mg SUBQ daily       Thank you,  Mathis Osler, [facility]  9/26/2023, 1:45 PM

## 2023-09-26 NOTE — FLOWSHEET NOTE
VN completed safety rounds. Camera accessed for safety. Pt alert in bed. No s/s of any distress noted. Call bell and personal items within reach. No needs at this time.

## 2023-09-26 NOTE — PROGRESS NOTES
Hospitalist Progress Note      Patient:  Lisset Peñaloza    Unit/Bed:6K-28/028-A  YOB: 1945  MRN: [id number]   Acct: [de-identified]   PCP: Neil Covington MD  Date of Admission: 9/25/2023    Assessment/Plan:    MADI on CKD stage IV:  Cr noted at 2.2 on admission, currently at 2.7. Was previously 1.9 on 9/1/23  Likely some prerenal component with reduced p.o. intake and on Bumex  Hold Bumex for now, avoid nephrotoxic agents  Nephrology consultation  Renal US pending  Urine electrolytes  Acute HFrEF exacerbation: Noted conversational dyspnea and pitting edema in bilateral lower extremities, most recent echo 9/19/2023 with EF 50 to 55%. CXR showing pulmonary vascular congestion vs pneumonia, (low suspicion as afebrile, no chills/productive cough). Cardiology consultation. Elevated troponin: Likely secondary to CKD history, high sensitive troponin 1171 x2. EKG without ischemic changes. Cardio consult. IDDM II: Patient takes 33 Lantus in the morning, 15 units nightly. SSI. Hypoglycemia protocol. Accu-Cheks. Chronic macrocytic anemia: Patient with hemoglobin noted at 9.3, this is close to his baseline. No evidence of gross bleeding continue monitor and transfuse for hemoglobin less than 7. CAD s/p CABG/PCI: History of 15 total stents, CABG about 20 years ago. Continue with home medications  Essential hypertension: Continue with home medications including Cardizem, hydralazine, Imdur, Toprol and Ranexa. Monitor BP.  BPH: Continue Flomax and Proscar  PAF not on [address]: Per patient and his wife he does not have a watchman, however he was going to be undergoing work-up for this as an outpatient. History of GI bleed precluding OAC. Chief Complaint: fatigue/malaise    Initial H and P:-    \"Wilfrid Rojas is a 66 y.o. male with PMHx of CAD, anemia, DM,  who presented to [facility] with chief complaint of fatigue, poor intake and weakness. VW)    Result Date: 9/25/2023  PROCEDURE: XR CHEST (2 VW) CLINICAL INFORMATION: Fatigue/history of CHF. Evaluate for consolidation/fluid overload. Thank you Byron Hirsch [id number] COMPARISON: Chest radiograph 11/11/2022 TECHNIQUE: PA and lateral views of the chest performed. FINDINGS: Increased interstitial pulmonary markings are seen bilaterally with increased groundglass opacification of both lungs. Median sternotomy has been performed. Cardiac silhouette is mildly enlarged. No pleural effusion. No pneumothorax. No acute bony abnormality. DISH of the thoracic spine. 1. Increased interstitial pulmonary markings are seen bilaterally with increased groundglass opacification of both lungs. Findings relate to development of pulmonary edema. Multilobar pneumonia is not excluded. **This report has been created using voice recognition software. It may contain minor errors which are inherent in voice recognition technology. ** Final report electronically signed by Dr Chucho Mcclain on 9/25/2023 12:05 PM      Electronically signed by Maricruz Pedersen PA-C on 9/26/2023 at 3:27 PM

## 2023-09-26 NOTE — CARE COORDINATION
Case Management Assessment  Initial Evaluation    Date/Time of Evaluation: 9/26/2023 1:50 PM  Assessment Completed by: Thomas Hussein RN    If patient is discharged prior to next notation, then this note serves as note for discharge by case management. Patient Name: Jaylin Galarza                   YOB: 1945  Diagnosis: Acute kidney injury superimposed on CKD ([address]) [N17.9, N18.9]  Acute renal failure superimposed on chronic kidney disease, unspecified CKD stage, unspecified acute renal failure type [N17.9, N18.9]  Acute on chronic congestive heart failure, unspecified heart failure type ([address]) [I50.9]  Acute on chronic anemia [D64.9]                   Date / Time: 9/25/2023 10:45 AM  Location: [facility]28/028     Patient Admission Status: Observation   Readmission Risk Low 0-14, Mod 15-19), High > 20: No data recorded  Current PCP: Mary Ortega MD  PCP verified by CM? Yes    Chart Reviewed: Yes      History Provided by: Patient, Spouse  Patient Orientation: Alert and Oriented    Patient Cognition: Alert    Hospitalization in the last 30 days (Readmission):  No    If yes, Readmission Assessment in CM Navigator will be completed. Advance Directives:      Code Status: Full Code   Patient's Primary Decision Maker is: Named in [address] (Sons: 1st Earlis Balloon)    Primary Decision Maker: Yamileth Tan - Spouse - [phone]    Secondary Decision Maker: Jagjit Hardin - Child - [phone]    Discharge Planning:    Patient lives with: Spouse/Significant Other Type of Home: House  Primary Care Giver: Self  Patient Support Systems include: Spouse/Significant Other   Current Financial resources: Medicare (Onalee Norse)  Current community resources:  Other (Comment) ( [facility])  Current services prior to admission: [facility], Other (Comment) ([facility] and pharmacist; OP therapy at [address] was ordered but eval not completed due to hospitalization) by hospitalist. Na+ 133, Creat 2.7, BUN 59, GFR 23. Total , Pro-BNP 5224, HS-Trops 1171 x 2. D-Dimer 608. Hgb 9.3.  UA negative. PMHx CAD, HLD, DM II, CABG 1997, MI 1997, Lumbar disc disease, nephrolithiasis, bladder CA 2002, RICCARDO , Renal artery stenosis, GI bleed, CKD, HTN, TIA, GERD, BPH, a-fib, CHF, squamous cell carcinoma, arthritis. PT/OT. Procedure:   9/19 previous ECHO showed EF 50-55%. 9/26 EKG: Sinus arrhythmia w 1st degree AV block. R BBB.    9/25 CXR: Increased interstitial pulmonary markings are seen bilaterally with increased groundglass opacification of both lungs. Findings relate to development of pulmonary edema. Multilobar pneumonia is not excluded. The Plan for Transition of Care is related to the following treatment goals of Acute kidney injury superimposed on CKD ([address]) [N17.9, N18.9]  Acute renal failure superimposed on chronic kidney disease, unspecified CKD stage, unspecified acute renal failure type [N17.9, N18.9]  Acute on chronic congestive heart failure, unspecified heart failure type ([address]) [I50.9]  Acute on chronic anemia [D64.9]    Patient Goals/Plan/Treatment Preferences: From home w wife Robin Tripathi. Diabetes clinic nurse and pharmacist. Has noted DME available. Denies DME needs. Previously had orders from Dr Dmitry Patel to start outpatient therapy at [facility] but had to cancel initial evaluation due to hospitalization. Will need new orders for OP PT/OT at discharge. Transportation/Food Security/Housekeeping Addressed: No issues identified.      Michelet Hankins RN  Case Management Department

## 2023-09-27 PROBLEM — I50.9 ACUTE ON CHRONIC CONGESTIVE HEART FAILURE (HCC): Status: ACTIVE | Noted: 2023-01-01

## 2023-09-27 LAB
ANION GAP SERPL CALC-SCNC: 12 MEQ/L (ref 8–16)
BACTERIA UR CULT: NORMAL
BUN SERPL-MCNC: 56 MG/DL (ref 7–22)
CALCIUM SERPL-MCNC: 8.9 MG/DL (ref 8.5–10.5)
CHLORIDE SERPL-SCNC: 98 MEQ/L (ref 98–111)
CO2 SERPL-SCNC: 23 MEQ/L (ref 23–33)
CREAT SERPL-MCNC: 2.3 MG/DL (ref 0.4–1.2)
DEPRECATED RDW RBC AUTO: 51.8 FL (ref 35–45)
ERYTHROCYTE [DISTWIDTH] IN BLOOD BY AUTOMATED COUNT: 13.3 % (ref 11.5–14.5)
GFR SERPL CREATININE-BSD FRML MDRD: 28 ML/MIN/1.73M2
GLUCOSE SERPL-MCNC: 131 MG/DL (ref 70–108)
HCT VFR BLD AUTO: 29.5 % (ref 42–52)
HGB BLD-MCNC: 9.4 GM/DL (ref 14–18)
MCH RBC QN AUTO: 33.5 PG (ref 26–33)
MCHC RBC AUTO-ENTMCNC: 31.9 GM/DL (ref 32.2–35.5)
MCV RBC AUTO: 105 FL (ref 80–94)
PLATELET # BLD AUTO: 198 THOU/MM3 (ref 130–400)
PMV BLD AUTO: 10.1 FL (ref 9.4–12.4)
POTASSIUM SERPL-SCNC: 4.3 MEQ/L (ref 3.5–5.2)
RBC # BLD AUTO: 2.81 MILL/MM3 (ref 4.7–6.1)
SODIUM SERPL-SCNC: 133 MEQ/L (ref 135–145)
WBC # BLD AUTO: 7.1 THOU/MM3 (ref 4.8–10.8)

## 2023-09-27 PROCEDURE — 6370000000 HC RX 637 (ALT 250 FOR IP): Performed by: PHYSICIAN ASSISTANT

## 2023-09-27 PROCEDURE — 6370000000 HC RX 637 (ALT 250 FOR IP)

## 2023-09-27 PROCEDURE — 97110 THERAPEUTIC EXERCISES: CPT

## 2023-09-27 PROCEDURE — 99232 SBSQ HOSP IP/OBS MODERATE 35: CPT | Performed by: INTERNAL MEDICINE

## 2023-09-27 PROCEDURE — 94669 MECHANICAL CHEST WALL OSCILL: CPT

## 2023-09-27 PROCEDURE — 2700000000 HC OXYGEN THERAPY PER DAY

## 2023-09-27 PROCEDURE — 36415 COLL VENOUS BLD VENIPUNCTURE: CPT

## 2023-09-27 PROCEDURE — 6360000002 HC RX W HCPCS

## 2023-09-27 PROCEDURE — 94640 AIRWAY INHALATION TREATMENT: CPT

## 2023-09-27 PROCEDURE — 97530 THERAPEUTIC ACTIVITIES: CPT

## 2023-09-27 PROCEDURE — 99223 1ST HOSP IP/OBS HIGH 75: CPT | Performed by: NUCLEAR MEDICINE

## 2023-09-27 PROCEDURE — 80048 BASIC METABOLIC PNL TOTAL CA: CPT

## 2023-09-27 PROCEDURE — 2580000003 HC RX 258

## 2023-09-27 PROCEDURE — 96372 THER/PROPH/DIAG INJ SC/IM: CPT

## 2023-09-27 PROCEDURE — 94760 N-INVAS EAR/PLS OXIMETRY 1: CPT

## 2023-09-27 PROCEDURE — G0378 HOSPITAL OBSERVATION PER HR: HCPCS

## 2023-09-27 PROCEDURE — 97162 PT EVAL MOD COMPLEX 30 MIN: CPT

## 2023-09-27 PROCEDURE — 85027 COMPLETE CBC AUTOMATED: CPT

## 2023-09-27 RX ORDER — DIPHENHYDRAMINE HCL 25 MG
25 TABLET ORAL NIGHTLY PRN
Status: DISCONTINUED | OUTPATIENT
Start: 2023-09-27 | End: 2023-10-06 | Stop reason: HOSPADM

## 2023-09-27 RX ADMIN — ASPIRIN 81 MG: 81 TABLET, COATED ORAL at 09:16

## 2023-09-27 RX ADMIN — TAMSULOSIN HYDROCHLORIDE 0.4 MG: 0.4 CAPSULE ORAL at 23:44

## 2023-09-27 RX ADMIN — INSULIN LISPRO 1 UNITS: 100 INJECTION, SOLUTION INTRAVENOUS; SUBCUTANEOUS at 17:34

## 2023-09-27 RX ADMIN — HYDRALAZINE HYDROCHLORIDE 25 MG: 25 TABLET, FILM COATED ORAL at 15:08

## 2023-09-27 RX ADMIN — HYDRALAZINE HYDROCHLORIDE 25 MG: 25 TABLET, FILM COATED ORAL at 09:16

## 2023-09-27 RX ADMIN — DIPHENHYDRAMINE HYDROCHLORIDE 25 MG: 25 TABLET ORAL at 23:48

## 2023-09-27 RX ADMIN — INSULIN GLARGINE 15 UNITS: 100 INJECTION, SOLUTION SUBCUTANEOUS at 20:53

## 2023-09-27 RX ADMIN — METOPROLOL SUCCINATE 50 MG: 50 TABLET, EXTENDED RELEASE ORAL at 09:18

## 2023-09-27 RX ADMIN — DIPHENHYDRAMINE HYDROCHLORIDE 25 MG: 25 TABLET ORAL at 00:17

## 2023-09-27 RX ADMIN — FINASTERIDE 5 MG: 5 TABLET, FILM COATED ORAL at 09:16

## 2023-09-27 RX ADMIN — ATORVASTATIN CALCIUM 40 MG: 40 TABLET, FILM COATED ORAL at 20:53

## 2023-09-27 RX ADMIN — ISOSORBIDE MONONITRATE 120 MG: 60 TABLET, EXTENDED RELEASE ORAL at 09:17

## 2023-09-27 RX ADMIN — SODIUM CHLORIDE, PRESERVATIVE FREE 10 ML: 5 INJECTION INTRAVENOUS at 20:58

## 2023-09-27 RX ADMIN — SODIUM CHLORIDE, PRESERVATIVE FREE 10 ML: 5 INJECTION INTRAVENOUS at 09:16

## 2023-09-27 RX ADMIN — IPRATROPIUM BROMIDE AND ALBUTEROL SULFATE 1 DOSE: .5; 3 SOLUTION RESPIRATORY (INHALATION) at 09:28

## 2023-09-27 RX ADMIN — ENOXAPARIN SODIUM 30 MG: 100 INJECTION SUBCUTANEOUS at 20:53

## 2023-09-27 RX ADMIN — RANOLAZINE 1000 MG: 500 TABLET, EXTENDED RELEASE ORAL at 20:53

## 2023-09-27 RX ADMIN — ACETAMINOPHEN 650 MG: 325 TABLET ORAL at 23:48

## 2023-09-27 RX ADMIN — TAMSULOSIN HYDROCHLORIDE 0.4 MG: 0.4 CAPSULE ORAL at 09:17

## 2023-09-27 RX ADMIN — IPRATROPIUM BROMIDE AND ALBUTEROL SULFATE 1 DOSE: .5; 3 SOLUTION RESPIRATORY (INHALATION) at 17:56

## 2023-09-27 RX ADMIN — RANOLAZINE 1000 MG: 500 TABLET, EXTENDED RELEASE ORAL at 09:18

## 2023-09-27 RX ADMIN — ACETAMINOPHEN 650 MG: 325 TABLET ORAL at 00:17

## 2023-09-27 RX ADMIN — HYDRALAZINE HYDROCHLORIDE 25 MG: 25 TABLET, FILM COATED ORAL at 20:53

## 2023-09-27 RX ADMIN — CLOPIDOGREL BISULFATE 75 MG: 75 TABLET ORAL at 09:16

## 2023-09-27 RX ADMIN — DILTIAZEM HYDROCHLORIDE 120 MG: 120 CAPSULE, COATED, EXTENDED RELEASE ORAL at 09:16

## 2023-09-27 RX ADMIN — INSULIN GLARGINE 33 UNITS: 100 INJECTION, SOLUTION SUBCUTANEOUS at 09:17

## 2023-09-27 RX ADMIN — HYDROXYZINE HYDROCHLORIDE 10 MG: 10 TABLET ORAL at 20:53

## 2023-09-27 RX ADMIN — ALBUTEROL SULFATE 2.5 MG: 2.5 SOLUTION RESPIRATORY (INHALATION) at 10:15

## 2023-09-27 RX ADMIN — POLYETHYLENE GLYCOL 3350 17 G: 17 POWDER, FOR SOLUTION ORAL at 15:08

## 2023-09-27 ASSESSMENT — PAIN SCALES - GENERAL
PAINLEVEL_OUTOF10: 0
PAINLEVEL_OUTOF10: 0

## 2023-09-27 NOTE — FLOWSHEET NOTE
09/27/23 1319   Safe Environment   Safety Measures Bed/Chair-Wheels locked;Call light within reach; Family at bedside;Gripper socks; Other (comment)  (virtual safety round)     Family responds to this VN and consents for camera at this time. Family reports pt is sleeping. Family denies any immediate needs or concerns at this time. Fall prevention education reinforced at this time. Virtual to continue to round on and educate pt as appropriate.

## 2023-09-27 NOTE — CONSULTS
[place] [address]                                  CONSULTATION    PATIENT NAME: Tiffanie Guallpa                     :        1945  MED REC NO:   [id number]                           ROOM:       0028  ACCOUNT NO:   [de-identified]                           ADMIT DATE: 2023  PROVIDER:     NICOLA Islas DATE:  2023    CARDIOLOGY CONSULTATION    REASON FOR CONSULTATION:  Congestive heart failure. REQUESTING PROVIDER:  Hospitalist Service. HISTORY OF PRESENT ILLNESS:  This is a very pleasant 70-year-old  gentleman with a quite complicated longstanding cardiac history, history  of coronary artery disease, bypass surgery, multiple stenting followed  by Dr. Bernice Espinosa, history of cardiorenal with worsening renal function,  comes in with worsening renal function as well as CHF, shortness of  breath. He does have baseline dyspnea. He denies any active chest  pain. We were consulted to assist in management of the patient because  of his CHF on chest x-ray. Currently, he denies any chest pain, showed  some improvement after bolus IV diuretics. History of atrial  fibrillation in the past.    REVIEW OF SYSTEMS:  No fever, chills or weight loss. No hematuria or  dysuria. No abdominal pain, nausea, vomiting, or diarrhea. No obvious  active psych problems or suicidal ideation. No skin rashes. No obvious  dizziness, lightheadedness or loss of consciousness. No recent trauma. Worsening shortness of breath as above. Diffuse arthritic and  musculoskeletal problems. PAST MEDICAL HISTORY:  1. Coronary artery disease, bypass surgery and multiple stents. 2.  Hypertension. 3.  Hyperlipidemia. 4.  Diabetes. 5.  AFib. 6.  Watchman. ALLERGIES:  NO KNOWN DRUG ALLERGIES.     CURRENT MEDICATIONS:  Aspirin 81 a day, Lipitor 40 a day, Bumex 2 a day,  Plavix 75 a day, Lantus, Humalog, isosorbide 120 a

## 2023-09-27 NOTE — PROGRESS NOTES
Hospitalist Progress Note      Patient:  Nuvia Combs    Unit/Bed:6K-28/028-A  YOB: 1945  MRN: [id number]   Acct: [de-identified]   PCP: Alexander Pearson MD  Date of Admission: 9/25/2023    Assessment/Plan:    MADI on CKD stage IV:  Cr noted at 2.2 on admission, currently at 2.7. Was previously 1.9 on 9/1/23  Likely some prerenal component with reduced p.o. intake and on Bumex  Hold Bumex for now, avoid nephrotoxic agents  Nephrology consultation  Renal US pending  Urine electrolytes    9/27: s/p IV Bumex yesterday. Improved Cr to 2.3, near baseline. Hold Bumex today and repeat BMP in am per nephrology. Acute HFrEF exacerbation: Noted conversational dyspnea and pitting edema in bilateral lower extremities, most recent echo 9/19/2023 with EF 50 to 55%. CXR showing pulmonary vascular congestion vs pneumonia, (low suspicion as afebrile, no chills/productive cough). Cardiology consultation. Elevated troponin: Likely secondary to CKD history, high sensitive troponin 1171 x2. EKG without ischemic changes. Cardio consult -> no plan for intervention. IDDM II: Patient takes 33 Lantus in the morning, 15 units nightly. SSI. Hypoglycemia protocol. Accu-Cheks. Chronic macrocytic anemia: Patient with hemoglobin noted at 9.3, this is close to his baseline. No evidence of gross bleeding continue monitor and transfuse for hemoglobin less than 7. CAD s/p CABG/PCI: History of 15 total stents, CABG about 20 years ago. Continue with home medications  Essential hypertension: Continue with home medications including Cardizem, hydralazine, Imdur, Toprol and Ranexa. Monitor BP.  BPH: Continue Flomax and Proscar  PAF not on [address]: Per patient and his wife he does not have a watchman, however he was going to be undergoing work-up for this as an outpatient. History of GI bleed precluding OAC.       Chief Complaint: fatigue/malaise    Initial H and P:-    \"Wilfrid Ibarra is a 66 y.o. male with PMHx of CAD, anemia, DM,  who presented to [facility] with chief complaint of fatigue, poor intake and weakness. Patient reports he has been feeling poorly for the last 3-4 days with excessive fatigue and weakness. Reports some nausea and states he has not been eating or drinking well. Patient's wife reports she thinks he may have a UTI. He is having difficulty controlling his flow and had an episode of incontinence 2 days ago. Reports chronic shortness of breath. States he has been weak for a while, but seems its worse than normal. Does admit to not taking his diuretic yesterday, but did take it today. Endorses an episode of chest pain several days ago, but it resolved quickly. Denies lightheadedness/dizziness, chest pain, worsening shortness of breath, abdominal pain, vomiting, and leg swelling. \"    9/26: Patient is sitting in chair with wife at bedside. He reports feeling slightly improved, still with conversational dyspnea present. He denies any current chest pains, though does admit to a episode of this approximately 4 to 5 days ago. He denies fever/chills/productive cough. Subjective (past 24 hours):   9/27: pt is sitting up in bed, reports feeling better overall but still with some labored breathing. Anxious to go home, told him maybe tomorrow. Denies fever/chills, still with cough but he \"recycles\" his sputum so cannot gauge production or color. Past medical history, family history, social history and allergies reviewed again and is unchanged since admission. ROS (All review of systems completed. Pertinent positives noted.  Otherwise All other systems reviewed and negative.)     Medications:  Reviewed    Infusion Medications    dextrose      sodium chloride       Scheduled Medications    ipratropium 0.5 mg-albuterol 2.5 mg  1 Dose Inhalation BID RT    enoxaparin  30 mg SubCUTAneous Q24H    aspirin  81 mg Oral Daily    atorvastatin  40 mg Oral Nightly

## 2023-09-27 NOTE — PLAN OF CARE
Problem: Discharge Planning  Goal: Discharge to home or other facility with appropriate resources  9/27/2023 0318 by Harini Hoffman RN  Outcome: Progressing  Flowsheets (Taken 9/26/2023 2045)  Discharge to home or other facility with appropriate resources: Identify barriers to discharge with patient and caregiver  9/26/2023 1514 by Tabatha Jones RN  Outcome: Progressing  Flowsheets (Taken 9/26/2023 0730)  Discharge to home or other facility with appropriate resources: Identify barriers to discharge with patient and caregiver     Problem: Safety - Adult  Goal: Free from fall injury  9/27/2023 0318 by Harini Hoffman RN  Outcome: Progressing  9/26/2023 1514 by Tabatha Jones RN  Outcome: Progressing  Flowsheets (Taken 9/26/2023 0730)  Free From Fall Injury: Instruct family/caregiver on patient safety     Problem: ABCDS Injury Assessment  Goal: Absence of physical injury  9/27/2023 0318 by Harini Hoffman RN  Outcome: Progressing  9/26/2023 1514 by Tabatha Jones RN  Outcome: Progressing     Problem: Chronic Conditions and Co-morbidities  Goal: Patient's chronic conditions and co-morbidity symptoms are monitored and maintained or improved  9/27/2023 0318 by Harini Hoffman RN  Outcome: Progressing  Flowsheets (Taken 9/26/2023 2045)  Care Plan - Patient's Chronic Conditions and Co-Morbidity Symptoms are Monitored and Maintained or Improved: Monitor and assess patient's chronic conditions and comorbid symptoms for stability, deterioration, or improvement  9/26/2023 1514 by Tabatha Jones RN  Outcome: Progressing  Flowsheets (Taken 9/26/2023 0730)  Care Plan - Patient's Chronic Conditions and Co-Morbidity Symptoms are Monitored and Maintained or Improved: Monitor and assess patient's chronic conditions and comorbid symptoms for stability, deterioration, or improvement

## 2023-09-27 NOTE — PLAN OF CARE
Problem: Respiratory - Adult  Goal: Clear lung sounds  9/27/2023 1759 by Juan Jose Santillan RCP  Outcome: Progressing   Patient agreed on goals

## 2023-09-27 NOTE — PROGRESS NOTES
of Home: House  Home Layout: Two level, Performs ADL's on one level, Able to Live on Main level with bedroom/bathroom  Home Access: Stairs to enter with rails  Entrance Stairs - Number of Steps: 2 ALETA  Entrance Stairs - Rails: Both  Home Equipment: Kemi Wesley, standard     Bathroom Shower/Tub: Walk-in shower  Bathroom Toilet: Handicap height  Bathroom Equipment: Grab bars in shower, Grab bars around toilet  Bathroom Accessibility: Accessible       ADL Assistance: Needs assistance (Spouse assists with socks. Pt reports Indep with toileting and showering.)  Homemaking Assistance: Needs assistance (Spouse completes all cooking, cleaning, and laundry.)  Homemaking Responsibilities: No  Ambulation Assistance: Independent  Transfer Assistance: Independent    Active : Yes  Occupation: Retired  Additional Comments: At baseline patient is on room air. OBJECTIVE:  Range of Motion:  Bilateral Lower Extremity: WFL    Strength:  Bilateral Lower Extremity: grossly 4+/5    Balance:  Static Sitting Balance:  Independent  Dynamic Sitting Balance: Independent  Static Standing Balance: Supervision  Dynamic Standing Balance: Supervision    Bed Mobility:  Sit to Supine: Supervision, with head of bed raised, with rail     Transfers:  Sit to Stand: Supervision  Stand to Sit:Supervision    Ambulation:  Stand By Assistance  Distance: 25 ft  Surface: Level Tile  Device:No Device  Gait Deviations:  Decreased Step Length Bilaterally, Decreased Gait Speed, and Decreased Heel Strike Bilaterally    Exercise:  Patient was guided in 1 set(s) 10 reps of exercise to both lower extremities. Ankle pumps, Glut sets, Quad sets, Heelslides, Hip abduction/adduction, and Long arc quads. Exercises were completed for increased independence with functional mobility.     Functional Outcome Measures: Completed  -PAC Inpatient Mobility Raw Score : 18  -PAC Inpatient T-Scale Score : 43.63    ASSESSMENT:  Activity Tolerance:  Patient tolerance of treatment: good. Treatment Initiated: Treatment and education initiated within context of evaluation. Evaluation time included review of current medical information, gathering information related to past medical, social and functional history, completion of standardized testing, formal and informal observation of tasks, assessment of data and development of plan of care and goals. Treatment time included skilled education and facilitation of tasks to increase safety and independence with functional mobility for improved independence and quality of life. Assessment: Body Structures, Functions, Activity Limitations Requiring Skilled Therapeutic Intervention: Decreased functional mobility , Decreased strength, Decreased endurance  Assessment: Pt is a 67 yo male that is deconditioned and participated well. Pt was generally Independent for mobility in OF and is at SBA to Supervision today. Pt would benefit from continued skilled PT to address strengthening, balance, bed mobility, endurance building, and functional mobility training. Therapy Prognosis: Good    Requires PT Follow-Up: Yes    Discharge Recommendations:  Discharge Recommendations: Continue to assess pending progress, Patient would benefit from continued therapy after discharge, Home with assist PRN    Patient Education:      . Patient Education  Education Given To: Patient  Education Provided: Role of Therapy, Plan of Care, Home Exercise Program  Education Method: Demonstration, Verbal  Barriers to Learning: None  Education Outcome: Verbalized understanding, Demonstrated understanding       Equipment Recommendations:   Other: monitor for needs    Plan:  Current Treatment Recommendations: Strengthening, Balance training, Functional mobility training, Transfer training, Gait training, Stair training, Patient/Caregiver education & training, Endurance training, Safety education & training, Home exercise program, Therapeutic activities  General

## 2023-09-27 NOTE — CARE COORDINATION
9/27/23, 2:14 PM EDT    DISCHARGE ON GOING [facility] day: 0  Location: [facility]28/028-A Reason for admit: Acute kidney injury superimposed on CKD ([address]) [N17.9, N18.9]  Acute renal failure superimposed on chronic kidney disease, unspecified CKD stage, unspecified acute renal failure type [N17.9, N18.9]  Acute on chronic congestive heart failure, unspecified heart failure type ([address]) [I50.9]  Acute on chronic anemia [D64.9]   Procedure:   CXR:  pulmonary vascular congestion  Barriers to Discharge: creatinine 2.3, Na 133, nephrology follows. Pro BNP >5000. Bumex. Med nebs, DM mgmt. Lovenox. Oxygen weaned to 1L/min NC sats 95%  PCP: Fartun Mcdonald MD   %  Patient Goals/Plan/Treatment Preferences: from home with wife Ector Whitlock who is  retired RN. He follows with DM clinic. Follow for new home oxygen needs. Has needed DME.

## 2023-09-28 LAB
ANION GAP SERPL CALC-SCNC: 11 MEQ/L (ref 8–16)
BUN SERPL-MCNC: 65 MG/DL (ref 7–22)
CALCIUM SERPL-MCNC: 8.7 MG/DL (ref 8.5–10.5)
CHLORIDE SERPL-SCNC: 97 MEQ/L (ref 98–111)
CO2 SERPL-SCNC: 23 MEQ/L (ref 23–33)
CREAT SERPL-MCNC: 2.4 MG/DL (ref 0.4–1.2)
DEPRECATED RDW RBC AUTO: 50.4 FL (ref 35–45)
ERYTHROCYTE [DISTWIDTH] IN BLOOD BY AUTOMATED COUNT: 13.2 % (ref 11.5–14.5)
FERRITIN SERPL IA-MCNC: 405 NG/ML (ref 22–322)
FLUAV RNA RESP QL NAA+PROBE: NOT DETECTED
FLUBV RNA RESP QL NAA+PROBE: NOT DETECTED
GFR SERPL CREATININE-BSD FRML MDRD: 27 ML/MIN/1.73M2
GLUCOSE SERPL-MCNC: 155 MG/DL (ref 70–108)
HCT VFR BLD AUTO: 28.9 % (ref 42–52)
HGB BLD-MCNC: 9.5 GM/DL (ref 14–18)
IRON SATN MFR SERPL: 16 % (ref 20–50)
IRON SERPL-MCNC: 32 UG/DL (ref 65–195)
MCH RBC QN AUTO: 34.3 PG (ref 26–33)
MCHC RBC AUTO-ENTMCNC: 32.9 GM/DL (ref 32.2–35.5)
MCV RBC AUTO: 104.3 FL (ref 80–94)
PLATELET # BLD AUTO: 209 THOU/MM3 (ref 130–400)
PMV BLD AUTO: 9.8 FL (ref 9.4–12.4)
POTASSIUM SERPL-SCNC: 4.7 MEQ/L (ref 3.5–5.2)
RBC # BLD AUTO: 2.77 MILL/MM3 (ref 4.7–6.1)
SARS-COV-2 RNA RESP QL NAA+PROBE: NOT DETECTED
SODIUM SERPL-SCNC: 131 MEQ/L (ref 135–145)
TIBC SERPL-MCNC: 195 UG/DL (ref 171–450)
WBC # BLD AUTO: 7.7 THOU/MM3 (ref 4.8–10.8)

## 2023-09-28 PROCEDURE — 6370000000 HC RX 637 (ALT 250 FOR IP): Performed by: PHYSICIAN ASSISTANT

## 2023-09-28 PROCEDURE — 97110 THERAPEUTIC EXERCISES: CPT

## 2023-09-28 PROCEDURE — 99232 SBSQ HOSP IP/OBS MODERATE 35: CPT | Performed by: PHYSICIAN ASSISTANT

## 2023-09-28 PROCEDURE — 94760 N-INVAS EAR/PLS OXIMETRY 1: CPT

## 2023-09-28 PROCEDURE — 97530 THERAPEUTIC ACTIVITIES: CPT

## 2023-09-28 PROCEDURE — 36415 COLL VENOUS BLD VENIPUNCTURE: CPT

## 2023-09-28 PROCEDURE — 6370000000 HC RX 637 (ALT 250 FOR IP)

## 2023-09-28 PROCEDURE — G0378 HOSPITAL OBSERVATION PER HR: HCPCS

## 2023-09-28 PROCEDURE — 83540 ASSAY OF IRON: CPT

## 2023-09-28 PROCEDURE — 99232 SBSQ HOSP IP/OBS MODERATE 35: CPT | Performed by: INTERNAL MEDICINE

## 2023-09-28 PROCEDURE — 83550 IRON BINDING TEST: CPT

## 2023-09-28 PROCEDURE — 96372 THER/PROPH/DIAG INJ SC/IM: CPT

## 2023-09-28 PROCEDURE — 94640 AIRWAY INHALATION TREATMENT: CPT

## 2023-09-28 PROCEDURE — 2500000003 HC RX 250 WO HCPCS: Performed by: INTERNAL MEDICINE

## 2023-09-28 PROCEDURE — 2580000003 HC RX 258

## 2023-09-28 PROCEDURE — 85027 COMPLETE CBC AUTOMATED: CPT

## 2023-09-28 PROCEDURE — 97116 GAIT TRAINING THERAPY: CPT

## 2023-09-28 PROCEDURE — 94669 MECHANICAL CHEST WALL OSCILL: CPT

## 2023-09-28 PROCEDURE — 6360000002 HC RX W HCPCS

## 2023-09-28 PROCEDURE — 2700000000 HC OXYGEN THERAPY PER DAY

## 2023-09-28 PROCEDURE — 82728 ASSAY OF FERRITIN: CPT

## 2023-09-28 PROCEDURE — 87636 SARSCOV2 & INF A&B AMP PRB: CPT

## 2023-09-28 PROCEDURE — 80048 BASIC METABOLIC PNL TOTAL CA: CPT

## 2023-09-28 PROCEDURE — 96376 TX/PRO/DX INJ SAME DRUG ADON: CPT

## 2023-09-28 RX ORDER — BUMETANIDE 0.25 MG/ML
1 INJECTION INTRAMUSCULAR; INTRAVENOUS ONCE
Status: COMPLETED | OUTPATIENT
Start: 2023-09-28 | End: 2023-09-28

## 2023-09-28 RX ORDER — IPRATROPIUM BROMIDE AND ALBUTEROL SULFATE 2.5; .5 MG/3ML; MG/3ML
1 SOLUTION RESPIRATORY (INHALATION) 3 TIMES DAILY
Status: DISCONTINUED | OUTPATIENT
Start: 2023-09-28 | End: 2023-09-29

## 2023-09-28 RX ORDER — DOCUSATE SODIUM 100 MG/1
100 CAPSULE, LIQUID FILLED ORAL DAILY
Status: DISCONTINUED | OUTPATIENT
Start: 2023-09-28 | End: 2023-10-06 | Stop reason: HOSPADM

## 2023-09-28 RX ADMIN — TAMSULOSIN HYDROCHLORIDE 0.4 MG: 0.4 CAPSULE ORAL at 09:39

## 2023-09-28 RX ADMIN — RANOLAZINE 1000 MG: 500 TABLET, EXTENDED RELEASE ORAL at 20:32

## 2023-09-28 RX ADMIN — CLOPIDOGREL BISULFATE 75 MG: 75 TABLET ORAL at 09:39

## 2023-09-28 RX ADMIN — IPRATROPIUM BROMIDE AND ALBUTEROL SULFATE 1 DOSE: .5; 3 SOLUTION RESPIRATORY (INHALATION) at 22:46

## 2023-09-28 RX ADMIN — ASPIRIN 81 MG: 81 TABLET, COATED ORAL at 09:38

## 2023-09-28 RX ADMIN — DOCUSATE SODIUM 100 MG: 100 CAPSULE, LIQUID FILLED ORAL at 11:43

## 2023-09-28 RX ADMIN — IPRATROPIUM BROMIDE AND ALBUTEROL SULFATE 1 DOSE: .5; 3 SOLUTION RESPIRATORY (INHALATION) at 08:36

## 2023-09-28 RX ADMIN — FINASTERIDE 5 MG: 5 TABLET, FILM COATED ORAL at 09:38

## 2023-09-28 RX ADMIN — BUMETANIDE 1 MG: 0.25 INJECTION INTRAMUSCULAR; INTRAVENOUS at 14:07

## 2023-09-28 RX ADMIN — SODIUM CHLORIDE, PRESERVATIVE FREE 10 ML: 5 INJECTION INTRAVENOUS at 20:33

## 2023-09-28 RX ADMIN — ALBUTEROL SULFATE 2.5 MG: 2.5 SOLUTION RESPIRATORY (INHALATION) at 13:29

## 2023-09-28 RX ADMIN — DIPHENHYDRAMINE HYDROCHLORIDE 25 MG: 25 TABLET ORAL at 23:51

## 2023-09-28 RX ADMIN — SODIUM CHLORIDE, PRESERVATIVE FREE 10 ML: 5 INJECTION INTRAVENOUS at 09:39

## 2023-09-28 RX ADMIN — ATORVASTATIN CALCIUM 40 MG: 40 TABLET, FILM COATED ORAL at 20:32

## 2023-09-28 RX ADMIN — ENOXAPARIN SODIUM 30 MG: 100 INJECTION SUBCUTANEOUS at 20:32

## 2023-09-28 RX ADMIN — IPRATROPIUM BROMIDE AND ALBUTEROL SULFATE 1 DOSE: .5; 3 SOLUTION RESPIRATORY (INHALATION) at 15:44

## 2023-09-28 RX ADMIN — RANOLAZINE 1000 MG: 500 TABLET, EXTENDED RELEASE ORAL at 09:38

## 2023-09-28 RX ADMIN — TAMSULOSIN HYDROCHLORIDE 0.4 MG: 0.4 CAPSULE ORAL at 20:32

## 2023-09-28 RX ADMIN — INSULIN LISPRO 1 UNITS: 100 INJECTION, SOLUTION INTRAVENOUS; SUBCUTANEOUS at 20:31

## 2023-09-28 RX ADMIN — ACETAMINOPHEN 650 MG: 325 TABLET ORAL at 23:51

## 2023-09-28 RX ADMIN — POLYETHYLENE GLYCOL 3350 17 G: 17 POWDER, FOR SOLUTION ORAL at 09:39

## 2023-09-28 RX ADMIN — HYDROXYZINE HYDROCHLORIDE 10 MG: 10 TABLET ORAL at 20:32

## 2023-09-28 RX ADMIN — INSULIN GLARGINE 33 UNITS: 100 INJECTION, SOLUTION SUBCUTANEOUS at 09:39

## 2023-09-28 RX ADMIN — INSULIN GLARGINE 15 UNITS: 100 INJECTION, SOLUTION SUBCUTANEOUS at 20:31

## 2023-09-28 RX ADMIN — INSULIN LISPRO 1 UNITS: 100 INJECTION, SOLUTION INTRAVENOUS; SUBCUTANEOUS at 17:43

## 2023-09-28 ASSESSMENT — PAIN SCALES - GENERAL
PAINLEVEL_OUTOF10: 3
PAINLEVEL_OUTOF10: 0

## 2023-09-28 NOTE — FLOWSHEET NOTE
09/28/23 1051   Safe Environment   Safety Measures Other (comment)  (VN safety round)     VN called into patients room and introduced myself and role. Visitor answered and permitted video. Video activated. . Patient resting comfortably in bed. . Visitor denied needs at this time. No obvious signs of distress noted at this time. Pt resting in bed with eyes closed.

## 2023-09-28 NOTE — PLAN OF CARE
Problem: Discharge Planning  Goal: Discharge to home or other facility with appropriate resources  Outcome: Progressing  Flowsheets (Taken 9/27/2023 2229)  Discharge to home or other facility with appropriate resources:   Identify barriers to discharge with patient and caregiver   Arrange for needed discharge resources and transportation as appropriate   Identify discharge learning needs (meds, wound care, etc)   Arrange for interpreters to assist at discharge as needed   Refer to discharge planning if patient needs post-hospital services based on physician order or complex needs related to functional status, cognitive ability or social support system     Problem: Safety - Adult  Goal: Free from fall injury  Outcome: Progressing  Flowsheets (Taken 9/27/2023 2229)  Free From Fall Injury:   Instruct family/caregiver on patient safety   Based on caregiver fall risk screen, instruct family/caregiver to ask for assistance with transferring infant if caregiver noted to have fall risk factors     Problem: ABCDS Injury Assessment  Goal: Absence of physical injury  Outcome: Progressing  Flowsheets (Taken 9/27/2023 2229)  Absence of Physical Injury: Implement safety measures based on patient assessment     Problem: Chronic Conditions and Co-morbidities  Goal: Patient's chronic conditions and co-morbidity symptoms are monitored and maintained or improved  Outcome: Progressing  Flowsheets (Taken 9/27/2023 2229)  Care Plan - Patient's Chronic Conditions and Co-Morbidity Symptoms are Monitored and Maintained or Improved:   Monitor and assess patient's chronic conditions and comorbid symptoms for stability, deterioration, or improvement   Collaborate with multidisciplinary team to address chronic and comorbid conditions and prevent exacerbation or deterioration   Update acute care plan with appropriate goals if chronic or comorbid symptoms are exacerbated and prevent overall improvement and discharge  Care plan reviewed with

## 2023-09-28 NOTE — PLAN OF CARE
Problem: Respiratory - Adult  Goal: Clear lung sounds  Outcome: Progressing   Patient agreed on goals

## 2023-09-28 NOTE — CARE COORDINATION
9/28/23, 2:37 PM EDT    DISCHARGE ON GOING 72 [facility] day: 0  Location: -28/028-A Reason for admit: Acute kidney injury superimposed on CKD ([address]) [N17.9, N18.9]  Acute renal failure superimposed on chronic kidney disease, unspecified CKD stage, unspecified acute renal failure type [N17.9, N18.9]  Acute on chronic congestive heart failure, unspecified heart failure type ([address]) [I50.9]  Acute on chronic anemia [D64.9]   Procedure:   9/25 CXR: Increased interstitial pulmonary markings are seen bilaterally with increased groundglass opacification of both lungs. Findings relate to development of pulmonary edema. Multilobar pneumonia is not excluded  9/26 US Renal: Increased renal cortical echogenicity consistent with chronic kidney disease. An acute superimposed component can not be excluded. 2. No renal atrophy or hydronephrosis. 3. Significant 150 mL postvoid urinary bladder residual  Barriers to Discharge: Hospitalist, Nephrology and Cardiology following. PT/OT. Telemetry. IS. Albuterol nebulizer. IV bumex 1x. Duonebs. 95% on room air. Na 131; Creat 2.4; Flu/COVID negative. PCP: Clare Noland MD   %  Patient Goals/Plan/Treatment Preferences: Home with wife. Has DME. Declines discharge needs.

## 2023-09-28 NOTE — PROGRESS NOTES
Hospitalist Progress Note      Patient:  Loreto Coleman    Unit/Bed:6K-28/028-A  YOB: 1945  MRN: [id number]   Acct: [de-identified]   PCP: Fartun Mcdonald MD  Date of Admission: 9/25/2023    Assessment/Plan:    MADI on CKD stage IV:  Cr noted at 2.2 on admission, currently at 2.7. Was previously 1.9 on 9/1/23  Likely some prerenal component with reduced p.o. intake and on Bumex  Hold Bumex for now, avoid nephrotoxic agents  Nephrology consultation  Renal US pending  Urine electrolytes    9/27: s/p IV Bumex yesterday. Improved Cr to 2.3, near baseline. Hold Bumex today and repeat BMP in am per nephrology. 9/28: Neph with Bumex management     Near-syncope: following going to the bathroom with paleness and diaphoresis, likely vagal episode. Hypotension present. Ortho neg. Acute HFrEF exacerbation: Noted conversational dyspnea and pitting edema in bilateral lower extremities, most recent echo 9/19/2023 with EF 50 to 55%. CXR showing pulmonary vascular congestion vs pneumonia, (low suspicion as afebrile, no chills/productive cough). Cardiology consultation. Elevated troponin: Likely secondary to CKD history, high sensitive troponin 1171 x2. EKG without ischemic changes. Cardio consult -> no plan for intervention. IDDM II: Patient takes 33 Lantus in the morning, 15 units nightly. SSI. Hypoglycemia protocol. Accu-Cheks. Chronic macrocytic anemia: Patient with hemoglobin noted at 9.3, this is close to his baseline. No evidence of gross bleeding continue monitor and transfuse for hemoglobin less than 7. CAD s/p CABG/PCI: History of 15 total stents, CABG about 20 years ago. Continue with home medications  Essential hypertension: Continue with home medications including Cardizem, hydralazine, Imdur, Toprol and Ranexa.   Monitor BP.  BPH: Continue Flomax and Proscar  PAF not on OAC: Per patient and his wife he does not have a Value Date/Time    NITRU NEGATIVE 09/25/2023 05:40 PM    WBCUA 0-2 09/25/2023 05:40 PM    BACTERIA NONE SEEN 09/25/2023 05:40 PM    RBCUA 0-2 09/25/2023 05:40 PM    BLOODU NEGATIVE 09/25/2023 05:40 PM    SPECGRAV 1.014 09/25/2023 05:40 PM    GLUCOSEU Negative 07/06/2022 11:30 AM       Radiology:  US RENAL COMPLETE   Final Result   1. Increased renal cortical echogenicity consistent with chronic kidney    disease. An acute superimposed component can not be excluded. 2. No renal atrophy or hydronephrosis. 3. Significant 150 mL postvoid urinary bladder residual.      This document has been electronically signed by: Janelle Liang MD on    09/26/2023 08:27 PM      XR CHEST (2 VW)   Final Result   1. Increased interstitial pulmonary markings are seen bilaterally with increased groundglass opacification of both lungs. Findings relate to development of pulmonary edema. Multilobar pneumonia is not excluded. **This report has been created using voice recognition software. It may contain minor errors which are inherent in voice recognition technology. **      Final report electronically signed by Dr Jb Londono on 9/25/2023 12:05 PM        XR CHEST (2 VW)    Result Date: 9/25/2023  PROCEDURE: XR CHEST (2 VW) CLINICAL INFORMATION: Fatigue/history of CHF. Evaluate for consolidation/fluid overload. Thank you Karyl Soulier [phone] COMPARISON: Chest radiograph 11/11/2022 TECHNIQUE: PA and lateral views of the chest performed. FINDINGS: Increased interstitial pulmonary markings are seen bilaterally with increased groundglass opacification of both lungs. Median sternotomy has been performed. Cardiac silhouette is mildly enlarged. No pleural effusion. No pneumothorax. No acute bony abnormality. DISH of the thoracic spine. 1. Increased interstitial pulmonary markings are seen bilaterally with increased groundglass opacification of both lungs. Findings relate to development of pulmonary edema.  Multilobar pneumonia is not

## 2023-09-28 NOTE — PROGRESS NOTES
[facility] RENAL TELEMETRY   Occupational Therapy  Daily Note  Time:   Time In: 3399  Time Out: 3854  Timed Code Treatment Minutes: 24 Minutes  Minutes: 24          Date: 2023  Patient Name: Bhaskar Burroughs,   Gender: male      [facility]  MRN: [id number]  : 1945  (66 y.o.)  Referring Practitioner: Karyn Oliver PA-C  Diagnosis: Acute Kidney Injury  Additional Pertinent Hx: Per H&P, \"Wilfrid MIGUEL ANGEL Rubin is a 66 y.o. male with PMHx of CAD, anemia, DM,  who presented to [facility] with chief complaint of fatigue, poor intake and weakness. Patient reports he has been feeling poorly for the last 3-4 days with excessive fatigue and weakness. Reports some nausea and states he has not been eating or drinking well. Patient's wife reports she thinks he may have a UTI. He is having difficulty controlling his flow and had an episode of incontinence 2 days ago. Reports chronic shortness of breath. States he has been weak for a while, but seems its worse than normal. Does admit to not taking his diuretic yesterday, but did take it today. Endorses an episode of chest pain several days ago, but it resolved quickly. Denies lightheadedness/dizziness, chest pain, worsening shortness of breath, abdominal pain, vomiting, and leg swelling. \"    Restrictions/Precautions:  Restrictions/Precautions: General Precautions, Fall Risk  Position Activity Restriction  Other position/activity restrictions: 1 Liter O2     SUBJECTIVE: Pt seated upright in bed with family present upon arrival, agreeable to OT session requesting up to chair. General Comment: Pt's wife reporting that pt had \"passed out\" after returning from the BR earlier- light OT session this date. PAIN: None    Vitals: Blood Pressure: 145/77 seated; 147/76  Oxygen: >95% throughout  Heart Rate: 70's    COGNITION: Slow Processing and Decreased Safety Awareness    ADL:   No ADL's completed this session. Gurley Edouard BALANCE:  Sitting Balance:  Supervision.

## 2023-09-28 NOTE — PROGRESS NOTES
[facility]  INPATIENT PHYSICAL THERAPY  DAILY NOTE  [facility] RENAL TELEMETRY 6K - 6K-28/028-A     Time In: 1100  Time Out: 9224  Timed Code Treatment Minutes: 45 Minutes  Minutes: 38          Date: 2023  Patient Name: Miguelito Pineda,  Gender:  male        MRN: [id number]  : 1945  (66 y.o.)     Referring Practitioner: Saturnino Ash PA-C  Diagnosis: Acute renal failure superimposed on CKD  Additional Pertinent Hx: Per H&P, \"Wilfrid Ibarra is a 66 y.o. male with PMHx of CAD, anemia, DM,  who presented to [facility] with chief complaint of fatigue, poor intake and weakness. Patient reports he has been feeling poorly for the last 3-4 days with excessive fatigue and weakness. Reports some nausea and states he has not been eating or drinking well. Patient's wife reports she thinks he may have a UTI. He is having difficulty controlling his flow and had an episode of incontinence 2 days ago. Reports chronic shortness of breath. States he has been weak for a while, but seems its worse than normal. Does admit to not taking his diuretic yesterday, but did take it today. Endorses an episode of chest pain several days ago, but it resolved quickly. Denies lightheadedness/dizziness, chest pain, worsening shortness of breath, abdominal pain, vomiting, and leg swelling. \"     Prior Level of Function:  Lives With: Spouse  Type of Home: House  Home Layout: Two level, Performs ADL's on one level, Able to Live on Main level with bedroom/bathroom  Home Access: Stairs to enter with rails  Entrance Stairs - Number of Steps: 2 ALETA  Entrance Stairs - Rails: Both  Home Equipment: Shelly Basket, standard   Bathroom Shower/Tub: Walk-in shower  Bathroom Toilet: Handicap height  Bathroom Equipment: Grab bars in shower, Grab bars around toilet  Bathroom Accessibility: Accessible    ADL Assistance: Needs assistance (Spouse assists with socks.  Pt reports Indep with toileting and showering.)  Homemaking Assistance: Needs

## 2023-09-29 ENCOUNTER — APPOINTMENT (OUTPATIENT)
Dept: NUCLEAR MEDICINE | Age: 78
DRG: 202 | End: 2023-09-29
Payer: MEDICARE

## 2023-09-29 ENCOUNTER — APPOINTMENT (OUTPATIENT)
Dept: GENERAL RADIOLOGY | Age: 78
DRG: 202 | End: 2023-09-29
Payer: MEDICARE

## 2023-09-29 PROBLEM — J45.21 MILD INTERMITTENT ASTHMA WITH EXACERBATION: Status: ACTIVE | Noted: 2023-09-29

## 2023-09-29 PROBLEM — J81.0 ACUTE PULMONARY EDEMA (HCC): Status: ACTIVE | Noted: 2023-09-29

## 2023-09-29 PROBLEM — J96.01 ACUTE RESPIRATORY FAILURE WITH HYPOXIA (HCC): Status: ACTIVE | Noted: 2023-01-01

## 2023-09-29 PROBLEM — R79.89 ELEVATED D-DIMER: Status: ACTIVE | Noted: 2023-01-01

## 2023-09-29 LAB
ANION GAP SERPL CALC-SCNC: 12 MEQ/L (ref 8–16)
BUN SERPL-MCNC: 63 MG/DL (ref 7–22)
CALCIUM SERPL-MCNC: 9.1 MG/DL (ref 8.5–10.5)
CHLORIDE SERPL-SCNC: 99 MEQ/L (ref 98–111)
CO2 SERPL-SCNC: 23 MEQ/L (ref 23–33)
CREAT SERPL-MCNC: 2.2 MG/DL (ref 0.4–1.2)
GFR SERPL CREATININE-BSD FRML MDRD: 30 ML/MIN/1.73M2
GLUCOSE SERPL-MCNC: 229 MG/DL (ref 70–108)
POTASSIUM SERPL-SCNC: 4.9 MEQ/L (ref 3.5–5.2)
SODIUM SERPL-SCNC: 134 MEQ/L (ref 135–145)

## 2023-09-29 PROCEDURE — 6370000000 HC RX 637 (ALT 250 FOR IP)

## 2023-09-29 PROCEDURE — 97530 THERAPEUTIC ACTIVITIES: CPT

## 2023-09-29 PROCEDURE — A9540 TC99M MAA: HCPCS | Performed by: NURSE PRACTITIONER

## 2023-09-29 PROCEDURE — 94640 AIRWAY INHALATION TREATMENT: CPT

## 2023-09-29 PROCEDURE — 6360000002 HC RX W HCPCS: Performed by: PHYSICIAN ASSISTANT

## 2023-09-29 PROCEDURE — 6370000000 HC RX 637 (ALT 250 FOR IP): Performed by: PHYSICIAN ASSISTANT

## 2023-09-29 PROCEDURE — 99232 SBSQ HOSP IP/OBS MODERATE 35: CPT | Performed by: INTERNAL MEDICINE

## 2023-09-29 PROCEDURE — 6360000002 HC RX W HCPCS: Performed by: NURSE PRACTITIONER

## 2023-09-29 PROCEDURE — 6360000002 HC RX W HCPCS

## 2023-09-29 PROCEDURE — 1200000000 HC SEMI PRIVATE

## 2023-09-29 PROCEDURE — 94761 N-INVAS EAR/PLS OXIMETRY MLT: CPT

## 2023-09-29 PROCEDURE — 2500000003 HC RX 250 WO HCPCS: Performed by: INTERNAL MEDICINE

## 2023-09-29 PROCEDURE — 71045 X-RAY EXAM CHEST 1 VIEW: CPT

## 2023-09-29 PROCEDURE — 3430000000 HC RX DIAGNOSTIC RADIOPHARMACEUTICAL: Performed by: NURSE PRACTITIONER

## 2023-09-29 PROCEDURE — 36415 COLL VENOUS BLD VENIPUNCTURE: CPT

## 2023-09-29 PROCEDURE — 80048 BASIC METABOLIC PNL TOTAL CA: CPT

## 2023-09-29 PROCEDURE — 78582 LUNG VENTILAT&PERFUS IMAGING: CPT

## 2023-09-29 PROCEDURE — A9558 XE133 XENON 10MCI: HCPCS | Performed by: NURSE PRACTITIONER

## 2023-09-29 PROCEDURE — 1200000003 HC TELEMETRY R&B

## 2023-09-29 PROCEDURE — 6370000000 HC RX 637 (ALT 250 FOR IP): Performed by: NURSE PRACTITIONER

## 2023-09-29 PROCEDURE — 2580000003 HC RX 258: Performed by: PHYSICIAN ASSISTANT

## 2023-09-29 PROCEDURE — 97110 THERAPEUTIC EXERCISES: CPT

## 2023-09-29 PROCEDURE — 2700000000 HC OXYGEN THERAPY PER DAY

## 2023-09-29 PROCEDURE — 2580000003 HC RX 258

## 2023-09-29 PROCEDURE — 99222 1ST HOSP IP/OBS MODERATE 55: CPT | Performed by: NURSE PRACTITIONER

## 2023-09-29 PROCEDURE — 99233 SBSQ HOSP IP/OBS HIGH 50: CPT | Performed by: PHYSICIAN ASSISTANT

## 2023-09-29 PROCEDURE — 2580000003 HC RX 258: Performed by: NURSE PRACTITIONER

## 2023-09-29 RX ORDER — BUMETANIDE 0.25 MG/ML
1 INJECTION INTRAMUSCULAR; INTRAVENOUS 2 TIMES DAILY
Status: COMPLETED | OUTPATIENT
Start: 2023-09-29 | End: 2023-09-29

## 2023-09-29 RX ORDER — METHYLPREDNISOLONE SODIUM SUCCINATE 125 MG/2ML
125 INJECTION, POWDER, LYOPHILIZED, FOR SOLUTION INTRAMUSCULAR; INTRAVENOUS ONCE
Status: DISCONTINUED | OUTPATIENT
Start: 2023-09-29 | End: 2023-09-29 | Stop reason: CLARIF

## 2023-09-29 RX ORDER — IPRATROPIUM BROMIDE AND ALBUTEROL SULFATE 2.5; .5 MG/3ML; MG/3ML
1 SOLUTION RESPIRATORY (INHALATION)
Status: DISCONTINUED | OUTPATIENT
Start: 2023-09-29 | End: 2023-10-06 | Stop reason: HOSPADM

## 2023-09-29 RX ORDER — XENON XE-133 10 MCI/1
7.3 GAS RESPIRATORY (INHALATION)
Status: COMPLETED | OUTPATIENT
Start: 2023-09-29 | End: 2023-09-29

## 2023-09-29 RX ADMIN — ACETAMINOPHEN 650 MG: 325 TABLET ORAL at 23:10

## 2023-09-29 RX ADMIN — IPRATROPIUM BROMIDE AND ALBUTEROL SULFATE 1 DOSE: .5; 3 SOLUTION RESPIRATORY (INHALATION) at 12:04

## 2023-09-29 RX ADMIN — BUMETANIDE 1 MG: 0.25 INJECTION INTRAMUSCULAR; INTRAVENOUS at 20:25

## 2023-09-29 RX ADMIN — ASPIRIN 81 MG: 81 TABLET, COATED ORAL at 08:28

## 2023-09-29 RX ADMIN — IPRATROPIUM BROMIDE AND ALBUTEROL SULFATE 1 DOSE: .5; 3 SOLUTION RESPIRATORY (INHALATION) at 08:29

## 2023-09-29 RX ADMIN — BUMETANIDE 1 MG: 0.25 INJECTION INTRAMUSCULAR; INTRAVENOUS at 13:47

## 2023-09-29 RX ADMIN — DOCUSATE SODIUM 100 MG: 100 CAPSULE, LIQUID FILLED ORAL at 08:28

## 2023-09-29 RX ADMIN — IPRATROPIUM BROMIDE AND ALBUTEROL SULFATE 1 DOSE: .5; 3 SOLUTION RESPIRATORY (INHALATION) at 19:53

## 2023-09-29 RX ADMIN — FINASTERIDE 5 MG: 5 TABLET, FILM COATED ORAL at 08:28

## 2023-09-29 RX ADMIN — DILTIAZEM HYDROCHLORIDE 120 MG: 120 CAPSULE, COATED, EXTENDED RELEASE ORAL at 08:28

## 2023-09-29 RX ADMIN — SODIUM CHLORIDE, PRESERVATIVE FREE 10 ML: 5 INJECTION INTRAVENOUS at 08:31

## 2023-09-29 RX ADMIN — METOPROLOL SUCCINATE 50 MG: 50 TABLET, EXTENDED RELEASE ORAL at 08:27

## 2023-09-29 RX ADMIN — WATER 125 MG: 1 INJECTION INTRAMUSCULAR; INTRAVENOUS; SUBCUTANEOUS at 13:47

## 2023-09-29 RX ADMIN — ATORVASTATIN CALCIUM 40 MG: 40 TABLET, FILM COATED ORAL at 20:26

## 2023-09-29 RX ADMIN — ENOXAPARIN SODIUM 30 MG: 100 INJECTION SUBCUTANEOUS at 20:34

## 2023-09-29 RX ADMIN — DIPHENHYDRAMINE HYDROCHLORIDE 25 MG: 25 TABLET ORAL at 23:10

## 2023-09-29 RX ADMIN — SODIUM CHLORIDE, PRESERVATIVE FREE 10 ML: 5 INJECTION INTRAVENOUS at 20:19

## 2023-09-29 RX ADMIN — ISOSORBIDE MONONITRATE 120 MG: 60 TABLET, EXTENDED RELEASE ORAL at 08:28

## 2023-09-29 RX ADMIN — WATER 40 MG: 1 INJECTION INTRAMUSCULAR; INTRAVENOUS; SUBCUTANEOUS at 20:35

## 2023-09-29 RX ADMIN — RANOLAZINE 1000 MG: 500 TABLET, EXTENDED RELEASE ORAL at 20:24

## 2023-09-29 RX ADMIN — INSULIN GLARGINE 33 UNITS: 100 INJECTION, SOLUTION SUBCUTANEOUS at 08:28

## 2023-09-29 RX ADMIN — Medication 5 MILLICURIE: at 15:30

## 2023-09-29 RX ADMIN — TAMSULOSIN HYDROCHLORIDE 0.4 MG: 0.4 CAPSULE ORAL at 20:24

## 2023-09-29 RX ADMIN — TAMSULOSIN HYDROCHLORIDE 0.4 MG: 0.4 CAPSULE ORAL at 08:27

## 2023-09-29 RX ADMIN — XENON XE-133 7.3 MILLICURIE: 10 GAS RESPIRATORY (INHALATION) at 15:25

## 2023-09-29 RX ADMIN — IPRATROPIUM BROMIDE AND ALBUTEROL SULFATE 1 DOSE: .5; 3 SOLUTION RESPIRATORY (INHALATION) at 17:15

## 2023-09-29 RX ADMIN — INSULIN GLARGINE 15 UNITS: 100 INJECTION, SOLUTION SUBCUTANEOUS at 20:34

## 2023-09-29 RX ADMIN — RANOLAZINE 1000 MG: 500 TABLET, EXTENDED RELEASE ORAL at 08:27

## 2023-09-29 RX ADMIN — CLOPIDOGREL BISULFATE 75 MG: 75 TABLET ORAL at 08:27

## 2023-09-29 RX ADMIN — HYDROXYZINE HYDROCHLORIDE 10 MG: 10 TABLET ORAL at 20:24

## 2023-09-29 ASSESSMENT — ENCOUNTER SYMPTOMS
NAUSEA: 1
WHEEZING: 1
VOMITING: 0
BACK PAIN: 1
COUGH: 1
CHEST TIGHTNESS: 0
DIARRHEA: 0
SHORTNESS OF BREATH: 0
STRIDOR: 0

## 2023-09-29 NOTE — PLAN OF CARE
Problem: Respiratory - Adult  Goal: Clear lung sounds  Outcome: Progressing     Continue breathing treatments to improve breath sounds. Patient mutually agreed on goals.

## 2023-09-29 NOTE — CARE COORDINATION
9/29/23, 8:22 AM EDT    DISCHARGE ON GOING [facility] day: 0  Location: -28/028-A Reason for admit: Acute kidney injury superimposed on CKD ([address]) [N17.9, N18.9]  Acute renal failure superimposed on chronic kidney disease, unspecified CKD stage, unspecified acute renal failure type [N17.9, N18.9]  Acute on chronic congestive heart failure, unspecified heart failure type ([address]) [I50.9]  Acute on chronic anemia [D64.9]  MADI (acute kidney injury) ([address]) [N17.9]   Procedure:   9/25 CXR: Increased interstitial pulmonary markings are seen bilaterally with increased groundglass opacification of both lungs. Findings relate to development of pulmonary edema. Multilobar pneumonia is not excluded  9/26 US Renal: Increased renal cortical echogenicity consistent with chronic kidney disease. An acute superimposed component can not be excluded. No renal atrophy or hydronephrosis. Significant 150 mL postvoid urinary bladder residual.     Barriers to Discharge: Hospitalist, Cardiology and Nephrology following. PT/OT. IS. Lovenox. DuoNeb tid. Given Bumex 1mg iv x1 yesterday. O2 at 2L/nc, sats 96%. PCP: Killian Mcfarland MD   %  Patient Goals/Plan/Treatment Preferences: Home with wife. Has DME. Declines discharge needs. Possible discharge today/this weekend. 9/29/23, 8:27 AM EDT    Patient goals/plan/ treatment preferences discussed by  and . Patient goals/plan/ treatment preferences reviewed with patient/ family. Patient/ family verbalize understanding of discharge plan and are in agreement with goal/plan/treatment preferences. Understanding was demonstrated using the teach back method. AVS provided by RN at time of discharge, which includes all necessary medical information pertaining to the patients current course of illness, treatment, post-discharge goals of care, and treatment preferences.           IMM Letter  IMM Letter given to Patient/Family/Significant

## 2023-09-29 NOTE — CONSULTS
[facility]      Patient - Tiffanie Guallpa   MRN -  [id number]   [facility]   - 1945      Date of Admission -  2023 10:45 AM  Date of evaluation -  2023  Room - [address] [place] [address] Primary Care Physician - Giuseppe Alatorre MD     Problem List      Active Hospital Problems    Diagnosis Date Noted    Acute on chronic congestive heart failure ([address]) [I50.9] 2023     Priority: High    Acute renal failure superimposed on chronic kidney disease ([address]) [N17.9, N18.9] 2021    MADI (acute kidney injury) ([address]) [N17.9] 2018     Reason for Consult    Acute hypoxic respiratory failure   HPI   History Obtained From: Patient patient's wife and electronic medical record. Tiffanie Guallpa is a 66 y.o. male  PMH CAD s/p CABG x 3 stents, anemia, CKD, mild intermittent asthma stopped treatment, DM, RICCARDO on PAP therapy, previous GI bleed since then has been off eliquis. Presented to [facility] with chief complaint of fatigue, poor intake and weakness. He began to have some issues with coordination urinating and incontinence. He went to his Urologist Dr. Sheri Shea and was instructed to come to ER for further evaluation. ER evaluation reviewed. EKG sinus rhythm with 1st degree AV block. Troponin elevated cardiology was consulted. CXR showed patchy hazy opacity bilaterally. Patient was afebrile no leukocytosis and Procal was negative pneumonia was deemed unlikely to be contributor. Patient was started on diuretics and nephrology was consulted for further management of volume overload with MADI on CKD. Patient was placed on low flow O2 due to hypoxia. On 2023 pulmonology was consulted for further evaluation of hypoxia and wheezing.        Patient was diagnosed with asthma previously followed with our office has been seen by Dr. Antoni Bañuelos, Dr. Darryn Delgado most recently Yolanda Humphrey CNP and Deon Martínez in sleep Moderately dilated. Mildly enlarged right atrium size. Signature      ----------------------------------------------------------------   Electronically signed by Mane Silveira MD (Interpreting   physician) on 09/19/2023 at 07:07 PM   ----------------------------------------------------------------      Radiology      US Renal     09/26/2023     Findings: The right kidney measures 13.4 cm in length. The left kidney measures 11.9   cm in length. Left renal cysts measuring 4.3 cm and 3.4 cm, respectively. Additional small subcentimeter cysts in both kidneys. No shadowing   calculus or hydronephrosis. Normal color Doppler signal in both kidneys. Increased renal cortical echogenicity. Normal renal cortical thickness. Postvoid residual of 150 mL in the urinary bladder. No urinary bladder   wall thickening or bladder mass. IMPRESSION:  1. Increased renal cortical echogenicity consistent with chronic kidney   disease. An acute superimposed component can not be excluded. 2. No renal atrophy or hydronephrosis. 3. Significant 150 mL postvoid urinary bladder residual.      CXR    XR CHEST (2 VW)   9/25/2023     FINDINGS:   Increased interstitial pulmonary markings are seen bilaterally with increased groundglass opacification of both lungs. Median sternotomy has been performed. Cardiac silhouette is mildly enlarged. No pleural effusion. No pneumothorax. No acute bony abnormality. DISH of the thoracic spine. IMPRESSION:  1. Increased interstitial pulmonary markings are seen bilaterally with increased groundglass opacification of both lungs. Findings relate to development of pulmonary edema. Multilobar pneumonia is not excluded.       CT Scans  (See actual reports for details)    Assessment   -Acute hypoxic respiratory failure- multifactorial includes pulmonary edema vs asthma exacerbation vs less likely PE  -Elevated D-dimer- 608 persistent hypoxia and SOB is on ASA and Plavix   -Pulmonary edema 2/2

## 2023-09-29 NOTE — PROGRESS NOTES
Hospitalist Progress Note      Patient:  Jt Guthrie    Unit/Bed:6K-28/028-A  YOB: 1945  MRN: [id number]   Acct: [de-identified]   PCP: Dean Christian MD  Date of Admission: 9/25/2023    Assessment/Plan:    Acute hypoxic respiratory failure, likely secondary to acute asthma exacerbation:  Acute on chronic dyspnea  Less likely volume overload, discussed case with Nephrology who is managing diuretics. Without improvement Pulm consulted, VQ with less likely probability for PE. Steroid therapy with IV Solu-medrol BID today followed by daily Prednisone x 5 days. Duonebs Q4 hours. MADI on CKD stage IV:  Cr noted at 2.2 on admission, currently at 2.7. Was previously 1.9 on 9/1/23  Likely some prerenal component with reduced p.o. intake and on Bumex  Hold Bumex for now, avoid nephrotoxic agents  Nephrology consultation  Renal US pending  Urine electrolytes    9/ 29: Neph with Bumex management - Cr near baseline. Near-syncope: following going to the bathroom with paleness and diaphoresis, likely vagal episode. Hypotension present. Ortho neg. VQ with unlikely PE. Acute HFrEF exacerbation, improved: Noted conversational dyspnea and pitting edema in bilateral lower extremities, most recent echo 9/19/2023 with EF 50 to 55%. CXR showing pulmonary vascular congestion vs pneumonia, (low suspicion as afebrile, no chills/productive cough). Cardiology consultation. Elevated troponin: Likely secondary to CKD history, high sensitive troponin 1171 x2. EKG without ischemic changes. Cardio consult -> no plan for intervention. IDDM II: Patient takes 33 Lantus in the morning, 15 units nightly. SSI. Hypoglycemia protocol. Accu-Cheks. Chronic macrocytic anemia: Patient with hemoglobin noted at 9.3, this is close to his baseline. No evidence of gross bleeding continue monitor and transfuse for hemoglobin less than 7.   CAD s/p CABG/PCI:

## 2023-09-29 NOTE — PROGRESS NOTES
assess pending progress and Home with Assist as Needed  Plan: Current Treatment Recommendations: Strengthening, Balance training, Functional mobility training, Transfer training, Gait training, Stair training, Patient/Caregiver education & training, Endurance training, Safety education & training, Home exercise program, Therapeutic activities  General Plan:  (5x GM)    Patient Education  Patient Education: Plan of Care, Home Exercise Program    Goals:  Patient Goals : go home  Short Term Goals  Time Frame for Short Term Goals: at discharge  Short Term Goal 1: Pt to be Mod I for supine <> sit to get in/out of bed  Short Term Goal 2: Pt to be Mod I for sit <> stand to get up to ambulate  Short Term Goal 3: Pt to ambulate >90 ft with no AD with Mod I/I for household distances  Short Term Goal 4: Pt to negotiate 2 steps with 1-2 rails with Supervision for home access  Long Term Goals  Time Frame for Long Term Goals : not set due to short ELOS    Following session, patient left in safe position with all fall risk precautions in place. Jazlyn Pereira.  Tamar Jaramillo

## 2023-09-30 LAB
ANION GAP SERPL CALC-SCNC: 14 MEQ/L (ref 8–16)
BUN SERPL-MCNC: 67 MG/DL (ref 7–22)
CALCIUM SERPL-MCNC: 8.9 MG/DL (ref 8.5–10.5)
CHLORIDE SERPL-SCNC: 99 MEQ/L (ref 98–111)
CO2 SERPL-SCNC: 21 MEQ/L (ref 23–33)
CREAT SERPL-MCNC: 2.3 MG/DL (ref 0.4–1.2)
GFR SERPL CREATININE-BSD FRML MDRD: 28 ML/MIN/1.73M2
GLUCOSE SERPL-MCNC: 263 MG/DL (ref 70–108)
POTASSIUM SERPL-SCNC: 4.8 MEQ/L (ref 3.5–5.2)
SODIUM SERPL-SCNC: 134 MEQ/L (ref 135–145)

## 2023-09-30 PROCEDURE — 80048 BASIC METABOLIC PNL TOTAL CA: CPT

## 2023-09-30 PROCEDURE — 99232 SBSQ HOSP IP/OBS MODERATE 35: CPT | Performed by: INTERNAL MEDICINE

## 2023-09-30 PROCEDURE — 99232 SBSQ HOSP IP/OBS MODERATE 35: CPT | Performed by: NURSE PRACTITIONER

## 2023-09-30 PROCEDURE — 36415 COLL VENOUS BLD VENIPUNCTURE: CPT

## 2023-09-30 PROCEDURE — 6370000000 HC RX 637 (ALT 250 FOR IP): Performed by: PHYSICIAN ASSISTANT

## 2023-09-30 PROCEDURE — 99233 SBSQ HOSP IP/OBS HIGH 50: CPT | Performed by: PHYSICIAN ASSISTANT

## 2023-09-30 PROCEDURE — 2580000003 HC RX 258: Performed by: NURSE PRACTITIONER

## 2023-09-30 PROCEDURE — 6360000002 HC RX W HCPCS

## 2023-09-30 PROCEDURE — 1200000003 HC TELEMETRY R&B

## 2023-09-30 PROCEDURE — 6370000000 HC RX 637 (ALT 250 FOR IP)

## 2023-09-30 PROCEDURE — 6370000000 HC RX 637 (ALT 250 FOR IP): Performed by: NURSE PRACTITIONER

## 2023-09-30 PROCEDURE — 2580000003 HC RX 258

## 2023-09-30 PROCEDURE — 2500000003 HC RX 250 WO HCPCS: Performed by: INTERNAL MEDICINE

## 2023-09-30 PROCEDURE — 6360000002 HC RX W HCPCS: Performed by: NURSE PRACTITIONER

## 2023-09-30 PROCEDURE — 1200000000 HC SEMI PRIVATE

## 2023-09-30 PROCEDURE — 94640 AIRWAY INHALATION TREATMENT: CPT

## 2023-09-30 PROCEDURE — 94761 N-INVAS EAR/PLS OXIMETRY MLT: CPT

## 2023-09-30 RX ORDER — INSULIN GLARGINE 100 [IU]/ML
20 INJECTION, SOLUTION SUBCUTANEOUS NIGHTLY
Status: DISCONTINUED | OUTPATIENT
Start: 2023-09-30 | End: 2023-10-06 | Stop reason: HOSPADM

## 2023-09-30 RX ORDER — INSULIN LISPRO 100 [IU]/ML
5 INJECTION, SOLUTION INTRAVENOUS; SUBCUTANEOUS
Status: DISCONTINUED | OUTPATIENT
Start: 2023-09-30 | End: 2023-10-06 | Stop reason: HOSPADM

## 2023-09-30 RX ORDER — INSULIN LISPRO 100 [IU]/ML
0-4 INJECTION, SOLUTION INTRAVENOUS; SUBCUTANEOUS NIGHTLY
Status: DISCONTINUED | OUTPATIENT
Start: 2023-09-30 | End: 2023-10-06 | Stop reason: HOSPADM

## 2023-09-30 RX ORDER — PREDNISONE 20 MG/1
40 TABLET ORAL DAILY
Status: DISCONTINUED | OUTPATIENT
Start: 2023-10-01 | End: 2023-10-02

## 2023-09-30 RX ORDER — BUMETANIDE 0.25 MG/ML
1 INJECTION INTRAMUSCULAR; INTRAVENOUS 2 TIMES DAILY
Status: COMPLETED | OUTPATIENT
Start: 2023-09-30 | End: 2023-10-01

## 2023-09-30 RX ORDER — GUAIFENESIN/DEXTROMETHORPHAN 100-10MG/5
5 SYRUP ORAL EVERY 4 HOURS PRN
Status: DISCONTINUED | OUTPATIENT
Start: 2023-09-30 | End: 2023-10-06 | Stop reason: HOSPADM

## 2023-09-30 RX ORDER — INSULIN GLARGINE 100 [IU]/ML
10 INJECTION, SOLUTION SUBCUTANEOUS NIGHTLY
Status: DISCONTINUED | OUTPATIENT
Start: 2023-09-30 | End: 2023-09-30 | Stop reason: ALTCHOICE

## 2023-09-30 RX ORDER — INSULIN LISPRO 100 [IU]/ML
0-16 INJECTION, SOLUTION INTRAVENOUS; SUBCUTANEOUS
Status: DISCONTINUED | OUTPATIENT
Start: 2023-09-30 | End: 2023-10-06 | Stop reason: HOSPADM

## 2023-09-30 RX ADMIN — INSULIN GLARGINE 33 UNITS: 100 INJECTION, SOLUTION SUBCUTANEOUS at 08:01

## 2023-09-30 RX ADMIN — WATER 40 MG: 1 INJECTION INTRAMUSCULAR; INTRAVENOUS; SUBCUTANEOUS at 08:02

## 2023-09-30 RX ADMIN — SODIUM CHLORIDE, PRESERVATIVE FREE 10 ML: 5 INJECTION INTRAVENOUS at 08:06

## 2023-09-30 RX ADMIN — INSULIN LISPRO 12 UNITS: 100 INJECTION, SOLUTION INTRAVENOUS; SUBCUTANEOUS at 11:55

## 2023-09-30 RX ADMIN — INSULIN LISPRO 1 UNITS: 100 INJECTION, SOLUTION INTRAVENOUS; SUBCUTANEOUS at 08:01

## 2023-09-30 RX ADMIN — INSULIN GLARGINE 20 UNITS: 100 INJECTION, SOLUTION SUBCUTANEOUS at 20:38

## 2023-09-30 RX ADMIN — TAMSULOSIN HYDROCHLORIDE 0.4 MG: 0.4 CAPSULE ORAL at 08:02

## 2023-09-30 RX ADMIN — IPRATROPIUM BROMIDE AND ALBUTEROL SULFATE 1 DOSE: .5; 3 SOLUTION RESPIRATORY (INHALATION) at 12:53

## 2023-09-30 RX ADMIN — GUAIFENESIN AND DEXTROMETHORPHAN 5 ML: 100; 10 SYRUP ORAL at 18:38

## 2023-09-30 RX ADMIN — DILTIAZEM HYDROCHLORIDE 120 MG: 120 CAPSULE, COATED, EXTENDED RELEASE ORAL at 08:03

## 2023-09-30 RX ADMIN — RANOLAZINE 1000 MG: 500 TABLET, EXTENDED RELEASE ORAL at 20:36

## 2023-09-30 RX ADMIN — CLOPIDOGREL BISULFATE 75 MG: 75 TABLET ORAL at 08:02

## 2023-09-30 RX ADMIN — TAMSULOSIN HYDROCHLORIDE 0.4 MG: 0.4 CAPSULE ORAL at 20:35

## 2023-09-30 RX ADMIN — GUAIFENESIN AND DEXTROMETHORPHAN 5 ML: 100; 10 SYRUP ORAL at 23:27

## 2023-09-30 RX ADMIN — BUMETANIDE 1 MG: 0.25 INJECTION INTRAMUSCULAR; INTRAVENOUS at 15:42

## 2023-09-30 RX ADMIN — INSULIN LISPRO 5 UNITS: 100 INJECTION, SOLUTION INTRAVENOUS; SUBCUTANEOUS at 18:36

## 2023-09-30 RX ADMIN — SODIUM CHLORIDE, PRESERVATIVE FREE 10 ML: 5 INJECTION INTRAVENOUS at 20:41

## 2023-09-30 RX ADMIN — ENOXAPARIN SODIUM 30 MG: 100 INJECTION SUBCUTANEOUS at 20:38

## 2023-09-30 RX ADMIN — WATER 40 MG: 1 INJECTION INTRAMUSCULAR; INTRAVENOUS; SUBCUTANEOUS at 20:40

## 2023-09-30 RX ADMIN — IPRATROPIUM BROMIDE AND ALBUTEROL SULFATE 1 DOSE: .5; 3 SOLUTION RESPIRATORY (INHALATION) at 08:30

## 2023-09-30 RX ADMIN — IPRATROPIUM BROMIDE AND ALBUTEROL SULFATE 1 DOSE: .5; 3 SOLUTION RESPIRATORY (INHALATION) at 21:39

## 2023-09-30 RX ADMIN — ATORVASTATIN CALCIUM 40 MG: 40 TABLET, FILM COATED ORAL at 20:38

## 2023-09-30 RX ADMIN — RANOLAZINE 1000 MG: 500 TABLET, EXTENDED RELEASE ORAL at 08:03

## 2023-09-30 RX ADMIN — METOPROLOL SUCCINATE 50 MG: 50 TABLET, EXTENDED RELEASE ORAL at 08:03

## 2023-09-30 RX ADMIN — FINASTERIDE 5 MG: 5 TABLET, FILM COATED ORAL at 08:03

## 2023-09-30 RX ADMIN — ASPIRIN 81 MG: 81 TABLET, COATED ORAL at 08:03

## 2023-09-30 RX ADMIN — DOCUSATE SODIUM 100 MG: 100 CAPSULE, LIQUID FILLED ORAL at 08:03

## 2023-09-30 RX ADMIN — INSULIN LISPRO 12 UNITS: 100 INJECTION, SOLUTION INTRAVENOUS; SUBCUTANEOUS at 15:50

## 2023-09-30 RX ADMIN — IPRATROPIUM BROMIDE AND ALBUTEROL SULFATE 1 DOSE: .5; 3 SOLUTION RESPIRATORY (INHALATION) at 17:28

## 2023-09-30 RX ADMIN — HYDROXYZINE HYDROCHLORIDE 10 MG: 10 TABLET ORAL at 20:39

## 2023-09-30 RX ADMIN — ISOSORBIDE MONONITRATE 120 MG: 60 TABLET, EXTENDED RELEASE ORAL at 08:02

## 2023-09-30 NOTE — CONSULTS
[facility]      Patient - Casie Calvillo   MRN -  [id number]   [facility]   - 1945      Date of Admission -  2023 10:45 AM  Date of evaluation -  2023  Room - [address] [place], [place] Primary Care Physician - Lilaine Carter MD     Problem List      Active Hospital Problems    Diagnosis Date Noted    Acute on chronic congestive heart failure ([address]) [I50.9] 2023     Priority: High    Acute respiratory failure with hypoxia ([address]) [J96.01] 2023     Priority: Medium    Acute pulmonary edema ([address]) [J81.0] 2023     Priority: Medium    Mild intermittent asthma with exacerbation [J45.21] 2023     Priority: Medium    RICCARDO treated with BiPAP [G47.33] 10/08/2015     Priority: Low    Elevated d-dimer [R79.89] 2023    Acute kidney injury superimposed on CKD ([address]) [N17.9, N18.9] 2021    MADI (acute kidney injury) ([address]) [N17.9] 2018     Reason for Consult    Acute hypoxic respiratory failure  HPI   History Obtained From: Patient patient's wife and electronic medical record. Casie Calvillo is a 66 y.o. male  PMH CAD s/p CABG x 3 stents, anemia, CKD, mild intermittent asthma stopped treatment, DM, RICCARDO on PAP therapy, previous GI bleed since then has been off eliquis. Presented to [facility] with chief complaint of fatigue, poor intake and weakness. He began to have some issues with coordination urinating and incontinence. He went to his Urologist Dr. Priyanka Michele and was instructed to come to ER for further evaluation. ER evaluation reviewed. EKG sinus rhythm with 1st degree AV block. Troponin elevated cardiology was consulted. CXR showed patchy hazy opacity bilaterally. Patient was afebrile no leukocytosis and Procal was negative pneumonia was deemed unlikely to be contributor.  Patient was started on diuretics and nephrology was consulted for further management of volume overload icterus. Neck: Neck supple. No tracheal deviation present. Cardiovascular: S1 and S2 with no murmur. Pulmonary/Chest: Normal effort with bilateral air entry, bilateral Rales. No stridor. No respiratory distress. Patient exhibits no tenderness. Abdominal: Soft. Bowel sounds audible. Rounded. No  tenderness to palp. Musculoskeletal: Moves all extremities  Neurological: Patient is alert, follows simple commands and noted to be [place]. Labs  - Old records and notes have been reviewed in [facility]   ABG  Lab Results   Component Value Date/Time    PH 5.5 06/18/2018 12:00 AM     No results found for: \"IFIO2\", \"MODE\", \"SETTIDVOL\", \"SETPEEP\"  CBC  Recent Labs     09/28/23  0550   WBC 7.7   RBC 2.77*   HGB 9.5*   HCT 28.9*   .3*   MCH 34.3*   MCHC 32.9      MPV 9.8        BMP  Recent Labs     09/28/23  0550 09/29/23  1007 09/30/23  0610   * 134* 134*   K 4.7 4.9 4.8   CL 97* 99 99   CO2 23 23 21*   BUN 65* 63* 67*   CREATININE 2.4* 2.2* 2.3*   GLUCOSE 155* 229* 263*   CALCIUM 8.7 9.1 8.9       LFT  No results for input(s): \"AST\", \"ALT\", \"ALB\", \"BILITOT\", \"ALKPHOS\", \"AMYLASE\", \"LIPASE\" in the last 72 hours. TROP  Lab Results   Component Value Date/Time    TROPONINT < 0.010 11/12/2022 03:43 AM    TROPONINT < 0.010 11/11/2022 10:44 PM    TROPONINT 0.011 11/11/2022 05:50 PM     BNP  No results for input(s): \"BNP\" in the last 72 hours. Lactic Acid  No results for input(s): \"LACTA\" in the last 72 hours. INR  No results for input(s): \"INR\", \"PROTIME\" in the last 72 hours. PTT  No results for input(s): \"APTT\" in the last 72 hours. Glucose  No results for input(s): \"POCGLU\" in the last 72 hours. UA No results for input(s): \"SPECGRAV\", \"PHUR\", \"COLORU\", \"CLARITYU\", \"MUCUS\", \"PROTEINU\", \"BLOODU\", \"RBCUA\", \"WBCUA\", \"BACTERIA\", \"NITRU\", \"GLUCOSEU\", \"BILIRUBINUR\", \"UROBILINOGEN\", \"KETUA\", \"LABCAST\", \"LABCASTTY\", \"AMORPHOS\" in the last 72 hours. Invalid input(s): \"CRYSTALS\".     PFTs       Sleep

## 2023-09-30 NOTE — PLAN OF CARE
Problem: Discharge Planning  Goal: Discharge to home or other facility with appropriate resources  9/30/2023 1826 by Ajith John RN  Note: Plans for return home with wife who is retired nurse. Problem: Safety - Adult  Goal: Free from fall injury  9/30/2023 1826 by Ajith John RN  Outcome: Progressing  Note: No falls this shift. Call light within reach. Bed and chair alarm in use. Up with assist.     Problem: ABCDS Injury Assessment  Goal: Absence of physical injury  9/30/2023 1826 by Ajith John RN  Outcome: Progressing  Note: No injury this shift. Problem: Chronic Conditions and Co-morbidities  Goal: Patient's chronic conditions and co-morbidity symptoms are monitored and maintained or improved  9/30/2023 1826 by Ajith John RN  Outcome: Progressing  Note: Cardio signed off. Pulm on case. Care plan reviewed with patient and wife. Patient and wife verbalize understanding of the plan of care and contribute to goal setting.

## 2023-09-30 NOTE — PROGRESS NOTES
Hospitalist Progress Note      Patient:  Leighton Banks    Unit/Bed:6K-28/028-A  YOB: 1945  MRN: [id number]   Acct: [de-identified]   PCP: Lisa Mosher MD  Date of Admission: 9/25/2023    Assessment/Plan:    Acute hypoxic respiratory failure, likely secondary to acute asthma exacerbation:  Acute on chronic dyspnea  Less likely volume overload, discussed case with Nephrology who is managing diuretics. Without improvement Pulm consulted, VQ with less likely probability for PE. Steroid therapy with IV Solu-medrol BID today followed by daily Prednisone x 5 days. Duonebs Q4 hours. Discussed with Pulm - monitor for improvement at minium until Monday     MADI on CKD stage IV:  Cr noted at 2.2 on admission, currently at 2.7. Was previously 1.9 on 9/1/23  Likely some prerenal component with reduced p.o. intake and on Bumex  Hold Bumex for now, avoid nephrotoxic agents  Nephrology consultation  Renal US pending  Urine electrolytes    9/29: Neph with Bumex management - Cr near baseline. Hyponatremia, mild: Monitor with daily BMP, sodium 134  Near-syncope: following going to the bathroom with paleness and diaphoresis, likely vagal episode. Hypotension present. Ortho neg. VQ with unlikely PE. Acute HFrEF exacerbation, improved: Noted conversational dyspnea and pitting edema in bilateral lower extremities, most recent echo 9/19/2023 with EF 50 to 55%. CXR showing pulmonary vascular congestion vs pneumonia, (low suspicion as afebrile, no chills/productive cough). Cardiology consultation. Elevated troponin: Likely secondary to CKD history, high sensitive troponin 1171 x2. EKG without ischemic changes. Cardio consult -> no plan for intervention. IDDM II: Patient takes 33 Lantus in the morning, 15 units nightly. SSI. Hypoglycemia protocol. Accu-Cheks.   Chronic macrocytic anemia: Patient with hemoglobin noted at 9.3, this is close to excluded. 2. No renal atrophy or hydronephrosis. 3. Significant 150 mL postvoid urinary bladder residual.      This document has been electronically signed by: Dot Miller MD on    09/26/2023 08:27 PM      XR CHEST (2 VW)   Final Result   1. Increased interstitial pulmonary markings are seen bilaterally with increased groundglass opacification of both lungs. Findings relate to development of pulmonary edema. Multilobar pneumonia is not excluded. **This report has been created using voice recognition software. It may contain minor errors which are inherent in voice recognition technology. **      Final report electronically signed by Dr Abhay Felder on 9/25/2023 12:05 PM        XR CHEST (2 VW)    Result Date: 9/25/2023  PROCEDURE: XR CHEST (2 VW) CLINICAL INFORMATION: Fatigue/history of CHF. Evaluate for consolidation/fluid overload. Thank you Vicki Muniz 0518 COMPARISON: Chest radiograph 11/11/2022 TECHNIQUE: PA and lateral views of the chest performed. FINDINGS: Increased interstitial pulmonary markings are seen bilaterally with increased groundglass opacification of both lungs. Median sternotomy has been performed. Cardiac silhouette is mildly enlarged. No pleural effusion. No pneumothorax. No acute bony abnormality. DISH of the thoracic spine. 1. Increased interstitial pulmonary markings are seen bilaterally with increased groundglass opacification of both lungs. Findings relate to development of pulmonary edema. Multilobar pneumonia is not excluded. **This report has been created using voice recognition software. It may contain minor errors which are inherent in voice recognition technology. ** Final report electronically signed by Dr Abhay Felder on 9/25/2023 12:05 PM      Electronically signed by Gisele Santoro PA-C on 9/30/2023 at 2:38 PM

## 2023-09-30 NOTE — PLAN OF CARE
Problem: Respiratory - Adult  Goal: Clear lung sounds  Note: Patient receiving breathing treatments to maintain and manage respiratory status. Will be continued as ordered to improve aeration.

## 2023-09-30 NOTE — PLAN OF CARE
Problem: Discharge Planning  Goal: Discharge to home or other facility with appropriate resources  Outcome: Progressing  Flowsheets (Taken 9/27/2023 2229 by Arlen Dixon RN)  Discharge to home or other facility with appropriate resources:   Identify barriers to discharge with patient and caregiver   Arrange for needed discharge resources and transportation as appropriate   Identify discharge learning needs (meds, wound care, etc)   Arrange for interpreters to assist at discharge as needed   Refer to discharge planning if patient needs post-hospital services based on physician order or complex needs related to functional status, cognitive ability or social support system     Problem: Safety - Adult  Goal: Free from fall injury  Outcome: Progressing  Flowsheets (Taken 9/27/2023 2229 by Arlen Dixon RN)  Free From Fall Injury:   Instruct family/caregiver on patient safety   Based on caregiver fall risk screen, instruct family/caregiver to ask for assistance with transferring infant if caregiver noted to have fall risk factors     Problem: ABCDS Injury Assessment  Goal: Absence of physical injury  Outcome: Progressing  Flowsheets (Taken 9/27/2023 2229 by Arlen Dixon RN)  Absence of Physical Injury: Implement safety measures based on patient assessment     Problem: Chronic Conditions and Co-morbidities  Goal: Patient's chronic conditions and co-morbidity symptoms are monitored and maintained or improved  Outcome: Progressing  Flowsheets (Taken 9/27/2023 2229 by Arlen Dixon RN)  Care Plan - Patient's Chronic Conditions and Co-Morbidity Symptoms are Monitored and Maintained or Improved:   Monitor and assess patient's chronic conditions and comorbid symptoms for stability, deterioration, or improvement   Collaborate with multidisciplinary team to address chronic and comorbid conditions and prevent exacerbation or deterioration   Update acute care plan with appropriate goals if chronic or comorbid symptoms are exacerbated and prevent overall improvement and discharge

## 2023-09-30 NOTE — FLOWSHEET NOTE
09/30/23 5607   Safe Environment   Safety Measures Bed in low position;Call light within reach; Family at bedside;Side rails X 2;Visitors at bedside  (Virtual Nurse Safety Round Complete)     Call placed to patients room, patient responds to audio, permitted video. Patient alert and oriented. Patient denied any voiced conscerns/complaints at this time but wanted to discuss the weather in my area, patient was very pleasant and so were the visitors. Patient educated on utilizig call light. Call light within reach, no signs or symtpoms of distress noted.

## 2023-09-30 NOTE — PROGRESS NOTES
[facility]--. [id number] [facility] PROGRESS NOTE      Patient: Jaylin Galarza  Room #: 0G-75/525-D            YOB: 1945  Age: 66 y.o. Gender: male            Admit Date & Time: 9/25/2023 10:45 AM    Assessment:    The patient is a 67 yo male who was in his recliner. The patient had his wife and adult child seated on the couch. The patient shared that his Yazidism had been regularly visiting him. The patient also shared he did 20 years of visitation for his Yazidism. The patient has been visited with great regularity. Interventions: The patient was encouraged to share his experiences and blessings. When the patient's dinner arrived his meal was blessed. Outcomes: The patient shared being in good spirits post visit. Plan: 1. Spiritual care will continue to follow the patient according to [facility] Ricarda's spiritual care SOP. Electronically signed by Holli Carroll on 9/30/2023 at 4:48 PM.  Ronny  [phone]     09/30/23 1646   Encounter Summary   Encounter Overview/Reason  Initial Encounter   Service Provided For: Patient; Family; Patient and family together;Significant other   Referral/Consult From: [facility]   Support System Spouse; Children;Family members; Buddhist/christina community   Last Encounter  09/30/23   Complexity of Encounter Moderate   Begin Time 1620   End Time  1640   Total Time Calculated 20 min   Spiritual/Emotional needs   Type Spiritual Support   Assessment/Intervention/Outcome   Assessment Calm; Hopeful   Intervention Prayer (assurance of)/[place];Sustaining Presence/Ministry of presence   Outcome Encouraged

## 2023-10-01 LAB
ANION GAP SERPL CALC-SCNC: 14 MEQ/L (ref 8–16)
BUN SERPL-MCNC: 80 MG/DL (ref 7–22)
CALCIUM SERPL-MCNC: 9 MG/DL (ref 8.5–10.5)
CHLORIDE SERPL-SCNC: 97 MEQ/L (ref 98–111)
CO2 SERPL-SCNC: 22 MEQ/L (ref 23–33)
CREAT SERPL-MCNC: 2.6 MG/DL (ref 0.4–1.2)
GFR SERPL CREATININE-BSD FRML MDRD: 24 ML/MIN/1.73M2
GLUCOSE SERPL-MCNC: 189 MG/DL (ref 70–108)
POTASSIUM SERPL-SCNC: 4.9 MEQ/L (ref 3.5–5.2)
POTASSIUM SERPL-SCNC: 5.6 MEQ/L (ref 3.5–5.2)
SODIUM SERPL-SCNC: 133 MEQ/L (ref 135–145)

## 2023-10-01 PROCEDURE — 6370000000 HC RX 637 (ALT 250 FOR IP): Performed by: PHYSICIAN ASSISTANT

## 2023-10-01 PROCEDURE — 1200000003 HC TELEMETRY R&B

## 2023-10-01 PROCEDURE — 6370000000 HC RX 637 (ALT 250 FOR IP)

## 2023-10-01 PROCEDURE — 94640 AIRWAY INHALATION TREATMENT: CPT

## 2023-10-01 PROCEDURE — 2500000003 HC RX 250 WO HCPCS: Performed by: INTERNAL MEDICINE

## 2023-10-01 PROCEDURE — 6370000000 HC RX 637 (ALT 250 FOR IP): Performed by: INTERNAL MEDICINE

## 2023-10-01 PROCEDURE — 36415 COLL VENOUS BLD VENIPUNCTURE: CPT

## 2023-10-01 PROCEDURE — 87086 URINE CULTURE/COLONY COUNT: CPT

## 2023-10-01 PROCEDURE — 51702 INSERT TEMP BLADDER CATH: CPT

## 2023-10-01 PROCEDURE — 94760 N-INVAS EAR/PLS OXIMETRY 1: CPT

## 2023-10-01 PROCEDURE — 2580000003 HC RX 258

## 2023-10-01 PROCEDURE — 6370000000 HC RX 637 (ALT 250 FOR IP): Performed by: NURSE PRACTITIONER

## 2023-10-01 PROCEDURE — 1200000000 HC SEMI PRIVATE

## 2023-10-01 PROCEDURE — 6360000002 HC RX W HCPCS

## 2023-10-01 PROCEDURE — 51798 US URINE CAPACITY MEASURE: CPT

## 2023-10-01 PROCEDURE — 2700000000 HC OXYGEN THERAPY PER DAY

## 2023-10-01 PROCEDURE — 80048 BASIC METABOLIC PNL TOTAL CA: CPT

## 2023-10-01 PROCEDURE — 99232 SBSQ HOSP IP/OBS MODERATE 35: CPT | Performed by: NURSE PRACTITIONER

## 2023-10-01 PROCEDURE — 84132 ASSAY OF SERUM POTASSIUM: CPT

## 2023-10-01 PROCEDURE — 99232 SBSQ HOSP IP/OBS MODERATE 35: CPT | Performed by: INTERNAL MEDICINE

## 2023-10-01 PROCEDURE — 94761 N-INVAS EAR/PLS OXIMETRY MLT: CPT

## 2023-10-01 RX ORDER — ALBUTEROL SULFATE 90 UG/1
2 AEROSOL, METERED RESPIRATORY (INHALATION) 4 TIMES DAILY PRN
Qty: 54 G | Refills: 1 | Status: SHIPPED | OUTPATIENT
Start: 2023-10-01 | End: 2023-10-13

## 2023-10-01 RX ORDER — HYDROXYZINE HYDROCHLORIDE 10 MG/1
10 TABLET, FILM COATED ORAL 3 TIMES DAILY PRN
Status: DISCONTINUED | OUTPATIENT
Start: 2023-10-01 | End: 2023-10-06 | Stop reason: HOSPADM

## 2023-10-01 RX ORDER — FLUTICASONE PROPIONATE 110 UG/1
2 AEROSOL, METERED RESPIRATORY (INHALATION) 2 TIMES DAILY
Qty: 12 G | Refills: 3 | Status: SHIPPED | OUTPATIENT
Start: 2023-10-01 | End: 2023-10-13

## 2023-10-01 RX ADMIN — ISOSORBIDE MONONITRATE 120 MG: 60 TABLET, EXTENDED RELEASE ORAL at 09:14

## 2023-10-01 RX ADMIN — IPRATROPIUM BROMIDE AND ALBUTEROL SULFATE 1 DOSE: .5; 3 SOLUTION RESPIRATORY (INHALATION) at 08:51

## 2023-10-01 RX ADMIN — IPRATROPIUM BROMIDE AND ALBUTEROL SULFATE 1 DOSE: .5; 3 SOLUTION RESPIRATORY (INHALATION) at 15:55

## 2023-10-01 RX ADMIN — ENOXAPARIN SODIUM 30 MG: 100 INJECTION SUBCUTANEOUS at 21:49

## 2023-10-01 RX ADMIN — ACETAMINOPHEN 650 MG: 325 TABLET ORAL at 01:32

## 2023-10-01 RX ADMIN — SODIUM CHLORIDE, PRESERVATIVE FREE 10 ML: 5 INJECTION INTRAVENOUS at 21:50

## 2023-10-01 RX ADMIN — TAMSULOSIN HYDROCHLORIDE 0.4 MG: 0.4 CAPSULE ORAL at 09:14

## 2023-10-01 RX ADMIN — FINASTERIDE 5 MG: 5 TABLET, FILM COATED ORAL at 09:14

## 2023-10-01 RX ADMIN — SODIUM CHLORIDE, PRESERVATIVE FREE 10 ML: 5 INJECTION INTRAVENOUS at 09:14

## 2023-10-01 RX ADMIN — METOPROLOL SUCCINATE 50 MG: 50 TABLET, EXTENDED RELEASE ORAL at 09:14

## 2023-10-01 RX ADMIN — RANOLAZINE 1000 MG: 500 TABLET, EXTENDED RELEASE ORAL at 09:14

## 2023-10-01 RX ADMIN — IPRATROPIUM BROMIDE AND ALBUTEROL SULFATE 1 DOSE: .5; 3 SOLUTION RESPIRATORY (INHALATION) at 12:27

## 2023-10-01 RX ADMIN — DIPHENHYDRAMINE HYDROCHLORIDE 25 MG: 25 TABLET ORAL at 01:32

## 2023-10-01 RX ADMIN — HYDROXYZINE HYDROCHLORIDE 10 MG: 10 TABLET ORAL at 11:59

## 2023-10-01 RX ADMIN — GUAIFENESIN AND DEXTROMETHORPHAN 5 ML: 100; 10 SYRUP ORAL at 09:35

## 2023-10-01 RX ADMIN — IPRATROPIUM BROMIDE AND ALBUTEROL SULFATE 1 DOSE: .5; 3 SOLUTION RESPIRATORY (INHALATION) at 21:46

## 2023-10-01 RX ADMIN — ACETAMINOPHEN 650 MG: 325 TABLET ORAL at 21:49

## 2023-10-01 RX ADMIN — INSULIN LISPRO 5 UNITS: 100 INJECTION, SOLUTION INTRAVENOUS; SUBCUTANEOUS at 13:15

## 2023-10-01 RX ADMIN — TAMSULOSIN HYDROCHLORIDE 0.4 MG: 0.4 CAPSULE ORAL at 21:52

## 2023-10-01 RX ADMIN — INSULIN LISPRO 4 UNITS: 100 INJECTION, SOLUTION INTRAVENOUS; SUBCUTANEOUS at 12:03

## 2023-10-01 RX ADMIN — ATORVASTATIN CALCIUM 40 MG: 40 TABLET, FILM COATED ORAL at 21:52

## 2023-10-01 RX ADMIN — BUMETANIDE 1 MG: 0.25 INJECTION INTRAMUSCULAR; INTRAVENOUS at 09:13

## 2023-10-01 RX ADMIN — INSULIN GLARGINE 33 UNITS: 100 INJECTION, SOLUTION SUBCUTANEOUS at 09:35

## 2023-10-01 RX ADMIN — DIPHENHYDRAMINE HYDROCHLORIDE 25 MG: 25 TABLET ORAL at 21:49

## 2023-10-01 RX ADMIN — SODIUM ZIRCONIUM CYCLOSILICATE 10 G: 10 POWDER, FOR SUSPENSION ORAL at 11:58

## 2023-10-01 RX ADMIN — INSULIN LISPRO 4 UNITS: 100 INJECTION, SOLUTION INTRAVENOUS; SUBCUTANEOUS at 16:53

## 2023-10-01 RX ADMIN — INSULIN GLARGINE 20 UNITS: 100 INJECTION, SOLUTION SUBCUTANEOUS at 21:49

## 2023-10-01 RX ADMIN — INSULIN LISPRO 5 UNITS: 100 INJECTION, SOLUTION INTRAVENOUS; SUBCUTANEOUS at 16:54

## 2023-10-01 RX ADMIN — RANOLAZINE 1000 MG: 500 TABLET, EXTENDED RELEASE ORAL at 21:52

## 2023-10-01 RX ADMIN — PREDNISONE 40 MG: 20 TABLET ORAL at 09:14

## 2023-10-01 RX ADMIN — ASPIRIN 81 MG: 81 TABLET, COATED ORAL at 09:13

## 2023-10-01 RX ADMIN — POLYETHYLENE GLYCOL 3350 17 G: 17 POWDER, FOR SOLUTION ORAL at 21:49

## 2023-10-01 RX ADMIN — DILTIAZEM HYDROCHLORIDE 120 MG: 120 CAPSULE, COATED, EXTENDED RELEASE ORAL at 09:14

## 2023-10-01 RX ADMIN — CLOPIDOGREL BISULFATE 75 MG: 75 TABLET ORAL at 09:13

## 2023-10-01 NOTE — PLAN OF CARE
Problem: Discharge Planning  Goal: Discharge to home or other facility with appropriate resources  9/30/2023 1826 by Dannie Cole RN  Note: Plans for return home with wife who is retired nurse. Problem: Safety - Adult  Goal: Free from fall injury  9/30/2023 1826 by Dannie Cole RN  Outcome: Progressing  Note: No falls this shift. Call light within reach. Bed and chair alarm in use. Up with assist.     Problem: ABCDS Injury Assessment  Goal: Absence of physical injury  9/30/2023 1826 by Dannie Cole RN  Outcome: Progressing  Note: No injury this shift. Problem: Chronic Conditions and Co-morbidities  Goal: Patient's chronic conditions and co-morbidity symptoms are monitored and maintained or improved  9/30/2023 1826 by Dannie Cole RN  Outcome: Progressing  Note: Cardio signed off. Pulm on case. Home O2 eval needs completed. Care plan reviewed with patient and wife. Patient and wife verbalize understanding of the plan of care and contribute to goal setting.

## 2023-10-01 NOTE — PLAN OF CARE
Problem: Respiratory - Adult  Goal: Clear lung sounds  9/30/2023 2141 by Patt Donald RCP  Outcome: Progressing   Patient mutually agreed on goals.

## 2023-10-01 NOTE — PROCEDURES
Bladder scan completed at [address] [place] [address] and results told to [facility]. Scan showed 899 mL in the patients bladder. Last void was unknown.  Diana Broussard CET

## 2023-10-01 NOTE — PROGRESS NOTES
Hospitalist Progress Note      Patient:  Barb Gabriel    Unit/Bed:6K-28/028-A  YOB: 1945  MRN: [id number]   Acct: [de-identified]   PCP: Olinda Osgood, MD  Date of Admission: 9/25/2023    Assessment/Plan:    Acute hypoxic respiratory failure, likely secondary to acute asthma exacerbation:  Acute on chronic dyspnea  Less likely volume overload, discussed case with Nephrology who is managing diuretics. Without improvement Pulm consulted, VQ with less likely probability for PE. Steroid taper per Pulm (plan reduce by 10 mg every 3 days)  Discussed with Pulm - monitor for improvement at minium until Monday     MADI on CKD stage IV:  Cr noted at 2.2 on admission, was previously 1.9 on 9/1/23  Likely some prerenal component with reduced p.o. intake while on Bumex  Hold Bumex for now, avoid nephrotoxic agents  Nephrology consultation  Renal US showed some urinary retention, repeat bladder scan 10/1  Urine electrolytes    10/1: Neph with Bumex management, hold as slight bump in Cr 2.6. Elec abnormalities:  Hyponatremia, mild: Monitor with daily BMP, sodium 133  Hyperkalemia: K 5.6 - Lokelma per Neph, repeat this evening  Near-syncope: following going to the bathroom with paleness and diaphoresis, likely vagal episode. Hypotension present. Ortho neg. VQ with unlikely PE. Acute HFrEF exacerbation, improved: Noted conversational dyspnea and pitting edema in bilateral lower extremities, most recent echo 9/19/2023 with EF 50 to 55%. CXR showing pulmonary vascular congestion vs pneumonia, (low suspicion as afebrile, no chills/productive cough). Cardiology consultation. Elevated troponin: Likely secondary to CKD history, high sensitive troponin 1171 x2. EKG without ischemic changes. Cardio consult -> no plan for intervention. IDDM II: Patient takes 33 Lantus in the morning, 15 units nightly. SSI. Hypoglycemia protocol.

## 2023-10-02 PROBLEM — R93.89 ABNORMAL CT OF THE CHEST: Status: ACTIVE | Noted: 2023-10-02

## 2023-10-02 LAB
ANION GAP SERPL CALC-SCNC: 13 MEQ/L (ref 8–16)
BACTERIA URNS QL MICRO: ABNORMAL /HPF
BILIRUB UR QL STRIP.AUTO: NEGATIVE
BUN SERPL-MCNC: 94 MG/DL (ref 7–22)
CALCIUM SERPL-MCNC: 8.9 MG/DL (ref 8.5–10.5)
CASTS #/AREA URNS LPF: ABNORMAL /LPF
CASTS 2: ABNORMAL /LPF
CHARACTER UR: ABNORMAL
CHLORIDE SERPL-SCNC: 96 MEQ/L (ref 98–111)
CO2 SERPL-SCNC: 22 MEQ/L (ref 23–33)
COLOR: ABNORMAL
CREAT SERPL-MCNC: 3 MG/DL (ref 0.4–1.2)
CRYSTALS URNS MICRO: ABNORMAL
DEPRECATED RDW RBC AUTO: 52.9 FL (ref 35–45)
EKG ATRIAL RATE: 67 BPM
EKG P AXIS: 84 DEGREES
EKG P-R INTERVAL: 246 MS
EKG Q-T INTERVAL: 472 MS
EKG QRS DURATION: 142 MS
EKG QTC CALCULATION (BAZETT): 498 MS
EKG R AXIS: -107 DEGREES
EKG T AXIS: 72 DEGREES
EKG VENTRICULAR RATE: 67 BPM
EPITHELIAL CELLS, UA: ABNORMAL /HPF
ERYTHROCYTE [DISTWIDTH] IN BLOOD BY AUTOMATED COUNT: 13.9 % (ref 11.5–14.5)
GFR SERPL CREATININE-BSD FRML MDRD: 21 ML/MIN/1.73M2
GLUCOSE SERPL-MCNC: 160 MG/DL (ref 70–108)
GLUCOSE UR QL STRIP.AUTO: NEGATIVE MG/DL
HCT VFR BLD AUTO: 29.7 % (ref 42–52)
HGB BLD-MCNC: 9.4 GM/DL (ref 14–18)
HGB UR QL STRIP.AUTO: ABNORMAL
KETONES UR QL STRIP.AUTO: ABNORMAL
MCH RBC QN AUTO: 33.1 PG (ref 26–33)
MCHC RBC AUTO-ENTMCNC: 31.6 GM/DL (ref 32.2–35.5)
MCV RBC AUTO: 104.6 FL (ref 80–94)
MISCELLANEOUS 2: ABNORMAL
NITRITE UR QL STRIP: NEGATIVE
PH UR STRIP.AUTO: 5 [PH] (ref 5–9)
PLATELET # BLD AUTO: 271 THOU/MM3 (ref 130–400)
PMV BLD AUTO: 9.7 FL (ref 9.4–12.4)
POTASSIUM SERPL-SCNC: 4.8 MEQ/L (ref 3.5–5.2)
PROT UR STRIP.AUTO-MCNC: 100 MG/DL
RBC # BLD AUTO: 2.84 MILL/MM3 (ref 4.7–6.1)
RBC URINE: > 100 /HPF
RENAL EPI CELLS #/AREA URNS HPF: ABNORMAL /[HPF]
SODIUM SERPL-SCNC: 131 MEQ/L (ref 135–145)
SP GR UR REFRACT.AUTO: 1.02 (ref 1–1.03)
UROBILINOGEN, URINE: 1 EU/DL (ref 0–1)
WBC # BLD AUTO: 13.5 THOU/MM3 (ref 4.8–10.8)
WBC #/AREA URNS HPF: ABNORMAL /HPF
WBC #/AREA URNS HPF: ABNORMAL /[HPF]
YEAST LIKE FUNGI URNS QL MICRO: ABNORMAL

## 2023-10-02 PROCEDURE — 80048 BASIC METABOLIC PNL TOTAL CA: CPT

## 2023-10-02 PROCEDURE — 1200000000 HC SEMI PRIVATE

## 2023-10-02 PROCEDURE — 6370000000 HC RX 637 (ALT 250 FOR IP): Performed by: PHYSICIAN ASSISTANT

## 2023-10-02 PROCEDURE — 94669 MECHANICAL CHEST WALL OSCILL: CPT

## 2023-10-02 PROCEDURE — 2580000003 HC RX 258

## 2023-10-02 PROCEDURE — 94760 N-INVAS EAR/PLS OXIMETRY 1: CPT

## 2023-10-02 PROCEDURE — 6370000000 HC RX 637 (ALT 250 FOR IP): Performed by: NURSE PRACTITIONER

## 2023-10-02 PROCEDURE — 97110 THERAPEUTIC EXERCISES: CPT

## 2023-10-02 PROCEDURE — 36415 COLL VENOUS BLD VENIPUNCTURE: CPT

## 2023-10-02 PROCEDURE — 99233 SBSQ HOSP IP/OBS HIGH 50: CPT | Performed by: PHYSICIAN ASSISTANT

## 2023-10-02 PROCEDURE — 6360000002 HC RX W HCPCS

## 2023-10-02 PROCEDURE — 85027 COMPLETE CBC AUTOMATED: CPT

## 2023-10-02 PROCEDURE — 94640 AIRWAY INHALATION TREATMENT: CPT

## 2023-10-02 PROCEDURE — 99233 SBSQ HOSP IP/OBS HIGH 50: CPT | Performed by: INTERNAL MEDICINE

## 2023-10-02 PROCEDURE — 97116 GAIT TRAINING THERAPY: CPT

## 2023-10-02 PROCEDURE — 81001 URINALYSIS AUTO W/SCOPE: CPT

## 2023-10-02 PROCEDURE — 99232 SBSQ HOSP IP/OBS MODERATE 35: CPT | Performed by: INTERNAL MEDICINE

## 2023-10-02 PROCEDURE — 6370000000 HC RX 637 (ALT 250 FOR IP)

## 2023-10-02 RX ORDER — PREDNISONE 10 MG/1
10 TABLET ORAL DAILY
Status: DISCONTINUED | OUTPATIENT
Start: 2023-10-09 | End: 2023-10-06 | Stop reason: HOSPADM

## 2023-10-02 RX ORDER — FLUTICASONE PROPIONATE 50 MCG
2 SPRAY, SUSPENSION (ML) NASAL DAILY
Status: DISCONTINUED | OUTPATIENT
Start: 2023-10-02 | End: 2023-10-06 | Stop reason: HOSPADM

## 2023-10-02 RX ORDER — PREDNISONE 20 MG/1
20 TABLET ORAL DAILY
Status: DISCONTINUED | OUTPATIENT
Start: 2023-10-06 | End: 2023-10-06 | Stop reason: HOSPADM

## 2023-10-02 RX ORDER — SENNOSIDES A AND B 8.6 MG/1
1 TABLET, FILM COATED ORAL NIGHTLY
Status: DISCONTINUED | OUTPATIENT
Start: 2023-10-02 | End: 2023-10-06 | Stop reason: HOSPADM

## 2023-10-02 RX ADMIN — GUAIFENESIN AND DEXTROMETHORPHAN 5 ML: 100; 10 SYRUP ORAL at 09:36

## 2023-10-02 RX ADMIN — RANOLAZINE 1000 MG: 500 TABLET, EXTENDED RELEASE ORAL at 09:37

## 2023-10-02 RX ADMIN — TAMSULOSIN HYDROCHLORIDE 0.4 MG: 0.4 CAPSULE ORAL at 09:38

## 2023-10-02 RX ADMIN — SODIUM CHLORIDE, PRESERVATIVE FREE 10 ML: 5 INJECTION INTRAVENOUS at 20:45

## 2023-10-02 RX ADMIN — SODIUM CHLORIDE, PRESERVATIVE FREE 10 ML: 5 INJECTION INTRAVENOUS at 09:36

## 2023-10-02 RX ADMIN — DILTIAZEM HYDROCHLORIDE 120 MG: 120 CAPSULE, COATED, EXTENDED RELEASE ORAL at 09:38

## 2023-10-02 RX ADMIN — SENNOSIDES 8.6 MG: 8.6 TABLET, FILM COATED ORAL at 20:44

## 2023-10-02 RX ADMIN — FINASTERIDE 5 MG: 5 TABLET, FILM COATED ORAL at 09:37

## 2023-10-02 RX ADMIN — INSULIN LISPRO 4 UNITS: 100 INJECTION, SOLUTION INTRAVENOUS; SUBCUTANEOUS at 17:47

## 2023-10-02 RX ADMIN — RANOLAZINE 1000 MG: 500 TABLET, EXTENDED RELEASE ORAL at 20:44

## 2023-10-02 RX ADMIN — INSULIN GLARGINE 20 UNITS: 100 INJECTION, SOLUTION SUBCUTANEOUS at 20:44

## 2023-10-02 RX ADMIN — METOPROLOL SUCCINATE 50 MG: 50 TABLET, EXTENDED RELEASE ORAL at 09:37

## 2023-10-02 RX ADMIN — ATORVASTATIN CALCIUM 40 MG: 40 TABLET, FILM COATED ORAL at 20:44

## 2023-10-02 RX ADMIN — IPRATROPIUM BROMIDE AND ALBUTEROL SULFATE 1 DOSE: .5; 3 SOLUTION RESPIRATORY (INHALATION) at 20:41

## 2023-10-02 RX ADMIN — TAMSULOSIN HYDROCHLORIDE 0.4 MG: 0.4 CAPSULE ORAL at 20:44

## 2023-10-02 RX ADMIN — ASPIRIN 81 MG: 81 TABLET, COATED ORAL at 09:36

## 2023-10-02 RX ADMIN — IPRATROPIUM BROMIDE AND ALBUTEROL SULFATE 1 DOSE: .5; 3 SOLUTION RESPIRATORY (INHALATION) at 12:17

## 2023-10-02 RX ADMIN — ISOSORBIDE MONONITRATE 120 MG: 60 TABLET, EXTENDED RELEASE ORAL at 09:37

## 2023-10-02 RX ADMIN — GUAIFENESIN AND DEXTROMETHORPHAN 5 ML: 100; 10 SYRUP ORAL at 21:20

## 2023-10-02 RX ADMIN — CLOPIDOGREL BISULFATE 75 MG: 75 TABLET ORAL at 09:38

## 2023-10-02 RX ADMIN — IPRATROPIUM BROMIDE AND ALBUTEROL SULFATE 1 DOSE: .5; 3 SOLUTION RESPIRATORY (INHALATION) at 16:20

## 2023-10-02 RX ADMIN — IPRATROPIUM BROMIDE AND ALBUTEROL SULFATE 1 DOSE: .5; 3 SOLUTION RESPIRATORY (INHALATION) at 08:30

## 2023-10-02 RX ADMIN — PREDNISONE 40 MG: 20 TABLET ORAL at 09:36

## 2023-10-02 RX ADMIN — ENOXAPARIN SODIUM 30 MG: 100 INJECTION SUBCUTANEOUS at 20:45

## 2023-10-02 RX ADMIN — DOCUSATE SODIUM 100 MG: 100 CAPSULE, LIQUID FILLED ORAL at 09:37

## 2023-10-02 RX ADMIN — INSULIN GLARGINE 33 UNITS: 100 INJECTION, SOLUTION SUBCUTANEOUS at 08:04

## 2023-10-02 ASSESSMENT — PAIN SCALES - GENERAL
PAINLEVEL_OUTOF10: 0

## 2023-10-02 NOTE — PROGRESS NOTES
Hospitalist Progress Note      Patient:  Ban Ramirez    Unit/Bed:6K-28/028-A  YOB: 1945  MRN: [id number]   Acct: [de-identified]   PCP: Gabby Sebastian MD  Date of Admission: 9/25/2023    Assessment/Plan:    Acute hypoxic respiratory failure (resolved), likely secondary to acute asthma exacerbation:  Acute on chronic dyspnea  Less likely volume overload, discussed case with Nephrology who is managing diuretics. Without improvement Pulm consulted - completed VQ with less likely probability for PE. Steroid taper per Pulm (plan reduce by 10 mg every 3 days)  Discussed with Pulm 10/2 - cleared for discharge from [facility] perspective. Schedule for Pulm clinic follow-up in 3 months for follow-up asthma exacerbation      MADI on CKD stage IV, worsening  Cr noted at 2.2 on admission, was previously 1.9 on 9/1/23. Likely some prerenal component with reduced p.o. intake while on Bumex  Hold Bumex for now, avoid nephrotoxic agents  Nephrology consultation  Renal US showed some urinary retention, repeat bladder scan 10/1  Urine electrolytes    10/2: Neph holding Bumex, Cr 3.0 today - obstructive process likely contributing with acute urinary retention    Acute urinary retention: known hx BPH with retention issues. Over 800 mL in bladder. Placed lira catheter, follows with Urology. Plan to keep lira catheter in place and have outpatient f/u with VT in office in 1-2 weeks. Continue on Flomax  Elec abnormalities:  Hyponatremia, mild: Monitor with daily BMP, sodium 131  Hyperkalemia: resolved following Lokelma  Near-syncope: following going to the bathroom with paleness and diaphoresis, likely vagal episode. Hypotension present. Ortho neg. VQ with unlikely PE. Acute HFrEF exacerbation, improved: Noted conversational dyspnea and pitting edema in bilateral lower extremities, most recent echo 9/19/2023 with EF 50 to 55%.  CXR showing pulmonary COMPARISON: Chest radiograph 11/11/2022 TECHNIQUE: PA and lateral views of the chest performed. FINDINGS: Increased interstitial pulmonary markings are seen bilaterally with increased groundglass opacification of both lungs. Median sternotomy has been performed. Cardiac silhouette is mildly enlarged. No pleural effusion. No pneumothorax. No acute bony abnormality. DISH of the thoracic spine. 1. Increased interstitial pulmonary markings are seen bilaterally with increased groundglass opacification of both lungs. Findings relate to development of pulmonary edema. Multilobar pneumonia is not excluded. **This report has been created using voice recognition software. It may contain minor errors which are inherent in voice recognition technology. ** Final report electronically signed by Dr Levy Leyden on 9/25/2023 12:05 PM      Electronically signed by Juan Butt PA-C on 10/2/2023 at 4:04 PM

## 2023-10-02 NOTE — PLAN OF CARE
Problem: Discharge Planning  Goal: Discharge to home or other facility with appropriate resources  Outcome: Progressing     Problem: Safety - Adult  Goal: Free from fall injury  Outcome: Progressing     Problem: ABCDS Injury Assessment  Goal: Absence of physical injury  Outcome: Progressing     Problem: Cardiovascular - Adult  Goal: Maintains optimal cardiac output and hemodynamic stability  Outcome: Progressing  Goal: Absence of cardiac dysrhythmias or at baseline  Outcome: Progressing     Problem: Skin/Tissue Integrity - Adult  Goal: Skin integrity remains intact  Outcome: Progressing  Flowsheets (Taken 10/1/2023 2056)  Skin Integrity Remains Intact: Monitor for areas of redness and/or skin breakdown  Goal: Incisions, wounds, or drain sites healing without S/S of infection  Outcome: Progressing  Goal: Oral mucous membranes remain intact  Outcome: Progressing     Problem: Metabolic/Fluid and Electrolytes - Adult  Goal: Electrolytes maintained within normal limits  Outcome: Progressing  Goal: Hemodynamic stability and optimal renal function maintained  Outcome: Progressing     Problem: Chronic Conditions and Co-morbidities  Goal: Patient's chronic conditions and co-morbidity symptoms are monitored and maintained or improved  Outcome: Progressing     Problem: Pain  Goal: Verbalizes/displays adequate comfort level or baseline comfort level  Outcome: Progressing  Flowsheets  Taken 10/2/2023 0038  Verbalizes/displays adequate comfort level or baseline comfort level: Encourage patient to monitor pain and request assistance  Taken 10/1/2023 2056  Verbalizes/displays adequate comfort level or baseline comfort level: Encourage patient to monitor pain and request assistance Implemented All Fall with Harm Risk Interventions:  [place] to call system. Call bell, personal items and telephone within reach. Instruct patient to call for assistance. Room bathroom lighting operational. Non-slip footwear when patient is off stretcher. Physically safe environment: no spills, clutter or unnecessary equipment. Stretcher in lowest position, wheels locked, appropriate side rails in place. Provide visual cue, wrist band, yellow gown, etc. Monitor gait and stability. Monitor for mental status changes and reorient to person, place, and time. Review medications for side effects contributing to fall risk. Reinforce activity limits and safety measures with patient and family. Provide visual clues: red socks.

## 2023-10-02 NOTE — PROGRESS NOTES
[facility]  INPATIENT PHYSICAL THERAPY  DAILY NOTE  [facility] RENAL TELEMETRY 6K - 6K-28/028-A     Time In: 9084  Time Out: 9180  Timed Code Treatment Minutes: 39 Minutes  Minutes: 36          Date: 10/2/2023  Patient Name: Darya Rangel,  Gender:  male        MRN: [id number]  : 1945  (66 y.o.)     Referring Practitioner: Precious Floyd PA-C  Diagnosis: Acute renal failure superimposed on CKD  Additional Pertinent Hx: Per H&P, \"Wilfrid MICHELLE Crystal Lott is a 66 y.o. male with PMHx of CAD, anemia, DM,  who presented to [facility] with chief complaint of fatigue, poor intake and weakness. Patient reports he has been feeling poorly for the last 3-4 days with excessive fatigue and weakness. Reports some nausea and states he has not been eating or drinking well. Patient's wife reports she thinks he may have a UTI. He is having difficulty controlling his flow and had an episode of incontinence 2 days ago. Reports chronic shortness of breath. States he has been weak for a while, but seems its worse than normal. Does admit to not taking his diuretic yesterday, but did take it today. Endorses an episode of chest pain several days ago, but it resolved quickly. Denies lightheadedness/dizziness, chest pain, worsening shortness of breath, abdominal pain, vomiting, and leg swelling. \"     Prior Level of Function:  Lives With: Spouse  Type of Home: House  Home Layout: Two level, Performs ADL's on one level, Able to Live on Main level with bedroom/bathroom  Home Access: Stairs to enter with rails  Entrance Stairs - Number of Steps: 2 ALETA  Entrance Stairs - Rails: Both  Home Equipment: Peyman Orts, standard   Bathroom Shower/Tub: Walk-in shower  Bathroom Toilet: Handicap height  Bathroom Equipment: Grab bars in shower, Grab bars around toilet  Bathroom Accessibility: Accessible    ADL Assistance: Needs assistance (Spouse assists with socks.  Pt reports Indep with toileting and showering.)  Homemaking Assistance: Needs Completed  AM-PAC Inpatient Mobility Raw Score : 18  AM-PAC Inpatient T-Scale Score : 43.63    ASSESSMENT:  Assessment: Patient progressing toward established goals. and needs to have TIMMY hose on whenever getting up (newly ordered during this session due to hypotension). Pt is generally moving well when initially getting up. Safety concern for tolerating being up >4-5 mins due to delayed hypotension. Activity Tolerance:  Patient tolerance of  treatment: good. Except with the hypotensive episode. Equipment Recommendations: Other: monitor for needs  Discharge Recommendations: Continue to assess pending progress, 24 hour assistance or supervision, and Patient would benefit from continued PT at discharge  Plan: Current Treatment Recommendations: Strengthening, Balance training, Functional mobility training, Transfer training, Gait training, Stair training, Patient/Caregiver education & training, Endurance training, Safety education & training, Home exercise program, Therapeutic activities  General Plan:  (5x GM)    Patient Education  Patient Education: Plan of Care, Home Exercise Program    Goals:  Patient Goals : go home  Short Term Goals  Time Frame for Short Term Goals: at discharge  Short Term Goal 1: Pt to be Mod I for supine <> sit to get in/out of bed  Short Term Goal 2: Pt to be Mod I for sit <> stand to get up to ambulate  Short Term Goal 3: Pt to ambulate >90 ft with no AD with Mod I/I for household distances  Short Term Goal 4: Pt to negotiate 2 steps with 1-2 rails with Supervision for home access  Long Term Goals  Time Frame for Long Term Goals : not set due to short ELOS    Following session, patient left in safe position with all fall risk precautions in place. Owen Nguyen.  Tamar Rice

## 2023-10-02 NOTE — PROGRESS NOTES
[facility]      Patient - Sal Reilly   MRN -  [id number]   [facility]   - 1945      Date of Admission -  2023 10:45 AM  Date of evaluation -  10/2/2023  Room - [place], [place] Primary Care Physician - Walter Purvis MD     Problem List      Active Hospital Problems    Diagnosis Date Noted    Acute on chronic congestive heart failure ([address]) [I50.9] 2023     Priority: High    Acute respiratory failure with hypoxia ([address]) [J96.01] 2023     Priority: Medium    Acute pulmonary edema ([address]) [J81.0] 2023     Priority: Medium    Mild intermittent asthma with exacerbation [J45.21] 2023     Priority: Medium    RICCARDO treated with BiPAP [G47.33] 10/08/2015     Priority: Low    Elevated d-dimer [R79.89] 2023    Acute kidney injury superimposed on CKD ([address]) [N17.9, N18.9] 2021    MADI (acute kidney injury) ([address]) [N17.9] 2018     Reason for Consult    Acute hypoxic respiratory failure  HPI   History Obtained From: Patient patient's wife and electronic medical record. Sal Reilly is a 66 y.o. male  PMH CAD s/p CABG x 3 stents, anemia, CKD, mild intermittent asthma stopped treatment, DM, RICCARDO on PAP therapy, previous GI bleed since then has been off eliquis. Presented to [facility] with chief complaint of fatigue, poor intake and weakness. He began to have some issues with coordination urinating and incontinence. He went to his Urologist Dr. Lyn Farley and was instructed to come to ER for further evaluation. ER evaluation reviewed. EKG sinus rhythm with 1st degree AV block. Troponin elevated cardiology was consulted. CXR showed patchy hazy opacity bilaterally. Patient was afebrile no leukocytosis and Procal was negative pneumonia was deemed unlikely to be contributor.  Patient was started on diuretics and nephrology was consulted for further management of volume overload -RICCARDO on BIPAP 18/15 follows with Oscar Albright PA-C    Plan   -Monitor SpO2 wean supplemental O2 to maintain SpO2 >90%  -Order that to use home BiPAP overnight and with daytime naps on current settings   -Prednisone 40 mg p.o. daily tapering doses will reduce by 10 mg every 3 days  -Send pneumonia panel PCR if cough becomes productive  -Duonebs Q 4 hrs WA, will have patient's start ICS at time of discharge and resume as needed Aysha  -Robitussin-DM 5 mL every 4 hours as needed cough  -flonase for PND  -Nephrology following MADI on CKD  -Daily standing weights   -Monitor I/O to track diuresis   -DVT prophylaxis on TIMMY stockings  -Stable from pulmonary standpoint pulmonary service will sign off   -Reschedule sleep clinic appointment in 1 months to review download   -Schedule for Pulm clinic follow-up in 3 months follow-up asthma exacerbation      Questions and concerns addressed. Electronically signed by   KAVON Merrill CNP on 10/2/2023 at 2:13 PM     Addendum by Dr. Kinza Ruffin MD:  Patient seen by me independently including key components of medical care. Face to face evaluation and examination was performed. Case discussed with Mr. Greer Bhandari CNP. Agree with Certified nurse practitioner's findings and plan as documented in the Certified nurse practitioner's note. Italicized font, if present,  represents changes to the note made by me. More than 50% of the encounter time involved with patient care by the Pulmonary & Critical care service team spent by me. Please see my modifications mentioned below: We will do a chest x-ray PA and lateral views in a.m. We will arrange for follow-up with Ms. Amalia Lieberman CNP in 1 month with a CT scan of chest without IV contrast to follow the subcentimeter lung nodules noted on his previous CT scan of chest performed in November 2022    Electronically signed by   Jacqueline Singh MD on 10/2/2023 at 9:19 PM

## 2023-10-02 NOTE — FLOWSHEET NOTE
10/02/23 1326   Safe Environment   Safety Measures Other (comment)  (VN safety round)     VN called into patients room and introduced myself and role. Patient answered and permitted video. Video activated. . Patient up in chair. Patient voiced no needs or concerns at this time. Call light within reach.

## 2023-10-02 NOTE — CARE COORDINATION
10/2/23, 8:39 AM EDT    DISCHARGE ON GOING EVALUATION    Maude Del Valle day: 3  Location: [facility]/028-A Reason for admit: Acute kidney injury superimposed on CKD ([address]) [N17.9, N18.9]  Acute renal failure superimposed on chronic kidney disease, unspecified CKD stage, unspecified acute renal failure type [N17.9, N18.9]  Acute on chronic congestive heart failure, unspecified heart failure type ([address]) [I50.9]  Acute on chronic anemia [D64.9]  MADI (acute kidney injury) ([address]) [N17.9]   Procedure:   9/25 CXR: Increased interstitial pulmonary markings are seen bilaterally with increased groundglass opacification of both lungs. Findings relate to development of pulmonary edema. Multilobar pneumonia is not excluded  9/26 US Renal: Increased renal cortical echogenicity consistent with chronic kidney disease. An acute superimposed component can not be excluded. No renal atrophy or hydronephrosis. Significant 150 mL postvoid urinary bladder residual.    9/29 CXR 1. No interval change since previous study dated 9/25/2023. .     9/29 NM Lung Vent/Perfusion Pulmonary embolism absent (very low probability lung scan). Barriers to Discharge: Creatinine 2.6, K+ 5.6, PT/OT, Pulmonology, Nephrology following, Albuterol nebs, Asa, Lipitor, Plavix, Cardizem CD, Benadryl, Colace, Lovenox, Proscar, diabetes management, Atarax, Duonebs, Imdur, Toprol XL, Zofran, Prednisone, Ranexa, Flomax. PCP: Shannan Whitfield MD  Readmission Risk Score: 18.9%  Patient Goals/Plan/Treatment Preferences: Plans home with wife, has DME, denies needs. Will follow.

## 2023-10-03 ENCOUNTER — APPOINTMENT (OUTPATIENT)
Dept: GENERAL RADIOLOGY | Age: 78
DRG: 202 | End: 2023-10-03
Payer: MEDICARE

## 2023-10-03 LAB
ANION GAP SERPL CALC-SCNC: 14 MEQ/L (ref 8–16)
BACTERIA UR CULT: NORMAL
BUN SERPL-MCNC: 97 MG/DL (ref 7–22)
CALCIUM SERPL-MCNC: 8.6 MG/DL (ref 8.5–10.5)
CHLORIDE SERPL-SCNC: 95 MEQ/L (ref 98–111)
CO2 SERPL-SCNC: 21 MEQ/L (ref 23–33)
CREAT SERPL-MCNC: 2.9 MG/DL (ref 0.4–1.2)
GFR SERPL CREATININE-BSD FRML MDRD: 21 ML/MIN/1.73M2
GLUCOSE SERPL-MCNC: 198 MG/DL (ref 70–108)
POTASSIUM SERPL-SCNC: 5.1 MEQ/L (ref 3.5–5.2)
SODIUM SERPL-SCNC: 130 MEQ/L (ref 135–145)

## 2023-10-03 PROCEDURE — 6370000000 HC RX 637 (ALT 250 FOR IP): Performed by: PHYSICIAN ASSISTANT

## 2023-10-03 PROCEDURE — 1200000000 HC SEMI PRIVATE

## 2023-10-03 PROCEDURE — 6370000000 HC RX 637 (ALT 250 FOR IP)

## 2023-10-03 PROCEDURE — 2580000003 HC RX 258: Performed by: INTERNAL MEDICINE

## 2023-10-03 PROCEDURE — 97110 THERAPEUTIC EXERCISES: CPT

## 2023-10-03 PROCEDURE — 97535 SELF CARE MNGMENT TRAINING: CPT

## 2023-10-03 PROCEDURE — 2580000003 HC RX 258

## 2023-10-03 PROCEDURE — 6360000002 HC RX W HCPCS

## 2023-10-03 PROCEDURE — 6370000000 HC RX 637 (ALT 250 FOR IP): Performed by: NURSE PRACTITIONER

## 2023-10-03 PROCEDURE — 36415 COLL VENOUS BLD VENIPUNCTURE: CPT

## 2023-10-03 PROCEDURE — 97530 THERAPEUTIC ACTIVITIES: CPT

## 2023-10-03 PROCEDURE — 71046 X-RAY EXAM CHEST 2 VIEWS: CPT

## 2023-10-03 PROCEDURE — 94640 AIRWAY INHALATION TREATMENT: CPT

## 2023-10-03 PROCEDURE — 97116 GAIT TRAINING THERAPY: CPT

## 2023-10-03 PROCEDURE — 80048 BASIC METABOLIC PNL TOTAL CA: CPT

## 2023-10-03 PROCEDURE — 99232 SBSQ HOSP IP/OBS MODERATE 35: CPT | Performed by: INTERNAL MEDICINE

## 2023-10-03 RX ORDER — SODIUM CHLORIDE 9 MG/ML
INJECTION, SOLUTION INTRAVENOUS CONTINUOUS
Status: ACTIVE | OUTPATIENT
Start: 2023-10-03 | End: 2023-10-04

## 2023-10-03 RX ORDER — BISACODYL 10 MG
10 SUPPOSITORY, RECTAL RECTAL DAILY PRN
Status: DISCONTINUED | OUTPATIENT
Start: 2023-10-03 | End: 2023-10-06 | Stop reason: HOSPADM

## 2023-10-03 RX ADMIN — IPRATROPIUM BROMIDE AND ALBUTEROL SULFATE 1 DOSE: .5; 3 SOLUTION RESPIRATORY (INHALATION) at 07:33

## 2023-10-03 RX ADMIN — DIPHENHYDRAMINE HYDROCHLORIDE 25 MG: 25 TABLET ORAL at 23:26

## 2023-10-03 RX ADMIN — ACETAMINOPHEN 650 MG: 325 TABLET ORAL at 23:26

## 2023-10-03 RX ADMIN — DILTIAZEM HYDROCHLORIDE 120 MG: 120 CAPSULE, COATED, EXTENDED RELEASE ORAL at 09:30

## 2023-10-03 RX ADMIN — RANOLAZINE 1000 MG: 500 TABLET, EXTENDED RELEASE ORAL at 09:31

## 2023-10-03 RX ADMIN — SODIUM CHLORIDE, PRESERVATIVE FREE 10 ML: 5 INJECTION INTRAVENOUS at 09:31

## 2023-10-03 RX ADMIN — GUAIFENESIN AND DEXTROMETHORPHAN 5 ML: 100; 10 SYRUP ORAL at 19:57

## 2023-10-03 RX ADMIN — IPRATROPIUM BROMIDE AND ALBUTEROL SULFATE 1 DOSE: .5; 3 SOLUTION RESPIRATORY (INHALATION) at 15:09

## 2023-10-03 RX ADMIN — IPRATROPIUM BROMIDE AND ALBUTEROL SULFATE 1 DOSE: .5; 3 SOLUTION RESPIRATORY (INHALATION) at 11:39

## 2023-10-03 RX ADMIN — SENNOSIDES 8.6 MG: 8.6 TABLET, FILM COATED ORAL at 19:57

## 2023-10-03 RX ADMIN — PREDNISONE 30 MG: 20 TABLET ORAL at 09:30

## 2023-10-03 RX ADMIN — METOPROLOL SUCCINATE 50 MG: 50 TABLET, EXTENDED RELEASE ORAL at 09:31

## 2023-10-03 RX ADMIN — TAMSULOSIN HYDROCHLORIDE 0.4 MG: 0.4 CAPSULE ORAL at 09:31

## 2023-10-03 RX ADMIN — FLUTICASONE PROPIONATE 2 SPRAY: 50 SPRAY, METERED NASAL at 09:29

## 2023-10-03 RX ADMIN — CLOPIDOGREL BISULFATE 75 MG: 75 TABLET ORAL at 09:30

## 2023-10-03 RX ADMIN — DIPHENHYDRAMINE HYDROCHLORIDE 25 MG: 25 TABLET ORAL at 01:01

## 2023-10-03 RX ADMIN — FINASTERIDE 5 MG: 5 TABLET, FILM COATED ORAL at 09:31

## 2023-10-03 RX ADMIN — DOCUSATE SODIUM 100 MG: 100 CAPSULE, LIQUID FILLED ORAL at 09:30

## 2023-10-03 RX ADMIN — ISOSORBIDE MONONITRATE 120 MG: 60 TABLET, EXTENDED RELEASE ORAL at 09:30

## 2023-10-03 RX ADMIN — ENOXAPARIN SODIUM 30 MG: 100 INJECTION SUBCUTANEOUS at 20:01

## 2023-10-03 RX ADMIN — ASPIRIN 81 MG: 81 TABLET, COATED ORAL at 09:30

## 2023-10-03 RX ADMIN — INSULIN GLARGINE 33 UNITS: 100 INJECTION, SOLUTION SUBCUTANEOUS at 09:29

## 2023-10-03 RX ADMIN — RANOLAZINE 1000 MG: 500 TABLET, EXTENDED RELEASE ORAL at 20:01

## 2023-10-03 RX ADMIN — TAMSULOSIN HYDROCHLORIDE 0.4 MG: 0.4 CAPSULE ORAL at 20:01

## 2023-10-03 RX ADMIN — INSULIN LISPRO 4 UNITS: 100 INJECTION, SOLUTION INTRAVENOUS; SUBCUTANEOUS at 17:33

## 2023-10-03 RX ADMIN — ACETAMINOPHEN 650 MG: 325 TABLET ORAL at 01:01

## 2023-10-03 RX ADMIN — INSULIN GLARGINE 20 UNITS: 100 INJECTION, SOLUTION SUBCUTANEOUS at 19:57

## 2023-10-03 RX ADMIN — BISACODYL 10 MG: 10 SUPPOSITORY RECTAL at 17:59

## 2023-10-03 RX ADMIN — ATORVASTATIN CALCIUM 40 MG: 40 TABLET, FILM COATED ORAL at 20:01

## 2023-10-03 RX ADMIN — SODIUM CHLORIDE: 9 INJECTION, SOLUTION INTRAVENOUS at 13:36

## 2023-10-03 NOTE — PROGRESS NOTES
Hospitalist Progress Note      Patient:  Dianna Nguyen    Unit/Bed:6K-28/028-A  YOB: 1945  MRN: [id number]   Acct: [de-identified]   PCP: Yari Cohen MD  Date of Admission: 9/25/2023    Assessment/Plan:    Acute hypoxic respiratory failure (resolved), likely secondary to acute asthma exacerbation:  Acute on chronic dyspnea  Less likely volume overload, discussed case with Nephrology who is managing diuretics. Without improvement Pulm consulted - completed VQ with less likely probability for PE. Steroid taper per Pulm (plan reduce by 10 mg every 3 days)  Discussed with Pulm 10/2 - cleared for discharge from [facility] perspective. Schedule for Pulm clinic follow-up in 3 months for follow-up asthma exacerbation      MADI on CKD stage IV, worsening  Cr noted at 2.2 on admission, was previously 1.9 on 9/1/23. Likely some prerenal component with reduced p.o. intake while on Bumex  Hold Bumex for now, avoid nephrotoxic agents  Nephrology consultation  Renal US showed some urinary retention, repeat bladder scan 10/1  Urine electrolytes    10/3: Neph holding Bumex, Cr 2.9 today     Acute urinary retention: known hx BPH with retention issues. Over 800 mL in bladder. Placed lira catheter, follows with Urology. Plan to keep lira catheter in place and have outpatient f/u with VT in office in 1-2 weeks. Continue on Flomax  Elec abnormalities:  Hyponatremia, mild: Monitor with daily BMP, sodium 130  Hyperkalemia: renal dysfunction, resolved following Lokelma  Near-syncope: following going to the bathroom with paleness and diaphoresis, likely vagal episode. Hypotension present. Ortho neg. VQ with unlikely PE. Repeat episode 10/2. Pt denies any preceding dizziness/palpitations, no abnormalities on tele per RN.    Acute HFrEF exacerbation, improved: Noted conversational dyspnea and pitting edema in bilateral lower extremities, most recent echo urinary bladder residual.      This document has been electronically signed by: Lali John MD on    09/26/2023 08:27 PM      XR CHEST (2 VW)   Final Result   1. Increased interstitial pulmonary markings are seen bilaterally with increased groundglass opacification of both lungs. Findings relate to development of pulmonary edema. Multilobar pneumonia is not excluded. **This report has been created using voice recognition software. It may contain minor errors which are inherent in voice recognition technology. **      Final report electronically signed by Dr Alok Zaman on 9/25/2023 12:05 PM      CT CHEST [facility]    (Results Pending)     XR CHEST (2 VW)    Result Date: 9/25/2023  PROCEDURE: XR CHEST (2 VW) CLINICAL INFORMATION: Fatigue/history of CHF. Evaluate for consolidation/fluid overload. Thank you Tano Crum [id number] COMPARISON: Chest radiograph 11/11/2022 TECHNIQUE: PA and lateral views of the chest performed. FINDINGS: Increased interstitial pulmonary markings are seen bilaterally with increased groundglass opacification of both lungs. Median sternotomy has been performed. Cardiac silhouette is mildly enlarged. No pleural effusion. No pneumothorax. No acute bony abnormality. DISH of the thoracic spine. 1. Increased interstitial pulmonary markings are seen bilaterally with increased groundglass opacification of both lungs. Findings relate to development of pulmonary edema. Multilobar pneumonia is not excluded. **This report has been created using voice recognition software. It may contain minor errors which are inherent in voice recognition technology. ** Final report electronically signed by Dr Alok Zaman on 9/25/2023 12:05 PM      Electronically signed by Jenniffer Louis PA-C on 10/3/2023 at 2:46 PM

## 2023-10-03 NOTE — PROGRESS NOTES
[facility] RENAL TELEMETRY 6K  Occupational Therapy  Daily Note  Time:   Time In: 9407  Time Out: 2425  Timed Code Treatment Minutes: 48 Minutes  Minutes: 53          Date: 10/3/2023  Patient Name: Sarahi Corbett,   Gender: male      Room: [facility]/028-A  MRN: [id number]  : 1945  (66 y.o.)  Referring Practitioner: Chapito Rodriguez PA-C  Diagnosis: Acute Kidney Injury  Additional Pertinent Hx: Per H&P, \"Wilfrid MIGUEL ANGEL Amanda Alicea is a 66 y.o. male with PMHx of CAD, anemia, DM,  who presented to [facility] with chief complaint of fatigue, poor intake and weakness. Patient reports he has been feeling poorly for the last 3-4 days with excessive fatigue and weakness. Reports some nausea and states he has not been eating or drinking well. Patient's wife reports she thinks he may have a UTI. He is having difficulty controlling his flow and had an episode of incontinence 2 days ago. Reports chronic shortness of breath. States he has been weak for a while, but seems its worse than normal. Does admit to not taking his diuretic yesterday, but did take it today. Endorses an episode of chest pain several days ago, but it resolved quickly. Denies lightheadedness/dizziness, chest pain, worsening shortness of breath, abdominal pain, vomiting, and leg swelling. \"    Restrictions/Precautions:  Restrictions/Precautions: General Precautions, Fall Risk  Position Activity Restriction  Other position/activity restrictions: Monitor BP, TIMMY Hose whenever up. SUBJECTIVE: Nurse Jamaal lopez'd session, sitting at EOB upon arrival, agreeable to OT session, cooperative, Wife present    PAIN: 0/10:     Vitals: Oxygen: RA 95%    COGNITION: WNL, [place]    ADL:   Grooming: with set-up. Combed hair sitting in chair  Bathing: Minimal Assistance. CGA when standing to wash buttocks and dex area, A for B feet  Lower Extremity Dressing: CGA when standing to pull up shorts  Footwear Management: Minimal Assistance.   For B timmy hose and slipper

## 2023-10-03 NOTE — PLAN OF CARE
Problem: Discharge Planning  Goal: Discharge to home or other facility with appropriate resources  Outcome: Progressing  Flowsheets (Taken 10/3/2023 0225)  Discharge to home or other facility with appropriate resources:   Identify barriers to discharge with patient and caregiver   Arrange for needed discharge resources and transportation as appropriate   Identify discharge learning needs (meds, wound care, etc)   Arrange for interpreters to assist at discharge as needed   Refer to discharge planning if patient needs post-hospital services based on physician order or complex needs related to functional status, cognitive ability or social support system     Problem: Safety - Adult  Goal: Free from fall injury  Outcome: Progressing  Flowsheets (Taken 10/3/2023 0225)  Free From Fall Injury:   Instruct family/caregiver on patient safety   Based on caregiver fall risk screen, instruct family/caregiver to ask for assistance with transferring infant if caregiver noted to have fall risk factors     Problem: ABCDS Injury Assessment  Goal: Absence of physical injury  Outcome: Progressing  Flowsheets (Taken 10/3/2023 0225)  Absence of Physical Injury: Implement safety measures based on patient assessment     Problem: Cardiovascular - Adult  Goal: Maintains optimal cardiac output and hemodynamic stability  Outcome: Progressing  Flowsheets (Taken 10/3/2023 0225)  Maintains optimal cardiac output and hemodynamic stability:   Monitor blood pressure and heart rate   Monitor urine output and notify Licensed Independent Practitioner for values outside of normal range   Assess for signs of decreased cardiac output   Administer fluid and/or volume expanders as ordered   Administer vasoactive medications as ordered   For PPHN infants, administer sedation as ordered and minimize all controllable stressors.      Problem: Cardiovascular - Adult  Goal: Absence of cardiac dysrhythmias or at baseline  Outcome: Progressing  Flowsheets (Taken 10/3/2023 0225)  Absence of cardiac dysrhythmias or at baseline:   Monitor cardiac rate and rhythm   Assess for signs of decreased cardiac output   Administer antiarrhythmia medication and electrolyte replacement as ordered     Problem: Skin/Tissue Integrity - Adult  Goal: Skin integrity remains intact  Outcome: Progressing  Flowsheets  Taken 10/3/2023 0225  Skin Integrity Remains Intact:   Monitor for areas of redness and/or skin breakdown   Assess vascular access sites hourly   Every 4-6 hours minimum: Change oxygen saturation probe site   Every 4-6 hours: If on nasal continuous positive airway pressure, respiratory therapy assesses nares and determine need for appliance change or resting period  Taken 10/3/2023 0224  Skin Integrity Remains Intact:   Monitor for areas of redness and/or skin breakdown   Assess vascular access sites hourly     Problem: Skin/Tissue Integrity - Adult  Goal: Incisions, wounds, or drain sites healing without S/S of infection  Outcome: Progressing  Flowsheets (Taken 10/3/2023 0225)  Incisions, Wounds, or Drain Sites Healing Without Sign and Symptoms of Infection:   ADMISSION and DAILY: Assess and document risk factors for pressure ulcer development   TWICE DAILY: Assess and document skin integrity   TWICE DAILY: Assess and document dressing/incision, wound bed, drain sites and surrounding tissue   Implement wound care per orders     Problem: Skin/Tissue Integrity - Adult  Goal: Oral mucous membranes remain intact  Outcome: Progressing  Flowsheets (Taken 10/3/2023 0225)  Oral Mucous Membranes Remain Intact:   Assess oral mucosa and hygiene practices   Implement oral medicated treatments as ordered   Implement preventative oral hygiene regimen     Problem: Metabolic/Fluid and Electrolytes - Adult  Goal: Electrolytes maintained within normal limits  Outcome: Progressing  Flowsheets (Taken 10/3/2023 0225)  Electrolytes maintained within normal limits:   Monitor labs and assess patient for

## 2023-10-03 NOTE — CARE COORDINATION
10/3/23, 8:36 AM EDT    DISCHARGE ON GOING EVALUATION    Sherice Orr day: 4  Location: [facility]28/028-A Reason for admit: Acute kidney injury superimposed on CKD ([address]) [N17.9, N18.9]  Acute renal failure superimposed on chronic kidney disease, unspecified CKD stage, unspecified acute renal failure type [N17.9, N18.9]  Acute on chronic congestive heart failure, unspecified heart failure type ([address]) [I50.9]  Acute on chronic anemia [D64.9]  MADI (acute kidney injury) ([address]) [N17.9]   Procedure:   9/25 CXR: Increased interstitial pulmonary markings are seen bilaterally with increased groundglass opacification of both lungs. Findings relate to development of pulmonary edema. Multilobar pneumonia is not excluded  9/26 US Renal: Increased renal cortical echogenicity consistent with chronic kidney disease. An acute superimposed component can not be excluded. No renal atrophy or hydronephrosis. Significant 150 mL postvoid urinary bladder residual.     9/29 CXR 1. No interval change since previous study dated 9/25/2023. .      9/29 NM Lung Vent/Perfusion Pulmonary embolism absent (very low probability lung scan). 10/3 CXR 1. Small bilateral pleural effusions. 2. No pulmonary infiltrates. Barriers to Discharge: Urine (+), creatinine 2.9, PT/OT, Pulmonology, Nephrology following, Albuterol nebs, Asa, Lipitor, Plavix, Cardizem CD, Benadryl, Colace, Lovenox, Proscar, diabetes management, Atarax, Duonebs, Imdur, Toprol XL, Zofran, Prednisone, Ranexa, Flomax. PCP: Kate Kerr MD  Readmission Risk Score: 22.3%  Patient Goals/Plan/Treatment Preferences:  Plans home with wife, has DME, denies needs. Will follow.

## 2023-10-03 NOTE — PROGRESS NOTES
[facility]  INPATIENT PHYSICAL THERAPY  DAILY NOTE  STRZ RENAL TELEMETRY 6K - 6K-28/028-A     Time In: 9767  Time Out: 1200  Timed Code Treatment Minutes: 29 Minutes  Minutes: 29          Date: 10/3/2023  Patient Name: Darya Rangel,  Gender:  male        MRN: [id number]  : 1945  (66 y.o.)     Referring Practitioner: Precious Floyd PA-C  Diagnosis: Acute renal failure superimposed on CKD  Additional Pertinent Hx: Per H&P, \"Wilfrid MIGUEL ANGEL Lott is a 66 y.o. male with PMHx of CAD, anemia, DM,  who presented to [facility] with chief complaint of fatigue, poor intake and weakness. Patient reports he has been feeling poorly for the last 3-4 days with excessive fatigue and weakness. Reports some nausea and states he has not been eating or drinking well. Patient's wife reports she thinks he may have a UTI. He is having difficulty controlling his flow and had an episode of incontinence 2 days ago. Reports chronic shortness of breath. States he has been weak for a while, but seems its worse than normal. Does admit to not taking his diuretic yesterday, but did take it today. Endorses an episode of chest pain several days ago, but it resolved quickly. Denies lightheadedness/dizziness, chest pain, worsening shortness of breath, abdominal pain, vomiting, and leg swelling. \"     Prior Level of Function:  Lives With: Spouse  Type of Home: House  Home Layout: Two level, Performs ADL's on one level, Able to Live on Main level with bedroom/bathroom  Home Access: Stairs to enter with rails  Entrance Stairs - Number of Steps: 2 ALETA  Entrance Stairs - Rails: Both  Home Equipment: Peyman Orts, standard   Bathroom Shower/Tub: Walk-in shower  Bathroom Toilet: Handicap height  Bathroom Equipment: Grab bars in shower, Grab bars around toilet  Bathroom Accessibility: Accessible    ADL Assistance: Needs assistance (Spouse assists with socks.  Pt reports Indep with toileting and showering.)  Homemaking Assistance: Needs assistance (Spouse completes all cooking, cleaning, and laundry.)  Homemaking Responsibilities: No  Ambulation Assistance: Independent  Transfer Assistance: Independent  Active : Yes  Additional Comments: At baseline patient is on room air. Restrictions/Precautions:  Restrictions/Precautions: General Precautions, Fall Risk  Position Activity Restriction  Other position/activity restrictions: Monitor BP, TIMMY Hose whenever up. SUBJECTIVE: Pt resting in bed with spouse present. Both agree to therapy. PAIN: denies      Vitals: Blood Pressure: 114/69 after ambulation with no symptoms    OBJECTIVE:  Bed Mobility:  Supine to Sit: Supervision, with rail  Sit to Supine: Supervision     Transfers:  Sit to Stand: Stand By Assistance  Stand to Sit:Stand By Assistance    Ambulation:  Stand By Assistance, Andrew Resources Assistance  Distance: 35 ft  Surface: Level Tile  Device:No Device  Gait Deviations:  Decreased Step Length Bilaterally and Decreased Gait Speed    Balance:  Static Sitting Balance:  Independent  Dynamic Sitting Balance: Modified Independent  Static Standing Balance: Stand By Assistance  Dynamic Standing Balance: Stand By Assistance, Contact Guard Assistance    Exercise:  Patient was guided in 1 set(s) 10-12 reps of exercise to both lower extremities. Ankle pumps, Glut sets, Quad sets, Heelslides, Hip abduction/adduction, Seated marches, and Long arc quads. Exercises were completed for increased independence with functional mobility. Functional Outcome Measures: Completed  -PAC Inpatient Mobility Raw Score : 18  -PAC Inpatient T-Scale Score : 43.63    ASSESSMENT:  Assessment: Patient progressing toward established goals. Activity Tolerance:  Patient tolerance of  treatment: good. Equipment Recommendations: Other: monitor for needs  Discharge Recommendations: Continue to assess pending progress, Home with Assist as Needed, and Patient would benefit from continued PT at

## 2023-10-03 NOTE — PROGRESS NOTES
[address] COORDINATOR CONSULT    Referral Type: internal    Patient Name: Griffin Bolton      MRN: [id number]    : 1945  (74 y.o.)  Gender: male   Race:White (non-)     Payor Source: Payor: MEDICARE / Plan: MEDICARE PART A AND B / Product Type: *No Product type* /   Secondary Payor Source:  [employer]    Isolation Status: No active isolations    Lives With: Spouse  Type of Home: House  Home Layout: Two level, Performs ADL's on one level, Able to Live on Main level with bedroom/bathroom  Home Access: Stairs to enter with rails  Entrance Stairs - Number of Steps: 2 ALETA     Occupation: Retired  Additional Comments: At baseline patient is on room air. Disciplines Required upon Admission to Inpatient Rehabilitation: Physical Therapy and Occupational Therapy  Post operative: No  Fall: near syncope found documented in chart review 2023  Dialysis: No  Diet: ADULT DIET; Regular; 5 carb choices (75 gm/meal); Low Fat/Low Chol/High Fiber/SHANIQUA; Low Sodium (2 gm); 2000 ml  Discussed patient with  and PM&R provider:  Await PM&R completed consult note for further determination of patient needs.

## 2023-10-03 NOTE — FLOWSHEET NOTE
10/03/23 1119   Safe Environment   Safety Measures Bed in low position;Call light within reach; Fall prevention (comment); Side rails X 2;Standard Safety Measures; Family at bedside  (Virtual nurse safety round completed.)     Patient is awake and  alert and answers questions. No distress noted. Referred patient to page 13 of the handbook for fall prevention review. Educated on call light use. Call light in reach.

## 2023-10-03 NOTE — FLOWSHEET NOTE
10/03/23 3944   Safe Environment   Safety Measures Bed in low position;Call light within reach; Side rails X 2;Standard Safety Measures;Caregiver at bedside; Family at bedside  (Virtual nurse safety round completed.)     Patient is awake and  alert and answers questions. No distress noted.

## 2023-10-03 NOTE — CONSULTS
Physical Medicine & Rehabilitation   Consult Note      Admitting Physician: Sebastian Zhang PA-C    Primary Care Provider: Amanda Givens MD     Reason for Consult: Assess for rehab needs    History of Present Illness:  Lianna Srivastava is a 66 y.o. male with past medical history significant for A-fib, bladder cancer, BPH, CAD, CHF, CKD, GERD, HLD, hypertension, diabetes, and RICCARDO who was admitted to [facility] on 9/25/2023. History obtained via: ED documentation, acute care documentation, patient    Patient initially presented to [facility] ED on 9/25/2023 with complaints of malaise, nausea, and decreased appetite. Work-up in the ED reportedly revealed MADI on CKD and acute on chronic CHF. Patient was admitted for further evaluation and management. On admission, nephrology consulted for MADI and cardiology consulted for acute on chronic exacerbation of patient's CHF. Patient was tachypneic with a chest x-ray reportedly revealing some pulmonary vascular congestion. Given this, patient required IV Bumex to optimize his respiratory status. Cardiology evaluated patient and recommended diuresis per nephrology with outpatient cardiology follow-up. Nephrology continued with diuresis with use of Bumex, however, patient with persistent shortness of breath and hypoxia. On 9/29 pulmonology was consulted for further evaluation. Per pulmonology, VQ scan obtained reportedly with a low probability for PE. Patient started on steroid therapy with IV Solu-Medrol twice daily followed by daily prednisone x5 days. Additionally, patient started Duo Nebs every 4 hours. Acute hypoxic respiratory failure felt to be multifactorial pulmonary edema vs. Asthma exacerbation. Pulmonology signed off on 10/2. On 10/2 patient noted to have some worsening of BUN/Cr. Diuretics held. Due to respiratory status, holding off on any IV fluids but rather encouraging oral hydration. Patient noted to have acute urinary retention and

## 2023-10-04 PROBLEM — N17.9 ACUTE RENAL FAILURE SUPERIMPOSED ON CHRONIC KIDNEY DISEASE (HCC): Status: ACTIVE | Noted: 2023-01-01

## 2023-10-04 PROBLEM — D64.9 ACUTE ON CHRONIC ANEMIA: Status: ACTIVE | Noted: 2023-01-01

## 2023-10-04 PROBLEM — N18.9 ACUTE RENAL FAILURE SUPERIMPOSED ON CHRONIC KIDNEY DISEASE (HCC): Status: ACTIVE | Noted: 2023-01-01

## 2023-10-04 LAB
ANION GAP SERPL CALC-SCNC: 11 MEQ/L (ref 8–16)
BUN SERPL-MCNC: 91 MG/DL (ref 7–22)
CALCIUM SERPL-MCNC: 8.3 MG/DL (ref 8.5–10.5)
CHLORIDE SERPL-SCNC: 99 MEQ/L (ref 98–111)
CO2 SERPL-SCNC: 22 MEQ/L (ref 23–33)
CREAT SERPL-MCNC: 2.6 MG/DL (ref 0.4–1.2)
GFR SERPL CREATININE-BSD FRML MDRD: 24 ML/MIN/1.73M2
GLUCOSE SERPL-MCNC: 193 MG/DL (ref 70–108)
POTASSIUM SERPL-SCNC: 4.8 MEQ/L (ref 3.5–5.2)
SODIUM SERPL-SCNC: 132 MEQ/L (ref 135–145)

## 2023-10-04 PROCEDURE — 1200000000 HC SEMI PRIVATE

## 2023-10-04 PROCEDURE — 6370000000 HC RX 637 (ALT 250 FOR IP)

## 2023-10-04 PROCEDURE — 97110 THERAPEUTIC EXERCISES: CPT

## 2023-10-04 PROCEDURE — 99232 SBSQ HOSP IP/OBS MODERATE 35: CPT | Performed by: INTERNAL MEDICINE

## 2023-10-04 PROCEDURE — 6360000002 HC RX W HCPCS

## 2023-10-04 PROCEDURE — 97530 THERAPEUTIC ACTIVITIES: CPT

## 2023-10-04 PROCEDURE — 6370000000 HC RX 637 (ALT 250 FOR IP): Performed by: PHYSICIAN ASSISTANT

## 2023-10-04 PROCEDURE — 6370000000 HC RX 637 (ALT 250 FOR IP): Performed by: NURSE PRACTITIONER

## 2023-10-04 PROCEDURE — 1200000003 HC TELEMETRY R&B

## 2023-10-04 PROCEDURE — 2580000003 HC RX 258

## 2023-10-04 PROCEDURE — 97116 GAIT TRAINING THERAPY: CPT

## 2023-10-04 PROCEDURE — 94761 N-INVAS EAR/PLS OXIMETRY MLT: CPT

## 2023-10-04 PROCEDURE — 99231 SBSQ HOSP IP/OBS SF/LOW 25: CPT | Performed by: NURSE PRACTITIONER

## 2023-10-04 PROCEDURE — 80048 BASIC METABOLIC PNL TOTAL CA: CPT

## 2023-10-04 PROCEDURE — 36415 COLL VENOUS BLD VENIPUNCTURE: CPT

## 2023-10-04 PROCEDURE — 94640 AIRWAY INHALATION TREATMENT: CPT

## 2023-10-04 PROCEDURE — 99233 SBSQ HOSP IP/OBS HIGH 50: CPT

## 2023-10-04 RX ADMIN — TAMSULOSIN HYDROCHLORIDE 0.4 MG: 0.4 CAPSULE ORAL at 09:44

## 2023-10-04 RX ADMIN — ATORVASTATIN CALCIUM 40 MG: 40 TABLET, FILM COATED ORAL at 19:58

## 2023-10-04 RX ADMIN — DILTIAZEM HYDROCHLORIDE 120 MG: 120 CAPSULE, COATED, EXTENDED RELEASE ORAL at 09:43

## 2023-10-04 RX ADMIN — PREDNISONE 30 MG: 20 TABLET ORAL at 09:44

## 2023-10-04 RX ADMIN — INSULIN GLARGINE 33 UNITS: 100 INJECTION, SOLUTION SUBCUTANEOUS at 09:42

## 2023-10-04 RX ADMIN — ASPIRIN 81 MG: 81 TABLET, COATED ORAL at 09:42

## 2023-10-04 RX ADMIN — IPRATROPIUM BROMIDE AND ALBUTEROL SULFATE 1 DOSE: .5; 3 SOLUTION RESPIRATORY (INHALATION) at 19:47

## 2023-10-04 RX ADMIN — IPRATROPIUM BROMIDE AND ALBUTEROL SULFATE 1 DOSE: .5; 3 SOLUTION RESPIRATORY (INHALATION) at 16:21

## 2023-10-04 RX ADMIN — FLUTICASONE PROPIONATE 2 SPRAY: 50 SPRAY, METERED NASAL at 09:43

## 2023-10-04 RX ADMIN — ISOSORBIDE MONONITRATE 120 MG: 60 TABLET, EXTENDED RELEASE ORAL at 09:43

## 2023-10-04 RX ADMIN — SODIUM CHLORIDE, PRESERVATIVE FREE 10 ML: 5 INJECTION INTRAVENOUS at 19:57

## 2023-10-04 RX ADMIN — IPRATROPIUM BROMIDE AND ALBUTEROL SULFATE 1 DOSE: .5; 3 SOLUTION RESPIRATORY (INHALATION) at 13:00

## 2023-10-04 RX ADMIN — FINASTERIDE 5 MG: 5 TABLET, FILM COATED ORAL at 09:43

## 2023-10-04 RX ADMIN — METOPROLOL SUCCINATE 50 MG: 50 TABLET, EXTENDED RELEASE ORAL at 09:44

## 2023-10-04 RX ADMIN — INSULIN LISPRO 4 UNITS: 100 INJECTION, SOLUTION INTRAVENOUS; SUBCUTANEOUS at 16:36

## 2023-10-04 RX ADMIN — IPRATROPIUM BROMIDE AND ALBUTEROL SULFATE 1 DOSE: .5; 3 SOLUTION RESPIRATORY (INHALATION) at 07:50

## 2023-10-04 RX ADMIN — ENOXAPARIN SODIUM 30 MG: 100 INJECTION SUBCUTANEOUS at 21:17

## 2023-10-04 RX ADMIN — CLOPIDOGREL BISULFATE 75 MG: 75 TABLET ORAL at 09:42

## 2023-10-04 RX ADMIN — INSULIN GLARGINE 20 UNITS: 100 INJECTION, SOLUTION SUBCUTANEOUS at 21:16

## 2023-10-04 RX ADMIN — RANOLAZINE 1000 MG: 500 TABLET, EXTENDED RELEASE ORAL at 09:44

## 2023-10-04 RX ADMIN — DIPHENHYDRAMINE HYDROCHLORIDE 25 MG: 25 TABLET ORAL at 21:16

## 2023-10-04 RX ADMIN — DOCUSATE SODIUM 100 MG: 100 CAPSULE, LIQUID FILLED ORAL at 09:42

## 2023-10-04 RX ADMIN — ACETAMINOPHEN 650 MG: 325 TABLET ORAL at 21:16

## 2023-10-04 RX ADMIN — TAMSULOSIN HYDROCHLORIDE 0.4 MG: 0.4 CAPSULE ORAL at 19:57

## 2023-10-04 RX ADMIN — RANOLAZINE 1000 MG: 500 TABLET, EXTENDED RELEASE ORAL at 19:57

## 2023-10-04 ASSESSMENT — PAIN SCALES - GENERAL
PAINLEVEL_OUTOF10: 0
PAINLEVEL_OUTOF10: 0

## 2023-10-04 NOTE — CARE COORDINATION
Clarified with Dayami ESTEBAN that patient will not be accepted to [facility] as he is doing too well to qualify. Met with Li Hung and his wife to update. They do feel he will need home care services initially, with plan to transition to OP therapies if needed. Wife has used [facility] in the past and would like to use again.  Ligia KINGSTON notified of discussion, and will make referral. Electronically signed by Maya Calix RN on 10/4/2023 at 12:27 PM

## 2023-10-04 NOTE — PROGRESS NOTES
Hospitalist Progress Note      Patient:  Barb Gabriel    Unit/Bed:6K-28/028-A  YOB: 1945  MRN: [id number]   Acct: [de-identified]   PCP: Olinda Osgood, MD  Date of Admission: 9/25/2023    Assessment/Plan:    Acute hypoxic respiratory failure (resolved), likely secondary to acute asthma exacerbation: Patient with reported dyspnea, wheezing on exam, shortness of breath. Pulmonology consulted. VQ scan completed with low probability for PE. Steroid taper per pulmonology. Prior provider Discussed with Pulm 10/2 - cleared for discharge from [address] [place] [address]. Schedule for Pulm clinic follow-up in 3 months for follow-up asthma exacerbation. MADI on CKD stage IV, mildly improved: Creatinine up to 3.0 on 10/2, baseline appears to be 1.9-2.0. Nephrology consulted. Bumex held. Renal ultrasound showing some urinary retention, Lira catheter placed. Received 1 L IV over 20 hours. Creatinine improved this morning to 2.6. Nephrology following. Acute urinary retention: known hx BPH with retention issues. Over 800 mL in bladder. Placed lira catheter, follows with Urology. Plan to keep lira catheter in place and have outpatient f/u with VT in office in 1-2 weeks. Continue on Flomax. Near syncope: Episode of diaphoresis and paleness following going to the bathroom, repeat episode 10/2. Hypotension noted however orthostatic vital signs were overall negative. Patient ambulated today 10/4 with therapy, no evidence of hypotension following activity. Physical deconditioning: Patient with progressive generalized weakness, per wife has been attempting to get outpatient therapy services prior to admission. Assessed by PT/OT recommending continued therapy after discharge. Evaluated by IPR, not a candidate at this time for IPR. Patient was able to ambulate 100 feet today. Order for home health with PT/OT placed.   Mild hyponatremia: kidney disease. An acute superimposed component can not be excluded. 2. No renal atrophy or hydronephrosis. 3. Significant 150 mL postvoid urinary bladder residual. This document has been electronically signed by: Lazarus Berkeley, MD on 09/26/2023 08:27 PM    XR CHEST (2 VW)    Result Date: 9/25/2023  PROCEDURE: XR CHEST (2 VW) CLINICAL INFORMATION: Fatigue/history of CHF. Evaluate for consolidation/fluid overload. Thank you Ellen Crump [id number] COMPARISON: Chest radiograph 11/11/2022 TECHNIQUE: PA and lateral views of the chest performed. FINDINGS: Increased interstitial pulmonary markings are seen bilaterally with increased groundglass opacification of both lungs. Median sternotomy has been performed. Cardiac silhouette is mildly enlarged. No pleural effusion. No pneumothorax. No acute bony abnormality. DISH of the thoracic spine. 1. Increased interstitial pulmonary markings are seen bilaterally with increased groundglass opacification of both lungs. Findings relate to development of pulmonary edema. Multilobar pneumonia is not excluded. **This report has been created using voice recognition software. It may contain minor errors which are inherent in voice recognition technology. ** Final report electronically signed by Dr Paolo Perry on 9/25/2023 12:05 PM      Electronically signed by KAVON Hurtado CNP on 10/4/2023 at 3:03 PM

## 2023-10-04 NOTE — PROGRESS NOTES
[facility] RENAL TELEMETRY 6  Occupational Therapy  Daily Note  Time:   Time In: 7919  Time Out: 8409  Timed Code Treatment Minutes: 40 Minutes  Minutes: 40          Date: 10/4/2023  Patient Name: Delisa Garcia,   Gender: male      [facility]028-A  MRN: [id number]  : 1945  (66 y.o.)  Referring Practitioner: Franco Pierson PA-C  Diagnosis: Acute Kidney Injury  Additional Pertinent Hx: Per H&P, \"Wilfrid MIGUEL ANGEL Colon is a 66 y.o. male with PMHx of CAD, anemia, DM,  who presented to [facility] with chief complaint of fatigue, poor intake and weakness. Patient reports he has been feeling poorly for the last 3-4 days with excessive fatigue and weakness. Reports some nausea and states he has not been eating or drinking well. Patient's wife reports she thinks he may have a UTI. He is having difficulty controlling his flow and had an episode of incontinence 2 days ago. Reports chronic shortness of breath. States he has been weak for a while, but seems its worse than normal. Does admit to not taking his diuretic yesterday, but did take it today. Endorses an episode of chest pain several days ago, but it resolved quickly. Denies lightheadedness/dizziness, chest pain, worsening shortness of breath, abdominal pain, vomiting, and leg swelling. \"    Restrictions/Precautions:  Restrictions/Precautions: General Precautions, Fall Risk  Position Activity Restriction  Other position/activity restrictions: Monitor BP, TIMMY Hose whenever up. SUBJECTIVE: Nurse ok'd session. Patient lying in bed upon arrival. Agreeable to OT session     Discussion provided with wife and patient on patients current assist level and need for continued therapies after discharge from acute care. Discussed different options including IPR, home with supervision and OP versus HH. Wife does not feel she can take care of patient at home at this time. Education provided on IPR expectations, both verbalizing understanding.      PAIN:

## 2023-10-04 NOTE — PLAN OF CARE
Problem: Discharge Planning  Goal: Discharge to home or other facility with appropriate resources  Outcome: Progressing  Flowsheets (Taken 10/3/2023 2306)  Discharge to home or other facility with appropriate resources:   Identify barriers to discharge with patient and caregiver   Arrange for needed discharge resources and transportation as appropriate   Identify discharge learning needs (meds, wound care, etc)   Arrange for interpreters to assist at discharge as needed   Refer to discharge planning if patient needs post-hospital services based on physician order or complex needs related to functional status, cognitive ability or social support system     Problem: Safety - Adult  Goal: Free from fall injury  Outcome: Progressing  Flowsheets (Taken 10/3/2023 2306)  Free From Fall Injury:   Instruct family/caregiver on patient safety   Based on caregiver fall risk screen, instruct family/caregiver to ask for assistance with transferring infant if caregiver noted to have fall risk factors     Problem: Cardiovascular - Adult  Goal: Maintains optimal cardiac output and hemodynamic stability  Outcome: Progressing  Flowsheets (Taken 10/3/2023 2306)  Maintains optimal cardiac output and hemodynamic stability:   Monitor blood pressure and heart rate   Monitor urine output and notify Licensed Independent Practitioner for values outside of normal range   Assess for signs of decreased cardiac output   Administer fluid and/or volume expanders as ordered   Administer vasoactive medications as ordered   For PPHN infants, administer sedation as ordered and minimize all controllable stressors.   Goal: Absence of cardiac dysrhythmias or at baseline  Outcome: Progressing  Flowsheets (Taken 10/3/2023 2306)  Absence of cardiac dysrhythmias or at baseline:   Monitor cardiac rate and rhythm   Assess for signs of decreased cardiac output   Administer antiarrhythmia medication and electrolyte replacement as ordered     Problem: Skin/Tissue restrictions as appropriate  Goal: Hemodynamic stability and optimal renal function maintained  Outcome: Progressing  Flowsheets (Taken 10/3/2023 2306)  Hemodynamic stability and optimal renal function maintained:   Monitor labs and assess for signs and symptoms of volume excess or deficit   Monitor intake, output and patient weight   Monitor urine specific gravity, serum osmolarity and serum sodium as indicated or ordered   Monitor response to interventions for patient's volume status, including labs, urine output, blood pressure (other measures as available)   Encourage oral intake as appropriate   Instruct patient on fluid and nutrition restrictions as appropriate     Problem: Chronic Conditions and Co-morbidities  Goal: Patient's chronic conditions and co-morbidity symptoms are monitored and maintained or improved  Outcome: Progressing  Flowsheets (Taken 10/3/2023 2306)  Care Plan - Patient's Chronic Conditions and Co-Morbidity Symptoms are Monitored and Maintained or Improved:   Monitor and assess patient's chronic conditions and comorbid symptoms for stability, deterioration, or improvement   Collaborate with multidisciplinary team to address chronic and comorbid conditions and prevent exacerbation or deterioration   Update acute care plan with appropriate goals if chronic or comorbid symptoms are exacerbated and prevent overall improvement and discharge     Problem: Pain  Goal: Verbalizes/displays adequate comfort level or baseline comfort level  Outcome: Progressing  Flowsheets (Taken 10/3/2023 2306)  Verbalizes/displays adequate comfort level or baseline comfort level:   Encourage patient to monitor pain and request assistance   Assess pain using appropriate pain scale   Administer analgesics based on type and severity of pain and evaluate response   Implement non-pharmacological measures as appropriate and evaluate response   Consider cultural and social influences on pain and pain management   Notify

## 2023-10-04 NOTE — PROGRESS NOTES
[facility]  INPATIENT PHYSICAL THERAPY  DAILY NOTE  STRZ RENAL TELEMETRY 6K - 6K-28/028-A     Time In: 0103  Time Out: 1123  Timed Code Treatment Minutes: 35 Minutes  Minutes: 35          Date: 10/4/2023  Patient Name: Lianna Srivastava,  Gender:  male        MRN: [id number]  : 1945  (66 y.o.)     Referring Practitioner: Dagoberto Bingham PA-C  Diagnosis: Acute renal failure superimposed on CKD  Additional Pertinent Hx: Per H&P, \"Wilfrid Iglesias is a 66 y.o. male with PMHx of CAD, anemia, DM,  who presented to [facility] with chief complaint of fatigue, poor intake and weakness. Patient reports he has been feeling poorly for the last 3-4 days with excessive fatigue and weakness. Reports some nausea and states he has not been eating or drinking well. Patient's wife reports she thinks he may have a UTI. He is having difficulty controlling his flow and had an episode of incontinence 2 days ago. Reports chronic shortness of breath. States he has been weak for a while, but seems its worse than normal. Does admit to not taking his diuretic yesterday, but did take it today. Endorses an episode of chest pain several days ago, but it resolved quickly. Denies lightheadedness/dizziness, chest pain, worsening shortness of breath, abdominal pain, vomiting, and leg swelling. \"     Prior Level of Function:  Lives With: Spouse  Type of Home: House  Home Layout: Two level, Performs ADL's on one level, Able to Live on Main level with bedroom/bathroom  Home Access: Stairs to enter with rails  Entrance Stairs - Number of Steps: 2 ALETA  Entrance Stairs - Rails: Both  Home Equipment: Lorette Benders, standard   Bathroom Shower/Tub: Walk-in shower  Bathroom Toilet: Handicap height  Bathroom Equipment: Grab bars in shower, Grab bars around toilet  Bathroom Accessibility: Accessible    ADL Assistance: Needs assistance (Spouse assists with socks.  Pt reports Indep with toileting and showering.)  Homemaking Assistance: Needs assistance (Spouse completes all cooking, cleaning, and laundry.)  Homemaking Responsibilities: No  Ambulation Assistance: Independent  Transfer Assistance: Independent  Active : Yes  Additional Comments: At baseline patient is on room air. Restrictions/Precautions:  Restrictions/Precautions: General Precautions, Fall Risk  Position Activity Restriction  Other position/activity restrictions: Monitor BP, TIMMY Hose whenever up. SUBJECTIVE: Pt had OT earlier and then just finished up with getting a shower. Pt and spouse agree to therapy. RN approved session. PAIN: denies      Vitals: Blood Pressure: 133/61 after ambulating    OBJECTIVE:  Bed Mobility:  Not Tested    Transfers:  Sit to Stand: Stand By Assistance  Stand to Sit:Stand By Assistance    Ambulation:  Stand By Assistance, Andrew Resources Assistance  Distance: 50 ft x 2  Surface: Level Tile  Device:No Device to light use of francisco rail  Gait Deviations:  Decreased Step Length Bilaterally and Decreased Gait Speed    Balance:  Static Sitting Balance:  Independent  Dynamic Sitting Balance: Modified Independent  Static Standing Balance: Supervision  Dynamic Standing Balance: Stand By Assistance, Contact Guard Assistance, due to recent episodes of dizziness and BP drops    Exercise:  Patient was guided in 1 set(s) 12 reps of exercise to both lower extremities. Ankle pumps, Glut sets, Quad sets, Heelslides, Hip abduction/adduction, Seated marches, and Long arc quads. Exercises were completed for increased independence with functional mobility. Functional Outcome Measures: Completed  AM-PAC Inpatient Mobility Raw Score : 18  AM-PAC Inpatient T-Scale Score : 43.63    ASSESSMENT:  Assessment: Patient progressing toward established goals. and participates well.  Pt continues to show some shortness of breath with activity and would benefit from continued skilled PT to address endurance building, energy conservation training, and improved tolerance for

## 2023-10-04 NOTE — PROGRESS NOTES
Physician Progress Note      Lexi Adame  CSN #:                  [id number]  :                       1945  ADMIT DATE:       2023 10:45 AM  DISCH DATE:  RESPONDING  PROVIDER #:        Gem Holder CNP          QUERY TEXT:    Patient admitted with MADI. Noted documentation of acute respiratory failure in   progress notes. In order to support the diagnosis of acute respiratory   failure, please include additional clinical indicators in your documentation. Or please document if the diagnosis of acute respiratory failure has been   ruled out after further study. The medical record reflects the following:  Risk Factors: acute asthma exacerbation  Clinical Indicators: presented with MADI, developed acute asthma exacerbation   and placed on 2 LPM without any noted hypoxia,  was 94% on 2LPM, then 93% on   1LPM  Treatment: Supplemental oxygen at 2 LPM, labs, imaging, monitoring    Acute Respiratory Failure Clinical Indicators per [employer] MS-DRG Training Guide and   Quick Reference Guide:  pO2 < 60 mmHg or SpO2 (pulse oximetry) < 91% breathing room air  pCO2 > 50 and pH < 7.35  P/F ratio (pO2 / FIO2) < 300  pO2 decrease or pCO2 increase by 10 mmHg from baseline (if known)  Supplemental oxygen of 40% or more  Presence of respiratory distress, tachypnea, dyspnea, shortness of breath,   wheezing  Unable to speak in complete sentences  Use of accessory muscles to breathe  Extreme anxiety and feeling of impending doom  Tripod position  Confusion/altered mental status/obtunded  Options provided:  -- Acute Respiratory Failure as evidenced by, Please document evidence.   -- Acute Respiratory Failure ruled out after study  -- Acute Respiratory Failure ruled out after study and Chronic Respiratory   Failure confirmed  -- Other - I will add my own diagnosis  -- Disagree - Not applicable / Not valid  -- Disagree - Clinically unable to determine / Unknown  -- Refer to Clinical Documentation

## 2023-10-04 NOTE — CARE COORDINATION
10/4/23, 1:38 PM EDT    DISCHARGE ON GOING EVALUATION    Antoni Keane day: 5  Location: [facility]28/028-A Reason for admit: Acute kidney injury superimposed on CKD ([address]) [N17.9, N18.9]  Acute renal failure superimposed on chronic kidney disease, unspecified CKD stage, unspecified acute renal failure type [N17.9, N18.9]  Acute on chronic congestive heart failure, unspecified heart failure type ([address]) [I50.9]  Acute on chronic anemia [D64.9]  MADI (acute kidney injury) ([address]) [N17.9]   Procedure:   9/25 CXR: Increased interstitial pulmonary markings are seen bilaterally with increased groundglass opacification of both lungs. Findings relate to development of pulmonary edema. Multilobar pneumonia is not excluded  9/26 US Renal: Increased renal cortical echogenicity consistent with chronic kidney disease. An acute superimposed component can not be excluded. No renal atrophy or hydronephrosis. Significant 150 mL postvoid urinary bladder residual.  9/29 CXR 1. No interval change since previous study dated 9/25/2023. .   9/29 NM Lung Vent/Perfusion Pulmonary embolism absent (very low probability lung scan). 10/3 CXR 1. Small bilateral pleural effusions. 2. No pulmonary infiltrates. Barriers to Discharge: Hospitalist, Cardiology  and Nephrology following. PT/OT. IVF discontinued. DM management. Duonebs. Prednisone. 94% on room air. Creat 2.6 (2.9 yesterday) BUN 91 (97 yesterday)     PCP: Gonzalo Gordon MD  Readmission Risk Score: 23.6%  Patient Goals/Plan/Treatment Preferences: Reina Salazar will be returning home with wife and [facility] for PT/OT. Referral called to [facility].

## 2023-10-04 NOTE — PROGRESS NOTES
Rounded on unit, spoke with THEE Strong, Primary RN Cara and patient and his wife about rehab referral.  Explained that we will follow patient therapy progress today and come up with recommendation.

## 2023-10-05 LAB
ANION GAP SERPL CALC-SCNC: 15 MEQ/L (ref 8–16)
BASOPHILS ABSOLUTE: 0 THOU/MM3 (ref 0–0.1)
BASOPHILS NFR BLD AUTO: 0.2 %
BUN SERPL-MCNC: 86 MG/DL (ref 7–22)
CALCIUM SERPL-MCNC: 8.7 MG/DL (ref 8.5–10.5)
CHLORIDE SERPL-SCNC: 99 MEQ/L (ref 98–111)
CO2 SERPL-SCNC: 19 MEQ/L (ref 23–33)
CREAT SERPL-MCNC: 2.4 MG/DL (ref 0.4–1.2)
DEPRECATED RDW RBC AUTO: 52.1 FL (ref 35–45)
EOSINOPHIL NFR BLD AUTO: 0.1 %
EOSINOPHILS ABSOLUTE: 0 THOU/MM3 (ref 0–0.4)
ERYTHROCYTE [DISTWIDTH] IN BLOOD BY AUTOMATED COUNT: 13.9 % (ref 11.5–14.5)
GFR SERPL CREATININE-BSD FRML MDRD: 27 ML/MIN/1.73M2
GLUCOSE SERPL-MCNC: 172 MG/DL (ref 70–108)
HCT VFR BLD AUTO: 28 % (ref 42–52)
HGB BLD-MCNC: 9.3 GM/DL (ref 14–18)
IMM GRANULOCYTES # BLD AUTO: 0.35 THOU/MM3 (ref 0–0.07)
IMM GRANULOCYTES NFR BLD AUTO: 2.8 %
LYMPHOCYTES ABSOLUTE: 0.5 THOU/MM3 (ref 1–4.8)
LYMPHOCYTES NFR BLD AUTO: 4.3 %
MCH RBC QN AUTO: 34.7 PG (ref 26–33)
MCHC RBC AUTO-ENTMCNC: 33.2 GM/DL (ref 32.2–35.5)
MCV RBC AUTO: 104.5 FL (ref 80–94)
MONOCYTES ABSOLUTE: 1.1 THOU/MM3 (ref 0.4–1.3)
MONOCYTES NFR BLD AUTO: 8.8 %
NEUTROPHILS NFR BLD AUTO: 83.8 %
NRBC BLD AUTO-RTO: 0 /100 WBC
PLATELET # BLD AUTO: 251 THOU/MM3 (ref 130–400)
PMV BLD AUTO: 9.8 FL (ref 9.4–12.4)
POTASSIUM SERPL-SCNC: 4.6 MEQ/L (ref 3.5–5.2)
RBC # BLD AUTO: 2.68 MILL/MM3 (ref 4.7–6.1)
SEGMENTED NEUTROPHILS ABSOLUTE COUNT: 10.4 THOU/MM3 (ref 1.8–7.7)
SODIUM SERPL-SCNC: 133 MEQ/L (ref 135–145)
WBC # BLD AUTO: 12.4 THOU/MM3 (ref 4.8–10.8)

## 2023-10-05 PROCEDURE — 6370000000 HC RX 637 (ALT 250 FOR IP)

## 2023-10-05 PROCEDURE — 94640 AIRWAY INHALATION TREATMENT: CPT

## 2023-10-05 PROCEDURE — 6370000000 HC RX 637 (ALT 250 FOR IP): Performed by: NURSE PRACTITIONER

## 2023-10-05 PROCEDURE — 99232 SBSQ HOSP IP/OBS MODERATE 35: CPT | Performed by: INTERNAL MEDICINE

## 2023-10-05 PROCEDURE — 6370000000 HC RX 637 (ALT 250 FOR IP): Performed by: PHYSICIAN ASSISTANT

## 2023-10-05 PROCEDURE — 6360000002 HC RX W HCPCS

## 2023-10-05 PROCEDURE — 2580000003 HC RX 258

## 2023-10-05 PROCEDURE — 97530 THERAPEUTIC ACTIVITIES: CPT

## 2023-10-05 PROCEDURE — 1200000003 HC TELEMETRY R&B

## 2023-10-05 PROCEDURE — 80048 BASIC METABOLIC PNL TOTAL CA: CPT

## 2023-10-05 PROCEDURE — 97110 THERAPEUTIC EXERCISES: CPT

## 2023-10-05 PROCEDURE — 36415 COLL VENOUS BLD VENIPUNCTURE: CPT

## 2023-10-05 PROCEDURE — 85025 COMPLETE CBC W/AUTO DIFF WBC: CPT

## 2023-10-05 PROCEDURE — 99232 SBSQ HOSP IP/OBS MODERATE 35: CPT

## 2023-10-05 PROCEDURE — 99232 SBSQ HOSP IP/OBS MODERATE 35: CPT | Performed by: PHYSICIAN ASSISTANT

## 2023-10-05 PROCEDURE — 94761 N-INVAS EAR/PLS OXIMETRY MLT: CPT

## 2023-10-05 RX ADMIN — FLUTICASONE PROPIONATE 2 SPRAY: 50 SPRAY, METERED NASAL at 09:50

## 2023-10-05 RX ADMIN — INSULIN LISPRO 5 UNITS: 100 INJECTION, SOLUTION INTRAVENOUS; SUBCUTANEOUS at 12:47

## 2023-10-05 RX ADMIN — IPRATROPIUM BROMIDE AND ALBUTEROL SULFATE 1 DOSE: .5; 3 SOLUTION RESPIRATORY (INHALATION) at 11:37

## 2023-10-05 RX ADMIN — SODIUM CHLORIDE, PRESERVATIVE FREE 10 ML: 5 INJECTION INTRAVENOUS at 20:23

## 2023-10-05 RX ADMIN — ATORVASTATIN CALCIUM 40 MG: 40 TABLET, FILM COATED ORAL at 20:23

## 2023-10-05 RX ADMIN — SODIUM CHLORIDE, PRESERVATIVE FREE 10 ML: 5 INJECTION INTRAVENOUS at 09:50

## 2023-10-05 RX ADMIN — DILTIAZEM HYDROCHLORIDE 120 MG: 120 CAPSULE, COATED, EXTENDED RELEASE ORAL at 09:49

## 2023-10-05 RX ADMIN — IPRATROPIUM BROMIDE AND ALBUTEROL SULFATE 1 DOSE: .5; 3 SOLUTION RESPIRATORY (INHALATION) at 22:23

## 2023-10-05 RX ADMIN — TAMSULOSIN HYDROCHLORIDE 0.4 MG: 0.4 CAPSULE ORAL at 20:23

## 2023-10-05 RX ADMIN — ISOSORBIDE MONONITRATE 120 MG: 60 TABLET, EXTENDED RELEASE ORAL at 09:49

## 2023-10-05 RX ADMIN — PREDNISONE 30 MG: 20 TABLET ORAL at 09:48

## 2023-10-05 RX ADMIN — IPRATROPIUM BROMIDE AND ALBUTEROL SULFATE 1 DOSE: .5; 3 SOLUTION RESPIRATORY (INHALATION) at 07:49

## 2023-10-05 RX ADMIN — INSULIN LISPRO 5 UNITS: 100 INJECTION, SOLUTION INTRAVENOUS; SUBCUTANEOUS at 16:43

## 2023-10-05 RX ADMIN — FINASTERIDE 5 MG: 5 TABLET, FILM COATED ORAL at 09:49

## 2023-10-05 RX ADMIN — CLOPIDOGREL BISULFATE 75 MG: 75 TABLET ORAL at 09:48

## 2023-10-05 RX ADMIN — DOCUSATE SODIUM 100 MG: 100 CAPSULE, LIQUID FILLED ORAL at 09:47

## 2023-10-05 RX ADMIN — ASPIRIN 81 MG: 81 TABLET, COATED ORAL at 09:49

## 2023-10-05 RX ADMIN — ENOXAPARIN SODIUM 30 MG: 100 INJECTION SUBCUTANEOUS at 20:23

## 2023-10-05 RX ADMIN — ACETAMINOPHEN 650 MG: 325 TABLET ORAL at 23:18

## 2023-10-05 RX ADMIN — RANOLAZINE 1000 MG: 500 TABLET, EXTENDED RELEASE ORAL at 20:23

## 2023-10-05 RX ADMIN — INSULIN GLARGINE 33 UNITS: 100 INJECTION, SOLUTION SUBCUTANEOUS at 09:46

## 2023-10-05 RX ADMIN — RANOLAZINE 1000 MG: 500 TABLET, EXTENDED RELEASE ORAL at 09:48

## 2023-10-05 RX ADMIN — IPRATROPIUM BROMIDE AND ALBUTEROL SULFATE 1 DOSE: .5; 3 SOLUTION RESPIRATORY (INHALATION) at 15:28

## 2023-10-05 RX ADMIN — DIPHENHYDRAMINE HYDROCHLORIDE 25 MG: 25 TABLET ORAL at 23:18

## 2023-10-05 RX ADMIN — INSULIN GLARGINE 20 UNITS: 100 INJECTION, SOLUTION SUBCUTANEOUS at 20:24

## 2023-10-05 RX ADMIN — TAMSULOSIN HYDROCHLORIDE 0.4 MG: 0.4 CAPSULE ORAL at 09:47

## 2023-10-05 ASSESSMENT — PAIN DESCRIPTION - DESCRIPTORS: DESCRIPTORS: ACHING

## 2023-10-05 ASSESSMENT — PAIN SCALES - GENERAL: PAINLEVEL_OUTOF10: 3

## 2023-10-05 ASSESSMENT — PAIN DESCRIPTION - LOCATION: LOCATION: GENERALIZED

## 2023-10-05 NOTE — CARE COORDINATION
10/5/23, 2:12 PM EDT    DISCHARGE ON GOING EVALUATION    Elvie Cross day: 6  Location: [facility]28/028-A Reason for admit: Acute kidney injury superimposed on CKD ([address]) [N17.9, N18.9]  Acute renal failure superimposed on chronic kidney disease, unspecified CKD stage, unspecified acute renal failure type [N17.9, N18.9]  Acute on chronic congestive heart failure, unspecified heart failure type ([address]) [I50.9]  Acute on chronic anemia [D64.9]  MADI (acute kidney injury) ([address]) [N17.9]   Procedure: 9/25 CXR: Increased interstitial pulmonary markings are seen bilaterally with increased groundglass opacification of both lungs. Findings relate to development of pulmonary edema. Multilobar pneumonia is not excluded  9/26 US Renal: Increased renal cortical echogenicity consistent with chronic kidney disease. An acute superimposed component can not be excluded. No renal atrophy or hydronephrosis. Significant 150 mL postvoid urinary bladder residual.  9/29 CXR 1. No interval change since previous study dated 9/25/2023. .   9/29 NM Lung Vent/Perfusion Pulmonary embolism absent (very low probability lung scan). 10/3 CXR 1. Small bilateral pleural effusions. 2. No pulmonary infiltrates. Barriers to Discharge: Hospitalist and Nephrology continue to follow. Creat 2.4. Prednisone taper. Holding Bumex. Symptomatic drop in BP while ambulating in [place] today. PCP: Alexander Pearson MD  Readmission Risk Score: 22.9%  Patient Goals/Plan/Treatment Preferences: Nicki Sanabria will be returning home with wife and new [address] [place] [address] for PT/OT.

## 2023-10-05 NOTE — PROGRESS NOTES
[facility]  INPATIENT PHYSICAL THERAPY  DAILY NOTE  [facility] RENAL TELEMETRY 6K - 6K-28/028-A     Time In: 09  Time Out: 2376  Timed Code Treatment Minutes: 40 Minutes  Minutes: 40          Date: 10/5/2023  Patient Name: Leighton Banks,  Gender:  male        MRN: [id number]  : 1945  (66 y.o.)     Referring Practitioner: Rik Michelle PA-C  Diagnosis: Acute renal failure superimposed on CKD  Additional Pertinent Hx: Per H&P, \"Wilfrid MICHELLE Jonas Ward is a 66 y.o. male with PMHx of CAD, anemia, DM,  who presented to [facility] with chief complaint of fatigue, poor intake and weakness. Patient reports he has been feeling poorly for the last 3-4 days with excessive fatigue and weakness. Reports some nausea and states he has not been eating or drinking well. Patient's wife reports she thinks he may have a UTI. He is having difficulty controlling his flow and had an episode of incontinence 2 days ago. Reports chronic shortness of breath. States he has been weak for a while, but seems its worse than normal. Does admit to not taking his diuretic yesterday, but did take it today. Endorses an episode of chest pain several days ago, but it resolved quickly. Denies lightheadedness/dizziness, chest pain, worsening shortness of breath, abdominal pain, vomiting, and leg swelling. \"     Prior Level of Function:  Lives With: Spouse  Type of Home: House  Home Layout: Two level, Performs ADL's on one level, Able to Live on Main level with bedroom/bathroom  Home Access: Stairs to enter with rails  Entrance Stairs - Number of Steps: 2 ALETA  Entrance Stairs - Rails: Both  Home Equipment: Elnor Schwab, standard   Bathroom Shower/Tub: Walk-in shower  Bathroom Toilet: Handicap height  Bathroom Equipment: Grab bars in shower, Grab bars around toilet  Bathroom Accessibility: Accessible    ADL Assistance: Needs assistance (Spouse assists with socks.  Pt reports Indep with toileting and showering.)  Homemaking Assistance: Needs

## 2023-10-05 NOTE — PLAN OF CARE
Problem: Discharge Planning  Goal: Discharge to home or other facility with appropriate resources  Outcome: Progressing  Flowsheets (Taken 10/5/2023 0800)  Discharge to home or other facility with appropriate resources: Identify barriers to discharge with patient and caregiver     Problem: Safety - Adult  Goal: Free from fall injury  Outcome: Progressing  Flowsheets (Taken 10/5/2023 0800)  Free From Fall Injury: Instruct family/caregiver on patient safety     Problem: ABCDS Injury Assessment  Goal: Absence of physical injury  Outcome: Progressing     Problem: Respiratory - Adult  Goal: Clear lung sounds  10/5/2023 0757 by Laureano Hollins RCP  Outcome: Progressing     Problem: Cardiovascular - Adult  Goal: Maintains optimal cardiac output and hemodynamic stability  Outcome: Progressing  Flowsheets (Taken 10/5/2023 0800)  Maintains optimal cardiac output and hemodynamic stability: Monitor blood pressure and heart rate     Problem: Cardiovascular - Adult  Goal: Absence of cardiac dysrhythmias or at baseline  Outcome: Progressing  Flowsheets (Taken 10/5/2023 0800)  Absence of cardiac dysrhythmias or at baseline: Monitor cardiac rate and rhythm     Problem: Skin/Tissue Integrity - Adult  Goal: Skin integrity remains intact  Outcome: Progressing  Flowsheets (Taken 10/5/2023 0800)  Skin Integrity Remains Intact: Monitor for areas of redness and/or skin breakdown     Problem: Skin/Tissue Integrity - Adult  Goal: Incisions, wounds, or drain sites healing without S/S of infection  Outcome: Progressing  Flowsheets (Taken 10/5/2023 0800)  Incisions, Wounds, or Drain Sites Healing Without Sign and Symptoms of Infection: ADMISSION and DAILY: Assess and document risk factors for pressure ulcer development     Problem: Skin/Tissue Integrity - Adult  Goal: Oral mucous membranes remain intact  Outcome: Progressing  Flowsheets (Taken 10/5/2023 0800)  Oral Mucous Membranes Remain Intact: Assess oral mucosa and hygiene practices

## 2023-10-05 NOTE — PROGRESS NOTES
This patient was walking in hallway with therapy and stated \" I Have to sit down\" Blood pressure was 104/61. Patient recovered quickly and sitting in wheel. Re-check on Blood pressure is 130/66. Had patient stand and blood pressure dropped to 100/85. Patient denies being dizzy/light headed, has no complaints, asymptomatic.    Mary Hinkle, KARLOS aware during morning rounds

## 2023-10-05 NOTE — PROGRESS NOTES
Hospitalist Progress Note      Patient:  Miguelito Pineda    Unit/Bed:6K-28/028-A  YOB: 1945  MRN: [id number]   Acct: [de-identified]   PCP: Vika Vernon MD  Date of Admission: 9/25/2023    Assessment/Plan:  Acute hypoxic respiratory failure (resolved), likely secondary to acute asthma exacerbation: Patient with reported dyspnea, wheezing on exam, shortness of breath. Pulmonology consulted. VQ scan completed with low probability for PE. Steroid taper per pulmonology. Prior provider Discussed with Pulm 10/2 - cleared for discharge from [address] [place] [address]. Schedule for Pulm clinic follow-up in 3 months for follow-up asthma exacerbation. MADI on CKD stage IV, mildly improved: Creatinine up to 3.0 on 10/2, baseline appears to be 1.9-2.0. Nephrology consulted. Bumex held. Renal ultrasound showing some urinary retention, Lira catheter placed. Received 1 L IV over 20 hours. Creatinine improved this morning to 2.4. Nephrology following. Near syncope/ ?orthostatic hypotension: Episode of diaphoresis and paleness following going to the bathroom, repeat episode 10/2. Hypotension noted. Did well with ambulation on 10/4. Today 10/5, noted to have drop in blood pressure to 100/85 after standing, was 130/66 while sitting. Cardiology reevaluated patient. Recommendation to repeat orthostatic vitals, consider discontinuing diltiazem, alternatives for flomax/proscar if orthostatics positive. Acute urinary retention: known hx BPH with retention issues. Over 800 mL in bladder. Placed lira catheter, follows with Urology. Plan to keep lira catheter in place and have outpatient f/u with VT in office in 1-2 weeks. Continue on Flomax, proscar. Physical deconditioning: Patient with progressive generalized weakness, per wife has been attempting to get outpatient therapy services prior to admission.   Assessed by PT/OT recommending continued on 9/25/2023 12:05 PM      Electronically signed by Tobias Councilman, APRN - CNP on 10/5/2023 at 3:27 PM

## 2023-10-05 NOTE — FLOWSHEET NOTE
10/05/23 1621   Safe Environment   Safety Measures Call light within reach; Family at bedside;Standard Safety Measures  (virtual nurse safety round completed)       VN rounds completed , pt currently resting in chair with family at bedside , no signs of distress noted , call light within reach.  Addressed 5 P's

## 2023-10-06 VITALS
SYSTOLIC BLOOD PRESSURE: 143 MMHG | RESPIRATION RATE: 18 BRPM | OXYGEN SATURATION: 98 % | DIASTOLIC BLOOD PRESSURE: 67 MMHG | BODY MASS INDEX: 31.67 KG/M2 | HEART RATE: 66 BPM | TEMPERATURE: 97.7 F | WEIGHT: 213.85 LBS | HEIGHT: 69 IN

## 2023-10-06 LAB
ANION GAP SERPL CALC-SCNC: 9 MEQ/L (ref 8–16)
BUN SERPL-MCNC: 73 MG/DL (ref 7–22)
CALCIUM SERPL-MCNC: 8.8 MG/DL (ref 8.5–10.5)
CHLORIDE SERPL-SCNC: 102 MEQ/L (ref 98–111)
CO2 SERPL-SCNC: 23 MEQ/L (ref 23–33)
CREAT SERPL-MCNC: 2 MG/DL (ref 0.4–1.2)
GFR SERPL CREATININE-BSD FRML MDRD: 33 ML/MIN/1.73M2
GLUCOSE SERPL-MCNC: 188 MG/DL (ref 70–108)
POTASSIUM SERPL-SCNC: 4.8 MEQ/L (ref 3.5–5.2)
SODIUM SERPL-SCNC: 134 MEQ/L (ref 135–145)

## 2023-10-06 PROCEDURE — 97110 THERAPEUTIC EXERCISES: CPT

## 2023-10-06 PROCEDURE — 97535 SELF CARE MNGMENT TRAINING: CPT

## 2023-10-06 PROCEDURE — 94761 N-INVAS EAR/PLS OXIMETRY MLT: CPT

## 2023-10-06 PROCEDURE — 80048 BASIC METABOLIC PNL TOTAL CA: CPT

## 2023-10-06 PROCEDURE — 99239 HOSP IP/OBS DSCHRG MGMT >30: CPT

## 2023-10-06 PROCEDURE — 6370000000 HC RX 637 (ALT 250 FOR IP): Performed by: NURSE PRACTITIONER

## 2023-10-06 PROCEDURE — 36415 COLL VENOUS BLD VENIPUNCTURE: CPT

## 2023-10-06 PROCEDURE — 94640 AIRWAY INHALATION TREATMENT: CPT

## 2023-10-06 PROCEDURE — 6370000000 HC RX 637 (ALT 250 FOR IP): Performed by: PHYSICIAN ASSISTANT

## 2023-10-06 PROCEDURE — 2580000003 HC RX 258

## 2023-10-06 PROCEDURE — 6370000000 HC RX 637 (ALT 250 FOR IP)

## 2023-10-06 RX ORDER — PREDNISONE 10 MG/1
10 TABLET ORAL DAILY
Qty: 3 TABLET | Refills: 0 | Status: SHIPPED | OUTPATIENT
Start: 2023-10-09 | End: 2023-10-12

## 2023-10-06 RX ORDER — SENNOSIDES A AND B 8.6 MG/1
1 TABLET, FILM COATED ORAL NIGHTLY
Qty: 30 TABLET | Refills: 0 | Status: SHIPPED | OUTPATIENT
Start: 2023-10-06 | End: 2023-10-13

## 2023-10-06 RX ORDER — PSEUDOEPHEDRINE HCL 30 MG
100 TABLET ORAL DAILY
Qty: 30 CAPSULE | Refills: 1 | Status: SHIPPED | OUTPATIENT
Start: 2023-10-07 | End: 2023-10-13

## 2023-10-06 RX ORDER — PREDNISONE 20 MG/1
20 TABLET ORAL DAILY
Qty: 2 TABLET | Refills: 0 | Status: SHIPPED | OUTPATIENT
Start: 2023-10-07 | End: 2023-10-09

## 2023-10-06 RX ORDER — BUMETANIDE 1 MG/1
1 TABLET ORAL DAILY
Status: DISCONTINUED | OUTPATIENT
Start: 2023-10-07 | End: 2023-10-06 | Stop reason: HOSPADM

## 2023-10-06 RX ORDER — BUMETANIDE 2 MG/1
1 TABLET ORAL DAILY
Qty: 90 TABLET | Refills: 3
Start: 2023-10-06 | End: 2023-10-13

## 2023-10-06 RX ADMIN — PREDNISONE 20 MG: 20 TABLET ORAL at 09:00

## 2023-10-06 RX ADMIN — METOPROLOL SUCCINATE 50 MG: 50 TABLET, EXTENDED RELEASE ORAL at 09:00

## 2023-10-06 RX ADMIN — FLUTICASONE PROPIONATE 2 SPRAY: 50 SPRAY, METERED NASAL at 09:02

## 2023-10-06 RX ADMIN — INSULIN GLARGINE 33 UNITS: 100 INJECTION, SOLUTION SUBCUTANEOUS at 09:00

## 2023-10-06 RX ADMIN — CLOPIDOGREL BISULFATE 75 MG: 75 TABLET ORAL at 08:59

## 2023-10-06 RX ADMIN — ASPIRIN 81 MG: 81 TABLET, COATED ORAL at 08:59

## 2023-10-06 RX ADMIN — IPRATROPIUM BROMIDE AND ALBUTEROL SULFATE 1 DOSE: .5; 3 SOLUTION RESPIRATORY (INHALATION) at 12:51

## 2023-10-06 RX ADMIN — DILTIAZEM HYDROCHLORIDE 120 MG: 120 CAPSULE, COATED, EXTENDED RELEASE ORAL at 09:00

## 2023-10-06 RX ADMIN — TAMSULOSIN HYDROCHLORIDE 0.4 MG: 0.4 CAPSULE ORAL at 09:00

## 2023-10-06 RX ADMIN — ISOSORBIDE MONONITRATE 120 MG: 60 TABLET, EXTENDED RELEASE ORAL at 08:59

## 2023-10-06 RX ADMIN — RANOLAZINE 1000 MG: 500 TABLET, EXTENDED RELEASE ORAL at 09:00

## 2023-10-06 RX ADMIN — IPRATROPIUM BROMIDE AND ALBUTEROL SULFATE 1 DOSE: .5; 3 SOLUTION RESPIRATORY (INHALATION) at 09:05

## 2023-10-06 RX ADMIN — SODIUM CHLORIDE, PRESERVATIVE FREE 10 ML: 5 INJECTION INTRAVENOUS at 09:00

## 2023-10-06 RX ADMIN — FINASTERIDE 5 MG: 5 TABLET, FILM COATED ORAL at 09:00

## 2023-10-06 RX ADMIN — DOCUSATE SODIUM 100 MG: 100 CAPSULE, LIQUID FILLED ORAL at 08:59

## 2023-10-06 NOTE — PLAN OF CARE
Problem: Discharge Planning  Goal: Discharge to home or other facility with appropriate resources  10/6/2023 0115 by Hilario Berrios RN  Outcome: Progressing  Flowsheets (Taken 10/6/2023 0115)  Discharge to home or other facility with appropriate resources:   Identify barriers to discharge with patient and caregiver   Identify discharge learning needs (meds, wound care, etc)     Problem: Safety - Adult  Goal: Free from fall injury  10/6/2023 0115 by Hilario Berrios RN  Outcome: Progressing  Flowsheets (Taken 10/6/2023 0115)  Free From Fall Injury: Instruct family/caregiver on patient safety     Problem: ABCDS Injury Assessment  Goal: Absence of physical injury  10/6/2023 0115 by Hilario Berrios RN  Outcome: Progressing  Flowsheets (Taken 10/6/2023 0115)  Absence of Physical Injury: Implement safety measures based on patient assessment     Problem: Chronic Conditions and Co-morbidities  Goal: Patient's chronic conditions and co-morbidity symptoms are monitored and maintained or improved  10/6/2023 0115 by Hilario Berrios RN  Outcome: Progressing  Flowsheets (Taken 10/6/2023 0115)  Care Plan - Patient's Chronic Conditions and Co-Morbidity Symptoms are Monitored and Maintained or Improved: Monitor and assess patient's chronic conditions and comorbid symptoms for stability, deterioration, or improvement     Problem: Pain  Goal: Verbalizes/displays adequate comfort level or baseline comfort level  10/6/2023 0115 by Hilario Berrios RN  Outcome: Progressing  Flowsheets (Taken 10/6/2023 0115)  Verbalizes/displays adequate comfort level or baseline comfort level:   Encourage patient to monitor pain and request assistance   Assess pain using appropriate pain scale     Problem: Cardiovascular - Adult  Goal: Maintains optimal cardiac output and hemodynamic stability  10/6/2023 0115 by Hilario Berrios RN  Outcome: Progressing  Flowsheets (Taken 10/6/2023 0115)  Maintains optimal cardiac output and hemodynamic stability:

## 2023-10-06 NOTE — CARE COORDINATION
10/6/23, 11:53 AM EDT    Patient goals/plan/ treatment preferences discussed by  and . Patient goals/plan/ treatment preferences reviewed with patient/ family. Patient/ family verbalize understanding of discharge plan and are in agreement with goal/plan/treatment preferences. Understanding was demonstrated using the teach back method. AVS provided by RN at time of discharge, which includes all necessary medical information pertaining to the patients current course of illness, treatment, post-discharge goals of care, and treatment preferences. Services At/After Discharge: Home Health, OT, and PT       IMM Letter  IMM Letter given to Patient/Family/Significant other/Guardian/POA/by[de-identified] Cassidy Childress RN, CM  IMM Letter date given[de-identified] 10/06/23  IMM Letter time given[de-identified] 1150  Observation Status Letter date given[de-identified] 09/25/23  Observation Status Letter time given[de-identified] 7388  Observation Status Letter given to Patient/Family/Significant other/Guardian/POA/by[de-identified] patient access     Pt verbalized understanding and gave permission for possible discharge within 4 hours of receiving IMM. THEE Called and left message for [facility] that patient is being discharged today.

## 2023-10-06 NOTE — PROGRESS NOTES
[facility] RENAL TELEMETRY   Occupational Therapy  Daily Note  Time:   Time In:   Time Out: 0935  Timed Code Treatment Minutes: 32 Minutes  Minutes: 32          Date: 10/6/2023  Patient Name: Yonathan Pinzon,   Gender: male      Room: [facility]28/028-A  MRN: [id number]  : 1945  (66 y.o.)  Referring Practitioner: Aline Macias PA-C  Diagnosis: Acute Kidney Injury  Additional Pertinent Hx: Per H&P, \"Wilfrid MIGUEL ANGEL Marizol Guevara is a 66 y.o. male with PMHx of CAD, anemia, DM,  who presented to [facility] with chief complaint of fatigue, poor intake and weakness. Patient reports he has been feeling poorly for the last 3-4 days with excessive fatigue and weakness. Reports some nausea and states he has not been eating or drinking well. Patient's wife reports she thinks he may have a UTI. He is having difficulty controlling his flow and had an episode of incontinence 2 days ago. Reports chronic shortness of breath. States he has been weak for a while, but seems its worse than normal. Does admit to not taking his diuretic yesterday, but did take it today. Endorses an episode of chest pain several days ago, but it resolved quickly. Denies lightheadedness/dizziness, chest pain, worsening shortness of breath, abdominal pain, vomiting, and leg swelling. \"    Restrictions/Precautions:  Restrictions/Precautions: General Precautions, Fall Risk  Position Activity Restriction  Other position/activity restrictions: Monitor BP, CORY Hose and Abd binder whenever up. SUBJECTIVE: Pt seated in chair with wife present upon arrival, agreeable to OT session. PAIN: None. Vitals: Blood Pressure: 141/62 at start of session; 154/63 upon standing; 113/67 following mobility- Pt wearing Cory hose and Abd binder. Oxygen: >95% throughout on RA. Heart Rate: 70's throughout    COGNITION: Slow Processing, Decreased Problem Solving, and Decreased Safety Awareness    ADL:   Upper Extremity Dressing: Minimal Assistance.

## 2023-10-06 NOTE — PLAN OF CARE
Problem: Discharge Planning  Goal: Discharge to home or other facility with appropriate resources  10/6/2023 1036 by Jose Ramon Nicholson RN  Outcome: Progressing  Flowsheets (Taken 10/6/2023 0848)  Discharge to home or other facility with appropriate resources:   Identify barriers to discharge with patient and caregiver   Arrange for needed discharge resources and transportation as appropriate   Identify discharge learning needs (meds, wound care, etc)  Note: Patient plans to discharge home with wife once medically stable. 10/6/2023 0115 by Ayanna Barnett RN  Outcome: Progressing  Flowsheets (Taken 10/6/2023 0115)  Discharge to home or other facility with appropriate resources:   Identify barriers to discharge with patient and caregiver   Identify discharge learning needs (meds, wound care, etc)     Problem: Safety - Adult  Goal: Free from fall injury  10/6/2023 1036 by Jose Ramon Nicholson RN  Outcome: Progressing  [address] (Taken 10/6/2023 0115 by Ayanna Barnett, RN)  Free From Fall Injury: Instruct family/caregiver on patient safety  Note: No falls noted this shift. Continue falling star program. Bed alarm on, bed in low position. Call light and personal belongings in reach. Patient uses call light appropriately.    10/6/2023 0115 by Ayanna Barnett RN  Outcome: Progressing  Flowsheets (Taken 10/6/2023 0115)  Free From Fall Injury: Instruct family/caregiver on patient safety     Problem: ABCDS Injury Assessment  Goal: Absence of physical injury  10/6/2023 1036 by Jose Ramon Nicholson RN  Outcome: Progressing  [address] (Taken 10/6/2023 0115 by Ayanna Barnett RN)  Absence of Physical Injury: Implement safety measures based on patient assessment  10/6/2023 0115 by Ayanna Barnett RN  Outcome: Progressing  Flowsheets (Taken 10/6/2023 0115)  Absence of Physical Injury: Implement safety measures based on patient assessment     Problem: Respiratory - Adult  Goal: Clear lung sounds  10/6/2023 0910 by Guilherme Banks

## 2023-10-06 NOTE — PROGRESS NOTES
This nurse into room to assess patient. Pt is laying sideways in bed with feet on the floor. This nurse inquiries if patient needs to use the bathroom. Pt states he was attempting to the use the bathroom. Nurse and tech attempt to assist patient to the bathroom with walker. Pt is having a hard time following directions. Nurse and tech ambulate patient back into bed and assist pt with urinal. Pt is disoriented to situation and surroundings. Pt's IV is laying on the floor. Pt states he doesn't know what he is doing.

## 2023-10-06 NOTE — PLAN OF CARE
Problem: Respiratory - Adult  Goal: Clear lung sounds  10/6/2023 0910 by Renee Hager RCP  Outcome: Progressing   Pt continues on aerosols for maintenance of asthma and pt does MDI's at home. Patient mutually agreed on goals.

## 2023-10-06 NOTE — PROGRESS NOTES
[facility]  PHYSICAL THERAPY MISSED TREATMENT NOTE  [facility] RENAL TELEMETRY 6K    Date: 10/6/2023  Patient Name: Barb Gabriel        MRN: [id number]   : 1945  (66 y.o.)  Gender: male   Referring Practitioner: Falguni Humphreys PA-C  Diagnosis: Acute renal failure superimposed on CKD         REASON FOR MISSED TREATMENT:  Pt up in wheelchair, dressed, and has STNA gathering his belongings as he is being discharged. Pt had no questions for this therapist today. Manuel Foote.  Tamar Hess

## 2023-10-06 NOTE — DISCHARGE SUMMARY
MG tablet  Commonly known as: TYLENOL PM EXTRA STRENGTH     Dulaglutide 3 MG/0.5ML Sopn     finasteride 5 MG tablet  Commonly known as: PROSCAR  Take 1 tablet by mouth daily     isosorbide mononitrate 120 MG extended release tablet  Commonly known as: IMDUR  Take 1 tablet by mouth daily     metoprolol succinate 50 MG extended release tablet  Commonly known as: TOPROL XL  Take 1 tablet by mouth daily     nitroGLYCERIN 0.4 MG/SPRAY 0.4 mg spray  Commonly known as: NITROLINGUAL  USE 1 SPRAY ON OR UNDER THE TONGUE EVERY 5 MINUTES AS NEEDED FOR CHEST PAIN     * NovoFine 32G X 6 MM Misc  Generic drug: Insulin Pen Needle  USE 1 NEW NEEDLE 6 TIMES  DAILY AND AS NEEDED     * Novofine Pen Needle 32G X 6 MM Misc  Generic drug: Insulin Pen Needle  1 each by Does not apply route 6 times daily     ONE TOUCH ULTRA 2 w/Device Kit  1 kit by Does not apply route daily DX:11.9     ONE TOUCH ULTRASOFT LANCETS Misc  Use to test Blood Sugar 4 times Daily, Dx: E11.9     ranolazine 1000 MG extended release tablet  Commonly known as: RANEXA  TAKE 1 TABLET BY MOUTH  TWICE DAILY NEW DOSE     tamsulosin 0.4 MG capsule  Commonly known as: FLOMAX  TAKE 1 CAPSULE BY MOUTH  TWICE DAILY     therapeutic multivitamin-minerals tablet           * This list has 2 medication(s) that are the same as other medications prescribed for you. Read the directions carefully, and ask your doctor or other care provider to review them with you.                 STOP taking these medications      fluocinonide 0.05 % cream  Commonly known as: LIDEX     hydrALAZINE 25 MG tablet  Commonly known as: APRESOLINE     hydrOXYzine HCl 10 MG tablet  Commonly known as: ATARAX               Where to Get Your Medications        These medications were sent to [facility]/pharmacy #[id number]- [place], [place] - [address] F [phone]  [address] [place] [address] [facility] [address] [facility]      Phone: [phone]   albuterol sulfate  (90 Base) MCG/ACT inhaler  fluticasone

## 2023-10-08 PROBLEM — I46.9 CARDIAC ARREST (HCC): Status: ACTIVE | Noted: 2023-10-08

## 2023-10-09 NOTE — PROGRESS NOTES
Comprehensive Nutrition Assessment    Type and Reason for Visit:  Initial (ventilator patient)    Nutrition Recommendations/Plan:   When appropriate to begin trophic feeding, recommend Nepro at 10ml/ hour; additional free water flush as per Physician      Malnutrition Assessment:  Malnutrition Status: At risk for malnutrition (Comment) (10/09/23 1410)    Context:  Acute Illness     Findings of the 6 clinical characteristics of malnutrition:  Energy Intake:  Unable to assess (intubated)  Weight Loss:  Unable to assess (due to edema)     Body Fat Loss:  No significant body fat loss     Muscle Mass Loss:  No significant muscle mass loss    Fluid Accumulation:  Unable to assess     Strength:  Not Performed    Nutrition Assessment:     Pt. nutritionally compromised AEB NPO status due to intubation 10/8. At risk for further nutrition compromise r/t cardiac arrest, TTM initiation 10/9, hyperkalemia and underlying medical condition (CAD, DM, CKD, HTN).        Nutrition Related Findings:    Pt. Report/Treatments/Miscellaneous: intubated, NG tube, RN reports begin targeted temperature management with ice packs today, Dr Cross General reports will not start tube feed yet  GI Status: BM 0   Pertinent Labs: Sodium 137, Potassium 5.4, BUN 63, Creatinine 2.4, Glucose 198  Pertinent Meds: humalog, keppra, amiodarone, lactated ringers, dipirvan   Wound Type: None       Current Nutrition Intake & Therapies:          No diet orders on file  Current Tube Feeding (TF) Recommendation:  Feeding Route: Nasogastric  Formula: Renal Formula  Schedule: Continuous  Feeding Regimen: After first 24 hours of TTM, recommend trickle feed Nepro at 10ml/ hour  Additives/Modulars: None  Water Flushes: as per Physician  Current TF & Flush Provides: Nepro at 10ml/ hour = 425 kcals (887 kcals including diprivan), 19 grams protein, 38 grams CHO, 6 grams fiber, 174ml free water in 240ml volume/ 24 hours   When off TTM and appropriate to increase tube feed to goal, recommend Nepro at 30ml/ hour = 1736 kcals (1274 TF, 462 diprivan), 58 grams protein, 115 grams CHO, 18 grams fiber, 523ml free water in 720ml total volume/ 24 hours  Additional Calorie Sources:   Diprivan at 17.5ml/ hour provides 462 lipid kcals/ 24 hours     Anthropometric Measures:  Height: 5' 9\" (175.3 cm)  Ideal Body Weight (IBW): 160 lbs (73 kg)    Admission Body Weight: 218 lb 4.1 oz (99 kg) (10/9; +2 pitting BLE edema)  Current Body Weight: 218 lb 4.1 oz (99 kg), 136.4 % IBW. Current BMI (kg/m2): 32.2  Usual Body Weight:  (per EMR: 10/4/23 228# 13oz, 9/5/23 208# 13oz, 4/26/23 214# 10oz, 11/14/22 218# 14oz)                       BMI Categories: Obese Class 1 (BMI 30.0-34. 9)    Estimated Daily Nutrient Needs:  Energy Requirements Based On: Kcal/kg  Weight Used for Energy Requirements:  (99kgm on 10/9)  Energy (kcal/day): 5789-9651 kcals (14-17)  Weight Used for Protein Requirements: Ideal (73kgm)  Protein (g/day): 58-73 grams (0.8-1)  Method Used for Fluid Requirements: Other (Comment)  Fluid (ml/day): as per Physician    Nutrition Diagnosis:   Inadequate oral intake related to impaired respiratory function as evidenced by NPO or clear liquid status due to medical condition, intubation    Nutrition Interventions:   Food and/or Nutrient Delivery:  (rec begin trophic feeding after first 24 hours of TTM)  Nutrition Education/Counseling: No recommendation at this time  Coordination of Nutrition Care: Continue to monitor while inpatient, Interdisciplinary Rounds       Goals:     Goals: Initiate nutrition support, by next RD assessment       Nutrition Monitoring and Evaluation:      Food/Nutrient Intake Outcomes: Enteral Nutrition Intake/Tolerance  Physical Signs/Symptoms Outcomes: Biochemical Data, Nutrition Focused Physical Findings, Skin, Weight, GI Status, Fluid Status or Edema, Hemodynamic Status    Discharge Planning:     Too soon to determine     Adriana Fernandez RD, LD  Contact: [phone]

## 2023-10-09 NOTE — PROGRESS NOTES
[facility] Laboratory Technician Worksheet      EEG Date: 10/9/2023    Name: Delisa Garcia   : 1945   Age: 66 y.o. SEX: male    ROOM: 4D11 MRN: [id number]           CSN: [id number]      Ordering Provider: Scotty Newman  EEG Number: [id number] Time of Test:  8671    Hand: intubated   Sedation: no, Propofol turned off at 1459    H.V. Done: No  intubated  Photic: Yes    Sleep: Yes  Drowsy: Yes   Sleep Deprived: No    Seizures observed: no    Mentality: obtunded      Clinical History: Hx of Hyperkalemia,Hyperglycemia and RBBB. pt here after Cardiac arrest; anoxic brain injury suspected, pt having myoclonus jerking post cardiac arrest.     CT of the head 10/9/2023  IMPRESSION:  1. No acute intracranial process. 2. Nonspecific left maxillary sinus disease.     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    BPH (benign prostatic hypertrophy)     CAD (coronary artery disease)     Carotid artery disease ([address])     right    Carotid disease, bilateral ([address])     Cerebral artery occlusion with cerebral infarction ([address])     CHF (congestive heart failure) ([address])     Chronic kidney disease     COVID-19 2022    GERD (gastroesophageal reflux disease)     GI bleed 2021    History of blood transfusion     GI bleed    History of blood transfusion     with heart surgery    Hyperlipidemia     Hypertension     Lumbar disc disease     MI (myocardial infarction) ([address])     Nephrolithiasis     NIDDM (non-insulin dependent diabetes mellitus) 1996    diagnosed     RICCARDO (obstructive sleep apnea)     cpap    PVC's (premature ventricular contractions)     Renal artery stenosis ([address]) 2007    left    Renal insufficiency     S/P CABG (status post coronary artery bypass graft) 1997    5 vessels    Skin cancer     Squamous cell carcinoma     Stenosis of right subclavian artery (HCC)     TIA (transient ischemic attack)     Wears partial dentures     upper and lower       Scheduled Meds:   levETIRAcetam  500 mg IntraVENous Q12H    insulin lispro  0-8 Units SubCUTAneous Q4H    [START ON 10/10/2023] cefTRIAXone (ROCEPHIN) IV  1,000 mg IntraVENous Q24H    chlorhexidine  15 mL Mouth/Throat BID     Continuous Infusions:   propofol 30 mcg/kg/min (10/09/23 1315)    amiodarone 0.5 mg/min (10/09/23 1453)    lactated ringers IV soln 125 mL/hr at 10/09/23 0927    dextrose       PRN Meds:.glucose, dextrose bolus **OR** dextrose bolus, glucagon (rDNA), dextrose    Technician: Yaz Rater 10/9/2023

## 2023-10-09 NOTE — ED PROVIDER NOTES
Critical Care    Performed by: Loy Boone MD  Authorized by: Norberto Decker MD    Critical care provider statement:     Critical care time (minutes):  45    Critical care time was exclusive of:  Separately billable procedures and treating other patients and teaching time    Critical care was necessary to treat or prevent imminent or life-threatening deterioration of the following conditions:  Cardiac failure    Critical care was time spent personally by me on the following activities:  Blood draw for specimens, development of treatment plan with patient or surrogate, discussions with consultants, evaluation of patient's response to treatment, examination of patient, interpretation of cardiac output measurements, obtaining history from patient or surrogate, ordering and performing treatments and interventions, ordering and review of laboratory studies, ordering and review of radiographic studies, pulse oximetry, re-evaluation of patient's condition, review of old charts, vascular access procedures and ventilator management    I assumed direction of critical care for this patient from another provider in my specialty: no      Care discussed with: admitting provider      Patient brought in as a cardiac arrest.  ET tube placed in the field. Patient had received epinephrine x2 in the field along with being shocked twice. The last rhythm that they had checked prior to coming to the emergency room was asystole. Patient was given calcium chloride, sodium bicarbonate, and epinephrine immediately on arrival to the emergency department. After 3 minutes we did a pulse check and he had ROSC. EKG showed sinus rhythm with runs of V. tach. 2 g magnesium sulfate given and amiodarone initiated. There was improvement of the rhythm after magnesium was given. Patient had a recent admission for hypoxic respiratory failure and CHF exacerbation. Wife states that he had had a good day but then suddenly syncopized.   She

## 2023-10-09 NOTE — CARE COORDINATION
Case Management Assessment  Initial Evaluation    Date/Time of Evaluation: 10/9/2023 12:53 PM  Assessment Completed by: Ector Carlin RN    If patient is discharged prior to next notation, then this note serves as note for discharge by case management. Patient Name: Holli Moscoso                   YOB: 1945  Diagnosis: Cardiac arrest [facility]) [I46.9]                   Date / Time: 10/8/2023  9:30 PM  Location: [address] [place] [address]     Patient Admission Status: Inpatient   Readmission Risk Low 0-14, Mod 15-19), High > 20: Readmission Risk Score: 22.7    Current PCP: Johnna Lee MD  PCP verified by CM? Yes    Chart Reviewed: Yes      History Provided by: Medical Record (Did not speak to wife at this time r/t condition. Will defer meet and greet until prognosis known.)  Patient Orientation: Unable to Assess    Patient Cognition: Other (see comment) (sedated)    Hospitalization in the last 30 days (Readmission):  Yes    If yes, Readmission Assessment in  Navigator will be completed. Advance Directives:      Code Status: Full Code   Patient's Primary Decision Maker is: Named in Scanned ACP Document    Primary Decision Maker: Gilmer Bender Spouse - [phone]    Secondary Decision Maker: Jagjit Hardin - Child - [phone]    Discharge Planning:    Patient lives with: Spouse/Significant Other Type of Home: House  Primary Care Giver: Self  Patient Support Systems include: Spouse/Significant Other, Family Members, Children   Current Financial resources: Medicare  Current community resources: ECF/Home Care  Current services prior to admission: Durable Medical Equipment, Other (Comment) ( DM clinic)            Current DME: Cane, Wheelchair, Walker, Shower Chair            Type of Home Care services:  Nursing Services    ADLS  Prior functional level:  Independent in ADLs/IADLs  Current functional level: Assistance with the following:, Bathing, Dressing, Toileting, Feeding, Cooking, Housework, Shopping, Mobility    Family can provide assistance at DC: Other (comment) (pending progress)  Would you like Case Management to discuss the discharge plan with any other family members/significant others, and if so, who? Yes (deferred at this time.)  Plans to Return to Present Housing: Unknown at present  Other Identified Issues/Barriers to RETURNING to current housing: pending progress  Potential Assistance needed at discharge: Other (Comment) (Pending progress)            Potential DME:    Patient expects to discharge to: Unknown  Plan for transportation at discharge: Other (see comment) (pending progress)    Financial    Payor: MEDICARE / Plan: MEDICARE PART A AND B / Product Type: *No Product type* /     Does insurance require precert for SNF: No    Potential assistance Purchasing Medications: No  Meds-to-Beds request:        [id number] [place], [place] - [address] [facility],[address] - F [phone]  [address] [place] [address]  Phone: [phone] Fax: [phone]    [facility] ([address] [place] [address]) - [address] [phone] - f [phone] [address] [facility]  [address]  [address] [place] [address]  Phone: [phone] Fax: [address] [phone] Tom Hodges [phone]  [address] [place] [address]  Phone: [phone] Fax: [phone]      Notes:    Factors facilitating achievement of predicted outcomes: Family support    Barriers to discharge: Medical complications    Additional Case Management Notes: Presented to ED via EMS post cardiac arrest. Was at home with wife when he slumped over and became unresponsive. Wife lowered to ground and began CPR. Was intubated in squad and shocked x 2 and give 1 round of epi. Admitted to ICU. Intensivist following. Target temp 36 C. Suspected anoxic brain injury. No cough or gag. K+ 5.4 (over 6 on 10/8). Creatinine 2.4.  LR @

## 2023-10-09 NOTE — CARE COORDINATION
10/09/23 1245   Readmission Assessment   Number of Days since last admission? 1-7 days   Previous Disposition Home with Family  (New HH was ordered, unsure if they had started)   Who is being Jagdish Villanueva  (Used medical records and spouses report to physicians to answer r/t prognosis.)   What was the patient's/caregiver's perception as to why they think they needed to return back to the hospital? Other (Comment)  (Was fine throughout day. Slumped over on couch and went unresponsive. She began CPR.)   Did you visit your Primary Care Physician after you left the hospital, before you returned this time? No   Why weren't you able to visit your PCP? Other (Comment)  (Readmitted prior)   Did you see a specialist, such as Cardiac, Pulmonary, Orthopedic Physician, etc. after you left the hospital? No   Who advised the patient to return to the hospital? Caregiver   Does the patient report anything that got in the way of taking their medications? No   In our efforts to provide the best possible care to you and others like you, can you think of anything that we could have done to help you after you left the hospital the first time, so that you might not have needed to return so soon?  Other (Comment)  (n/a)

## 2023-10-09 NOTE — ED PROVIDER NOTES
[facility] ENCOUNTER          Pt Name: Oriana Hunt  MRN: [id number]  [address] [facility] 1945  Date of evaluation: 10/8/2023  ED Resident: Bird Farmer MD  ED Attending: Dr. Macey Wade       Chief Complaint   Patient presents with    Cardiac Arrest     History obtained from the patient. HISTORY OF PRESENT ILLNESS    HPI  Oriana Hunt is a 66 y.o. male who presents to the emergency department for evaluation of cardiac arrest.     Per patient's wife, they were sitting at home watching TV, patient suddenly flailing and slumped down. He was not complaining of any chest pain or indigestion prior to this episode. Patient's wife initiated CPR on site and called 911. Patient arrives via EMS with [facility] device in place for compressions, intubated. The patient has no other acute complaints at this time.     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    BPH (benign prostatic hypertrophy)     CAD (coronary artery disease) 1997    Carotid artery disease ([address]) 1995    right    Carotid disease, bilateral ([address])     Cerebral artery occlusion with cerebral infarction ([address])     CHF (congestive heart failure) ([address])     Chronic kidney disease     COVID-19 07/09/2022    GERD (gastroesophageal reflux disease)     GI bleed 07/2021    History of blood transfusion 2021    GI bleed    History of blood transfusion 1997    with heart surgery    Hyperlipidemia 1996    Hypertension     Lumbar disc disease 2001    MI (myocardial infarction) ([address]) 1997    Nephrolithiasis 2002    NIDDM (non-insulin dependent diabetes mellitus) 1996    diagnosed 1996    RICCARDO (obstructive sleep apnea) 2005 Pro-BNP 6381.0 (*)     All other components within normal limits   CALCIUM IONIZED SERUM - Abnormal; Notable for the following components:    Calcium,Ionized 1.64 (*)     All other components within normal limits   POC LACTIC ACID - Abnormal; Notable for the following components:    POC Lactic Acid 7.7 (*)     All other components within normal limits   SODIUM, WHOLE BLOOD - Abnormal; Notable for the following components:    Sodium, Whole Blood 136 (*)     All other components within normal limits   GLUCOSE, WHOLE BLOOD - Abnormal; Notable for the following components:    Glucose, Whole Blood 409 (*)     All other components within normal limits   BLOOD GAS, VENOUS - Abnormal; Notable for the following components:    PH MIXED 7.11 (*)     PCO2, MIXED VENOUS 65 (*)     PO2, Mixed 51 (*)     HCO3, Mixed 21 (*)     Base Exc, Mixed -9.3 (*)     All other components within normal limits   ANION GAP - Abnormal; Notable for the following components:    Anion Gap 17.0 (*)     All other components within normal limits   GLOMERULAR FILTRATION RATE, ESTIMATED - Abnormal; Notable for the following components:    Est, Glom Filt Rate 33 (*)     All other components within normal limits   OSMOLALITY - Abnormal; Notable for the following components:    Osmolality Calc 302.6 (*)     All other components within normal limits   BASIC METABOLIC PANEL - Abnormal; Notable for the following components:    Potassium 6.1 (*)     CO2 20 (*)     Glucose 413 (*)     BUN 56 (*)     Creatinine 2.1 (*)     All other components within normal limits   MAGNESIUM - Abnormal; Notable for the following components:    Magnesium 2.6 (*)     All other components within normal limits   PHOSPHORUS - Abnormal; Notable for the following components:    Phosphorus 5.6 (*)     All other components within normal limits   LACTIC ACID - Abnormal; Notable for the following components:    Lactic Acid 4.2 (*)     All other components within normal limits bifurcation. 2. Distal tip of the enteric tube is outside the field of view and below    the diaphragm. 3. Bilateral pulmonary opacities secondary to edema or infection. 4. Probable small left pleural effusion. This document has been electronically signed by: Usha Hanley DO on    10/08/2023 11:06 PM          ED Medications administered this visit:   Medications   norepinephrine-sodium chloride (LEVOPHED) 16-0.9 MG/250ML-% infusion (  Canceled Entry 10/9/23 0028)   amiodarone (NEXTERONE) 360 mg in dextrose 5% 200 ml (1 mg/min IntraVENous New Bag 10/8/23 2153)     Followed by   amiodarone (NEXTERONE) 360 mg in dextrose 5% 200 ml (has no administration in time range)   vancomycin (VANCOCIN) 1,000 mg in sodium chloride 0.9 % 250 mL IVPB ([id number]) (1,000 mg IntraVENous New Bag 10/9/23 0051)   piperacillin-tazobactam (ZOSYN) 4,500 mg in sodium chloride 0.9 % 100 mL IVPB (mini-bag) (has no administration in time range)   lactated ringers IV soln infusion ( IntraVENous New Bag 10/9/23 0024)   calcium gluconate 2,000 mg in sodium chloride 100 mL (2,000 mg IntraVENous New Bag 10/9/23 0024)   glucose chewable tablet 16 g (has no administration in time range)   dextrose bolus 10% 125 mL (has no administration in time range)     Or   dextrose bolus 10% 250 mL (has no administration in time range)   Glucagon Emergency KIT 1 mg (has no administration in time range)   dextrose 10 % infusion (has no administration in time range)   levETIRAcetam (KEPPRA) 1,500 mg in sodium chloride 0.9 % 100 mL IVPB (has no administration in time range)   levETIRAcetam (KEPPRA) 500 mg in sodium chloride 0.9 % 100 mL IVPB (has no administration in time range)   cefTRIAXone (ROCEPHIN) 1,000 mg in sodium chloride 0.9 % 50 mL IVPB (mini-bag) (has no administration in time range)   insulin lispro (HUMALOG) injection vial 0-8 Units (has no administration in time range)   insulin lispro (HUMALOG) injection vial 0-4 Units (has no

## 2023-10-09 NOTE — PROGRESS NOTES
Patient has been on continuous ceribell monitor x 2hrs, during which spontaneous full-body jerking movements have continued to occur. Seizure burden has maintained at 0% throughout recording.

## 2023-10-09 NOTE — PROCEDURES
Date: 10/9/2023  Referring physician: Dr. Mark Byrne    Indication  Patient aged 66 y with cardiac arrest. EEG done to assess for epileptiform activity. Introduction  This 7 hrs EEG was recorded using the [employer] one band system. Automated seizure detection algorithms were applied. Description  The background consistent of burst suppression pattern. No consistent focal slowing or interhemispheric asymmetry was noted. Stage I and stage II sleep were not observed. There were no interictal epileptiform discharges or electrographic seizures. Activations  Hyperventilation was not performed. Intermittent photic stimulation was not performed. Impression  Abnormal awake EEG. The slowing mentioned above suggests severe non specific encephalopathy. It is unclear the burst activity if correlating with any clinical semiology due to lack of video. No epileptiform discharges were identified. Please note the absence of such activity on this record cannot conclusively rule out an epileptic disorder. If such is still clinically suspected, a repeat study with sleep deprivation and/or prolonged sampling may be helpful. Please note this EEG was meant to screen for emergent condition and is prone to artifact and with some limitations. The interpretation of this EEG result should be taken only with clinical correlation. Ideally regular EEG with full leads should be considered when possible. Sophie Serrano MD  Epilepsy Board Certified. Neurology Board Certified.     Electronically Signed

## 2023-10-09 NOTE — PROGRESS NOTES
CRITICAL CARE PROGRESS NOTE      Patient:  Charles Basurto    Unit/Bed:4D-11/011-A  YOB: 1945  MRN: [id number]   PCP: Husam Glass MD  Date of Admission: 10/8/2023  Chief Complaint:- Cardiac arrest    Assessment and Plan:    S/p Cardiac Arrest:  Witnessed arrest by wife (former CCU nurse) who initiated chest compressions. En route per EMS, patient received Epi 1 mg x3, 2 shocks (200 J, 300 J). Asystole on arrival. 1 round CPR in ED, ROSC achieved. Amio drip started for Vtach. POCUS in ICU shows mildly reduced EF, global hypokinesis, no pericardial effusion, no R heart strain. CT head negative for any acute processes. Target temperature of 36 C  Amio drip  Anoxic brain injury:  2/2 cardiac arrest. GCS 3 on admit. No cough or gag reflex. CT head negative for acute processess. MRI Brain with contrast 24 hours after cardiac arrest  Per discussion with MRI staff, cannot give contrast if GFR < 30. Currently hydrating in attempt to improve GFR. LR at 125 mL/hr  Levophed with MAP goal > 75 to ensure Cerebral Perfusion Pressure > 65  Post cardiac arrest myoclonus: Witnessed by admitting provider on exam to face and b/l extremities. Ceribell showed 0% seizure burden. Patient started on Propofol 30 due to these movements. 4 hour EEG ordered, stop propofol at start of EEG  Monitor for further myoclonus/seizure activity during EEG off of propofol  If occurs, give Propofol 50 bolus  Hyperkalemia, improving:  Initial K 4.3, repeat 2 hours later 6.1. Patient given 8 units IV insulin, Albuterol 10. Repeat 5.3. Patient given Lasix IV 20 mg. Monitor  Hyperglycemia:  Glucose 413 on arrival. Patient given 8 units IV insulin as above. Medium dose SSI every 4 hours  UTI: UA shows small Leukocyte esterase, >200 WBC. Patient given Zosyn and Vanc in ED. Continue Rocephin, started 10/9  CKD: Baseline Cr 1.9-2.3.  Needs GFR > 30 for MRI Brain with contrast.  IVF, as above      INITIAL H AND P AND ICU COURSE:  Per 3 seconds. Palpable dorsalis pedis pulses. Skin:  warm and dry. Psych:  non-responsive   Lymph:  No supraclavicular adenopathy. Neurologic:  GCS 3, no cough, no gag reflex    Data: (All radiographs, tracings, PFTs, and imaging are personally viewed and interpreted unless otherwise noted). Sodium 140, K 5.3, Chloride 105, CO2 22, BUN 62, Cr 2.4, magnesium 2.4  Lactic Acid trend: 6.4, 4.2, 2.0  WBC 19.9, Hgb 10.8, Plt 263  UA: glucose 500, moderate bilirubin, large blood, > 300 protein, small leukocyte esterase, >15 hyaline casts, >200 WBC, >100 RBC  Telemetry shows sinus rhythm        Meets Continued ICU Level Care Criteria:    [x] Yes   [] No - Transfer Planned to listed location:  [] HOSPITALIST CONTACTED-      Case and plan discussed with Dr. Aniya Ayala. Electronically signed by Joseline Mistry DO  [address]   Patient seen by me including key components of medical care. Case discussed with resident physician. EEG testing post sedation cessation. Poor outlook. Italicized font, if present,  represents changes to the note made by me. CC time 35 minutes. Time was discontiguous. Time does not include procedure. Time does include my direct assessment of the patient and coordination of care. Time represents more than 50% of the time involved with patient care by the [address] [place] [address] team.  Electronically signed by Ayesha Meehan.  Aniya Ayala MD.

## 2023-10-09 NOTE — CODE DOCUMENTATION
Pt to er via [facility]. Pt coming in for cardiac arrest. Upon arrival to ER, chest compression in progress by charla machine and pt intubated. Pt was given 3, 1mg epi in route. Pt also shocked twice at [address] [facility]. EMS states pt was asystole in route. Pt recently admitted for pulmonary edema. Wife at bedside. Wife states pt was sitting in loveseat when he started shaking and then passed out. Wife states he did not have a pulse. Wife states she got pt on floor and started CPR. Wife states pt passed out around 2105.

## 2023-10-09 NOTE — PROGRESS NOTES
Patient arrived to unit from ED via [address] [facility]. Patient transferred to ICU bed and placed on continuous ICU bedside monitor. Patient admitted for Cardiac arrest ([address]) [I46.9]. Vitals obtained. See flowsheets. Patient's IV access includes 20g right hand, 20g right wrist, left femoral CVC. Current infusions and rates of infusion include amio 1mg. Assessment completed by [facility] RN. Two nurse skin assessment completed by [facility] RN and Filipe Royal RN. See flowsheets for assessment details. Policies and procedures of ICU unable to be explained to patient at this time. Family member(s)/representative(s) present at time of admission include n/a. Patient rights explained to family member(s)/representatives and patient, as able. Patient/patient's family member(s)/representative(s) N/A to have physician notified of their admission. All questions posed by patient's family member(s)/representative(s) and patient answered at this time.

## 2023-10-09 NOTE — CODE DOCUMENTATION
Pt arrived with lira cath in place. Wife states pt was having urinary retention and is on diuretics so they placed it at end of sept when pt was admitted.

## 2023-10-09 NOTE — H&P
CRITICAL CARE PROGRESS NOTE      Patient:  Michael Foreman    Unit/Bed:4D-11/011-A  YOB: 1945  MRN: [id number]   PCP: Agapito Vernon MD  Date of Admission: 10/8/2023  Chief Complaint:- Cardiac arrest    Assessment and Plan:    Cardiac arrest:  Witnessed arrest, wife (former cardiac nurse) provided chest compressions. 2 shocks en route. 1 round of Epi, calcium gluconate, and bicarb upon arrival achieved ROSC. Pt started on Amio gtt for runs of vtach. Bedside POCUS upon arrival to the ICU significant for mildly reduced EF and global hypokinesis, no pericardial effusion, no R heart strain. CTH negative for any acute processes. Targeted temperature of 36 C  Continue Amio gtt  Consult cardiology  EKG  Repeat trop  Repeat electrolytes  Anoxic brain injury: Suspected 2/2 cardiac arrest. GCS 3t, no cough or gag reflex. CTH negative for acute processes. Hyperkalemia: K 6.1 (4.3 upon arrival). 2g Ca gluconate  8 units IV insulin  Albuterol  Recheck  Hyperglycemia: Glucose 413. H/o DM, wife reports compliance with medications. A1c 6.5 on 7/24/23  8 units IV insulin  POC glucose q2hrs  Hypoglycemia protocols  Medium dose sliding scale  Post cardiac arrest myoclonus: Witnessed on exam to face and BLE. Annalee Lively shows 0% seizure burden. Repeat electrolytes  Check CK  UTI: WBC 19.9. + WBC, small leukocyte esterase. Vitals stable at this time, Pt is afebrile. Pending cultures. Procal 0.09. Start Rocephin 1000 mg qd  HTN:  Hold antihypertensives  PAF:  Continue amio gtt  Elevated D dimer:  Correlates to length of cardiac arrest. No R heart strain appreciated on bedside ECHO. Low suspicion for PE. RBBB:  Chronic      INITIAL H AND P AND ICU COURSE:  Pt is a 65 y/o male with a h/o CAD, CKD, HTN, anemia, DM, and PAF who was discharged from this hospital 2 days ago who presents in cardiac arrest. Pt's wife is a former cardiology nurse. She states he was feeling fine all day.  They were watching a movie

## 2023-10-09 NOTE — PROGRESS NOTES
Encountered patient's wife, Grayson Simmonds, in [facility]. She has no family in town, but they are on the way. She shared that Keo Saravia was recently discharged and seemed to be doing well. They were watching a movie and he seemed to have a seizure. She shared she got him to the floor, called 911 and started CPR. She is worried she wasn't doing it right.  endorsed that she did all the right things to get him help. She also stated that Keo Saravia has been saying lately that they need to update their tapia and do  arrangements. She is dwelling on that, wondering if he knew something was going to happen. She is planning on staying the night.  obtained pillows, cases, sheet, and blankets for her. Informed 4D staff that she is here.

## 2023-10-09 NOTE — SIGNIFICANT EVENT
Spoke to patient's wife regarding CODE STATUS for patient due to no CODE STATUS recorded at this time. Asked patient's wife if she would want patient to undergo full resuscitative measures if cardiac arrest were to occur again. Patient's wife states that she does not want to make a definite decision until she talks with her sons. In the absence of decision, I confirmed with wife that full code will be recorded in EMR, however CODE STATUS can be changed at any time. Patient's wife agreeable to this plan.     Electronically signed by Marquise Hunt DO on 10/9/2023 at 9:52 AM

## 2023-10-09 NOTE — PROGRESS NOTES
[facility]--. [id number] [facility] PROGRESS NOTE      Patient: Casie Calvillo  Room #: 4D-11/011-A            YOB: 1945  Age: 66 y.o. Gender: male            Admit Date & Time: 10/8/2023  9:30 PM    Assessment:    The patient is a 67 yo male who is being attended to by his wife. The patient's wife shared her concerns that he would not make it and that she had called bother her sons to come to the hospital. (Sons are coming from [facility])    Interventions: The  focused care on listening and providing appropriate prayers. Outcomes: The patient's spouse seemed more composed post conversation. Plan: 1. Spiritual care will continue to follow the patient according to [facility]. Ricarda's spiritual care SOP. Electronically signed by Corbin Hilliard on 10/9/2023 at 10:45 AM.  Ronny  [phone]     10/09/23 1044   Encounter Summary   Encounter Overview/Reason  Follow-up   Service Provided For: Significant other   Referral/Consult From: Other    Support System Spouse; Children   Last Encounter  10/09/23   Complexity of Encounter Low   Begin Time 0725   End Time  0730   Total Time Calculated 5 min   Spiritual/Emotional needs   Type Spiritual Support   Assessment/Intervention/Outcome   Assessment Anxious   Intervention Prayer (assurance of)/[place]   Outcome Engaged in conversation

## 2023-10-10 NOTE — PROGRESS NOTES
CRITICAL CARE PROGRESS NOTE      Patient:  Darya Rangel    Unit/Bed:4D-11/011-A  YOB: 1945  MRN: [id number]   PCP: Gonzalo Gordon MD  Date of Admission: 10/8/2023  Chief Complaint:- Cardiac arrest    Assessment and Plan:    S/p Cardiac Arrest:  Witnessed arrest by wife (former CCU nurse) who initiated chest compressions. En route per EMS, patient received Epi 1 mg x3, 2 shocks (200 J, 300 J). Asystole on arrival. 1 round CPR in ED, ROSC achieved. Amio drip started for Vtach. POCUS in ICU shows mildly reduced EF, global hypokinesis, no pericardial effusion, no R heart strain. CT head negative for any acute processes. Target temperature of 36 C  Amio drip  Anoxic brain injury:  2/2 cardiac arrest. GCS 3. No cough or gag reflex. CT head negative for acute processess. 4-hour EEG on 10/10 abnormal, burst suppression pattern and attenuation, clinically jaw jerks during the burst of theta activity, suggestive of severe encephalopathy can be seen in anoxic brain injury. MRI Brain without contrast pending  Post cardiac arrest myoclonus: Witnessed by admitting provider on exam to face and b/l extremities. Ceribell showed 0% seizure burden. Patient started on Propofol 30 due to these movements. Discontinued at time of 4-hour EEG. 4-hour EEG results as above, negative for any seizure activity. Hyperkalemia, resolved:  Initial K 4.3, repeat 2 hours later 6.1. Patient given 8 units IV insulin, Albuterol 10. Repeat 5.3. Patient given Lasix IV 20 mg. Repeat K 4.8. Monitor  Hyperglycemia:  Glucose 413 on arrival. Patient given 8 units IV insulin as above. Patient continues to have blood glucose in 200s. Do not want to greatly reduce due to hyperglycemia beneficial at time of brain injury. Medium dose SSI every 4 hours  UTI: UA shows small Leukocyte esterase, >200 WBC. Patient given Zosyn and Vanc in ED. Continue Rocephin, started 10/9  CKD: Baseline Cr 1.9-2.3.  Needs GFR > 30 for MRI Brain with Spoke to patient's wife regarding poor outlook. We will wait to make any further decision until MRI results. Past Medical History:   has a past medical history of Adenomatous colon polyp, Angina at rest, Arthritis, Atrial fibrillation ([address]), 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[address]), BPH (benign prostatic hypertrophy), CAD (coronary artery disease), Carotid artery disease ([address]), Carotid disease, bilateral ([address]), Cerebral artery occlusion with cerebral infarction ([address]), CHF (congestive heart failure) ([address]), Chronic kidney disease, COVID-19, GERD (gastroesophageal reflux disease), GI bleed, History of blood transfusion, History of blood transfusion, Hyperlipidemia, Hypertension, Lumbar disc disease, MI (myocardial infarction) ([address]), Nephrolithiasis, NIDDM (non-insulin dependent diabetes mellitus), RICCARDO (obstructive sleep apnea), PVC's (premature ventricular contractions), Renal artery stenosis ([address]), Renal insufficiency, S/P CABG (status post coronary artery bypass graft), Skin cancer, Squamous cell carcinoma, Stenosis of right subclavian artery ([address]), TIA (transient ischemic attack), and Wears partial dentures. Family History:  family history includes Cancer in his brother, brother, and mother; Heart Disease in his brother; High Blood Pressure in his father and mother; Stroke in his father. Social History:   reports that he has never smoked. He has never used smokeless tobacco. He reports that he does not drink alcohol and does not use drugs. ROS   Unable to obtain, patient intubated and non-responsive     Scheduled Meds:   levETIRAcetam  500 mg IntraVENous Q12H    insulin lispro  0-8 Units SubCUTAneous Q4H    cefTRIAXone (ROCEPHIN) IV  1,000 mg IntraVENous Q24H    chlorhexidine  15 mL Mouth/Throat BID     Continuous Infusions:   propofol Stopped (10/09/23 1508)    norepinephrine      amiodarone 0.5 mg/min (10/10/23 1444)    lactated ringers IV soln 125 mL/hr at 10/10/23 0924    dextrose         PHYSICAL EXAMINATION:  T:  83.

## 2023-10-10 NOTE — PROCEDURES
EEG REPORT       Patient: Stephanie Patel Age: 66 y.o. MRN: [id number]      Referring Provider: No ref. provider found    History: This routine 30 minute scalp EEG was recorded with video- monitoring for a 66 y.o.. male who presented with encephalopathy. This EEG was performed to evaluate for focal and epileptiform abnormalities.      The EEG was reviewed from 14: 50  up to 19: 17  on 7/9/2023    Stephanie Patel   Current Facility-Administered Medications   Medication Dose Route Frequency Provider Last Rate Last Admin    levETIRAcetam (KEPPRA) 500 mg in sodium chloride 0.9 % 100 mL IVPB  500 mg IntraVENous Q12H Zuleika Chong, DO   Stopped at 10/09/23 1351    insulin lispro (HUMALOG) injection vial 0-8 Units  0-8 Units SubCUTAneous Q4H Tano Fresno, DO   2 Units at 10/09/23 1705    propofol infusion  5-50 mcg/kg/min (Order-Specific) IntraVENous Continuous Tano [place], DO   Stopped at 10/09/23 1508    [START ON 10/10/2023] cefTRIAXone (ROCEPHIN) 1,000 mg in sodium chloride 0.9 % 50 mL IVPB (mini-bag)  1,000 mg IntraVENous Q24H Tano Fres[place], DO        chlorhexidine (PERIDEX) 0.12 % solution 15 mL  15 mL Mouth/Throat BID Tano Fresno, DO        norepinephrine (LEVOPHED) 16 mg in sodium chloride 0.9 % 250 mL infusion  1-100 mcg/min IntraVENous Continuous Tano Fres[place], DO        amiodarone (NEXTERONE) 360 mg in dextrose 5% 200 ml  0.5 mg/min IntraVENous Continuous Cleve Coates MD 16.7 mL/hr at 10/09/23 1559 0.5 mg/min at 10/09/23 1559    lactated ringers IV soln infusion   IntraVENous Continuous Zuleika Chong,  mL/hr at 10/09/23 1735 New Bag at 10/09/23 1735    glucose chewable tablet 16 g  4 tablet Oral PRN Zuleika Chong, DO        dextrose bolus 10% 125 mL  125 mL IntraVENous PRN Zuleika Chong, DO        Or    dextrose bolus 10% 250 mL  250 mL IntraVENous PRN Zuleika Chong, DO        Glucagon Emergency KIT 1 mg  1 mg SubCUTAneous PRN

## 2023-10-10 NOTE — PLAN OF CARE
Problem: Respiratory - Adult  Goal: Achieves optimal ventilation and oxygenation  Outcome: Not Progressing                                                Patient Weaning Progress    The patient's vent settings was not able to be weaned this shift. Ventilator settings that were weaned              [] Mode   [] Pressure support weaned   [] Fio2 weaned   [] Peep weaned      Spontaneous weaning trial  was not attempted. due to defined parameters for SBT (spontaneous breathing trial) not being met. Evac tube was  hooked up with continuous low suction(20-30mmHg)      Cuff was not  deflated to determine cuff leak. Unable to get agreement for goals because no family is present and patient cannot respond.

## 2023-10-10 NOTE — PROGRESS NOTES
Pt was unresponsive but was blessed. His family was with him. Prayer was appreciated. 10/10/23 8664   Encounter Summary   Service Provided For: Patient and family together   [facility]   Support System Spouse; Family members   Last Encounter  10/10/23  (NR)   Complexity of Encounter Low   Spiritual/Emotional needs   Type Spiritual Support   Assessment/Intervention/Outcome   Assessment Unable to assess

## 2023-10-11 NOTE — PLAN OF CARE
Problem: Respiratory - Adult  Goal: Achieves optimal ventilation and oxygenation  10/11/2023 0634 by [facility], [facility]  Outcome: Not Progressing                                                Patient Weaning Progress    The patient's vent settings was not able to be weaned this shift. Ventilator settings that were weaned              [] Mode   [] Pressure support weaned   [] Fio2 weaned   [] Peep weaned      Spontaneous weaning trial  was not attempted. due to defined parameters for SBT (spontaneous breathing trial) not being met. Evac tube was not  hooked up with continuous low suction(20-30mmHg)      Cuff was not  deflated to determine cuff leak. Unable to get agreement for goals because no family is present and patient cannot respond.

## 2023-10-11 NOTE — CARE COORDINATION
10/11/23, 4:02 PM EDT    DISCHARGE ON GOING EVALUATION    Antoni Keane day: 3  Location: -11/011-A Reason for admit: Cardiac arrest [facility]) [I46.9]   Procedure:   10/8 Intubated en route  10/8 EEG  10/8 CVC placed  10/8 CT  head: No acute findings  10/10 MRI Brain: No hemorrhage. Diffuse symmetric cortical restricted diffusion suggestive of hypoxia from cardiac interest.    Barriers to Discharge: MRI findings discussed with family. Family has elected to change code status to [facility] with plans for terminal extubation when rest of family arrives. Per primary RN, anticipate transition tomorrow morning to comfort care measures. Remains on vent at this time. PCP: Gonzalo Gordon MD  Readmission Risk Score: 27.5%  Patient Goals/Plan/Treatment Preferences: From home. Plan pending clinical course.

## 2023-10-11 NOTE — PROGRESS NOTES
IV team to room to assess CVC and change dressing. Unable to get blood return from blue lumen of CVC. Suggested order for cathflo at this time. Primary RN stated care was being withdrawn later this afternoon.

## 2023-10-11 NOTE — PROGRESS NOTES
Nutrition Note:  Due to change in medical/code status, dietitian will sign off.   Note plan to change to comfort care

## 2023-10-11 NOTE — PLAN OF CARE
Problem: Safety - Adult  Goal: Free from fall injury  Outcome: Progressing  Flowsheets (Taken 10/11/2023 0357)  Free From Fall Injury: Instruct family/caregiver on patient safety     Problem: Chronic Conditions and Co-morbidities  Goal: Patient's chronic conditions and co-morbidity symptoms are monitored and maintained or improved  Outcome: Progressing  Flowsheets (Taken 10/9/2023 2100)  Care Plan - Patient's Chronic Conditions and Co-Morbidity Symptoms are Monitored and Maintained or Improved:   Monitor and assess patient's chronic conditions and comorbid symptoms for stability, deterioration, or improvement   Collaborate with multidisciplinary team to address chronic and comorbid conditions and prevent exacerbation or deterioration   Update acute care plan with appropriate goals if chronic or comorbid symptoms are exacerbated and prevent overall improvement and discharge     Problem: Pain  Goal: Verbalizes/displays adequate comfort level or baseline comfort level  Outcome: Progressing  Flowsheets (Taken 10/10/2023 1930)  Verbalizes/displays adequate comfort level or baseline comfort level:   Assess pain using appropriate pain scale   Administer analgesics based on type and severity of pain and evaluate response   Implement non-pharmacological measures as appropriate and evaluate response   Consider cultural and social influences on pain and pain management   Notify Licensed Independent Practitioner if interventions unsuccessful or patient reports new pain   Care plan reviewed with patient family. Patient family verbalize understanding of the plan of care and contribute to goal setting.      Problem: Discharge Planning  Goal: Discharge to home or other facility with appropriate resources  Outcome: Not Progressing  Flowsheets (Taken 10/11/2023 0357)  Discharge to home or other facility with appropriate resources:   Identify barriers to discharge with patient and caregiver   Arrange for needed discharge resources and transportation as appropriate   Identify discharge learning needs (meds, wound care, etc)   Refer to discharge planning if patient needs post-hospital services based on physician order or complex needs related to functional status, cognitive ability or social support system     Problem: Respiratory - Adult  Goal: Achieves optimal ventilation and oxygenation  Outcome: Not Progressing  Flowsheets (Taken 10/11/2023 0357)  Achieves optimal ventilation and oxygenation:   Assess for changes in respiratory status   Assess for changes in mentation and behavior   Position to facilitate oxygenation and minimize respiratory effort   Oxygen supplementation based on oxygen saturation or arterial blood gases   Assess the need for suctioning and aspirate as needed   Respiratory therapy support as indicated

## 2023-10-11 NOTE — PROGRESS NOTES
[facility]--. [id number] [facility] PROGRESS NOTE      Patient: Jt Guthrie  Room #: 4D-11/011-A            YOB: 1945  Age: 66 y.o. Gender: male            Admit Date & Time: 10/8/2023  9:30 PM    Assessment: This visit was encouraged by the nursing staff who shared the patient was terminally weaning later today. The patient was intubated and not responsive at the time of this visit. The patients spouse was welcoming and was encouraged to share stories of her  and his nature. 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 She shared wonderful memories and a passion for her . Interventions: The patient's spouse took time to discuss life, death and the plans she had been making. She shared a christina in [address] [facility] and was provided prayers and a service for those approaching death. Outcomes: The patient seemed to be coping with the potential outcome of death well at this time post visit. Plan: 1. Spiritual care will continue to follow the patient according to [facility]. Ricarda's spiritual care SOP. Electronically signed by Ann Marie Castillo, on 10/11/2023 at 12:33 PM.  Ronny  [phone]     10/11/23 1231   Encounter Summary   Encounter Overview/Reason  Follow-up   Service Provided For: Patient;Significant other;Patient and family together   Referral/Consult From: Nurse;Rounding   Support System Children;Spouse; Family members;Friends/neighbors   Last Encounter  10/11/23   Complexity of Encounter High   Begin Time 0740   End Time  0750   Total Time Calculated 10 min   Spiritual/Emotional needs   Type Spiritual Support   Assessment/Intervention/Outcome   Assessment Tearful;Sad   Intervention Discussed death, afterlife; Discussed relationship with God;Prayer (assurance of)/[place] Outcome Grieving

## 2023-10-11 NOTE — PLAN OF CARE
Problem: Respiratory - Adult  Goal: Achieves optimal ventilation and oxygenation  10/11/2023 1249 by Pablo Nicholas RCP  Outcome: Not Progressing                                                Patient Weaning Progress    The patient's vent settings was not able to be weaned this shift. Ventilator settings that were weaned              [] Mode   [] Pressure support weaned   [] Fio2 weaned   [] Peep weaned      Spontaneous weaning trial  was not attempted due to defined parameters for SBT (spontaneous breathing trial) not being met. Reason that defined ventilator parameters for SBT was not met              [x] Patient condition requires increased settings/increased sedation   [] Settings not within weaning range   [] SAT not completed   [] Physician orders        Family mutually agreed on goals.

## 2023-10-11 NOTE — PROGRESS NOTES
CRITICAL CARE PROGRESS NOTE      Patient:  [facility]    Unit/Bed:4D-11/011-A  YOB: 1945  MRN: [id number]   PCP: Killian Mcfarland MD  Date of Admission: 10/8/2023  Chief Complaint:- Cardiac arrest    Assessment and Plan:    Comfort Care Measures: MRI Brain consistent with permanent brain injury. Had discussion with family regarding poor prognosis. Family plans to withdraw care, currently waiting on more family to be present. Will initiate comfort care medications. S/p Cardiac Arrest:  Witnessed arrest by wife (former CCU nurse) who initiated chest compressions. En route per EMS, patient received Epi 1 mg x3, 2 shocks (200 J, 300 J). Asystole on arrival. 1 round CPR in ED, ROSC achieved. Amio drip started for Vtach. POCUS in ICU shows mildly reduced EF, global hypokinesis, no pericardial effusion, no R heart strain. CT head negative for any acute processes. Will discontinue Amio drip due to change in code status. Anoxic brain injury:  2/2 cardiac arrest. GCS 3. No cough or gag reflex. CT head negative for acute processess. 4-hour EEG on 10/10 abnormal, burst suppression pattern and attenuation, clinically jaw jerks during the burst of theta activity, suggestive of severe encephalopathy can be seen in anoxic brain injury. MRI Brain shows anoxic brain injury with restricted cortical diffusion, consistent with permanent structural injury to the brain. Post cardiac arrest myoclonus: Witnessed by admitting provider on exam to face and b/l extremities. Ceribell showed 0% seizure burden. Patient started on Propofol 30 due to these movements. Discontinued at time of 4-hour EEG. 4-hour EEG results as above, negative for any seizure activity. Hyperkalemia, resolved:  Initial K 4.3, repeat 2 hours later 6.1. Patient given 8 units IV insulin, Albuterol 10. Repeat 5.3. Patient given Lasix IV 20 mg. Repeat K 4.8. Hyperglycemia:  Glucose 413 on arrival. Patient given 8 units IV insulin as above.

## 2023-10-12 NOTE — PROGRESS NOTES
[facility]  Notice of Patient Passing      Patient Name- Luis Whyte Number- [de-identified]   Attending Physician- Mike Willett MD    Admitted on-10/8/2023  9:30 PM     On 10/12/2023 at 22 [id number] patient was found in 4D11 with:   Absence of vital signs. Absence of neurological response. Confirmed time of death at 1218. Physician or On-call Physician notified of time of death- yes    Family present at time of death- yes   Spiritual care present at time of death- yes, Father Sharon Rowell    Physician was notified and orders were obtained to release the body. Post-Mortem documentation completed; form printed, signed, and given to admitting. Scotty Murdock RN RN Nursing Supervisor/ Manager  10/12/23   1:15 PM    ________________________________________________________________________          Pt Name: Nuvia Combs   Address: [address] [place] [address]  Phone: [phone] (home)      MRN: [id number]    AGE: 66 y.o.    YOB: 1945       GENDER: male     Primary Care Physician: Alexander Pearson MD   Attending Physician Name: Dr. Mike Willett    Date of Death: 10/12/23  Time of Death: 1218  Pronounced By: Dr. Mike Willett      Emergency Contact:   Name of Family Notified of Death: Cedric Ramos   Relationship to Patient: Spouse   Phone Number: [phone]      Cause of Death: cardiac arrest    Co-Morbidities:  Patient Active Problem List   Diagnosis    CKD (chronic kidney disease) stage 3, GFR 30-59 ml/min (HCC)    Paroxysmal atrial fibrillation (HCC)    S/P CABG (coronary artery bypass graft)    Hyperlipidemia    Lumbar radiculopathy    RICCARDO treated with BiPAP    Chronic diastolic congestive heart failure ([address])    Coronary atherosclerosis    Diabetes mellitus type 2, insulin dependent ([address])    Status post angioplasty with stent    MADI (acute kidney injury) ([address])    SOB (shortness of breath)    Wheezing    Asthma    Essential hypertension Obesity (BMI 30-39. 9)    Elevated PSA    Hypertrophy of prostate with urinary obstruction    Adenomatous colon polyp    Angina, class III (HCC)    Hx of atrial fibrillation without current medication    Bilateral carotid artery stenosis    Hx of nonmelanoma skin cancer    Acute kidney injury superimposed on CKD (HCC)    Anemia of chronic renal failure    Anemia in stage 3 chronic kidney disease (HCC)    Melena    Chronic kidney disease, stage 4 (severe) (HCC)    Strangulated umbilical hernia    Small bowel obstruction (HCC)    Displacement of lumbar intervertebral disc without myelopathy    Degeneration of lumbosacral intervertebral disc    Chronic systolic (congestive) heart failure    Presence of Watchman left atrial appendage closure device    Closed fracture of proximal end of right ulna with routine healing    Closed fracture of spinous process of lumbar vertebra with routine healing    Forehead laceration, initial encounter    Type 2 diabetes mellitus with chronic kidney disease    Acute on chronic congestive heart failure (HCC)    Acute respiratory failure with hypoxia (HCC)    Acute pulmonary edema (HCC)    Mild intermittent asthma with exacerbation    Elevated d-dimer    Abnormal CT of the chest    Acute on chronic anemia    Acute renal failure superimposed on chronic kidney disease ([address])    Cardiac arrest [facility])         Name of  Home: [address] Phone Number: [phone]    Who Will Sign Death Certificate:     [] Salud Mcgee MD     [x] Dr. Christiano Avery

## 2023-10-12 NOTE — PROGRESS NOTES
Pt was unresponsive but his wife was there with him. He was dealing with cardiac arrest.   Treatment would be withdrawn today. His wife was there with him. His children will all be coming in today to be with him. Grief support was offered and prayer given. 10/12/23 1232   Encounter Summary   Service Provided For: Patient and family together   Referral/Consult From: Nurse   Support System Spouse; Family members   Last Encounter  10/12/23   Complexity of Encounter Moderate   Begin Time 0915   End Time  0930   Total Time Calculated 15 min   Spiritual/Emotional needs   Type Spiritual Support   Assessment/Intervention/Outcome   Assessment Anxious   Intervention Empowerment;End of Life Care; Active listening   Outcome Engaged in conversation;Encouraged; Acceptance

## 2023-10-12 NOTE — DISCHARGE SUMMARY
DEATH SUMMARY      Patient:  Dianna Nguyen  YOB: 1945  MRN: [id number]   Acct: [de-identified]    Primary Care Physician: Yari Cohen MD    Admit date:  10/8/2023    Discharge date:  10/12/23     Admission Condition:critical    Discharge Diagnoses:   Cardiac arrest [facility])  Principal Problem:    Cardiac arrest [facility])  Resolved Problems:    * No resolved hospital problems. *      Consultations:-  [x] NONE [] Cardiology  [] Nephrology  [] Hemo onco   [] GI   [] ID  [] Endocrine  [] Pulm    [] Neuro    [] Psych   [] Urology  [] ENT   [] G SURGERY   []Ortho    []CV surg    [] Palliative  [] Hospice [] Pain management   []    []TCU   [] PT/OT  OTHERS:-      [address]:-  Per previous ICU HPI \"Pt is a 65 y/o male with a h/o CAD, CKD, HTN, anemia, DM, and PAF who was discharged from this hospital 2 days ago who presents in cardiac arrest. Pt's wife is a former cardiology nurse. She states he was feeling fine all day. They were watching a movie together on the couch. He slumped over and wasn't responsive to her voice or her shaking him. She moved him to the floor, called 911 and began compressions. Pt was intubated PTA, shocked twice in the field with asystole being his rhythm noticed right before arrival. Upon arrival in the ED was given Epi, calcium chloride, sodium bicarb. Pulses appreciated on 1st pulse check. The monitor showed sinus rhythm with runs of V tach so the Pt was started on Amio gtt and given mag sulfate. Pt was placed on Levo. \"     10/9: Patient unresponsive, does not respond to pain. Due to myoclonus movements, EEG ordered. Instructions to stop Propofol at start of EEG, give Propofol 50 bolus if myoclonus occurs during EEG. EEG ordered to differentiate post cardiac arrest myoclonus vs seizure. Spoke with family regarding patient status.  Due to anoxic brain injury and reperfusion injury, patient requires time to determine if brain injury is permanent. No decisions need to be made at this time regarding discontinuing care. As noted in Significant Event note, patient's wife unsure if she wants to continue full code status or change to [facility], wants to discuss more with family. Due to no decision, patient code status full code. 10/10: EEG does not show any seizure activity, suggestive of anoxic brain injury. MRI brain without contrast ordered, ordered without contrast due to GFR < 30. Patient continues to have twitching movement in mouth. Patient on no sedation at this time. No cough or gag present. Pupils constricted. Patient does not react to pain. Positive Babinski reflex on right lower extremity. Spoke to patient's wife regarding poor outlook. We will wait to make any further decision until MRI results. 10/11: MRI Brain shows diffuse symmetric cortical restricted diffusion suggestive of hypoxia from cardiac arrest, consistent with permament brain damage. Spoke with family regarding poor prognosis and permanent brain injury. Family states that they want to withdraw care. Family states they would like to wait to withdraw care until more family has been able to come see that patient. Will change code status to [facility] and start comfort care medications. Will not extubate until all family is present and the decision has been made to terminally extubate. 10/12: Patient terminally extubated by RT at 56 with family at bedside. Time of Death 1218. Cause of Death:   Anoxic Brain Injury due to cardiac arrest    Date and Time of Death:  10/12/23   1218    Disposition:      Time Spent:  35 minutes    Electronically signed by  Sriarm Caicedo DO on 10/12/2023 at 2:23 PM  Patient seen by me including key components of medical care. Case discussed with resident physician. Discussed with family prior to terminal extubation. Family desires cessation of life support. Electronically signed by Nila Garber MD.

## 2023-10-12 NOTE — DEATH NOTES
Death Pronouncement Note  Patient's Name: Eugenio Young   Patient's YOB: 1945  MRN Number: [id number]    Admitting Provider: Tracie Rogers MD  Attending Provider: Sania Farley MD    Patient was examined by RN and the following were absent: Pulses, Blood Pressure, and Respiratory effort    RN declared the patient dead on 10/12/2023 at 12:18 PM    Preliminary Cause of Death: Anoxic brain injury [facility]) secondary to cardiac arrest     Electronically signed by Kam Gomez DO on 10/12/23 at 2:15 PM EDT

## 2023-10-12 NOTE — PLAN OF CARE
Problem: Respiratory - Adult  Goal: Achieves optimal ventilation and oxygenation  10/12/2023 0133 by Deirdre Cagle RCP  Outcome: Not Progressing  Note: Intubated/Vented.  Wean as tolerated

## 2023-10-12 NOTE — PROGRESS NOTES
Patient Weaning Progress    The patient's vent settings was not able to be weaned this shift. Ventilator settings that were weaned              [] Mode   [] Pressure support weaned   [] Fio2 weaned   [] Peep weaned      Spontaneous weaning trial  was not attempted due to defined parameters for SBT (spontaneous breathing trial) not being met. Reason that defined ventilator parameters for SBT was not met              [x] Patient condition requires increased ventilator settings  [] Requires increased sedation   [] Settings not within weaning range   [] SAT not completed   [] Physician orders      Family mutually agreed on goals.

## 2023-10-14 LAB
BACTERIA BLD AEROBE CULT: NORMAL
BACTERIA BLD AEROBE CULT: NORMAL

## 2023-10-16 NOTE — PROGRESS NOTES
Physician Progress Note      Michael Clarke  CSN #:                  [id number]  :                       1945  ADMIT DATE:       10/8/2023 9:30 PM  [address] [facility] DATE:        10/12/2023 2:45 PM  RESPONDING  PROVIDER #:        Timbo Mittal TEXT:    Pt admitted due to cardiac arrest. Pt noted to have V-tach. If possible,   please document in progress notes and discharge summary the relationship, if   any, between cardiac arrest and V-tach. The medical record reflects the following:  Risk Factors: Elderly, CKD  Clinical Indicators: Documented-Pt started on Amio gtt for runs of vtach  Treatment: ICU monitoring, lab monitoring, imaging, medication management    Thank You! Bhupendra Lopez RN, CRCR  RN Clinical Documentation Integrity  Options provided:  -- Cardiac arrest likely due to V-tach  -- Cardiac arrest unrelated to V-tach  -- Other - I will add my own diagnosis  -- Disagree - Not applicable / Not valid  -- Disagree - Clinically unable to determine / Unknown  -- Refer to Clinical Documentation Reviewer    PROVIDER RESPONSE TEXT:    Provider disagreed with this query.     Query created by: Bhupendra Lopez on 10/16/2023 11:22 AM      Electronically signed by:  Jessica Alejandra 10/16/2023 5:38 PM

## 2023-10-18 NOTE — PROGRESS NOTES
Physician Progress Note      PATIENTMaris Bias  [facility] #:                  [id number]  :                       1945  ADMIT DATE:       10/8/2023 9:30 PM  [facility] [place] DATE:        10/12/2023 2:45 PM  RESPONDING  PROVIDER #:        Renee Gonzalez TEXT:    Patient admitted with Cardiac arrest. Documentation reflects ED final   diagnosis:  10-12\" Patient was started on IV abx for broad coverage given   sepsis. WBC 19.9, LA 6.4/7.7, porcal 0.09, resp 20-31/min, HR /min. Noted UTI. If possible, please document in the progress notes and discharge   summary if Sepsis was: The medical record reflects the following:  Risk Factors: UTI: WBC 19.9. + WBC, small leukocyte esterase. Vitals   stable at this time, Pt is afebrile. Procal 0.09. Clinical Indicators: WBC 19.9, LA 6.4/7.7, porcal 0.09, resp 20-31/min, HR   /min  Treatment: ICU admit, on vent, LEVOPHED, vancomycin, ZOSYN, ROCEPHIN    Thank you. June Wheeler RN, Clinical Documentation Integrity, [employer], [facility]), CRCR  Options provided:  -- Sepsis confirmed after study and was present on admission  -- Sepsis treated and resolved  -- No Sepsis, localized infection only  -- Sepsis was ruled out  -- Other - I will add my own diagnosis  -- Disagree - Not applicable / Not valid  -- Disagree - Clinically unable to determine / Unknown  -- Refer to Clinical Documentation Reviewer    PROVIDER RESPONSE TEXT:    Provider disagreed with this query. Query created by: Marion Smyth on 10/18/2023 12:11 PM      QUERY TEXT:    Patient admitted with Cardiac arrest. Documentation reflects ED final   diagnosis:  10-12\" Patient was started on IV abx for broad coverage given   sepsis. WBC 19.9, LA 6.4/7.7, porcal 0.09, resp 20-31/min, HR /min. BP   99/63 MAP 64,  104/46 with MAP 62 MAP < 65). Placed on LEVOPHED.   If   possible, please document in the progress notes and discharge summary if 10/12: Patient terminally extubated  Options provided:  -- Acute respiratory failure with hypoxia  -- Acute respiratory failure with hypercapnia  -- Acute respiratory failure with hypoxia and hypercapnia  -- Chronic respiratory failure with hypoxia  -- Chronic respiratory failure with hypercapnia  -- Chronic respiratory failure with hypoxia and hypercapnia  -- Acute on chronic respiratory failure with hypoxia  -- Acute on chronic respiratory failure with hypercapnia  -- Acute on chronic respiratory failure with hypoxia and hypercapnia  -- Other - I will add my own diagnosis  -- Disagree - Not applicable / Not valid  -- Disagree - Clinically unable to determine / Unknown  -- Refer to Clinical Documentation Reviewer    PROVIDER RESPONSE TEXT:    This patient is in acute respiratory failure with hypoxia.     Query created by: Meseret John on 10/18/2023 12:22 PM      Electronically signed by:  Livier Barrett 10/18/2023 5:42 PM

## 2023-10-20 ENCOUNTER — TELEPHONE (OUTPATIENT)
Dept: NEPHROLOGY | Age: 78
End: 2023-10-20

## 2023-10-20 NOTE — TELEPHONE ENCOUNTER
Wife called to let Dr. Laura Ross know that Thelma Carlin passed away. She said \"It was his heart. \"

## 2023-11-08 NOTE — TELEPHONE ENCOUNTER
Spoke to pharmacist.   Letter req meter, strips, lancets will not be accepted, needs to be an rx script on script paper only then faxed if unable to find in epic.     [facility] Was A Bandage Applied: Yes

## (undated) PROCEDURE — 0BH17EZ INSERTION OF ENDOTRACHEAL AIRWAY INTO TRACHEA, VIA NATURAL OR ARTIFICIAL OPENING: ICD-10-PCS

## (undated) PROCEDURE — 02HV33Z INSERTION OF INFUSION DEVICE INTO SUPERIOR VENA CAVA, PERCUTANEOUS APPROACH: ICD-10-PCS

## (undated) PROCEDURE — 5A1945Z RESPIRATORY VENTILATION, 24-96 CONSECUTIVE HOURS: ICD-10-PCS

## (undated) DEVICE — GOWN,SIRUS,NON REINFRCD,LARGE,SET IN SL: Brand: MEDLINE

## (undated) DEVICE — PACK PROCEDURE SURG PLAS SC MIN SRHP LF

## (undated) DEVICE — GLOVE ORTHO 8   MSG9480

## (undated) DEVICE — GLOVE SURG SZ 7 L12IN FNGR THK94MIL TRNSLUC YEL LTX HYDRGEL

## (undated) DEVICE — GLOVE SURG SZ 8 L11.77IN FNGR THK9.8MIL STRW LTX POLYMER

## (undated) DEVICE — ABDOMINAL BINDER: Brand: DEROYAL

## (undated) DEVICE — GLOVE ORANGE PI 7   MSG9070

## (undated) DEVICE — SUTURE MCRYL SZ 4-0 L18IN ABSRB UD P-3 L13MM 3/8 CIR PRIM Y494G

## (undated) DEVICE — GAUZE,SPONGE,4"X4",12PLY,STERILE,LF,2'S: Brand: MEDLINE

## (undated) DEVICE — ADHESIVE SKIN CLOSURE FLX 0.5 CC FOR TISS SEAL HISTOACRYL

## (undated) DEVICE — SPONGE GZ W4XL4IN COT 12 PLY TYP VII WVN C FLD DSGN

## (undated) DEVICE — SUTURE VCRL + SZ 3-0 L27IN ABSRB UD L26MM SH 1/2 CIR VCP416H

## (undated) DEVICE — SUTURE VCRL + SZ 2-0 L27IN ABSRB CLR CT-1 1/2 CIR TAPERCUT VCP259H

## (undated) DEVICE — SUTURE NONABSORBABLE BRAIDED 4-0 SH 30 IN BLK PERMA HND K831H

## (undated) DEVICE — PENCIL SMK EVAC ALL IN 1 DSGN ENH VISIBILITY IMPROVED AIR

## (undated) DEVICE — SUTURE PROL SZ 0 L30IN NONABSORBABLE BLU L36MM CT-1 1/2 CIR 8424H

## (undated) DEVICE — COTTON BALL ST

## (undated) DEVICE — GARMENT,MEDLINE,DVT,INT,CALF,MED, GEN2: Brand: MEDLINE

## (undated) DEVICE — BREAST HERNIA PACK: Brand: MEDLINE INDUSTRIES, INC.

## (undated) DEVICE — CHLORAPREP 26ML CLEAR

## (undated) DEVICE — BIOGUARD A/W CLEANING ADAPTER

## (undated) DEVICE — BANDAGE,GAUZE,4.5"X4.1YD,STERILE,LF: Brand: MEDLINE

## (undated) DEVICE — SUTURE MCRYL SZ 4-0 L27IN ABSRB UD L19MM PS-2 1/2 CIR PRIM Y426H